# Patient Record
Sex: FEMALE | Race: WHITE | Employment: OTHER | URBAN - NONMETROPOLITAN AREA
[De-identification: names, ages, dates, MRNs, and addresses within clinical notes are randomized per-mention and may not be internally consistent; named-entity substitution may affect disease eponyms.]

---

## 2017-02-28 ENCOUNTER — PROCEDURE VISIT (OUTPATIENT)
Dept: CARDIOLOGY | Age: 72
End: 2017-02-28

## 2017-02-28 DIAGNOSIS — Z95.810 DUAL IMPLANTABLE CARDIOVERTER-DEFIBRILLATOR IN SITU: Primary | ICD-10-CM

## 2017-02-28 PROCEDURE — 93283 PRGRMG EVAL IMPLANTABLE DFB: CPT | Performed by: INTERNAL MEDICINE

## 2017-03-13 RX ORDER — METOPROLOL TARTRATE 50 MG/1
TABLET, FILM COATED ORAL
Qty: 180 TABLET | Refills: 0 | Status: SHIPPED | OUTPATIENT
Start: 2017-03-13 | End: 2017-06-11 | Stop reason: SDUPTHER

## 2017-03-13 RX ORDER — ENALAPRIL MALEATE 20 MG/1
TABLET ORAL
Qty: 180 TABLET | Refills: 0 | Status: SHIPPED | OUTPATIENT
Start: 2017-03-13 | End: 2017-06-11 | Stop reason: SDUPTHER

## 2017-03-13 RX ORDER — AMIODARONE HYDROCHLORIDE 200 MG/1
TABLET ORAL
Qty: 90 TABLET | Refills: 0 | Status: SHIPPED | OUTPATIENT
Start: 2017-03-13 | End: 2017-06-11 | Stop reason: SDUPTHER

## 2017-04-05 RX ORDER — ATORVASTATIN CALCIUM 80 MG/1
TABLET, FILM COATED ORAL
Qty: 90 TABLET | Refills: 0 | Status: SHIPPED | OUTPATIENT
Start: 2017-04-05 | End: 2017-07-06 | Stop reason: SDUPTHER

## 2017-05-10 DIAGNOSIS — C57.00 FALLOPIAN TUBE CANCER, CARCINOMA, UNSPECIFIED LATERALITY (HCC): Primary | ICD-10-CM

## 2017-05-17 ENCOUNTER — OFFICE VISIT (OUTPATIENT)
Dept: CARDIOLOGY | Age: 72
End: 2017-05-17

## 2017-05-17 VITALS
WEIGHT: 190 LBS | SYSTOLIC BLOOD PRESSURE: 148 MMHG | BODY MASS INDEX: 32.44 KG/M2 | HEIGHT: 64 IN | HEART RATE: 72 BPM | DIASTOLIC BLOOD PRESSURE: 78 MMHG

## 2017-05-17 DIAGNOSIS — Z95.810 DUAL IMPLANTABLE CARDIOVERTER-DEFIBRILLATOR IN SITU: ICD-10-CM

## 2017-05-17 DIAGNOSIS — I25.5 CARDIOMYOPATHY, ISCHEMIC: Primary | ICD-10-CM

## 2017-05-17 PROCEDURE — 3014F SCREEN MAMMO DOC REV: CPT | Performed by: INTERNAL MEDICINE

## 2017-05-17 PROCEDURE — G8598 ASA/ANTIPLAT THER USED: HCPCS | Performed by: INTERNAL MEDICINE

## 2017-05-17 PROCEDURE — 99213 OFFICE O/P EST LOW 20 MIN: CPT | Performed by: INTERNAL MEDICINE

## 2017-05-17 PROCEDURE — G8419 CALC BMI OUT NRM PARAM NOF/U: HCPCS | Performed by: INTERNAL MEDICINE

## 2017-05-17 PROCEDURE — 4040F PNEUMOC VAC/ADMIN/RCVD: CPT | Performed by: INTERNAL MEDICINE

## 2017-05-17 PROCEDURE — 3017F COLORECTAL CA SCREEN DOC REV: CPT | Performed by: INTERNAL MEDICINE

## 2017-05-17 PROCEDURE — 1090F PRES/ABSN URINE INCON ASSESS: CPT | Performed by: INTERNAL MEDICINE

## 2017-05-17 PROCEDURE — 1036F TOBACCO NON-USER: CPT | Performed by: INTERNAL MEDICINE

## 2017-05-17 PROCEDURE — 1123F ACP DISCUSS/DSCN MKR DOCD: CPT | Performed by: INTERNAL MEDICINE

## 2017-05-17 PROCEDURE — G8400 PT W/DXA NO RESULTS DOC: HCPCS | Performed by: INTERNAL MEDICINE

## 2017-05-17 PROCEDURE — G8427 DOCREV CUR MEDS BY ELIG CLIN: HCPCS | Performed by: INTERNAL MEDICINE

## 2017-06-12 RX ORDER — METOPROLOL TARTRATE 50 MG/1
TABLET, FILM COATED ORAL
Qty: 180 TABLET | Refills: 1 | Status: SHIPPED | OUTPATIENT
Start: 2017-06-12 | End: 2017-12-09 | Stop reason: SDUPTHER

## 2017-06-12 RX ORDER — AMIODARONE HYDROCHLORIDE 200 MG/1
TABLET ORAL
Qty: 90 TABLET | Refills: 1 | Status: SHIPPED | OUTPATIENT
Start: 2017-06-12 | End: 2017-12-09 | Stop reason: SDUPTHER

## 2017-06-12 RX ORDER — ENALAPRIL MALEATE 20 MG/1
TABLET ORAL
Qty: 180 TABLET | Refills: 1 | Status: SHIPPED | OUTPATIENT
Start: 2017-06-12 | End: 2017-12-09 | Stop reason: SDUPTHER

## 2017-07-06 RX ORDER — ATORVASTATIN CALCIUM 80 MG/1
TABLET, FILM COATED ORAL
Qty: 90 TABLET | Refills: 2 | Status: SHIPPED | OUTPATIENT
Start: 2017-07-06 | End: 2018-04-02 | Stop reason: SDUPTHER

## 2017-07-13 ENCOUNTER — PROCEDURE VISIT (OUTPATIENT)
Dept: CARDIOLOGY | Age: 72
End: 2017-07-13

## 2017-07-13 DIAGNOSIS — Z95.810 DUAL IMPLANTABLE CARDIOVERTER-DEFIBRILLATOR IN SITU: Primary | ICD-10-CM

## 2017-07-13 PROCEDURE — 93296 REM INTERROG EVL PM/IDS: CPT | Performed by: INTERNAL MEDICINE

## 2017-07-13 PROCEDURE — 93295 DEV INTERROG REMOTE 1/2/MLT: CPT | Performed by: INTERNAL MEDICINE

## 2017-09-13 ENCOUNTER — OFFICE VISIT (OUTPATIENT)
Dept: FAMILY MEDICINE CLINIC | Age: 72
End: 2017-09-13
Payer: MEDICARE

## 2017-09-13 VITALS
HEART RATE: 70 BPM | DIASTOLIC BLOOD PRESSURE: 74 MMHG | BODY MASS INDEX: 31.65 KG/M2 | TEMPERATURE: 98.2 F | SYSTOLIC BLOOD PRESSURE: 144 MMHG | HEIGHT: 64 IN | RESPIRATION RATE: 16 BRPM | WEIGHT: 185.4 LBS

## 2017-09-13 DIAGNOSIS — Z12.11 SPECIAL SCREENING FOR MALIGNANT NEOPLASMS, COLON: ICD-10-CM

## 2017-09-13 DIAGNOSIS — E11.9 TYPE 2 DIABETES MELLITUS WITHOUT COMPLICATION, WITHOUT LONG-TERM CURRENT USE OF INSULIN (HCC): Primary | ICD-10-CM

## 2017-09-13 DIAGNOSIS — E78.2 MIXED HYPERLIPIDEMIA: ICD-10-CM
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
## 2017-09-13 DIAGNOSIS — M25.562 ACUTE PAIN OF BOTH KNEES: ICD-10-CM

## 2017-09-13 DIAGNOSIS — Z23 NEED FOR VACCINATION: ICD-10-CM

## 2017-09-13 DIAGNOSIS — Z91.81 AT HIGH RISK FOR FALLS: ICD-10-CM

## 2017-09-13 DIAGNOSIS — M25.561 ACUTE PAIN OF BOTH KNEES: ICD-10-CM

## 2017-09-13 DIAGNOSIS — I10 ESSENTIAL HYPERTENSION: ICD-10-CM

## 2017-09-13 PROCEDURE — G8598 ASA/ANTIPLAT THER USED: HCPCS | Performed by: FAMILY MEDICINE

## 2017-09-13 PROCEDURE — 96372 THER/PROPH/DIAG INJ SC/IM: CPT | Performed by: FAMILY MEDICINE

## 2017-09-13 PROCEDURE — G8400 PT W/DXA NO RESULTS DOC: HCPCS | Performed by: FAMILY MEDICINE

## 2017-09-13 PROCEDURE — 3046F HEMOGLOBIN A1C LEVEL >9.0%: CPT | Performed by: FAMILY MEDICINE

## 2017-09-13 PROCEDURE — G8427 DOCREV CUR MEDS BY ELIG CLIN: HCPCS | Performed by: FAMILY MEDICINE

## 2017-09-13 PROCEDURE — G0009 ADMIN PNEUMOCOCCAL VACCINE: HCPCS | Performed by: FAMILY MEDICINE

## 2017-09-13 PROCEDURE — 99204 OFFICE O/P NEW MOD 45 MIN: CPT | Performed by: FAMILY MEDICINE

## 2017-09-13 PROCEDURE — 3014F SCREEN MAMMO DOC REV: CPT | Performed by: FAMILY MEDICINE

## 2017-09-13 PROCEDURE — 3017F COLORECTAL CA SCREEN DOC REV: CPT | Performed by: FAMILY MEDICINE

## 2017-09-13 PROCEDURE — 1090F PRES/ABSN URINE INCON ASSESS: CPT | Performed by: FAMILY MEDICINE

## 2017-09-13 PROCEDURE — 1123F ACP DISCUSS/DSCN MKR DOCD: CPT | Performed by: FAMILY MEDICINE

## 2017-09-13 PROCEDURE — 90732 PPSV23 VACC 2 YRS+ SUBQ/IM: CPT | Performed by: FAMILY MEDICINE

## 2017-09-13 PROCEDURE — G8417 CALC BMI ABV UP PARAM F/U: HCPCS | Performed by: FAMILY MEDICINE

## 2017-09-13 PROCEDURE — 1036F TOBACCO NON-USER: CPT | Performed by: FAMILY MEDICINE

## 2017-09-13 PROCEDURE — 4040F PNEUMOC VAC/ADMIN/RCVD: CPT | Performed by: FAMILY MEDICINE

## 2017-09-13 RX ORDER — TRIAMCINOLONE ACETONIDE 40 MG/ML
60 INJECTION, SUSPENSION INTRA-ARTICULAR; INTRAMUSCULAR ONCE
Status: DISCONTINUED | OUTPATIENT
Start: 2017-09-13 | End: 2017-09-13

## 2017-09-13 RX ORDER — KETOROLAC TROMETHAMINE 30 MG/ML
60 INJECTION, SOLUTION INTRAMUSCULAR; INTRAVENOUS ONCE
Status: COMPLETED | OUTPATIENT
Start: 2017-09-13 | End: 2017-09-13

## 2017-09-13 RX ORDER — TRAMADOL HYDROCHLORIDE 100 MG/1
100 TABLET, EXTENDED RELEASE ORAL DAILY PRN
Qty: 7 TABLET | Refills: 0 | Status: CANCELLED | OUTPATIENT
Start: 2017-09-13 | End: 2017-09-20

## 2017-09-13 RX ORDER — TRAMADOL HYDROCHLORIDE 50 MG/1
50 TABLET ORAL EVERY 8 HOURS PRN
Qty: 20 TABLET | Refills: 0 | Status: SHIPPED | OUTPATIENT
Start: 2017-09-13 | End: 2017-11-20 | Stop reason: SDUPTHER

## 2017-09-13 RX ADMIN — KETOROLAC TROMETHAMINE 60 MG: 30 INJECTION, SOLUTION INTRAMUSCULAR; INTRAVENOUS at 09:28

## 2017-09-13 ASSESSMENT — ENCOUNTER SYMPTOMS
COUGH: 0
SHORTNESS OF BREATH: 0
RHINORRHEA: 0
WHEEZING: 0
EYE DISCHARGE: 0
DIARRHEA: 0
NAUSEA: 0
CONSTIPATION: 0
SORE THROAT: 0
ABDOMINAL PAIN: 0

## 2017-09-14 ENCOUNTER — HOSPITAL ENCOUNTER (OUTPATIENT)
Dept: GENERAL RADIOLOGY | Age: 72
Discharge: HOME | End: 2017-09-14
Payer: MEDICARE

## 2017-09-14 ENCOUNTER — HOSPITAL ENCOUNTER (OUTPATIENT)
Age: 72
Discharge: HOME | End: 2017-09-14
Payer: MEDICARE
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
## 2017-09-14 DIAGNOSIS — M25.562 ACUTE PAIN OF BOTH KNEES: ICD-10-CM

## 2017-09-14 DIAGNOSIS — M25.561 ACUTE PAIN OF BOTH KNEES: ICD-10-CM

## 2017-09-14 PROCEDURE — 73565 X-RAY EXAM OF KNEES: CPT

## 2017-09-21 ENCOUNTER — PROCEDURE VISIT (OUTPATIENT)
Dept: CARDIOLOGY CLINIC | Age: 72
End: 2017-09-21
Payer: MEDICARE

## 2017-09-21 DIAGNOSIS — Z95.810 DUAL IMPLANTABLE CARDIOVERTER-DEFIBRILLATOR IN SITU: ICD-10-CM

## 2017-09-21 DIAGNOSIS — I25.5 CARDIOMYOPATHY, ISCHEMIC: Primary | ICD-10-CM

## 2017-09-21 PROCEDURE — 93297 REM INTERROG DEV EVAL ICPMS: CPT | Performed by: INTERNAL MEDICINE

## 2017-09-26 ENCOUNTER — OFFICE VISIT (OUTPATIENT)
Dept: FAMILY MEDICINE CLINIC | Age: 72
End: 2017-09-26
Payer: MEDICARE

## 2017-09-26 VITALS
SYSTOLIC BLOOD PRESSURE: 132 MMHG | DIASTOLIC BLOOD PRESSURE: 76 MMHG | HEART RATE: 72 BPM | WEIGHT: 180.8 LBS | BODY MASS INDEX: 31.25 KG/M2 | RESPIRATION RATE: 12 BRPM

## 2017-09-26 DIAGNOSIS — E11.9 TYPE 2 DIABETES MELLITUS WITHOUT COMPLICATION, WITHOUT LONG-TERM CURRENT USE OF INSULIN (HCC): Primary | ICD-10-CM

## 2017-09-26 PROCEDURE — 1090F PRES/ABSN URINE INCON ASSESS: CPT | Performed by: FAMILY MEDICINE

## 2017-09-26 PROCEDURE — 1036F TOBACCO NON-USER: CPT | Performed by: FAMILY MEDICINE

## 2017-09-26 PROCEDURE — 3014F SCREEN MAMMO DOC REV: CPT | Performed by: FAMILY MEDICINE

## 2017-09-26 PROCEDURE — 3046F HEMOGLOBIN A1C LEVEL >9.0%: CPT | Performed by: FAMILY MEDICINE

## 2017-09-26 PROCEDURE — 1123F ACP DISCUSS/DSCN MKR DOCD: CPT | Performed by: FAMILY MEDICINE

## 2017-09-26 PROCEDURE — 4040F PNEUMOC VAC/ADMIN/RCVD: CPT | Performed by: FAMILY MEDICINE

## 2017-09-26 PROCEDURE — G8417 CALC BMI ABV UP PARAM F/U: HCPCS | Performed by: FAMILY MEDICINE

## 2017-09-26 PROCEDURE — G8427 DOCREV CUR MEDS BY ELIG CLIN: HCPCS | Performed by: FAMILY MEDICINE

## 2017-09-26 PROCEDURE — 82274 ASSAY TEST FOR BLOOD FECAL: CPT | Performed by: FAMILY MEDICINE

## 2017-09-26 PROCEDURE — G8400 PT W/DXA NO RESULTS DOC: HCPCS | Performed by: FAMILY MEDICINE

## 2017-09-26 PROCEDURE — 99214 OFFICE O/P EST MOD 30 MIN: CPT | Performed by: FAMILY MEDICINE

## 2017-09-26 PROCEDURE — G8598 ASA/ANTIPLAT THER USED: HCPCS | Performed by: FAMILY MEDICINE

## 2017-09-26 PROCEDURE — 3017F COLORECTAL CA SCREEN DOC REV: CPT | Performed by: FAMILY MEDICINE

## 2017-09-26 RX ORDER — GABAPENTIN 100 MG/1
100 CAPSULE ORAL DAILY
Qty: 90 CAPSULE | Refills: 3 | Status: SHIPPED | OUTPATIENT
Start: 2017-09-26 | End: 2017-10-31

## 2017-09-26 ASSESSMENT — ENCOUNTER SYMPTOMS
RHINORRHEA: 0
EYE DISCHARGE: 0
SHORTNESS OF BREATH: 0
CONSTIPATION: 0
COUGH: 0
WHEEZING: 0
DIARRHEA: 0
SORE THROAT: 0
NAUSEA: 0
ABDOMINAL PAIN: 0

## 2017-09-27 ENCOUNTER — HOSPITAL ENCOUNTER (OUTPATIENT)
Age: 72
Discharge: HOME | End: 2017-09-27
Payer: MEDICARE

## 2017-09-27 DIAGNOSIS — E11.9 TYPE 2 DIABETES MELLITUS WITHOUT COMPLICATION, WITHOUT LONG-TERM CURRENT USE OF INSULIN (HCC): ICD-10-CM

## 2017-09-27 LAB
CREATININE, URINE: 64.8 MG/DL
MICROALBUMIN UR-MCNC: < 1.2 MG/DL
MICROALBUMIN/CREAT UR-RTO: 19 MG/G (ref 0–30)

## 2017-09-27 PROCEDURE — 83036 HEMOGLOBIN GLYCOSYLATED A1C: CPT

## 2017-09-27 PROCEDURE — 82043 UR ALBUMIN QUANTITATIVE: CPT

## 2017-09-29 LAB — MISC. #1 REFERENCE GROUP TEST: ABNORMAL

## 2017-10-02 LAB
CONTROL: NORMAL
HEMOCCULT STL QL: NORMAL

## 2017-10-03 ENCOUNTER — OFFICE VISIT (OUTPATIENT)
Dept: FAMILY MEDICINE CLINIC | Age: 72
End: 2017-10-03
Payer: MEDICARE

## 2017-10-03 VITALS
DIASTOLIC BLOOD PRESSURE: 78 MMHG | HEIGHT: 64 IN | BODY MASS INDEX: 30.32 KG/M2 | WEIGHT: 177.6 LBS | TEMPERATURE: 97.6 F | RESPIRATION RATE: 16 BRPM | HEART RATE: 70 BPM | SYSTOLIC BLOOD PRESSURE: 138 MMHG

## 2017-10-03 DIAGNOSIS — E11.42 DIABETIC POLYNEUROPATHY ASSOCIATED WITH TYPE 2 DIABETES MELLITUS (HCC): ICD-10-CM

## 2017-10-03 DIAGNOSIS — E11.65 TYPE 2 DIABETES MELLITUS WITH HYPERGLYCEMIA, WITHOUT LONG-TERM CURRENT USE OF INSULIN (HCC): Primary | ICD-10-CM

## 2017-10-03 DIAGNOSIS — I10 ESSENTIAL HYPERTENSION: ICD-10-CM

## 2017-10-03 PROBLEM — E11.40 DIABETIC NEUROPATHY (HCC): Status: ACTIVE | Noted: 2017-10-03

## 2017-10-03 PROCEDURE — G8598 ASA/ANTIPLAT THER USED: HCPCS | Performed by: FAMILY MEDICINE

## 2017-10-03 PROCEDURE — G8400 PT W/DXA NO RESULTS DOC: HCPCS | Performed by: FAMILY MEDICINE

## 2017-10-03 PROCEDURE — 4040F PNEUMOC VAC/ADMIN/RCVD: CPT | Performed by: FAMILY MEDICINE

## 2017-10-03 PROCEDURE — 1090F PRES/ABSN URINE INCON ASSESS: CPT | Performed by: FAMILY MEDICINE

## 2017-10-03 PROCEDURE — 99214 OFFICE O/P EST MOD 30 MIN: CPT | Performed by: FAMILY MEDICINE

## 2017-10-03 PROCEDURE — 3017F COLORECTAL CA SCREEN DOC REV: CPT | Performed by: FAMILY MEDICINE

## 2017-10-03 PROCEDURE — G8427 DOCREV CUR MEDS BY ELIG CLIN: HCPCS | Performed by: FAMILY MEDICINE

## 2017-10-03 PROCEDURE — 1123F ACP DISCUSS/DSCN MKR DOCD: CPT | Performed by: FAMILY MEDICINE

## 2017-10-03 PROCEDURE — G8484 FLU IMMUNIZE NO ADMIN: HCPCS | Performed by: FAMILY MEDICINE

## 2017-10-03 PROCEDURE — 1036F TOBACCO NON-USER: CPT | Performed by: FAMILY MEDICINE

## 2017-10-03 PROCEDURE — 3014F SCREEN MAMMO DOC REV: CPT | Performed by: FAMILY MEDICINE

## 2017-10-03 PROCEDURE — 3046F HEMOGLOBIN A1C LEVEL >9.0%: CPT | Performed by: FAMILY MEDICINE

## 2017-10-03 PROCEDURE — G8417 CALC BMI ABV UP PARAM F/U: HCPCS | Performed by: FAMILY MEDICINE

## 2017-10-03 ASSESSMENT — ENCOUNTER SYMPTOMS
VISUAL CHANGE: 0
SORE THROAT: 0
COUGH: 0
DIARRHEA: 0
SHORTNESS OF BREATH: 0
NAUSEA: 0
ABDOMINAL PAIN: 0
WHEEZING: 0
RHINORRHEA: 0
CONSTIPATION: 0
EYE DISCHARGE: 0

## 2017-10-03 NOTE — PATIENT INSTRUCTIONS
Learning About Diabetes Food Guidelines  Your Care Instructions  Meal planning is important to manage diabetes. It helps keep your blood sugar at a target level (which you set with your doctor). You don't have to eat special foods. You can eat what your family eats, including sweets once in a while. But you do have to pay attention to how often you eat and how much you eat of certain foods. You may want to work with a dietitian or a certified diabetes educator (CDE) to help you plan meals and snacks. A dietitian or CDE can also help you lose weight if that is one of your goals. What should you know about eating carbs? Managing the amount of carbohydrate (carbs) you eat is an important part of healthy meals when you have diabetes. Carbohydrate is found in many foods. · Learn which foods have carbs. And learn the amounts of carbs in different foods. ¨ Bread, cereal, pasta, and rice have about 15 grams of carbs in a serving. A serving is 1 slice of bread (1 ounce), ½ cup of cooked cereal, or 1/3 cup of cooked pasta or rice. ¨ Fruits have 15 grams of carbs in a serving. A serving is 1 small fresh fruit, such as an apple or orange; ½ of a banana; ½ cup of cooked or canned fruit; ½ cup of fruit juice; 1 cup of melon or raspberries; or 2 tablespoons of dried fruit. ¨ Milk and no-sugar-added yogurt have 15 grams of carbs in a serving. A serving is 1 cup of milk or 2/3 cup of no-sugar-added yogurt. ¨ Starchy vegetables have 15 grams of carbs in a serving. A serving is ½ cup of mashed potatoes or sweet potato; 1 cup winter squash; ½ of a small baked potato; ½ cup of cooked beans; or ½ cup cooked corn or green peas. · Learn how much carbs to eat each day and at each meal. A dietitian or CDE can teach you how to keep track of the amount of carbs you eat. This is called carbohydrate counting. · If you are not sure how to count carbohydrate grams, use the Plate Method to plan meals.  It is a good, quick way to make skip meals. Your blood sugar may drop too low if you skip meals and take insulin or certain medicines for diabetes. · Check with your doctor before you drink alcohol. Alcohol can cause your blood sugar to drop too low. Alcohol can also cause a bad reaction if you take certain diabetes medicines. Follow-up care is a key part of your treatment and safety. Be sure to make and go to all appointments, and call your doctor if you are having problems. It's also a good idea to know your test results and keep a list of the medicines you take. Where can you learn more? Go to https://SourceDogg.compepicewPlayDo.Meal Sharing. org and sign in to your Macrotek account. Enter M507 in the [facility] box to learn more about \"Learning About Diabetes Food Guidelines. \"     If you do not have an account, please click on the \"Sign Up Now\" link. Current as of: March 13, 2017  Content Version: 11.3  © 5384-9159 [employer]. Care instructions adapted under license by [facility] ([facility]). If you have questions about a medical condition or this instruction, always ask your healthcare professional. [place] [address] any warranty or liability for your use of this information. Diabetes and Preventing Falls: Care Instructions  Your Care Instructions  If you are an older adult who has diabetes, you may have a higher risk of falling. Complications of diabetessuch as nerve damage, foot problems, and reduced visionmay increase your risk of a fall. Some of your medicines also may add to your risk. By making your home safer, you can lower your risk of falling. Doing things to prevent diabetes complications may also help to lower your risk. You can make your home safer with a few simple measures. Follow-up care is a key part of your treatment and safety. Be sure to make and go to all appointments, and call your doctor if you are having problems.  It's also a good idea to know your test results and keep a list of the medicines you take. How can you care for yourself at home? Taking care of yourself  · Keep your blood sugar at a target level (which you set with your doctor). · Exercise regularly to improve your strength, muscle tone, and balance. Walk if you can. Swimming may be a good choice if you cannot walk easily. · Have your vision checked as often as your doctor recommends. It is usually once a year or more often if you have eye problems. · Know the side effects of the medicines you take. Ask your doctor or pharmacist whether the medicines you take can affect your balance. Sleeping pills or sedatives can affect your balance. · Limit the amount of alcohol you drink. Alcohol can impair your balance and other senses. · Have your doctor check your feet during each visit. If you have a foot problem, see your doctor. Preventing falls at home  · Remove raised doorway thresholds, throw rugs, and clutter. Repair loose carpet or raised areas in the floor. · Move furniture and electrical cords to keep them out of walking paths. · Use nonskid floor wax, and wipe up spills right away, especially on ceramic tile floors. · If you use a walker or cane, put rubber tips on it. If you use crutches, clean the bottoms of them regularly with an abrasive pad, such as steel wool. · Keep your house well lit, especially [place], and outside walkways. Use night-lights in areas such as hallways and bathrooms. Add extra light switches or use remote switches (such as switches that go on or off when you clap your hands) to make it easier to turn lights on if you have to get up during the night. · Install sturdy handrails on stairways. Put grab bars near your shower, bathtub, and toilet. · Store household items on low shelves so that you do not have to climb or reach high. Or use a reaching device that you can get at a medical supply store.  If you have to climb for something, use a step stool with handrails, or ask someone to get

## 2017-10-03 NOTE — MR AVS SNAPSHOT
[address] [place] [address] [address] Jesus Guess [phone]      You Were Seen for:         Comments    Type 2 diabetes mellitus with hyperglycemia, without long-term current use of insulin ([facility]   [[id number]]         Vital Signs     Blood Pressure Pulse Temperature Respirations Height Weight    138/78 (Site: Left Arm, Position: Sitting, Cuff Size: Medium Adult) 70 97.6 °F (36.4 °C) (Oral) 16 5' 3.78\" (1.62 m) 177 lb 9.6 oz (80.6 kg)    Body Mass Index Smoking Status                30.7 kg/m2 Former Smoker          Additional Information about your Body Mass Index (BMI)           Your BMI as listed above is considered obese (30 or more). BMI is an estimate of body fat, calculated from your height and weight. The higher your BMI, the greater your risk of heart disease, high blood pressure, type 2 diabetes, stroke, gallstones, arthritis, sleep apnea, and certain cancers. BMI is not perfect. It may overestimate body fat in athletes and people who are more muscular. Even a small weight loss (between 5 and 10 percent of your current weight) by decreasing your calorie intake and becoming more physically active will help lower your risk of developing or worsening diseases associated with obesity. Learn more at: SiteJabber.co.uk          Instructions         Learning About Diabetes Food Guidelines  Your Care Instructions  Meal planning is important to manage diabetes. It helps keep your blood sugar at a target level (which you set with your doctor). You don't have to eat special foods. You can eat what your family eats, including sweets once in a while. But you do have to pay attention to how often you eat and how much you eat of certain foods. You may want to work with a dietitian or a certified diabetes educator (CDE) to help you plan meals and snacks. A dietitian or CDE can also help you lose weight if that is one of your goals. What should you know about eating carbs? Managing the amount of carbohydrate (carbs) you eat is an important part of healthy meals when you have diabetes. Carbohydrate is found in many foods. · Learn which foods have carbs. And learn the amounts of carbs in different foods. ¨ Bread, cereal, pasta, and rice have about 15 grams of carbs in a serving. A serving is 1 slice of bread (1 ounce), ½ cup of cooked cereal, or 1/3 cup of cooked pasta or rice. ¨ Fruits have 15 grams of carbs in a serving. A serving is 1 small fresh fruit, such as an apple or orange; ½ of a banana; ½ cup of cooked or canned fruit; ½ cup of fruit juice; 1 cup of melon or raspberries; or 2 tablespoons of dried fruit. ¨ Milk and no-sugar-added yogurt have 15 grams of carbs in a serving. A serving is 1 cup of milk or 2/3 cup of no-sugar-added yogurt. ¨ Starchy vegetables have 15 grams of carbs in a serving. A serving is ½ cup of mashed potatoes or sweet potato; 1 cup winter squash; ½ of a small baked potato; ½ cup of cooked beans; or ½ cup cooked corn or green peas. · Learn how much carbs to eat each day and at each meal. A dietitian or CDE can teach you how to keep track of the amount of carbs you eat. This is called carbohydrate counting. · If you are not sure how to count carbohydrate grams, use the Plate Method to plan meals. It is a good, quick way to make sure that you have a balanced meal. It also helps you spread carbs throughout the day. ¨ Divide your plate by types of foods. Put non-starchy vegetables on half the plate, meat or other protein food on one-quarter of the plate, and a grain or starchy vegetable in the final quarter of the plate.  To this you can add a small piece of fruit and 1 cup of milk or yogurt, depending on how many carbs you are supposed to eat at a meal.  · Try to eat about the same amount of carbs at each meal. Do not \"save up\" your daily allowance of carbs to eat at one meal. · Proteins have very little or no carbs per serving. Examples of proteins are beef, chicken, turkey, fish, eggs, tofu, cheese, cottage cheese, and peanut butter. A serving size of meat is 3 ounces, which is about the size of a deck of cards. Examples of meat substitute serving sizes (equal to 1 ounce of meat) are 1/4 cup of cottage cheese, 1 egg, 1 tablespoon of peanut butter, and ½ cup of tofu. How can you eat out and still eat healthy? · Learn to estimate the serving sizes of foods that have carbohydrate. If you measure food at home, it will be easier to estimate the amount in a serving of restaurant food. · If the meal you order has too much carbohydrate (such as potatoes, corn, or baked beans), ask to have a low-carbohydrate food instead. Ask for a salad or green vegetables. · If you use insulin, check your blood sugar before and after eating out to help you plan how much to eat in the future. · If you eat more carbohydrate at a meal than you had planned, take a walk or do other exercise. This will help lower your blood sugar. What else should you know? · Limit saturated fat, such as the fat from meat and dairy products. This is a healthy choice because people who have diabetes are at higher risk of heart disease. So choose lean cuts of meat and nonfat or low-fat dairy products. Use olive or canola oil instead of butter or shortening when cooking. · Don't skip meals. Your blood sugar may drop too low if you skip meals and take insulin or certain medicines for diabetes. · Check with your doctor before you drink alcohol. Alcohol can cause your blood sugar to drop too low. Alcohol can also cause a bad reaction if you take certain diabetes medicines. Follow-up care is a key part of your treatment and safety. Be sure to make and go to all appointments, and call your doctor if you are having problems. It's also a good idea to know your test results and keep a list of the medicines you take. Where can you learn more? Go to https://chpepiceweb.Getonic. org and sign in to your Tonchidot account. Enter P602 in the [employer] box to learn more about \"Learning About Diabetes Food Guidelines. \"     If you do not have an account, please click on the \"Sign Up Now\" link. Current as of: March 13, 2017  Content Version: 11.3  © 0906-8239 [employer] ([employer]). Care instructions adapted under license by [facility] ([facility]). If you have questions about a medical condition or this instruction, always ask your healthcare professional. [place] [address] any warranty or liability for your use of this information. Diabetes and Preventing Falls: Care Instructions  Your Care Instructions  If you are an older adult who has diabetes, you may have a higher risk of falling. Complications of diabetessuch as nerve damage, foot problems, and reduced visionmay increase your risk of a fall. Some of your medicines also may add to your risk. By making your home safer, you can lower your risk of falling. Doing things to prevent diabetes complications may also help to lower your risk. You can make your home safer with a few simple measures. Follow-up care is a key part of your treatment and safety. Be sure to make and go to all appointments, and call your doctor if you are having problems. It's also a good idea to know your test results and keep a list of the medicines you take. How can you care for yourself at home? Taking care of yourself  · Keep your blood sugar at a target level (which you set with your doctor). · Exercise regularly to improve your strength, muscle tone, and balance. Walk if you can. Swimming may be a good choice if you cannot walk easily. · Have your vision checked as often as your doctor recommends. It is usually once a year or more often if you have eye problems. · Know the side effects of the medicines you take.  Ask your doctor or pharmacist whether the medicines you take can affect your balance. Sleeping pills or sedatives can affect your balance. · Limit the amount of alcohol you drink. Alcohol can impair your balance and other senses. · Have your doctor check your feet during each visit. If you have a foot problem, see your doctor. Preventing falls at home  · Remove raised doorway thresholds, throw rugs, and clutter. Repair loose carpet or raised areas in the floor. · Move furniture and electrical cords to keep them out of walking paths. · Use nonskid floor wax, and wipe up spills right away, especially on ceramic tile floors. · If you use a walker or cane, put rubber tips on it. If you use crutches, clean the bottoms of them regularly with an abrasive pad, such as steel wool. · Keep your house well lit, especially [place], and outside walkways. Use night-lights in areas such as hallways and bathrooms. Add extra light switches or use remote switches (such as switches that go on or off when you clap your hands) to make it easier to turn lights on if you have to get up during the night. · Install sturdy handrails on stairways. Put grab bars near your shower, bathtub, and toilet. · Store household items on low shelves so that you do not have to climb or reach high. Or use a reaching device that you can get at a medical supply store. If you have to climb for something, use a step stool with handrails, or ask someone to get it for you. · Keep a cordless phone and a flashlight with new batteries by your bed. If possible, put a phone in each of the main rooms of your house, or carry a cell phone in case you fall and cannot reach a phone. Or you can wear a device around your neck or wrist. You push a button that sends a signal for help. · Wear low-heeled shoes that fit well and give your feet good support. Use footwear with nonskid soles.  Check the heels and soles of your shoes for wear. Repair or replace worn heels or soles. · Do not wear socks without shoes on wood floors. · Walk on the grass when the sidewalks are slippery. If you live in an area that gets snow and ice in the winter, sprinkle salt on slippery steps and sidewalks. Where can you learn more? Go to https://FaisonsAffaire.compepiceweb.20x200. org and sign in to your Referrizer account. Enter E309 in the Puddle box to learn more about \"Diabetes and Preventing Falls: Care Instructions. \"     If you do not have an account, please click on the \"Sign Up Now\" link. Current as of: August 4, 2016  Content Version: 11.3  © 3808-5397 [employer]. Care instructions adapted under license by [facility] ([facility]). If you have questions about a medical condition or this instruction, always ask your healthcare professional. [place] [address] any warranty or liability for your use of this information. Today's Medication Changes          These changes are accurate as of: 10/3/17 10:05 AM.  If you have any questions, ask your nurse or doctor. START taking these medications           insulin glargine 100 UNIT/ML injection pen   Commonly known as:  [facility]   Instructions:  Inject 10 Units into the skin nightly   Quantity:  5 Pen   Refills:  3   Started by:  Columba Agarwal MD            Where to Get Your Medications      These medications were sent to [facility]. - P [phone] - F [phone]  [address]  [address] [address], [place] [address]     Phone:  [phone]     insulin glargine 100 UNIT/ML injection pen               Your Current Medications Are              insulin glargine (BASAGLAR KWIKPEN) 100 UNIT/ML injection pen Inject 10 Units into the skin nightly    gabapentin (NEURONTIN) 100 MG capsule Take 1 capsule by mouth daily    atorvastatin (LIPITOR) 80 MG tablet TAKE 1 TABLET DAILY metoprolol tartrate (LOPRESSOR) 50 MG tablet TAKE 1 TABLET TWICE A DAY    amiodarone (CORDARONE) 200 MG tablet TAKE 1 TABLET DAILY    enalapril (VASOTEC) 20 MG tablet TAKE 1 TABLET TWICE A DAY    glucose blood VI test strips (ASCENSIA AUTODISC VI;ONE TOUCH ULTRA TEST VI) strip Test once daily. Dispense One Touch Ultra Test Strips. Dx: Type 2 diabetes (250.00)    aspirin 325 MG tablet Take 325 mg by mouth daily. Allergies              Sulfa Antibiotics       We Ordered/Performed the following            [facility] FOOT [facility]     Scheduling Instructions:    [address]intia [address]   [address] [facility] [address], [facility]  [phone]    Comments: The patient can be scheduled with any member of the group, including the provider with the first available appointments.           Additional Information        Basic Information     Date Of Birth Sex Race Ethnicity Preferred Language Preferred Written Language    1945 Female White Non-/Non  English English      Problem List as of 10/3/2017  Date Reviewed: 5/17/2017                Diabetic neuropathy ([facility]    Type 2 diabetes mellitus with hyperglycemia, without long-term current use of insulin ([facility]    Cardiomyopathy, ischemic    Hyperlipidemia    Hypertension    Ventricular arrhythmia    [facility] Boris dual ICD      Immunizations as of 10/3/2017     Name Date    Influenza Vaccine, unspecified formulation 11/6/2015    Pneumococcal Polysaccharide (Wpoxyvnkg75) 9/13/2017      Preventive Care        Date Due    Hemoglobin A1C (Test For Long-Term Glucose Control) 1/3/1955    Eye Exam By An Eye Doctor 1/3/1955    Tetanus Combination Vaccine (1 - Tdap) 1/3/1963    Cholesterol Screening 4/25/2015    Diabetic Foot Exam 1/26/2016    Mammograms are recommended every 2 years for low/average risk patients

## 2017-10-03 NOTE — PROGRESS NOTES
change in her home blood glucose trend. An ACE inhibitor/angiotensin II receptor blocker is being taken. She does not see a podiatrist.Eye exam is not current. Past Medical History:   Diagnosis Date    CAD (coronary artery disease)     Hyperlipidemia     Hypertension     ICD (implantable cardiac defibrillator), dual, in situ     [facility]. Boris dual ICD    Shingles 12/2014    St Boris dual ICD     Type II or unspecified type diabetes mellitus without mention of complication, not stated as uncontrolled     Ventricular arrhythmia       Past Surgical History:   Procedure Laterality Date    CARDIAC PACEMAKER PLACEMENT  9-08-09    [facility]    CARDIOVASCULAR STRESS TEST  01-27-10    Excellent treadmill exercise. Improved functional capacity to functional class 1 completed 8 METS. Hemodynamic response was appropriate. No ischemic ECG changes noted.  CARDIOVASCULAR STRESS TEST  10-28-09    No evidence of stress induced ischemia. Evidence of  prior transmural MI demonstrated by transient fixed defects of inferior wall extending into lateral wall, indicative of RCA lesion. Bowel and soft tissue attenuation interfering w/ counts of lateral wall. EF 29% w/ severe septal and inferolateral hypokinesis w/ very mild lateral wall hypokinesis being noted.  CARDIOVERSION  8-29-09    CHOLECYSTECTOMY  2005    Laparoscopic    CORONARY ARTERY BYPASS GRAFT      DIAGNOSTIC CARDIAC CATH LAB PROCEDURE  8-24-09    Multivessel disease demonstrated by LAD having 70-80%  proximal lesion and napkin-ring lesion at bifurcation w/ D2. D2 appears to be medium large caliber vessel which has an ostial lesion of 70-80%. left -to-left collaterals as well as left-to-right collaterals emanating from LAD to PDA. Circumflex had anterior marginal branch and posterior marginal branch.  HERNIA REPAIR      Multiple    HYSTERECTOMY  1997    TRANSTHORACIC ECHOCARDIOGRAM  07-11-11    LV size mildly to moderately dilated.  Systolic function 01/26/2017    Flu vaccine (1) 09/01/2017    Pneumococcal low/med risk (2 of 2 - PCV13) 09/13/2018    Colon Cancer Screen FIT/FOBT  09/26/2018    Diabetic microalbuminuria test  09/27/2018    Zostavax vaccine  Addressed    DEXA (modify frequency per FRAX score)  Addressed       Subjective:      Review of Systems   Constitutional: Negative for chills, fatigue, fever and weight loss. HENT: Negative for congestion, rhinorrhea and sore throat. Eyes: Negative for discharge and visual disturbance. Respiratory: Negative for cough, shortness of breath and wheezing. Cardiovascular: Negative for chest pain and palpitations. Gastrointestinal: Negative for abdominal pain, constipation, diarrhea and nausea. Endocrine: Positive for polydipsia, polyphagia and polyuria. Genitourinary: Positive for frequency. Negative for dysuria and hematuria. Musculoskeletal: Negative for arthralgias and myalgias. Neurological: Positive for numbness. Negative for dizziness, weakness and headaches. Psychiatric/Behavioral: Negative for sleep disturbance. The patient is not nervous/anxious. Objective:     Physical Exam   Constitutional: She is oriented to person, place, and time. She appears well-developed and well-nourished. HENT:   Head: Normocephalic and atraumatic. Eyes: Conjunctivae and EOM are normal. Right eye exhibits no discharge. Left eye exhibits no discharge. No scleral icterus. Neck: Normal range of motion. Cardiovascular: Normal rate, regular rhythm and normal heart sounds. Pulmonary/Chest: Effort normal and breath sounds normal. She has no wheezes. Abdominal: Soft. Bowel sounds are normal. She exhibits no distension. There is no tenderness. Musculoskeletal: She exhibits no edema or tenderness. Lymphadenopathy:     She has no cervical adenopathy. Neurological: She is alert and oriented to person, place, and time. Coordination normal.   Skin: Skin is warm and dry.    Psychiatric: She

## 2017-10-09 ENCOUNTER — TELEPHONE (OUTPATIENT)
Dept: FAMILY MEDICINE CLINIC | Age: 72
End: 2017-10-09

## 2017-10-09 PROBLEM — E11.65 TYPE 2 DIABETES MELLITUS WITH HYPERGLYCEMIA, WITHOUT LONG-TERM CURRENT USE OF INSULIN (HCC): Status: RESOLVED | Noted: 2017-09-13 | Resolved: 2017-10-09

## 2017-10-09 PROBLEM — Z79.4 TYPE 2 DIABETES MELLITUS WITH HYPERGLYCEMIA, WITH LONG-TERM CURRENT USE OF INSULIN (HCC): Status: ACTIVE | Noted: 2017-10-09

## 2017-10-09 PROBLEM — E11.65 TYPE 2 DIABETES MELLITUS WITH HYPERGLYCEMIA, WITH LONG-TERM CURRENT USE OF INSULIN (HCC): Status: ACTIVE | Noted: 2017-10-09

## 2017-10-18 ENCOUNTER — TELEPHONE (OUTPATIENT)
Dept: FAMILY MEDICINE CLINIC | Age: 72
End: 2017-10-18

## 2017-10-24 ENCOUNTER — TELEPHONE (OUTPATIENT)
Dept: FAMILY MEDICINE CLINIC | Age: 72
End: 2017-10-24

## 2017-10-25 ENCOUNTER — PROCEDURE VISIT (OUTPATIENT)
Dept: CARDIOLOGY CLINIC | Age: 72
End: 2017-10-25

## 2017-10-25 DIAGNOSIS — I25.5 CARDIOMYOPATHY, ISCHEMIC: Primary | ICD-10-CM

## 2017-10-25 DIAGNOSIS — Z95.810 DUAL IMPLANTABLE CARDIOVERTER-DEFIBRILLATOR IN SITU: ICD-10-CM

## 2017-10-25 NOTE — PROGRESS NOTES
6.2-6.7 years on device   At imped 360   Shock 51  At threshold 1 @ 0.5  RV 1.5 @ 0.5  P waves 1 RV 12    86% atrial paced 0% RV paced

## 2017-10-31 ENCOUNTER — OFFICE VISIT (OUTPATIENT)
Dept: FAMILY MEDICINE CLINIC | Age: 72
End: 2017-10-31
Payer: MEDICARE

## 2017-10-31 VITALS
BODY MASS INDEX: 30.46 KG/M2 | WEIGHT: 178.4 LBS | DIASTOLIC BLOOD PRESSURE: 64 MMHG | TEMPERATURE: 98.2 F | SYSTOLIC BLOOD PRESSURE: 124 MMHG | RESPIRATION RATE: 12 BRPM | HEIGHT: 64 IN | HEART RATE: 70 BPM

## 2017-10-31 DIAGNOSIS — E11.65 TYPE 2 DIABETES MELLITUS WITH HYPERGLYCEMIA, WITH LONG-TERM CURRENT USE OF INSULIN (HCC): Primary | ICD-10-CM

## 2017-10-31 DIAGNOSIS — E11.42 DIABETIC POLYNEUROPATHY ASSOCIATED WITH TYPE 2 DIABETES MELLITUS (HCC): ICD-10-CM

## 2017-10-31 DIAGNOSIS — Z79.4 TYPE 2 DIABETES MELLITUS WITH HYPERGLYCEMIA, WITH LONG-TERM CURRENT USE OF INSULIN (HCC): Primary | ICD-10-CM

## 2017-10-31 DIAGNOSIS — Z23 NEED FOR VACCINATION: ICD-10-CM

## 2017-10-31 DIAGNOSIS — E11.65 TYPE 2 DIABETES MELLITUS WITH HYPERGLYCEMIA, WITHOUT LONG-TERM CURRENT USE OF INSULIN (HCC): ICD-10-CM

## 2017-10-31 LAB — HBA1C MFR BLD: 14 %

## 2017-10-31 PROCEDURE — G8427 DOCREV CUR MEDS BY ELIG CLIN: HCPCS | Performed by: FAMILY MEDICINE

## 2017-10-31 PROCEDURE — 83036 HEMOGLOBIN GLYCOSYLATED A1C: CPT | Performed by: FAMILY MEDICINE

## 2017-10-31 PROCEDURE — G8598 ASA/ANTIPLAT THER USED: HCPCS | Performed by: FAMILY MEDICINE

## 2017-10-31 PROCEDURE — 1036F TOBACCO NON-USER: CPT | Performed by: FAMILY MEDICINE

## 2017-10-31 PROCEDURE — 3017F COLORECTAL CA SCREEN DOC REV: CPT | Performed by: FAMILY MEDICINE

## 2017-10-31 PROCEDURE — 3046F HEMOGLOBIN A1C LEVEL >9.0%: CPT | Performed by: FAMILY MEDICINE

## 2017-10-31 PROCEDURE — 99214 OFFICE O/P EST MOD 30 MIN: CPT | Performed by: FAMILY MEDICINE

## 2017-10-31 PROCEDURE — 90688 IIV4 VACCINE SPLT 0.5 ML IM: CPT | Performed by: FAMILY MEDICINE

## 2017-10-31 PROCEDURE — G0008 ADMIN INFLUENZA VIRUS VAC: HCPCS | Performed by: FAMILY MEDICINE

## 2017-10-31 PROCEDURE — G8417 CALC BMI ABV UP PARAM F/U: HCPCS | Performed by: FAMILY MEDICINE

## 2017-10-31 PROCEDURE — G8484 FLU IMMUNIZE NO ADMIN: HCPCS | Performed by: FAMILY MEDICINE

## 2017-10-31 PROCEDURE — 4040F PNEUMOC VAC/ADMIN/RCVD: CPT | Performed by: FAMILY MEDICINE

## 2017-10-31 PROCEDURE — 3014F SCREEN MAMMO DOC REV: CPT | Performed by: FAMILY MEDICINE

## 2017-10-31 PROCEDURE — G8400 PT W/DXA NO RESULTS DOC: HCPCS | Performed by: FAMILY MEDICINE

## 2017-10-31 PROCEDURE — 1090F PRES/ABSN URINE INCON ASSESS: CPT | Performed by: FAMILY MEDICINE

## 2017-10-31 PROCEDURE — 1123F ACP DISCUSS/DSCN MKR DOCD: CPT | Performed by: FAMILY MEDICINE

## 2017-10-31 RX ORDER — METFORMIN HYDROCHLORIDE 750 MG/1
750 TABLET, EXTENDED RELEASE ORAL 2 TIMES DAILY WITH MEALS
Qty: 30 TABLET | Refills: 3 | Status: SHIPPED | OUTPATIENT
Start: 2017-10-31 | End: 2018-04-09 | Stop reason: SDUPTHER

## 2017-10-31 RX ORDER — GABAPENTIN 400 MG/1
400 CAPSULE ORAL 3 TIMES DAILY
Qty: 90 CAPSULE | Refills: 3 | Status: SHIPPED | OUTPATIENT
Start: 2017-10-31 | End: 2017-12-19 | Stop reason: DRUGHIGH

## 2017-10-31 ASSESSMENT — ENCOUNTER SYMPTOMS
SHORTNESS OF BREATH: 0
ABDOMINAL PAIN: 0
SORE THROAT: 0
RHINORRHEA: 0
NAUSEA: 0
CONSTIPATION: 0
DIARRHEA: 0
WHEEZING: 0
COUGH: 0
EYE DISCHARGE: 0

## 2017-10-31 NOTE — PROGRESS NOTES
Demarcus Gonzalez  [address] [place] [address]  Dept: [phone]  Dept Fax: [phone]  Loc: [phone]    Timmy Yee is a 67 y.o. female who presents today for her medical conditions/complaints as noted below. Chief Complaint   Patient presents with    1 Month Follow-Up     4 week check up DM           HPI:     Patient presents for one-month follow-up of diabetes. She states that she has not been to the diabetic clinic But she does have an appointment later this week. She states she is trying to avoid carbohydrates and sweets in her meals and is only eating 3 meals a day with out junk food snacks. She is taking her Lantus 10 units at night but states that her sugar in the morning still runs at least 250. She states that she feels well and she is nervous about bringing her sugar down to low as previously this made her feel ill. She does complain of numbness and tingling in her feet and toes. States the electric pain is improved with gabapentin, but still present. She is currently just takin gabapentin 300mg qhs. Vision is good.         Current Outpatient Prescriptions   Medication Sig Dispense Refill    gabapentin (NEURONTIN) 400 MG capsule Take 1 capsule by mouth 3 times daily 90 capsule 3    metFORMIN (GLUCOPHAGE XR) 750 MG extended release tablet Take 1 tablet by mouth 2 times daily (with meals) 30 tablet 3    insulin glargine (LANTUS SOLOSTAR) 100 UNIT/ML injection pen Inject 20 Units into the skin nightly 5 Pen 1    Insulin Pen Needle (B-D UF III MINI PEN NEEDLES) 31G X 5 MM MISC 1 each by Does not apply route daily 100 each 3    atorvastatin (LIPITOR) 80 MG tablet TAKE 1 TABLET DAILY 90 tablet 2    metoprolol tartrate (LOPRESSOR) 50 MG tablet TAKE 1 TABLET TWICE A  tablet 1    amiodarone (CORDARONE) 200 MG tablet TAKE 1 TABLET DAILY 90 tablet 1    enalapril (VASOTEC) 20 MG tablet TAKE 1 TABLET TWICE A  tablet 1    glucose blood VI test strips (ASCENSIA AUTODISC VI;ONE TOUCH ULTRA TEST VI) strip Test once daily. Dispense One Touch Ultra Test Strips. Dx: Type 2 diabetes (250.00) 100 each 5    aspirin 325 MG tablet Take 325 mg by mouth daily. No current facility-administered medications for this visit. Allergies   Allergen Reactions    Sulfa Antibiotics        Subjective:      Review of Systems   Constitutional: Negative for chills, fatigue and fever. HENT: Negative for congestion, rhinorrhea and sore throat. Eyes: Negative for discharge and visual disturbance. Respiratory: Negative for cough, shortness of breath and wheezing. Cardiovascular: Negative for chest pain and palpitations. Gastrointestinal: Negative for abdominal pain, constipation, diarrhea and nausea. Endocrine: Positive for polydipsia and polyuria. Genitourinary: Negative for dysuria and hematuria. Musculoskeletal: Negative for arthralgias and myalgias. Neurological: Positive for numbness (of feet/toes). Negative for dizziness and headaches. Psychiatric/Behavioral: Negative for sleep disturbance. The patient is not nervous/anxious. Objective:     /64 (Site: Left Arm, Position: Sitting, Cuff Size: Medium Adult)   Pulse 70   Temp 98.2 °F (36.8 °C) (Oral)   Resp 12   Ht 5' 3.78\" (1.62 m)   Wt 178 lb 6.4 oz (80.9 kg)   BMI 30.83 kg/m²     Physical Exam   Constitutional: She is oriented to person, place, and time. She appears well-developed and well-nourished. HENT:   Head: Normocephalic and atraumatic. Eyes: Conjunctivae and EOM are normal. Right eye exhibits no discharge. Left eye exhibits no discharge. No scleral icterus. Neck: Normal range of motion. Cardiovascular: Normal rate, regular rhythm and normal heart sounds. Pulmonary/Chest: Effort normal and breath sounds normal. She has no wheezes. Abdominal: Soft. Bowel sounds are normal. She exhibits no distension.  There is no tenderness. Musculoskeletal: She exhibits no edema or tenderness. Lymphadenopathy:     She has no cervical adenopathy. Neurological: She is alert and oriented to person, place, and time. Coordination normal.   Skin: Skin is warm and dry. Psychiatric: She has a normal mood and affect. Her behavior is normal. Judgment and thought content normal.   Nursing note and vitals reviewed. Assessment:      1. Type 2 diabetes mellitus with hyperglycemia, with long-term current use of insulin (HCC)  POCT glycosylated hemoglobin (Hb A1C)    Lipid Panel    Comprehensive Metabolic Panel    CBC Auto Differential    metFORMIN (GLUCOPHAGE XR) 750 MG extended release tablet   2. Diabetic polyneuropathy associated with type 2 diabetes mellitus (HCC)  gabapentin (NEURONTIN) 400 MG capsule    insulin glargine (LANTUS SOLOSTAR) 100 UNIT/ML injection pen   3. Need for vaccination  INFLUENZA, QUADV, 3 YRS AND OLDER, IM, MDV, 0.5ML (Racheal Rodríguez)   4. Type 2 diabetes mellitus with hyperglycemia, without long-term current use of insulin (HCC)  insulin glargine (LANTUS SOLOSTAR) 100 UNIT/ML injection pen       Plan:   A1C still greater than 14 today. Will add metformin and increase lantus to 20 units. Discussed likelihood that she will be on higher doses of insulin, and probably with meals as well, but will go slowly. Increase neurontin to 400mg tid. Follow up with diabetic clinic this week and here in office in 4 wks. Keep sugar log. Flu shot given. Fasting labs ordered     Junaid England received counseling on the following healthy behaviors: nutrition    Patient given educational materials on Diabetes    I have instructed Junaid England to complete a self tracking handout on Blood Sugars  and instructed them to bring it with them to her next appointment. Discussed use, benefit, and side effects of prescribed medications. Barriers to medication compliance addressed. All patient questions answered. Pt voiced understanding.

## 2017-10-31 NOTE — PROGRESS NOTES
Visit Information    Have you changed or started any medications since your last visit including any over-the-counter medicines, vitamins, or herbal medicines? no   Are you having any side effects from any of your medications? -  no  Have you stopped taking any of your medications? Is so, why? -  no    Have you seen any other physician or provider since your last visit? No  Have you had any other diagnostic tests since your last visit? No  Have you been seen in the emergency room and/or had an admission to a hospital since we last saw you? No  Have you had your routine dental cleaning in the past 6 months? no    Have you activated your LookIt account? If not, what are your barriers?  Yes     Patient Care Team:  Moo Shane MD as PCP - General (Family Medicine)  Moo Shane MD as PCP - S Attributed Provider    Medical History Review  Past Medical, Family, and Social History reviewed and does contribute to the patient presenting condition    Health Maintenance   Topic Date Due    Diabetic hemoglobin A1C test  01/03/1955    DTaP/Tdap/Td vaccine (1 - Tdap) 01/03/1963    Lipid screen  04/25/2015    Breast cancer screen  01/26/2017    Flu vaccine (1) 09/01/2017    Pneumococcal low/med risk (2 of 2 - PCV13) 09/13/2018    Colon Cancer Screen FIT/FOBT  09/26/2018    Diabetic microalbuminuria test  09/27/2018    Diabetic foot exam  10/03/2018    Diabetic retinal exam  10/10/2018    Zostavax vaccine  Addressed    DEXA (modify frequency per FRAX score)  Addressed

## 2017-11-02 ENCOUNTER — HOSPITAL ENCOUNTER (OUTPATIENT)
Dept: PHARMACY | Age: 72
Setting detail: THERAPIES SERIES
Discharge: HOME | End: 2017-11-02
Payer: MEDICARE

## 2017-11-02 VITALS
HEIGHT: 64 IN | BODY MASS INDEX: 29.84 KG/M2 | SYSTOLIC BLOOD PRESSURE: 92 MMHG | WEIGHT: 174.8 LBS | DIASTOLIC BLOOD PRESSURE: 58 MMHG

## 2017-11-02 PROCEDURE — G0108 DIAB MANAGE TRN  PER INDIV: HCPCS | Performed by: REGISTERED NURSE

## 2017-11-02 NOTE — PROGRESS NOTES
Diabetes Clinic at [address] [place], [address]  [phone] (phone)  [phone] (fax)    Patient ID: Levi Duffy [id number]  Referring Provider: Dr. Gumaro Alexander     Patient's name and  were verified. Subjective:    She presents for Her Initial diabetic visit. She has type 2 diabetes mellitus. Home regimen includes: insulin  biguanide She is noncompliant some of the time. Assessment:     Lab Results   Component Value Date    LABA1C 14.0 10/31/2017    BUN 16 10/04/2016    CREATININE 0.7 2015     Vitals:    17 1403   Weight: 174 lb 12.8 oz (79.3 kg)   Height: 5' 4\" (1.626 m)     Wt Readings from Last 3 Encounters:   17 174 lb 12.8 oz (79.3 kg)   10/31/17 178 lb 6.4 oz (80.9 kg)   10/03/17 177 lb 9.6 oz (80.6 kg)     Ht Readings from Last 3 Encounters:   17 5' 4\" (1.626 m)   10/31/17 5' 3.78\" (1.62 m)   10/03/17 5' 3.78\" (1.62 m)       Glucose at 2 hrs PPD today resulted at 186mg/dl  Current monitoring regimen: home blood tests - 1 times daily  Home blood sugar trends: FBS's 184-281  Any episodes of hypoglycemia? no  Previous visit with dietician: no  Current diet: 6-8am B cereal chex/ tea                       12-1pm Belgian [place] ([facility]) sandwich ([facility]) and banana                       5-6pm D turkey sausage/ cauliflower/ potato                                 Colored peppers                   No snacking                     Tea or water  Current exercise: limited r/t pain in Rt knee and leg  Eye exam current (within one year): yes 2017 SANG AGUILAR AM [place]  Any history of foot problems? no  Last foot exam: 17  Immunizations up to date: yes -   Taking ASA:  Yes  Appropriate for use of MyChart Glucose Grid:  No    Focus:     Initial diabetes education visit. Paul Franco reports that she is new to Diabetes, but presents with a meter that was from when she checked BS's 2 yrs ago. REcent A1C 14%. FBS's only are 180-280. She is on Lantus insulin and was started on Metformin 2 days ago; tolerating well at this time. Educated today on carbs/ exercise and SGM and goals. She is receptive to changes suggested and is asked to bring BS report ot class session in 1 week. Requesting directives per referral-sent to Dr. Trev Clemons. DSME PLAN:   Discussed general issues about diabetes pathophysiology and management. Counseling at today's visit: BG goals; exercise; SGM; mealplan-carbs. 1. 3 meals each day . Try to have 45 grams ( 30-50 grams of carbohdyrate) at each meal  2. Check your blood sugar every morning and every 3rd day-check your blood sugar at supper time  3. Ride you bike 10 minutes every day--see if you can increase to 15 or 20 minutes. 4. Be ready for a low blood sugar --glucose tablets (4) or smarties (3 rolls) or fruit juice (1/2 cup)  5. Drink plenty of water during the day. 6. Bring your blood sugar results to your class on Nov 7  Meter download, medications, PMH and nursing assessment Hector Pratt states She is willing to participate in this plan of care and verbalized understanding of all instructions provided. Teach back used to verify comprehension. Total time involved in direct patient education: 60 minutes.

## 2017-11-06 ENCOUNTER — TELEPHONE (OUTPATIENT)
Dept: FAMILY MEDICINE CLINIC | Age: 72
End: 2017-11-06

## 2017-11-06 DIAGNOSIS — M25.561 CHRONIC PAIN OF BOTH KNEES: Primary | ICD-10-CM

## 2017-11-06 DIAGNOSIS — G89.29 CHRONIC PAIN OF BOTH KNEES: Primary | ICD-10-CM

## 2017-11-06 DIAGNOSIS — M25.562 CHRONIC PAIN OF BOTH KNEES: Primary | ICD-10-CM

## 2017-11-06 NOTE — TELEPHONE ENCOUNTER
11/6/17 Pt is asking if Dr Fawad Obregon could refer her to an orthopedic doctor for the pain in her legs, she does not have a preference.  (diabetic nurse suggested this to the pt)Thanks Sam Zarco

## 2017-11-07 ENCOUNTER — HOSPITAL ENCOUNTER (OUTPATIENT)
Dept: PHARMACY | Age: 72
Setting detail: THERAPIES SERIES
Discharge: HOME | End: 2017-11-07
Payer: MEDICARE

## 2017-11-07 PROCEDURE — G0109 DIAB MANAGE TRN IND/GROUP: HCPCS | Performed by: REGISTERED NURSE

## 2017-11-13 ENCOUNTER — HOSPITAL ENCOUNTER (OUTPATIENT)
Dept: INTERVENTIONAL RADIOLOGY/VASCULAR | Age: 72
Discharge: HOME | End: 2017-11-13
Payer: MEDICARE

## 2017-11-13 DIAGNOSIS — R52 PAIN: ICD-10-CM

## 2017-11-13 DIAGNOSIS — R60.9 SWELLING: ICD-10-CM

## 2017-11-13 PROCEDURE — 93970 EXTREMITY STUDY: CPT

## 2017-11-14 ENCOUNTER — HOSPITAL ENCOUNTER (OUTPATIENT)
Dept: PHARMACY | Age: 72
Setting detail: THERAPIES SERIES
Discharge: HOME | End: 2017-11-14
Payer: MEDICARE

## 2017-11-14 PROCEDURE — 97804 MEDICAL NUTRITION GROUP: CPT | Performed by: DIETITIAN, REGISTERED

## 2017-11-20 ENCOUNTER — TELEPHONE (OUTPATIENT)
Dept: FAMILY MEDICINE CLINIC | Age: 72
End: 2017-11-20

## 2017-11-20 DIAGNOSIS — M25.561 ACUTE PAIN OF BOTH KNEES: ICD-10-CM

## 2017-11-20 DIAGNOSIS — M25.562 ACUTE PAIN OF BOTH KNEES: ICD-10-CM

## 2017-11-20 DIAGNOSIS — E11.42 DIABETIC POLYNEUROPATHY ASSOCIATED WITH TYPE 2 DIABETES MELLITUS (HCC): Primary | ICD-10-CM

## 2017-11-20 RX ORDER — TRAMADOL HYDROCHLORIDE 50 MG/1
50 TABLET ORAL EVERY 8 HOURS PRN
Qty: 30 TABLET | Refills: 1 | Status: SHIPPED | OUTPATIENT
Start: 2017-11-20 | End: 2017-11-27

## 2017-11-20 NOTE — TELEPHONE ENCOUNTER
11/20/2017 Patient requesting prescription of tramadol for her diabetic nerve pain to be called into [facility]. da

## 2017-11-28 ENCOUNTER — PROCEDURE VISIT (OUTPATIENT)
Dept: CARDIOLOGY CLINIC | Age: 72
End: 2017-11-28
Payer: MEDICARE

## 2017-11-28 ENCOUNTER — HOSPITAL ENCOUNTER (OUTPATIENT)
Dept: PHARMACY | Age: 72
Setting detail: THERAPIES SERIES
Discharge: HOME | End: 2017-11-28
Payer: MEDICARE

## 2017-11-28 VITALS — DIASTOLIC BLOOD PRESSURE: 67 MMHG | HEART RATE: 68 BPM | SYSTOLIC BLOOD PRESSURE: 118 MMHG

## 2017-11-28 DIAGNOSIS — Z95.810 DUAL IMPLANTABLE CARDIOVERTER-DEFIBRILLATOR IN SITU: Primary | ICD-10-CM

## 2017-11-28 PROCEDURE — 93295 DEV INTERROG REMOTE 1/2/MLT: CPT | Performed by: INTERNAL MEDICINE

## 2017-11-28 PROCEDURE — 93296 REM INTERROG EVL PM/IDS: CPT | Performed by: INTERNAL MEDICINE

## 2017-11-28 PROCEDURE — 99213 OFFICE O/P EST LOW 20 MIN: CPT

## 2017-11-28 NOTE — PROGRESS NOTES
resulted at 116mg/dl  See  for home blood glucose trends    Assessment:        type 2 DM under fair control as evidenced by home glucose log. Plan:  No medications changes  1. Be sure to check your blood sugar every morning and continue to write them down.   2. Bring both meters with you to your next apt.  3. Follow up with Karen Millard in 8 weeks (1/30/18 at 2pm)    Follow-up:   8 weeks with CDE    Assessment and plan review with Danielle Kelsey, AngelitoD

## 2017-12-11 ENCOUNTER — HOSPITAL ENCOUNTER (OUTPATIENT)
Age: 72
Discharge: HOME | End: 2017-12-11
Payer: MEDICARE

## 2017-12-11 DIAGNOSIS — Z79.4 TYPE 2 DIABETES MELLITUS WITH HYPERGLYCEMIA, WITH LONG-TERM CURRENT USE OF INSULIN (HCC): ICD-10-CM

## 2017-12-11 DIAGNOSIS — E11.65 TYPE 2 DIABETES MELLITUS WITH HYPERGLYCEMIA, WITH LONG-TERM CURRENT USE OF INSULIN (HCC): ICD-10-CM

## 2017-12-11 DIAGNOSIS — I25.5 CARDIOMYOPATHY, ISCHEMIC: ICD-10-CM

## 2017-12-11 LAB
ALBUMIN SERPL-MCNC: 3.8 G/DL (ref 3.5–5.1)
ALP BLD-CCNC: 71 U/L (ref 38–126)
ALT SERPL-CCNC: 13 U/L (ref 11–66)
ANION GAP SERPL CALCULATED.3IONS-SCNC: 11 MEQ/L (ref 8–16)
ANISOCYTOSIS: ABNORMAL
AST SERPL-CCNC: 12 U/L (ref 5–40)
BASOPHILS # BLD: 0.4 %
BASOPHILS ABSOLUTE: 0 THOU/MM3 (ref 0–0.1)
BILIRUB SERPL-MCNC: 0.5 MG/DL (ref 0.3–1.2)
BILIRUBIN DIRECT: < 0.2 MG/DL (ref 0–0.3)
BUN BLDV-MCNC: 11 MG/DL (ref 7–22)
CALCIUM SERPL-MCNC: 9.5 MG/DL (ref 8.5–10.5)
CHLORIDE BLD-SCNC: 103 MEQ/L (ref 98–111)
CHOLESTEROL, TOTAL: 115 MG/DL (ref 100–199)
CO2: 30 MEQ/L (ref 23–33)
CREAT SERPL-MCNC: 0.6 MG/DL (ref 0.4–1.2)
EOSINOPHIL # BLD: 1.5 %
EOSINOPHILS ABSOLUTE: 0.1 THOU/MM3 (ref 0–0.4)
GFR SERPL CREATININE-BSD FRML MDRD: > 90 ML/MIN/1.73M2
GLUCOSE BLD-MCNC: 137 MG/DL (ref 70–108)
HCT VFR BLD CALC: 43.7 % (ref 37–47)
HDLC SERPL-MCNC: 48 MG/DL
HEMOGLOBIN: 14.4 GM/DL (ref 12–16)
LDL CHOLESTEROL CALCULATED: 47 MG/DL
LYMPHOCYTES # BLD: 24.8 %
LYMPHOCYTES ABSOLUTE: 2.1 THOU/MM3 (ref 1–4.8)
MCH RBC QN AUTO: 30.3 PG (ref 27–31)
MCHC RBC AUTO-ENTMCNC: 32.9 GM/DL (ref 33–37)
MCV RBC AUTO: 92.2 FL (ref 81–99)
MONOCYTES # BLD: 5 %
MONOCYTES ABSOLUTE: 0.4 THOU/MM3 (ref 0.4–1.3)
NUCLEATED RED BLOOD CELLS: 0 /100 WBC
PDW BLD-RTO: 15 % (ref 11.5–14.5)
PLATELET # BLD: 238 THOU/MM3 (ref 130–400)
PLATELET ESTIMATE: ADEQUATE
PMV BLD AUTO: 9.9 MCM (ref 7.4–10.4)
POTASSIUM SERPL-SCNC: 4.8 MEQ/L (ref 3.5–5.2)
RBC # BLD: 4.74 MILL/MM3 (ref 4.2–5.4)
SCAN OF BLOOD SMEAR: NORMAL
SEG NEUTROPHILS: 68.3 %
SEGMENTED NEUTROPHILS ABSOLUTE COUNT: 5.8 THOU/MM3 (ref 1.8–7.7)
SODIUM BLD-SCNC: 144 MEQ/L (ref 135–145)
TOTAL PROTEIN: 6.9 G/DL (ref 6.1–8)
TRIGL SERPL-MCNC: 102 MG/DL (ref 0–199)
WBC # BLD: 8.5 THOU/MM3 (ref 4.8–10.8)

## 2017-12-11 PROCEDURE — 82248 BILIRUBIN DIRECT: CPT

## 2017-12-11 PROCEDURE — 85025 COMPLETE CBC W/AUTO DIFF WBC: CPT

## 2017-12-11 PROCEDURE — 80061 LIPID PANEL: CPT

## 2017-12-11 PROCEDURE — 36415 COLL VENOUS BLD VENIPUNCTURE: CPT

## 2017-12-11 PROCEDURE — 82542 COL CHROMOTOGRAPHY QUAL/QUAN: CPT

## 2017-12-11 PROCEDURE — 80053 COMPREHEN METABOLIC PANEL: CPT

## 2017-12-11 RX ORDER — ENALAPRIL MALEATE 20 MG/1
TABLET ORAL
Qty: 180 TABLET | Refills: 0 | Status: SHIPPED | OUTPATIENT
Start: 2017-12-11 | End: 2018-03-12 | Stop reason: SDUPTHER

## 2017-12-11 RX ORDER — AMIODARONE HYDROCHLORIDE 200 MG/1
TABLET ORAL
Qty: 90 TABLET | Refills: 0 | Status: SHIPPED | OUTPATIENT
Start: 2017-12-11 | End: 2018-03-12 | Stop reason: SDUPTHER

## 2017-12-11 RX ORDER — METOPROLOL TARTRATE 50 MG/1
TABLET, FILM COATED ORAL
Qty: 180 TABLET | Refills: 0 | Status: SHIPPED | OUTPATIENT
Start: 2017-12-11 | End: 2018-03-12 | Stop reason: SDUPTHER

## 2017-12-13 LAB — AMIODARONE LEVEL: NORMAL

## 2017-12-19 ENCOUNTER — OFFICE VISIT (OUTPATIENT)
Dept: FAMILY MEDICINE CLINIC | Age: 72
End: 2017-12-19
Payer: MEDICARE

## 2017-12-19 ENCOUNTER — TELEPHONE (OUTPATIENT)
Dept: FAMILY MEDICINE CLINIC | Age: 72
End: 2017-12-19

## 2017-12-19 VITALS
OXYGEN SATURATION: 97 % | SYSTOLIC BLOOD PRESSURE: 114 MMHG | BODY MASS INDEX: 29.59 KG/M2 | RESPIRATION RATE: 20 BRPM | HEART RATE: 70 BPM | WEIGHT: 167 LBS | HEIGHT: 63 IN | DIASTOLIC BLOOD PRESSURE: 62 MMHG

## 2017-12-19 DIAGNOSIS — E11.65 TYPE 2 DIABETES MELLITUS WITH HYPERGLYCEMIA, WITH LONG-TERM CURRENT USE OF INSULIN (HCC): ICD-10-CM

## 2017-12-19 DIAGNOSIS — M17.0 PRIMARY OSTEOARTHRITIS OF BOTH KNEES: ICD-10-CM

## 2017-12-19 DIAGNOSIS — E11.42 DIABETIC POLYNEUROPATHY ASSOCIATED WITH TYPE 2 DIABETES MELLITUS (HCC): ICD-10-CM

## 2017-12-19 DIAGNOSIS — E11.65 TYPE 2 DIABETES MELLITUS WITH HYPERGLYCEMIA, WITH LONG-TERM CURRENT USE OF INSULIN (HCC): Primary | ICD-10-CM

## 2017-12-19 DIAGNOSIS — Z79.4 TYPE 2 DIABETES MELLITUS WITH HYPERGLYCEMIA, WITH LONG-TERM CURRENT USE OF INSULIN (HCC): Primary | ICD-10-CM

## 2017-12-19 DIAGNOSIS — Z79.4 TYPE 2 DIABETES MELLITUS WITH HYPERGLYCEMIA, WITH LONG-TERM CURRENT USE OF INSULIN (HCC): ICD-10-CM

## 2017-12-19 LAB — HBA1C MFR BLD: 9.5 %

## 2017-12-19 PROCEDURE — G8484 FLU IMMUNIZE NO ADMIN: HCPCS | Performed by: FAMILY MEDICINE

## 2017-12-19 PROCEDURE — 1036F TOBACCO NON-USER: CPT | Performed by: FAMILY MEDICINE

## 2017-12-19 PROCEDURE — G8400 PT W/DXA NO RESULTS DOC: HCPCS | Performed by: FAMILY MEDICINE

## 2017-12-19 PROCEDURE — G8427 DOCREV CUR MEDS BY ELIG CLIN: HCPCS | Performed by: FAMILY MEDICINE

## 2017-12-19 PROCEDURE — 1090F PRES/ABSN URINE INCON ASSESS: CPT | Performed by: FAMILY MEDICINE

## 2017-12-19 PROCEDURE — 99214 OFFICE O/P EST MOD 30 MIN: CPT | Performed by: FAMILY MEDICINE

## 2017-12-19 PROCEDURE — G8598 ASA/ANTIPLAT THER USED: HCPCS | Performed by: FAMILY MEDICINE

## 2017-12-19 PROCEDURE — 1123F ACP DISCUSS/DSCN MKR DOCD: CPT | Performed by: FAMILY MEDICINE

## 2017-12-19 PROCEDURE — 4040F PNEUMOC VAC/ADMIN/RCVD: CPT | Performed by: FAMILY MEDICINE

## 2017-12-19 PROCEDURE — 3046F HEMOGLOBIN A1C LEVEL >9.0%: CPT | Performed by: FAMILY MEDICINE

## 2017-12-19 PROCEDURE — G8417 CALC BMI ABV UP PARAM F/U: HCPCS | Performed by: FAMILY MEDICINE

## 2017-12-19 PROCEDURE — 83036 HEMOGLOBIN GLYCOSYLATED A1C: CPT | Performed by: FAMILY MEDICINE

## 2017-12-19 PROCEDURE — 3017F COLORECTAL CA SCREEN DOC REV: CPT | Performed by: FAMILY MEDICINE

## 2017-12-19 PROCEDURE — 3014F SCREEN MAMMO DOC REV: CPT | Performed by: FAMILY MEDICINE

## 2017-12-19 RX ORDER — GABAPENTIN 400 MG/1
800 CAPSULE ORAL EVERY EVENING
Qty: 90 CAPSULE | Refills: 3 | Status: SHIPPED
Start: 2017-12-19 | End: 2018-04-09 | Stop reason: SDUPTHER

## 2017-12-19 ASSESSMENT — ENCOUNTER SYMPTOMS
DIARRHEA: 0
EYE DISCHARGE: 0
COUGH: 0
CONSTIPATION: 0
SORE THROAT: 0
WHEEZING: 0
NAUSEA: 0
SHORTNESS OF BREATH: 0
RHINORRHEA: 0
ABDOMINAL PAIN: 0

## 2017-12-19 ASSESSMENT — PATIENT HEALTH QUESTIONNAIRE - PHQ9
SUM OF ALL RESPONSES TO PHQ QUESTIONS 1-9: 0
2. FEELING DOWN, DEPRESSED OR HOPELESS: 0
SUM OF ALL RESPONSES TO PHQ9 QUESTIONS 1 & 2: 0
1. LITTLE INTEREST OR PLEASURE IN DOING THINGS: 0

## 2017-12-19 NOTE — TELEPHONE ENCOUNTER
Per the  ( on [facility]) was requesting the lantus solostar insulin pen 100 units can this go thru [facility] or not, please call and advise the pt.    ann marie-12/19/2017    sherri-01/30/2018    Pharmacy [facility] in [address] [place] [address] but they would  Like to see if they can get this thru [facility] instead they wasn't sure.

## 2017-12-19 NOTE — PROGRESS NOTES
Ariella Dixon  [address] [place] [address]  Dept: [phone]  Dept Fax: [phone]  Loc: [phone]    Joel Do is a 67 y.o. female who presents today for her medical conditions/complaints as noted below. Chief Complaint   Patient presents with   Joey Han Rides on 1-2-18 right knee scope and 2-6-18 left knee scope done at S     Diabetes     2 month check up BS have been running lower            HPI:     Patient presents for preop clearance as she is having bilateral arthroscopic knee done of her knees. She denies any chest pain or shortness of breath. States that she can vacuum a room without getting short of breath and can also walk up a flight of stairs without getting short of breath, although her knees hurt her. 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 Also here to recheck her diabetes. States that her sugars have been running between 901 40. She is taking 20 units of insulin nightly. States that she has had a few instances with sugar in the 70 and when this happens she feels a little shaky AND SWEATY. She eats something and then her symptoms resolved. She is following up with diabetic clinic. Her previous hemoglobin A1c was greater than 14 and today is 9.5. Has been 6 weeks since last checked. Patient does complain of neuropathy in her feet and legs which she states is worse at night. She states she's got shooting cramping pains in her legs and it keeps her awake at night. She takes the Neurontin which she thinks helps somewhat but does not give great relief. Otherwise patient doing well without any complaints.         Past Medical History:   Diagnosis Date    CAD (coronary artery disease)     Hyperlipidemia     Hypertension     ICD (implantable cardiac defibrillator), dual, in situ     [facility] dual ICD    Shingles 12/2014    [facility] dual ICD     Type II or unspecified type diabetes mellitus without mention of complication, not stated as uncontrolled     Ventricular arrhythmia       Past Surgical History:   Procedure Laterality Date    CARDIAC PACEMAKER PLACEMENT  9-08-09    [facility]    CARDIOVASCULAR STRESS TEST  01-27-10    Excellent treadmill exercise. Improved functional capacity to functional class 1 completed 8 METS. Hemodynamic response was appropriate. No ischemic ECG changes noted.  CARDIOVASCULAR STRESS TEST  10-28-09    No evidence of stress induced ischemia. Evidence of  prior transmural MI demonstrated by transient fixed defects of inferior wall extending into lateral wall, indicative of RCA lesion. Bowel and soft tissue attenuation interfering w/ counts of lateral wall. EF 29% w/ severe septal and inferolateral hypokinesis w/ very mild lateral wall hypokinesis being noted.  CARDIOVERSION  8-29-09    CHOLECYSTECTOMY  2005    Laparoscopic    CORONARY ARTERY BYPASS GRAFT      DIAGNOSTIC CARDIAC CATH LAB PROCEDURE  8-24-09    Multivessel disease demonstrated by LAD having 70-80%  proximal lesion and napkin-ring lesion at bifurcation w/ D2. D2 appears to be medium large caliber vessel which has an ostial lesion of 70-80%. left -to-left collaterals as well as left-to-right collaterals emanating from LAD to PDA. Circumflex had anterior marginal branch and posterior marginal branch.  HERNIA REPAIR      Multiple    HYSTERECTOMY  1997    TRANSTHORACIC ECHOCARDIOGRAM  07-11-11    LV size mildly to moderately dilated. Systolic function markedly reduced. EF 25-30%. Severe diffuse hypokinesis. Grade 1 diastolic dysfunction. LA was mildly to moderately dilated. RV mildly dilated, systolic function mildly reduced. Mild MR and TR.  TRANSTHORACIC ECHOCARDIOGRAM  8-24-09    LV demonstrates severe hypokinesis of the inferior basal as well as  basal aneurysm being noted and paradoxical septal motion. EF 45-50%.  LA is moderately dilated and AV demonstrates mild AV signed by Rivera Eckert MD on 12/19/2017 at 1:24 PM

## 2017-12-27 ENCOUNTER — OFFICE VISIT (OUTPATIENT)
Dept: CARDIOLOGY CLINIC | Age: 72
End: 2017-12-27
Payer: MEDICARE

## 2017-12-27 ENCOUNTER — HOSPITAL ENCOUNTER (OUTPATIENT)
Age: 72
End: 2017-12-27

## 2017-12-27 VITALS
HEART RATE: 64 BPM | WEIGHT: 163 LBS | BODY MASS INDEX: 30 KG/M2 | SYSTOLIC BLOOD PRESSURE: 102 MMHG | DIASTOLIC BLOOD PRESSURE: 68 MMHG | HEIGHT: 62 IN

## 2017-12-27 DIAGNOSIS — I25.5 CARDIOMYOPATHY, ISCHEMIC: ICD-10-CM

## 2017-12-27 DIAGNOSIS — Z79.4 TYPE 2 DIABETES MELLITUS WITH HYPERGLYCEMIA, WITH LONG-TERM CURRENT USE OF INSULIN (HCC): ICD-10-CM

## 2017-12-27 DIAGNOSIS — E11.65 TYPE 2 DIABETES MELLITUS WITH HYPERGLYCEMIA, WITH LONG-TERM CURRENT USE OF INSULIN (HCC): ICD-10-CM

## 2017-12-27 DIAGNOSIS — R94.31 EKG ABNORMALITIES: Primary | ICD-10-CM

## 2017-12-27 DIAGNOSIS — Z01.810 PREOP CARDIOVASCULAR EXAM: ICD-10-CM

## 2017-12-27 DIAGNOSIS — I73.9 CLAUDICATION (HCC): ICD-10-CM

## 2017-12-27 PROCEDURE — 99213 OFFICE O/P EST LOW 20 MIN: CPT | Performed by: INTERNAL MEDICINE

## 2017-12-27 PROCEDURE — 3017F COLORECTAL CA SCREEN DOC REV: CPT | Performed by: INTERNAL MEDICINE

## 2017-12-27 PROCEDURE — G8400 PT W/DXA NO RESULTS DOC: HCPCS | Performed by: INTERNAL MEDICINE

## 2017-12-27 PROCEDURE — G8484 FLU IMMUNIZE NO ADMIN: HCPCS | Performed by: INTERNAL MEDICINE

## 2017-12-27 PROCEDURE — 1123F ACP DISCUSS/DSCN MKR DOCD: CPT | Performed by: INTERNAL MEDICINE

## 2017-12-27 PROCEDURE — G8417 CALC BMI ABV UP PARAM F/U: HCPCS | Performed by: INTERNAL MEDICINE

## 2017-12-27 PROCEDURE — 3014F SCREEN MAMMO DOC REV: CPT | Performed by: INTERNAL MEDICINE

## 2017-12-27 PROCEDURE — G8598 ASA/ANTIPLAT THER USED: HCPCS | Performed by: INTERNAL MEDICINE

## 2017-12-27 PROCEDURE — 1036F TOBACCO NON-USER: CPT | Performed by: INTERNAL MEDICINE

## 2017-12-27 PROCEDURE — 1090F PRES/ABSN URINE INCON ASSESS: CPT | Performed by: INTERNAL MEDICINE

## 2017-12-27 PROCEDURE — G8427 DOCREV CUR MEDS BY ELIG CLIN: HCPCS | Performed by: INTERNAL MEDICINE

## 2017-12-27 PROCEDURE — 4040F PNEUMOC VAC/ADMIN/RCVD: CPT | Performed by: INTERNAL MEDICINE

## 2017-12-27 PROCEDURE — 3046F HEMOGLOBIN A1C LEVEL >9.0%: CPT | Performed by: INTERNAL MEDICINE

## 2017-12-27 NOTE — PROGRESS NOTES
tongue is mid line no masses presence and no peter A waves present   LYMPHATICS are without adenopathies at least on exam   CHEST  No biventricular heaves and thrills and no right ventricular heaves and examination without signs of trauma and lesions  HEART not distant and regular rate and rhythm without   gallop signs and and no murmurs and systolic crescendo  Decrescendo  normal s1 and s2 sounds and no s3 and s4 split   Point of maximum intensity of the heartbeat  is at or displaced from the fifth intercostal space  LUNGS no   presence of intercostal retractions and no  use of the accessory muscles with a diaphragmatic movement present and not present pectus and pigeon chest and without wheezes and rhonchi and non diminished in all posterior lungs  and without rales  ABDOMEN abdominal bruit not present  And  No  Presency of  morbid obesity and with no    non distended without organomegaly and no rebound tenderness and bowel sound are present  all quadrants   NEUROLOGY all the cranial nerves are intact and no motor deficits  and no sensory deficits  MUSCULAR SKELETAL without signs of trauma and kyphosis and scoliosis and normal range of motion for all extremities  INTEGUMENTARY without tenting and skin rashes indentation and  dryness  NECK without lymph adenopathy   NEUROPSYCHIATRIC has no anxiety, no mood swings and depression  VASCULAR EXAM for carotid ,radial femoral arteries are  steady  and not diminished   Extremities no evidence of peripheral edema  And pulses are  normal    Assessment and plans    1. EKG abnormalities  Stress test, lexiscan    VL NATANAEL BILATERAL LIMITED 1-2 LEVELS   2. Claudication (HCC)  VL NATANAEL BILATERAL LIMITED 1-2 LEVELS   3. Type 2 diabetes mellitus with hyperglycemia, with long-term current use of insulin (HCC)     4. Cardiomyopathy, ischemic     5. Preop cardiovascular exam       Patient was advised to return in 2 months for follow up or sooner if there is any change in care.   Nuclear stress test is recommended  to patient to exclude ischemia  For preoperative work up and has ashd and cabg many years ago over 5 years and patient  agrees with that work up and its risks and benefits and potential need for additional testing if stress test is abnormal such as cardiac catheterization. In addition patient is advised to avoid strenuous activity that not only  Includes all strenuous activities  and is not limited to sexual activities and driving heavy equipments and traveling long distances and hunting or even stressfully events  In the mean time while we a wait stress test testing. A waits stress test and the followed by risk assessment as per findings     Patient is educated about symptoms of congestive heart failure and these include weighing one self and if gains 3 pounds to take additional water pill, fluid restrict to 2 liters a day, 2 gram sodium intake and if increased thirst to seek medical attention. Patient is a advised to have their lipids and liver panel and TSH checked through their family doctors and the therapeutic values that need to be met are also presented to the patient and need to achieve them is emphasized and patient agrees and accepts.          Electronically signed by Jhonny Arreguin DO on 12/27/2017 at 3:15 PM

## 2017-12-27 NOTE — PROGRESS NOTES
Pt here for 6 mo check up     Pt due to sx  Right knee on 1/2/18, sx on left a month later with Dr. Randell Ramos at [facility]    Pt has fallen several times on knees since last visit     Pt denies peripheral edema , dizziness, chest pain, heart palpitations, sob     Pt c/o right leg pain, numbness and tingling, bilateral leg pain notices at night when resting

## 2017-12-29 ENCOUNTER — HOSPITAL ENCOUNTER (OUTPATIENT)
Dept: NON INVASIVE DIAGNOSTICS | Age: 72
Discharge: HOME | End: 2017-12-29
Payer: MEDICARE

## 2017-12-29 ENCOUNTER — HOSPITAL ENCOUNTER (OUTPATIENT)
Dept: INTERVENTIONAL RADIOLOGY/VASCULAR | Age: 72
Discharge: HOME | End: 2017-12-29
Payer: MEDICARE

## 2017-12-29 VITALS — HEIGHT: 62 IN | WEIGHT: 163 LBS | BODY MASS INDEX: 30 KG/M2

## 2017-12-29 DIAGNOSIS — R94.31 EKG ABNORMALITIES: ICD-10-CM

## 2017-12-29 DIAGNOSIS — I73.9 CLAUDICATION (HCC): ICD-10-CM

## 2017-12-29 PROCEDURE — 3430000000 HC RX DIAGNOSTIC RADIOPHARMACEUTICAL: Performed by: INTERNAL MEDICINE

## 2017-12-29 PROCEDURE — A9500 TC99M SESTAMIBI: HCPCS | Performed by: INTERNAL MEDICINE

## 2017-12-29 PROCEDURE — 78452 HT MUSCLE IMAGE SPECT MULT: CPT | Performed by: INTERNAL MEDICINE

## 2017-12-29 PROCEDURE — 93922 UPR/L XTREMITY ART 2 LEVELS: CPT

## 2017-12-29 PROCEDURE — 6360000002 HC RX W HCPCS

## 2017-12-29 PROCEDURE — 93017 CV STRESS TEST TRACING ONLY: CPT | Performed by: INTERNAL MEDICINE

## 2017-12-29 RX ADMIN — Medication 9.4 MILLICURIE: at 12:00

## 2017-12-29 RX ADMIN — Medication 32.2 MILLICURIE: at 12:55

## 2018-01-02 ENCOUNTER — TELEPHONE (OUTPATIENT)
Dept: CARDIOLOGY CLINIC | Age: 73
End: 2018-01-02

## 2018-01-02 NOTE — TELEPHONE ENCOUNTER
Called and informed Amparo Zambrano that pt is high risk for surgery and has to have the surgery done in the hospital, telephone encounter faxed to Amparo Zambrano. Left message for pt to call our office back.

## 2018-01-03 ENCOUNTER — PROCEDURE VISIT (OUTPATIENT)
Dept: CARDIOLOGY CLINIC | Age: 73
End: 2018-01-03

## 2018-01-03 DIAGNOSIS — Z95.810 DUAL IMPLANTABLE CARDIOVERTER-DEFIBRILLATOR IN SITU: Primary | ICD-10-CM

## 2018-01-03 PROCEDURE — 99999 PR OFFICE/OUTPT VISIT,PROCEDURE ONLY: CPT | Performed by: INTERNAL MEDICINE

## 2018-01-08 ENCOUNTER — TELEPHONE (OUTPATIENT)
Dept: CARDIOLOGY | Age: 73
End: 2018-01-08

## 2018-01-08 NOTE — TELEPHONE ENCOUNTER
1/8/18  Patient requesting 12/29/17 stress results.    Thanks/blm  Donv: 2/28/18 in [address] [place] [address] (pt req to stay in [address] [place] [address])

## 2018-01-08 NOTE — TELEPHONE ENCOUNTER
Patient requesting stress test (12/29/2017)  Results. Dr. Ely Mccarthy out of town. Lexiscan EKG stress test is not suggestive for ischemia.   The LVEF is calculated to be 31% with severe inferior basal hypokinesis .   There was a large infarct with no viability in the distribution of the   right coronary and left circumflex arteries in the anterior basal region.   No evidence of ischemia is noted on this study.

## 2018-01-09 ENCOUNTER — HOSPITAL ENCOUNTER (EMERGENCY)
Age: 73
Discharge: HOME | End: 2018-01-09
Attending: FAMILY MEDICINE
Payer: MEDICARE

## 2018-01-09 ENCOUNTER — OFFICE VISIT (OUTPATIENT)
Dept: FAMILY MEDICINE CLINIC | Age: 73
End: 2018-01-09
Payer: MEDICARE

## 2018-01-09 VITALS
OXYGEN SATURATION: 97 % | BODY MASS INDEX: 28.72 KG/M2 | WEIGHT: 157 LBS | SYSTOLIC BLOOD PRESSURE: 138 MMHG | HEART RATE: 70 BPM | DIASTOLIC BLOOD PRESSURE: 70 MMHG | RESPIRATION RATE: 20 BRPM

## 2018-01-09 VITALS
RESPIRATION RATE: 18 BRPM | SYSTOLIC BLOOD PRESSURE: 140 MMHG | BODY MASS INDEX: 30 KG/M2 | HEART RATE: 70 BPM | OXYGEN SATURATION: 100 % | WEIGHT: 163 LBS | DIASTOLIC BLOOD PRESSURE: 79 MMHG | HEIGHT: 62 IN | TEMPERATURE: 97.6 F

## 2018-01-09 DIAGNOSIS — M79.604 BILATERAL LEG AND FOOT PAIN: Primary | ICD-10-CM

## 2018-01-09 DIAGNOSIS — M79.671 BILATERAL LEG AND FOOT PAIN: Primary | ICD-10-CM

## 2018-01-09 DIAGNOSIS — E11.42 DIABETIC POLYNEUROPATHY ASSOCIATED WITH TYPE 2 DIABETES MELLITUS (HCC): Primary | ICD-10-CM

## 2018-01-09 DIAGNOSIS — G25.81 RESTLESS LEG SYNDROME: ICD-10-CM

## 2018-01-09 DIAGNOSIS — M79.605 BILATERAL LEG AND FOOT PAIN: Primary | ICD-10-CM

## 2018-01-09 DIAGNOSIS — I10 ESSENTIAL HYPERTENSION: ICD-10-CM

## 2018-01-09 DIAGNOSIS — M79.672 BILATERAL LEG AND FOOT PAIN: Primary | ICD-10-CM

## 2018-01-09 PROCEDURE — 99282 EMERGENCY DEPT VISIT SF MDM: CPT

## 2018-01-09 PROCEDURE — 99214 OFFICE O/P EST MOD 30 MIN: CPT | Performed by: FAMILY MEDICINE

## 2018-01-09 RX ORDER — ROPINIROLE 0.5 MG/1
0.5 TABLET, FILM COATED ORAL NIGHTLY
Qty: 30 TABLET | Refills: 1 | Status: SHIPPED | OUTPATIENT
Start: 2018-01-09 | End: 2018-03-02 | Stop reason: SDUPTHER

## 2018-01-09 RX ORDER — PREGABALIN 200 MG/1
200 CAPSULE ORAL 2 TIMES DAILY
Qty: 60 CAPSULE | Refills: 2 | Status: SHIPPED | OUTPATIENT
Start: 2018-01-09 | End: 2018-04-09 | Stop reason: SDUPTHER

## 2018-01-09 ASSESSMENT — ENCOUNTER SYMPTOMS
SHORTNESS OF BREATH: 0
VOMITING: 0
CONSTIPATION: 0
DIARRHEA: 0
ABDOMINAL PAIN: 0
WHEEZING: 0
SINUS PRESSURE: 0
SHORTNESS OF BREATH: 0
RHINORRHEA: 0
CHEST TIGHTNESS: 0
COLOR CHANGE: 0
NAUSEA: 0
SORE THROAT: 0
WHEEZING: 0
COUGH: 0
NAUSEA: 0
ABDOMINAL PAIN: 0
SORE THROAT: 0
BACK PAIN: 0
DIARRHEA: 0

## 2018-01-09 ASSESSMENT — PAIN DESCRIPTION - DESCRIPTORS: DESCRIPTORS: ACHING

## 2018-01-09 ASSESSMENT — PAIN SCALES - GENERAL: PAINLEVEL_OUTOF10: 8

## 2018-01-09 ASSESSMENT — PAIN DESCRIPTION - ORIENTATION: ORIENTATION: LEFT;RIGHT

## 2018-01-09 ASSESSMENT — PAIN DESCRIPTION - LOCATION: LOCATION: LEG

## 2018-01-09 NOTE — ED PROVIDER NOTES
defibrillator), dual, in situ; Shingles; St Boris dual ICD; Type II or unspecified type diabetes mellitus without mention of complication, not stated as uncontrolled; and Ventricular arrhythmia. SURGICAL HISTORY      has a past surgical history that includes Diagnostic Cardiac Cath Lab Procedure (8-24-09); Cardiac pacemaker placement (9-08-09); transthoracic echocardiogram (07-11-11); cardiovascular stress test (01-27-10); Hysterectomy (1997); Coronary artery bypass graft; Cholecystectomy (2005); hernia repair; cardiovascular stress test (10-28-09); transthoracic echocardiogram (8-24-09); and Cardioversion (8-29-09). CURRENT MEDICATIONS       Discharge Medication List as of 1/9/2018 12:22 PM      CONTINUE these medications which have NOT CHANGED    Details   gabapentin (NEURONTIN) 400 MG capsule Take 2 capsules by mouth every evening, Disp-90 capsule, R-3Adjust Sig      insulin glargine (LANTUS SOLOSTAR) 100 UNIT/ML injection pen Inject 20 Units into the skin nightly, Disp-18 mL, R-2Normal      enalapril (VASOTEC) 20 MG tablet TAKE 1 TABLET TWICE A DAY, Disp-180 tablet, R-0Normal      metoprolol tartrate (LOPRESSOR) 50 MG tablet TAKE 1 TABLET TWICE A DAY, Disp-180 tablet, R-0Normal      amiodarone (CORDARONE) 200 MG tablet TAKE 1 TABLET DAILY, Disp-90 tablet, R-0Normal      metFORMIN (GLUCOPHAGE XR) 750 MG extended release tablet Take 1 tablet by mouth 2 times daily (with meals), Disp-30 tablet, R-3Normal      atorvastatin (LIPITOR) 80 MG tablet TAKE 1 TABLET DAILY, Disp-90 tablet, R-2Normal      aspirin 325 MG tablet Take 325 mg by mouth daily. Insulin Pen Needle (B-D UF III MINI PEN NEEDLES) 31G X 5 MM MISC DAILY Starting Wed 10/18/2017, Disp-100 each, R-3, Normal      glucose blood VI test strips (ASCENSIA AUTODISC VI;ONE TOUCH ULTRA TEST VI) strip Disp-100 each, R-5, NormalTest once daily. Dispense One Touch Ultra Test Strips.   Dx: Type 2 diabetes (250.00)             ALLERGIES     is allergic to sulfa antibiotics. FAMILY HISTORY     indicated that the status of her father is unknown.    family history includes Diabetes in her father. SOCIAL HISTORY      reports that she quit smoking about 29 years ago. Her smoking use included Cigarettes. She has never used smokeless tobacco. She reports that she does not drink alcohol or use drugs. PHYSICAL EXAM     INITIAL VITALS:  height is 5' 2\" (1.575 m) and weight is 163 lb (73.9 kg). Her oral temperature is 97.6 °F (36.4 °C). Her blood pressure is 140/79 (abnormal) and her pulse is 70. Her respiration is 18 and oxygen saturation is 100%. Physical Exam   Constitutional: She is oriented to person, place, and time. She appears well-developed and well-nourished. No distress. HENT:   Head: Normocephalic and atraumatic. Mouth/Throat: Oropharynx is clear and moist.   Eyes: Conjunctivae and EOM are normal. Pupils are equal, round, and reactive to light. Right eye exhibits no discharge. Left eye exhibits no discharge. Neck: Normal range of motion. Neck supple. No thyromegaly present. Cardiovascular: Normal rate, regular rhythm, normal heart sounds and intact distal pulses. No murmur heard. Pulmonary/Chest: Effort normal and breath sounds normal. She has no wheezes. She exhibits no tenderness. Abdominal: Soft. Bowel sounds are normal. She exhibits no distension. There is no tenderness. There is no guarding. Musculoskeletal: Normal range of motion. She exhibits no edema or tenderness. Neurological: She is alert and oriented to person, place, and time. No cranial nerve deficit. Monofilament unavailable for sensation testing in feet. Skin: Skin is warm and dry. No rash noted. Psychiatric: She has a normal mood and affect. Her behavior is normal.   Nursing note and vitals reviewed.       DIFFERENTIAL DIAGNOSIS:   Peripheral neuropathy, diabetic neuropathy, restless leg syndrome    DIAGNOSTIC RESULTS       LABS:   Labs Reviewed - No data to display    EMERGENCY DEPARTMENT COURSE:   Vitals:    Vitals:    01/09/18 1120   BP: (!) 140/79   Pulse: 70   Resp: 18   Temp: 97.6 °F (36.4 °C)   TempSrc: Oral   SpO2: 100%   Weight: 163 lb (73.9 kg)   Height: 5' 2\" (1.575 m)     MDM  Patient seen and evaluated in the department for bilateral leg pain. Appropriate labs were ordered and reviewed. Examination revealed no bilateral lower extremity edema. Patient likely had ABIs performed by Dr. Jina Rolon. I considered discharge to be an appropriate decision based on the patient's condition. Patient was discharged home in stable condition with recommended follow up with their PCP. I informed patient that her gabapentin dosing may require adjusting, she may require an EMG study, or medication for restless leg syndrome. Continue regular Tylenol for discomfort at this time. She understood and was in agreement with the above-stated plans. CRITICAL CARE:   None    CONSULTS:  None    PROCEDURES:  None    FINAL IMPRESSION      1. Bilateral leg and foot pain    2.  Essential hypertension          DISPOSITION/PLAN   Discharge    PATIENT REFERRED TO:  Liliam Miguel MD  [address] [place]  [phone]    Schedule an appointment as soon as possible for a visit in 2 days  Bilateral leg pain      DISCHARGE MEDICATIONS:  Discharge Medication List as of 1/9/2018 12:22 PM          (Please note that portions of this note were completed with a voice recognition program.  Efforts were made to edit the dictations but occasionally words are mis-transcribed.)    MD Dave Guerrero MD  01/09/18 [id number]

## 2018-01-09 NOTE — PROGRESS NOTES
SpO2 97%   BMI 28.72 kg/m²     Assessment:      1. Diabetic polyneuropathy associated with type 2 diabetes mellitus (HCC)  pregabalin (LYRICA) 200 MG capsule   2. Restless leg syndrome  rOPINIRole (REQUIP) 0.5 MG tablet       Plan: We will change to Lyrica as gabapentin wasn't effective at 800 mg. Will add Requip nightly for restless leg. Follow-up in 6 weeks to recheck this as well as diabetes. Discussed use, benefit, and side effects of prescribed medications. Barriers to medication compliance addressed. All patient questions answered. Pt voiced understanding. No Follow-up on file. No orders of the defined types were placed in this encounter. Orders Placed This Encounter   Medications    rOPINIRole (REQUIP) 0.5 MG tablet     Sig: Take 1 tablet by mouth nightly     Dispense:  30 tablet     Refill:  1    pregabalin (LYRICA) 200 MG capsule     Sig: Take 1 capsule by mouth 2 times daily for 30 days. Dispense:  60 capsule     Refill:  2        Reccommended tobacco cessation options including pharmacologic methods, counseled great than 3 minutes during this visit:  Yes  []  No  []     Patient given educational materials - see patient instructions. Discussed use, benefit, and side effects of prescribed medications. All patient questions answered. Pt voiced understanding. Reviewed health maintenance. Instructed to continue current medications, diet and exercise. Patient agreed with treatment plan. Follow up as directed.      Electronically signed by Malachi Salvador MD on 1/9/2018 at 4:59 PM

## 2018-01-09 NOTE — ED TRIAGE NOTES
Patient presents to the ED with complaints of bilateral leg pain that has been going on for approximately one month. Patient states that the pain has gotten so bad that she can hardly stand it. Patient states that it feels like muscle pain. Awb2ytnd states that she is diabetic and the fears that this is nerve pain. Patient states that shhe has not noticed increased swelling. VS as noted.

## 2018-01-11 ENCOUNTER — TELEPHONE (OUTPATIENT)
Dept: FAMILY MEDICINE CLINIC | Age: 73
End: 2018-01-11

## 2018-01-16 ENCOUNTER — TELEPHONE (OUTPATIENT)
Dept: FAMILY MEDICINE CLINIC | Age: 73
End: 2018-01-16

## 2018-01-16 DIAGNOSIS — G62.9 NEUROPATHY: Primary | ICD-10-CM

## 2018-01-16 NOTE — TELEPHONE ENCOUNTER
1/16/18 Pt is asking for a referral to a neurologist. Pt is having difficulty walking, c/o discomfort and shakiness in her legs. Pt states it started a couple of weeks ago.  No preference, pls advise thanks/kqk

## 2018-01-22 ENCOUNTER — TELEPHONE (OUTPATIENT)
Dept: FAMILY MEDICINE CLINIC | Age: 73
End: 2018-01-22
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
## 2018-01-22 NOTE — TELEPHONE ENCOUNTER
Patient is calling still having Pain in Both Legs, numb in both legs and feet, she has fallen a few times and she wants to make sure a Neurologist is who she should be seen by.  Please advise to patient at [phone]

## 2018-01-26 ENCOUNTER — HOSPITAL ENCOUNTER (OUTPATIENT)
Age: 73
Discharge: HOME | End: 2018-01-26
Payer: MEDICARE
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
## 2018-01-26 LAB
CREAT SERPL-MCNC: 0.6 MG/DL (ref 0.4–1.2)
GFR SERPL CREATININE-BSD FRML MDRD: > 90 ML/MIN/1.73M2

## 2018-01-26 PROCEDURE — 36415 COLL VENOUS BLD VENIPUNCTURE: CPT

## 2018-01-26 PROCEDURE — 82565 ASSAY OF CREATININE: CPT

## 2018-01-29 ENCOUNTER — TELEPHONE (OUTPATIENT)
Dept: FAMILY MEDICINE CLINIC | Age: 73
End: 2018-01-29

## 2018-01-29 DIAGNOSIS — E11.42 DIABETIC POLYNEUROPATHY ASSOCIATED WITH TYPE 2 DIABETES MELLITUS (HCC): ICD-10-CM

## 2018-01-29 DIAGNOSIS — E11.65 TYPE 2 DIABETES MELLITUS WITH HYPERGLYCEMIA, WITH LONG-TERM CURRENT USE OF INSULIN (HCC): ICD-10-CM

## 2018-01-29 DIAGNOSIS — Z79.4 TYPE 2 DIABETES MELLITUS WITH HYPERGLYCEMIA, WITH LONG-TERM CURRENT USE OF INSULIN (HCC): ICD-10-CM

## 2018-01-30 ENCOUNTER — HOSPITAL ENCOUNTER (OUTPATIENT)
Dept: CT IMAGING | Age: 73
Discharge: HOME | End: 2018-01-30
Payer: MEDICARE
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
## 2018-01-30 PROCEDURE — 70470 CT HEAD/BRAIN W/O & W/DYE: CPT

## 2018-01-30 PROCEDURE — 6360000004 HC RX CONTRAST MEDICATION: Performed by: FAMILY MEDICINE

## 2018-01-30 RX ADMIN — IOPAMIDOL 100 ML: 755 INJECTION, SOLUTION INTRAVENOUS at 09:26

## 2018-02-06 ENCOUNTER — OFFICE VISIT (OUTPATIENT)
Dept: FAMILY MEDICINE CLINIC | Age: 73
End: 2018-02-06
Payer: MEDICARE

## 2018-02-06 VITALS
HEIGHT: 62 IN | SYSTOLIC BLOOD PRESSURE: 118 MMHG | WEIGHT: 169.4 LBS | BODY MASS INDEX: 31.17 KG/M2 | DIASTOLIC BLOOD PRESSURE: 70 MMHG | OXYGEN SATURATION: 97 % | TEMPERATURE: 97.3 F | RESPIRATION RATE: 16 BRPM | HEART RATE: 70 BPM
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
## 2018-02-06 DIAGNOSIS — I10 ESSENTIAL HYPERTENSION: ICD-10-CM

## 2018-02-06 DIAGNOSIS — R26.81 UNSTEADY GAIT: ICD-10-CM

## 2018-02-06 DIAGNOSIS — E11.42 DIABETIC POLYNEUROPATHY ASSOCIATED WITH TYPE 2 DIABETES MELLITUS (HCC): Primary | ICD-10-CM

## 2018-02-06 LAB — HBA1C MFR BLD: 7.3 %

## 2018-02-06 PROCEDURE — 1090F PRES/ABSN URINE INCON ASSESS: CPT | Performed by: FAMILY MEDICINE

## 2018-02-06 PROCEDURE — G8484 FLU IMMUNIZE NO ADMIN: HCPCS | Performed by: FAMILY MEDICINE

## 2018-02-06 PROCEDURE — 3014F SCREEN MAMMO DOC REV: CPT | Performed by: FAMILY MEDICINE

## 2018-02-06 PROCEDURE — G8417 CALC BMI ABV UP PARAM F/U: HCPCS | Performed by: FAMILY MEDICINE

## 2018-02-06 PROCEDURE — 3045F PR MOST RECENT HEMOGLOBIN A1C LEVEL 7.0-9.0%: CPT | Performed by: FAMILY MEDICINE

## 2018-02-06 PROCEDURE — 1123F ACP DISCUSS/DSCN MKR DOCD: CPT | Performed by: FAMILY MEDICINE

## 2018-02-06 PROCEDURE — G8427 DOCREV CUR MEDS BY ELIG CLIN: HCPCS | Performed by: FAMILY MEDICINE

## 2018-02-06 PROCEDURE — 3017F COLORECTAL CA SCREEN DOC REV: CPT | Performed by: FAMILY MEDICINE

## 2018-02-06 PROCEDURE — 1036F TOBACCO NON-USER: CPT | Performed by: FAMILY MEDICINE

## 2018-02-06 PROCEDURE — 83036 HEMOGLOBIN GLYCOSYLATED A1C: CPT | Performed by: FAMILY MEDICINE

## 2018-02-06 PROCEDURE — G8598 ASA/ANTIPLAT THER USED: HCPCS | Performed by: FAMILY MEDICINE

## 2018-02-06 PROCEDURE — 4040F PNEUMOC VAC/ADMIN/RCVD: CPT | Performed by: FAMILY MEDICINE

## 2018-02-06 PROCEDURE — 99214 OFFICE O/P EST MOD 30 MIN: CPT | Performed by: FAMILY MEDICINE

## 2018-02-06 PROCEDURE — G8400 PT W/DXA NO RESULTS DOC: HCPCS | Performed by: FAMILY MEDICINE

## 2018-02-06 ASSESSMENT — ENCOUNTER SYMPTOMS
CONSTIPATION: 0
SORE THROAT: 0
DIARRHEA: 0
COUGH: 0
SHORTNESS OF BREATH: 0
NAUSEA: 0
EYE DISCHARGE: 0
RHINORRHEA: 0
WHEEZING: 0
ABDOMINAL PAIN: 0

## 2018-02-06 NOTE — PROGRESS NOTES
[facility]  [address] [place] [address]  Dept: [phone]  Dept Fax: [phone]  Loc: [phone]    Jorge Rodriguez is a 75 y.o. female who presents today for her medical conditions/complaints as noted below. Chief Complaint   Patient presents with    1 Month Follow-Up     diabetes and leg pain and also review CT scan completed 01/30/2018           HPI:     Patient presents for one-month follow-up of diabetes, leg pain, unsteady gait and falls. Patient has been a diabetic who is uncontrolled for years. Recently started coming to our office and sugars have improved significantly. However, unfortunately patient has moderate to severe diabetic neuropathy in her bilateral feet and ankles due to the many years untreated diabetes. She is currently taking insulin and metformin and her fasting morning sugars are between 100 and 120. She denies any lows. She does state that she's frustrated with the pain in her legs and the numbness. She states that she falls often because she cannot feel her toes and trips over things. She does use a walker but is still unsteady. Her  is frustrated because she can't go out and do things that she would like to do because the neuropathy. Did have a CT scan of the head to rule out any neurologic pathology but the CT was reviewed and was within normal limits. She states that she takes the Lyrica twice daily and Neurontin nightly and she does believe that this helps somewhat with her neuropathic pain. A1c today is down to 7.3 which is greatly improved from 9.56 weeks ago.         Past Medical History:   Diagnosis Date    CAD (coronary artery disease)     Hyperlipidemia     Hypertension     ICD (implantable cardiac defibrillator), dual, in situ     [employer]. Boris dual ICD    Shingles 12/2014    St Boris dual ICD     Type II or unspecified type diabetes mellitus without mention of complication, not Family History   Problem Relation Age of Onset    Diabetes Father        Social History   Substance Use Topics    Smoking status: Former Smoker     Types: Cigarettes     Quit date: 9/20/1988    Smokeless tobacco: Never Used    Alcohol use No      Current Outpatient Prescriptions   Medication Sig Dispense Refill    insulin glargine (LANTUS SOLOSTAR) 100 UNIT/ML injection pen Inject 20 Units into the skin nightly 6 mL 0    rOPINIRole (REQUIP) 0.5 MG tablet Take 1 tablet by mouth nightly 30 tablet 1    pregabalin (LYRICA) 200 MG capsule Take 1 capsule by mouth 2 times daily for 30 days. 60 capsule 2    gabapentin (NEURONTIN) 400 MG capsule Take 2 capsules by mouth every evening 90 capsule 3    enalapril (VASOTEC) 20 MG tablet TAKE 1 TABLET TWICE A  tablet 0    metoprolol tartrate (LOPRESSOR) 50 MG tablet TAKE 1 TABLET TWICE A  tablet 0    amiodarone (CORDARONE) 200 MG tablet TAKE 1 TABLET DAILY 90 tablet 0    metFORMIN (GLUCOPHAGE XR) 750 MG extended release tablet Take 1 tablet by mouth 2 times daily (with meals) 30 tablet 3    Insulin Pen Needle (B-D UF III MINI PEN NEEDLES) 31G X 5 MM MISC 1 each by Does not apply route daily 100 each 3    atorvastatin (LIPITOR) 80 MG tablet TAKE 1 TABLET DAILY 90 tablet 2    glucose blood VI test strips (ASCENSIA AUTODISC VI;ONE TOUCH ULTRA TEST VI) strip Test once daily. Dispense One Touch Ultra Test Strips. Dx: Type 2 diabetes (250.00) 100 each 5    aspirin 325 MG tablet Take 325 mg by mouth daily. No current facility-administered medications for this visit.       Allergies   Allergen Reactions    Sulfa Antibiotics        Health Maintenance   Topic Date Due    DTaP/Tdap/Td vaccine (1 - Tdap) [birth year]03/1963    TSH testing  01/21/2016    Breast cancer screen  01/26/2017    A1C test (Diabetic or Prediabetic)  03/19/2018    Pneumococcal low/med risk (2 of 2 - PCV13) 09/13/2018    Colon Cancer Screen FIT/FOBT  09/26/2018    Diabetic microalbuminuria test  09/27/2018    Diabetic foot exam  10/03/2018    Diabetic retinal exam  10/10/2018    Lipid screen  12/11/2018    Potassium monitoring  12/11/2018    Creatinine monitoring  01/26/2019    Zostavax vaccine  Addressed    DEXA (modify frequency per FRAX score)  Addressed    Flu vaccine  Completed       Subjective:      Review of Systems   Constitutional: Negative for chills, fatigue and fever. HENT: Negative for congestion, rhinorrhea and sore throat. Eyes: Negative for discharge and visual disturbance. Respiratory: Negative for cough, shortness of breath and wheezing. Cardiovascular: Negative for chest pain and palpitations. Gastrointestinal: Negative for abdominal pain, constipation, diarrhea and nausea. Genitourinary: Negative for dysuria and hematuria. Musculoskeletal: Positive for gait problem. Negative for arthralgias and myalgias. Neurological: Positive for numbness. Negative for dizziness and headaches. Psychiatric/Behavioral: Negative for sleep disturbance. The patient is not nervous/anxious. Objective:     Physical Exam   Constitutional: She is oriented to person, place, and time. She appears well-developed and well-nourished. No distress. HENT:   Head: Normocephalic and atraumatic. Right Ear: Hearing, tympanic membrane, external ear and ear canal normal.   Left Ear: Hearing, tympanic membrane, external ear and ear canal normal.   Nose: Right sinus exhibits no maxillary sinus tenderness and no frontal sinus tenderness. Left sinus exhibits no maxillary sinus tenderness and no frontal sinus tenderness. Mouth/Throat: Oropharynx is clear and moist and mucous membranes are normal. No oropharyngeal exudate, posterior oropharyngeal edema or posterior oropharyngeal erythema. Eyes: Conjunctivae and EOM are normal. Pupils are equal, round, and reactive to light. Right eye exhibits no discharge. Left eye exhibits no discharge. No scleral icterus.    Neck: Reason:   Specialty Services Required     Requested Specialty:   Physical Therapy     Number of Visits Requested:   1    POCT glycosylated hemoglobin (Hb A1C)     No orders of the defined types were placed in this encounter. Reccommended tobacco cessation options including pharmacologic methods, counseled great than 3 minutes during this visit:  Yes  []  No  []     Patient given educational materials - see patient instructions. Discussed use, benefit, and side effects of prescribed medications. All patient questions answered. Pt voiced understanding. Reviewed health maintenance. Instructed to continue current medications, diet and exercise. Patient agreed with treatment plan. Follow up as directed.      Electronically signed by Marry Garcia MD on 2/6/2018 at 1:03 PM

## 2018-02-06 NOTE — PATIENT INSTRUCTIONS
when cooking. · Don't skip meals. Your blood sugar may drop too low if you skip meals and take insulin or certain medicines for diabetes. · Check with your doctor before you drink alcohol. Alcohol can cause your blood sugar to drop too low. Alcohol can also cause a bad reaction if you take certain diabetes medicines. Follow-up care is a key part of your treatment and safety. Be sure to make and go to all appointments, and call your doctor if you are having problems. It's also a good idea to know your test results and keep a list of the medicines you take. Where can you learn more? Go to [email or web address] and sign in to your [employer] account. Enter L306 in the Tapjoy box to learn more about \"Learning About Diabetes Food Guidelines. \"     If you do not have an account, please click on the \"Sign Up Now\" link. Current as of: March 13, 2017  Content Version: 11.5  © 6235-4140 [employer] Care instructions adapted under license by [facility] ([facility]). If you have questions about a medical condition or this instruction, always ask your healthcare professional. [place] [address] any warranty or liability for your use of this information. Patient Education        Preventing Falls: Care Instructions  Your Care Instructions    Getting around your home safely can be a challenge if you have injuries or health problems that make it easy for you to fall. Loose rugs and furniture in walkways are among the dangers for many older people who have problems walking or who have poor eyesight. People who have conditions such as arthritis, osteoporosis, or dementia also have to be careful not to fall. You can make your home safer with a few simple measures. Follow-up care is a key part of your treatment and safety. Be sure to make and go to all appointments, and call your doctor if you are having problems.  It's also a good idea to know your test results and keep a

## 2018-02-08 ENCOUNTER — INITIAL CONSULT (OUTPATIENT)
Dept: NEUROLOGY | Age: 73
End: 2018-02-08
Payer: MEDICARE

## 2018-02-08 VITALS
DIASTOLIC BLOOD PRESSURE: 83 MMHG | HEART RATE: 66 BPM | BODY MASS INDEX: 31.65 KG/M2 | WEIGHT: 172 LBS | SYSTOLIC BLOOD PRESSURE: 130 MMHG | HEIGHT: 62 IN

## 2018-02-08 DIAGNOSIS — R20.0 NUMBNESS IN BOTH LEGS: ICD-10-CM

## 2018-02-08 DIAGNOSIS — R27.8 SENSORY ATAXIA: Primary | ICD-10-CM

## 2018-02-08 PROCEDURE — 3017F COLORECTAL CA SCREEN DOC REV: CPT | Performed by: PSYCHIATRY & NEUROLOGY

## 2018-02-08 PROCEDURE — 1036F TOBACCO NON-USER: CPT | Performed by: PSYCHIATRY & NEUROLOGY

## 2018-02-08 PROCEDURE — G8598 ASA/ANTIPLAT THER USED: HCPCS | Performed by: PSYCHIATRY & NEUROLOGY

## 2018-02-08 PROCEDURE — G8484 FLU IMMUNIZE NO ADMIN: HCPCS | Performed by: PSYCHIATRY & NEUROLOGY

## 2018-02-08 PROCEDURE — 99204 OFFICE O/P NEW MOD 45 MIN: CPT | Performed by: PSYCHIATRY & NEUROLOGY

## 2018-02-08 PROCEDURE — 4040F PNEUMOC VAC/ADMIN/RCVD: CPT | Performed by: PSYCHIATRY & NEUROLOGY

## 2018-02-08 PROCEDURE — G8417 CALC BMI ABV UP PARAM F/U: HCPCS | Performed by: PSYCHIATRY & NEUROLOGY

## 2018-02-08 PROCEDURE — 1090F PRES/ABSN URINE INCON ASSESS: CPT | Performed by: PSYCHIATRY & NEUROLOGY

## 2018-02-08 PROCEDURE — 1123F ACP DISCUSS/DSCN MKR DOCD: CPT | Performed by: PSYCHIATRY & NEUROLOGY

## 2018-02-08 PROCEDURE — G8400 PT W/DXA NO RESULTS DOC: HCPCS | Performed by: PSYCHIATRY & NEUROLOGY

## 2018-02-08 PROCEDURE — G8427 DOCREV CUR MEDS BY ELIG CLIN: HCPCS | Performed by: PSYCHIATRY & NEUROLOGY

## 2018-02-08 PROCEDURE — 3014F SCREEN MAMMO DOC REV: CPT | Performed by: PSYCHIATRY & NEUROLOGY

## 2018-02-08 NOTE — LETTER
[facility]  [facility]  [address]  Dept: [phone]  Dept Fax: [phone]  Loc: Ama Ruvalcaba MD        2/8/2018      Patient:  Tiffani Padron  MRN:  [id number]  YOB: 1945  Date of Visit:  2/8/2018    Dear Dr. Len Manzo,    Thank you for referring Otto Rosado to me for consultation. Please see attached visit summary with my findings. If you have any questions, please do not hesitate to call me.       Sincerely,         Keven Booker MD

## 2018-02-08 NOTE — PROGRESS NOTES
Problems Mother        Review of Systems   Complete review of systems is negative other than what is mentioned in HPI      Vitals:    02/08/18 1511   BP: 130/83   Site: Left Arm   Position: Sitting   Pulse: 66   Weight: 172 lb (78 kg)   Height: 5' 2.01\" (1.575 m)       Physical Examination:  General appearance - alert, well appearing, and in no distress, oriented to person, place, and time and normal weight, she uses a walker. Mental status- Level of Alertness: awake  Orientation: person, place, time  Memory: normal  Fund of Knowledge: normal  Attention/Concentration: normal  Language: normal. Mood is normal.   Neck - supple, no significant adenopathy, carotids upstroke normal bilaterally,   There is no carotid bruit . No neck lymphadenopathy . No thyroid enlargement   Neurological -   Cranial [id number]:.   Cranial nerve II: Normal   Cranial nerve III: Pupils: equal, round, reactive to light  Cranial nerves III, IV, VI: Extraocular Movements: intact   Cranial nerve V: Facial sensation: intact   Cranial nerve VII:Facial strength: intact   Cranial nerve VIII: Hearing: intact   Cranial nerve IX: Palate Elevation intact bilaterally  Cranial nerve XI: Shoulder shrug intact bilaterally  Cranial nerve XII: Tongue midline   neck supple without rigidity  DTR's are decreased distal and symmetric  Babinski sign negative. Motor exam is 5/5 in the upper and lower extremitiest. Normal muscle tone . There is no muscle atrophy. Sensory is intact for light touch, cortical sensation. Coordination: finger to nose intact  Gait and station steppage gait guarded, she uses walker. Abnormal movement none, vibration absent up to above the knee bilaterally, proprioception absent. Skin - normal coloration, no rashes, no suspicious skin lesions  Superficial temporal artery pulses are normal.   There is no limitation of range of motion of the neck.   There is no resting tremor, no pin rolling, no bradykinesia, no Hypohonia, normal blink rate. Musculoskeletal: Has no hand arthritis, no limitation of ROM in any of the four extremities. There is no leg edema . The Heart was regular in rate and rhythm. No heart murmur  Chest Clear  Abdomen was soft. We reviewed the patient records from referring provider and available information in the EHR       ASSESSMENT:     1. Sensory ataxia     2. Numbness in both legs        This is a 76-year-old female who presents with symptoms of sensory ataxia probably related to poorly controlled diabetes. She is unable to ambulate without constantly holding onto the walker. She loses her balance when she closes her eyes, she does well when she is able to hold on to an object, while ambulating without having to depend on it with weight. The patient's unable to walk at night in the dark, even with the walker. She has to look down at the ground when ambulating. She has numbness in both lower extremities. The patient has a pacemaker in place, precluding MRI being performed. The patient was counseled about her condition, and the need to modify her risk factors. She has questions reflecting understanding. She would benefit from an EMG of the lower extremities, in addition to obtaining B12, folate, B6. She was advised to work on  modifying her diabetes control. She appears to be making progress with the A1c, improving from 14 to 7.3 recently. After detailed discussion with patient we agreed on the following plan. Plan    1. B12, Folate, B6.   2. EMG bilateral lower extremities. 3. Work with your family doctor closely on improving your diabetes control. 4. Physical therapy evaluation for gait safety. 5. Call with any new symptoms or concerns. 6. Follow up in 2 months.      Alexander Sanchez MD

## 2018-02-14 ENCOUNTER — HOSPITAL ENCOUNTER (OUTPATIENT)
Dept: PHYSICAL THERAPY | Age: 73
Setting detail: THERAPIES SERIES
Discharge: HOME | End: 2018-02-14
Payer: MEDICARE

## 2018-02-14 PROCEDURE — 97110 THERAPEUTIC EXERCISES: CPT

## 2018-02-14 PROCEDURE — G8979 MOBILITY GOAL STATUS: HCPCS

## 2018-02-14 PROCEDURE — G8978 MOBILITY CURRENT STATUS: HCPCS

## 2018-02-14 PROCEDURE — 97162 PT EVAL MOD COMPLEX 30 MIN: CPT

## 2018-02-14 ASSESSMENT — PAIN DESCRIPTION - LOCATION: LOCATION: TOE (COMMENT WHICH ONE);LEG

## 2018-02-14 ASSESSMENT — PAIN DESCRIPTION - PAIN TYPE: TYPE: CHRONIC PAIN

## 2018-02-14 ASSESSMENT — PAIN SCALES - GENERAL: PAINLEVEL_OUTOF10: 6

## 2018-02-14 ASSESSMENT — PAIN DESCRIPTION - ORIENTATION: ORIENTATION: RIGHT;LEFT

## 2018-02-14 NOTE — PROGRESS NOTES
No  Comments: next physician visit Dr. Filemon Aly 2/22/18, Dr. Avi So 3/13/18. difficulty with steps, walking, balance,   Other (Comment): [facility]     Subjective: patient reports she has had problems with falling X 1 year. Has started using RW X 1 month. Feels off balance. Has had feelings of numbness in toes, anterior lower leg R>L X 1 year.  Has fallen about 7X in the past year      Pain:  Patient Currently in Pain: Yes  Pain Assessment: 0-10  Pain Level: 6  Pain Type: Chronic pain  Pain Location: Toe (Comment which one), Leg  Pain Orientation: Right, Left  Pain Radiating Towards: anterior lower legs by ankles R>L     Social/Functional History:    Lives With: Spouse  Type of Home: House  Home Layout: One level  Home Access: Stairs to enter without rails (2 steps)  Home Equipment: 4 wheeled walker, Cane   ADL Assistance: Independent  Homemaking Assistance: Independent  Ambulation Assistance: Independent  Transfer Assistance: Independent    Active : No  Occupation: Retired  Leisure & Hobbies: crossword puzzles, watch TV, computer games,     Objective  Overall Orientation Status: Within Normal Limits    Follows Commands: Within Functional Limits        Vision: Within Functional Limits    Hearing: Within functional limits      Observation/Palpation  Observation: foot drop noted L      ROM RLE: SLR 70 degrees with assist due to leg weakness, dorsiflexion 5 degrees  ROM LLE: SLR 70 degrees with assist due to leg weakness, dorsiflexion 5 degrees    Strength RLE: Exception  Comment: hip flexion 3/5, abduction, 4/5, hip extension 4/5, knee extension 4+/5, knee flexion 4/5, dorsiflexion 1/5, plantarflexion 3+/5  Strength LLE: Exception  Comment: hip flexion 3/5, abduction, 4/5, hip extension 4/5, knee extension 4+/5, knee flexion 4/5, dorsiflexion 3/5, plantarflexion 3+/5    Strength LUE: WNL  Strength RUE: WNL    Special Tests: double knee to chest: no change, trunk rolls: feet a little less numb Endurance Training, Neuromuscular Re-education, Home Exercise Program, Safety Education & Training, Positioning, Equipment Evaluation, Education, & procurement, Patient/Caregiver Education & Training  Plan Comment: POC initiated    History: Personal factors or comorbidities that impact plan of care -  Moderate Complexity: 1-2 personal factors or comorbidities. See history section above for details. Examination: Body structures and functions, activity limitations, participation restrictions; using standardized tests and measures - Moderate Complexity: 3 or morebody structures and functional, activity limitations and/or participation restrictions. See restrictions and objective section above for details. Clinical Presentation: Moderate - Evolving with Changing Characteristics: decreased LE strength, history of falls, impaired balance, R foot drop    Decision Making: Moderate Complexity due to decreased LE strength, history of falls, impaired balance, R foot drop. Decision making was based on patient assessment and decision making process in determining plan of care and establishing reasonable expectations for measurable functional outcomes. Evaluation Complexity: Based on the findings of patient history, examination, clinical presentation, and decision making during this evaluation, the evaluation of Janina Rockwell  is of medium complexity.     Goals:  Patient goals : get legs better    Short term goals  Time Frame for Short term goals: 4 weeks  Short term goal 1: no reported falls in 4 weeks period for safety in home and community with using RW  Short term goal 2: increase LE strength >4/5 for balance, stair climbing, transfers  Short term goal 3: tandem stance >10 sec, SLS>5 sec for safety on wet or uneven surfaces  Short term goal 4: ambulate with RW or cart >30 min for shopping, steps reciprocally with 2 handrails for improve egress into home    Long term goals  Time Frame for Long term goals : 6

## 2018-02-15 ENCOUNTER — HOSPITAL ENCOUNTER (OUTPATIENT)
Dept: PHYSICAL THERAPY | Age: 73
Setting detail: THERAPIES SERIES
Discharge: HOME | End: 2018-02-15
Payer: MEDICARE

## 2018-02-15 PROCEDURE — 97116 GAIT TRAINING THERAPY: CPT

## 2018-02-15 PROCEDURE — 97110 THERAPEUTIC EXERCISES: CPT

## 2018-02-15 NOTE — PROGRESS NOTES
current POC, progressing as able    Goals:  Patient goals : Get legs better    Short term goals  Time Frame for Short term goals: 4 weeks  Short term goal 1: No reported falls in 4 weeks period for safety in home and community with using RW  Short term goal 2: Increase LE strength >4/5 for balance, stair climbing, transfers  Short term goal 3: Tandem stance >10 sec, SLS>5 sec for safety on wet or uneven surfaces  Short term goal 4: Ambulate with RW or cart >30 min for shopping, steps reciprocally with 2 handrails for improve egress into home    Long term goals  Time Frame for Long term goals : 6 weeks  Long term goal 1: Patient to ambulate with straight cane >30 min for shopping, steps reciprocally with 1 handrail for egress into home  Long term goal 2: SLS>8 sec bilat, tandem stance >15 sec for balance, fall prevention, walking on wet or uneven surfaces  Long term goal 3: Increase LE strength >4+/5 for balance, fall prevention, walking on wet or uneven surfaces  Long term goal 4: Independent HEP to achieve above goals       Colleen Walsh.  Miranda Gallagher, [address], 2/15/2018

## 2018-02-19 ENCOUNTER — HOSPITAL ENCOUNTER (OUTPATIENT)
Dept: PHYSICAL THERAPY | Age: 73
Setting detail: THERAPIES SERIES
Discharge: HOME | End: 2018-02-19
Payer: MEDICARE

## 2018-02-19 PROCEDURE — 97110 THERAPEUTIC EXERCISES: CPT

## 2018-02-19 NOTE — PROGRESS NOTES
occasional hand tap to //bars, Rockerboard 2 directions x10 each and balance x1 minute each way  Exercise 4: Gait training x4 lengths of gym with rollator walker working on heel strike, hip/knee flexion and keeping toes straight ahead. Ace wrap at RIght foot assisting with dorsiflexion  Exercise 5: Seated heel/toe raises x10  Exercise 8: Squats x10, Lunges x5 Bilateral; 3-way hip x10 Bilateral   Exercise 9:  straight leg raises x10 Bilateral, Bridging 10x 5 seconds         Activity Tolerance:  Activity Tolerance: Patient Tolerated treatment well  Activity Tolerance: Fatigue noted at end of session. Assessment: Body structures, Functions, Activity limitations: Decreased functional mobility , Decreased ADL status, Decreased strength, Decreased balance, Decreased endurance  Assessment: does well wtih gait and R foot wrapped into dorsiflexion. will continue this and possiblty have patient pursue AFO in near future  Prognosis: Good  REQUIRES PT FOLLOW UP: Yes    Patient Education:  Patient Education: Continue with HEP marching, walking sideways at counter. discussion on handrail at back door, shower seat and hand held shower for home with handout provided.  seated heel/toe raise, seated march/LAQ, hip abd/Ham curl with red t-band                      Plan:  Times per week: 2-3X/week  Plan weeks: 6 weeks  Specific instructions for Next Treatment: ROM, stretching, strength, core strength, balance, gait, stairs  Current Treatment Recommendations: Strengthening, ROM, Balance Training, Functional Mobility Training, Transfer Training, IADL Training, Endurance Training, Neuromuscular Re-education, Home Exercise Program, Safety Education & Training, Positioning, Equipment Evaluation, Education, & procurement, Patient/Caregiver Education & Training  Plan Comment: Continue with current POC, progressing as able    Goals:  Patient goals : Get legs better    Short term goals  Time Frame for Short term goals: 4 weeks  Short term

## 2018-02-21 ENCOUNTER — HOSPITAL ENCOUNTER (OUTPATIENT)
Dept: PHYSICAL THERAPY | Age: 73
Setting detail: THERAPIES SERIES
Discharge: HOME | End: 2018-02-21
Payer: MEDICARE

## 2018-02-21 PROCEDURE — 97110 THERAPEUTIC EXERCISES: CPT

## 2018-02-21 NOTE — PROGRESS NOTES
goals  Time Frame for Short term goals: 4 weeks  Short term goal 1: No reported falls in 4 weeks period for safety in home and community with using RW  Short term goal 2: Increase LE strength >4/5 for balance, stair climbing, transfers  Short term goal 3: Tandem stance >10 sec, SLS>5 sec for safety on wet or uneven surfaces  Short term goal 4: Ambulate with RW or cart >30 min for shopping, steps reciprocally with 2 handrails for improve egress into home    Long term goals  Time Frame for Long term goals : 6 weeks  Long term goal 1: Patient to ambulate with straight cane >30 min for shopping, steps reciprocally with 1 handrail for egress into home  Long term goal 2: SLS>8 sec bilat, tandem stance >15 sec for balance, fall prevention, walking on wet or uneven surfaces  Long term goal 3: Increase LE strength >4+/5 for balance, fall prevention, walking on wet or uneven surfaces  Long term goal 4:  Independent HEP to achieve above goals         Shari Matos, PT  [id number]

## 2018-02-22 ENCOUNTER — PROCEDURE VISIT (OUTPATIENT)
Dept: NEUROLOGY | Age: 73
End: 2018-02-22
Payer: MEDICARE

## 2018-02-22 DIAGNOSIS — G62.9 NEUROPATHY: ICD-10-CM

## 2018-02-22 DIAGNOSIS — M21.371 RIGHT FOOT DROP: ICD-10-CM

## 2018-02-22 DIAGNOSIS — M54.16 LUMBAR RADICULOPATHY: Primary | ICD-10-CM

## 2018-02-22 PROCEDURE — 95910 NRV CNDJ TEST 7-8 STUDIES: CPT | Performed by: PSYCHIATRY & NEUROLOGY

## 2018-02-22 PROCEDURE — 95886 MUSC TEST DONE W/N TEST COMP: CPT | Performed by: PSYCHIATRY & NEUROLOGY

## 2018-02-26 ENCOUNTER — HOSPITAL ENCOUNTER (OUTPATIENT)
Dept: PHYSICAL THERAPY | Age: 73
Setting detail: THERAPIES SERIES
Discharge: HOME | End: 2018-02-26
Payer: MEDICARE

## 2018-02-26 PROCEDURE — 97110 THERAPEUTIC EXERCISES: CPT

## 2018-02-26 NOTE — PROGRESS NOTES
Anjelkayla Hyatt 59  OUTPATIENT PHYSICAL THERAPY  DAILY NOTE  [facility]     Time In: 1300  Time Out: 1353  Minutes: 53  Timed Code Treatment Minutes: 48 Minutes     Date: 2018  Patient Name: Jane Gutiérrez,  Gender:  female        CSN: [id number]   : 1945  (75 y.o.)       Referring Practitioner: Rober Adame MD      Diagnosis: diabetic polyneuropathy associated wit type2 diabetes mellitus, fall, unsteady gait  Treatment Diagnosis: diabetic polyneuropathy associated with type 2 veronica etes mellitus, fall, unsteady gait   Additional Pertinent Hx: history of DM with poly neuropathy, essential HTN, history of falls, PACEMAKER, CABG                  General:  PT Visit Information  Onset Date: 18  PT Insurance Information: Medicare $3700 cap/calendar year, ionto/HP/CP not covered. [place] secondary  Total # of Visits to Date: 5  Plan of Care/Certification Expiration Date: 18  Progress Note Counter: 5/10 for PN               Subjective:  Chart Reviewed: Yes  Patient assessed for rehabilitation services?: Yes  Family / Caregiver Present: No  Comments: next physician visit Dr. Marco Whalen 18, Dr. Toya Mello 3/13/18. difficulty with steps, walking, balance,   Other (Comment): [facility]     Subjective: has a report to see Dr. Kaykay Garrido for her back 3/19/18-no back pain but radicular symptoms/weakness may be related. Had EMG a few days ago.       Pain:  Patient Currently in Pain: Denies         Objective  Follows Commands: Within Functional Limits                                                                                                                       Exercises  Exercise 1: LE strength: Hip adduction red X 10 bilat, hip abduction with red theraband x15, seated march 2# X 15 bilat,    ; Seated LAQ 2# X15, hamstring curls with red theraband x15 Bilateral  Exercise 2: Sidestepping in parallel bars: 2#  x3 laps with ace wrap assisting Right foot into dorsiflexion;     stand

## 2018-03-01 ENCOUNTER — HOSPITAL ENCOUNTER (OUTPATIENT)
Dept: PHYSICAL THERAPY | Age: 73
Setting detail: THERAPIES SERIES
Discharge: HOME | End: 2018-03-01
Payer: MEDICARE

## 2018-03-01 PROCEDURE — 97110 THERAPEUTIC EXERCISES: CPT

## 2018-03-01 NOTE — PROGRESS NOTES
4\" X 10 bilat  Exercise 5: Seated heel/toe raises x10X 2  Exercise 6: 3 way hip: 2# X 10 ea bilat    flexion and abduction: 2# X 5 X 5 sec ea bilat  Exercise 10:  seated march, LAQ: 2# X 15 ea. standham curl 2# X 15 ea         Activity Tolerance:  Activity Tolerance: Patient Tolerated treatment well  Activity Tolerance: Fatigue noted at end of session. Assessment: Body structures, Functions, Activity limitations: Decreased functional mobility , Decreased ADL status, Decreased strength, Decreased balance, Decreased endurance  Assessment: doing well with ex. continues with weakness of hamstrings and weakness of bilat dorsiflexors R worse than L  Prognosis: Good  REQUIRES PT FOLLOW UP: Yes    Patient Education:  Patient Education: Continue with HEP marching, walking sideways at counter. discussion on handrail at back door, shower seat and hand held shower for home with handout provided. seated heel/toe raise, seated march/LAQ, hip abd/Ham curl with red t-band                      Plan:  Times per week: 2-3X/week  Plan weeks: 6 weeks  Specific instructions for Next Treatment: ROM, stretching, strength, core strength, balance, gait, stairs  Current Treatment Recommendations: Strengthening, ROM, Balance Training, Functional Mobility Training, Transfer Training, IADL Training, Endurance Training, Neuromuscular Re-education, Home Exercise Program, Safety Education & Training, Positioning, Equipment Evaluation, Education, & procurement, Patient/Caregiver Education & Training  Plan Comment: Continue with current POC, progressing as able    Goals:  Patient goals : Get legs better    Short term goals  Time Frame for Short term goals: 4 weeks  Short term goal 1: No reported falls in 4 weeks period for safety in home and community with using RW  Short term goal 2:  Increase LE strength >4/5 for balance, stair climbing, transfers  Short term goal 3: Tandem stance >10 sec, SLS>5 sec for safety on wet or uneven surfaces  Short

## 2018-03-02 DIAGNOSIS — G25.81 RESTLESS LEG SYNDROME: ICD-10-CM

## 2018-03-05 ENCOUNTER — HOSPITAL ENCOUNTER (OUTPATIENT)
Dept: PHYSICAL THERAPY | Age: 73
Setting detail: THERAPIES SERIES
Discharge: HOME | End: 2018-03-05
Payer: MEDICARE

## 2018-03-05 PROCEDURE — 97110 THERAPEUTIC EXERCISES: CPT

## 2018-03-05 RX ORDER — ROPINIROLE 0.5 MG/1
0.5 TABLET, FILM COATED ORAL NIGHTLY
Qty: 30 TABLET | Refills: 11 | Status: SHIPPED | OUTPATIENT
Start: 2018-03-05 | End: 2019-03-11 | Stop reason: SDUPTHER

## 2018-03-05 NOTE — PROGRESS NOTES
directions x15 each and balance x1 minute each way  Exercise 4: step up, lat step up: 4\" X 10 bilat  Exercise 5: Seated heel/toe raises x10X 2  Exercise 6: 3 way hip: 2# X 10 ea bilat    flexion and abduction: 2# X 5 X 5 sec ea bilat  Exercise 8: Squats x10, Lunges x5 Bilateral;   Exercise 10:  seated march, LAQ: 2# X 15 ea-X 2 sets ea. standham curl X 15 ea-min assist needed bilat         Activity Tolerance:  Activity Tolerance: Patient Tolerated treatment well  Activity Tolerance: Fatigue noted at end of session. Assessment: Body structures, Functions, Activity limitations: Decreased functional mobility , Decreased ADL status, Decreased strength, Decreased balance, Decreased endurance  Assessment: doing well with ex. continues with weakness of hamstrings and weakness of bilat dorsiflexors R worse than L. wrapping  R foot into dorsiflexion helps with gait. She would benefit from AFO for R foot drop  Prognosis: Good  REQUIRES PT FOLLOW UP: Yes    Patient Education:  Patient Education: Continue with HEP marching, walking sideways at counter. discussion on handrail at back door, shower seat and hand held shower for home with handout provided.  seated heel/toe raise, seated march/LAQ, hip abd/Ham curl with red t-band                      Plan:  Times per week: 2-3X/week  Plan weeks: 6 weeks  Specific instructions for Next Treatment: ROM, stretching, strength, core strength, balance, gait, stairs  Current Treatment Recommendations: Strengthening, ROM, Balance Training, Functional Mobility Training, Transfer Training, IADL Training, Endurance Training, Neuromuscular Re-education, Home Exercise Program, Safety Education & Training, Positioning, Equipment Evaluation, Education, & procurement, Patient/Caregiver Education & Training  Plan Comment: Continue with current POC, progressing as able    Goals:  Patient goals : Get legs better    Short term goals  Time Frame for Short term goals: 4 weeks  Short term goal 1: No

## 2018-03-07 ENCOUNTER — OFFICE VISIT (OUTPATIENT)
Dept: CARDIOLOGY CLINIC | Age: 73
End: 2018-03-07
Payer: MEDICARE

## 2018-03-07 VITALS
HEART RATE: 77 BPM | DIASTOLIC BLOOD PRESSURE: 70 MMHG | BODY MASS INDEX: 31.65 KG/M2 | WEIGHT: 172 LBS | HEIGHT: 62 IN | SYSTOLIC BLOOD PRESSURE: 132 MMHG

## 2018-03-07 DIAGNOSIS — I10 ESSENTIAL HYPERTENSION: ICD-10-CM

## 2018-03-07 DIAGNOSIS — Z79.4 TYPE 2 DIABETES MELLITUS WITH HYPERGLYCEMIA, WITH LONG-TERM CURRENT USE OF INSULIN (HCC): ICD-10-CM

## 2018-03-07 DIAGNOSIS — I25.10 CORONARY ARTERY DISEASE INVOLVING NATIVE CORONARY ARTERY OF NATIVE HEART WITHOUT ANGINA PECTORIS: Primary | ICD-10-CM

## 2018-03-07 DIAGNOSIS — I49.9 VENTRICULAR ARRHYTHMIA: ICD-10-CM

## 2018-03-07 DIAGNOSIS — I73.9 CLAUDICATION (HCC): ICD-10-CM

## 2018-03-07 DIAGNOSIS — I25.5 CARDIOMYOPATHY, ISCHEMIC: ICD-10-CM

## 2018-03-07 DIAGNOSIS — R94.30 ABNORMAL CARDIAC FUNCTION TEST: ICD-10-CM

## 2018-03-07 DIAGNOSIS — Z95.1 HX OF CABG: ICD-10-CM

## 2018-03-07 DIAGNOSIS — E11.65 TYPE 2 DIABETES MELLITUS WITH HYPERGLYCEMIA, WITH LONG-TERM CURRENT USE OF INSULIN (HCC): ICD-10-CM

## 2018-03-07 DIAGNOSIS — I70.0 ATHEROSCLEROSIS OF AORTA (HCC): ICD-10-CM

## 2018-03-07 DIAGNOSIS — Z95.810 DUAL IMPLANTABLE CARDIOVERTER-DEFIBRILLATOR IN SITU: ICD-10-CM

## 2018-03-07 PROCEDURE — G8400 PT W/DXA NO RESULTS DOC: HCPCS | Performed by: INTERNAL MEDICINE

## 2018-03-07 PROCEDURE — 3014F SCREEN MAMMO DOC REV: CPT | Performed by: INTERNAL MEDICINE

## 2018-03-07 PROCEDURE — 93000 ELECTROCARDIOGRAM COMPLETE: CPT | Performed by: INTERNAL MEDICINE

## 2018-03-07 PROCEDURE — 1123F ACP DISCUSS/DSCN MKR DOCD: CPT | Performed by: INTERNAL MEDICINE

## 2018-03-07 PROCEDURE — 4040F PNEUMOC VAC/ADMIN/RCVD: CPT | Performed by: INTERNAL MEDICINE

## 2018-03-07 PROCEDURE — 3045F PR MOST RECENT HEMOGLOBIN A1C LEVEL 7.0-9.0%: CPT | Performed by: INTERNAL MEDICINE

## 2018-03-07 PROCEDURE — 1036F TOBACCO NON-USER: CPT | Performed by: INTERNAL MEDICINE

## 2018-03-07 PROCEDURE — G8484 FLU IMMUNIZE NO ADMIN: HCPCS | Performed by: INTERNAL MEDICINE

## 2018-03-07 PROCEDURE — G8417 CALC BMI ABV UP PARAM F/U: HCPCS | Performed by: INTERNAL MEDICINE

## 2018-03-07 PROCEDURE — G8598 ASA/ANTIPLAT THER USED: HCPCS | Performed by: INTERNAL MEDICINE

## 2018-03-07 PROCEDURE — 1090F PRES/ABSN URINE INCON ASSESS: CPT | Performed by: INTERNAL MEDICINE

## 2018-03-07 PROCEDURE — 99214 OFFICE O/P EST MOD 30 MIN: CPT | Performed by: INTERNAL MEDICINE

## 2018-03-07 PROCEDURE — G8428 CUR MEDS NOT DOCUMENT: HCPCS | Performed by: INTERNAL MEDICINE

## 2018-03-07 PROCEDURE — 3017F COLORECTAL CA SCREEN DOC REV: CPT | Performed by: INTERNAL MEDICINE

## 2018-03-07 NOTE — PROGRESS NOTES
runoff. Patient is educated about symptoms of congestive heart failure and these include weighing one self and if gains 3 pounds to take additional water pill, fluid restrict to 2 liters a day, 2 gram sodium intake and if increased thirst to seek medical attention. Return in about 3 months (around 6/7/2018).

## 2018-03-09 ENCOUNTER — HOSPITAL ENCOUNTER (OUTPATIENT)
Dept: PHYSICAL THERAPY | Age: 73
Setting detail: THERAPIES SERIES
Discharge: HOME | End: 2018-03-09
Payer: MEDICARE

## 2018-03-09 PROCEDURE — G8978 MOBILITY CURRENT STATUS: HCPCS

## 2018-03-09 PROCEDURE — G8979 MOBILITY GOAL STATUS: HCPCS

## 2018-03-09 PROCEDURE — 97110 THERAPEUTIC EXERCISES: CPT

## 2018-03-09 NOTE — PROGRESS NOTES
Tita Hyatt 59  OUTPATIENT PHYSICAL THERAPY  DAILY NOTE  [facility]     Time In: 1695  Time Out: 1110  Minutes: 55  Timed Code Treatment Minutes: 50 Minutes     Date: 3/9/2018  Patient Name: Kathi Fink,  Gender:  female        CSN: [id number]   : 1945  (75 y.o.)       Referring Practitioner: Liliam Miguel MD      Diagnosis: diabetic polyneuropathy associated wit type2 diabetes mellitus, fall, unsteady gait  Treatment Diagnosis: diabetic polyneuropathy associated with type 2 veronica etes mellitus, fall, unsteady gait   Additional Pertinent Hx: history of DM with poly neuropathy, essential HTN, history of falls, PACEMAKER, CABG                  General:  PT Visit Information  Onset Date: 18  PT Insurance Information: Medicare $3700 cap/calendar year, ionto/HP/CP not covered. [place] secondary  Total # of Visits to Date: 8  Plan of Care/Certification Expiration Date: 18  Progress Note Counter: 8/10 for PN               Subjective:  Chart Reviewed: Yes  Patient assessed for rehabilitation services?: Yes  Family / Caregiver Present: No  Comments: next physician visit Dr. Carlos Hameed 18, Dr. Clementina Collado 3/13/18, Dr. Cesilia Curran 3/19/18. difficulty with steps, walking, balance,   Other (Comment): [facility]     Subjective: to have CTA scan of abdominal aorta and LE's 3/17/18. no falls     Pain:  Patient Currently in Pain: Denies         Objective  Follows Commands: Within Functional Limits                                                                                                                       Exercises  Exercise 2: Sidestepping in parallel bars: yellow  x3 laps with ace wrap assisting Right foot into dorsiflexion;     stand march: 2# X1 min-work on hi knees  Exercise 6: 3 way hip: 2# X 10 ea bilat    flexion and abduction: 2# X 5 X 5 sec ea bilat  Exercise 7: core strength:-sit on dynadisc[de-identified]  seated trunk rotation-red band X 10 bilat.            shoulder flexion and wood chop 2# ball X 10 bilat. abdominal isomets 5 X 5 sec blat  Exercise 9:  straight leg raises x10 Bilateral, Bridging 10x 10seconds. ...supine hip abd/add X 10 bilat        prone ham curl X 10 (min assist for L, mod assist with strap for R)-strength <2/10 R  Exercise 10:  seated march, LAQ: 2# X 15 ea-X 2 sets ea. standham curl X 15 ea-min assist needed bilat         Activity Tolerance:  Activity Tolerance: Patient Tolerated treatment well    Assessment: Body structures, Functions, Activity limitations: Decreased functional mobility , Decreased ADL status, Decreased strength, Decreased balance, Decreased endurance  Assessment: continues with leg weakness R>L and has only minimally improved. Hamstrings are very week bilaterally. would benefit from AFO for RLE to assist with foot drop. Prognosis: Good  REQUIRES PT FOLLOW UP: Yes    Patient Education:  Patient Education: Continue with HEP marching, walking sideways at counter. discussion on handrail at back door, shower seat and hand held shower for home with handout provided. seated heel/toe raise, seated march/LAQ, hip abd/Ham curl with red t-band. bridge, SLR, supine hpi abd/add, heel slides, prone ham curl                      Plan:  Times per week: 2-3X/week  Plan weeks: 6 weeks  Specific instructions for Next Treatment: ROM, stretching, strength, core strength, balance, gait, stairs  Current Treatment Recommendations: Strengthening, ROM, Balance Training, Functional Mobility Training, Transfer Training, IADL Training, Endurance Training, Neuromuscular Re-education, Home Exercise Program, Safety Education & Training, Positioning, Equipment Evaluation, Education, & procurement, Patient/Caregiver Education & Training  Plan Comment: Will put on hold until after visit with neurologist on 3/19/18.  recommend to continue with therapy for strengthening and balance for 4 more weeks but will wait until after doctor visit    Goals:  Patient goals : Get legs

## 2018-03-09 NOTE — PROGRESS NOTES
Recommendations: Strengthening, ROM, Balance Training, Functional Mobility Training, Transfer Training, IADL Training, Endurance Training, Neuromuscular Re-education, Home Exercise Program, Safety Education & Training, Positioning, Equipment Evaluation, Education, & procurement, Patient/Caregiver Education & Training  Plan Comment: Will put on hold until after visit with neurologist on 3/19/18. recommend to continue with therapy for strengthening and balance for 4 more weeks but will wait until after doctor visit    Goals:  Patient goals : Get legs better    Short term goals  Time Frame for Short term goals: 4 weeks  Short term goal 1: No reported falls in 4 weeks period for safety in home and community with using RW: MET with no reported falls  Short term goal 2: Increase LE strength >4/5 for balance, stair climbing, transfers: PART MET with hip flexion 4-/5 bilat, abduction 4/5, knee extension L 4/5, R 4/5, knee flexion L 2+/5, R 2-/5  Short term goal 3: Tandem stance >10 sec, SLS>5 sec for safety on wet or uneven surfaces: PART MET-continue  Short term goal 4: Ambulate with RW or cart >30 min for shopping, steps reciprocally with 2 handrails for improve egress into home: PART MET with limited walking <200 ft    Long term goals  Time Frame for Long term goals : 6 weeks: NOT MET due to time frame  Long term goal 1: Patient to ambulate with straight cane >30 min for shopping, steps reciprocally with 1 handrail for egress into home  Long term goal 2: SLS>8 sec bilat, tandem stance >15 sec for balance, fall prevention, walking on wet or uneven surfaces  Long term goal 3: Increase LE strength >4+/5 for balance, fall prevention, walking on wet or uneven surfaces  Long term goal 4:  Independent HEP to achieve above goals    PT G-Codes  Functional Assessment Tool Used: patient specific functional scale  Score: walking7, steps 8, balance 5: 20/3=6  Functional Limitation: Mobility: Walking and moving around  Mobility:

## 2018-03-12 RX ORDER — ENALAPRIL MALEATE 20 MG/1
TABLET ORAL
Qty: 180 TABLET | Refills: 0 | Status: SHIPPED | OUTPATIENT
Start: 2018-03-12 | End: 2018-06-10 | Stop reason: SDUPTHER

## 2018-03-12 RX ORDER — AMIODARONE HYDROCHLORIDE 200 MG/1
TABLET ORAL
Qty: 90 TABLET | Refills: 0 | Status: SHIPPED | OUTPATIENT
Start: 2018-03-12 | End: 2018-06-10 | Stop reason: SDUPTHER

## 2018-03-12 RX ORDER — METOPROLOL TARTRATE 50 MG/1
TABLET, FILM COATED ORAL
Qty: 180 TABLET | Refills: 0 | Status: SHIPPED | OUTPATIENT
Start: 2018-03-12 | End: 2018-06-10 | Stop reason: SDUPTHER

## 2018-03-13 ENCOUNTER — OFFICE VISIT (OUTPATIENT)
Dept: FAMILY MEDICINE CLINIC | Age: 73
End: 2018-03-13
Payer: MEDICARE

## 2018-03-13 VITALS
WEIGHT: 173.2 LBS | RESPIRATION RATE: 20 BRPM | HEIGHT: 62 IN | OXYGEN SATURATION: 97 % | DIASTOLIC BLOOD PRESSURE: 64 MMHG | TEMPERATURE: 97.8 F | SYSTOLIC BLOOD PRESSURE: 124 MMHG | BODY MASS INDEX: 31.87 KG/M2 | HEART RATE: 70 BPM

## 2018-03-13 DIAGNOSIS — E11.42 DIABETIC POLYNEUROPATHY ASSOCIATED WITH TYPE 2 DIABETES MELLITUS (HCC): ICD-10-CM

## 2018-03-13 DIAGNOSIS — E11.65 TYPE 2 DIABETES MELLITUS WITH HYPERGLYCEMIA, WITH LONG-TERM CURRENT USE OF INSULIN (HCC): Primary | ICD-10-CM

## 2018-03-13 DIAGNOSIS — Z79.4 TYPE 2 DIABETES MELLITUS WITH HYPERGLYCEMIA, WITH LONG-TERM CURRENT USE OF INSULIN (HCC): Primary | ICD-10-CM

## 2018-03-13 PROCEDURE — 4040F PNEUMOC VAC/ADMIN/RCVD: CPT | Performed by: FAMILY MEDICINE

## 2018-03-13 PROCEDURE — 3014F SCREEN MAMMO DOC REV: CPT | Performed by: FAMILY MEDICINE

## 2018-03-13 PROCEDURE — G8400 PT W/DXA NO RESULTS DOC: HCPCS | Performed by: FAMILY MEDICINE

## 2018-03-13 PROCEDURE — 1090F PRES/ABSN URINE INCON ASSESS: CPT | Performed by: FAMILY MEDICINE

## 2018-03-13 PROCEDURE — G8598 ASA/ANTIPLAT THER USED: HCPCS | Performed by: FAMILY MEDICINE

## 2018-03-13 PROCEDURE — 99214 OFFICE O/P EST MOD 30 MIN: CPT | Performed by: FAMILY MEDICINE

## 2018-03-13 PROCEDURE — G8482 FLU IMMUNIZE ORDER/ADMIN: HCPCS | Performed by: FAMILY MEDICINE

## 2018-03-13 PROCEDURE — 3017F COLORECTAL CA SCREEN DOC REV: CPT | Performed by: FAMILY MEDICINE

## 2018-03-13 PROCEDURE — 1036F TOBACCO NON-USER: CPT | Performed by: FAMILY MEDICINE

## 2018-03-13 PROCEDURE — G8427 DOCREV CUR MEDS BY ELIG CLIN: HCPCS | Performed by: FAMILY MEDICINE

## 2018-03-13 PROCEDURE — G8417 CALC BMI ABV UP PARAM F/U: HCPCS | Performed by: FAMILY MEDICINE

## 2018-03-13 PROCEDURE — 3045F PR MOST RECENT HEMOGLOBIN A1C LEVEL 7.0-9.0%: CPT | Performed by: FAMILY MEDICINE

## 2018-03-13 PROCEDURE — 1123F ACP DISCUSS/DSCN MKR DOCD: CPT | Performed by: FAMILY MEDICINE

## 2018-03-13 ASSESSMENT — ENCOUNTER SYMPTOMS
ABDOMINAL PAIN: 0
COUGH: 0
RHINORRHEA: 0
CONSTIPATION: 0
SHORTNESS OF BREATH: 0
WHEEZING: 0
SORE THROAT: 0
DIARRHEA: 0
NAUSEA: 0

## 2018-03-13 NOTE — PROGRESS NOTES
STRESS TEST  10-28-09    No evidence of stress induced ischemia. Evidence of  prior transmural MI demonstrated by transient fixed defects of inferior wall extending into lateral wall, indicative of RCA lesion. Bowel and soft tissue attenuation interfering w/ counts of lateral wall. EF 29% w/ severe septal and inferolateral hypokinesis w/ very mild lateral wall hypokinesis being noted.  CARDIOVERSION  8-29-09    CHOLECYSTECTOMY  2005    Laparoscopic    CORONARY ARTERY BYPASS GRAFT      DIAGNOSTIC CARDIAC CATH LAB PROCEDURE  8-24-09    Multivessel disease demonstrated by LAD having 70-80%  proximal lesion and napkin-ring lesion at bifurcation w/ D2. D2 appears to be medium large caliber vessel which has an ostial lesion of 70-80%. left -to-left collaterals as well as left-to-right collaterals emanating from LAD to PDA. Circumflex had anterior marginal branch and posterior marginal branch.  HERNIA REPAIR      Multiple    HYSTERECTOMY  1997    TRANSTHORACIC ECHOCARDIOGRAM  07-11-11    LV size mildly to moderately dilated. Systolic function markedly reduced. EF 25-30%. Severe diffuse hypokinesis. Grade 1 diastolic dysfunction. LA was mildly to moderately dilated. RV mildly dilated, systolic function mildly reduced. Mild MR and TR.  TRANSTHORACIC ECHOCARDIOGRAM  8-24-09    LV demonstrates severe hypokinesis of the inferior basal as well as  basal aneurysm being noted and paradoxical septal motion. EF 45-50%. LA is moderately dilated and AV demonstrates mild AV sclerosis w/o AS and AI. Trace MR and TV insufficiency.         Family History   Problem Relation Age of Onset    Diabetes Father     No Known Problems Mother        Social History   Substance Use Topics    Smoking status: Former Smoker     Types: Cigarettes     Quit date: 9/20/1988    Smokeless tobacco: Never Used    Alcohol use No      Current Outpatient Prescriptions   Medication Sig Dispense Refill    enalapril (VASOTEC) 20 MG tablet TAKE 1 TABLET TWICE A  tablet 0    metoprolol tartrate (LOPRESSOR) 50 MG tablet TAKE 1 TABLET TWICE A  tablet 0    amiodarone (CORDARONE) 200 MG tablet TAKE 1 TABLET DAILY 90 tablet 0    rOPINIRole (REQUIP) 0.5 MG tablet Take 1 tablet by mouth nightly 30 tablet 11    insulin glargine (LANTUS SOLOSTAR) 100 UNIT/ML injection pen Inject 20 Units into the skin nightly 6 mL 0    pregabalin (LYRICA) 200 MG capsule Take 1 capsule by mouth 2 times daily for 30 days. 60 capsule 2    gabapentin (NEURONTIN) 400 MG capsule Take 2 capsules by mouth every evening 90 capsule 3    metFORMIN (GLUCOPHAGE XR) 750 MG extended release tablet Take 1 tablet by mouth 2 times daily (with meals) 30 tablet 3    Insulin Pen Needle (B-D UF III MINI PEN NEEDLES) 31G X 5 MM MISC 1 each by Does not apply route daily 100 each 3    atorvastatin (LIPITOR) 80 MG tablet TAKE 1 TABLET DAILY 90 tablet 2    glucose blood VI test strips (ASCENSIA AUTODISC VI;ONE TOUCH ULTRA TEST VI) strip Test once daily. Dispense One Touch Ultra Test Strips. Dx: Type 2 diabetes (250.00) 100 each 5    aspirin 325 MG tablet Take 325 mg by mouth daily. No current facility-administered medications for this visit.       Allergies   Allergen Reactions    Sulfa Antibiotics        Health Maintenance   Topic Date Due    DTaP/Tdap/Td vaccine (1 - Tdap) 01/03/1963    Shingles Vaccine (1 of 2 - 2 Dose Series) 01/03/1994    TSH testing  01/21/2016    Breast cancer screen  01/26/2017    Pneumococcal low/med risk (2 of 2 - PCV13) 09/13/2018    Colon Cancer Screen FIT/FOBT  09/26/2018    Diabetic microalbuminuria test  09/27/2018    Diabetic foot exam  10/03/2018    Diabetic retinal exam  10/10/2018    Lipid screen  12/11/2018    Potassium monitoring  12/11/2018    Creatinine monitoring  01/26/2019    A1C test (Diabetic or Prediabetic)  02/06/2019    DEXA (modify frequency per FRAX score)  Addressed    Flu vaccine  Completed       Subjective: Review of Systems   Constitutional: Negative for chills, fatigue and fever. HENT: Negative for congestion, rhinorrhea and sore throat. Eyes: Negative for visual disturbance. Respiratory: Negative for cough, shortness of breath and wheezing. Cardiovascular: Negative for chest pain and palpitations. Gastrointestinal: Negative for abdominal pain, constipation, diarrhea and nausea. Genitourinary: Negative for dysuria and hematuria. Musculoskeletal: Positive for gait problem. Negative for arthralgias and myalgias. Neurological: Positive for numbness. Negative for dizziness and headaches. Psychiatric/Behavioral: Negative for sleep disturbance. The patient is not nervous/anxious. Objective:     Physical Exam   Constitutional: She is oriented to person, place, and time. She appears well-developed and well-nourished. HENT:   Head: Normocephalic and atraumatic. Eyes: Conjunctivae and EOM are normal. Right eye exhibits no discharge. Left eye exhibits no discharge. No scleral icterus. Neck: Normal range of motion. Cardiovascular: Normal rate, regular rhythm and normal heart sounds. Pulmonary/Chest: Effort normal and breath sounds normal. She has no wheezes. Abdominal: Soft. Bowel sounds are normal. She exhibits no distension. There is no tenderness. Musculoskeletal: She exhibits no edema or tenderness. Lymphadenopathy:     She has no cervical adenopathy. Neurological: She is alert and oriented to person, place, and time. Coordination normal.   Skin: Skin is warm and dry. Psychiatric: She has a normal mood and affect. Her behavior is normal. Judgment and thought content normal.   Nursing note and vitals reviewed. /64 (Site: Left Arm, Position: Sitting, Cuff Size: Medium Adult)   Pulse 70   Temp 97.8 °F (36.6 °C) (Oral)   Resp 20   Ht 5' 2\" (1.575 m)   Wt 173 lb 3.2 oz (78.6 kg)   SpO2 97%   BMI 31.68 kg/m²     Assessment:      1.  Type 2 diabetes mellitus with hyperglycemia, with long-term current use of insulin ([facility] [address]     2. Diabetic polyneuropathy associated with type 2 diabetes mellitus ([facility] [address]         Plan:     Sugars much better. Tolerates meds, so Continue current meds for now. Recheck A1C per diabetic clinic. ABIs per cardio and follow up with neuro for neuropathy. Follow up here in 3 months or prn      Discussed use, benefit, and side effects of prescribed medications. Barriers to medication compliance addressed. All patient questions answered. Pt voiced understanding. Return in about 3 months (around 6/13/2018) for follow up, DM. No orders of the defined types were placed in this encounter. No orders of the defined types were placed in this encounter. Reccommended tobacco cessation options including pharmacologic methods, counseled great than 3 minutes during this visit:  Yes  []  No  []     Patient given educational materials - see patient instructions. Discussed use, benefit, and side effects of prescribed medications. All patient questions answered. Pt voiced understanding. Reviewed health maintenance. Instructed to continue current medications, diet and exercise. Patient agreed with treatment plan. Follow up as directed.      Electronically signed by Jo Gomez MD on 3/13/2018 at 9:53 AM

## 2018-03-14 ENCOUNTER — NURSE ONLY (OUTPATIENT)
Dept: CARDIOLOGY CLINIC | Age: 73
End: 2018-03-14
Payer: MEDICARE

## 2018-03-14 DIAGNOSIS — Z95.810 DUAL IMPLANTABLE CARDIOVERTER-DEFIBRILLATOR IN SITU: Primary | ICD-10-CM

## 2018-03-14 PROCEDURE — 93283 PRGRMG EVAL IMPLANTABLE DFB: CPT | Performed by: INTERNAL MEDICINE

## 2018-03-17 ENCOUNTER — HOSPITAL ENCOUNTER (OUTPATIENT)
Dept: CT IMAGING | Age: 73
Discharge: HOME | End: 2018-03-17
Payer: MEDICARE

## 2018-03-17 DIAGNOSIS — R94.30 ABNORMAL CARDIAC FUNCTION TEST: ICD-10-CM

## 2018-03-17 LAB — POC CREATININE WHOLE BLOOD: 0.7 MG/DL (ref 0.5–1.2)

## 2018-03-17 PROCEDURE — 6360000004 HC RX CONTRAST MEDICATION: Performed by: INTERNAL MEDICINE

## 2018-03-17 PROCEDURE — 75635 CT ANGIO ABDOMINAL ARTERIES: CPT

## 2018-03-17 PROCEDURE — 82565 ASSAY OF CREATININE: CPT

## 2018-03-17 RX ADMIN — IOPAMIDOL 100 ML: 755 INJECTION, SOLUTION INTRAVENOUS at 10:21

## 2018-03-19 ENCOUNTER — OFFICE VISIT (OUTPATIENT)
Dept: NEUROSURGERY | Age: 73
End: 2018-03-19
Payer: MEDICARE

## 2018-03-19 VITALS
DIASTOLIC BLOOD PRESSURE: 74 MMHG | HEIGHT: 62 IN | HEART RATE: 69 BPM | BODY MASS INDEX: 31.73 KG/M2 | SYSTOLIC BLOOD PRESSURE: 127 MMHG | WEIGHT: 172.4 LBS

## 2018-03-19 DIAGNOSIS — R20.2 NUMBNESS AND TINGLING OF BOTH FEET: Primary | ICD-10-CM

## 2018-03-19 DIAGNOSIS — R20.0 NUMBNESS AND TINGLING OF BOTH FEET: Primary | ICD-10-CM

## 2018-03-19 DIAGNOSIS — R29.898 WEAKNESS OF FOOT, RIGHT: ICD-10-CM

## 2018-03-19 PROCEDURE — 1123F ACP DISCUSS/DSCN MKR DOCD: CPT | Performed by: NEUROLOGICAL SURGERY

## 2018-03-19 PROCEDURE — G8427 DOCREV CUR MEDS BY ELIG CLIN: HCPCS | Performed by: NEUROLOGICAL SURGERY

## 2018-03-19 PROCEDURE — G8598 ASA/ANTIPLAT THER USED: HCPCS | Performed by: NEUROLOGICAL SURGERY

## 2018-03-19 PROCEDURE — 4040F PNEUMOC VAC/ADMIN/RCVD: CPT | Performed by: NEUROLOGICAL SURGERY

## 2018-03-19 PROCEDURE — 3017F COLORECTAL CA SCREEN DOC REV: CPT | Performed by: NEUROLOGICAL SURGERY

## 2018-03-19 PROCEDURE — 1090F PRES/ABSN URINE INCON ASSESS: CPT | Performed by: NEUROLOGICAL SURGERY

## 2018-03-19 PROCEDURE — 1036F TOBACCO NON-USER: CPT | Performed by: NEUROLOGICAL SURGERY

## 2018-03-19 PROCEDURE — G8400 PT W/DXA NO RESULTS DOC: HCPCS | Performed by: NEUROLOGICAL SURGERY

## 2018-03-19 PROCEDURE — 3014F SCREEN MAMMO DOC REV: CPT | Performed by: NEUROLOGICAL SURGERY

## 2018-03-19 PROCEDURE — G8482 FLU IMMUNIZE ORDER/ADMIN: HCPCS | Performed by: NEUROLOGICAL SURGERY

## 2018-03-19 PROCEDURE — 99204 OFFICE O/P NEW MOD 45 MIN: CPT | Performed by: NEUROLOGICAL SURGERY

## 2018-03-19 PROCEDURE — G8417 CALC BMI ABV UP PARAM F/U: HCPCS | Performed by: NEUROLOGICAL SURGERY

## 2018-03-19 ASSESSMENT — ENCOUNTER SYMPTOMS
BACK PAIN: 0
CHEST TIGHTNESS: 0
ABDOMINAL PAIN: 0
TROUBLE SWALLOWING: 0

## 2018-03-19 NOTE — LETTER
use included Cigarettes. She has never used smokeless tobacco. She reports that she does not drink alcohol or use drugs. Subjective:      Review of Systems   Constitutional: Positive for activity change. Negative for fever. HENT: Negative for trouble swallowing. Eyes: Negative for visual disturbance. Respiratory: Negative for chest tightness. Cardiovascular: Negative for chest pain. Gastrointestinal: Negative for abdominal pain. Genitourinary: Negative for difficulty urinating. Musculoskeletal: Positive for arthralgias and gait problem. Negative for back pain and neck pain. Neurological: Positive for seizures, weakness and numbness. Negative for dizziness. Psychiatric/Behavioral: Negative for agitation. The patient is not nervous/anxious. Objective:     /74 (Site: Left Arm, Position: Sitting)   Pulse 69   Ht 5' 2.01\" (1.575 m)   Wt 172 lb 6.4 oz (78.2 kg)   BMI 31.52 kg/m²         On physical exam:       Examination of carotid arteries (puls, amplitude, bruits) or Examination of peripheral vascular system  (swelling, varicosities and pulses, temperature, edema,tenderness) : WNL  Patient is A/A/Ox3  Recent and remote memory: WNL  Attention span and concentration: WNL  Language (naming objects;repeating phrases;spontaneous speech): WNL  Fund of knowledge: WNL  Cranial nerves:2-12: grossly intact   Muscle strength: foot dors-flexion about 0/5 in the right ( since about 3 weeks per patient), otherwise 5/5 through out. DTR in all 4 extremities:1+  Babinski:down response  Gait:unsteady gait, has foot drop in the right   Cerebellar function: WNL  Sensation: grossly intact. Straight leg raising test: negative. Imaging studies:    Patient came in without any spine imaging studies.     Lab Results   Component Value Date    WBC 8.5 12/11/2017    HGB 14.4 12/11/2017    HCT 43.7 12/11/2017     12/11/2017    CHOL 115 12/11/2017    TRIG 102 12/11/2017    HDL 48 12/11/2017

## 2018-03-20 ENCOUNTER — TELEPHONE (OUTPATIENT)
Dept: CARDIOLOGY CLINIC | Age: 73
End: 2018-03-20

## 2018-03-21 ENCOUNTER — HOSPITAL ENCOUNTER (OUTPATIENT)
Dept: CT IMAGING | Age: 73
Discharge: HOME | End: 2018-03-21
Payer: MEDICARE

## 2018-03-21 DIAGNOSIS — R20.0 NUMBNESS AND TINGLING OF BOTH FEET: ICD-10-CM

## 2018-03-21 DIAGNOSIS — R20.2 NUMBNESS AND TINGLING OF BOTH FEET: ICD-10-CM

## 2018-03-21 DIAGNOSIS — R29.898 WEAKNESS OF FOOT, RIGHT: ICD-10-CM

## 2018-03-21 PROCEDURE — 72133 CT LUMBAR SPINE W/O & W/DYE: CPT

## 2018-03-21 PROCEDURE — 6360000004 HC RX CONTRAST MEDICATION: Performed by: NEUROLOGICAL SURGERY

## 2018-03-21 RX ADMIN — IOPAMIDOL 100 ML: 755 INJECTION, SOLUTION INTRAVENOUS at 11:34

## 2018-03-21 NOTE — TELEPHONE ENCOUNTER
Let the PCP know the result  Please let patient know about the result. Please schedule office visit next week.

## 2018-03-27 NOTE — COMMUNICATION BODY
Assessment:     HPI     Patient presented today because of history of several fall over the course of  last years. Also, patient is started about one year ago to have gait progressive gait problem in the form of progressive numbness and tingling in both legs and feet  especially in right. Patient numbness and tingling are increased with walking. Now she only can walk for short distance without walking before she needs to sit down to relief her symptoms. Patient stated that she developed right foot drop after 3 weeks ago. There is no history of back pain or radiating pain ( she just experiences pain in her posterior aspect of her calfs sometimes). Patient denied any changes in her urination or bowel habits. Has good control in urination and bowell habits. There is no history of fever or back surgery. On aspirin. Patient on on aspirin and has a pacemaker. Medications:    Current Outpatient Prescriptions:     enalapril (VASOTEC) 20 MG tablet, TAKE 1 TABLET TWICE A DAY, Disp: 180 tablet, Rfl: 0    metoprolol tartrate (LOPRESSOR) 50 MG tablet, TAKE 1 TABLET TWICE A DAY, Disp: 180 tablet, Rfl: 0    amiodarone (CORDARONE) 200 MG tablet, TAKE 1 TABLET DAILY, Disp: 90 tablet, Rfl: 0    rOPINIRole (REQUIP) 0.5 MG tablet, Take 1 tablet by mouth nightly, Disp: 30 tablet, Rfl: 11    insulin glargine (LANTUS SOLOSTAR) 100 UNIT/ML injection pen, Inject 20 Units into the skin nightly, Disp: 6 mL, Rfl: 0    pregabalin (LYRICA) 200 MG capsule, Take 1 capsule by mouth 2 times daily for 30 days. , Disp: 60 capsule, Rfl: 2    gabapentin (NEURONTIN) 400 MG capsule, Take 2 capsules by mouth every evening, Disp: 90 capsule, Rfl: 3    metFORMIN (GLUCOPHAGE XR) 750 MG extended release tablet, Take 1 tablet by mouth 2 times daily (with meals), Disp: 30 tablet, Rfl: 3    Insulin Pen Needle (B-D UF III MINI PEN NEEDLES) 31G X 5 MM MISC, 1 each by Does not apply route daily, Disp: 100 each, Rfl: 3    atorvastatin her:  · L-spine CT with and without contrast ( as obtaining MRI is contraindication because of her pacemaker). · Obtaining  of her EMG. · Pre-cardiac cleanness for possible surgical intervention. I will see her again as soon as the above are completed.

## 2018-03-28 ENCOUNTER — OFFICE VISIT (OUTPATIENT)
Dept: NEUROSURGERY | Age: 73
End: 2018-03-28
Payer: MEDICARE

## 2018-03-28 ENCOUNTER — OFFICE VISIT (OUTPATIENT)
Dept: CARDIOLOGY CLINIC | Age: 73
End: 2018-03-28
Payer: MEDICARE

## 2018-03-28 VITALS
HEIGHT: 62 IN | BODY MASS INDEX: 32.2 KG/M2 | WEIGHT: 175 LBS | DIASTOLIC BLOOD PRESSURE: 84 MMHG | SYSTOLIC BLOOD PRESSURE: 143 MMHG | HEART RATE: 69 BPM

## 2018-03-28 VITALS
SYSTOLIC BLOOD PRESSURE: 124 MMHG | HEART RATE: 70 BPM | BODY MASS INDEX: 31.98 KG/M2 | DIASTOLIC BLOOD PRESSURE: 76 MMHG | HEIGHT: 62 IN | WEIGHT: 173.8 LBS

## 2018-03-28 DIAGNOSIS — I25.5 CARDIOMYOPATHY, ISCHEMIC: ICD-10-CM

## 2018-03-28 DIAGNOSIS — I73.9 PVD (PERIPHERAL VASCULAR DISEASE) WITH CLAUDICATION (HCC): ICD-10-CM

## 2018-03-28 DIAGNOSIS — I70.0 ATHEROSCLEROSIS OF AORTA (HCC): ICD-10-CM

## 2018-03-28 DIAGNOSIS — R20.0 NUMBNESS AND TINGLING OF BOTH LEGS: Primary | ICD-10-CM

## 2018-03-28 DIAGNOSIS — I25.10 CORONARY ARTERY DISEASE INVOLVING NATIVE CORONARY ARTERY OF NATIVE HEART WITHOUT ANGINA PECTORIS: Primary | ICD-10-CM

## 2018-03-28 DIAGNOSIS — Z95.810 DUAL IMPLANTABLE CARDIOVERTER-DEFIBRILLATOR IN SITU: ICD-10-CM

## 2018-03-28 DIAGNOSIS — I10 ESSENTIAL HYPERTENSION: ICD-10-CM

## 2018-03-28 DIAGNOSIS — E11.65 TYPE 2 DIABETES MELLITUS WITH HYPERGLYCEMIA, WITH LONG-TERM CURRENT USE OF INSULIN (HCC): ICD-10-CM

## 2018-03-28 DIAGNOSIS — R20.2 NUMBNESS AND TINGLING OF BOTH LEGS: Primary | ICD-10-CM

## 2018-03-28 DIAGNOSIS — I71.40 ABDOMINAL AORTIC ANEURYSM (AAA) WITHOUT RUPTURE: ICD-10-CM

## 2018-03-28 DIAGNOSIS — Z95.1 HX OF CABG: ICD-10-CM

## 2018-03-28 DIAGNOSIS — R29.898 WEAKNESS OF FOOT, RIGHT: ICD-10-CM

## 2018-03-28 DIAGNOSIS — I49.9 VENTRICULAR ARRHYTHMIA: ICD-10-CM

## 2018-03-28 DIAGNOSIS — I73.9 CLAUDICATION (HCC): ICD-10-CM

## 2018-03-28 DIAGNOSIS — Z79.4 TYPE 2 DIABETES MELLITUS WITH HYPERGLYCEMIA, WITH LONG-TERM CURRENT USE OF INSULIN (HCC): ICD-10-CM

## 2018-03-28 PROCEDURE — 1090F PRES/ABSN URINE INCON ASSESS: CPT | Performed by: NEUROLOGICAL SURGERY

## 2018-03-28 PROCEDURE — 1036F TOBACCO NON-USER: CPT | Performed by: INTERNAL MEDICINE

## 2018-03-28 PROCEDURE — 4040F PNEUMOC VAC/ADMIN/RCVD: CPT | Performed by: INTERNAL MEDICINE

## 2018-03-28 PROCEDURE — G8482 FLU IMMUNIZE ORDER/ADMIN: HCPCS | Performed by: NEUROLOGICAL SURGERY

## 2018-03-28 PROCEDURE — G8400 PT W/DXA NO RESULTS DOC: HCPCS | Performed by: INTERNAL MEDICINE

## 2018-03-28 PROCEDURE — 99214 OFFICE O/P EST MOD 30 MIN: CPT | Performed by: NEUROLOGICAL SURGERY

## 2018-03-28 PROCEDURE — G8427 DOCREV CUR MEDS BY ELIG CLIN: HCPCS | Performed by: NEUROLOGICAL SURGERY

## 2018-03-28 PROCEDURE — G8427 DOCREV CUR MEDS BY ELIG CLIN: HCPCS | Performed by: INTERNAL MEDICINE

## 2018-03-28 PROCEDURE — 99214 OFFICE O/P EST MOD 30 MIN: CPT | Performed by: INTERNAL MEDICINE

## 2018-03-28 PROCEDURE — 1123F ACP DISCUSS/DSCN MKR DOCD: CPT | Performed by: INTERNAL MEDICINE

## 2018-03-28 PROCEDURE — G8598 ASA/ANTIPLAT THER USED: HCPCS | Performed by: INTERNAL MEDICINE

## 2018-03-28 PROCEDURE — G8417 CALC BMI ABV UP PARAM F/U: HCPCS | Performed by: INTERNAL MEDICINE

## 2018-03-28 PROCEDURE — G8598 ASA/ANTIPLAT THER USED: HCPCS | Performed by: NEUROLOGICAL SURGERY

## 2018-03-28 PROCEDURE — 3045F PR MOST RECENT HEMOGLOBIN A1C LEVEL 7.0-9.0%: CPT | Performed by: INTERNAL MEDICINE

## 2018-03-28 PROCEDURE — 3014F SCREEN MAMMO DOC REV: CPT | Performed by: NEUROLOGICAL SURGERY

## 2018-03-28 PROCEDURE — 3014F SCREEN MAMMO DOC REV: CPT | Performed by: INTERNAL MEDICINE

## 2018-03-28 PROCEDURE — G8417 CALC BMI ABV UP PARAM F/U: HCPCS | Performed by: NEUROLOGICAL SURGERY

## 2018-03-28 PROCEDURE — 3017F COLORECTAL CA SCREEN DOC REV: CPT | Performed by: NEUROLOGICAL SURGERY

## 2018-03-28 PROCEDURE — 4040F PNEUMOC VAC/ADMIN/RCVD: CPT | Performed by: NEUROLOGICAL SURGERY

## 2018-03-28 PROCEDURE — 1090F PRES/ABSN URINE INCON ASSESS: CPT | Performed by: INTERNAL MEDICINE

## 2018-03-28 PROCEDURE — 3017F COLORECTAL CA SCREEN DOC REV: CPT | Performed by: INTERNAL MEDICINE

## 2018-03-28 PROCEDURE — G8482 FLU IMMUNIZE ORDER/ADMIN: HCPCS | Performed by: INTERNAL MEDICINE

## 2018-03-28 PROCEDURE — 1036F TOBACCO NON-USER: CPT | Performed by: NEUROLOGICAL SURGERY

## 2018-03-28 PROCEDURE — G8400 PT W/DXA NO RESULTS DOC: HCPCS | Performed by: NEUROLOGICAL SURGERY

## 2018-03-28 PROCEDURE — 1123F ACP DISCUSS/DSCN MKR DOCD: CPT | Performed by: NEUROLOGICAL SURGERY

## 2018-03-28 NOTE — PROGRESS NOTES
Patient here to discuss CTA results. Patient denies any cardiac symptoms at this time. Patient needs cardiac clearance for back surgery with Dr. Kirstin Vo, not scheduled yet. Hold ASA?

## 2018-03-28 NOTE — PROGRESS NOTES
Christiano Soares NOTE  OUTPATIENT  [facility]    Patient Name: Lars Sullivan        CSN: [id number]   YOB: 1945  Gender: female  Referring Practitioner: Andria Springer MD  Diagnosis: diabetic polyneuropathy associated wit type2 diabetes mellitus, fall, unsteady gait    Patient is discharged from Physical Therapy services at this time. See last note for details related to results of therapy and goal achievement. Reason for discharge:  patient's last visit 3/9/18. Was placed on hold until after neurologist's visit 3/19/18.  No change since progress note of 3/9/18      Maciel Gave, PT [id number]

## 2018-03-29 DIAGNOSIS — E11.65 TYPE 2 DIABETES MELLITUS WITH HYPERGLYCEMIA, WITH LONG-TERM CURRENT USE OF INSULIN (HCC): ICD-10-CM

## 2018-03-29 DIAGNOSIS — Z79.4 TYPE 2 DIABETES MELLITUS WITH HYPERGLYCEMIA, WITH LONG-TERM CURRENT USE OF INSULIN (HCC): ICD-10-CM

## 2018-03-29 DIAGNOSIS — E11.42 DIABETIC POLYNEUROPATHY ASSOCIATED WITH TYPE 2 DIABETES MELLITUS (HCC): ICD-10-CM

## 2018-03-30 NOTE — PROGRESS NOTES
There is preservation of the expected lumbar lordosis. The vertebral body heights and alignment are maintained. No compression fracture deformity or bony destructive lesion is identified. There is a subacute, nondisplaced fracture of the posterior left 11th rib. No suspicious abnormality is identified within the spinal canal. Dedicated views through the disc spaces demonstrate a disc bulge without significant spinal canal narrowing at L3-L4. Mild facet arthropathy is also present without significant neural foraminal narrowing. At L4-L5, there is a disc bulge and ligamentum flavum thickening without significant spinal canal narrowing. Degenerative facet arthropathy is present. Mild neural foraminal narrowing is suggested bilaterally. At L5-S1, there is a disc bulge and flattening of the ventral thecal sac without significant spinal canal narrowing. Facet arthropathy is present and there is suggestion of mild neural foraminal narrowing on the right and mild to moderate neural foraminal narrowing on the left. There are mild degenerative changes involving the bilateral sacroiliac joints with vacuum phenomenon in the joint and adjacent bony sclerosis. Within the visualized retroperitoneal soft tissues, atherosclerotic plaque is present within the abdominal aorta which is aneurysmal and measures up to 3.6 cm in diameter. Fatty atrophy is also noted within the posterior spinal musculature. No other suspicious abnormality is identified within the paraspinal soft tissues are     1. Mild degenerative changes are noted within the lumbar spine without significant spinal canal narrowing. Mild to moderate neural foraminal narrowing is noted on the left at L5-S1. 2. There is a subacute, nondisplaced fracture of the posterior left 11th rib. 3. There is aneurysmal dilation of the abdominal aorta which measures up to 3.6 cm in diameter. **This report has been created using voice recognition software.  It may contain minor errors which patent. There is moderate partially calcified plaque within the right common femoral artery. Multifocal areas of moderate partially calcified plaque are noted within the right SFA. There is narrowing of the right mid superficial femoral artery which represents at least 50% stenosis which is significantly flow-limiting. There are also multifocal areas of mild to moderate narrowing within the distal right SFA extending to the femoropopliteal region. There are multifocal areas of moderate flow-limiting stenosis within the above-the-knee popliteal artery as well where the luminal diameters is reduced to as much is 3 mm which is near 50%. The trifurcation appears patent. However, the anterior tibial artery on the right occludes proximally. The posterior tibial artery and the right crosses the right ankle. There is a small collateral vessel off of the peroneal artery which appears to reconstitute the dorsalis pedis artery distally. However, this is diminutive in caliber. Multifocal areas of mild to moderate stenosis within the left SFA are demonstrated. This is most pronounced near the left hiatus where there is at least 50% luminal narrowing demonstrated. There is also near complete occlusion involving a proximal segment of the left popliteal artery above-the-knee. This is demonstrated on axial image 226. However, the popliteal artery reconstitutes to a more normal caliber more distally. There is a patent trifurcation on the left. However there is chronic-appearing multifocal occlusive segments of the left posterior tibial artery. However, there is reconstitution of the left posterior tibial artery distally by small collaterals. The posterior tibial arteries seen crossing the left ankle distally. No acute osseous findings are demonstrated. Lower lumbar facet arthrosis is demonstrated. There is an age-indeterminate subacute fracture within the posterior left 11th rib.  There is a ventral abdominal hernia containing Date    LABA1C 7.3 02/06/2018       Lab Results   Component Value Date    TRIG 102 12/11/2017    HDL 48 12/11/2017    LDLCALC 47 12/11/2017       Lab Results   Component Value Date    TSH 2.890 01/21/2015           Assessment/Plan:    Coronary artery disease  CABG  Seems to be stable. Denies angina or change in breathing pattern. Continue ASA//BB/Statin. Hypertension, on medical treatment. Seems to be under good control. Patient is compliant with medical treatment. Diabetes mellitus: Followed by family physician. Patient needs very tight control to minimize cardiac risk. Dyslipidemia: On statin, followed periodically. Patient need periodic lipid and liver profile. Patient is a advised to have their lipids and liver panel checked through family doctor. The therapeutic values that need to be met are also presented to the patient and need to achieve them is emphasized and patient agrees and accepts. NYHA class II CHF. EF - 35-40%  Well compensated. No clinical evidence of fluid overload today. No recent hospitalization for CHF. P atient is educated about symptoms of congestive heart failure:\  These includes  1. Weighing one self daily and if gains 2-3 pounds in 1-2 days to take additional water pill. 2. Fluid restrict to 2 liters a day  3.  2 gram sodium intake   4. If increased thirst to seek medical attention    Will need periodic echocardiograms depending on symptoms. The patient will be optimized with medical therapy for heart failure. Continue BB/ACEi. Heart Failure Clinic will be considered. Claudication/Multiple risk factors for peripheral vascular disease. Borderline abnormal NATANAEL  Abnormal TBI  C/o toe numbness  Abnormal CTA of abd/pelvis  3.6 cm abdominal aortic aneurysm infrarenal.  Jordan significant proximal iliac artery stenosis. Jordan SFA/popliteal arteries disease. I think her symptoms of leg pain and numbness are due to PVD. I will get vascular surgical consult.   ?

## 2018-04-02 RX ORDER — ATORVASTATIN CALCIUM 80 MG/1
TABLET, FILM COATED ORAL
Qty: 90 TABLET | Refills: 0 | Status: SHIPPED | OUTPATIENT
Start: 2018-04-02 | End: 2018-04-12 | Stop reason: SDUPTHER

## 2018-04-05 ENCOUNTER — TELEPHONE (OUTPATIENT)
Dept: CARDIOLOGY CLINIC | Age: 73
End: 2018-04-05

## 2018-04-05 DIAGNOSIS — I70.213 ATHEROSCLEROSIS OF NATIVE ARTERY OF BOTH LOWER EXTREMITIES WITH INTERMITTENT CLAUDICATION (HCC): ICD-10-CM

## 2018-04-05 DIAGNOSIS — I73.9 CLAUDICATION (HCC): Primary | ICD-10-CM

## 2018-04-09 DIAGNOSIS — E11.65 TYPE 2 DIABETES MELLITUS WITH HYPERGLYCEMIA, WITH LONG-TERM CURRENT USE OF INSULIN (HCC): ICD-10-CM

## 2018-04-09 DIAGNOSIS — Z79.4 TYPE 2 DIABETES MELLITUS WITH HYPERGLYCEMIA, WITH LONG-TERM CURRENT USE OF INSULIN (HCC): ICD-10-CM

## 2018-04-09 DIAGNOSIS — E11.42 DIABETIC POLYNEUROPATHY ASSOCIATED WITH TYPE 2 DIABETES MELLITUS (HCC): ICD-10-CM

## 2018-04-09 RX ORDER — GABAPENTIN 400 MG/1
800 CAPSULE ORAL EVERY EVENING
Qty: 90 CAPSULE | Refills: 3 | Status: SHIPPED | OUTPATIENT
Start: 2018-04-09 | End: 2018-10-23 | Stop reason: SDUPTHER

## 2018-04-09 RX ORDER — METFORMIN HYDROCHLORIDE 750 MG/1
750 TABLET, EXTENDED RELEASE ORAL 2 TIMES DAILY WITH MEALS
Qty: 30 TABLET | Refills: 3 | Status: SHIPPED | OUTPATIENT
Start: 2018-04-09 | End: 2018-06-25 | Stop reason: SDUPTHER

## 2018-04-09 RX ORDER — PREGABALIN 200 MG/1
200 CAPSULE ORAL 2 TIMES DAILY
Qty: 60 CAPSULE | Refills: 2 | Status: SHIPPED | OUTPATIENT
Start: 2018-04-09 | End: 2018-07-09 | Stop reason: SDUPTHER

## 2018-04-10 ENCOUNTER — PREP FOR PROCEDURE (OUTPATIENT)
Dept: CARDIOLOGY | Age: 73
End: 2018-04-10

## 2018-04-10 RX ORDER — ASPIRIN 325 MG
325 TABLET ORAL ONCE
Status: CANCELLED | OUTPATIENT
Start: 2018-04-10 | End: 2018-04-10

## 2018-04-10 RX ORDER — SODIUM CHLORIDE 0.9 % (FLUSH) 0.9 %
10 SYRINGE (ML) INJECTION EVERY 12 HOURS SCHEDULED
Status: CANCELLED | OUTPATIENT
Start: 2018-04-10

## 2018-04-10 RX ORDER — SODIUM CHLORIDE 0.9 % (FLUSH) 0.9 %
10 SYRINGE (ML) INJECTION PRN
Status: CANCELLED | OUTPATIENT
Start: 2018-04-10

## 2018-04-10 RX ORDER — DIPHENHYDRAMINE HYDROCHLORIDE 50 MG/ML
50 INJECTION INTRAMUSCULAR; INTRAVENOUS ONCE
Status: CANCELLED | OUTPATIENT
Start: 2018-04-10 | End: 2018-04-10

## 2018-04-10 RX ORDER — SODIUM CHLORIDE 9 MG/ML
INJECTION, SOLUTION INTRAVENOUS CONTINUOUS
Status: CANCELLED | OUTPATIENT
Start: 2018-04-10

## 2018-04-10 RX ORDER — NITROGLYCERIN 0.4 MG/1
0.4 TABLET SUBLINGUAL EVERY 5 MIN PRN
Status: CANCELLED | OUTPATIENT
Start: 2018-04-10

## 2018-04-11 ENCOUNTER — HOSPITAL ENCOUNTER (OUTPATIENT)
Dept: INTERVENTIONAL RADIOLOGY/VASCULAR | Age: 73
Discharge: HOME | End: 2018-04-11
Attending: INTERNAL MEDICINE | Admitting: INTERNAL MEDICINE
Payer: MEDICARE

## 2018-04-11 VITALS
HEART RATE: 70 BPM | HEIGHT: 62 IN | SYSTOLIC BLOOD PRESSURE: 159 MMHG | OXYGEN SATURATION: 95 % | WEIGHT: 163 LBS | DIASTOLIC BLOOD PRESSURE: 77 MMHG | TEMPERATURE: 97.9 F | BODY MASS INDEX: 30 KG/M2 | RESPIRATION RATE: 16 BRPM

## 2018-04-11 DIAGNOSIS — I73.9 CLAUDICATION (HCC): ICD-10-CM

## 2018-04-11 DIAGNOSIS — I70.213 ATHEROSCLEROSIS OF NATIVE ARTERY OF BOTH LOWER EXTREMITIES WITH INTERMITTENT CLAUDICATION (HCC): ICD-10-CM

## 2018-04-11 PROBLEM — Z01.810 PREOP CARDIOVASCULAR EXAM: Status: RESOLVED | Noted: 2017-12-27 | Resolved: 2018-04-11

## 2018-04-11 LAB
ABO CHECK: NORMAL
ANION GAP SERPL CALCULATED.3IONS-SCNC: 11 MEQ/L (ref 8–16)
ANTIBODY SCREEN: NORMAL
APTT: 29 SECONDS (ref 22–38)
BUN BLDV-MCNC: 19 MG/DL (ref 7–22)
CALCIUM SERPL-MCNC: 9.1 MG/DL (ref 8.5–10.5)
CHLORIDE BLD-SCNC: 102 MEQ/L (ref 98–111)
CO2: 29 MEQ/L (ref 23–33)
CREAT SERPL-MCNC: 0.6 MG/DL (ref 0.4–1.2)
GFR SERPL CREATININE-BSD FRML MDRD: > 90 ML/MIN/1.73M2
GLUCOSE BLD-MCNC: 106 MG/DL (ref 70–108)
HCT VFR BLD CALC: 38 % (ref 37–47)
HEMOGLOBIN: 12.7 GM/DL (ref 12–16)
INR BLD: 0.96 (ref 0.85–1.13)
MCH RBC QN AUTO: 31.1 PG (ref 27–31)
MCHC RBC AUTO-ENTMCNC: 33.5 GM/DL (ref 33–37)
MCV RBC AUTO: 92.9 FL (ref 81–99)
PDW BLD-RTO: 13.9 % (ref 11.5–14.5)
PLATELET # BLD: 167 THOU/MM3 (ref 130–400)
PMV BLD AUTO: 10.8 FL (ref 7.4–10.4)
POTASSIUM REFLEX MAGNESIUM: 4.8 MEQ/L (ref 3.5–5.2)
RBC # BLD: 4.09 MILL/MM3 (ref 4.2–5.4)
RH FACTOR: NORMAL
SODIUM BLD-SCNC: 142 MEQ/L (ref 135–145)
WBC # BLD: 8.2 THOU/MM3 (ref 4.8–10.8)

## 2018-04-11 PROCEDURE — 86900 BLOOD TYPING SEROLOGIC ABO: CPT

## 2018-04-11 PROCEDURE — 36246 INS CATH ABD/L-EXT ART 2ND: CPT | Performed by: INTERNAL MEDICINE

## 2018-04-11 PROCEDURE — 75625 CONTRAST EXAM ABDOMINL AORTA: CPT | Performed by: INTERNAL MEDICINE

## 2018-04-11 PROCEDURE — 85610 PROTHROMBIN TIME: CPT

## 2018-04-11 PROCEDURE — 36415 COLL VENOUS BLD VENIPUNCTURE: CPT

## 2018-04-11 PROCEDURE — C1894 INTRO/SHEATH, NON-LASER: HCPCS

## 2018-04-11 PROCEDURE — 80048 BASIC METABOLIC PNL TOTAL CA: CPT

## 2018-04-11 PROCEDURE — 86901 BLOOD TYPING SEROLOGIC RH(D): CPT

## 2018-04-11 PROCEDURE — 2500000003 HC RX 250 WO HCPCS

## 2018-04-11 PROCEDURE — 86850 RBC ANTIBODY SCREEN: CPT

## 2018-04-11 PROCEDURE — C1769 GUIDE WIRE: HCPCS

## 2018-04-11 PROCEDURE — 2580000003 HC RX 258: Performed by: NURSE PRACTITIONER

## 2018-04-11 PROCEDURE — 2500000003 HC RX 250 WO HCPCS: Performed by: INTERNAL MEDICINE

## 2018-04-11 PROCEDURE — 6360000002 HC RX W HCPCS

## 2018-04-11 PROCEDURE — 75716 ARTERY X-RAYS ARMS/LEGS: CPT | Performed by: INTERNAL MEDICINE

## 2018-04-11 PROCEDURE — 6360000002 HC RX W HCPCS: Performed by: INTERNAL MEDICINE

## 2018-04-11 PROCEDURE — 96360 HYDRATION IV INFUSION INIT: CPT

## 2018-04-11 PROCEDURE — 36200 PLACE CATHETER IN AORTA: CPT | Performed by: INTERNAL MEDICINE

## 2018-04-11 PROCEDURE — 85027 COMPLETE CBC AUTOMATED: CPT

## 2018-04-11 PROCEDURE — 6360000004 HC RX CONTRAST MEDICATION: Performed by: INTERNAL MEDICINE

## 2018-04-11 PROCEDURE — 85730 THROMBOPLASTIN TIME PARTIAL: CPT

## 2018-04-11 RX ORDER — FENTANYL CITRATE 50 UG/ML
25 INJECTION, SOLUTION INTRAMUSCULAR; INTRAVENOUS ONCE
Status: COMPLETED | OUTPATIENT
Start: 2018-04-11 | End: 2018-04-11

## 2018-04-11 RX ORDER — VERAPAMIL HYDROCHLORIDE 2.5 MG/ML
2.5 INJECTION, SOLUTION INTRAVENOUS ONCE
Status: DISCONTINUED | OUTPATIENT
Start: 2018-04-11 | End: 2018-04-11 | Stop reason: HOSPADM

## 2018-04-11 RX ORDER — SODIUM CHLORIDE 9 MG/ML
INJECTION, SOLUTION INTRAVENOUS CONTINUOUS
Status: DISCONTINUED | OUTPATIENT
Start: 2018-04-11 | End: 2018-04-11 | Stop reason: HOSPADM

## 2018-04-11 RX ORDER — MIDAZOLAM HYDROCHLORIDE 1 MG/ML
1 INJECTION INTRAMUSCULAR; INTRAVENOUS ONCE
Status: COMPLETED | OUTPATIENT
Start: 2018-04-11 | End: 2018-04-11

## 2018-04-11 RX ORDER — DIPHENHYDRAMINE HYDROCHLORIDE 50 MG/ML
50 INJECTION INTRAMUSCULAR; INTRAVENOUS ONCE
Status: DISCONTINUED | OUTPATIENT
Start: 2018-04-11 | End: 2018-04-11

## 2018-04-11 RX ORDER — HEPARIN SODIUM 1000 [USP'U]/ML
40 INJECTION, SOLUTION INTRAVENOUS; SUBCUTANEOUS ONCE
Status: COMPLETED | OUTPATIENT
Start: 2018-04-11 | End: 2018-04-11

## 2018-04-11 RX ORDER — SODIUM CHLORIDE 0.9 % (FLUSH) 0.9 %
10 SYRINGE (ML) INJECTION PRN
Status: DISCONTINUED | OUTPATIENT
Start: 2018-04-11 | End: 2018-04-11

## 2018-04-11 RX ORDER — SODIUM CHLORIDE 0.9 % (FLUSH) 0.9 %
10 SYRINGE (ML) INJECTION EVERY 12 HOURS SCHEDULED
Status: DISCONTINUED | OUTPATIENT
Start: 2018-04-11 | End: 2018-04-11 | Stop reason: HOSPADM

## 2018-04-11 RX ORDER — SODIUM CHLORIDE 0.9 % (FLUSH) 0.9 %
10 SYRINGE (ML) INJECTION EVERY 12 HOURS SCHEDULED
Status: DISCONTINUED | OUTPATIENT
Start: 2018-04-11 | End: 2018-04-11

## 2018-04-11 RX ORDER — NITROGLYCERIN 0.4 MG/1
0.4 TABLET SUBLINGUAL EVERY 5 MIN PRN
Status: DISCONTINUED | OUTPATIENT
Start: 2018-04-11 | End: 2018-04-11

## 2018-04-11 RX ORDER — SODIUM CHLORIDE 9 MG/ML
INJECTION, SOLUTION INTRAVENOUS CONTINUOUS
Status: DISCONTINUED | OUTPATIENT
Start: 2018-04-11 | End: 2018-04-11

## 2018-04-11 RX ORDER — CLOPIDOGREL BISULFATE 75 MG/1
75 TABLET ORAL DAILY
Qty: 30 TABLET | Refills: 3 | Status: SHIPPED | OUTPATIENT
Start: 2018-04-11 | End: 2018-09-14 | Stop reason: SDUPTHER

## 2018-04-11 RX ORDER — ONDANSETRON 2 MG/ML
4 INJECTION INTRAMUSCULAR; INTRAVENOUS EVERY 6 HOURS PRN
Status: DISCONTINUED | OUTPATIENT
Start: 2018-04-11 | End: 2018-04-11 | Stop reason: HOSPADM

## 2018-04-11 RX ORDER — ASPIRIN 81 MG/1
81 TABLET ORAL DAILY
Qty: 30 TABLET | Refills: 3 | Status: SHIPPED | OUTPATIENT
Start: 2018-04-11

## 2018-04-11 RX ORDER — ASPIRIN 325 MG
325 TABLET ORAL ONCE
Status: DISCONTINUED | OUTPATIENT
Start: 2018-04-11 | End: 2018-04-11

## 2018-04-11 RX ORDER — SODIUM CHLORIDE 0.9 % (FLUSH) 0.9 %
10 SYRINGE (ML) INJECTION PRN
Status: DISCONTINUED | OUTPATIENT
Start: 2018-04-11 | End: 2018-04-11 | Stop reason: HOSPADM

## 2018-04-11 RX ORDER — ACETAMINOPHEN 325 MG/1
650 TABLET ORAL EVERY 4 HOURS PRN
Status: DISCONTINUED | OUTPATIENT
Start: 2018-04-11 | End: 2018-04-11 | Stop reason: HOSPADM

## 2018-04-11 RX ADMIN — Medication 200 MCG: at 12:28

## 2018-04-11 RX ADMIN — IOVERSOL 105 ML: 678 INJECTION INTRA-ARTERIAL; INTRAVENOUS at 13:02

## 2018-04-11 RX ADMIN — SODIUM CHLORIDE: 9 INJECTION, SOLUTION INTRAVENOUS at 10:31

## 2018-04-11 RX ADMIN — HEPARIN SODIUM 3000 UNITS: 1000 INJECTION, SOLUTION INTRAVENOUS; SUBCUTANEOUS at 12:28

## 2018-04-11 RX ADMIN — FENTANYL CITRATE 25 MCG: 50 INJECTION, SOLUTION INTRAMUSCULAR; INTRAVENOUS at 12:25

## 2018-04-11 RX ADMIN — MIDAZOLAM HYDROCHLORIDE 1 MG: 1 INJECTION INTRAMUSCULAR; INTRAVENOUS at 12:24

## 2018-04-11 ASSESSMENT — PAIN SCALES - GENERAL: PAINLEVEL_OUTOF10: 0

## 2018-04-12 ENCOUNTER — TELEPHONE (OUTPATIENT)
Dept: CARDIOLOGY CLINIC | Age: 73
End: 2018-04-12

## 2018-04-12 DIAGNOSIS — I71.40 ABDOMINAL AORTIC ANEURYSM (AAA) WITHOUT RUPTURE: Primary | ICD-10-CM

## 2018-04-12 RX ORDER — ATORVASTATIN CALCIUM 80 MG/1
TABLET, FILM COATED ORAL
Qty: 90 TABLET | Refills: 0 | Status: SHIPPED | OUTPATIENT
Start: 2018-04-12 | End: 2018-09-06 | Stop reason: SDUPTHER

## 2018-04-13 ENCOUNTER — HOSPITAL ENCOUNTER (OUTPATIENT)
Age: 73
Discharge: HOME | End: 2018-04-13
Payer: MEDICARE

## 2018-04-13 DIAGNOSIS — R27.8 SENSORY ATAXIA: ICD-10-CM

## 2018-04-13 DIAGNOSIS — R20.0 NUMBNESS IN BOTH LEGS: ICD-10-CM

## 2018-04-13 LAB
FOLATE: 8.7 NG/ML (ref 4.8–24.2)
VITAMIN B-12: 245 PG/ML (ref 211–911)

## 2018-04-13 PROCEDURE — 84207 ASSAY OF VITAMIN B-6: CPT

## 2018-04-13 PROCEDURE — 82607 VITAMIN B-12: CPT

## 2018-04-13 PROCEDURE — 82746 ASSAY OF FOLIC ACID SERUM: CPT

## 2018-04-13 PROCEDURE — 36415 COLL VENOUS BLD VENIPUNCTURE: CPT

## 2018-04-16 ENCOUNTER — TELEPHONE (OUTPATIENT)
Dept: NEUROLOGY | Age: 73
End: 2018-04-16

## 2018-04-16 ENCOUNTER — TELEPHONE (OUTPATIENT)
Dept: FAMILY MEDICINE CLINIC | Age: 73
End: 2018-04-16

## 2018-04-17 LAB — VITAMIN B6: 9.8 NMOL/L (ref 20–125)

## 2018-04-18 ENCOUNTER — OFFICE VISIT (OUTPATIENT)
Dept: NEUROLOGY | Age: 73
End: 2018-04-18
Payer: MEDICARE

## 2018-04-18 VITALS
DIASTOLIC BLOOD PRESSURE: 72 MMHG | WEIGHT: 167.2 LBS | HEIGHT: 62 IN | HEART RATE: 74 BPM | SYSTOLIC BLOOD PRESSURE: 132 MMHG | BODY MASS INDEX: 30.77 KG/M2

## 2018-04-18 DIAGNOSIS — M54.16 LUMBAR RADICULOPATHY: Primary | ICD-10-CM

## 2018-04-18 DIAGNOSIS — R20.0 NUMBNESS IN BOTH LEGS: ICD-10-CM

## 2018-04-18 DIAGNOSIS — M21.371 RIGHT FOOT DROP: ICD-10-CM

## 2018-04-18 DIAGNOSIS — R27.8 SENSORY ATAXIA: ICD-10-CM

## 2018-04-18 PROCEDURE — 99213 OFFICE O/P EST LOW 20 MIN: CPT | Performed by: PSYCHIATRY & NEUROLOGY

## 2018-04-18 PROCEDURE — G8400 PT W/DXA NO RESULTS DOC: HCPCS | Performed by: PSYCHIATRY & NEUROLOGY

## 2018-04-18 PROCEDURE — G8598 ASA/ANTIPLAT THER USED: HCPCS | Performed by: PSYCHIATRY & NEUROLOGY

## 2018-04-18 PROCEDURE — 4040F PNEUMOC VAC/ADMIN/RCVD: CPT | Performed by: PSYCHIATRY & NEUROLOGY

## 2018-04-18 PROCEDURE — 1036F TOBACCO NON-USER: CPT | Performed by: PSYCHIATRY & NEUROLOGY

## 2018-04-18 PROCEDURE — 3017F COLORECTAL CA SCREEN DOC REV: CPT | Performed by: PSYCHIATRY & NEUROLOGY

## 2018-04-18 PROCEDURE — G8427 DOCREV CUR MEDS BY ELIG CLIN: HCPCS | Performed by: PSYCHIATRY & NEUROLOGY

## 2018-04-18 PROCEDURE — 1123F ACP DISCUSS/DSCN MKR DOCD: CPT | Performed by: PSYCHIATRY & NEUROLOGY

## 2018-04-18 PROCEDURE — 1090F PRES/ABSN URINE INCON ASSESS: CPT | Performed by: PSYCHIATRY & NEUROLOGY

## 2018-04-18 PROCEDURE — G8417 CALC BMI ABV UP PARAM F/U: HCPCS | Performed by: PSYCHIATRY & NEUROLOGY

## 2018-04-23 ENCOUNTER — OFFICE VISIT (OUTPATIENT)
Dept: CARDIOTHORACIC SURGERY | Age: 73
End: 2018-04-23
Payer: MEDICARE

## 2018-04-23 VITALS
DIASTOLIC BLOOD PRESSURE: 70 MMHG | SYSTOLIC BLOOD PRESSURE: 121 MMHG | HEART RATE: 69 BPM | BODY MASS INDEX: 33.82 KG/M2 | WEIGHT: 183.8 LBS | HEIGHT: 62 IN

## 2018-04-23 DIAGNOSIS — I71.40 ABDOMINAL AORTIC ANEURYSM (AAA) 3.0 CM TO 5.0 CM IN DIAMETER IN FEMALE: Primary | ICD-10-CM

## 2018-04-23 DIAGNOSIS — J43.1 PANLOBULAR EMPHYSEMA (HCC): ICD-10-CM

## 2018-04-23 DIAGNOSIS — I73.9 PVD (PERIPHERAL VASCULAR DISEASE) WITH CLAUDICATION (HCC): ICD-10-CM

## 2018-04-23 DIAGNOSIS — E11.65 TYPE 2 DIABETES MELLITUS WITH HYPERGLYCEMIA, WITH LONG-TERM CURRENT USE OF INSULIN (HCC): ICD-10-CM

## 2018-04-23 DIAGNOSIS — Z79.4 TYPE 2 DIABETES MELLITUS WITH HYPERGLYCEMIA, WITH LONG-TERM CURRENT USE OF INSULIN (HCC): ICD-10-CM

## 2018-04-23 PROCEDURE — 2022F DILAT RTA XM EVC RTNOPTHY: CPT | Performed by: THORACIC SURGERY (CARDIOTHORACIC VASCULAR SURGERY)

## 2018-04-23 PROCEDURE — 3045F PR MOST RECENT HEMOGLOBIN A1C LEVEL 7.0-9.0%: CPT | Performed by: THORACIC SURGERY (CARDIOTHORACIC VASCULAR SURGERY)

## 2018-04-23 PROCEDURE — G8417 CALC BMI ABV UP PARAM F/U: HCPCS | Performed by: THORACIC SURGERY (CARDIOTHORACIC VASCULAR SURGERY)

## 2018-04-23 PROCEDURE — G8400 PT W/DXA NO RESULTS DOC: HCPCS | Performed by: THORACIC SURGERY (CARDIOTHORACIC VASCULAR SURGERY)

## 2018-04-23 PROCEDURE — 3023F SPIROM DOC REV: CPT | Performed by: THORACIC SURGERY (CARDIOTHORACIC VASCULAR SURGERY)

## 2018-04-23 PROCEDURE — 99204 OFFICE O/P NEW MOD 45 MIN: CPT | Performed by: THORACIC SURGERY (CARDIOTHORACIC VASCULAR SURGERY)

## 2018-04-23 PROCEDURE — 1123F ACP DISCUSS/DSCN MKR DOCD: CPT | Performed by: THORACIC SURGERY (CARDIOTHORACIC VASCULAR SURGERY)

## 2018-04-23 PROCEDURE — 1090F PRES/ABSN URINE INCON ASSESS: CPT | Performed by: THORACIC SURGERY (CARDIOTHORACIC VASCULAR SURGERY)

## 2018-04-23 PROCEDURE — 3017F COLORECTAL CA SCREEN DOC REV: CPT | Performed by: THORACIC SURGERY (CARDIOTHORACIC VASCULAR SURGERY)

## 2018-04-23 PROCEDURE — G8427 DOCREV CUR MEDS BY ELIG CLIN: HCPCS | Performed by: THORACIC SURGERY (CARDIOTHORACIC VASCULAR SURGERY)

## 2018-04-23 PROCEDURE — 1036F TOBACCO NON-USER: CPT | Performed by: THORACIC SURGERY (CARDIOTHORACIC VASCULAR SURGERY)

## 2018-04-23 PROCEDURE — G8598 ASA/ANTIPLAT THER USED: HCPCS | Performed by: THORACIC SURGERY (CARDIOTHORACIC VASCULAR SURGERY)

## 2018-04-23 PROCEDURE — G8926 SPIRO NO PERF OR DOC: HCPCS | Performed by: THORACIC SURGERY (CARDIOTHORACIC VASCULAR SURGERY)

## 2018-04-23 PROCEDURE — 4040F PNEUMOC VAC/ADMIN/RCVD: CPT | Performed by: THORACIC SURGERY (CARDIOTHORACIC VASCULAR SURGERY)

## 2018-04-23 ASSESSMENT — ENCOUNTER SYMPTOMS
GASTROINTESTINAL NEGATIVE: 1
ALLERGIC/IMMUNOLOGIC NEGATIVE: 1
RESPIRATORY NEGATIVE: 1
EYES NEGATIVE: 1

## 2018-05-16 ENCOUNTER — TELEPHONE (OUTPATIENT)
Dept: CARDIOLOGY CLINIC | Age: 73
End: 2018-05-16

## 2018-06-06 ENCOUNTER — OFFICE VISIT (OUTPATIENT)
Dept: CARDIOLOGY CLINIC | Age: 73
End: 2018-06-06
Payer: MEDICARE

## 2018-06-06 VITALS
DIASTOLIC BLOOD PRESSURE: 64 MMHG | HEART RATE: 60 BPM | BODY MASS INDEX: 34.23 KG/M2 | SYSTOLIC BLOOD PRESSURE: 134 MMHG | HEIGHT: 62 IN | WEIGHT: 186 LBS

## 2018-06-06 DIAGNOSIS — I71.40 ABDOMINAL AORTIC ANEURYSM (AAA) WITHOUT RUPTURE: Primary | ICD-10-CM

## 2018-06-06 DIAGNOSIS — I25.5 CARDIOMYOPATHY, ISCHEMIC: ICD-10-CM

## 2018-06-06 DIAGNOSIS — Z95.1 HX OF CABG: ICD-10-CM

## 2018-06-06 DIAGNOSIS — Z79.4 TYPE 2 DIABETES MELLITUS WITH HYPERGLYCEMIA, WITH LONG-TERM CURRENT USE OF INSULIN (HCC): ICD-10-CM

## 2018-06-06 DIAGNOSIS — Z95.810 DUAL IMPLANTABLE CARDIOVERTER-DEFIBRILLATOR IN SITU: ICD-10-CM

## 2018-06-06 DIAGNOSIS — I73.9 PAD (PERIPHERAL ARTERY DISEASE) (HCC): ICD-10-CM

## 2018-06-06 DIAGNOSIS — I10 ESSENTIAL HYPERTENSION: ICD-10-CM

## 2018-06-06 DIAGNOSIS — E11.65 TYPE 2 DIABETES MELLITUS WITH HYPERGLYCEMIA, WITH LONG-TERM CURRENT USE OF INSULIN (HCC): ICD-10-CM

## 2018-06-06 PROCEDURE — G8598 ASA/ANTIPLAT THER USED: HCPCS | Performed by: PHYSICIAN ASSISTANT

## 2018-06-06 PROCEDURE — G8417 CALC BMI ABV UP PARAM F/U: HCPCS | Performed by: PHYSICIAN ASSISTANT

## 2018-06-06 PROCEDURE — 2022F DILAT RTA XM EVC RTNOPTHY: CPT | Performed by: PHYSICIAN ASSISTANT

## 2018-06-06 PROCEDURE — G8427 DOCREV CUR MEDS BY ELIG CLIN: HCPCS | Performed by: PHYSICIAN ASSISTANT

## 2018-06-06 PROCEDURE — 4040F PNEUMOC VAC/ADMIN/RCVD: CPT | Performed by: PHYSICIAN ASSISTANT

## 2018-06-06 PROCEDURE — 99213 OFFICE O/P EST LOW 20 MIN: CPT | Performed by: PHYSICIAN ASSISTANT

## 2018-06-06 PROCEDURE — 1090F PRES/ABSN URINE INCON ASSESS: CPT | Performed by: PHYSICIAN ASSISTANT

## 2018-06-06 PROCEDURE — 1123F ACP DISCUSS/DSCN MKR DOCD: CPT | Performed by: PHYSICIAN ASSISTANT

## 2018-06-06 PROCEDURE — 3017F COLORECTAL CA SCREEN DOC REV: CPT | Performed by: PHYSICIAN ASSISTANT

## 2018-06-06 PROCEDURE — G8400 PT W/DXA NO RESULTS DOC: HCPCS | Performed by: PHYSICIAN ASSISTANT

## 2018-06-06 PROCEDURE — 3045F PR MOST RECENT HEMOGLOBIN A1C LEVEL 7.0-9.0%: CPT | Performed by: PHYSICIAN ASSISTANT

## 2018-06-06 PROCEDURE — 1036F TOBACCO NON-USER: CPT | Performed by: PHYSICIAN ASSISTANT

## 2018-06-06 RX ORDER — HYDROCHLOROTHIAZIDE 25 MG/1
12.5 TABLET ORAL DAILY
Qty: 30 TABLET | Refills: 3 | Status: SHIPPED | OUTPATIENT
Start: 2018-06-06 | End: 2018-06-06 | Stop reason: CLARIF

## 2018-06-06 RX ORDER — HYDROCHLOROTHIAZIDE 25 MG/1
12.5 TABLET ORAL DAILY
Qty: 30 TABLET | Refills: 3 | Status: SHIPPED | OUTPATIENT
Start: 2018-06-06 | End: 2019-01-11 | Stop reason: SDUPTHER

## 2018-06-11 RX ORDER — METOPROLOL TARTRATE 50 MG/1
TABLET, FILM COATED ORAL
Qty: 180 TABLET | Refills: 0 | Status: SHIPPED | OUTPATIENT
Start: 2018-06-11 | End: 2018-09-08 | Stop reason: SDUPTHER

## 2018-06-11 RX ORDER — AMIODARONE HYDROCHLORIDE 200 MG/1
TABLET ORAL
Qty: 90 TABLET | Refills: 0 | Status: SHIPPED | OUTPATIENT
Start: 2018-06-11 | End: 2018-09-08 | Stop reason: SDUPTHER

## 2018-06-11 RX ORDER — ENALAPRIL MALEATE 20 MG/1
TABLET ORAL
Qty: 180 TABLET | Refills: 0 | Status: SHIPPED | OUTPATIENT
Start: 2018-06-11 | End: 2018-09-08 | Stop reason: SDUPTHER

## 2018-06-12 ENCOUNTER — OFFICE VISIT (OUTPATIENT)
Dept: FAMILY MEDICINE CLINIC | Age: 73
End: 2018-06-12
Payer: MEDICARE

## 2018-06-12 VITALS
OXYGEN SATURATION: 98 % | RESPIRATION RATE: 20 BRPM | BODY MASS INDEX: 33.31 KG/M2 | SYSTOLIC BLOOD PRESSURE: 120 MMHG | HEART RATE: 72 BPM | DIASTOLIC BLOOD PRESSURE: 68 MMHG | HEIGHT: 62 IN | WEIGHT: 181 LBS

## 2018-06-12 DIAGNOSIS — E11.65 TYPE 2 DIABETES MELLITUS WITH HYPERGLYCEMIA, WITH LONG-TERM CURRENT USE OF INSULIN (HCC): Primary | ICD-10-CM

## 2018-06-12 DIAGNOSIS — R29.898 MUSCULAR DECONDITIONING: ICD-10-CM

## 2018-06-12 DIAGNOSIS — R26.81 UNSTEADY GAIT: ICD-10-CM

## 2018-06-12 DIAGNOSIS — Z79.4 TYPE 2 DIABETES MELLITUS WITH HYPERGLYCEMIA, WITH LONG-TERM CURRENT USE OF INSULIN (HCC): Primary | ICD-10-CM

## 2018-06-12 LAB — HBA1C MFR BLD: 6.2 %

## 2018-06-12 PROCEDURE — 99214 OFFICE O/P EST MOD 30 MIN: CPT | Performed by: FAMILY MEDICINE

## 2018-06-12 PROCEDURE — 83036 HEMOGLOBIN GLYCOSYLATED A1C: CPT | Performed by: FAMILY MEDICINE

## 2018-06-12 PROCEDURE — 1123F ACP DISCUSS/DSCN MKR DOCD: CPT | Performed by: FAMILY MEDICINE

## 2018-06-12 PROCEDURE — G8598 ASA/ANTIPLAT THER USED: HCPCS | Performed by: FAMILY MEDICINE

## 2018-06-12 PROCEDURE — 3017F COLORECTAL CA SCREEN DOC REV: CPT | Performed by: FAMILY MEDICINE

## 2018-06-12 PROCEDURE — G8427 DOCREV CUR MEDS BY ELIG CLIN: HCPCS | Performed by: FAMILY MEDICINE

## 2018-06-12 PROCEDURE — G8417 CALC BMI ABV UP PARAM F/U: HCPCS | Performed by: FAMILY MEDICINE

## 2018-06-12 PROCEDURE — 1090F PRES/ABSN URINE INCON ASSESS: CPT | Performed by: FAMILY MEDICINE

## 2018-06-12 PROCEDURE — 2022F DILAT RTA XM EVC RTNOPTHY: CPT | Performed by: FAMILY MEDICINE

## 2018-06-12 PROCEDURE — 3044F HG A1C LEVEL LT 7.0%: CPT | Performed by: FAMILY MEDICINE

## 2018-06-12 PROCEDURE — G8400 PT W/DXA NO RESULTS DOC: HCPCS | Performed by: FAMILY MEDICINE

## 2018-06-12 PROCEDURE — 4040F PNEUMOC VAC/ADMIN/RCVD: CPT | Performed by: FAMILY MEDICINE

## 2018-06-12 PROCEDURE — 1036F TOBACCO NON-USER: CPT | Performed by: FAMILY MEDICINE

## 2018-06-12 ASSESSMENT — ENCOUNTER SYMPTOMS
WHEEZING: 0
CONSTIPATION: 0
SHORTNESS OF BREATH: 0
ABDOMINAL PAIN: 0
DIARRHEA: 0
RHINORRHEA: 0
NAUSEA: 0
EYE DISCHARGE: 0
SORE THROAT: 0
COUGH: 0

## 2018-06-25 ENCOUNTER — TELEPHONE (OUTPATIENT)
Dept: FAMILY MEDICINE CLINIC | Age: 73
End: 2018-06-25

## 2018-06-25 DIAGNOSIS — Z79.4 TYPE 2 DIABETES MELLITUS WITH HYPERGLYCEMIA, WITH LONG-TERM CURRENT USE OF INSULIN (HCC): Primary | ICD-10-CM

## 2018-06-25 DIAGNOSIS — E11.65 TYPE 2 DIABETES MELLITUS WITH HYPERGLYCEMIA, WITH LONG-TERM CURRENT USE OF INSULIN (HCC): Primary | ICD-10-CM

## 2018-06-25 RX ORDER — METFORMIN HYDROCHLORIDE 750 MG/1
750 TABLET, EXTENDED RELEASE ORAL 2 TIMES DAILY WITH MEALS
Qty: 30 TABLET | Refills: 11 | Status: SHIPPED | OUTPATIENT
Start: 2018-06-25 | End: 2019-07-08 | Stop reason: SDUPTHER

## 2018-07-09 DIAGNOSIS — E11.42 DIABETIC POLYNEUROPATHY ASSOCIATED WITH TYPE 2 DIABETES MELLITUS (HCC): ICD-10-CM

## 2018-07-09 NOTE — TELEPHONE ENCOUNTER
Irvin Blanc called requesting a refill on the following medications:  Requested Prescriptions     Pending Prescriptions Disp Refills    pregabalin (LYRICA) 200 MG capsule 60 capsule 2     Sig: Take 1 capsule by mouth 2 times daily for 30 days. .     Pharm[facility]: [facility]      Date of last visit:  6/12/2018  Date of next visit (if applicable): 82/23/2475

## 2018-07-10 RX ORDER — PREGABALIN 200 MG/1
200 CAPSULE ORAL 2 TIMES DAILY
Qty: 60 CAPSULE | Refills: 0 | Status: SHIPPED | OUTPATIENT
Start: 2018-07-10 | End: 2018-08-07 | Stop reason: SDUPTHER

## 2018-07-25 ENCOUNTER — TELEPHONE (OUTPATIENT)
Dept: CARDIOLOGY CLINIC | Age: 73
End: 2018-07-25

## 2018-07-25 NOTE — TELEPHONE ENCOUNTER
States she cannot get monitor to work and \"can't get thru\" to the company. Scheduled for in office appt.

## 2018-08-07 DIAGNOSIS — E11.42 DIABETIC POLYNEUROPATHY ASSOCIATED WITH TYPE 2 DIABETES MELLITUS (HCC): ICD-10-CM

## 2018-08-07 RX ORDER — PREGABALIN 200 MG/1
200 CAPSULE ORAL 2 TIMES DAILY
Qty: 60 CAPSULE | Refills: 0 | Status: SHIPPED | OUTPATIENT
Start: 2018-08-07 | End: 2018-09-10 | Stop reason: SDUPTHER

## 2018-08-08 ENCOUNTER — NURSE ONLY (OUTPATIENT)
Dept: CARDIOLOGY CLINIC | Age: 73
End: 2018-08-08
Payer: MEDICARE

## 2018-08-08 DIAGNOSIS — Z95.810 DUAL IMPLANTABLE CARDIOVERTER-DEFIBRILLATOR IN SITU: Primary | ICD-10-CM

## 2018-08-08 PROCEDURE — 93283 PRGRMG EVAL IMPLANTABLE DFB: CPT | Performed by: INTERNAL MEDICINE

## 2018-08-08 NOTE — PROGRESS NOTES
Carolina [employer] ICD  Dr Mark Reid 4yrs  Atrial paced 97%  Ventricle paced 87%  p sensing 0.6mV  r wave >12  Atrial threshold 1.1V @ 0.5ms  RV threshold 1.2V @ 0.5ms  Atrial impedence 330ohms  rv impedence 400 ohms  Shock 43ohms  No events

## 2018-08-09 RX ORDER — ONDANSETRON 2 MG/ML
INJECTION INTRAMUSCULAR; INTRAVENOUS
Status: DISPENSED
Start: 2018-08-09 | End: 2018-08-09

## 2018-08-16 ENCOUNTER — HOSPITAL ENCOUNTER (OUTPATIENT)
Age: 73
Discharge: HOME | End: 2018-08-16
Payer: MEDICARE

## 2018-08-16 LAB
ALBUMIN SERPL-MCNC: 4.2 G/DL (ref 3.5–5.1)
ALP BLD-CCNC: 89 U/L (ref 38–126)
ALT SERPL-CCNC: 15 U/L (ref 11–66)
ANION GAP SERPL CALCULATED.3IONS-SCNC: 12 MEQ/L (ref 8–16)
AST SERPL-CCNC: 14 U/L (ref 5–40)
AVERAGE GLUCOSE: 126 MG/DL (ref 70–126)
BASOPHILS # BLD: 0.6 %
BASOPHILS ABSOLUTE: 0 THOU/MM3 (ref 0–0.1)
BILIRUB SERPL-MCNC: 0.4 MG/DL (ref 0.3–1.2)
BUN BLDV-MCNC: 21 MG/DL (ref 7–22)
CALCIUM SERPL-MCNC: 9.4 MG/DL (ref 8.5–10.5)
CHLORIDE BLD-SCNC: 101 MEQ/L (ref 98–111)
CHOLESTEROL, TOTAL: 142 MG/DL (ref 100–199)
CO2: 30 MEQ/L (ref 23–33)
CREAT SERPL-MCNC: 0.6 MG/DL (ref 0.4–1.2)
CREATININE, URINE: 44.3 MG/DL
EOSINOPHIL # BLD: 2.8 %
EOSINOPHILS ABSOLUTE: 0.2 THOU/MM3 (ref 0–0.4)
ERYTHROCYTE [DISTWIDTH] IN BLOOD BY AUTOMATED COUNT: 14.6 % (ref 11.5–14.5)
ERYTHROCYTE [DISTWIDTH] IN BLOOD BY AUTOMATED COUNT: 49.5 FL (ref 35–45)
GFR SERPL CREATININE-BSD FRML MDRD: > 90 ML/MIN/1.73M2
GLUCOSE BLD-MCNC: 64 MG/DL (ref 70–108)
HBA1C MFR BLD: 6.2 % (ref 4.4–6.4)
HCT VFR BLD CALC: 38.5 % (ref 37–47)
HDLC SERPL-MCNC: 54 MG/DL
HEMOGLOBIN: 12.4 GM/DL (ref 12–16)
IMMATURE GRANS (ABS): 0.03 THOU/MM3 (ref 0–0.07)
IMMATURE GRANULOCYTES: 0.4 %
LDL CHOLESTEROL CALCULATED: 76 MG/DL
LYMPHOCYTES # BLD: 23.7 %
LYMPHOCYTES ABSOLUTE: 1.8 THOU/MM3 (ref 1–4.8)
MCH RBC QN AUTO: 29.7 PG (ref 26–33)
MCHC RBC AUTO-ENTMCNC: 32.2 GM/DL (ref 32.2–35.5)
MCV RBC AUTO: 92.3 FL (ref 81–99)
MICROALBUMIN UR-MCNC: < 1.2 MG/DL
MICROALBUMIN/CREAT UR-RTO: 27 MG/G (ref 0–30)
MONOCYTES # BLD: 6.8 %
MONOCYTES ABSOLUTE: 0.5 THOU/MM3 (ref 0.4–1.3)
NUCLEATED RED BLOOD CELLS: 0 /100 WBC
PLATELET # BLD: 179 THOU/MM3 (ref 130–400)
PMV BLD AUTO: 11.9 FL (ref 9.4–12.4)
POTASSIUM SERPL-SCNC: 4.4 MEQ/L (ref 3.5–5.2)
RBC # BLD: 4.17 MILL/MM3 (ref 4.2–5.4)
SEG NEUTROPHILS: 65.7 %
SEGMENTED NEUTROPHILS ABSOLUTE COUNT: 5.1 THOU/MM3 (ref 1.8–7.7)
SODIUM BLD-SCNC: 143 MEQ/L (ref 135–145)
TOTAL PROTEIN: 7.3 G/DL (ref 6.1–8)
TRIGL SERPL-MCNC: 61 MG/DL (ref 0–199)
TSH SERPL DL<=0.05 MIU/L-ACNC: 3.62 UIU/ML (ref 0.4–4.2)
WBC # BLD: 7.8 THOU/MM3 (ref 4.8–10.8)

## 2018-08-16 PROCEDURE — 82043 UR ALBUMIN QUANTITATIVE: CPT

## 2018-08-16 PROCEDURE — 84443 ASSAY THYROID STIM HORMONE: CPT

## 2018-08-16 PROCEDURE — 36415 COLL VENOUS BLD VENIPUNCTURE: CPT

## 2018-08-16 PROCEDURE — 80053 COMPREHEN METABOLIC PANEL: CPT

## 2018-08-16 PROCEDURE — 80061 LIPID PANEL: CPT

## 2018-08-16 PROCEDURE — 83036 HEMOGLOBIN GLYCOSYLATED A1C: CPT

## 2018-08-16 PROCEDURE — 85025 COMPLETE CBC W/AUTO DIFF WBC: CPT

## 2018-09-06 RX ORDER — ATORVASTATIN CALCIUM 80 MG/1
TABLET, FILM COATED ORAL
Qty: 90 TABLET | Refills: 0 | Status: SHIPPED | OUTPATIENT
Start: 2018-09-06 | End: 2018-12-05 | Stop reason: SDUPTHER

## 2018-09-10 DIAGNOSIS — E11.42 DIABETIC POLYNEUROPATHY ASSOCIATED WITH TYPE 2 DIABETES MELLITUS (HCC): ICD-10-CM

## 2018-09-10 RX ORDER — AMIODARONE HYDROCHLORIDE 200 MG/1
TABLET ORAL
Qty: 90 TABLET | Refills: 3 | Status: SHIPPED | OUTPATIENT
Start: 2018-09-10 | End: 2019-09-08 | Stop reason: SDUPTHER

## 2018-09-10 RX ORDER — ENALAPRIL MALEATE 20 MG/1
TABLET ORAL
Qty: 180 TABLET | Refills: 3 | Status: ON HOLD | OUTPATIENT
Start: 2018-09-10 | End: 2019-08-25 | Stop reason: HOSPADM

## 2018-09-10 RX ORDER — METOPROLOL TARTRATE 50 MG/1
TABLET, FILM COATED ORAL
Qty: 180 TABLET | Refills: 3 | Status: ON HOLD | OUTPATIENT
Start: 2018-09-10 | End: 2019-06-05 | Stop reason: HOSPADM

## 2018-09-10 RX ORDER — PREGABALIN 200 MG/1
200 CAPSULE ORAL 2 TIMES DAILY
Qty: 60 CAPSULE | Refills: 0 | Status: SHIPPED | OUTPATIENT
Start: 2018-09-10 | End: 2018-10-09 | Stop reason: SDUPTHER

## 2018-09-14 ENCOUNTER — OFFICE VISIT (OUTPATIENT)
Dept: CARDIOLOGY CLINIC | Age: 73
End: 2018-09-14
Payer: MEDICARE

## 2018-09-14 VITALS
HEART RATE: 72 BPM | WEIGHT: 197.8 LBS | SYSTOLIC BLOOD PRESSURE: 122 MMHG | HEIGHT: 62 IN | DIASTOLIC BLOOD PRESSURE: 62 MMHG | BODY MASS INDEX: 36.4 KG/M2

## 2018-09-14 DIAGNOSIS — I25.810 CORONARY ARTERY DISEASE INVOLVING CORONARY BYPASS GRAFT OF NATIVE HEART WITHOUT ANGINA PECTORIS: ICD-10-CM

## 2018-09-14 DIAGNOSIS — I25.5 ISCHEMIC CARDIOMYOPATHY: ICD-10-CM

## 2018-09-14 DIAGNOSIS — I10 ESSENTIAL HYPERTENSION: ICD-10-CM

## 2018-09-14 DIAGNOSIS — I73.9 PVD (PERIPHERAL VASCULAR DISEASE) (HCC): ICD-10-CM

## 2018-09-14 DIAGNOSIS — I71.40 ABDOMINAL AORTIC ANEURYSM (AAA) WITHOUT RUPTURE: Primary | ICD-10-CM

## 2018-09-14 PROCEDURE — G8400 PT W/DXA NO RESULTS DOC: HCPCS | Performed by: NUCLEAR MEDICINE

## 2018-09-14 PROCEDURE — G8598 ASA/ANTIPLAT THER USED: HCPCS | Performed by: NUCLEAR MEDICINE

## 2018-09-14 PROCEDURE — 1123F ACP DISCUSS/DSCN MKR DOCD: CPT | Performed by: NUCLEAR MEDICINE

## 2018-09-14 PROCEDURE — G8427 DOCREV CUR MEDS BY ELIG CLIN: HCPCS | Performed by: NUCLEAR MEDICINE

## 2018-09-14 PROCEDURE — 99214 OFFICE O/P EST MOD 30 MIN: CPT | Performed by: NUCLEAR MEDICINE

## 2018-09-14 PROCEDURE — 1036F TOBACCO NON-USER: CPT | Performed by: NUCLEAR MEDICINE

## 2018-09-14 PROCEDURE — 1090F PRES/ABSN URINE INCON ASSESS: CPT | Performed by: NUCLEAR MEDICINE

## 2018-09-14 PROCEDURE — 4040F PNEUMOC VAC/ADMIN/RCVD: CPT | Performed by: NUCLEAR MEDICINE

## 2018-09-14 PROCEDURE — G8417 CALC BMI ABV UP PARAM F/U: HCPCS | Performed by: NUCLEAR MEDICINE

## 2018-09-14 PROCEDURE — 3017F COLORECTAL CA SCREEN DOC REV: CPT | Performed by: NUCLEAR MEDICINE

## 2018-09-14 PROCEDURE — 1101F PT FALLS ASSESS-DOCD LE1/YR: CPT | Performed by: NUCLEAR MEDICINE

## 2018-09-14 RX ORDER — CLOPIDOGREL BISULFATE 75 MG/1
75 TABLET ORAL DAILY
Qty: 90 TABLET | Refills: 3 | Status: SHIPPED | OUTPATIENT
Start: 2018-09-14 | End: 2019-01-01 | Stop reason: SDUPTHER

## 2018-09-14 NOTE — PROGRESS NOTES
times daily (with meals) 30 tablet 11    hydrochlorothiazide (HYDRODIURIL) 25 MG tablet Take 0.5 tablets by mouth daily 30 tablet 3    aspirin EC 81 MG EC tablet Take 1 tablet by mouth daily 30 tablet 3    clopidogrel (PLAVIX) 75 MG tablet Take 1 tablet by mouth daily 30 tablet 3    rOPINIRole (REQUIP) 0.5 MG tablet Take 1 tablet by mouth nightly 30 tablet 11    Insulin Pen Needle (B-D UF III MINI PEN NEEDLES) 31G X 5 MM MISC 1 each by Does not apply route daily 100 each 3    glucose blood VI test strips (ASCENSIA AUTODISC VI;ONE TOUCH ULTRA TEST VI) strip Test once daily. Dispense One Touch Ultra Test Strips. Dx: Type 2 diabetes (250.00) 100 each 5    gabapentin (NEURONTIN) 400 MG capsule Take 2 capsules by mouth every evening for 30 days. 90 capsule 3    insulin glargine (LANTUS SOLOSTAR) 100 UNIT/ML injection pen Inject 20 Units into the skin nightly 6 mL 0     No current facility-administered medications for this visit.       Allergies   Allergen Reactions    Sulfa Antibiotics      Health Maintenance   Topic Date Due    DTaP/Tdap/Td vaccine (1 - Tdap) 01/03/1963    Shingles Vaccine (1 of 2 - 2 Dose Series) 01/03/1994    Breast cancer screen  01/26/2017    Flu vaccine (1) 09/01/2018    Pneumococcal low/med risk (2 of 2 - PCV13) 09/13/2018    Colon Cancer Screen FIT/FOBT  09/26/2018    Diabetic foot exam  10/03/2018    Diabetic retinal exam  10/10/2018    A1C test (Diabetic or Prediabetic)  08/16/2019    Diabetic microalbuminuria test  08/16/2019    Lipid screen  08/16/2019    TSH testing  08/16/2019    Potassium monitoring  08/16/2019    Creatinine monitoring  08/16/2019    DEXA (modify frequency per FRAX score)  Addressed       Subjective:  Review of Systems  General:   No fever, no chills, some fatigue or weight loss  Pulmonary:    some chronic dyspnea, no wheezing  Cardiac:    Denies recent chest pain,   GI:     No nausea or vomiting, no abdominal pain  Neuro:     No dizziness or light

## 2018-09-21 DIAGNOSIS — Z79.4 TYPE 2 DIABETES MELLITUS WITH HYPERGLYCEMIA, WITH LONG-TERM CURRENT USE OF INSULIN (HCC): ICD-10-CM

## 2018-09-21 DIAGNOSIS — E11.65 TYPE 2 DIABETES MELLITUS WITH HYPERGLYCEMIA, WITH LONG-TERM CURRENT USE OF INSULIN (HCC): ICD-10-CM

## 2018-09-21 DIAGNOSIS — E11.42 DIABETIC POLYNEUROPATHY ASSOCIATED WITH TYPE 2 DIABETES MELLITUS (HCC): ICD-10-CM

## 2018-09-24 RX ORDER — INSULIN GLARGINE 100 [IU]/ML
INJECTION, SOLUTION SUBCUTANEOUS
Qty: 15 ML | Refills: 2 | Status: SHIPPED | OUTPATIENT
Start: 2018-09-24 | End: 2019-05-06 | Stop reason: SDUPTHER

## 2018-09-24 NOTE — TELEPHONE ENCOUNTER
[facility] [place]  [facility]  Advanced Heart Failure Consult    [facility]      Heart Failure Clinic in [place]    Date of visit: 9/24/2018  Patient Name: Ese Booth  Medical Record Number: [id number]  YOB: 1930   Primary Physician: Derick Cardenas DO  Cardiologist: Dr Velarde    Referral: Dr. Ambrocio Velarde M.D.    CC:    Chief Complaint   Patient presents with   • Follow-up   • CHF       SUBJECTIVE     HISTORY OF PRESENT ILLNESS:Ese Booth is a 88 year old female who presents to the clinic with the following CV atherosclerotic heart disease, generalized cardiomyopathy, history of recent stroke, history of recent volvulus, status post colon resection, Back pain      2/8/2012 to  2/9/12 Sciatica of left side  2/15/2012 to 02/17/2012   Back pain          8/23/2014 to 8/26/2014 Ureteral stone, acute UTI   5/13/2016 to 5/16/2016 unsteadiness on feet, uncontrolled hypertension   10/20/2017 to 10/25/2017 CVA   10/25/2017 to 11/9/2017 CVA inpatient rehab  11/15/2017 to 11/21/2017 cecal volvulus, s/p right hemicolectomy  3/29/18 ED for UTI and hypertension  7/25/18 ED for UTI    At first visit:      Ese Booth states she is walking over 200 feet without shortness of breath or LARSEN. She is walking this  6 times a day been going to and from the lunchroom. Upon returning from the lunchroom she does need to rest at 175 feet due to weakness not LARSEN or shortness of breath. She is unsteady when moving from sitting to standing, I did witness that in the clinic, she denies lightheadedness or dizziness. Ese Booth is weighing herself daily however, both her and her  have difficulty reading the scale. Ese Booth is receiving home PT. Her daughter started June on glycerena protein shakes once per day. Ese Booth denies diarrhea, hypoglycemic episodes, edema, weight gain or weight loss, fatigue, dysphagia, cough,  wheeze, chest pain or  Referral placed. Thanks. tightness, syncope, presyncope, lightheadedness, dizziness,  SOB (shortness of breath), dyspnea on exertion, orthopnea, bendopnea, PND (paroxysmal nocturnal dyspnea), abdominal pain, abdominal fullness or early satiety. Ese Booth sleeps flat in bed on one pillow.       Since last visit: increased losartan then switched to spironolactone 12.5 mg daily with candesartan per recommendation of PCP  Ese Booth is accompanied by  Daughter Kira Booth is walking with a walker at this time she is able to walk over 200 feet without LARSEN or SOB. Ese Booth states she has been feeling good. Ese Booth's daughter Kira believes that Tessas blood pressure is elevated due to concern for her daughter.  Ese Booth denies headache, edema, weight gain or weight loss, fatigue, dysphagia, cough,  wheeze, chest pain or tightness, syncope, presyncope, lightheadedness, dizziness,  SOB (shortness of breath), dyspnea on exertion, orthopnea, PND (paroxysmal nocturnal dyspnea), abdominal pain, abdominal fullness or early satiety. Ese Booth sleeps flat in bed on one pillow.        HEART FAILURE MEDICATIONS   [] ACEi [x] ARB [x] BB (beta blocker) [] CCB (calcium channel blocker)   [] Corlanor   [] DIG (digoxin) [x] Diuretic  [] Entresto [] Hydralazine [x] MRA [] Nitrate       DEVICES has documented refusal of ICD  [ ] ICD [ ] BIV - ICD [ ] PPM [ ] Life Vest [ ] CardioMEMS    Frequency / Description   []  YES    [x]  NO Angina:   []  YES    [x]  NO Claudication:   []  YES    [x]  NO Syncope:   []  YES    [x]  NO Orthopnea:   []  YES    [x]  NO PND:   []  YES    [x]  NO Pedal edema:   []  YES    [x]  NO Abdominal Swelling:   []  YES    [x]  NO Early Satiety:   []  YES    [x]  NO Hospitalization:   []  YES    [x]  NO ER Visit:   [x]  YES    []  NO Weight gain:up one lb since the last visit  []  YES    [x]  NO Other:    COMPLIANCE   Description   [x]  YES    []  NO Med:   [x]  YES    []  NO Diet:    REVIEW OF SYSTEMS:   6 point review of systems was completed and is negative except as stated above.    MEDICATIONS  Current Outpatient Medications   Medication Sig Dispense Refill   • spironolactone (ALDACTONE) 25 MG tablet Take 0.5 tablets by mouth daily. 15 tablet 11   • candesartan (ATACAND) 32 MG tablet Take 1 tablet by mouth daily. 30 tablet 11   • docusate sodium-sennosides (SENEXON-S) 50-8.6 MG per tablet Take 1 tablet by mouth daily. 60 tablet 0   • metformin (GLUCOPHAGE-XR) 500 MG 24 hr tablet Take 1 tablet by mouth 2 times daily. 60 tablet 3   • saline nasal (AYR,NASOGEL) Gel Spray in each nostril 3 times daily.  0   • carbamide peroxide (DEBROX) 6.5 % otic solution Place 5 drops into both ears 2 times daily. 15 mL 0   • carvedilol (COREG) 12.5 MG tablet Take 1 tablet by mouth 2 times daily (with meals). 60 tablet 3   • DULoxetine (CYMBALTA) 20 MG capsule Take 2 capsules by mouth daily. 60 capsule 5   • furosemide (LASIX) 20 MG tablet Take 1 tablet by mouth daily. 28 tablet 5   • LANTUS SOLOSTAR 100 UNIT/ML pen-injector INJECT 12 UNITS SUBQ EVERY MORNING FOR DIABETES MELLITUS **DATE OPENED: ___/___/___**EXPIRES 28 DAYS ONCE OPENED* 3 mL 10   • ferrous sulfate 324 (65 Fe) MG EC tablet Take 1 tablet by mouth three times a week. Mon/wed/fri 12 tablet 5   • carBAMazepine (TEGRETOL XR) 200 MG 12 hr tablet Take 1 tablet by mouth every 12 hours. 60 tablet 4   • pantoprazole (PROTONIX) 40 MG tablet Take 1 tablet by mouth nightly. 30 tablet 4   • acetaminophen (TYLENOL) 325 MG tablet Take 2 tablets by mouth 3 times daily. 120 tablet 2   • senna (SENOKOT) 8.6 MG tablet Take 2 tablets by mouth 2 times daily as needed for Constipation.     • polyethylene glycol (MIRALAX) powder Take 17 g by mouth twice a week.      • atorvastatin (LIPITOR) 40 MG tablet Take 1 tablet by mouth daily. 28 tablet 11   • Magnesium Hydroxide (MILK OF MAGNESIA PO)      • blood glucose (ACCU-CHEK JULIET PLUS) test strip One strip to test blood sugar twice daily.  100 strip 2   • Insulin Pen Needle (PEN NEEDLES) 31G X 6 MM Misc 1 unit daily 60 each 11   • calcium carbonate-vitamin D (CALTRATE+D) 600-400 MG-UNIT per tablet Take 1 tablet by mouth daily.     • ASPIRIN 81 MG OR TABS 1 TABLET BY MOUTH DAILY 30 11     No current facility-administered medications for this visit.        OBJECTIVE  PAST HISTORY:  I have reviewed the past medical history, family history, social history, medications and allergies listed in the medical record as obtained by my nursing staff and support staff and agree with their documentation.    PHYSICAL EXAMINATION:  Vitals:    Visit Vitals  /60   Pulse 58   Resp 18   Ht 5' 7\" (1.702 m)   Wt 72.4 kg   BMI 25.00 kg/m²      BMI: Estimated body mass index is 25 kg/m² as calculated from the following:    Height as of this encounter: 5' 7\" (1.702 m).    Weight as of this encounter: 72.4 kg.   Constitutional: well nourished 88 year old female in no acute distress.  Skin: Warm, dry, intact, no lesions.  HEENT: Normocephalic, atraumatic. Oral mucous membranes moist, EOMs(extraocular movements) intact.  Neck: Supple, trachea midline, RAP= 5 cm, positive HJR (hepatojgular reflux), negative carotid bruits bilateral.  No thyromegaly.  Cardiovascular: Normal S1, S2. regular rhythm. Murmur: Soft systolic murmur. negative S3 and S4.  Respiratory: Anterior/posterior lung sounds : fine rales in bases to auscultation bilaterally.   Abdomen: Soft, nontender, negative hepatomegaly and normal bowel sounds.  Musculoskeletal/Extremities: CRT (capillary refill time) < 3 seconds, no clubbing, no cyanosis, no edema.  Neurological: No focal neurological deficits and speech normal. Sensation grossly intact.  Psychiatric: Alert and oriented to person, place date except for month    LABORATORY:  Lab Results   Component Value Date    POTASSIUM 5.1 09/24/2018    SODIUM 137 09/24/2018    CO2 32 09/24/2018    CHLORIDE 98 09/24/2018    BUN 30 (H) 09/24/2018    CREATININE 1.03 (H)  09/24/2018    GLUCOSE 135 (H) 09/24/2018    CALCIUM 9.1 09/24/2018    WBC 10.9 08/17/2018    RBC 3.96 (L) 08/17/2018    HCT 35.3 (L) 08/17/2018    HGB 11.3 (L) 08/17/2018     08/17/2018    TSH 2.879 10/23/2017    CHOLESTEROL 176 10/21/2017    HDL 79 10/21/2017    CHOHDL 2.2 10/21/2017    TRIGLYCERIDE 77 10/21/2017    CALCLDL 82 10/21/2017    INR 1.0 11/14/2017       Sodium (mmol/L)   Date Value   09/24/2018 137     Potassium (mmol/L)   Date Value   09/24/2018 5.1     Chloride (mmol/L)   Date Value   09/24/2018 98     Glucose (mg/dL)   Date Value   09/24/2018 135 (H)     CALCIUM (mg/dL)   Date Value   09/24/2018 9.1   07/18/2012 9.7     CO2 (mmol/L)   Date Value   08/24/2012 31     Carbon Dioxide (mmol/L)   Date Value   09/24/2018 32     BUN (mg/dL)   Date Value   09/24/2018 30 (H)     Creatinine (mg/dL)   Date Value   09/24/2018 1.03 (H)     B-TYPE NATRIURETIC PEPTIDE (pg/mL)   Date Value   05/31/2018 167 (H)     Albumin (g/dL)   Date Value   09/07/2018 3.6     Lab Results   Component Value Date    NTPROB 1,826 (H) 09/24/2018         LIPIDS:  CHOLESTEROL (mg/dL)   Date Value   10/21/2017 176     HDL (mg/dL)   Date Value   10/21/2017 79     CHOL/HDL (no units)   Date Value   10/21/2017 2.2     TRIGLYCERIDE (mg/dL)   Date Value   10/21/2017 77     CALCULATED LDL (mg/dL)   Date Value   10/21/2017 82             DIAGNOSTICS:  The following diagnostics were reviewed    Cardiac   11/30/2017 ECHO  Increased left ventricular cavity size with severely reduced left ventricular function - left ventricular ejection fraction, 18 % is  globally reduced.  Moderately decreased right ventricular systolic function.  Increased left atrial size.  No significant change since the prior study from 10/22/2017.    10/22/2017 ECHO  Left ventricular enlargeLment with severe global LV systolic dysfunction. EF 15%. No LV apical thrombus.  Moderately decreased right ventricular systolic function. Normal right ventricular  size.  Unremarkable valvular structures.  Incomplete TR Doppler envelope precludes accurate assessment of PASP.  LVID= 5.1 cm    8/8/2007 ECHO      11/14/2017 EKG  Normal sinus rhythm   Left axis deviation   Left bundle branch block   Abnormal ECG   When compared with ECG of   25-OCT-2017 21:43,   fusion complexes   are no longer   present   Confirmed by JOSE SMILEY MD ([id number]) on 11/14/2017 11:54:47 PM    10/24/2017 Cardiac Cath  1) SEVERE TANDEM AND CALCIFIED  STENOSIS AT MID LAD.  2) MILD LV DYSFUNCTION.     3) SUCCESSFUL PTCA OF MID LAD.  4) UN-SUCCESSFUL  STENT PLACEMENT OF STENT DUE TO     TORTUITY AND CALCIFICATION OF MID-LAD,        Non-Cardiac       ASSESSMENT/PLAN:  Heart Failure most likely Nonischemic possible \"stress induced from CVA\" cardiomyopathy HFrEF (ejection fraction): ACC/ AHA Stage C, New York Heart Association Classification:   NYHA Class III: Significant HF symptoms/activity intolerance (Only able to do light housework, can walk slowly on level ground)  Volume status is normal .   Perfusion status is normal.   Labs Reviewed.    Based on objective data and clinical data will augment medical therapy as follows:   OMT (optimal medical therapy):    ICD:  None refuses defibrillator   Teaching  1) Record weights on a daily basis.    2) Call me with a weight gain greater than 3 pounds in one day or 5 pounds in one week.  Also call for weight loss of greater than 5 lbs in one week.  3) Call the clinic if you have dizziness, lightheadedness, or pass out.  4) Follow a low salt diet keep to less than 2 gram sodium (2000mg) per day  and limit fluid intake to 8494-6690 ml or 8-10 cups fluid restricted diet.  5) Avoid the use of NSAID including but not limited to Ibuprofen, Advil and Aleve.  May take Tylenol.   6) Ese C [place] please check with community pharmacist with all over the counter  medications to ensure product does not contain NSAIDs.   7) Following these instructions and taking your  medication as prescribed as well as exercising, WILL IMPROVE YOUR QUALITY OF LIFE and INCREASE your life span. It will also prevent progression of heart failure symptoms and decrease hospitalizations.    Debilitation and a low serum albumin: she has increased her ambulation and is \"more steady\", Pal BOWERS from palliative care will be seeing patient today.    Albumin (g/dL)   Date Value   09/07/2018 3.6     High serum potassium: stable, needs spirolactone for blood pressure control, can not increase spirolactonedue to high potassium level, potassium level still within normal range     Hypertension: blood pressure remains on the high side of normal, recheck of blood pressure was 158/60, will allow PCP to adjust medications.     Follow-up:     Clinic: 1 month PCP, Yue NP in 2 months   Test:  [x] BMP [x] pro-BNP [] CBC [] CMP [] Echo  [] CPX    Visit coordinated by:   Donna Booth  understands she can return sooner if any issues should arise.       Yue Centeno, ROBINSON NP APNP    Thank you Dr. Velarde for this kind referral to our heart failure clinic we will work closely with you to give Ese Booth the best care.

## 2018-10-09 DIAGNOSIS — E11.42 DIABETIC POLYNEUROPATHY ASSOCIATED WITH TYPE 2 DIABETES MELLITUS (HCC): ICD-10-CM

## 2018-10-09 RX ORDER — PREGABALIN 200 MG/1
200 CAPSULE ORAL 2 TIMES DAILY
Qty: 60 CAPSULE | Refills: 3 | Status: SHIPPED | OUTPATIENT
Start: 2018-10-09 | End: 2019-02-11 | Stop reason: SDUPTHER

## 2018-10-09 NOTE — TELEPHONE ENCOUNTER
[address] [place] [address] called requesting a refill on the following medications:  Requested Prescriptions     Pending Prescriptions Disp Refills    pregabalin (LYRICA) 200 MG capsule 60 capsule 0     Sig: Take 1 capsule by mouth 2 times daily for 30 days. .     Pharmacy verified:  [facility]       Date of last visit: 06-12-18   Date of next visit (if applicable): 72/28/1780

## 2018-10-15 ENCOUNTER — HOSPITAL ENCOUNTER (OUTPATIENT)
Dept: CT IMAGING | Age: 73
Discharge: HOME | End: 2018-10-15
Payer: MEDICARE

## 2018-10-15 ENCOUNTER — HOSPITAL ENCOUNTER (OUTPATIENT)
Dept: INTERVENTIONAL RADIOLOGY/VASCULAR | Age: 73
Discharge: HOME | End: 2018-10-15
Payer: MEDICARE

## 2018-10-15 DIAGNOSIS — E11.65 TYPE 2 DIABETES MELLITUS WITH HYPERGLYCEMIA, WITH LONG-TERM CURRENT USE OF INSULIN (HCC): ICD-10-CM

## 2018-10-15 DIAGNOSIS — Z79.4 TYPE 2 DIABETES MELLITUS WITH HYPERGLYCEMIA, WITH LONG-TERM CURRENT USE OF INSULIN (HCC): ICD-10-CM

## 2018-10-15 DIAGNOSIS — I73.9 PVD (PERIPHERAL VASCULAR DISEASE) WITH CLAUDICATION (HCC): ICD-10-CM

## 2018-10-15 DIAGNOSIS — I71.40 ABDOMINAL AORTIC ANEURYSM (AAA) 3.0 CM TO 5.0 CM IN DIAMETER IN FEMALE: ICD-10-CM

## 2018-10-15 LAB — POC CREATININE WHOLE BLOOD: 0.8 MG/DL (ref 0.5–1.2)

## 2018-10-15 PROCEDURE — 93922 UPR/L XTREMITY ART 2 LEVELS: CPT

## 2018-10-15 PROCEDURE — 82565 ASSAY OF CREATININE: CPT

## 2018-10-15 PROCEDURE — 6360000004 HC RX CONTRAST MEDICATION: Performed by: THORACIC SURGERY (CARDIOTHORACIC VASCULAR SURGERY)

## 2018-10-15 PROCEDURE — 75635 CT ANGIO ABDOMINAL ARTERIES: CPT

## 2018-10-15 RX ADMIN — IOPAMIDOL 90 ML: 755 INJECTION, SOLUTION INTRAVENOUS at 08:28

## 2018-10-22 ENCOUNTER — OFFICE VISIT (OUTPATIENT)
Dept: CARDIOTHORACIC SURGERY | Age: 73
End: 2018-10-22
Payer: MEDICARE

## 2018-10-22 VITALS
WEIGHT: 206.8 LBS | BODY MASS INDEX: 38.05 KG/M2 | HEIGHT: 62 IN | DIASTOLIC BLOOD PRESSURE: 77 MMHG | HEART RATE: 71 BPM | SYSTOLIC BLOOD PRESSURE: 135 MMHG

## 2018-10-22 DIAGNOSIS — I73.9 ASYMPTOMATIC PVD (PERIPHERAL VASCULAR DISEASE) (HCC): Primary | ICD-10-CM

## 2018-10-22 DIAGNOSIS — I71.40 ABDOMINAL AORTIC ANEURYSM (AAA) 3.0 CM TO 5.0 CM IN DIAMETER IN FEMALE: ICD-10-CM

## 2018-10-22 PROCEDURE — 99213 OFFICE O/P EST LOW 20 MIN: CPT | Performed by: THORACIC SURGERY (CARDIOTHORACIC VASCULAR SURGERY)

## 2018-10-22 PROCEDURE — G8417 CALC BMI ABV UP PARAM F/U: HCPCS | Performed by: THORACIC SURGERY (CARDIOTHORACIC VASCULAR SURGERY)

## 2018-10-22 PROCEDURE — 1090F PRES/ABSN URINE INCON ASSESS: CPT | Performed by: THORACIC SURGERY (CARDIOTHORACIC VASCULAR SURGERY)

## 2018-10-22 PROCEDURE — 1036F TOBACCO NON-USER: CPT | Performed by: THORACIC SURGERY (CARDIOTHORACIC VASCULAR SURGERY)

## 2018-10-22 PROCEDURE — 1101F PT FALLS ASSESS-DOCD LE1/YR: CPT | Performed by: THORACIC SURGERY (CARDIOTHORACIC VASCULAR SURGERY)

## 2018-10-22 PROCEDURE — 4040F PNEUMOC VAC/ADMIN/RCVD: CPT | Performed by: THORACIC SURGERY (CARDIOTHORACIC VASCULAR SURGERY)

## 2018-10-22 PROCEDURE — G8484 FLU IMMUNIZE NO ADMIN: HCPCS | Performed by: THORACIC SURGERY (CARDIOTHORACIC VASCULAR SURGERY)

## 2018-10-22 PROCEDURE — G8400 PT W/DXA NO RESULTS DOC: HCPCS | Performed by: THORACIC SURGERY (CARDIOTHORACIC VASCULAR SURGERY)

## 2018-10-22 PROCEDURE — G8598 ASA/ANTIPLAT THER USED: HCPCS | Performed by: THORACIC SURGERY (CARDIOTHORACIC VASCULAR SURGERY)

## 2018-10-22 PROCEDURE — G8427 DOCREV CUR MEDS BY ELIG CLIN: HCPCS | Performed by: THORACIC SURGERY (CARDIOTHORACIC VASCULAR SURGERY)

## 2018-10-22 PROCEDURE — 1123F ACP DISCUSS/DSCN MKR DOCD: CPT | Performed by: THORACIC SURGERY (CARDIOTHORACIC VASCULAR SURGERY)

## 2018-10-22 PROCEDURE — 3017F COLORECTAL CA SCREEN DOC REV: CPT | Performed by: THORACIC SURGERY (CARDIOTHORACIC VASCULAR SURGERY)

## 2018-10-23 DIAGNOSIS — E11.42 DIABETIC POLYNEUROPATHY ASSOCIATED WITH TYPE 2 DIABETES MELLITUS (HCC): ICD-10-CM

## 2018-10-23 DIAGNOSIS — Z79.4 TYPE 2 DIABETES MELLITUS WITH HYPERGLYCEMIA, WITH LONG-TERM CURRENT USE OF INSULIN (HCC): ICD-10-CM

## 2018-10-23 DIAGNOSIS — E11.65 TYPE 2 DIABETES MELLITUS WITH HYPERGLYCEMIA, WITH LONG-TERM CURRENT USE OF INSULIN (HCC): ICD-10-CM

## 2018-10-23 RX ORDER — GABAPENTIN 400 MG/1
800 CAPSULE ORAL EVERY EVENING
Qty: 90 CAPSULE | Refills: 3 | Status: SHIPPED | OUTPATIENT
Start: 2018-10-23 | End: 2019-04-29 | Stop reason: SDUPTHER

## 2018-10-30 DIAGNOSIS — Z51.11 ENCOUNTER FOR ANTINEOPLASTIC CHEMOTHERAPY: ICD-10-CM

## 2018-11-16 ENCOUNTER — HOSPITAL ENCOUNTER (OUTPATIENT)
Age: 73
Discharge: HOME | End: 2018-11-16
Payer: MEDICARE

## 2018-11-16 LAB
ALBUMIN SERPL-MCNC: 4.1 G/DL (ref 3.5–5.1)
ALP BLD-CCNC: 94 U/L (ref 38–126)
ALT SERPL-CCNC: 16 U/L (ref 11–66)
ANION GAP SERPL CALCULATED.3IONS-SCNC: 9 MEQ/L (ref 8–16)
AST SERPL-CCNC: 16 U/L (ref 5–40)
AVERAGE GLUCOSE: 126 MG/DL (ref 70–126)
BASOPHILS # BLD: 0.7 %
BASOPHILS ABSOLUTE: 0.1 THOU/MM3 (ref 0–0.1)
BILIRUB SERPL-MCNC: 0.3 MG/DL (ref 0.3–1.2)
BUN BLDV-MCNC: 18 MG/DL (ref 7–22)
CALCIUM SERPL-MCNC: 8.9 MG/DL (ref 8.5–10.5)
CHLORIDE BLD-SCNC: 101 MEQ/L (ref 98–111)
CHOLESTEROL, TOTAL: 115 MG/DL (ref 100–199)
CO2: 29 MEQ/L (ref 23–33)
CREAT SERPL-MCNC: 0.6 MG/DL (ref 0.4–1.2)
CREATININE, URINE: 24.7 MG/DL
EOSINOPHIL # BLD: 2 %
EOSINOPHILS ABSOLUTE: 0.2 THOU/MM3 (ref 0–0.4)
ERYTHROCYTE [DISTWIDTH] IN BLOOD BY AUTOMATED COUNT: 15.7 % (ref 11.5–14.5)
ERYTHROCYTE [DISTWIDTH] IN BLOOD BY AUTOMATED COUNT: 52.7 FL (ref 35–45)
GFR SERPL CREATININE-BSD FRML MDRD: > 90 ML/MIN/1.73M2
GLUCOSE BLD-MCNC: 85 MG/DL (ref 70–108)
HBA1C MFR BLD: 6.2 % (ref 4.4–6.4)
HCT VFR BLD CALC: 37.3 % (ref 37–47)
HDLC SERPL-MCNC: 56 MG/DL
HEMOGLOBIN: 11.6 GM/DL (ref 12–16)
IMMATURE GRANS (ABS): 0.04 THOU/MM3 (ref 0–0.07)
IMMATURE GRANULOCYTES: 0.5 %
LDL CHOLESTEROL CALCULATED: 50 MG/DL
LYMPHOCYTES # BLD: 25.6 %
LYMPHOCYTES ABSOLUTE: 2.1 THOU/MM3 (ref 1–4.8)
MCH RBC QN AUTO: 28.6 PG (ref 26–33)
MCHC RBC AUTO-ENTMCNC: 31.1 GM/DL (ref 32.2–35.5)
MCV RBC AUTO: 91.9 FL (ref 81–99)
MICROALBUMIN UR-MCNC: < 1.2 MG/DL
MICROALBUMIN/CREAT UR-RTO: 49 MG/G (ref 0–30)
MONOCYTES # BLD: 7.2 %
MONOCYTES ABSOLUTE: 0.6 THOU/MM3 (ref 0.4–1.3)
NUCLEATED RED BLOOD CELLS: 0 /100 WBC
PLATELET # BLD: 181 THOU/MM3 (ref 130–400)
PMV BLD AUTO: 12.4 FL (ref 9.4–12.4)
POTASSIUM SERPL-SCNC: 4.6 MEQ/L (ref 3.5–5.2)
RBC # BLD: 4.06 MILL/MM3 (ref 4.2–5.4)
SEG NEUTROPHILS: 64 %
SEGMENTED NEUTROPHILS ABSOLUTE COUNT: 5.2 THOU/MM3 (ref 1.8–7.7)
SODIUM BLD-SCNC: 139 MEQ/L (ref 135–145)
TOTAL PROTEIN: 6.7 G/DL (ref 6.1–8)
TRIGL SERPL-MCNC: 46 MG/DL (ref 0–199)
TSH SERPL DL<=0.05 MIU/L-ACNC: 2.56 UIU/ML (ref 0.4–4.2)
WBC # BLD: 8.2 THOU/MM3 (ref 4.8–10.8)

## 2018-11-16 PROCEDURE — 36415 COLL VENOUS BLD VENIPUNCTURE: CPT

## 2018-11-16 PROCEDURE — 84443 ASSAY THYROID STIM HORMONE: CPT

## 2018-11-16 PROCEDURE — 83036 HEMOGLOBIN GLYCOSYLATED A1C: CPT

## 2018-11-16 PROCEDURE — 80053 COMPREHEN METABOLIC PANEL: CPT

## 2018-11-16 PROCEDURE — 85025 COMPLETE CBC W/AUTO DIFF WBC: CPT

## 2018-11-16 PROCEDURE — 82043 UR ALBUMIN QUANTITATIVE: CPT

## 2018-11-16 PROCEDURE — 80061 LIPID PANEL: CPT

## 2018-11-29 PROBLEM — Z51.11 ENCOUNTER FOR ANTINEOPLASTIC CHEMOTHERAPY: Status: RESOLVED | Noted: 2018-10-30 | Resolved: 2018-11-29

## 2018-12-03 ENCOUNTER — OFFICE VISIT (OUTPATIENT)
Dept: FAMILY MEDICINE CLINIC | Age: 73
End: 2018-12-03
Payer: MEDICARE

## 2018-12-03 VITALS
HEART RATE: 74 BPM | BODY MASS INDEX: 37.54 KG/M2 | OXYGEN SATURATION: 96 % | DIASTOLIC BLOOD PRESSURE: 68 MMHG | WEIGHT: 204 LBS | SYSTOLIC BLOOD PRESSURE: 132 MMHG | HEIGHT: 62 IN | RESPIRATION RATE: 20 BRPM

## 2018-12-03 DIAGNOSIS — J30.2 SEASONAL ALLERGIC RHINITIS, UNSPECIFIED TRIGGER: ICD-10-CM

## 2018-12-03 DIAGNOSIS — Z12.11 SPECIAL SCREENING FOR MALIGNANT NEOPLASMS, COLON: ICD-10-CM

## 2018-12-03 DIAGNOSIS — Z23 NEED FOR VACCINATION: ICD-10-CM

## 2018-12-03 DIAGNOSIS — E11.42 DIABETIC POLYNEUROPATHY ASSOCIATED WITH TYPE 2 DIABETES MELLITUS (HCC): Primary | ICD-10-CM

## 2018-12-03 PROCEDURE — G8400 PT W/DXA NO RESULTS DOC: HCPCS | Performed by: FAMILY MEDICINE

## 2018-12-03 PROCEDURE — G8417 CALC BMI ABV UP PARAM F/U: HCPCS | Performed by: FAMILY MEDICINE

## 2018-12-03 PROCEDURE — G8427 DOCREV CUR MEDS BY ELIG CLIN: HCPCS | Performed by: FAMILY MEDICINE

## 2018-12-03 PROCEDURE — 3044F HG A1C LEVEL LT 7.0%: CPT | Performed by: FAMILY MEDICINE

## 2018-12-03 PROCEDURE — G8484 FLU IMMUNIZE NO ADMIN: HCPCS | Performed by: FAMILY MEDICINE

## 2018-12-03 PROCEDURE — 1090F PRES/ABSN URINE INCON ASSESS: CPT | Performed by: FAMILY MEDICINE

## 2018-12-03 PROCEDURE — 1036F TOBACCO NON-USER: CPT | Performed by: FAMILY MEDICINE

## 2018-12-03 PROCEDURE — 3017F COLORECTAL CA SCREEN DOC REV: CPT | Performed by: FAMILY MEDICINE

## 2018-12-03 PROCEDURE — 99214 OFFICE O/P EST MOD 30 MIN: CPT | Performed by: FAMILY MEDICINE

## 2018-12-03 PROCEDURE — G8598 ASA/ANTIPLAT THER USED: HCPCS | Performed by: FAMILY MEDICINE

## 2018-12-03 PROCEDURE — 4040F PNEUMOC VAC/ADMIN/RCVD: CPT | Performed by: FAMILY MEDICINE

## 2018-12-03 PROCEDURE — 1101F PT FALLS ASSESS-DOCD LE1/YR: CPT | Performed by: FAMILY MEDICINE

## 2018-12-03 PROCEDURE — G0009 ADMIN PNEUMOCOCCAL VACCINE: HCPCS | Performed by: FAMILY MEDICINE

## 2018-12-03 PROCEDURE — 1123F ACP DISCUSS/DSCN MKR DOCD: CPT | Performed by: FAMILY MEDICINE

## 2018-12-03 PROCEDURE — 90670 PCV13 VACCINE IM: CPT | Performed by: FAMILY MEDICINE

## 2018-12-03 PROCEDURE — 2022F DILAT RTA XM EVC RTNOPTHY: CPT | Performed by: FAMILY MEDICINE

## 2018-12-03 RX ORDER — FLUTICASONE PROPIONATE 50 MCG
2 SPRAY, SUSPENSION (ML) NASAL DAILY
Qty: 1 BOTTLE | Refills: 1 | Status: ON HOLD | OUTPATIENT
Start: 2018-12-03 | End: 2020-01-01

## 2018-12-03 RX ORDER — LANOLIN ALCOHOL/MO/W.PET/CERES
3000 CREAM (GRAM) TOPICAL DAILY
Status: ON HOLD | COMMUNITY
End: 2019-01-01 | Stop reason: HOSPADM

## 2018-12-03 ASSESSMENT — ENCOUNTER SYMPTOMS
WHEEZING: 0
EYE DISCHARGE: 0
RESPIRATORY NEGATIVE: 1
CONSTIPATION: 0
TROUBLE SWALLOWING: 0
NAUSEA: 0
ABDOMINAL PAIN: 0
COUGH: 0
DIARRHEA: 0
SINUS PAIN: 1
SORE THROAT: 0
EYES NEGATIVE: 1
SHORTNESS OF BREATH: 0
GASTROINTESTINAL NEGATIVE: 1
RHINORRHEA: 0
SINUS PRESSURE: 1

## 2018-12-03 ASSESSMENT — PATIENT HEALTH QUESTIONNAIRE - PHQ9
1. LITTLE INTEREST OR PLEASURE IN DOING THINGS: 0
2. FEELING DOWN, DEPRESSED OR HOPELESS: 0
SUM OF ALL RESPONSES TO PHQ QUESTIONS 1-9: 0
SUM OF ALL RESPONSES TO PHQ QUESTIONS 1-9: 0
SUM OF ALL RESPONSES TO PHQ9 QUESTIONS 1 & 2: 0

## 2018-12-03 NOTE — PROGRESS NOTES
[facility]  [address] [place] [address]  Dept: [phone]  Dept Fax: [phone]  Loc: [phone]    Richie Oswald is a 75 y.o. female who presents today for her medical conditions/complaints as noted below. Chief Complaint   Patient presents with    Diabetes     6 month check up A1C on 11-16-18 was 6.2 results are in Epic. This am was 143 but has been ranging 70 to 140s    Hypertension     6 month check up            HPI:     BUNNY Dupree is a 75 y.o. Female here for 6 - mo follow-up on T2DM management. Patient is doing well today and states she has no problems with her medication regimen or adverse side effects. Ms. Annabelle Nicohlas does complain of some maxillary sinus pressure with associated inner ear soreness/pressure. Patient denies purulent sputum production, fevers/chills, cough, nausea, vomiting or sore throat. The sinus pressure has been present for the past 3 days. She has tried Aleve for her symptoms but has had little relief. Ms. Annabelle Nicholas takes her blood glucose periodically and in the past few weeks her low was 79 while the high was in the 160s. She denies any episodes of hypoglycemia with tremors, sweating or falls. Past Medical History:   Diagnosis Date    CAD (coronary artery disease)     Hyperlipidemia     Hypertension     ICD (implantable cardiac defibrillator), dual, in situ     [facility] dual ICD    Shingles 12/2014    [facility] dual ICD     Type II or unspecified type diabetes mellitus without mention of complication, not stated as uncontrolled     Ventricular arrhythmia       Past Surgical History:   Procedure Laterality Date    CARDIAC PACEMAKER PLACEMENT  9-08-09    [facility]    CARDIOVASCULAR STRESS TEST  01-27-10    Excellent treadmill exercise. Improved functional capacity to functional class 1 completed 8 METS. Hemodynamic response was appropriate.  No ischemic ECG changes

## 2018-12-05 RX ORDER — ATORVASTATIN CALCIUM 80 MG/1
TABLET, FILM COATED ORAL
Qty: 90 TABLET | Refills: 1 | Status: ON HOLD | OUTPATIENT
Start: 2018-12-05 | End: 2019-06-03 | Stop reason: SDUPTHER

## 2018-12-06 DIAGNOSIS — Z12.11 SPECIAL SCREENING FOR MALIGNANT NEOPLASMS, COLON: ICD-10-CM

## 2018-12-06 LAB
CONTROL: NORMAL
HEMOCCULT STL QL: POSITIVE

## 2018-12-06 PROCEDURE — 82274 ASSAY TEST FOR BLOOD FECAL: CPT | Performed by: FAMILY MEDICINE

## 2018-12-07 DIAGNOSIS — R19.5 POSITIVE FIT (FECAL IMMUNOCHEMICAL TEST): Primary | ICD-10-CM

## 2018-12-11 ENCOUNTER — TELEPHONE (OUTPATIENT)
Dept: FAMILY MEDICINE CLINIC | Age: 73
End: 2018-12-11

## 2018-12-11 ENCOUNTER — PROCEDURE VISIT (OUTPATIENT)
Dept: NEUROLOGY | Age: 73
End: 2018-12-11
Payer: MEDICARE

## 2018-12-11 DIAGNOSIS — G62.9 NEUROPATHY: ICD-10-CM

## 2018-12-11 DIAGNOSIS — M54.16 LUMBAR RADICULOPATHY: Primary | ICD-10-CM

## 2018-12-11 DIAGNOSIS — M21.371 RIGHT FOOT DROP: ICD-10-CM

## 2018-12-11 PROCEDURE — 95886 MUSC TEST DONE W/N TEST COMP: CPT | Performed by: PSYCHIATRY & NEUROLOGY

## 2018-12-11 PROCEDURE — 95908 NRV CNDJ TST 3-4 STUDIES: CPT | Performed by: PSYCHIATRY & NEUROLOGY

## 2018-12-19 ENCOUNTER — TELEPHONE (OUTPATIENT)
Dept: FAMILY MEDICINE CLINIC | Age: 73
End: 2018-12-19

## 2018-12-19 DIAGNOSIS — E66.9 DIABETES MELLITUS TYPE 2 IN OBESE (HCC): ICD-10-CM

## 2018-12-19 DIAGNOSIS — E11.69 DIABETES MELLITUS TYPE 2 IN OBESE (HCC): ICD-10-CM

## 2019-01-01 ENCOUNTER — OFFICE VISIT (OUTPATIENT)
Dept: SURGERY | Age: 74
End: 2019-01-01
Payer: MEDICARE

## 2019-01-01 ENCOUNTER — HOSPITAL ENCOUNTER (OUTPATIENT)
Dept: NON INVASIVE DIAGNOSTICS | Age: 74
Discharge: HOME | End: 2019-11-20
Payer: MEDICARE

## 2019-01-01 ENCOUNTER — ANESTHESIA (OUTPATIENT)
Dept: OPERATING ROOM | Age: 74
End: 2019-01-01
Payer: MEDICARE

## 2019-01-01 ENCOUNTER — TELEPHONE (OUTPATIENT)
Dept: CARDIOLOGY CLINIC | Age: 74
End: 2019-01-01

## 2019-01-01 ENCOUNTER — OFFICE VISIT (OUTPATIENT)
Dept: FAMILY MEDICINE CLINIC | Age: 74
End: 2019-01-01
Payer: MEDICARE

## 2019-01-01 ENCOUNTER — HOSPITAL ENCOUNTER (OUTPATIENT)
Dept: WOMENS IMAGING | Age: 74
Discharge: HOME | End: 2019-12-03
Payer: MEDICARE

## 2019-01-01 ENCOUNTER — HOSPITAL ENCOUNTER (OUTPATIENT)
Dept: INFUSION THERAPY | Age: 74
Discharge: HOME | End: 2019-10-29
Payer: MEDICARE

## 2019-01-01 ENCOUNTER — CARE COORDINATION (OUTPATIENT)
Dept: CARE COORDINATION | Age: 74
End: 2019-01-01

## 2019-01-01 ENCOUNTER — HOSPITAL ENCOUNTER (OUTPATIENT)
Dept: PHYSICAL THERAPY | Age: 74
Setting detail: THERAPIES SERIES
Discharge: HOME | End: 2019-12-17
Payer: MEDICARE

## 2019-01-01 ENCOUNTER — HOSPITAL ENCOUNTER (OUTPATIENT)
Dept: INFUSION THERAPY | Age: 74
Discharge: HOME | End: 2019-12-18
Payer: MEDICARE

## 2019-01-01 ENCOUNTER — HOSPITAL ENCOUNTER (OUTPATIENT)
Dept: PHYSICAL THERAPY | Age: 74
Setting detail: THERAPIES SERIES
Discharge: HOME | End: 2019-12-30
Payer: MEDICARE

## 2019-01-01 ENCOUNTER — HOSPITAL ENCOUNTER (OUTPATIENT)
Dept: PHYSICAL THERAPY | Age: 74
Setting detail: THERAPIES SERIES
Discharge: HOME | End: 2019-11-25
Payer: MEDICARE

## 2019-01-01 ENCOUNTER — HOSPITAL ENCOUNTER (OUTPATIENT)
Dept: PHYSICAL THERAPY | Age: 74
Setting detail: THERAPIES SERIES
Discharge: HOME | End: 2019-12-23
Payer: MEDICARE

## 2019-01-01 ENCOUNTER — OFFICE VISIT (OUTPATIENT)
Dept: ONCOLOGY | Age: 74
End: 2019-01-01
Payer: MEDICARE

## 2019-01-01 ENCOUNTER — OFFICE VISIT (OUTPATIENT)
Dept: CARDIOTHORACIC SURGERY | Age: 74
End: 2019-01-01
Payer: MEDICARE

## 2019-01-01 ENCOUNTER — HOSPITAL ENCOUNTER (OUTPATIENT)
Age: 74
Setting detail: OUTPATIENT SURGERY
Discharge: HOME | End: 2019-12-03
Attending: SURGERY | Admitting: SURGERY
Payer: MEDICARE

## 2019-01-01 ENCOUNTER — ANESTHESIA EVENT (OUTPATIENT)
Dept: OPERATING ROOM | Age: 74
End: 2019-01-01
Payer: MEDICARE

## 2019-01-01 ENCOUNTER — TELEPHONE (OUTPATIENT)
Dept: ONCOLOGY | Age: 74
End: 2019-01-01

## 2019-01-01 ENCOUNTER — OFFICE VISIT (OUTPATIENT)
Dept: SURGERY | Age: 74
End: 2019-01-01

## 2019-01-01 ENCOUNTER — TELEPHONE (OUTPATIENT)
Dept: SURGERY | Age: 74
End: 2019-01-01

## 2019-01-01 ENCOUNTER — HOSPITAL ENCOUNTER (OUTPATIENT)
Dept: NUCLEAR MEDICINE | Age: 74
Discharge: HOME | End: 2019-12-03
Payer: MEDICARE

## 2019-01-01 ENCOUNTER — HOSPITAL ENCOUNTER (OUTPATIENT)
Dept: PET IMAGING | Age: 74
Discharge: HOME | End: 2019-11-11
Payer: MEDICARE

## 2019-01-01 ENCOUNTER — APPOINTMENT (OUTPATIENT)
Dept: WOMENS IMAGING | Age: 74
End: 2019-01-01
Attending: SURGERY
Payer: MEDICARE

## 2019-01-01 ENCOUNTER — HOSPITAL ENCOUNTER (OUTPATIENT)
Dept: INFUSION THERAPY | Age: 74
Discharge: HOME | End: 2019-11-12
Payer: MEDICARE

## 2019-01-01 ENCOUNTER — HOSPITAL ENCOUNTER (OUTPATIENT)
Dept: CT IMAGING | Age: 74
Discharge: HOME | End: 2019-10-09
Payer: MEDICARE

## 2019-01-01 ENCOUNTER — HOSPITAL ENCOUNTER (OUTPATIENT)
Dept: INTERVENTIONAL RADIOLOGY/VASCULAR | Age: 74
Discharge: HOME | End: 2019-10-09
Payer: MEDICARE

## 2019-01-01 ENCOUNTER — NURSE ONLY (OUTPATIENT)
Dept: CARDIOLOGY CLINIC | Age: 74
End: 2019-01-01
Payer: MEDICARE

## 2019-01-01 VITALS
WEIGHT: 204.81 LBS | OXYGEN SATURATION: 90 % | HEIGHT: 62 IN | RESPIRATION RATE: 18 BRPM | HEART RATE: 80 BPM | SYSTOLIC BLOOD PRESSURE: 122 MMHG | BODY MASS INDEX: 37.69 KG/M2 | DIASTOLIC BLOOD PRESSURE: 62 MMHG | TEMPERATURE: 97.9 F

## 2019-01-01 VITALS
BODY MASS INDEX: 35.99 KG/M2 | TEMPERATURE: 97.8 F | SYSTOLIC BLOOD PRESSURE: 106 MMHG | OXYGEN SATURATION: 96 % | HEIGHT: 62 IN | HEART RATE: 79 BPM | RESPIRATION RATE: 20 BRPM | DIASTOLIC BLOOD PRESSURE: 65 MMHG | WEIGHT: 195.6 LBS

## 2019-01-01 VITALS
SYSTOLIC BLOOD PRESSURE: 110 MMHG | OXYGEN SATURATION: 97 % | DIASTOLIC BLOOD PRESSURE: 59 MMHG | RESPIRATION RATE: 14 BRPM

## 2019-01-01 VITALS — BODY MASS INDEX: 36.25 KG/M2 | HEIGHT: 62 IN | WEIGHT: 197 LBS

## 2019-01-01 VITALS
OXYGEN SATURATION: 93 % | TEMPERATURE: 97.1 F | BODY MASS INDEX: 36.36 KG/M2 | WEIGHT: 197.6 LBS | RESPIRATION RATE: 18 BRPM | HEART RATE: 80 BPM | DIASTOLIC BLOOD PRESSURE: 74 MMHG | SYSTOLIC BLOOD PRESSURE: 118 MMHG | HEIGHT: 62 IN

## 2019-01-01 VITALS
BODY MASS INDEX: 35.99 KG/M2 | HEIGHT: 62 IN | TEMPERATURE: 97.8 F | HEART RATE: 79 BPM | WEIGHT: 195.6 LBS | RESPIRATION RATE: 18 BRPM | SYSTOLIC BLOOD PRESSURE: 106 MMHG | DIASTOLIC BLOOD PRESSURE: 65 MMHG | OXYGEN SATURATION: 96 %

## 2019-01-01 VITALS
DIASTOLIC BLOOD PRESSURE: 60 MMHG | RESPIRATION RATE: 18 BRPM | WEIGHT: 207.8 LBS | HEART RATE: 79 BPM | OXYGEN SATURATION: 98 % | BODY MASS INDEX: 38.24 KG/M2 | SYSTOLIC BLOOD PRESSURE: 122 MMHG | HEIGHT: 62 IN | TEMPERATURE: 98.2 F

## 2019-01-01 VITALS
HEIGHT: 62 IN | DIASTOLIC BLOOD PRESSURE: 68 MMHG | WEIGHT: 198.6 LBS | RESPIRATION RATE: 20 BRPM | TEMPERATURE: 97.5 F | SYSTOLIC BLOOD PRESSURE: 120 MMHG | BODY MASS INDEX: 36.55 KG/M2 | HEART RATE: 74 BPM | OXYGEN SATURATION: 90 %

## 2019-01-01 VITALS
OXYGEN SATURATION: 98 % | SYSTOLIC BLOOD PRESSURE: 120 MMHG | BODY MASS INDEX: 37.69 KG/M2 | HEIGHT: 62 IN | HEART RATE: 87 BPM | TEMPERATURE: 97.7 F | DIASTOLIC BLOOD PRESSURE: 80 MMHG | WEIGHT: 204.8 LBS | RESPIRATION RATE: 18 BRPM

## 2019-01-01 VITALS
HEIGHT: 62 IN | BODY MASS INDEX: 35.63 KG/M2 | HEART RATE: 81 BPM | WEIGHT: 193.6 LBS | DIASTOLIC BLOOD PRESSURE: 68 MMHG | SYSTOLIC BLOOD PRESSURE: 107 MMHG

## 2019-01-01 VITALS
BODY MASS INDEX: 36.63 KG/M2 | DIASTOLIC BLOOD PRESSURE: 66 MMHG | SYSTOLIC BLOOD PRESSURE: 114 MMHG | WEIGHT: 199.08 LBS | RESPIRATION RATE: 18 BRPM | OXYGEN SATURATION: 92 % | HEART RATE: 70 BPM | HEIGHT: 62 IN | TEMPERATURE: 96.5 F

## 2019-01-01 VITALS
SYSTOLIC BLOOD PRESSURE: 112 MMHG | DIASTOLIC BLOOD PRESSURE: 58 MMHG | HEART RATE: 77 BPM | WEIGHT: 200.3 LBS | RESPIRATION RATE: 16 BRPM | BODY MASS INDEX: 36.64 KG/M2 | OXYGEN SATURATION: 97 %

## 2019-01-01 DIAGNOSIS — E11.42 DIABETIC POLYNEUROPATHY ASSOCIATED WITH TYPE 2 DIABETES MELLITUS (HCC): ICD-10-CM

## 2019-01-01 DIAGNOSIS — Z01.818 PRE-OP TESTING: ICD-10-CM

## 2019-01-01 DIAGNOSIS — E86.1 HYPOTENSION DUE TO HYPOVOLEMIA: ICD-10-CM

## 2019-01-01 DIAGNOSIS — C50.911 BREAST CANCER METASTASIZED TO AXILLARY LYMPH NODE, RIGHT (HCC): Primary | ICD-10-CM

## 2019-01-01 DIAGNOSIS — Z92.21 STATUS POST CHEMOTHERAPY, TIME SINCE GREATER THAN 12 WEEKS: ICD-10-CM

## 2019-01-01 DIAGNOSIS — E11.65 TYPE 2 DIABETES MELLITUS WITH HYPERGLYCEMIA, WITH LONG-TERM CURRENT USE OF INSULIN (HCC): ICD-10-CM

## 2019-01-01 DIAGNOSIS — T45.1X5A NEUROPATHY DUE TO CHEMOTHERAPEUTIC DRUG (HCC): ICD-10-CM

## 2019-01-01 DIAGNOSIS — C50.211 MALIGNANT NEOPLASM OF UPPER-INNER QUADRANT OF RIGHT FEMALE BREAST, UNSPECIFIED ESTROGEN RECEPTOR STATUS (HCC): ICD-10-CM

## 2019-01-01 DIAGNOSIS — I10 ESSENTIAL HYPERTENSION: ICD-10-CM

## 2019-01-01 DIAGNOSIS — C77.3 BREAST CANCER METASTASIZED TO AXILLARY LYMPH NODE, RIGHT (HCC): ICD-10-CM

## 2019-01-01 DIAGNOSIS — C77.3 BREAST CANCER METASTASIZED TO AXILLARY LYMPH NODE, RIGHT (HCC): Primary | ICD-10-CM

## 2019-01-01 DIAGNOSIS — Z95.810 DUAL IMPLANTABLE CARDIOVERTER-DEFIBRILLATOR IN SITU: ICD-10-CM

## 2019-01-01 DIAGNOSIS — I25.810 CORONARY ARTERY DISEASE INVOLVING CORONARY BYPASS GRAFT OF NATIVE HEART WITHOUT ANGINA PECTORIS: ICD-10-CM

## 2019-01-01 DIAGNOSIS — Z79.4 TYPE 2 DIABETES MELLITUS WITH HYPERGLYCEMIA, WITH LONG-TERM CURRENT USE OF INSULIN (HCC): ICD-10-CM

## 2019-01-01 DIAGNOSIS — R54 FRAIL ELDERLY: ICD-10-CM

## 2019-01-01 DIAGNOSIS — I25.810 CORONARY ARTERY DISEASE INVOLVING CORONARY BYPASS GRAFT OF NATIVE HEART WITHOUT ANGINA PECTORIS: Primary | ICD-10-CM

## 2019-01-01 DIAGNOSIS — C50.912 MALIGNANT NEOPLASM OF LEFT FEMALE BREAST, UNSPECIFIED ESTROGEN RECEPTOR STATUS, UNSPECIFIED SITE OF BREAST (HCC): Primary | ICD-10-CM

## 2019-01-01 DIAGNOSIS — I25.5 ISCHEMIC CARDIOMYOPATHY: ICD-10-CM

## 2019-01-01 DIAGNOSIS — R91.1 PULMONARY NODULE: ICD-10-CM

## 2019-01-01 DIAGNOSIS — I73.9 ASYMPTOMATIC PVD (PERIPHERAL VASCULAR DISEASE) (HCC): ICD-10-CM

## 2019-01-01 DIAGNOSIS — Z98.890 STATUS POST RIGHT BREAST LUMPECTOMY: ICD-10-CM

## 2019-01-01 DIAGNOSIS — C50.911 BREAST CANCER METASTASIZED TO AXILLARY LYMPH NODE, RIGHT (HCC): ICD-10-CM

## 2019-01-01 DIAGNOSIS — Z95.810 DUAL IMPLANTABLE CARDIOVERTER-DEFIBRILLATOR IN SITU: Primary | ICD-10-CM

## 2019-01-01 DIAGNOSIS — I71.40 ABDOMINAL AORTIC ANEURYSM (AAA) WITHOUT RUPTURE: Primary | ICD-10-CM

## 2019-01-01 DIAGNOSIS — C50.911 INVASIVE DUCTAL CARCINOMA OF RIGHT BREAST (HCC): ICD-10-CM

## 2019-01-01 DIAGNOSIS — I95.89 HYPOTENSION DUE TO HYPOVOLEMIA: ICD-10-CM

## 2019-01-01 DIAGNOSIS — N63.0 BREAST MASS: ICD-10-CM

## 2019-01-01 DIAGNOSIS — Z09 POSTOP CHECK: ICD-10-CM

## 2019-01-01 DIAGNOSIS — J90 PLEURAL EFFUSION, BILATERAL: ICD-10-CM

## 2019-01-01 DIAGNOSIS — C50.411 MALIGNANT NEOPLASM OF UPPER-OUTER QUADRANT OF RIGHT FEMALE BREAST, UNSPECIFIED ESTROGEN RECEPTOR STATUS (HCC): Primary | ICD-10-CM

## 2019-01-01 DIAGNOSIS — G62.0 NEUROPATHY DUE TO CHEMOTHERAPEUTIC DRUG (HCC): ICD-10-CM

## 2019-01-01 DIAGNOSIS — R06.02 SHORTNESS OF BREATH: ICD-10-CM

## 2019-01-01 DIAGNOSIS — Z87.891 FORMER SMOKER: ICD-10-CM

## 2019-01-01 DIAGNOSIS — I71.40 ABDOMINAL AORTIC ANEURYSM (AAA) 3.0 CM TO 5.0 CM IN DIAMETER IN FEMALE: ICD-10-CM

## 2019-01-01 DIAGNOSIS — R53.81 PHYSICAL DECONDITIONING: ICD-10-CM

## 2019-01-01 DIAGNOSIS — E11.42 DIABETIC POLYNEUROPATHY ASSOCIATED WITH TYPE 2 DIABETES MELLITUS (HCC): Primary | ICD-10-CM

## 2019-01-01 LAB
ALBUMIN SERPL-MCNC: 3.7 G/DL (ref 3.5–5.1)
ALP BLD-CCNC: 71 U/L (ref 38–126)
ALT SERPL-CCNC: 11 U/L (ref 11–66)
AST SERPL-CCNC: 14 U/L (ref 5–40)
BILIRUB SERPL-MCNC: 0.3 MG/DL (ref 0.3–1.2)
BILIRUBIN DIRECT: < 0.2 MG/DL (ref 0–0.3)
ERYTHROCYTE [DISTWIDTH] IN BLOOD BY AUTOMATED COUNT: 25.5 % (ref 11.5–14.5)
ERYTHROCYTE [DISTWIDTH] IN BLOOD BY AUTOMATED COUNT: 81 FL (ref 35–45)
GLUCOSE BLD-MCNC: 102 MG/DL (ref 70–108)
GLUCOSE BLD-MCNC: 118 MG/DL (ref 70–108)
HCT VFR BLD CALC: 34.1 % (ref 37–47)
HEMOGLOBIN: 10.6 GM/DL (ref 12–16)
LV EF: 43 %
LVEF MODALITY: NORMAL
MCH RBC QN AUTO: 28.3 PG (ref 26–33)
MCHC RBC AUTO-ENTMCNC: 31.1 GM/DL (ref 32.2–35.5)
MCV RBC AUTO: 91.2 FL (ref 81–99)
PLATELET # BLD: 159 THOU/MM3 (ref 130–400)
POTASSIUM REFLEX MAGNESIUM: 3.9 MEQ/L (ref 3.5–5.2)
RBC # BLD: 3.74 MILL/MM3 (ref 4.2–5.4)
SCAN OF BLOOD SMEAR: NORMAL
SEGMENTED NEUTROPHILS ABSOLUTE COUNT: 5.2 THOU/MM3 (ref 1.8–7.7)
TOTAL PROTEIN: 6.6 G/DL (ref 6.1–8)
WBC # BLD: 7.6 THOU/MM3 (ref 4.8–10.8)

## 2019-01-01 PROCEDURE — 3044F HG A1C LEVEL LT 7.0%: CPT | Performed by: INTERNAL MEDICINE

## 2019-01-01 PROCEDURE — 3017F COLORECTAL CA SCREEN DOC REV: CPT | Performed by: INTERNAL MEDICINE

## 2019-01-01 PROCEDURE — 2580000003 HC RX 258: Performed by: SURGERY

## 2019-01-01 PROCEDURE — 97140 MANUAL THERAPY 1/> REGIONS: CPT

## 2019-01-01 PROCEDURE — G8427 DOCREV CUR MEDS BY ELIG CLIN: HCPCS | Performed by: FAMILY MEDICINE

## 2019-01-01 PROCEDURE — 97535 SELF CARE MNGMENT TRAINING: CPT

## 2019-01-01 PROCEDURE — 1090F PRES/ABSN URINE INCON ASSESS: CPT | Performed by: PHYSICIAN ASSISTANT

## 2019-01-01 PROCEDURE — A9500 TC99M SESTAMIBI: HCPCS | Performed by: NUCLEAR MEDICINE

## 2019-01-01 PROCEDURE — 7100000011 HC PHASE II RECOVERY - ADDTL 15 MIN: Performed by: SURGERY

## 2019-01-01 PROCEDURE — 6360000002 HC RX W HCPCS

## 2019-01-01 PROCEDURE — G8598 ASA/ANTIPLAT THER USED: HCPCS | Performed by: INTERNAL MEDICINE

## 2019-01-01 PROCEDURE — 1036F TOBACCO NON-USER: CPT | Performed by: INTERNAL MEDICINE

## 2019-01-01 PROCEDURE — 19301 PARTIAL MASTECTOMY: CPT | Performed by: SURGERY

## 2019-01-01 PROCEDURE — 4040F PNEUMOC VAC/ADMIN/RCVD: CPT | Performed by: INTERNAL MEDICINE

## 2019-01-01 PROCEDURE — G8484 FLU IMMUNIZE NO ADMIN: HCPCS | Performed by: PHYSICIAN ASSISTANT

## 2019-01-01 PROCEDURE — 77065 DX MAMMO INCL CAD UNI: CPT

## 2019-01-01 PROCEDURE — G8598 ASA/ANTIPLAT THER USED: HCPCS | Performed by: FAMILY MEDICINE

## 2019-01-01 PROCEDURE — A9552 F18 FDG: HCPCS | Performed by: INTERNAL MEDICINE

## 2019-01-01 PROCEDURE — G8400 PT W/DXA NO RESULTS DOC: HCPCS | Performed by: PHYSICIAN ASSISTANT

## 2019-01-01 PROCEDURE — G8417 CALC BMI ABV UP PARAM F/U: HCPCS | Performed by: INTERNAL MEDICINE

## 2019-01-01 PROCEDURE — 2580000003 HC RX 258: Performed by: ANESTHESIOLOGY

## 2019-01-01 PROCEDURE — 99211 OFF/OP EST MAY X REQ PHY/QHP: CPT

## 2019-01-01 PROCEDURE — G8400 PT W/DXA NO RESULTS DOC: HCPCS | Performed by: INTERNAL MEDICINE

## 2019-01-01 PROCEDURE — 88305 TISSUE EXAM BY PATHOLOGIST: CPT

## 2019-01-01 PROCEDURE — 6360000002 HC RX W HCPCS: Performed by: ANESTHESIOLOGY

## 2019-01-01 PROCEDURE — G8400 PT W/DXA NO RESULTS DOC: HCPCS | Performed by: FAMILY MEDICINE

## 2019-01-01 PROCEDURE — C1769 GUIDE WIRE: HCPCS

## 2019-01-01 PROCEDURE — G8427 DOCREV CUR MEDS BY ELIG CLIN: HCPCS | Performed by: PHYSICIAN ASSISTANT

## 2019-01-01 PROCEDURE — 3017F COLORECTAL CA SCREEN DOC REV: CPT | Performed by: SURGERY

## 2019-01-01 PROCEDURE — A9541 TC99M SULFUR COLLOID: HCPCS | Performed by: SURGERY

## 2019-01-01 PROCEDURE — 1123F ACP DISCUSS/DSCN MKR DOCD: CPT | Performed by: INTERNAL MEDICINE

## 2019-01-01 PROCEDURE — 1123F ACP DISCUSS/DSCN MKR DOCD: CPT | Performed by: PHYSICIAN ASSISTANT

## 2019-01-01 PROCEDURE — 84132 ASSAY OF SERUM POTASSIUM: CPT

## 2019-01-01 PROCEDURE — G8428 CUR MEDS NOT DOCUMENT: HCPCS | Performed by: INTERNAL MEDICINE

## 2019-01-01 PROCEDURE — 97161 PT EVAL LOW COMPLEX 20 MIN: CPT

## 2019-01-01 PROCEDURE — 38792 RA TRACER ID OF SENTINL NODE: CPT

## 2019-01-01 PROCEDURE — 4040F PNEUMOC VAC/ADMIN/RCVD: CPT | Performed by: SURGERY

## 2019-01-01 PROCEDURE — 2709999900 HC NON-CHARGEABLE SUPPLY

## 2019-01-01 PROCEDURE — 99214 OFFICE O/P EST MOD 30 MIN: CPT | Performed by: PHYSICIAN ASSISTANT

## 2019-01-01 PROCEDURE — 99214 OFFICE O/P EST MOD 30 MIN: CPT | Performed by: INTERNAL MEDICINE

## 2019-01-01 PROCEDURE — G8482 FLU IMMUNIZE ORDER/ADMIN: HCPCS | Performed by: INTERNAL MEDICINE

## 2019-01-01 PROCEDURE — G8417 CALC BMI ABV UP PARAM F/U: HCPCS | Performed by: SURGERY

## 2019-01-01 PROCEDURE — 3430000000 HC RX DIAGNOSTIC RADIOPHARMACEUTICAL: Performed by: SURGERY

## 2019-01-01 PROCEDURE — G8598 ASA/ANTIPLAT THER USED: HCPCS | Performed by: SURGERY

## 2019-01-01 PROCEDURE — 97110 THERAPEUTIC EXERCISES: CPT

## 2019-01-01 PROCEDURE — 4040F PNEUMOC VAC/ADMIN/RCVD: CPT | Performed by: FAMILY MEDICINE

## 2019-01-01 PROCEDURE — 6360000002 HC RX W HCPCS: Performed by: NURSE ANESTHETIST, CERTIFIED REGISTERED

## 2019-01-01 PROCEDURE — 2580000003 HC RX 258: Performed by: INTERNAL MEDICINE

## 2019-01-01 PROCEDURE — 3700000001 HC ADD 15 MINUTES (ANESTHESIA): Performed by: SURGERY

## 2019-01-01 PROCEDURE — G8598 ASA/ANTIPLAT THER USED: HCPCS | Performed by: PHYSICIAN ASSISTANT

## 2019-01-01 PROCEDURE — 7100000000 HC PACU RECOVERY - FIRST 15 MIN: Performed by: SURGERY

## 2019-01-01 PROCEDURE — 7100000001 HC PACU RECOVERY - ADDTL 15 MIN: Performed by: SURGERY

## 2019-01-01 PROCEDURE — 2022F DILAT RTA XM EVC RTNOPTHY: CPT | Performed by: FAMILY MEDICINE

## 2019-01-01 PROCEDURE — 19285 PERQ DEV BREAST 1ST US IMAG: CPT

## 2019-01-01 PROCEDURE — 1090F PRES/ABSN URINE INCON ASSESS: CPT | Performed by: SURGERY

## 2019-01-01 PROCEDURE — 93306 TTE W/DOPPLER COMPLETE: CPT

## 2019-01-01 PROCEDURE — 6360000002 HC RX W HCPCS: Performed by: SURGERY

## 2019-01-01 PROCEDURE — 93016 CV STRESS TEST SUPVJ ONLY: CPT | Performed by: NUCLEAR MEDICINE

## 2019-01-01 PROCEDURE — 76098 X-RAY EXAM SURGICAL SPECIMEN: CPT

## 2019-01-01 PROCEDURE — 99024 POSTOP FOLLOW-UP VISIT: CPT | Performed by: SURGERY

## 2019-01-01 PROCEDURE — 99214 OFFICE O/P EST MOD 30 MIN: CPT | Performed by: FAMILY MEDICINE

## 2019-01-01 PROCEDURE — 1036F TOBACCO NON-USER: CPT | Performed by: PHYSICIAN ASSISTANT

## 2019-01-01 PROCEDURE — 93283 PRGRMG EVAL IMPLANTABLE DFB: CPT | Performed by: INTERNAL MEDICINE

## 2019-01-01 PROCEDURE — 2022F DILAT RTA XM EVC RTNOPTHY: CPT | Performed by: SURGERY

## 2019-01-01 PROCEDURE — 1090F PRES/ABSN URINE INCON ASSESS: CPT | Performed by: INTERNAL MEDICINE

## 2019-01-01 PROCEDURE — 4040F PNEUMOC VAC/ADMIN/RCVD: CPT | Performed by: PHYSICIAN ASSISTANT

## 2019-01-01 PROCEDURE — 74176 CT ABD & PELVIS W/O CONTRAST: CPT

## 2019-01-01 PROCEDURE — 38900 IO MAP OF SENT LYMPH NODE: CPT | Performed by: SURGERY

## 2019-01-01 PROCEDURE — G8482 FLU IMMUNIZE ORDER/ADMIN: HCPCS | Performed by: FAMILY MEDICINE

## 2019-01-01 PROCEDURE — 3430000000 HC RX DIAGNOSTIC RADIOPHARMACEUTICAL: Performed by: INTERNAL MEDICINE

## 2019-01-01 PROCEDURE — 3044F HG A1C LEVEL LT 7.0%: CPT | Performed by: SURGERY

## 2019-01-01 PROCEDURE — 1123F ACP DISCUSS/DSCN MKR DOCD: CPT | Performed by: SURGERY

## 2019-01-01 PROCEDURE — 3600000012 HC SURGERY LEVEL 2 ADDTL 15MIN: Performed by: SURGERY

## 2019-01-01 PROCEDURE — 3044F HG A1C LEVEL LT 7.0%: CPT | Performed by: FAMILY MEDICINE

## 2019-01-01 PROCEDURE — 2500000003 HC RX 250 WO HCPCS: Performed by: SURGERY

## 2019-01-01 PROCEDURE — 99214 OFFICE O/P EST MOD 30 MIN: CPT | Performed by: SURGERY

## 2019-01-01 PROCEDURE — G8417 CALC BMI ABV UP PARAM F/U: HCPCS | Performed by: PHYSICIAN ASSISTANT

## 2019-01-01 PROCEDURE — G8417 CALC BMI ABV UP PARAM F/U: HCPCS | Performed by: FAMILY MEDICINE

## 2019-01-01 PROCEDURE — 36591 DRAW BLOOD OFF VENOUS DEVICE: CPT

## 2019-01-01 PROCEDURE — 1036F TOBACCO NON-USER: CPT | Performed by: SURGERY

## 2019-01-01 PROCEDURE — G8427 DOCREV CUR MEDS BY ELIG CLIN: HCPCS | Performed by: SURGERY

## 2019-01-01 PROCEDURE — G8400 PT W/DXA NO RESULTS DOC: HCPCS | Performed by: SURGERY

## 2019-01-01 PROCEDURE — APPSS30 APP SPLIT SHARED TIME 16-30 MINUTES: Performed by: PHYSICIAN ASSISTANT

## 2019-01-01 PROCEDURE — 93922 UPR/L XTREMITY ART 2 LEVELS: CPT

## 2019-01-01 PROCEDURE — 78452 HT MUSCLE IMAGE SPECT MULT: CPT

## 2019-01-01 PROCEDURE — G8427 DOCREV CUR MEDS BY ELIG CLIN: HCPCS | Performed by: INTERNAL MEDICINE

## 2019-01-01 PROCEDURE — 78815 PET IMAGE W/CT SKULL-THIGH: CPT

## 2019-01-01 PROCEDURE — 78452 HT MUSCLE IMAGE SPECT MULT: CPT | Performed by: NUCLEAR MEDICINE

## 2019-01-01 PROCEDURE — 1090F PRES/ABSN URINE INCON ASSESS: CPT | Performed by: FAMILY MEDICINE

## 2019-01-01 PROCEDURE — 7100000010 HC PHASE II RECOVERY - FIRST 15 MIN: Performed by: SURGERY

## 2019-01-01 PROCEDURE — 36590 REMOVAL TUNNELED CV CATH: CPT | Performed by: SURGERY

## 2019-01-01 PROCEDURE — 1036F TOBACCO NON-USER: CPT | Performed by: FAMILY MEDICINE

## 2019-01-01 PROCEDURE — 3700000000 HC ANESTHESIA ATTENDED CARE: Performed by: SURGERY

## 2019-01-01 PROCEDURE — 82948 REAGENT STRIP/BLOOD GLUCOSE: CPT

## 2019-01-01 PROCEDURE — 3430000000 HC RX DIAGNOSTIC RADIOPHARMACEUTICAL: Performed by: NUCLEAR MEDICINE

## 2019-01-01 PROCEDURE — 2709999900 HC NON-CHARGEABLE SUPPLY: Performed by: SURGERY

## 2019-01-01 PROCEDURE — 19286 PERQ DEV BREAST ADD US IMAG: CPT

## 2019-01-01 PROCEDURE — 3017F COLORECTAL CA SCREEN DOC REV: CPT | Performed by: PHYSICIAN ASSISTANT

## 2019-01-01 PROCEDURE — 6360000002 HC RX W HCPCS: Performed by: INTERNAL MEDICINE

## 2019-01-01 PROCEDURE — 93017 CV STRESS TEST TRACING ONLY: CPT

## 2019-01-01 PROCEDURE — 38740 REMOVE ARMPIT LYMPH NODES: CPT | Performed by: SURGERY

## 2019-01-01 PROCEDURE — 3017F COLORECTAL CA SCREEN DOC REV: CPT | Performed by: FAMILY MEDICINE

## 2019-01-01 PROCEDURE — 88307 TISSUE EXAM BY PATHOLOGIST: CPT

## 2019-01-01 PROCEDURE — 2022F DILAT RTA XM EVC RTNOPTHY: CPT | Performed by: INTERNAL MEDICINE

## 2019-01-01 PROCEDURE — 3600000002 HC SURGERY LEVEL 2 BASE: Performed by: SURGERY

## 2019-01-01 PROCEDURE — 1123F ACP DISCUSS/DSCN MKR DOCD: CPT | Performed by: FAMILY MEDICINE

## 2019-01-01 PROCEDURE — 85027 COMPLETE CBC AUTOMATED: CPT

## 2019-01-01 PROCEDURE — G8482 FLU IMMUNIZE ORDER/ADMIN: HCPCS | Performed by: SURGERY

## 2019-01-01 PROCEDURE — 36415 COLL VENOUS BLD VENIPUNCTURE: CPT

## 2019-01-01 PROCEDURE — 93018 CV STRESS TEST I&R ONLY: CPT | Performed by: NUCLEAR MEDICINE

## 2019-01-01 PROCEDURE — 80076 HEPATIC FUNCTION PANEL: CPT

## 2019-01-01 RX ORDER — SODIUM CHLORIDE 0.9 % (FLUSH) 0.9 %
20 SYRINGE (ML) INJECTION PRN
Status: CANCELLED | OUTPATIENT
Start: 2019-01-01

## 2019-01-01 RX ORDER — PHENYLEPHRINE HYDROCHLORIDE 10 MG/ML
INJECTION INTRAVENOUS PRN
Status: DISCONTINUED | OUTPATIENT
Start: 2019-01-01 | End: 2019-01-01 | Stop reason: SDUPTHER

## 2019-01-01 RX ORDER — FENTANYL CITRATE 50 UG/ML
25 INJECTION, SOLUTION INTRAMUSCULAR; INTRAVENOUS EVERY 5 MIN PRN
Status: DISCONTINUED | OUTPATIENT
Start: 2019-01-01 | End: 2019-01-01 | Stop reason: HOSPADM

## 2019-01-01 RX ORDER — INSULIN GLARGINE 100 [IU]/ML
INJECTION, SOLUTION SUBCUTANEOUS
Qty: 15 ML | Refills: 4 | Status: ON HOLD | OUTPATIENT
Start: 2019-01-01 | End: 2020-01-01

## 2019-01-01 RX ORDER — FENTANYL CITRATE 50 UG/ML
INJECTION, SOLUTION INTRAMUSCULAR; INTRAVENOUS PRN
Status: DISCONTINUED | OUTPATIENT
Start: 2019-01-01 | End: 2019-01-01 | Stop reason: SDUPTHER

## 2019-01-01 RX ORDER — MORPHINE SULFATE 2 MG/ML
2 INJECTION, SOLUTION INTRAMUSCULAR; INTRAVENOUS
Status: CANCELLED | OUTPATIENT
Start: 2019-01-01

## 2019-01-01 RX ORDER — TRAMADOL HYDROCHLORIDE 50 MG/1
100 TABLET ORAL EVERY 6 HOURS PRN
Status: CANCELLED | OUTPATIENT
Start: 2019-01-01

## 2019-01-01 RX ORDER — GABAPENTIN 400 MG/1
400 CAPSULE ORAL 3 TIMES DAILY
Qty: 90 CAPSULE | Refills: 0 | Status: SHIPPED | OUTPATIENT
Start: 2019-01-01 | End: 2020-01-01 | Stop reason: ALTCHOICE

## 2019-01-01 RX ORDER — BUPIVACAINE HYDROCHLORIDE AND EPINEPHRINE 5; 5 MG/ML; UG/ML
INJECTION, SOLUTION EPIDURAL; INTRACAUDAL; PERINEURAL PRN
Status: DISCONTINUED | OUTPATIENT
Start: 2019-01-01 | End: 2019-01-01 | Stop reason: ALTCHOICE

## 2019-01-01 RX ORDER — SODIUM CHLORIDE 0.9 % (FLUSH) 0.9 %
10 SYRINGE (ML) INJECTION PRN
Status: DISCONTINUED | OUTPATIENT
Start: 2019-01-01 | End: 2019-01-01 | Stop reason: HOSPADM

## 2019-01-01 RX ORDER — ACETAMINOPHEN 325 MG/1
650 TABLET ORAL EVERY 4 HOURS PRN
Status: CANCELLED | OUTPATIENT
Start: 2019-01-01

## 2019-01-01 RX ORDER — ONDANSETRON 2 MG/ML
4 INJECTION INTRAMUSCULAR; INTRAVENOUS
Status: DISCONTINUED | OUTPATIENT
Start: 2019-01-01 | End: 2019-01-01 | Stop reason: HOSPADM

## 2019-01-01 RX ORDER — SODIUM CHLORIDE 0.9 % (FLUSH) 0.9 %
10 SYRINGE (ML) INJECTION EVERY 12 HOURS SCHEDULED
Status: CANCELLED | OUTPATIENT
Start: 2019-01-01

## 2019-01-01 RX ORDER — PREGABALIN 200 MG/1
200 CAPSULE ORAL 2 TIMES DAILY
Qty: 60 CAPSULE | Refills: 2 | Status: SHIPPED | OUTPATIENT
Start: 2019-01-01 | End: 2020-01-01 | Stop reason: SDUPTHER

## 2019-01-01 RX ORDER — PROPOFOL 10 MG/ML
INJECTION, EMULSION INTRAVENOUS PRN
Status: DISCONTINUED | OUTPATIENT
Start: 2019-01-01 | End: 2019-01-01 | Stop reason: SDUPTHER

## 2019-01-01 RX ORDER — MORPHINE SULFATE 2 MG/ML
4 INJECTION, SOLUTION INTRAMUSCULAR; INTRAVENOUS
Status: CANCELLED | OUTPATIENT
Start: 2019-01-01

## 2019-01-01 RX ORDER — FENTANYL CITRATE 50 UG/ML
50 INJECTION, SOLUTION INTRAMUSCULAR; INTRAVENOUS EVERY 5 MIN PRN
Status: DISCONTINUED | OUTPATIENT
Start: 2019-01-01 | End: 2019-01-01 | Stop reason: HOSPADM

## 2019-01-01 RX ORDER — LABETALOL 20 MG/4 ML (5 MG/ML) INTRAVENOUS SYRINGE
5 EVERY 10 MIN PRN
Status: DISCONTINUED | OUTPATIENT
Start: 2019-01-01 | End: 2019-01-01 | Stop reason: HOSPADM

## 2019-01-01 RX ORDER — FLUDEOXYGLUCOSE F 18 200 MCI/ML
14.7 INJECTION, SOLUTION INTRAVENOUS
Status: COMPLETED | OUTPATIENT
Start: 2019-01-01 | End: 2019-01-01

## 2019-01-01 RX ORDER — SODIUM CHLORIDE 9 MG/ML
INJECTION, SOLUTION INTRAVENOUS CONTINUOUS PRN
Status: DISCONTINUED | OUTPATIENT
Start: 2019-01-01 | End: 2019-01-01 | Stop reason: SDUPTHER

## 2019-01-01 RX ORDER — SODIUM CHLORIDE 0.9 % (FLUSH) 0.9 %
10 SYRINGE (ML) INJECTION EVERY 12 HOURS SCHEDULED
Status: DISCONTINUED | OUTPATIENT
Start: 2019-01-01 | End: 2019-01-01 | Stop reason: HOSPADM

## 2019-01-01 RX ORDER — HEPARIN SODIUM (PORCINE) LOCK FLUSH IV SOLN 100 UNIT/ML 100 UNIT/ML
500 SOLUTION INTRAVENOUS PRN
Status: CANCELLED | OUTPATIENT
Start: 2019-01-01

## 2019-01-01 RX ORDER — SODIUM CHLORIDE 9 MG/ML
INJECTION, SOLUTION INTRAVENOUS CONTINUOUS
Status: CANCELLED | OUTPATIENT
Start: 2019-01-01

## 2019-01-01 RX ORDER — SODIUM CHLORIDE 9 MG/ML
INJECTION, SOLUTION INTRAVENOUS CONTINUOUS
Status: DISCONTINUED | OUTPATIENT
Start: 2019-01-01 | End: 2019-01-01 | Stop reason: HOSPADM

## 2019-01-01 RX ORDER — CLOPIDOGREL BISULFATE 75 MG/1
75 TABLET ORAL DAILY
Qty: 90 TABLET | Refills: 3 | Status: SHIPPED | OUTPATIENT
Start: 2019-01-01

## 2019-01-01 RX ORDER — PREGABALIN 200 MG/1
200 CAPSULE ORAL 2 TIMES DAILY
COMMUNITY
Start: 2019-01-01 | End: 2019-01-01 | Stop reason: SDUPTHER

## 2019-01-01 RX ORDER — SODIUM CHLORIDE 0.9 % (FLUSH) 0.9 %
10 SYRINGE (ML) INJECTION PRN
Status: CANCELLED | OUTPATIENT
Start: 2019-01-01

## 2019-01-01 RX ORDER — ISOSULFAN BLUE 50 MG/5ML
INJECTION, SOLUTION SUBCUTANEOUS PRN
Status: DISCONTINUED | OUTPATIENT
Start: 2019-01-01 | End: 2019-01-01 | Stop reason: ALTCHOICE

## 2019-01-01 RX ORDER — SODIUM CHLORIDE 0.9 % (FLUSH) 0.9 %
20 SYRINGE (ML) INJECTION PRN
Status: DISCONTINUED | OUTPATIENT
Start: 2019-01-01 | End: 2019-01-01 | Stop reason: HOSPADM

## 2019-01-01 RX ORDER — TRAMADOL HYDROCHLORIDE 50 MG/1
50 TABLET ORAL EVERY 6 HOURS PRN
Status: CANCELLED | OUTPATIENT
Start: 2019-01-01

## 2019-01-01 RX ORDER — HEPARIN SODIUM (PORCINE) LOCK FLUSH IV SOLN 100 UNIT/ML 100 UNIT/ML
500 SOLUTION INTRAVENOUS PRN
Status: DISCONTINUED | OUTPATIENT
Start: 2019-01-01 | End: 2019-01-01 | Stop reason: HOSPADM

## 2019-01-01 RX ORDER — TRAMADOL HYDROCHLORIDE 50 MG/1
50 TABLET ORAL EVERY 6 HOURS PRN
Qty: 28 TABLET | Refills: 0 | Status: SHIPPED | OUTPATIENT
Start: 2019-01-01 | End: 2019-01-01 | Stop reason: ALTCHOICE

## 2019-01-01 RX ORDER — ONDANSETRON 2 MG/ML
4 INJECTION INTRAMUSCULAR; INTRAVENOUS EVERY 6 HOURS PRN
Status: CANCELLED | OUTPATIENT
Start: 2019-01-01

## 2019-01-01 RX ADMIN — Medication 0.99 MILLICURIE: at 11:06

## 2019-01-01 RX ADMIN — Medication 9.6 MILLICURIE: at 09:45

## 2019-01-01 RX ADMIN — PROPOFOL 100 MG: 10 INJECTION, EMULSION INTRAVENOUS at 13:57

## 2019-01-01 RX ADMIN — PHENYLEPHRINE HYDROCHLORIDE 100 MCG: 10 INJECTION INTRAVENOUS at 14:35

## 2019-01-01 RX ADMIN — FENTANYL CITRATE 50 MCG: 50 INJECTION INTRAMUSCULAR; INTRAVENOUS at 14:13

## 2019-01-01 RX ADMIN — Medication 10 ML: at 15:10

## 2019-01-01 RX ADMIN — Medication 31.9 MILLICURIE: at 10:46

## 2019-01-01 RX ADMIN — SODIUM CHLORIDE: 9 INJECTION, SOLUTION INTRAVENOUS at 13:54

## 2019-01-01 RX ADMIN — HEPARIN SODIUM (PORCINE) LOCK FLUSH IV SOLN 100 UNIT/ML 500 UNITS: 100 SOLUTION at 15:11

## 2019-01-01 RX ADMIN — FENTANYL CITRATE 50 MCG: 50 INJECTION INTRAMUSCULAR; INTRAVENOUS at 13:57

## 2019-01-01 RX ADMIN — SODIUM CHLORIDE: 9 INJECTION, SOLUTION INTRAVENOUS at 13:12

## 2019-01-01 RX ADMIN — Medication 2 G: at 14:05

## 2019-01-01 RX ADMIN — Medication 20 ML: at 15:11

## 2019-01-01 RX ADMIN — FLUDEOXYGLUCOSE F 18 14.7 MILLICURIE: 200 INJECTION, SOLUTION INTRAVENOUS at 10:08

## 2019-01-01 SDOH — SOCIAL STABILITY: SOCIAL NETWORK
IN A TYPICAL WEEK, HOW MANY TIMES DO YOU TALK ON THE PHONE WITH FAMILY, FRIENDS, OR NEIGHBORS?: MORE THAN THREE TIMES A WEEK

## 2019-01-01 SDOH — HEALTH STABILITY: MENTAL HEALTH: HOW OFTEN DO YOU HAVE A DRINK CONTAINING ALCOHOL?: NEVER

## 2019-01-01 SDOH — SOCIAL STABILITY: SOCIAL NETWORK: ARE YOU MARRIED, WIDOWED, DIVORCED, SEPARATED, NEVER MARRIED, OR LIVING WITH A PARTNER?: MARRIED

## 2019-01-01 SDOH — HEALTH STABILITY: PHYSICAL HEALTH: ON AVERAGE, HOW MANY MINUTES DO YOU ENGAGE IN EXERCISE AT THIS LEVEL?: 10 MIN

## 2019-01-01 SDOH — SOCIAL STABILITY: SOCIAL NETWORK: HOW OFTEN DO YOU GET TOGETHER WITH FRIENDS OR RELATIVES?: NEVER

## 2019-01-01 SDOH — SOCIAL STABILITY: SOCIAL NETWORK
DO YOU BELONG TO ANY CLUBS OR ORGANIZATIONS SUCH AS CHURCH GROUPS UNIONS, FRATERNAL OR ATHLETIC GROUPS, OR SCHOOL GROUPS?: NO

## 2019-01-01 SDOH — ECONOMIC STABILITY: INCOME INSECURITY: HOW HARD IS IT FOR YOU TO PAY FOR THE VERY BASICS LIKE FOOD, HOUSING, MEDICAL CARE, AND HEATING?: NOT HARD AT ALL

## 2019-01-01 SDOH — HEALTH STABILITY: MENTAL HEALTH
STRESS IS WHEN SOMEONE FEELS TENSE, NERVOUS, ANXIOUS, OR CAN'T SLEEP AT NIGHT BECAUSE THEIR MIND IS TROUBLED. HOW STRESSED ARE YOU?: NOT AT ALL

## 2019-01-01 SDOH — HEALTH STABILITY: PHYSICAL HEALTH: ON AVERAGE, HOW MANY DAYS PER WEEK DO YOU ENGAGE IN MODERATE TO STRENUOUS EXERCISE (LIKE A BRISK WALK)?: 5 DAYS

## 2019-01-01 SDOH — SOCIAL STABILITY: SOCIAL NETWORK: HOW OFTEN DO YOU ATTENT MEETINGS OF THE CLUB OR ORGANIZATION YOU BELONG TO?: NEVER

## 2019-01-01 SDOH — SOCIAL STABILITY: SOCIAL NETWORK: HOW OFTEN DO YOU ATTEND CHURCH OR RELIGIOUS SERVICES?: NEVER

## 2019-01-01 SDOH — ECONOMIC STABILITY: TRANSPORTATION INSECURITY
IN THE PAST 12 MONTHS, HAS THE LACK OF TRANSPORTATION KEPT YOU FROM MEDICAL APPOINTMENTS OR FROM GETTING MEDICATIONS?: NO

## 2019-01-01 SDOH — ECONOMIC STABILITY: FOOD INSECURITY: WITHIN THE PAST 12 MONTHS, YOU WORRIED THAT YOUR FOOD WOULD RUN OUT BEFORE YOU GOT MONEY TO BUY MORE.: NEVER TRUE

## 2019-01-01 SDOH — ECONOMIC STABILITY: FOOD INSECURITY: WITHIN THE PAST 12 MONTHS, THE FOOD YOU BOUGHT JUST DIDN'T LAST AND YOU DIDN'T HAVE MONEY TO GET MORE.: NEVER TRUE

## 2019-01-01 SDOH — ECONOMIC STABILITY: TRANSPORTATION INSECURITY
IN THE PAST 12 MONTHS, HAS LACK OF TRANSPORTATION KEPT YOU FROM MEETINGS, WORK, OR FROM GETTING THINGS NEEDED FOR DAILY LIVING?: NO

## 2019-01-01 ASSESSMENT — ENCOUNTER SYMPTOMS
SORE THROAT: 0
BLOOD IN STOOL: 0
SORE THROAT: 0
RECTAL PAIN: 0
WHEEZING: 0
CHEST TIGHTNESS: 0
BACK PAIN: 1
TROUBLE SWALLOWING: 0
SORE THROAT: 0
RECTAL PAIN: 0
BLOOD IN STOOL: 0
SHORTNESS OF BREATH: 1
CHEST TIGHTNESS: 0
DIARRHEA: 0
CONSTIPATION: 0
NAUSEA: 0
RHINORRHEA: 0
VOICE CHANGE: 0
COUGH: 0
TROUBLE SWALLOWING: 0
COUGH: 0
ABDOMINAL DISTENTION: 0
COLOR CHANGE: 0
VOMITING: 0
SHORTNESS OF BREATH: 1
ABDOMINAL PAIN: 0
DIARRHEA: 0
BLOOD IN STOOL: 0
COLOR CHANGE: 0
FACIAL SWELLING: 0
BACK PAIN: 1
SHORTNESS OF BREATH: 1
ABDOMINAL PAIN: 0
TROUBLE SWALLOWING: 0
DIARRHEA: 0
ABDOMINAL PAIN: 0
WHEEZING: 0
BACK PAIN: 1
ABDOMINAL DISTENTION: 0
BLOOD IN STOOL: 0
COLOR CHANGE: 0
ABDOMINAL PAIN: 0
ABDOMINAL PAIN: 0
FACIAL SWELLING: 0
WHEEZING: 0
COLOR CHANGE: 0
CONSTIPATION: 0
EYE DISCHARGE: 0
VOMITING: 0
SHORTNESS OF BREATH: 0
NAUSEA: 0
VOMITING: 0
SORE THROAT: 0
VOMITING: 0
SORE THROAT: 0
CONSTIPATION: 0
SHORTNESS OF BREATH: 1
EYE DISCHARGE: 0
EYE DISCHARGE: 0
CHEST TIGHTNESS: 0
COUGH: 0
TROUBLE SWALLOWING: 0
DIARRHEA: 0
COUGH: 0
WHEEZING: 0
NAUSEA: 0
NAUSEA: 0
RECTAL PAIN: 0
ABDOMINAL DISTENTION: 0
FACIAL SWELLING: 0
EYE DISCHARGE: 0
COUGH: 0
NAUSEA: 0
CONSTIPATION: 0
SHORTNESS OF BREATH: 0
WHEEZING: 0

## 2019-01-01 ASSESSMENT — PULMONARY FUNCTION TESTS
PIF_VALUE: 3
PIF_VALUE: 4
PIF_VALUE: 3
PIF_VALUE: 2
PIF_VALUE: 3
PIF_VALUE: 3
PIF_VALUE: 4
PIF_VALUE: 3
PIF_VALUE: 4
PIF_VALUE: 2
PIF_VALUE: 2
PIF_VALUE: 3
PIF_VALUE: 2
PIF_VALUE: 3
PIF_VALUE: 7
PIF_VALUE: 2
PIF_VALUE: 2
PIF_VALUE: 4
PIF_VALUE: 4
PIF_VALUE: 3
PIF_VALUE: 4
PIF_VALUE: 3
PIF_VALUE: 8
PIF_VALUE: 3
PIF_VALUE: 3
PIF_VALUE: 28
PIF_VALUE: 4
PIF_VALUE: 15
PIF_VALUE: 3
PIF_VALUE: 3
PIF_VALUE: 4
PIF_VALUE: 3
PIF_VALUE: 0
PIF_VALUE: 1
PIF_VALUE: 3
PIF_VALUE: 2
PIF_VALUE: 3
PIF_VALUE: 4
PIF_VALUE: 3
PIF_VALUE: 4
PIF_VALUE: 2
PIF_VALUE: 3
PIF_VALUE: 3
PIF_VALUE: 2
PIF_VALUE: 3
PIF_VALUE: 7
PIF_VALUE: 4
PIF_VALUE: 2
PIF_VALUE: 3
PIF_VALUE: 2
PIF_VALUE: 3
PIF_VALUE: 2
PIF_VALUE: 3
PIF_VALUE: 2
PIF_VALUE: 3
PIF_VALUE: 3

## 2019-01-01 ASSESSMENT — PAIN - FUNCTIONAL ASSESSMENT: PAIN_FUNCTIONAL_ASSESSMENT: 0-10

## 2019-01-01 ASSESSMENT — PAIN SCALES - GENERAL: PAINLEVEL_OUTOF10: 2

## 2019-01-02 ENCOUNTER — HOSPITAL ENCOUNTER (OUTPATIENT)
Age: 74
Discharge: HOME | End: 2019-01-02
Payer: MEDICARE

## 2019-01-02 LAB
ANION GAP SERPL CALCULATED.3IONS-SCNC: 11 MEQ/L (ref 8–16)
BASOPHILS # BLD: 0.9 %
BASOPHILS ABSOLUTE: 0.1 THOU/MM3 (ref 0–0.1)
BUN BLDV-MCNC: 14 MG/DL (ref 7–22)
CALCIUM SERPL-MCNC: 9.1 MG/DL (ref 8.5–10.5)
CHLORIDE BLD-SCNC: 103 MEQ/L (ref 98–111)
CO2: 29 MEQ/L (ref 23–33)
CREAT SERPL-MCNC: 0.7 MG/DL (ref 0.4–1.2)
EOSINOPHIL # BLD: 2.9 %
EOSINOPHILS ABSOLUTE: 0.2 THOU/MM3 (ref 0–0.4)
ERYTHROCYTE [DISTWIDTH] IN BLOOD BY AUTOMATED COUNT: 15.6 % (ref 11.5–14.5)
ERYTHROCYTE [DISTWIDTH] IN BLOOD BY AUTOMATED COUNT: 51.8 FL (ref 35–45)
GFR SERPL CREATININE-BSD FRML MDRD: 82 ML/MIN/1.73M2
GLUCOSE BLD-MCNC: 90 MG/DL (ref 70–108)
HCT VFR BLD CALC: 35.7 % (ref 37–47)
HEMOGLOBIN: 11.1 GM/DL (ref 12–16)
IMMATURE GRANS (ABS): 0.03 THOU/MM3 (ref 0–0.07)
IMMATURE GRANULOCYTES: 0.4 %
LYMPHOCYTES # BLD: 20 %
LYMPHOCYTES ABSOLUTE: 1.4 THOU/MM3 (ref 1–4.8)
MCH RBC QN AUTO: 28.3 PG (ref 26–33)
MCHC RBC AUTO-ENTMCNC: 31.1 GM/DL (ref 32.2–35.5)
MCV RBC AUTO: 91.1 FL (ref 81–99)
MONOCYTES # BLD: 6.7 %
MONOCYTES ABSOLUTE: 0.5 THOU/MM3 (ref 0.4–1.3)
NUCLEATED RED BLOOD CELLS: 0 /100 WBC
PLATELET # BLD: 176 THOU/MM3 (ref 130–400)
PMV BLD AUTO: 12 FL (ref 9.4–12.4)
POTASSIUM SERPL-SCNC: 4.8 MEQ/L (ref 3.5–5.2)
RBC # BLD: 3.92 MILL/MM3 (ref 4.2–5.4)
SEG NEUTROPHILS: 69.1 %
SEGMENTED NEUTROPHILS ABSOLUTE COUNT: 4.8 THOU/MM3 (ref 1.8–7.7)
SODIUM BLD-SCNC: 143 MEQ/L (ref 135–145)
WBC # BLD: 6.9 THOU/MM3 (ref 4.8–10.8)

## 2019-01-02 PROCEDURE — 80048 BASIC METABOLIC PNL TOTAL CA: CPT

## 2019-01-02 PROCEDURE — 36415 COLL VENOUS BLD VENIPUNCTURE: CPT

## 2019-01-02 PROCEDURE — 85025 COMPLETE CBC W/AUTO DIFF WBC: CPT

## 2019-01-09 ENCOUNTER — ANESTHESIA EVENT (OUTPATIENT)
Dept: ENDOSCOPY | Age: 74
End: 2019-01-09
Payer: MEDICARE

## 2019-01-09 ENCOUNTER — ANESTHESIA (OUTPATIENT)
Dept: ENDOSCOPY | Age: 74
End: 2019-01-09
Payer: MEDICARE

## 2019-01-09 ENCOUNTER — HOSPITAL ENCOUNTER (OUTPATIENT)
Age: 74
Setting detail: OUTPATIENT SURGERY
Discharge: HOME | End: 2019-01-09
Attending: INTERNAL MEDICINE | Admitting: INTERNAL MEDICINE
Payer: MEDICARE

## 2019-01-09 VITALS
OXYGEN SATURATION: 96 % | HEART RATE: 70 BPM | BODY MASS INDEX: 37.73 KG/M2 | DIASTOLIC BLOOD PRESSURE: 60 MMHG | SYSTOLIC BLOOD PRESSURE: 116 MMHG | RESPIRATION RATE: 16 BRPM | TEMPERATURE: 97.7 F | WEIGHT: 205 LBS | HEIGHT: 62 IN

## 2019-01-09 VITALS
DIASTOLIC BLOOD PRESSURE: 67 MMHG | OXYGEN SATURATION: 100 % | SYSTOLIC BLOOD PRESSURE: 119 MMHG | RESPIRATION RATE: 18 BRPM

## 2019-01-09 LAB
GLUCOSE BLD-MCNC: 100 MG/DL (ref 70–108)
GLUCOSE BLD-MCNC: 55 MG/DL (ref 70–108)

## 2019-01-09 PROCEDURE — 2580000003 HC RX 258: Performed by: ANESTHESIOLOGY

## 2019-01-09 PROCEDURE — 82948 REAGENT STRIP/BLOOD GLUCOSE: CPT

## 2019-01-09 PROCEDURE — 7100000000 HC PACU RECOVERY - FIRST 15 MIN: Performed by: INTERNAL MEDICINE

## 2019-01-09 PROCEDURE — 2580000003 HC RX 258: Performed by: INTERNAL MEDICINE

## 2019-01-09 PROCEDURE — 6360000002 HC RX W HCPCS: Performed by: NURSE ANESTHETIST, CERTIFIED REGISTERED

## 2019-01-09 PROCEDURE — 3609027000 HC COLONOSCOPY: Performed by: INTERNAL MEDICINE

## 2019-01-09 PROCEDURE — 2709999900 HC NON-CHARGEABLE SUPPLY

## 2019-01-09 PROCEDURE — 7100000001 HC PACU RECOVERY - ADDTL 15 MIN: Performed by: INTERNAL MEDICINE

## 2019-01-09 PROCEDURE — 3700000000 HC ANESTHESIA ATTENDED CARE: Performed by: INTERNAL MEDICINE

## 2019-01-09 PROCEDURE — 3700000001 HC ADD 15 MINUTES (ANESTHESIA): Performed by: INTERNAL MEDICINE

## 2019-01-09 PROCEDURE — 2709999900 HC NON-CHARGEABLE SUPPLY: Performed by: INTERNAL MEDICINE

## 2019-01-09 RX ORDER — PROPOFOL 10 MG/ML
INJECTION, EMULSION INTRAVENOUS PRN
Status: DISCONTINUED | OUTPATIENT
Start: 2019-01-09 | End: 2019-01-09 | Stop reason: SDUPTHER

## 2019-01-09 RX ORDER — SODIUM CHLORIDE 450 MG/100ML
INJECTION, SOLUTION INTRAVENOUS CONTINUOUS
Status: DISCONTINUED | OUTPATIENT
Start: 2019-01-09 | End: 2019-01-09 | Stop reason: HOSPADM

## 2019-01-09 RX ORDER — DEXTROSE AND SODIUM CHLORIDE 5; .9 G/100ML; G/100ML
INJECTION, SOLUTION INTRAVENOUS CONTINUOUS
Status: DISCONTINUED | OUTPATIENT
Start: 2019-01-09 | End: 2019-01-09 | Stop reason: HOSPADM

## 2019-01-09 RX ADMIN — SODIUM CHLORIDE: 4.5 INJECTION, SOLUTION INTRAVENOUS at 12:19

## 2019-01-09 RX ADMIN — PROPOFOL 100 MG: 10 INJECTION, EMULSION INTRAVENOUS at 13:35

## 2019-01-09 RX ADMIN — DEXTROSE AND SODIUM CHLORIDE: 5; 900 INJECTION, SOLUTION INTRAVENOUS at 12:34

## 2019-01-09 RX ADMIN — PROPOFOL 50 MG: 10 INJECTION, EMULSION INTRAVENOUS at 13:47

## 2019-01-09 RX ADMIN — DEXTROSE AND SODIUM CHLORIDE 300 ML: 5; 900 INJECTION, SOLUTION INTRAVENOUS at 13:58

## 2019-01-09 RX ADMIN — PROPOFOL 50 MG: 10 INJECTION, EMULSION INTRAVENOUS at 13:40

## 2019-01-09 ASSESSMENT — PAIN SCALES - GENERAL
PAINLEVEL_OUTOF10: 0
PAINLEVEL_OUTOF10: 0

## 2019-01-09 ASSESSMENT — PAIN - FUNCTIONAL ASSESSMENT: PAIN_FUNCTIONAL_ASSESSMENT: 0-10

## 2019-01-10 RX ORDER — HYDROCHLOROTHIAZIDE 25 MG/1
12.5 TABLET ORAL DAILY
Qty: 30 TABLET | Refills: 3 | Status: CANCELLED | OUTPATIENT
Start: 2019-01-10

## 2019-01-11 RX ORDER — HYDROCHLOROTHIAZIDE 25 MG/1
12.5 TABLET ORAL DAILY
Qty: 30 TABLET | Refills: 4 | Status: ON HOLD | OUTPATIENT
Start: 2019-01-11 | End: 2019-06-05 | Stop reason: HOSPADM

## 2019-02-11 DIAGNOSIS — E11.42 DIABETIC POLYNEUROPATHY ASSOCIATED WITH TYPE 2 DIABETES MELLITUS (HCC): ICD-10-CM

## 2019-02-11 RX ORDER — PREGABALIN 200 MG/1
200 CAPSULE ORAL 2 TIMES DAILY
Qty: 60 CAPSULE | Refills: 3 | Status: SHIPPED | OUTPATIENT
Start: 2019-02-11 | End: 2019-06-19 | Stop reason: SDUPTHER

## 2019-03-11 DIAGNOSIS — G25.81 RESTLESS LEG SYNDROME: ICD-10-CM

## 2019-03-11 RX ORDER — ROPINIROLE 0.5 MG/1
0.5 TABLET, FILM COATED ORAL NIGHTLY
Qty: 30 TABLET | Refills: 11 | Status: ON HOLD | OUTPATIENT
Start: 2019-03-11 | End: 2019-01-01 | Stop reason: HOSPADM

## 2019-03-13 ENCOUNTER — OFFICE VISIT (OUTPATIENT)
Dept: NEUROSURGERY | Age: 74
End: 2019-03-13
Payer: MEDICARE

## 2019-03-13 VITALS
HEIGHT: 62 IN | DIASTOLIC BLOOD PRESSURE: 78 MMHG | SYSTOLIC BLOOD PRESSURE: 132 MMHG | HEART RATE: 71 BPM | WEIGHT: 218 LBS | BODY MASS INDEX: 40.12 KG/M2

## 2019-03-13 DIAGNOSIS — R20.2 NUMBNESS AND TINGLING OF BOTH FEET: Primary | ICD-10-CM

## 2019-03-13 DIAGNOSIS — R29.898 WEAKNESS OF FOOT, RIGHT: ICD-10-CM

## 2019-03-13 DIAGNOSIS — R20.0 NUMBNESS AND TINGLING OF BOTH FEET: Primary | ICD-10-CM

## 2019-03-13 PROCEDURE — G8400 PT W/DXA NO RESULTS DOC: HCPCS | Performed by: NEUROLOGICAL SURGERY

## 2019-03-13 PROCEDURE — 99214 OFFICE O/P EST MOD 30 MIN: CPT | Performed by: NEUROLOGICAL SURGERY

## 2019-03-13 PROCEDURE — G8484 FLU IMMUNIZE NO ADMIN: HCPCS | Performed by: NEUROLOGICAL SURGERY

## 2019-03-13 PROCEDURE — 1101F PT FALLS ASSESS-DOCD LE1/YR: CPT | Performed by: NEUROLOGICAL SURGERY

## 2019-03-13 PROCEDURE — 1090F PRES/ABSN URINE INCON ASSESS: CPT | Performed by: NEUROLOGICAL SURGERY

## 2019-03-13 PROCEDURE — G8427 DOCREV CUR MEDS BY ELIG CLIN: HCPCS | Performed by: NEUROLOGICAL SURGERY

## 2019-03-13 PROCEDURE — G8417 CALC BMI ABV UP PARAM F/U: HCPCS | Performed by: NEUROLOGICAL SURGERY

## 2019-03-13 PROCEDURE — G8598 ASA/ANTIPLAT THER USED: HCPCS | Performed by: NEUROLOGICAL SURGERY

## 2019-03-13 PROCEDURE — 1123F ACP DISCUSS/DSCN MKR DOCD: CPT | Performed by: NEUROLOGICAL SURGERY

## 2019-03-13 PROCEDURE — 1036F TOBACCO NON-USER: CPT | Performed by: NEUROLOGICAL SURGERY

## 2019-03-13 PROCEDURE — 4040F PNEUMOC VAC/ADMIN/RCVD: CPT | Performed by: NEUROLOGICAL SURGERY

## 2019-03-13 PROCEDURE — 3017F COLORECTAL CA SCREEN DOC REV: CPT | Performed by: NEUROLOGICAL SURGERY

## 2019-03-14 ENCOUNTER — TELEPHONE (OUTPATIENT)
Dept: NEUROSURGERY | Age: 74
End: 2019-03-14

## 2019-03-19 ENCOUNTER — TELEPHONE (OUTPATIENT)
Dept: NEUROSURGERY | Age: 74
End: 2019-03-19

## 2019-03-20 ENCOUNTER — NURSE ONLY (OUTPATIENT)
Dept: CARDIOLOGY CLINIC | Age: 74
End: 2019-03-20
Payer: MEDICARE

## 2019-03-20 DIAGNOSIS — Z95.810 DUAL IMPLANTABLE CARDIOVERTER-DEFIBRILLATOR IN SITU: Primary | ICD-10-CM

## 2019-03-20 PROCEDURE — 93283 PRGRMG EVAL IMPLANTABLE DFB: CPT | Performed by: INTERNAL MEDICINE

## 2019-04-09 ENCOUNTER — OFFICE VISIT (OUTPATIENT)
Dept: PHYSICAL MEDICINE AND REHAB | Age: 74
End: 2019-04-09
Payer: MEDICARE

## 2019-04-09 VITALS
HEART RATE: 70 BPM | DIASTOLIC BLOOD PRESSURE: 65 MMHG | HEIGHT: 62 IN | BODY MASS INDEX: 40.12 KG/M2 | SYSTOLIC BLOOD PRESSURE: 111 MMHG | WEIGHT: 218.03 LBS

## 2019-04-09 DIAGNOSIS — M21.371 RIGHT FOOT DROP: Primary | ICD-10-CM

## 2019-04-09 PROCEDURE — 1036F TOBACCO NON-USER: CPT | Performed by: PHYSICAL MEDICINE & REHABILITATION

## 2019-04-09 PROCEDURE — 4040F PNEUMOC VAC/ADMIN/RCVD: CPT | Performed by: PHYSICAL MEDICINE & REHABILITATION

## 2019-04-09 PROCEDURE — 99204 OFFICE O/P NEW MOD 45 MIN: CPT | Performed by: PHYSICAL MEDICINE & REHABILITATION

## 2019-04-09 PROCEDURE — 1090F PRES/ABSN URINE INCON ASSESS: CPT | Performed by: PHYSICAL MEDICINE & REHABILITATION

## 2019-04-09 PROCEDURE — G8400 PT W/DXA NO RESULTS DOC: HCPCS | Performed by: PHYSICAL MEDICINE & REHABILITATION

## 2019-04-09 PROCEDURE — G8598 ASA/ANTIPLAT THER USED: HCPCS | Performed by: PHYSICAL MEDICINE & REHABILITATION

## 2019-04-09 PROCEDURE — G8417 CALC BMI ABV UP PARAM F/U: HCPCS | Performed by: PHYSICAL MEDICINE & REHABILITATION

## 2019-04-09 PROCEDURE — G8427 DOCREV CUR MEDS BY ELIG CLIN: HCPCS | Performed by: PHYSICAL MEDICINE & REHABILITATION

## 2019-04-09 PROCEDURE — 1123F ACP DISCUSS/DSCN MKR DOCD: CPT | Performed by: PHYSICAL MEDICINE & REHABILITATION

## 2019-04-09 PROCEDURE — 3017F COLORECTAL CA SCREEN DOC REV: CPT | Performed by: PHYSICAL MEDICINE & REHABILITATION

## 2019-04-09 ASSESSMENT — ENCOUNTER SYMPTOMS
PHOTOPHOBIA: 0
RECTAL PAIN: 0
SORE THROAT: 0
TROUBLE SWALLOWING: 0
BACK PAIN: 1
SHORTNESS OF BREATH: 0
DIARRHEA: 0
WHEEZING: 0

## 2019-04-09 NOTE — PROGRESS NOTES
Spine Medicine Consultation    [address] [facility] [address]  [place]  Dept: [phone]  Loc: [phone]      Chief Complaint(s): Numbness and drop foot      Arelis Yo is a 75 y.o. female, who came in due to numbness in  feet    Onset: 1years    Cause of the symptom(s): N/A    Quality of Symptoms: numbness and tingling    Aggravating factors: N/A    Relieving factors: rest    Red Flags: N/A    Treatment done: physical therapy      Current pain level: 0 on the scale of 0-10 ( 10 being worst)     Allergies   Allergen Reactions    Sulfa Antibiotics        Social History     Socioeconomic History    Marital status:      Spouse name: Not on file    Number of children: Not on file    Years of education: Not on file    Highest education level: Not on file   Occupational History    Not on file   Social Needs    Financial resource strain: Not on file    Food insecurity:     Worry: Not on file     Inability: Not on file    Transportation needs:     Medical: Not on file     Non-medical: Not on file   Tobacco Use    Smoking status: Former Smoker     Packs/day: 1.50     Years: 25.00     Pack years: 37.50     Types: Cigarettes     Last attempt to quit: 1988     Years since quittin.5    Smokeless tobacco: Never Used   Substance and Sexual Activity    Alcohol use: No    Drug use: No    Sexual activity: Not on file   Lifestyle    Physical activity:     Days per week: Not on file     Minutes per session: Not on file    Stress: Not on file   Relationships    Social connections:     Talks on phone: Not on file     Gets together: Not on file     Attends Congregation service: Not on file     Active member of club or organization: Not on file     Attends meetings of clubs or organizations: Not on file     Relationship status: Not on file    Intimate partner violence:     Fear of current or ex partner: Not on file     Emotionally abused: Not on file     Physically abused: Not on file     Forced sexual activity: Not on file   Other Topics Concern    Not on file   Social History Narrative    Not on file       Past Medical History:   Diagnosis Date    CAD (coronary artery disease)     Hyperlipidemia     Hypertension     ICD (implantable cardiac defibrillator), dual, in situ     [facility] dual ICD    Shingles 12/2014    [facility] dual ICD     Type II or unspecified type diabetes mellitus without mention of complication, not stated as uncontrolled     Ventricular arrhythmia        Past Surgical History:   Procedure Laterality Date    CARDIAC PACEMAKER PLACEMENT  9-08-09    [facility]    CARDIOVASCULAR STRESS TEST  01-27-10    Excellent treadmill exercise. Improved functional capacity to functional class 1 completed 8 METS. Hemodynamic response was appropriate. No ischemic ECG changes noted.  CARDIOVASCULAR STRESS TEST  10-28-09    No evidence of stress induced ischemia. Evidence of  prior transmural MI demonstrated by transient fixed defects of inferior wall extending into lateral wall, indicative of RCA lesion. Bowel and soft tissue attenuation interfering w/ counts of lateral wall. EF 29% w/ severe septal and inferolateral hypokinesis w/ very mild lateral wall hypokinesis being noted.  CARDIOVERSION  8-29-09    CHOLECYSTECTOMY  2005    Laparoscopic    COLONOSCOPY Left 1/9/2019    COLONOSCOPY performed by Helena Gonzalez MD at [address] [place] [address] CATH LAB PROCEDURE  8-24-09    Multivessel disease demonstrated by LAD having 70-80%  proximal lesion and napkin-ring lesion at bifurcation w/ D2. D2 appears to be medium large caliber vessel which has an ostial lesion of 70-80%. left -to-left collaterals as well as left-to-right collaterals emanating from LAD to PDA.  Circumflex had anterior marginal branch and posterior marginal normal.       Neurologic Exam     Mental Status   Oriented to person, place, and time. Ortho Exam    Bilateral leg swelling 2+   2/5 right dorsiflexor    Assessment:     Right Foot Drop   Diabetes     Plan:     Agree with CT scan of the LS spine. With unilateral weakness, spinal pathology, most likely right L4, might be pathological.     She also has bilateral foot numbness due to diabetes. Since this is a painless foot drop, and no pain, PT should be the most reasonable approach. Further assessment and followup in  4 weeks for re-evaluation of new treatment(s) and change therapy. Time spent with patient was 45 minutes. More than 50% was spent counseling/coordinating the patient's care.      Jesse Lerner MD   Spine Medicine/PM&R

## 2019-04-19 RX ORDER — SODIUM CHLORIDE 450 MG/100ML
INJECTION, SOLUTION INTRAVENOUS CONTINUOUS
Status: CANCELLED | OUTPATIENT
Start: 2019-04-19

## 2019-04-22 ENCOUNTER — HOSPITAL ENCOUNTER (OUTPATIENT)
Dept: CT IMAGING | Age: 74
Discharge: HOME | End: 2019-04-22
Payer: MEDICARE

## 2019-04-22 ENCOUNTER — HOSPITAL ENCOUNTER (OUTPATIENT)
Dept: GENERAL RADIOLOGY | Age: 74
Discharge: HOME | End: 2019-04-22
Payer: MEDICARE

## 2019-04-22 VITALS
OXYGEN SATURATION: 100 % | HEART RATE: 82 BPM | WEIGHT: 200 LBS | RESPIRATION RATE: 16 BRPM | SYSTOLIC BLOOD PRESSURE: 141 MMHG | DIASTOLIC BLOOD PRESSURE: 66 MMHG | TEMPERATURE: 96.8 F | BODY MASS INDEX: 36.57 KG/M2

## 2019-04-22 DIAGNOSIS — R29.898 WEAKNESS OF FOOT, RIGHT: ICD-10-CM

## 2019-04-22 DIAGNOSIS — M54.5 LOW BACK PAIN, UNSPECIFIED BACK PAIN LATERALITY, UNSPECIFIED CHRONICITY, WITH SCIATICA PRESENCE UNSPECIFIED: ICD-10-CM

## 2019-04-22 DIAGNOSIS — R20.2 NUMBNESS AND TINGLING OF BOTH FEET: ICD-10-CM

## 2019-04-22 DIAGNOSIS — R20.0 NUMBNESS AND TINGLING OF BOTH FEET: ICD-10-CM

## 2019-04-22 PROCEDURE — 72132 CT LUMBAR SPINE W/DYE: CPT

## 2019-04-22 PROCEDURE — 62304 MYELOGRAPHY LUMBAR INJECTION: CPT

## 2019-04-22 PROCEDURE — 2580000003 HC RX 258: Performed by: RADIOLOGY

## 2019-04-22 PROCEDURE — 2709999900 HC NON-CHARGEABLE SUPPLY

## 2019-04-22 PROCEDURE — 6360000004 HC RX CONTRAST MEDICATION: Performed by: RADIOLOGY

## 2019-04-22 RX ORDER — GABAPENTIN 400 MG/1
400 CAPSULE ORAL 2 TIMES DAILY
Status: ON HOLD | COMMUNITY
End: 2019-06-05 | Stop reason: HOSPADM

## 2019-04-22 RX ORDER — PREGABALIN 100 MG/1
200 CAPSULE ORAL 2 TIMES DAILY
Status: ON HOLD | COMMUNITY
End: 2019-06-05 | Stop reason: HOSPADM

## 2019-04-22 RX ORDER — SODIUM CHLORIDE 450 MG/100ML
INJECTION, SOLUTION INTRAVENOUS CONTINUOUS
Status: DISCONTINUED | OUTPATIENT
Start: 2019-04-22 | End: 2019-04-23 | Stop reason: HOSPADM

## 2019-04-22 RX ADMIN — IOHEXOL 20 ML: 180 INJECTION INTRAVENOUS at 10:17

## 2019-04-22 RX ADMIN — SODIUM CHLORIDE: 4.5 INJECTION, SOLUTION INTRAVENOUS at 09:11

## 2019-04-22 ASSESSMENT — PAIN - FUNCTIONAL ASSESSMENT: PAIN_FUNCTIONAL_ASSESSMENT: 0-10

## 2019-04-22 NOTE — PROGRESS NOTES
0845 pt arrives to [facility] for CT Myelogram. All questions and concerns addressed. Blood thinners held x 5 days. Family at bedside  0847 PATIENT RIGHTS AND RESPONSIBILITIES SHEET OFFERED TO PT TO READ.  0920 fluro notified of pt availability  0940 to Fluro via cart  1045 pt returns to [facility] post procedure. Mid lower back bandaid clean and dry. Hand grasp, pedal push and pull equal and strong. Denies pain. Refreshments given to pt, lunch offered to spouse. Side rail up x1, call light in reach.  Advised of post procedure orders  1115 denies needs  1145 resting, call light in reach  1230 resting, denies needs  1240 __m__ Safety:       (Environmental)  Altamese Bussing to environment   Ensure ID band is correct and in place/ allergy band as needed   Assess for fall risk   Initiate fall precautions as applicable (fall band, side rails, etc.)   Call light within reach   Bed in low position/ wheels locked    _m___ Pain:        Assess pain level and characteristics   Administer analgesics as ordered   Assess effectiveness of pain management and report to MD as needed    __m__ Knowledge Deficit:   Assess baseline knowledge   Provide teaching at level of understanding   Provide teaching via preferred learning method   Evaluate teaching effectiveness    __m_ Hemodynamic/Respiratory Status:       (Pre and Post Procedure Monitoring)   Assess/Monitor vital signs and LOC   Assess Baseline SpO2 prior to any sedation   Obtain weight/height   Assess vital signs/ LOC until patient meets discharge criteria   Monitor procedure site and notify MD of any issues         1245 WRITTEN DISCHARGE INSTRUCTIONS GIVEN TO PT-VERBALIZES UNDERSTANDING  1300   PT DISCHARGED PER WHEEL CHAIR IN SATISFACTORY CONDITION

## 2019-04-29 DIAGNOSIS — Z79.4 TYPE 2 DIABETES MELLITUS WITH HYPERGLYCEMIA, WITH LONG-TERM CURRENT USE OF INSULIN (HCC): ICD-10-CM

## 2019-04-29 DIAGNOSIS — E11.42 DIABETIC POLYNEUROPATHY ASSOCIATED WITH TYPE 2 DIABETES MELLITUS (HCC): ICD-10-CM

## 2019-04-29 DIAGNOSIS — E11.65 TYPE 2 DIABETES MELLITUS WITH HYPERGLYCEMIA, WITH LONG-TERM CURRENT USE OF INSULIN (HCC): ICD-10-CM

## 2019-04-29 RX ORDER — GABAPENTIN 400 MG/1
800 CAPSULE ORAL EVERY EVENING
Qty: 90 CAPSULE | Refills: 3 | Status: ON HOLD | OUTPATIENT
Start: 2019-04-29 | End: 2019-09-01 | Stop reason: HOSPADM

## 2019-04-29 NOTE — TELEPHONE ENCOUNTER
Astrid Villar called requesting a refill on the following medications:  Requested Prescriptions     Pending Prescriptions Disp Refills    gabapentin (NEURONTIN) 400 MG capsule 90 capsule 3     Sig: Take 2 capsules by mouth every evening for 30 days. Pharmacy verified:[facility]  . pv      Date of last visit: 12-18-19  Date of next visit (if applicable): 0/6/7371

## 2019-05-02 ENCOUNTER — HOSPITAL ENCOUNTER (OUTPATIENT)
Age: 74
Discharge: HOME | End: 2019-05-02
Payer: MEDICARE

## 2019-05-02 LAB
AVERAGE GLUCOSE: 114 MG/DL (ref 70–126)
HBA1C MFR BLD: 5.8 % (ref 4.4–6.4)

## 2019-05-02 PROCEDURE — 83036 HEMOGLOBIN GLYCOSYLATED A1C: CPT

## 2019-05-02 PROCEDURE — 36415 COLL VENOUS BLD VENIPUNCTURE: CPT

## 2019-05-06 ENCOUNTER — OFFICE VISIT (OUTPATIENT)
Dept: FAMILY MEDICINE CLINIC | Age: 74
End: 2019-05-06
Payer: MEDICARE

## 2019-05-06 VITALS
BODY MASS INDEX: 40.05 KG/M2 | OXYGEN SATURATION: 98 % | HEART RATE: 72 BPM | WEIGHT: 219 LBS | RESPIRATION RATE: 16 BRPM | DIASTOLIC BLOOD PRESSURE: 68 MMHG | SYSTOLIC BLOOD PRESSURE: 124 MMHG

## 2019-05-06 DIAGNOSIS — H61.21 IMPACTED CERUMEN OF RIGHT EAR: ICD-10-CM

## 2019-05-06 DIAGNOSIS — I71.40 ABDOMINAL AORTIC ANEURYSM (AAA) WITHOUT RUPTURE: ICD-10-CM

## 2019-05-06 DIAGNOSIS — I73.9 PVD (PERIPHERAL VASCULAR DISEASE) (HCC): ICD-10-CM

## 2019-05-06 DIAGNOSIS — E11.65 TYPE 2 DIABETES MELLITUS WITH HYPERGLYCEMIA, WITH LONG-TERM CURRENT USE OF INSULIN (HCC): ICD-10-CM

## 2019-05-06 DIAGNOSIS — E11.42 DIABETIC POLYNEUROPATHY ASSOCIATED WITH TYPE 2 DIABETES MELLITUS (HCC): ICD-10-CM

## 2019-05-06 DIAGNOSIS — Z79.4 TYPE 2 DIABETES MELLITUS WITH HYPERGLYCEMIA, WITH LONG-TERM CURRENT USE OF INSULIN (HCC): ICD-10-CM

## 2019-05-06 DIAGNOSIS — E11.42 DIABETIC POLYNEUROPATHY ASSOCIATED WITH TYPE 2 DIABETES MELLITUS (HCC): Primary | ICD-10-CM

## 2019-05-06 PROCEDURE — 3044F HG A1C LEVEL LT 7.0%: CPT | Performed by: FAMILY MEDICINE

## 2019-05-06 PROCEDURE — G8427 DOCREV CUR MEDS BY ELIG CLIN: HCPCS | Performed by: FAMILY MEDICINE

## 2019-05-06 PROCEDURE — G8598 ASA/ANTIPLAT THER USED: HCPCS | Performed by: FAMILY MEDICINE

## 2019-05-06 PROCEDURE — 99214 OFFICE O/P EST MOD 30 MIN: CPT | Performed by: FAMILY MEDICINE

## 2019-05-06 PROCEDURE — 4040F PNEUMOC VAC/ADMIN/RCVD: CPT | Performed by: FAMILY MEDICINE

## 2019-05-06 PROCEDURE — 3017F COLORECTAL CA SCREEN DOC REV: CPT | Performed by: FAMILY MEDICINE

## 2019-05-06 PROCEDURE — G8400 PT W/DXA NO RESULTS DOC: HCPCS | Performed by: FAMILY MEDICINE

## 2019-05-06 PROCEDURE — 2022F DILAT RTA XM EVC RTNOPTHY: CPT | Performed by: FAMILY MEDICINE

## 2019-05-06 PROCEDURE — G8417 CALC BMI ABV UP PARAM F/U: HCPCS | Performed by: FAMILY MEDICINE

## 2019-05-06 PROCEDURE — 1036F TOBACCO NON-USER: CPT | Performed by: FAMILY MEDICINE

## 2019-05-06 PROCEDURE — 69210 REMOVE IMPACTED EAR WAX UNI: CPT | Performed by: FAMILY MEDICINE

## 2019-05-06 PROCEDURE — 1123F ACP DISCUSS/DSCN MKR DOCD: CPT | Performed by: FAMILY MEDICINE

## 2019-05-06 PROCEDURE — 1090F PRES/ABSN URINE INCON ASSESS: CPT | Performed by: FAMILY MEDICINE

## 2019-05-06 RX ORDER — INSULIN GLARGINE 100 [IU]/ML
INJECTION, SOLUTION SUBCUTANEOUS
Qty: 15 ML | Refills: 2 | Status: SHIPPED | OUTPATIENT
Start: 2019-05-06 | End: 2019-05-06

## 2019-05-06 ASSESSMENT — ENCOUNTER SYMPTOMS
SORE THROAT: 0
CONSTIPATION: 0
NAUSEA: 0
SHORTNESS OF BREATH: 0
EYE DISCHARGE: 0
DIARRHEA: 0
ABDOMINAL PAIN: 0
COUGH: 0
RHINORRHEA: 0
WHEEZING: 0

## 2019-05-06 ASSESSMENT — PATIENT HEALTH QUESTIONNAIRE - PHQ9
2. FEELING DOWN, DEPRESSED OR HOPELESS: 0
SUM OF ALL RESPONSES TO PHQ9 QUESTIONS 1 & 2: 0
1. LITTLE INTEREST OR PLEASURE IN DOING THINGS: 0
SUM OF ALL RESPONSES TO PHQ QUESTIONS 1-9: 0
SUM OF ALL RESPONSES TO PHQ QUESTIONS 1-9: 0

## 2019-05-06 NOTE — PROGRESS NOTES
[facility]  [address] [place] [address]  Dept: [phone]  Dept Fax: [phone]  Loc: [phone]    Radha Short is a 75 y.o. female who presents today for her medical conditions/complaints as noted below. Chief Complaint   Patient presents with    6 Month Follow-Up     A1C 5/2/19    Diabetes    Nasal Congestion    Other     patient states she hears a thumping noise when she lays down unless she is sleeping in her lounge chair. HPI:     Patient presents for six-month recheck of chronic medical conditions including diabetes mellitus type 2 with neuropathy, peripheral vascular disease, AAA, essential hypertension. Her diabetes has been very well controlled with her current regimen and her latest hemoglobin A1c was 5.8. She denies any signs or symptoms of hypoglycemia and tolerates medications well. Also has peripheral vascular disease with numbness and tingling of her feet associated with poor blood flow as well as diabetes. She states that she still has some numbness but the neuropathic pain is controlled with Lyrica and Neurontin. She does follow up with cardiology. Also has history of a AAA that this is being monitored and not yet large enough for surgical intervention. Next, patient with hypertension but this is been well-controlled on her current regimen. Denies any headache, visual changes, chest pain. Does states she's had some congestion and fullness in her head over the past several days. States she almost feels dizzy if she changes positions too quickly. She states that she hears a thumping sound in her right ear feels full of pressure.       Past Medical History:   Diagnosis Date    CAD (coronary artery disease)     Hyperlipidemia     Hypertension     ICD (implantable cardiac defibrillator), dual, in situ     [facility] [facility] dual ICD    Shingles 12/2014    [facility] dual ICD     Type II or unspecified type diabetes mellitus without mention of complication, not stated as uncontrolled     Ventricular arrhythmia       Past Surgical History:   Procedure Laterality Date    CARDIAC PACEMAKER PLACEMENT  9-08-09    [facility]    CARDIOVASCULAR STRESS TEST  01-27-10    Excellent treadmill exercise. Improved functional capacity to functional class 1 completed 8 METS. Hemodynamic response was appropriate. No ischemic ECG changes noted.  CARDIOVASCULAR STRESS TEST  10-28-09    No evidence of stress induced ischemia. Evidence of  prior transmural MI demonstrated by transient fixed defects of inferior wall extending into lateral wall, indicative of RCA lesion. Bowel and soft tissue attenuation interfering w/ counts of lateral wall. EF 29% w/ severe septal and inferolateral hypokinesis w/ very mild lateral wall hypokinesis being noted.  CARDIOVERSION  8-29-09    CHOLECYSTECTOMY  2005    Laparoscopic    COLONOSCOPY Left 1/9/2019    COLONOSCOPY performed by Shilpi Spangler MD at [address] [place] [address] CATH LAB PROCEDURE  8-24-09    Multivessel disease demonstrated by LAD having 70-80%  proximal lesion and napkin-ring lesion at bifurcation w/ D2. D2 appears to be medium large caliber vessel which has an ostial lesion of 70-80%. left -to-left collaterals as well as left-to-right collaterals emanating from LAD to PDA. Circumflex had anterior marginal branch and posterior marginal branch.  HERNIA REPAIR      Multiple    HYSTERECTOMY  1997    PACEMAKER PLACEMENT      TRANSTHORACIC ECHOCARDIOGRAM  07-11-11    LV size mildly to moderately dilated. Systolic function markedly reduced. EF 25-30%. Severe diffuse hypokinesis. Grade 1 diastolic dysfunction. LA was mildly to moderately dilated. RV mildly dilated, systolic function mildly reduced. Mild MR and TR.      TRANSTHORACIC ECHOCARDIOGRAM  8-24-09    LV demonstrates severe hypokinesis of the inferior basal as well as  basal aneurysm being noted and paradoxical septal motion. EF 45-50%. LA is moderately dilated and AV demonstrates mild AV sclerosis w/o AS and AI. Trace MR and TV insufficiency. Family History   Problem Relation Age of Onset    Diabetes Father     No Known Problems Mother        Social History     Tobacco Use    Smoking status: Former Smoker     Packs/day: 1.50     Years: 25.00     Pack years: 37.50     Types: Cigarettes     Last attempt to quit: 1988     Years since quittin.6    Smokeless tobacco: Never Used   Substance Use Topics    Alcohol use: No      Current Outpatient Medications   Medication Sig Dispense Refill    gabapentin (NEURONTIN) 400 MG capsule Take 2 capsules by mouth every evening for 30 days. 90 capsule 3    gabapentin (NEURONTIN) 400 MG capsule Take 400 mg by mouth 2 times daily.  pregabalin (LYRICA) 100 MG capsule Take 200 mg by mouth 2 times daily.  rOPINIRole (REQUIP) 0.5 MG tablet Take 1 tablet by mouth nightly 30 tablet 11    pregabalin (LYRICA) 200 MG capsule Take 1 capsule by mouth 2 times daily for 30 days. . 60 capsule 3    hydrochlorothiazide (HYDRODIURIL) 25 MG tablet Take 0.5 tablets by mouth daily 30 tablet 4    blood glucose test strips (ASCENSIA AUTODISC VI;ONE TOUCH ULTRA TEST VI) strip Test once daily. Dispense One Touch Ultra Test Strips.   Dx: Type 2 diabetes (250.00) 100 each 5    atorvastatin (LIPITOR) 80 MG tablet TAKE 1 TABLET DAILY 90 tablet 1    vitamin B-12 (CYANOCOBALAMIN) 1000 MCG tablet Take 3,000 mcg by mouth daily      fluticasone (FLONASE) 50 MCG/ACT nasal spray 2 sprays by Nasal route daily 1 Bottle 1    Insulin Pen Needle (B-D UF III MINI PEN NEEDLES) 31G X 5 MM MISC 1 each by Does not apply route daily 100 each 3    clopidogrel (PLAVIX) 75 MG tablet Take 1 tablet by mouth daily 90 tablet 3    amiodarone (CORDARONE) 200 MG tablet TAKE 1 TABLET DAILY 90 tablet 3    metoprolol tartrate (LOPRESSOR) 50 MG tablet TAKE 1 TABLET TWICE A  tablet 3    enalapril (VASOTEC) 20 MG tablet TAKE 1 TABLET TWICE A  tablet 3    metFORMIN (GLUCOPHAGE XR) 750 MG extended release tablet Take 1 tablet by mouth 2 times daily (with meals) 30 tablet 11    aspirin EC 81 MG EC tablet Take 1 tablet by mouth daily 30 tablet 3    insulin glargine (LANTUS SOLOSTAR) 100 UNIT/ML injection pen Inject 20 Units into the skin nightly 6 mL 0     No current facility-administered medications for this visit. Allergies   Allergen Reactions    Sulfa Antibiotics        Health Maintenance   Topic Date Due    DTaP/Tdap/Td vaccine (1 - Tdap) 01/03/1963    Shingles Vaccine (1 of 2) 01/03/1994    Diabetic foot exam  10/03/2018    Diabetic retinal exam  10/10/2018    Lipid screen  11/16/2019    TSH testing  11/16/2019    Potassium monitoring  01/02/2020    Creatinine monitoring  01/02/2020    A1C test (Diabetic or Prediabetic)  05/02/2020    Colon cancer screen colonoscopy  01/09/2029    Flu vaccine  Completed    Pneumococcal 65+ years Vaccine  Completed    DEXA (modify frequency per FRAX score)  Addressed       Subjective:      Review of Systems   Constitutional: Negative for chills, fatigue and fever. HENT: Positive for congestion, hearing loss (right side) and postnasal drip. Negative for rhinorrhea and sore throat. Eyes: Negative for discharge and visual disturbance. Respiratory: Negative for cough, shortness of breath and wheezing. Cardiovascular: Negative for chest pain and palpitations. Gastrointestinal: Negative for abdominal pain, constipation, diarrhea and nausea. Genitourinary: Negative for dysuria and hematuria. Musculoskeletal: Negative for arthralgias and myalgias. Neurological: Positive for numbness (feet) and headaches. Negative for dizziness. Psychiatric/Behavioral: Negative for sleep disturbance. The patient is not nervous/anxious.         Objective:     Physical Exam   Constitutional: She is oriented to person, place, and time. She appears well-developed and well-nourished. No distress. HENT:   Head: Normocephalic and atraumatic. Right Ear: Hearing, tympanic membrane, external ear and ear canal normal.   Left Ear: Hearing, tympanic membrane, external ear and ear canal normal.   Nose: Right sinus exhibits no maxillary sinus tenderness and no frontal sinus tenderness. Left sinus exhibits no maxillary sinus tenderness and no frontal sinus tenderness. Mouth/Throat: Oropharynx is clear and moist and mucous membranes are normal. No oropharyngeal exudate, posterior oropharyngeal edema or posterior oropharyngeal erythema. Cerumen impaction, right   Eyes: Pupils are equal, round, and reactive to light. Conjunctivae and EOM are normal. Right eye exhibits no discharge. Left eye exhibits no discharge. No scleral icterus. Neck: Normal range of motion. Neck supple. Cardiovascular: Normal rate, regular rhythm, normal heart sounds and intact distal pulses. Pulmonary/Chest: Effort normal and breath sounds normal. No respiratory distress. She has no wheezes. Abdominal: Soft. Bowel sounds are normal. She exhibits no distension. There is no tenderness. Musculoskeletal: Normal range of motion. She exhibits no edema or tenderness. Lymphadenopathy:     She has no cervical adenopathy. Neurological: She is alert and oriented to person, place, and time. She exhibits normal muscle tone. Coordination normal.   Skin: Skin is warm and dry. No rash noted. Psychiatric: She has a normal mood and affect. Her behavior is normal. Judgment and thought content normal.   Nursing note and vitals reviewed.   Ear Cerumen Removal Procedure Note    Indication: ear cerumen impaction    Procedure: After placing the patient's head in the appropriate position, the patient's right ear canal was irrigated with the appropriate solution and curetted until the majority of the cerumen was flushed out of the canal.  At this point, the procedure was complete. The patient tolerated the procedure well. Complications: None    Visual inspection:  Deformity/amputation: absent  Skin lesions/pre-ulcerative calluses: absent  Edema: right- negative, left- negative    Sensory exam:  Monofilament sensation: abnormal  (minimum of 5 random plantar locations tested, avoiding callused areas - > 1 area with absence of sensation is + for neuropathy)    Plus at least one of the following:  Pulses: normal,   Pinprick: Intact  Proprioception: Intact  Vibration (128 Hz): N/A  Thickened, yellow toenails of all 10 digits    /68 (Site: Left Upper Arm, Position: Sitting, Cuff Size: Medium Adult)   Pulse 72   Resp 16   Wt 219 lb (99.3 kg)   SpO2 98%   BMI 40.05 kg/m²     Assessment:       Diagnosis Orders   1. Diabetic polyneuropathy associated with type 2 diabetes mellitus (HCC)   DIABETES FOOT EXAM     DIABETES EYE EXAM   2. Abdominal aortic aneurysm (AAA) without rupture ([facility] [address]     3. PVD (peripheral vascular disease) ([facility] [address]     4. Type 2 diabetes mellitus with hyperglycemia, with long-term current use of insulin ([facility])     5. Impacted cerumen of right ear  10941 - [place] REMOVE IMPACTED EAR WAX       Plan:     DM- controlled, lyrica/neurontin for neuropathy. Sugars controlled with current meds  AAA- follows up with CT surg  PVD- chronic, med management. Follows  With cardio/ ct surg  Right ear flushed. Discussed use, benefit, and side effects of prescribed medications. Barriers tomedication compliance addressed. All patient questions answered. Pt voiced understanding. No follow-ups on file. Orders Placed This Encounter   Procedures     DIABETES FOOT EXAM     DIABETES EYE EXAM     Standing Status:   Future     Standing Expiration Date:   11/6/2020    00639 - [place] REMOVE IMPACTED EAR WAX     No orders of the defined types were placed in this encounter.        Reccommended tobacco cessation options including pharmacologicmethods, counseled great than 3 minutes during this visit:  Yes  []  No  []     Patient given educational materials - see patient instructions. Discussed use, benefit, and sideeffects of prescribed medications. All patient questions answered. Pt voiced understanding. Reviewed health maintenance. Instructed to continue current medications, diet and exercise. Patient agreed with treatment plan. Follow up as directed.      Electronically signed by Ariel Lynn MD on 5/6/2019 at 3:40 PM

## 2019-05-08 ENCOUNTER — OFFICE VISIT (OUTPATIENT)
Dept: NEUROSURGERY | Age: 74
End: 2019-05-08
Payer: MEDICARE

## 2019-05-08 VITALS
HEIGHT: 62 IN | SYSTOLIC BLOOD PRESSURE: 133 MMHG | BODY MASS INDEX: 40.52 KG/M2 | WEIGHT: 220.2 LBS | HEART RATE: 76 BPM | DIASTOLIC BLOOD PRESSURE: 76 MMHG

## 2019-05-08 DIAGNOSIS — M21.371 RIGHT FOOT DROP: ICD-10-CM

## 2019-05-08 DIAGNOSIS — R29.898 WEAKNESS OF FOOT, RIGHT: ICD-10-CM

## 2019-05-08 DIAGNOSIS — R20.2 NUMBNESS AND TINGLING OF BOTH FEET: Primary | ICD-10-CM

## 2019-05-08 DIAGNOSIS — R20.0 NUMBNESS AND TINGLING OF BOTH FEET: Primary | ICD-10-CM

## 2019-05-08 PROCEDURE — G8427 DOCREV CUR MEDS BY ELIG CLIN: HCPCS | Performed by: NEUROLOGICAL SURGERY

## 2019-05-08 PROCEDURE — 1036F TOBACCO NON-USER: CPT | Performed by: NEUROLOGICAL SURGERY

## 2019-05-08 PROCEDURE — 4040F PNEUMOC VAC/ADMIN/RCVD: CPT | Performed by: NEUROLOGICAL SURGERY

## 2019-05-08 PROCEDURE — 3017F COLORECTAL CA SCREEN DOC REV: CPT | Performed by: NEUROLOGICAL SURGERY

## 2019-05-08 PROCEDURE — G8400 PT W/DXA NO RESULTS DOC: HCPCS | Performed by: NEUROLOGICAL SURGERY

## 2019-05-08 PROCEDURE — G8598 ASA/ANTIPLAT THER USED: HCPCS | Performed by: NEUROLOGICAL SURGERY

## 2019-05-08 PROCEDURE — G8417 CALC BMI ABV UP PARAM F/U: HCPCS | Performed by: NEUROLOGICAL SURGERY

## 2019-05-08 PROCEDURE — 1123F ACP DISCUSS/DSCN MKR DOCD: CPT | Performed by: NEUROLOGICAL SURGERY

## 2019-05-08 PROCEDURE — 1090F PRES/ABSN URINE INCON ASSESS: CPT | Performed by: NEUROLOGICAL SURGERY

## 2019-05-08 PROCEDURE — 99214 OFFICE O/P EST MOD 30 MIN: CPT | Performed by: NEUROLOGICAL SURGERY

## 2019-05-14 ENCOUNTER — OFFICE VISIT (OUTPATIENT)
Dept: PHYSICAL MEDICINE AND REHAB | Age: 74
End: 2019-05-14
Payer: MEDICARE

## 2019-05-14 VITALS
WEIGHT: 220.24 LBS | DIASTOLIC BLOOD PRESSURE: 77 MMHG | BODY MASS INDEX: 40.53 KG/M2 | SYSTOLIC BLOOD PRESSURE: 126 MMHG | HEART RATE: 69 BPM | HEIGHT: 62 IN

## 2019-05-14 DIAGNOSIS — M21.371 RIGHT FOOT DROP: Primary | ICD-10-CM

## 2019-05-14 DIAGNOSIS — M47.816 LUMBAR SPONDYLOSIS: ICD-10-CM

## 2019-05-14 PROCEDURE — G8400 PT W/DXA NO RESULTS DOC: HCPCS | Performed by: PHYSICAL MEDICINE & REHABILITATION

## 2019-05-14 PROCEDURE — G8427 DOCREV CUR MEDS BY ELIG CLIN: HCPCS | Performed by: PHYSICAL MEDICINE & REHABILITATION

## 2019-05-14 PROCEDURE — 99214 OFFICE O/P EST MOD 30 MIN: CPT | Performed by: PHYSICAL MEDICINE & REHABILITATION

## 2019-05-14 PROCEDURE — 4040F PNEUMOC VAC/ADMIN/RCVD: CPT | Performed by: PHYSICAL MEDICINE & REHABILITATION

## 2019-05-14 PROCEDURE — 3017F COLORECTAL CA SCREEN DOC REV: CPT | Performed by: PHYSICAL MEDICINE & REHABILITATION

## 2019-05-14 PROCEDURE — G8598 ASA/ANTIPLAT THER USED: HCPCS | Performed by: PHYSICAL MEDICINE & REHABILITATION

## 2019-05-14 PROCEDURE — G8417 CALC BMI ABV UP PARAM F/U: HCPCS | Performed by: PHYSICAL MEDICINE & REHABILITATION

## 2019-05-14 PROCEDURE — 1123F ACP DISCUSS/DSCN MKR DOCD: CPT | Performed by: PHYSICAL MEDICINE & REHABILITATION

## 2019-05-14 PROCEDURE — 1090F PRES/ABSN URINE INCON ASSESS: CPT | Performed by: PHYSICAL MEDICINE & REHABILITATION

## 2019-05-14 PROCEDURE — 1036F TOBACCO NON-USER: CPT | Performed by: PHYSICAL MEDICINE & REHABILITATION

## 2019-05-14 NOTE — PROGRESS NOTES
Emotionally abused: Not on file     Physically abused: Not on file     Forced sexual activity: Not on file   Other Topics Concern    Not on file   Social History Narrative    Not on file       Past Medical History:   Diagnosis Date    CAD (coronary artery disease)     Hyperlipidemia     Hypertension     ICD (implantable cardiac defibrillator), dual, in situ     [facility] dual ICD    Shingles 12/2014    [facility] dual ICD     Type II or unspecified type diabetes mellitus without mention of complication, not stated as uncontrolled     Ventricular arrhythmia        Past Surgical History:   Procedure Laterality Date    CARDIAC PACEMAKER PLACEMENT  9-08-09    [facility]    CARDIOVASCULAR STRESS TEST  01-27-10    Excellent treadmill exercise. Improved functional capacity to functional class 1 completed 8 METS. Hemodynamic response was appropriate. No ischemic ECG changes noted.  CARDIOVASCULAR STRESS TEST  10-28-09    No evidence of stress induced ischemia. Evidence of  prior transmural MI demonstrated by transient fixed defects of inferior wall extending into lateral wall, indicative of RCA lesion. Bowel and soft tissue attenuation interfering w/ counts of lateral wall. EF 29% w/ severe septal and inferolateral hypokinesis w/ very mild lateral wall hypokinesis being noted.  CARDIOVERSION  8-29-09    CHOLECYSTECTOMY  2005    Laparoscopic    COLONOSCOPY Left 1/9/2019    COLONOSCOPY performed by Denisha Caal MD at [address] [place] [address] CATH LAB PROCEDURE  8-24-09    Multivessel disease demonstrated by LAD having 70-80%  proximal lesion and napkin-ring lesion at bifurcation w/ D2. D2 appears to be medium large caliber vessel which has an ostial lesion of 70-80%. left -to-left collaterals as well as left-to-right collaterals emanating from LAD to PDA. Circumflex had anterior marginal branch and posterior marginal branch.      HERNIA REPAIR      Multiple

## 2019-05-20 ENCOUNTER — OFFICE VISIT (OUTPATIENT)
Dept: CARDIOLOGY CLINIC | Age: 74
End: 2019-05-20
Payer: MEDICARE

## 2019-05-20 VITALS
SYSTOLIC BLOOD PRESSURE: 112 MMHG | HEIGHT: 62 IN | WEIGHT: 224 LBS | BODY MASS INDEX: 41.22 KG/M2 | DIASTOLIC BLOOD PRESSURE: 60 MMHG | HEART RATE: 77 BPM

## 2019-05-20 DIAGNOSIS — I25.5 ISCHEMIC CARDIOMYOPATHY: ICD-10-CM

## 2019-05-20 DIAGNOSIS — I71.40 ABDOMINAL AORTIC ANEURYSM (AAA) WITHOUT RUPTURE: ICD-10-CM

## 2019-05-20 DIAGNOSIS — I25.810 CORONARY ARTERY DISEASE INVOLVING CORONARY BYPASS GRAFT OF NATIVE HEART WITHOUT ANGINA PECTORIS: ICD-10-CM

## 2019-05-20 DIAGNOSIS — Z95.810 DUAL IMPLANTABLE CARDIOVERTER-DEFIBRILLATOR IN SITU: Primary | ICD-10-CM

## 2019-05-20 PROCEDURE — 93000 ELECTROCARDIOGRAM COMPLETE: CPT | Performed by: NUCLEAR MEDICINE

## 2019-05-20 PROCEDURE — 1123F ACP DISCUSS/DSCN MKR DOCD: CPT | Performed by: NUCLEAR MEDICINE

## 2019-05-20 PROCEDURE — 3017F COLORECTAL CA SCREEN DOC REV: CPT | Performed by: NUCLEAR MEDICINE

## 2019-05-20 PROCEDURE — G8598 ASA/ANTIPLAT THER USED: HCPCS | Performed by: NUCLEAR MEDICINE

## 2019-05-20 PROCEDURE — 1090F PRES/ABSN URINE INCON ASSESS: CPT | Performed by: NUCLEAR MEDICINE

## 2019-05-20 PROCEDURE — 4040F PNEUMOC VAC/ADMIN/RCVD: CPT | Performed by: NUCLEAR MEDICINE

## 2019-05-20 PROCEDURE — G8417 CALC BMI ABV UP PARAM F/U: HCPCS | Performed by: NUCLEAR MEDICINE

## 2019-05-20 PROCEDURE — 99214 OFFICE O/P EST MOD 30 MIN: CPT | Performed by: NUCLEAR MEDICINE

## 2019-05-20 PROCEDURE — 1036F TOBACCO NON-USER: CPT | Performed by: NUCLEAR MEDICINE

## 2019-05-20 PROCEDURE — G8400 PT W/DXA NO RESULTS DOC: HCPCS | Performed by: NUCLEAR MEDICINE

## 2019-05-20 PROCEDURE — G8427 DOCREV CUR MEDS BY ELIG CLIN: HCPCS | Performed by: NUCLEAR MEDICINE

## 2019-05-20 NOTE — PROGRESS NOTES
Endoscopy    CORONARY ARTERY BYPASS GRAFT      DIAGNOSTIC CARDIAC CATH LAB PROCEDURE  09    Multivessel disease demonstrated by LAD having 70-80%  proximal lesion and napkin-ring lesion at bifurcation w/ D2. D2 appears to be medium large caliber vessel which has an ostial lesion of 70-80%. left -to-left collaterals as well as left-to-right collaterals emanating from LAD to PDA. Circumflex had anterior marginal branch and posterior marginal branch.  HERNIA REPAIR      Multiple    HYSTERECTOMY      PACEMAKER PLACEMENT      TRANSTHORACIC ECHOCARDIOGRAM  11    LV size mildly to moderately dilated. Systolic function markedly reduced. EF 25-30%. Severe diffuse hypokinesis. Grade 1 diastolic dysfunction. LA was mildly to moderately dilated. RV mildly dilated, systolic function mildly reduced. Mild MR and TR.  TRANSTHORACIC ECHOCARDIOGRAM  09    LV demonstrates severe hypokinesis of the inferior basal as well as  basal aneurysm being noted and paradoxical septal motion. EF 45-50%. LA is moderately dilated and AV demonstrates mild AV sclerosis w/o AS and AI. Trace MR and TV insufficiency. Family History   Problem Relation Age of Onset    Diabetes Father     No Known Problems Mother      Social History     Tobacco Use    Smoking status: Former Smoker     Packs/day: 1.50     Years: 25.00     Pack years: 37.50     Types: Cigarettes     Last attempt to quit: 1988     Years since quittin.6    Smokeless tobacco: Never Used   Substance Use Topics    Alcohol use: No      Current Outpatient Medications   Medication Sig Dispense Refill    gabapentin (NEURONTIN) 400 MG capsule Take 2 capsules by mouth every evening for 30 days. 90 capsule 3    gabapentin (NEURONTIN) 400 MG capsule Take 400 mg by mouth 2 times daily.  pregabalin (LYRICA) 100 MG capsule Take 200 mg by mouth 2 times daily.       rOPINIRole (REQUIP) 0.5 MG tablet Take 1 tablet by mouth nightly 30 tablet 11    hydrochlorothiazide (HYDRODIURIL) 25 MG tablet Take 0.5 tablets by mouth daily 30 tablet 4    blood glucose test strips (ASCENSIA AUTODISC VI;ONE TOUCH ULTRA TEST VI) strip Test once daily. Dispense One Touch Ultra Test Strips. Dx: Type 2 diabetes (250.00) 100 each 5    atorvastatin (LIPITOR) 80 MG tablet TAKE 1 TABLET DAILY 90 tablet 1    vitamin B-12 (CYANOCOBALAMIN) 1000 MCG tablet Take 3,000 mcg by mouth daily      fluticasone (FLONASE) 50 MCG/ACT nasal spray 2 sprays by Nasal route daily 1 Bottle 1    Insulin Pen Needle (B-D UF III MINI PEN NEEDLES) 31G X 5 MM MISC 1 each by Does not apply route daily 100 each 3    clopidogrel (PLAVIX) 75 MG tablet Take 1 tablet by mouth daily 90 tablet 3    amiodarone (CORDARONE) 200 MG tablet TAKE 1 TABLET DAILY 90 tablet 3    metoprolol tartrate (LOPRESSOR) 50 MG tablet TAKE 1 TABLET TWICE A  tablet 3    enalapril (VASOTEC) 20 MG tablet TAKE 1 TABLET TWICE A  tablet 3    metFORMIN (GLUCOPHAGE XR) 750 MG extended release tablet Take 1 tablet by mouth 2 times daily (with meals) 30 tablet 11    aspirin EC 81 MG EC tablet Take 1 tablet by mouth daily 30 tablet 3    pregabalin (LYRICA) 200 MG capsule Take 1 capsule by mouth 2 times daily for 30 days. . 60 capsule 3    insulin glargine (LANTUS SOLOSTAR) 100 UNIT/ML injection pen Inject 20 Units into the skin nightly 6 mL 0     No current facility-administered medications for this visit.       Allergies   Allergen Reactions    Sulfa Antibiotics      Health Maintenance   Topic Date Due    DTaP/Tdap/Td vaccine (1 - Tdap) 01/03/1963    Shingles Vaccine (1 of 2) 01/03/1994    Diabetic retinal exam  10/10/2018    Lipid screen  11/16/2019    TSH testing  11/16/2019    Potassium monitoring  01/02/2020    Creatinine monitoring  01/02/2020    A1C test (Diabetic or Prediabetic)  05/02/2020    Diabetic foot exam  05/06/2020    Colon cancer screen colonoscopy  01/09/2029    Flu vaccine  Completed    medications. All patient questions answered. Pt voicedunderstanding. Instructed to continue current medications, diet and exercise. Continue risk factor modification and medical management. Patient agreed with treatment plan. Follow up as directed.     Electronically signedby Elmer Nava MD on 5/20/2019 at 11:25 AM

## 2019-05-30 ENCOUNTER — HOSPITAL ENCOUNTER (OUTPATIENT)
Age: 74
Discharge: HOME | DRG: 291 | End: 2019-05-30
Attending: HOSPITALIST | Admitting: HOSPITALIST
Payer: MEDICARE

## 2019-05-30 ENCOUNTER — OFFICE VISIT (OUTPATIENT)
Dept: FAMILY MEDICINE CLINIC | Age: 74
End: 2019-05-30
Payer: MEDICARE

## 2019-05-30 ENCOUNTER — HOSPITAL ENCOUNTER (INPATIENT)
Age: 74
LOS: 6 days | Discharge: HOME HEALTH | DRG: 291 | End: 2019-06-05
Attending: HOSPITALIST | Admitting: INTERNAL MEDICINE
Payer: MEDICARE

## 2019-05-30 ENCOUNTER — HOSPITAL ENCOUNTER (OUTPATIENT)
Dept: GENERAL RADIOLOGY | Age: 74
Discharge: HOME | DRG: 291 | End: 2019-05-30
Payer: MEDICARE

## 2019-05-30 VITALS
OXYGEN SATURATION: 85 % | BODY MASS INDEX: 41.59 KG/M2 | HEART RATE: 76 BPM | RESPIRATION RATE: 24 BRPM | HEIGHT: 62 IN | SYSTOLIC BLOOD PRESSURE: 116 MMHG | DIASTOLIC BLOOD PRESSURE: 72 MMHG | WEIGHT: 226 LBS

## 2019-05-30 DIAGNOSIS — J90 PLEURAL EFFUSION, BILATERAL: ICD-10-CM

## 2019-05-30 DIAGNOSIS — Z79.4 TYPE 2 DIABETES MELLITUS WITH HYPERGLYCEMIA, WITH LONG-TERM CURRENT USE OF INSULIN (HCC): ICD-10-CM

## 2019-05-30 DIAGNOSIS — R06.02 SOB (SHORTNESS OF BREATH): ICD-10-CM

## 2019-05-30 DIAGNOSIS — Z87.891 FORMER SMOKER: ICD-10-CM

## 2019-05-30 DIAGNOSIS — J18.9 PNEUMONIA DUE TO INFECTIOUS ORGANISM, UNSPECIFIED LATERALITY, UNSPECIFIED PART OF LUNG: Primary | ICD-10-CM

## 2019-05-30 DIAGNOSIS — E11.65 TYPE 2 DIABETES MELLITUS WITH HYPERGLYCEMIA, WITH LONG-TERM CURRENT USE OF INSULIN (HCC): ICD-10-CM

## 2019-05-30 DIAGNOSIS — E11.42 DIABETIC POLYNEUROPATHY ASSOCIATED WITH TYPE 2 DIABETES MELLITUS (HCC): ICD-10-CM

## 2019-05-30 DIAGNOSIS — G47.30 SLEEP APNEA, UNSPECIFIED TYPE: Primary | ICD-10-CM

## 2019-05-30 DIAGNOSIS — J96.21 ACUTE AND CHRONIC RESPIRATORY FAILURE WITH HYPOXIA (HCC): ICD-10-CM

## 2019-05-30 DIAGNOSIS — N63.10 MASS OF RIGHT BREAST: ICD-10-CM

## 2019-05-30 DIAGNOSIS — J96.01 ACUTE HYPOXEMIC RESPIRATORY FAILURE (HCC): ICD-10-CM

## 2019-05-30 DIAGNOSIS — R06.02 SHORTNESS OF BREATH: ICD-10-CM

## 2019-05-30 LAB
ALBUMIN SERPL-MCNC: 3.8 G/DL (ref 3.5–5.1)
ALP BLD-CCNC: 101 U/L (ref 38–126)
ALT SERPL-CCNC: 34 U/L (ref 11–66)
ANION GAP SERPL CALCULATED.3IONS-SCNC: 17 MEQ/L (ref 8–16)
AST SERPL-CCNC: 25 U/L (ref 5–40)
AVERAGE GLUCOSE: 120 MG/DL (ref 70–126)
BASOPHILS # BLD: 0.7 %
BASOPHILS ABSOLUTE: 0.1 THOU/MM3 (ref 0–0.1)
BILIRUB SERPL-MCNC: 0.5 MG/DL (ref 0.3–1.2)
BUN BLDV-MCNC: 17 MG/DL (ref 7–22)
CALCIUM SERPL-MCNC: 9 MG/DL (ref 8.5–10.5)
CHLORIDE BLD-SCNC: 98 MEQ/L (ref 98–111)
CO2: 24 MEQ/L (ref 23–33)
CREAT SERPL-MCNC: 0.9 MG/DL (ref 0.4–1.2)
EKG ATRIAL RATE: 75 BPM
EKG P-R INTERVAL: 216 MS
EKG Q-T INTERVAL: 508 MS
EKG QRS DURATION: 182 MS
EKG QTC CALCULATION (BAZETT): 548 MS
EKG R AXIS: -78 DEGREES
EKG T AXIS: 106 DEGREES
EKG VENTRICULAR RATE: 70 BPM
EOSINOPHIL # BLD: 0.8 %
EOSINOPHILS ABSOLUTE: 0.1 THOU/MM3 (ref 0–0.4)
ERYTHROCYTE [DISTWIDTH] IN BLOOD BY AUTOMATED COUNT: 17.7 % (ref 11.5–14.5)
ERYTHROCYTE [DISTWIDTH] IN BLOOD BY AUTOMATED COUNT: 51.6 FL (ref 35–45)
FOLATE: 10.2 NG/ML (ref 4.8–24.2)
GFR SERPL CREATININE-BSD FRML MDRD: 61 ML/MIN/1.73M2
GLUCOSE BLD-MCNC: 95 MG/DL (ref 70–108)
HBA1C MFR BLD: 6 % (ref 4.4–6.4)
HCT VFR BLD CALC: 31.4 % (ref 37–47)
HEMOGLOBIN: 9.3 GM/DL (ref 12–16)
IMMATURE GRANS (ABS): 0.04 THOU/MM3 (ref 0–0.07)
IMMATURE GRANULOCYTES: 0.4 %
LYMPHOCYTES # BLD: 12 %
LYMPHOCYTES ABSOLUTE: 1.2 THOU/MM3 (ref 1–4.8)
MCH RBC QN AUTO: 24.3 PG (ref 26–33)
MCHC RBC AUTO-ENTMCNC: 29.6 GM/DL (ref 32.2–35.5)
MCV RBC AUTO: 82.2 FL (ref 81–99)
MONOCYTES # BLD: 7.2 %
MONOCYTES ABSOLUTE: 0.7 THOU/MM3 (ref 0.4–1.3)
NUCLEATED RED BLOOD CELLS: 0 /100 WBC
PLATELET # BLD: 231 THOU/MM3 (ref 130–400)
PMV BLD AUTO: 12.6 FL (ref 9.4–12.4)
POTASSIUM SERPL-SCNC: 4.3 MEQ/L (ref 3.5–5.2)
RBC # BLD: 3.82 MILL/MM3 (ref 4.2–5.4)
SEG NEUTROPHILS: 78.9 %
SEGMENTED NEUTROPHILS ABSOLUTE COUNT: 7.7 THOU/MM3 (ref 1.8–7.7)
SODIUM BLD-SCNC: 139 MEQ/L (ref 135–145)
TOTAL PROTEIN: 6.4 G/DL (ref 6.1–8)
TSH SERPL DL<=0.05 MIU/L-ACNC: 2.74 UIU/ML (ref 0.4–4.2)
VITAMIN B-12: > 2000 PG/ML (ref 211–911)
WBC # BLD: 9.7 THOU/MM3 (ref 4.8–10.8)

## 2019-05-30 PROCEDURE — 82746 ASSAY OF FOLIC ACID SERUM: CPT

## 2019-05-30 PROCEDURE — G8427 DOCREV CUR MEDS BY ELIG CLIN: HCPCS | Performed by: NURSE PRACTITIONER

## 2019-05-30 PROCEDURE — 87040 BLOOD CULTURE FOR BACTERIA: CPT

## 2019-05-30 PROCEDURE — 93005 ELECTROCARDIOGRAM TRACING: CPT | Performed by: NURSE PRACTITIONER

## 2019-05-30 PROCEDURE — 80053 COMPREHEN METABOLIC PANEL: CPT

## 2019-05-30 PROCEDURE — 1123F ACP DISCUSS/DSCN MKR DOCD: CPT | Performed by: NURSE PRACTITIONER

## 2019-05-30 PROCEDURE — 99214 OFFICE O/P EST MOD 30 MIN: CPT | Performed by: NURSE PRACTITIONER

## 2019-05-30 PROCEDURE — 85025 COMPLETE CBC W/AUTO DIFF WBC: CPT

## 2019-05-30 PROCEDURE — 4040F PNEUMOC VAC/ADMIN/RCVD: CPT | Performed by: NURSE PRACTITIONER

## 2019-05-30 PROCEDURE — 1200000003 HC TELEMETRY R&B

## 2019-05-30 PROCEDURE — 99222 1ST HOSP IP/OBS MODERATE 55: CPT | Performed by: INTERNAL MEDICINE

## 2019-05-30 PROCEDURE — 96372 THER/PROPH/DIAG INJ SC/IM: CPT | Performed by: NURSE PRACTITIONER

## 2019-05-30 PROCEDURE — 1090F PRES/ABSN URINE INCON ASSESS: CPT | Performed by: NURSE PRACTITIONER

## 2019-05-30 PROCEDURE — 71046 X-RAY EXAM CHEST 2 VIEWS: CPT

## 2019-05-30 PROCEDURE — G8417 CALC BMI ABV UP PARAM F/U: HCPCS | Performed by: NURSE PRACTITIONER

## 2019-05-30 PROCEDURE — 82607 VITAMIN B-12: CPT

## 2019-05-30 PROCEDURE — 36415 COLL VENOUS BLD VENIPUNCTURE: CPT

## 2019-05-30 PROCEDURE — 1036F TOBACCO NON-USER: CPT | Performed by: NURSE PRACTITIONER

## 2019-05-30 PROCEDURE — 2709999900 HC NON-CHARGEABLE SUPPLY

## 2019-05-30 PROCEDURE — 84443 ASSAY THYROID STIM HORMONE: CPT

## 2019-05-30 PROCEDURE — 3017F COLORECTAL CA SCREEN DOC REV: CPT | Performed by: NURSE PRACTITIONER

## 2019-05-30 PROCEDURE — G8400 PT W/DXA NO RESULTS DOC: HCPCS | Performed by: NURSE PRACTITIONER

## 2019-05-30 PROCEDURE — G8598 ASA/ANTIPLAT THER USED: HCPCS | Performed by: NURSE PRACTITIONER

## 2019-05-30 PROCEDURE — 94640 AIRWAY INHALATION TREATMENT: CPT

## 2019-05-30 PROCEDURE — 6370000000 HC RX 637 (ALT 250 FOR IP): Performed by: HOSPITALIST

## 2019-05-30 PROCEDURE — 83036 HEMOGLOBIN GLYCOSYLATED A1C: CPT

## 2019-05-30 PROCEDURE — 1200000000 HC SEMI PRIVATE

## 2019-05-30 RX ORDER — SODIUM CHLORIDE 0.9 % (FLUSH) 0.9 %
10 SYRINGE (ML) INJECTION PRN
Status: DISCONTINUED | OUTPATIENT
Start: 2019-05-30 | End: 2019-06-05 | Stop reason: HOSPADM

## 2019-05-30 RX ORDER — DOXYCYCLINE HYCLATE 100 MG
100 TABLET ORAL 2 TIMES DAILY
Qty: 20 TABLET | Refills: 0 | Status: ON HOLD | OUTPATIENT
Start: 2019-05-30 | End: 2019-06-05 | Stop reason: HOSPADM

## 2019-05-30 RX ORDER — ATORVASTATIN CALCIUM 80 MG/1
80 TABLET, FILM COATED ORAL DAILY
Status: DISCONTINUED | OUTPATIENT
Start: 2019-05-31 | End: 2019-06-05 | Stop reason: HOSPADM

## 2019-05-30 RX ORDER — METOPROLOL TARTRATE 50 MG/1
50 TABLET, FILM COATED ORAL 2 TIMES DAILY
Status: DISCONTINUED | OUTPATIENT
Start: 2019-05-30 | End: 2019-06-03

## 2019-05-30 RX ORDER — METFORMIN HYDROCHLORIDE 750 MG/1
750 TABLET, EXTENDED RELEASE ORAL 2 TIMES DAILY WITH MEALS
Status: DISCONTINUED | OUTPATIENT
Start: 2019-05-30 | End: 2019-05-30

## 2019-05-30 RX ORDER — CEFTRIAXONE 1 G/1
1 INJECTION, POWDER, FOR SOLUTION INTRAMUSCULAR; INTRAVENOUS ONCE
Status: COMPLETED | OUTPATIENT
Start: 2019-05-30 | End: 2019-05-30

## 2019-05-30 RX ORDER — PREGABALIN 50 MG/1
200 CAPSULE ORAL 2 TIMES DAILY
Status: DISCONTINUED | OUTPATIENT
Start: 2019-05-30 | End: 2019-06-05 | Stop reason: HOSPADM

## 2019-05-30 RX ORDER — PREGABALIN 50 MG/1
200 CAPSULE ORAL 2 TIMES DAILY
Status: DISCONTINUED | OUTPATIENT
Start: 2019-05-30 | End: 2019-05-30 | Stop reason: SDUPTHER

## 2019-05-30 RX ORDER — HYDROCHLOROTHIAZIDE 25 MG/1
12.5 TABLET ORAL DAILY
Status: DISCONTINUED | OUTPATIENT
Start: 2019-05-31 | End: 2019-06-02

## 2019-05-30 RX ORDER — METHYLPREDNISOLONE SODIUM SUCCINATE 40 MG/ML
40 INJECTION, POWDER, LYOPHILIZED, FOR SOLUTION INTRAMUSCULAR; INTRAVENOUS DAILY
Status: DISCONTINUED | OUTPATIENT
Start: 2019-05-30 | End: 2019-05-31

## 2019-05-30 RX ORDER — DEXTROSE MONOHYDRATE 25 G/50ML
12.5 INJECTION, SOLUTION INTRAVENOUS PRN
Status: DISCONTINUED | OUTPATIENT
Start: 2019-05-30 | End: 2019-06-05 | Stop reason: HOSPADM

## 2019-05-30 RX ORDER — ACETAMINOPHEN 325 MG/1
650 TABLET ORAL EVERY 4 HOURS PRN
Status: DISCONTINUED | OUTPATIENT
Start: 2019-05-30 | End: 2019-06-05 | Stop reason: HOSPADM

## 2019-05-30 RX ORDER — GUAIFENESIN 600 MG/1
600 TABLET, EXTENDED RELEASE ORAL 2 TIMES DAILY
Status: DISCONTINUED | OUTPATIENT
Start: 2019-05-30 | End: 2019-06-05 | Stop reason: HOSPADM

## 2019-05-30 RX ORDER — GABAPENTIN 400 MG/1
400 CAPSULE ORAL 2 TIMES DAILY
Status: DISCONTINUED | OUTPATIENT
Start: 2019-05-30 | End: 2019-05-31 | Stop reason: DRUGHIGH

## 2019-05-30 RX ORDER — AMIODARONE HYDROCHLORIDE 200 MG/1
200 TABLET ORAL DAILY
Status: DISCONTINUED | OUTPATIENT
Start: 2019-05-31 | End: 2019-06-05 | Stop reason: HOSPADM

## 2019-05-30 RX ORDER — CLOPIDOGREL BISULFATE 75 MG/1
75 TABLET ORAL DAILY
Status: DISCONTINUED | OUTPATIENT
Start: 2019-05-31 | End: 2019-06-05 | Stop reason: HOSPADM

## 2019-05-30 RX ORDER — INSULIN GLARGINE 100 [IU]/ML
19 INJECTION, SOLUTION SUBCUTANEOUS NIGHTLY
Status: DISCONTINUED | OUTPATIENT
Start: 2019-05-30 | End: 2019-06-01

## 2019-05-30 RX ORDER — ENALAPRIL MALEATE 10 MG/1
20 TABLET ORAL 2 TIMES DAILY
Status: DISCONTINUED | OUTPATIENT
Start: 2019-05-30 | End: 2019-06-03

## 2019-05-30 RX ORDER — PREDNISONE 20 MG/1
20 TABLET ORAL DAILY
Qty: 5 TABLET | Refills: 0 | Status: ON HOLD | OUTPATIENT
Start: 2019-05-30 | End: 2019-06-05 | Stop reason: HOSPADM

## 2019-05-30 RX ORDER — DEXTROSE MONOHYDRATE 50 MG/ML
100 INJECTION, SOLUTION INTRAVENOUS PRN
Status: DISCONTINUED | OUTPATIENT
Start: 2019-05-30 | End: 2019-06-05 | Stop reason: HOSPADM

## 2019-05-30 RX ORDER — ROPINIROLE 0.5 MG/1
0.5 TABLET, FILM COATED ORAL NIGHTLY
Status: DISCONTINUED | OUTPATIENT
Start: 2019-05-30 | End: 2019-06-05 | Stop reason: HOSPADM

## 2019-05-30 RX ORDER — SODIUM CHLORIDE 0.9 % (FLUSH) 0.9 %
10 SYRINGE (ML) INJECTION EVERY 12 HOURS SCHEDULED
Status: DISCONTINUED | OUTPATIENT
Start: 2019-05-30 | End: 2019-06-05 | Stop reason: HOSPADM

## 2019-05-30 RX ORDER — FAMOTIDINE 20 MG/1
20 TABLET, FILM COATED ORAL 2 TIMES DAILY
Status: DISCONTINUED | OUTPATIENT
Start: 2019-05-30 | End: 2019-06-05 | Stop reason: HOSPADM

## 2019-05-30 RX ORDER — ONDANSETRON 2 MG/ML
4 INJECTION INTRAMUSCULAR; INTRAVENOUS EVERY 6 HOURS PRN
Status: DISCONTINUED | OUTPATIENT
Start: 2019-05-30 | End: 2019-06-05 | Stop reason: HOSPADM

## 2019-05-30 RX ORDER — GABAPENTIN 400 MG/1
800 CAPSULE ORAL EVERY EVENING
Status: DISCONTINUED | OUTPATIENT
Start: 2019-05-30 | End: 2019-06-05 | Stop reason: HOSPADM

## 2019-05-30 RX ORDER — SODIUM CHLORIDE 9 MG/ML
INJECTION, SOLUTION INTRAVENOUS CONTINUOUS
Status: DISCONTINUED | OUTPATIENT
Start: 2019-05-30 | End: 2019-05-31

## 2019-05-30 RX ORDER — IPRATROPIUM BROMIDE AND ALBUTEROL SULFATE 2.5; .5 MG/3ML; MG/3ML
1 SOLUTION RESPIRATORY (INHALATION)
Status: DISCONTINUED | OUTPATIENT
Start: 2019-05-30 | End: 2019-06-04

## 2019-05-30 RX ORDER — LANOLIN ALCOHOL/MO/W.PET/CERES
3000 CREAM (GRAM) TOPICAL DAILY
Status: DISCONTINUED | OUTPATIENT
Start: 2019-05-30 | End: 2019-05-31 | Stop reason: SINTOL

## 2019-05-30 RX ORDER — FLUTICASONE PROPIONATE 50 MCG
2 SPRAY, SUSPENSION (ML) NASAL DAILY
Status: DISCONTINUED | OUTPATIENT
Start: 2019-05-31 | End: 2019-06-05 | Stop reason: HOSPADM

## 2019-05-30 RX ORDER — ALBUTEROL SULFATE 2.5 MG/3ML
2.5 SOLUTION RESPIRATORY (INHALATION)
Status: DISCONTINUED | OUTPATIENT
Start: 2019-05-30 | End: 2019-06-05 | Stop reason: HOSPADM

## 2019-05-30 RX ORDER — ASPIRIN 81 MG/1
81 TABLET ORAL DAILY
Status: DISCONTINUED | OUTPATIENT
Start: 2019-05-31 | End: 2019-06-05 | Stop reason: HOSPADM

## 2019-05-30 RX ORDER — ONDANSETRON 4 MG/1
4 TABLET, ORALLY DISINTEGRATING ORAL EVERY 8 HOURS PRN
Qty: 20 TABLET | Refills: 0 | Status: SHIPPED | OUTPATIENT
Start: 2019-05-30 | End: 2019-07-30

## 2019-05-30 RX ORDER — NICOTINE POLACRILEX 4 MG
15 LOZENGE BUCCAL PRN
Status: DISCONTINUED | OUTPATIENT
Start: 2019-05-30 | End: 2019-06-05 | Stop reason: HOSPADM

## 2019-05-30 RX ADMIN — IPRATROPIUM BROMIDE AND ALBUTEROL SULFATE 1 AMPULE: .5; 3 SOLUTION RESPIRATORY (INHALATION) at 21:57

## 2019-05-30 RX ADMIN — CEFTRIAXONE 1 G: 1 INJECTION, POWDER, FOR SOLUTION INTRAMUSCULAR; INTRAVENOUS at 12:25

## 2019-05-30 ASSESSMENT — ENCOUNTER SYMPTOMS
DIARRHEA: 0
SORE THROAT: 0
VOMITING: 1
COUGH: 1
COLOR CHANGE: 0
SHORTNESS OF BREATH: 1
WHEEZING: 1
SINUS PRESSURE: 0
NAUSEA: 1
ABDOMINAL PAIN: 0
ABDOMINAL DISTENTION: 0

## 2019-05-30 ASSESSMENT — PAIN SCALES - GENERAL
PAINLEVEL_OUTOF10: 0
PAINLEVEL_OUTOF10: 0

## 2019-05-30 NOTE — PROGRESS NOTES
After obtaining consent, and per orders of Sima Peñaloza CNP, injection of Rocephin 1 gram IM given in Right upper quad. gluteus by Guerda Sierra. Patient instructed to remain in clinic for 20 minutes afterwards, and to report any adverse reaction to me immediately. Upon checking the pt she reported no adverse reaction and felt fine. Pt left on her own.          Administrations This Visit     cefTRIAXone (ROCEPHIN) injection 1 g     Admin Date  05/30/2019  12:25 Action  Given Dose  1 g Route  Intramuscular Site  Dorsogluteal Right Administered By  Guerda Sierra CMA ([facility])    Ordering Provider:  ROMAIN Caban CNP    NDC:  7350-7623-83    Lot#:  [id number]    :  NITZA Becerra    Patient Supplied?:  No    Comments:  right gluteal

## 2019-05-30 NOTE — PROGRESS NOTES
Concha Messina  [address] [place] [address]  Dept: [phone]  Dept Fax: [phone]  Loc: [phone]    Teddy Felty is a 75 y.o. female who presents today for her medical conditions/complaints as noted below. Chief Complaint   Patient presents with    Shortness of Breath     has noticed she has trouble catching her breath at times and gets SOB at times when she is up moving around denies being dizzy or lt headed            HPI:     This is a 58-year-old female presents today for shortness of breath. This has been going on for approximately the last week or so. It has been getting slightly worse over the course of the week. She is coughing up phlegm. Denies any fevers. She has been nauseated and does vomit up some phlegm at times. She states she does have some swelling in bilateral legs which is normal for her she states. She has not been taken anything over-the-counter nor has she been exposed any recent illnesses. Today her pulse ox in the office was 85% percent and her normal is 98%. Does have heart history including AICD. HPI    Current Outpatient Medications   Medication Sig Dispense Refill    ondansetron (ZOFRAN ODT) 4 MG disintegrating tablet Take 1 tablet by mouth every 8 hours as needed for Nausea or Vomiting 20 tablet 0    doxycycline hyclate (VIBRA-TABS) 100 MG tablet Take 1 tablet by mouth 2 times daily for 10 days 20 tablet 0    predniSONE (DELTASONE) 20 MG tablet Take 1 tablet by mouth daily for 5 days 5 tablet 0    gabapentin (NEURONTIN) 400 MG capsule Take 400 mg by mouth 2 times daily.  rOPINIRole (REQUIP) 0.5 MG tablet Take 1 tablet by mouth nightly 30 tablet 11    pregabalin (LYRICA) 200 MG capsule Take 1 capsule by mouth 2 times daily for 30 days. . 60 capsule 3    hydrochlorothiazide (HYDRODIURIL) 25 MG tablet Take 0.5 tablets by mouth daily 30 tablet 4    blood glucose test strips (ASCENSIA AUTODISC VI;ONE TOUCH ULTRA TEST VI) strip Test once daily. Dispense One Touch Ultra Test Strips. Dx: Type 2 diabetes (250.00) 100 each 5    atorvastatin (LIPITOR) 80 MG tablet TAKE 1 TABLET DAILY 90 tablet 1    vitamin B-12 (CYANOCOBALAMIN) 1000 MCG tablet Take 3,000 mcg by mouth daily      fluticasone (FLONASE) 50 MCG/ACT nasal spray 2 sprays by Nasal route daily 1 Bottle 1    Insulin Pen Needle (B-D UF III MINI PEN NEEDLES) 31G X 5 MM MISC 1 each by Does not apply route daily 100 each 3    clopidogrel (PLAVIX) 75 MG tablet Take 1 tablet by mouth daily 90 tablet 3    amiodarone (CORDARONE) 200 MG tablet TAKE 1 TABLET DAILY 90 tablet 3    metoprolol tartrate (LOPRESSOR) 50 MG tablet TAKE 1 TABLET TWICE A  tablet 3    enalapril (VASOTEC) 20 MG tablet TAKE 1 TABLET TWICE A  tablet 3    metFORMIN (GLUCOPHAGE XR) 750 MG extended release tablet Take 1 tablet by mouth 2 times daily (with meals) 30 tablet 11    aspirin EC 81 MG EC tablet Take 1 tablet by mouth daily 30 tablet 3    insulin glargine (LANTUS SOLOSTAR) 100 UNIT/ML injection pen Inject 20 Units into the skin nightly (Patient taking differently: Inject 19 Units into the skin nightly ) 6 mL 0    gabapentin (NEURONTIN) 400 MG capsule Take 2 capsules by mouth every evening for 30 days. 90 capsule 3    pregabalin (LYRICA) 100 MG capsule Take 200 mg by mouth 2 times daily. No current facility-administered medications for this visit. Allergies   Allergen Reactions    Sulfa Antibiotics        Subjective:      Review of Systems   Constitutional: Positive for chills and fatigue. Negative for activity change, appetite change and fever. HENT: Negative for congestion, ear pain, postnasal drip, sinus pressure and sore throat. Respiratory: Positive for cough, shortness of breath and wheezing. Cardiovascular: Negative for chest pain. Gastrointestinal: Positive for nausea and vomiting.  Negative for abdominal distention, abdominal pain and diarrhea. Genitourinary: Negative for difficulty urinating. Skin: Negative for color change and rash. Neurological: Negative for dizziness, light-headedness and headaches. Objective:     /72   Pulse 76   Resp 24   Ht 5' 2\" (1.575 m)   Wt 226 lb (102.5 kg)   SpO2 (!) 85%   BMI 41.34 kg/m²     Physical Exam   Constitutional: She is oriented to person, place, and time. She appears well-developed and well-nourished. HENT:   Head: Normocephalic. Neck: Normal range of motion. Neck supple. Cardiovascular: Normal rate and regular rhythm. Pulmonary/Chest: Effort normal. Tachypnea noted. No respiratory distress. She has decreased breath sounds. She has no wheezes. She has rhonchi. She has no rales. Abdominal: Soft. There is no tenderness. Musculoskeletal: Normal range of motion. Neurological: She is alert and oriented to person, place, and time. Skin: Skin is warm. No rash noted. Assessment:       Diagnosis Orders   1. Pneumonia due to infectious organism, unspecified laterality, unspecified part of lung     2. SOB (shortness of breath)  CBC Auto Differential    XR CHEST STANDARD (2 VW)    Comprehensive Metabolic Panel    EKG 12 Lead       Plan:     Rocephin 1 gram IM given  Doxy script given  Prednisone as prescribed    ADDEND:  Chest x-ray showed pneumonia  Spoke with hospitalist  Admit to [facility] for pneumonia and hypoxia  Spoke with Dr. Eric Hickey to 5K 26A        Discussed use, benefit, and side effects of prescribed medications. Barriers to medication compliance addressed. All patient questions answered. Pt voiced understanding. No follow-ups on file.   Orders Placed This Encounter   Procedures    XR CHEST STANDARD (2 VW)     Standing Status:   Future     Number of Occurrences:   1     Standing Expiration Date:   5/29/2020     Order Specific Question:   Reason for exam:     Answer:   SOB    CBC Auto Differential Standing Status:   Future     Number of Occurrences:   1     Standing Expiration Date:   5/29/2020    Comprehensive Metabolic Panel     Standing Status:   Future     Number of Occurrences:   1     Standing Expiration Date:   5/29/2020    EKG 12 Lead     Standing Status:   Future     Standing Expiration Date:   5/29/2020     Order Specific Question:   Reason for Exam?     Answer:   Shortness of Breath     Orders Placed This Encounter   Medications    ondansetron (ZOFRAN ODT) 4 MG disintegrating tablet     Sig: Take 1 tablet by mouth every 8 hours as needed for Nausea or Vomiting     Dispense:  20 tablet     Refill:  0    doxycycline hyclate (VIBRA-TABS) 100 MG tablet     Sig: Take 1 tablet by mouth 2 times daily for 10 days     Dispense:  20 tablet     Refill:  0    predniSONE (DELTASONE) 20 MG tablet     Sig: Take 1 tablet by mouth daily for 5 days     Dispense:  5 tablet     Refill:  0    cefTRIAXone (ROCEPHIN) injection 1 g           Electronically signed by ROMAIN Skinner CNP on 5/30/2019 at 3:39 PM

## 2019-05-31 ENCOUNTER — APPOINTMENT (OUTPATIENT)
Dept: CT IMAGING | Age: 74
DRG: 291 | End: 2019-05-31
Attending: HOSPITALIST
Payer: MEDICARE

## 2019-05-31 PROBLEM — R05.9 COUGH: Status: ACTIVE | Noted: 2019-05-31

## 2019-05-31 PROBLEM — J96.01 ACUTE HYPOXEMIC RESPIRATORY FAILURE (HCC): Status: ACTIVE | Noted: 2019-05-31

## 2019-05-31 PROBLEM — R60.0 LOWER EXTREMITY EDEMA: Status: ACTIVE | Noted: 2019-05-31

## 2019-05-31 LAB
ALBUMIN SERPL-MCNC: 3.7 G/DL (ref 3.5–5.1)
ALP BLD-CCNC: 112 U/L (ref 38–126)
ALT SERPL-CCNC: 41 U/L (ref 11–66)
ANION GAP SERPL CALCULATED.3IONS-SCNC: 14 MEQ/L (ref 8–16)
AST SERPL-CCNC: 36 U/L (ref 5–40)
BASOPHILS # BLD: 0.4 %
BASOPHILS ABSOLUTE: 0 THOU/MM3 (ref 0–0.1)
BILIRUB SERPL-MCNC: 0.4 MG/DL (ref 0.3–1.2)
BUN BLDV-MCNC: 18 MG/DL (ref 7–22)
CALCIUM SERPL-MCNC: 8.8 MG/DL (ref 8.5–10.5)
CHLORIDE BLD-SCNC: 98 MEQ/L (ref 98–111)
CHOLESTEROL, TOTAL: 94 MG/DL (ref 100–199)
CO2: 24 MEQ/L (ref 23–33)
CREAT SERPL-MCNC: 0.9 MG/DL (ref 0.4–1.2)
EOSINOPHIL # BLD: 0 %
EOSINOPHILS ABSOLUTE: 0 THOU/MM3 (ref 0–0.4)
ERYTHROCYTE [DISTWIDTH] IN BLOOD BY AUTOMATED COUNT: 17.5 % (ref 11.5–14.5)
ERYTHROCYTE [DISTWIDTH] IN BLOOD BY AUTOMATED COUNT: 51.1 FL (ref 35–45)
GFR SERPL CREATININE-BSD FRML MDRD: 61 ML/MIN/1.73M2
GLUCOSE BLD-MCNC: 176 MG/DL (ref 70–108)
GLUCOSE BLD-MCNC: 219 MG/DL (ref 70–108)
GLUCOSE BLD-MCNC: 325 MG/DL (ref 70–108)
GLUCOSE BLD-MCNC: 460 MG/DL (ref 70–108)
HCT VFR BLD CALC: 30.3 % (ref 37–47)
HDLC SERPL-MCNC: 44 MG/DL
HEMOGLOBIN: 9 GM/DL (ref 12–16)
IMMATURE GRANS (ABS): 0.06 THOU/MM3 (ref 0–0.07)
IMMATURE GRANULOCYTES: 0.7 %
INR BLD: 1.11 (ref 0.85–1.13)
LACTIC ACID: 1.1 MMOL/L (ref 0.5–2.2)
LD: 218 U/L (ref 100–190)
LDL CHOLESTEROL CALCULATED: 37 MG/DL
LV EF: 40 %
LVEF MODALITY: NORMAL
LYMPHOCYTES # BLD: 3.5 %
LYMPHOCYTES ABSOLUTE: 0.3 THOU/MM3 (ref 1–4.8)
MCH RBC QN AUTO: 23.9 PG (ref 26–33)
MCHC RBC AUTO-ENTMCNC: 29.7 GM/DL (ref 32.2–35.5)
MCV RBC AUTO: 80.6 FL (ref 81–99)
MONOCYTES # BLD: 1.1 %
MONOCYTES ABSOLUTE: 0.1 THOU/MM3 (ref 0.4–1.3)
NUCLEATED RED BLOOD CELLS: 0 /100 WBC
PLATELET # BLD: 184 THOU/MM3 (ref 130–400)
PMV BLD AUTO: 11.6 FL (ref 9.4–12.4)
POTASSIUM REFLEX MAGNESIUM: 4.2 MEQ/L (ref 3.5–5.2)
PRO-BNP: 4667 PG/ML (ref 0–1800)
PROCALCITONIN: 0.03 NG/ML (ref 0.01–0.09)
RBC # BLD: 3.76 MILL/MM3 (ref 4.2–5.4)
SEG NEUTROPHILS: 94.3 %
SEGMENTED NEUTROPHILS ABSOLUTE COUNT: 8.6 THOU/MM3 (ref 1.8–7.7)
SODIUM BLD-SCNC: 136 MEQ/L (ref 135–145)
TOTAL PROTEIN: 6.5 G/DL (ref 6.1–8)
TOTAL PROTEIN: 6.7 G/DL (ref 6.1–8)
TRIGL SERPL-MCNC: 67 MG/DL (ref 0–199)
WBC # BLD: 9.1 THOU/MM3 (ref 4.8–10.8)

## 2019-05-31 PROCEDURE — 83615 LACTATE (LD) (LDH) ENZYME: CPT

## 2019-05-31 PROCEDURE — 2709999900 HC NON-CHARGEABLE SUPPLY

## 2019-05-31 PROCEDURE — 85610 PROTHROMBIN TIME: CPT

## 2019-05-31 PROCEDURE — 84145 PROCALCITONIN (PCT): CPT

## 2019-05-31 PROCEDURE — 82948 REAGENT STRIP/BLOOD GLUCOSE: CPT

## 2019-05-31 PROCEDURE — 6370000000 HC RX 637 (ALT 250 FOR IP): Performed by: HOSPITALIST

## 2019-05-31 PROCEDURE — 2700000000 HC OXYGEN THERAPY PER DAY

## 2019-05-31 PROCEDURE — 85025 COMPLETE CBC W/AUTO DIFF WBC: CPT

## 2019-05-31 PROCEDURE — 80061 LIPID PANEL: CPT

## 2019-05-31 PROCEDURE — 2580000003 HC RX 258: Performed by: HOSPITALIST

## 2019-05-31 PROCEDURE — 6360000002 HC RX W HCPCS: Performed by: HOSPITALIST

## 2019-05-31 PROCEDURE — 97165 OT EVAL LOW COMPLEX 30 MIN: CPT

## 2019-05-31 PROCEDURE — 6360000004 HC RX CONTRAST MEDICATION: Performed by: INTERNAL MEDICINE

## 2019-05-31 PROCEDURE — 99232 SBSQ HOSP IP/OBS MODERATE 35: CPT | Performed by: INTERNAL MEDICINE

## 2019-05-31 PROCEDURE — 80053 COMPREHEN METABOLIC PANEL: CPT

## 2019-05-31 PROCEDURE — 87070 CULTURE OTHR SPECIMN AEROBIC: CPT

## 2019-05-31 PROCEDURE — 87075 CULTR BACTERIA EXCEPT BLOOD: CPT

## 2019-05-31 PROCEDURE — 83880 ASSAY OF NATRIURETIC PEPTIDE: CPT

## 2019-05-31 PROCEDURE — 1200000000 HC SEMI PRIVATE

## 2019-05-31 PROCEDURE — 94640 AIRWAY INHALATION TREATMENT: CPT

## 2019-05-31 PROCEDURE — 99223 1ST HOSP IP/OBS HIGH 75: CPT | Performed by: INTERNAL MEDICINE

## 2019-05-31 PROCEDURE — 84155 ASSAY OF PROTEIN SERUM: CPT

## 2019-05-31 PROCEDURE — 93306 TTE W/DOPPLER COMPLETE: CPT

## 2019-05-31 PROCEDURE — 97535 SELF CARE MNGMENT TRAINING: CPT

## 2019-05-31 PROCEDURE — 71275 CT ANGIOGRAPHY CHEST: CPT

## 2019-05-31 PROCEDURE — 36415 COLL VENOUS BLD VENIPUNCTURE: CPT

## 2019-05-31 PROCEDURE — 94761 N-INVAS EAR/PLS OXIMETRY MLT: CPT

## 2019-05-31 PROCEDURE — 87205 SMEAR GRAM STAIN: CPT

## 2019-05-31 PROCEDURE — 83605 ASSAY OF LACTIC ACID: CPT

## 2019-05-31 RX ADMIN — FLUTICASONE PROPIONATE 2 SPRAY: 50 SPRAY, METERED NASAL at 11:09

## 2019-05-31 RX ADMIN — GUAIFENESIN 600 MG: 600 TABLET, EXTENDED RELEASE ORAL at 08:39

## 2019-05-31 RX ADMIN — HYDROCHLOROTHIAZIDE 12.5 MG: 25 TABLET ORAL at 08:39

## 2019-05-31 RX ADMIN — INSULIN GLARGINE 19 UNITS: 100 INJECTION, SOLUTION SUBCUTANEOUS at 00:42

## 2019-05-31 RX ADMIN — INSULIN GLARGINE 19 UNITS: 100 INJECTION, SOLUTION SUBCUTANEOUS at 20:28

## 2019-05-31 RX ADMIN — AZITHROMYCIN MONOHYDRATE 500 MG: 500 INJECTION, POWDER, LYOPHILIZED, FOR SOLUTION INTRAVENOUS at 01:15

## 2019-05-31 RX ADMIN — GABAPENTIN 800 MG: 400 CAPSULE ORAL at 17:53

## 2019-05-31 RX ADMIN — METOPROLOL TARTRATE 50 MG: 50 TABLET, FILM COATED ORAL at 08:39

## 2019-05-31 RX ADMIN — PREGABALIN 200 MG: 50 CAPSULE ORAL at 19:36

## 2019-05-31 RX ADMIN — ENALAPRIL MALEATE 20 MG: 10 TABLET ORAL at 08:39

## 2019-05-31 RX ADMIN — SODIUM CHLORIDE: 9 INJECTION, SOLUTION INTRAVENOUS at 00:25

## 2019-05-31 RX ADMIN — ENALAPRIL MALEATE 20 MG: 10 TABLET ORAL at 19:36

## 2019-05-31 RX ADMIN — PREGABALIN 200 MG: 50 CAPSULE ORAL at 08:38

## 2019-05-31 RX ADMIN — CEFTRIAXONE SODIUM 1 G: 1 INJECTION, POWDER, FOR SOLUTION INTRAMUSCULAR; INTRAVENOUS at 21:35

## 2019-05-31 RX ADMIN — Medication 10 ML: at 19:37

## 2019-05-31 RX ADMIN — Medication 10 ML: at 00:25

## 2019-05-31 RX ADMIN — IPRATROPIUM BROMIDE AND ALBUTEROL SULFATE 1 AMPULE: .5; 3 SOLUTION RESPIRATORY (INHALATION) at 08:56

## 2019-05-31 RX ADMIN — METOPROLOL TARTRATE 50 MG: 50 TABLET, FILM COATED ORAL at 19:36

## 2019-05-31 RX ADMIN — ENOXAPARIN SODIUM 40 MG: 40 INJECTION SUBCUTANEOUS at 00:39

## 2019-05-31 RX ADMIN — ASPIRIN 81 MG: 81 TABLET, COATED ORAL at 08:38

## 2019-05-31 RX ADMIN — IPRATROPIUM BROMIDE AND ALBUTEROL SULFATE 1 AMPULE: .5; 3 SOLUTION RESPIRATORY (INHALATION) at 16:20

## 2019-05-31 RX ADMIN — IPRATROPIUM BROMIDE AND ALBUTEROL SULFATE 1 AMPULE: .5; 3 SOLUTION RESPIRATORY (INHALATION) at 21:19

## 2019-05-31 RX ADMIN — FAMOTIDINE 20 MG: 20 TABLET ORAL at 08:39

## 2019-05-31 RX ADMIN — Medication 10 ML: at 08:41

## 2019-05-31 RX ADMIN — CLOPIDOGREL BISULFATE 75 MG: 75 TABLET ORAL at 08:39

## 2019-05-31 RX ADMIN — FAMOTIDINE 20 MG: 20 TABLET ORAL at 19:36

## 2019-05-31 RX ADMIN — ATORVASTATIN CALCIUM 80 MG: 80 TABLET, FILM COATED ORAL at 08:39

## 2019-05-31 RX ADMIN — ENALAPRIL MALEATE 20 MG: 10 TABLET ORAL at 00:39

## 2019-05-31 RX ADMIN — IPRATROPIUM BROMIDE AND ALBUTEROL SULFATE 1 AMPULE: .5; 3 SOLUTION RESPIRATORY (INHALATION) at 12:47

## 2019-05-31 RX ADMIN — METHYLPREDNISOLONE SODIUM SUCCINATE 40 MG: 40 INJECTION, POWDER, FOR SOLUTION INTRAMUSCULAR; INTRAVENOUS at 08:39

## 2019-05-31 RX ADMIN — CEFTRIAXONE SODIUM 1 G: 1 INJECTION, POWDER, FOR SOLUTION INTRAMUSCULAR; INTRAVENOUS at 00:30

## 2019-05-31 RX ADMIN — GUAIFENESIN 600 MG: 600 TABLET, EXTENDED RELEASE ORAL at 00:39

## 2019-05-31 RX ADMIN — ROPINIROLE HYDROCHLORIDE 0.5 MG: 0.5 TABLET, FILM COATED ORAL at 01:10

## 2019-05-31 RX ADMIN — FAMOTIDINE 20 MG: 20 TABLET ORAL at 00:39

## 2019-05-31 RX ADMIN — METOPROLOL TARTRATE 50 MG: 50 TABLET, FILM COATED ORAL at 00:39

## 2019-05-31 RX ADMIN — AZITHROMYCIN MONOHYDRATE 500 MG: 500 INJECTION, POWDER, LYOPHILIZED, FOR SOLUTION INTRAVENOUS at 22:12

## 2019-05-31 RX ADMIN — PREGABALIN 200 MG: 50 CAPSULE ORAL at 01:15

## 2019-05-31 RX ADMIN — IPRATROPIUM BROMIDE AND ALBUTEROL SULFATE 1 AMPULE: .5; 3 SOLUTION RESPIRATORY (INHALATION) at 03:24

## 2019-05-31 RX ADMIN — AMIODARONE HYDROCHLORIDE 200 MG: 200 TABLET ORAL at 08:39

## 2019-05-31 RX ADMIN — GUAIFENESIN 600 MG: 600 TABLET, EXTENDED RELEASE ORAL at 19:36

## 2019-05-31 RX ADMIN — METHYLPREDNISOLONE SODIUM SUCCINATE 40 MG: 40 INJECTION, POWDER, FOR SOLUTION INTRAMUSCULAR; INTRAVENOUS at 00:39

## 2019-05-31 RX ADMIN — ROPINIROLE HYDROCHLORIDE 0.5 MG: 0.5 TABLET, FILM COATED ORAL at 19:36

## 2019-05-31 RX ADMIN — IOPAMIDOL 80 ML: 755 INJECTION, SOLUTION INTRAVENOUS at 13:42

## 2019-05-31 ASSESSMENT — PAIN SCALES - GENERAL
PAINLEVEL_OUTOF10: 0

## 2019-05-31 NOTE — PLAN OF CARE
[facility]  PHYSICAL THERAPY MISSED TREATMENT NOTE  ACUTE CARE    Date: 2019  Patient Name: Kateryna Arredondo        MRN: [id number]   : 1945  (75 y.o.)  Gender: female   Referring Practitioner: Maribel Machuca DO  Diagnosis: pneumonia          REASON FOR MISSED TREATMENT:  Missed Treat. First attempt this am pt with OT. Will check back as able.

## 2019-05-31 NOTE — H&P
05/30/19  1320 05/31/19  0559    136   K 4.3 4.2   CL 98 98   CO2 24 24   BUN 17 18   CREATININE 0.9 0.9   CALCIUM 9.0 8.8     Recent Labs     05/30/19  1320 05/31/19  0559   AST 25 36   ALT 34 41   BILITOT 0.5 0.4   ALKPHOS 101 112     No results for input(s): INR in the last 72 hours. No results for input(s): [place] in the last 72 hours. Urinalysis:    No results found for: Vanessa Im, BACTERIA, RBCUA, BLOODU, [address]    Radiology:   I have reviewed the imaging studies with the following interpretation:  CT CHEST W CONTRAST    (Results Pending)         Diet:  DIET CARB CONTROL;    DVT prophylaxis: [x] Lovenox                                 [] SCDs                                 [] SQ Heparin                                 [] Encourage ambulation           [] Already on Anticoagulation    Code Status: Full Code      PT/OT Eval Status: no    Disposition:    [x] Home       [] TCU       [] Rehab       [] Psych       [] SNF       [] [place]       [] Other-       Assessment and Plan:    Principal Problem:    Acute hypoxemic respiratory failure ([address]  Active Problems:    Diabetic neuropathy ([facility]    Type 2 diabetes mellitus with hyperglycemia, with long-term current use of insulin (HCC)    PVD (peripheral vascular disease) ([facility]    Coronary artery disease involving coronary bypass graft of native heart without angina pectoris    Abdominal aortic aneurysm (AAA) without rupture ([address]    Pneumonia    Lower extremity edema    Cough  Resolved Problems:    * No resolved hospital problems. *        · Admit for hypoxia in outpatient office - was 85% on RA  · O2 as needed  · CT chest  · PCT  · Labs in the AM  · Echo as patient has LE edema  · Continue with antibiotics (Rocephin and azithro) for now pending the above testing  · Continue with home medications      Thank you Tomi Armendariz MD for the opportunity to be involved in this patient's care.     Electronically signed by Bren Luther DO on 5/31/2019 at 7:27 AM

## 2019-05-31 NOTE — CONSULTS
in the past. She also smoked tobacco for 25 to 30years with 1PPD . She quit smoking several years back. She also exposed to second hand tobacco smoke. She is having cough: Yes  Duration of cough: for 2 weeks   Her cough is associated with sputum production: Yes   The sputum color: yellow  Hemoptysis:No  Diurnal variation: None. Relieving factors: No  Aggravating factors: No    She gives a history of fever: No  Chills & rigors:No  Associated night sweats: No.  Recent travel history:No.    Rrecent sick contacts: No.      She admits to orthopnea and paroxysmal nocturnal dyspnea. Prior to current current hospitalization:  She is not using any oxygen supplementation at rest, exercise or during sleep/at night time. She was never diagnosed with pulmonary diseases I.e bronchial asthma, COPD, pulmonary embolism,pulmonary hypertension or pleural effusion/s in the past.  She was never diagnosed with pulmonary tuberculosis in the past. She denies any recent exposure to any patients with tuberculosis. She denies any recent travel to endemic places of tuberculosis. PMHx   Past Medical History      Diagnosis Date    CAD (coronary artery disease)     Hyperlipidemia     Hypertension     ICD (implantable cardiac defibrillator), dual, in situ     [facility] [employer] dual ICD    Shingles 12/2014    [facility] [place] dual ICD     Type II or unspecified type diabetes mellitus without mention of complication, not stated as uncontrolled     Ventricular arrhythmia       Past Surgical History        Procedure Laterality Date    CARDIAC PACEMAKER PLACEMENT  9-08-09    [facility]    CARDIOVASCULAR STRESS TEST  01-27-10    Excellent treadmill exercise. Improved functional capacity to functional class 1 completed 8 METS. Hemodynamic response was appropriate. No ischemic ECG changes noted.  CARDIOVASCULAR STRESS TEST  10-28-09    No evidence of stress induced ischemia.  Evidence of  prior transmural MI demonstrated by transient fixed Blood cultures- no growth X 2 sets. Sputum cultures- no growth. Urine for legionella and streptococcal antigens were pending. EKG     Echocardiogram   Echocardiogram done on 5/31/19: Official report pending. Radiology    CXR  May 30, 2019   PROCEDURE: XR CHEST (2 VW)   1. Mild cardiomegaly. Permanent dual-chamber pacemaker. Metallic sternotomy sutures. 2. Small bilateral pleural effusions. Minimal retrocardiac atelectasis/pneumonia. Moderate right middle lobe atelectasis/pneumonia. Questionable minimal infiltrate anterior segment right upper lobe. CT Scans  (See actual reports for details)      Assessment   -Acute hypoxic respiratory failure due to ? decompensated CHF Vs right side pleural effusion with atelectasis Vs right side Pneumonia due to community acquired pneumonia ( CAP) Vs Atypical pneumonia  -Right side Pneumonia due to community acquired pneumonia ( CAP) Vs Atypical pneumonia.  -Chronic hx of tobacco smoking- ? COPD. -CAD S/p CABG in the past.  -Hypertension. -S/p [facility] dual ICD. -Type II or unspecified type diabetes mellitus without mention of complication, not stated as uncontrolled      Plan   -Follow pending cultures.  -Will send serum BNP now. -Send and follow Sputum cultures and gram stain.  -Follow Urine for legionella and streptococcal antigens.  -Continue current antibiotics. -Duonebs 3ml via nebs Q4h while awake  -Accuchecks monitoring ACHS with regular insulin coverage with Low dose regimen.  -Acapella Q4h as tolerated. -Will schedule patient for full pulmonary function tests before clinic visit as out patient.  -Titrate Oxygen to keep Spo2 >90%. -Schedule patient for ultrasound guided thoracentesis on Right side of chest with interventional radiology service.  -Send pleural fluid to routine analysis including PH, total protein, LDH,Glucose, cell count, Cytology with cell block, Gram stain and cultures.  Please send simultaneous serum total protein and LDH for

## 2019-05-31 NOTE — PLAN OF CARE
Problem: RESPIRATORY  Goal: Lung sounds clear or within normal limits for patient  5/31/2019 8565 by Niharika Neumann RCP  Outcome: Ongoing  Note:   Duoneb to improve breath sounds.

## 2019-05-31 NOTE — CARE COORDINATION
5/31/19, 1:30 PM      Ele Pratt       Admitted from: Direct admit from her PCP's office. 5/30/2019/ 1215 [facility] day: 1   Location: [address] [place] [address]- Reason for admit: Pneumonia [J18.9] Status: Inpt. Admit order signed?: yes  PMH:  has a past medical history of CAD (coronary artery disease), Hyperlipidemia, Hypertension, ICD (implantable cardiac defibrillator), dual, in situ, Shingles, [facility] dual ICD, Type II or unspecified type diabetes mellitus without mention of complication, not stated as uncontrolled, and Ventricular arrhythmia. Procedure: N/A  Pertinent abnormal Imaging:Chest x-ray. Medications:  Scheduled Meds:   amiodarone  200 mg Oral Daily    aspirin EC  81 mg Oral Daily    atorvastatin  80 mg Oral Daily    clopidogrel  75 mg Oral Daily    enalapril  20 mg Oral BID    fluticasone  2 spray Nasal Daily    gabapentin  800 mg Oral QPM    hydrochlorothiazide  12.5 mg Oral Daily    insulin glargine  19 Units Subcutaneous Nightly    metoprolol tartrate  50 mg Oral BID    pregabalin  200 mg Oral BID    rOPINIRole  0.5 mg Oral Nightly    sodium chloride flush  10 mL Intravenous 2 times per day    enoxaparin  40 mg Subcutaneous Q24H    famotidine  20 mg Oral BID    ipratropium-albuterol  1 ampule Inhalation Q4H WA    azithromycin  500 mg Intravenous Q24H    And    cefTRIAXone (ROCEPHIN) IV  1 g Intravenous Q24H    guaiFENesin  600 mg Oral BID     Continuous Infusions:   dextrose        Pertinent Info/Orders/Treatment Plan:  Pulmonology consult, IV Zithromax, IV Rocephin, Duoneb nebulizer, DM management, Lovenox, prn Proventil, BMP, CBC, and Magnesium in the a.m., oxygen, SCD's, PT/OT, up with assistance. Diet: DIET CARB CONTROL;   Smoking status:  reports that she quit smoking about 30 years ago. Her smoking use included cigarettes. She has a 37.50 pack-year smoking history.  She has never used smokeless tobacco.   PCP: Josue Rehman MD  Readmission: No  Readmission Risk Score: 13%    Discharge Planning  Current Residence:  Private Residence  Living Arrangements:  Family Members, Children, Spouse/Significant Other   Support Systems:  Spouse/Significant Other, Children, Family Members  Current Services PTA:     Potential Assistance Needed:  N/A  Potential Assistance Purchasing Medications:  No  Does patient want to participate in local refill/ meds to beds program?  No  Type of Home Care Services:  None  Patient expects to be discharged to:  Home  Expected Discharge date:  05/30/19  Follow Up Appointment: Best Day/ Time: (S) (any day but Friday, prefers mornings. )    Discharge Plan: Rachel Cuellar is from home with her . She is in agreement for [place] at discharge. SS has arranged.       Evaluation: yes

## 2019-05-31 NOTE — PROGRESS NOTES
goals: by discharge  Short term goal 1: Pt will complete BADL routine with S and incorporating EC strategies prn wtih no vc for technique to improve ease with shower routine  Short term goal 2: Pt will complete dynamic stnading task wtih S for > 3 minutes with 2 hand release and no LOB for ease with sink side grooming  Short term goal 3: Pt will complete functional mobility HH distances with S and LRAD and no vc for safety or breathing technique to improve ease with IADLs  Long term goals  Time Frame for Long term goals : No LTG established d/t short ELOS    Following session, patient left in safe position with all fall risk precautions in place.

## 2019-05-31 NOTE — PLAN OF CARE
Problem: Falls - Risk of:  Goal: Will remain free from falls  Description  Will remain free from falls  Outcome: Ongoing  Note:   PT remains free from falls this shift. Bed in lowest position with brakes on. Bed exit alarm activated. Pathway clear. Possessions within reach. 2/4 side rails raised for safety. Call light within reach. Pt rounded on hourly. Problem: Risk for Impaired Skin Integrity  Goal: Tissue integrity - skin and mucous membranes  Description  Structural intactness and normal physiological function of skin and  mucous membranes. Outcome: Ongoing  Note:   No decreased skin integrity to report. Problem: Discharge Planning:  Goal: Discharged to appropriate level of care  Description  Discharged to appropriate level of care  Outcome: Ongoing  Note:   Pt is from home. Pt plans to return home with family upon discharge. Problem: Pain:  Goal: Pain level will decrease  Description  Pain level will decrease  Outcome: Ongoing  Note:   PT's pain controlled this shift. Pt has denied pain this shift. Non-pharmacological interventions include rest and reposition. Pt tolerates this plan of care for pain. Problem: RESPIRATORY  Goal: Lung sounds clear or within normal limits for patient  5/31/2019 0830 by Carrie Bates RN  Outcome: Ongoing  Note:   PT's lung sounds are diminished. Pt has been short of breath. Pt has had 3L of O2 added tonight for decreased O2 saturation. Care plan reviewed with patient. Patient verbalize understanding of the plan of care and contribute to goal setting.

## 2019-05-31 NOTE — PLAN OF CARE
Problem: DISCHARGE BARRIERS  Goal: Patient's continuum of care needs are met  Outcome: Ongoing  Note:   Home as PTA-new referral to [facility]. See SW note for 5/31/19.

## 2019-06-01 ENCOUNTER — APPOINTMENT (OUTPATIENT)
Dept: GENERAL RADIOLOGY | Age: 74
DRG: 291 | End: 2019-06-01
Attending: HOSPITALIST
Payer: MEDICARE

## 2019-06-01 ENCOUNTER — APPOINTMENT (OUTPATIENT)
Dept: ULTRASOUND IMAGING | Age: 74
DRG: 291 | End: 2019-06-01
Attending: HOSPITALIST
Payer: MEDICARE

## 2019-06-01 LAB
ANION GAP SERPL CALCULATED.3IONS-SCNC: 9 MEQ/L (ref 8–16)
BASOPHILS # BLD: 0.3 %
BASOPHILS ABSOLUTE: 0 THOU/MM3 (ref 0–0.1)
BUN BLDV-MCNC: 20 MG/DL (ref 7–22)
CALCIUM SERPL-MCNC: 9 MG/DL (ref 8.5–10.5)
CHLORIDE BLD-SCNC: 101 MEQ/L (ref 98–111)
CO2: 28 MEQ/L (ref 23–33)
CREAT SERPL-MCNC: 0.8 MG/DL (ref 0.4–1.2)
EOSINOPHIL # BLD: 0.3 %
EOSINOPHILS ABSOLUTE: 0 THOU/MM3 (ref 0–0.4)
ERYTHROCYTE [DISTWIDTH] IN BLOOD BY AUTOMATED COUNT: 17.5 % (ref 11.5–14.5)
ERYTHROCYTE [DISTWIDTH] IN BLOOD BY AUTOMATED COUNT: 51 FL (ref 35–45)
GFR SERPL CREATININE-BSD FRML MDRD: 70 ML/MIN/1.73M2
GLUCOSE BLD-MCNC: 183 MG/DL (ref 70–108)
GLUCOSE BLD-MCNC: 190 MG/DL (ref 70–108)
GLUCOSE BLD-MCNC: 204 MG/DL (ref 70–108)
GLUCOSE BLD-MCNC: 239 MG/DL (ref 70–108)
GLUCOSE BLD-MCNC: 252 MG/DL (ref 70–108)
GLUCOSE, FLUID: 214 MG/DL
HCT VFR BLD CALC: 27.8 % (ref 37–47)
HEMOGLOBIN: 8.5 GM/DL (ref 12–16)
IMMATURE GRANS (ABS): 0.04 THOU/MM3 (ref 0–0.07)
IMMATURE GRANULOCYTES: 0.3 %
LD, FLUID: 65 U/L
LYMPHOCYTES # BLD: 14.6 %
LYMPHOCYTES ABSOLUTE: 1.7 THOU/MM3 (ref 1–4.8)
MAGNESIUM: 2 MG/DL (ref 1.6–2.4)
MCH RBC QN AUTO: 24.5 PG (ref 26–33)
MCHC RBC AUTO-ENTMCNC: 30.6 GM/DL (ref 32.2–35.5)
MCV RBC AUTO: 80.1 FL (ref 81–99)
MONOCYTES # BLD: 9.7 %
MONOCYTES ABSOLUTE: 1.1 THOU/MM3 (ref 0.4–1.3)
NUCLEATED RED BLOOD CELLS: 0 /100 WBC
PH FLUID: 7.48
PLATELET # BLD: 189 THOU/MM3 (ref 130–400)
PMV BLD AUTO: 11.8 FL (ref 9.4–12.4)
POTASSIUM SERPL-SCNC: 4.5 MEQ/L (ref 3.5–5.2)
PROTEIN FLUID: 1.5 GM/DL
RBC # BLD: 3.47 MILL/MM3 (ref 4.2–5.4)
SEG NEUTROPHILS: 74.8 %
SEGMENTED NEUTROPHILS ABSOLUTE COUNT: 8.6 THOU/MM3 (ref 1.8–7.7)
SODIUM BLD-SCNC: 138 MEQ/L (ref 135–145)
WBC # BLD: 11.5 THOU/MM3 (ref 4.8–10.8)

## 2019-06-01 PROCEDURE — 6360000002 HC RX W HCPCS: Performed by: HOSPITALIST

## 2019-06-01 PROCEDURE — 71045 X-RAY EXAM CHEST 1 VIEW: CPT

## 2019-06-01 PROCEDURE — 99232 SBSQ HOSP IP/OBS MODERATE 35: CPT | Performed by: HOSPITALIST

## 2019-06-01 PROCEDURE — 36415 COLL VENOUS BLD VENIPUNCTURE: CPT

## 2019-06-01 PROCEDURE — 89050 BODY FLUID CELL COUNT: CPT

## 2019-06-01 PROCEDURE — 82948 REAGENT STRIP/BLOOD GLUCOSE: CPT

## 2019-06-01 PROCEDURE — 0W993ZZ DRAINAGE OF RIGHT PLEURAL CAVITY, PERCUTANEOUS APPROACH: ICD-10-PCS | Performed by: INTERNAL MEDICINE

## 2019-06-01 PROCEDURE — 88112 CYTOPATH CELL ENHANCE TECH: CPT

## 2019-06-01 PROCEDURE — 6370000000 HC RX 637 (ALT 250 FOR IP): Performed by: HOSPITALIST

## 2019-06-01 PROCEDURE — 88305 TISSUE EXAM BY PATHOLOGIST: CPT

## 2019-06-01 PROCEDURE — 80048 BASIC METABOLIC PNL TOTAL CA: CPT

## 2019-06-01 PROCEDURE — 94761 N-INVAS EAR/PLS OXIMETRY MLT: CPT

## 2019-06-01 PROCEDURE — 2580000003 HC RX 258: Performed by: HOSPITALIST

## 2019-06-01 PROCEDURE — 94640 AIRWAY INHALATION TREATMENT: CPT

## 2019-06-01 PROCEDURE — 2709999900 HC NON-CHARGEABLE SUPPLY

## 2019-06-01 PROCEDURE — 93010 ELECTROCARDIOGRAM REPORT: CPT | Performed by: INTERNAL MEDICINE

## 2019-06-01 PROCEDURE — 83615 LACTATE (LD) (LDH) ENZYME: CPT

## 2019-06-01 PROCEDURE — 83986 ASSAY PH BODY FLUID NOS: CPT

## 2019-06-01 PROCEDURE — 82945 GLUCOSE OTHER FLUID: CPT

## 2019-06-01 PROCEDURE — 99233 SBSQ HOSP IP/OBS HIGH 50: CPT | Performed by: INTERNAL MEDICINE

## 2019-06-01 PROCEDURE — 1200000000 HC SEMI PRIVATE

## 2019-06-01 PROCEDURE — 32555 ASPIRATE PLEURA W/ IMAGING: CPT

## 2019-06-01 PROCEDURE — 85025 COMPLETE CBC W/AUTO DIFF WBC: CPT

## 2019-06-01 PROCEDURE — 83735 ASSAY OF MAGNESIUM: CPT

## 2019-06-01 PROCEDURE — 84157 ASSAY OF PROTEIN OTHER: CPT

## 2019-06-01 RX ORDER — INSULIN GLARGINE 100 [IU]/ML
25 INJECTION, SOLUTION SUBCUTANEOUS NIGHTLY
Status: DISCONTINUED | OUTPATIENT
Start: 2019-06-01 | End: 2019-06-04

## 2019-06-01 RX ADMIN — CEFTRIAXONE SODIUM 1 G: 1 INJECTION, POWDER, FOR SOLUTION INTRAMUSCULAR; INTRAVENOUS at 21:46

## 2019-06-01 RX ADMIN — FAMOTIDINE 20 MG: 20 TABLET ORAL at 10:08

## 2019-06-01 RX ADMIN — IPRATROPIUM BROMIDE AND ALBUTEROL SULFATE 1 AMPULE: .5; 3 SOLUTION RESPIRATORY (INHALATION) at 07:53

## 2019-06-01 RX ADMIN — FLUTICASONE PROPIONATE 2 SPRAY: 50 SPRAY, METERED NASAL at 10:09

## 2019-06-01 RX ADMIN — METOPROLOL TARTRATE 50 MG: 50 TABLET, FILM COATED ORAL at 19:51

## 2019-06-01 RX ADMIN — ENALAPRIL MALEATE 20 MG: 10 TABLET ORAL at 19:51

## 2019-06-01 RX ADMIN — ENOXAPARIN SODIUM 40 MG: 40 INJECTION SUBCUTANEOUS at 21:49

## 2019-06-01 RX ADMIN — IPRATROPIUM BROMIDE AND ALBUTEROL SULFATE 1 AMPULE: .5; 3 SOLUTION RESPIRATORY (INHALATION) at 20:48

## 2019-06-01 RX ADMIN — Medication 10 ML: at 10:10

## 2019-06-01 RX ADMIN — INSULIN LISPRO 5 UNITS: 100 INJECTION, SOLUTION INTRAVENOUS; SUBCUTANEOUS at 14:40

## 2019-06-01 RX ADMIN — Medication 10 ML: at 19:53

## 2019-06-01 RX ADMIN — PREGABALIN 200 MG: 50 CAPSULE ORAL at 19:51

## 2019-06-01 RX ADMIN — AZITHROMYCIN MONOHYDRATE 500 MG: 500 INJECTION, POWDER, LYOPHILIZED, FOR SOLUTION INTRAVENOUS at 23:09

## 2019-06-01 RX ADMIN — GABAPENTIN 800 MG: 400 CAPSULE ORAL at 18:03

## 2019-06-01 RX ADMIN — GUAIFENESIN 600 MG: 600 TABLET, EXTENDED RELEASE ORAL at 10:09

## 2019-06-01 RX ADMIN — IPRATROPIUM BROMIDE AND ALBUTEROL SULFATE 1 AMPULE: .5; 3 SOLUTION RESPIRATORY (INHALATION) at 16:10

## 2019-06-01 RX ADMIN — PREGABALIN 200 MG: 50 CAPSULE ORAL at 10:08

## 2019-06-01 RX ADMIN — ROPINIROLE HYDROCHLORIDE 0.5 MG: 0.5 TABLET, FILM COATED ORAL at 19:51

## 2019-06-01 RX ADMIN — INSULIN GLARGINE 25 UNITS: 100 INJECTION, SOLUTION SUBCUTANEOUS at 21:49

## 2019-06-01 RX ADMIN — ATORVASTATIN CALCIUM 80 MG: 80 TABLET, FILM COATED ORAL at 10:09

## 2019-06-01 RX ADMIN — INSULIN LISPRO 5 UNITS: 100 INJECTION, SOLUTION INTRAVENOUS; SUBCUTANEOUS at 18:02

## 2019-06-01 RX ADMIN — FAMOTIDINE 20 MG: 20 TABLET ORAL at 19:51

## 2019-06-01 RX ADMIN — GUAIFENESIN 600 MG: 600 TABLET, EXTENDED RELEASE ORAL at 19:51

## 2019-06-01 RX ADMIN — IPRATROPIUM BROMIDE AND ALBUTEROL SULFATE 1 AMPULE: .5; 3 SOLUTION RESPIRATORY (INHALATION) at 12:33

## 2019-06-01 ASSESSMENT — PAIN SCALES - GENERAL: PAINLEVEL_OUTOF10: 0

## 2019-06-01 NOTE — PROGRESS NOTES
Updated Dr. Nazario Denny regarding patient BP this morning. Per Dr. Nazario Denny, hold Amiodarone, Enalapril, Hydrochlorothiazide, Metoprolol. Per Dr. Nazario Denny, hold is SBP < 110.

## 2019-06-01 NOTE — PROGRESS NOTES
[facility]    Patient - Ross Maharaj   MRN -  [id number]   [facility]   - 1945      Date of Admission -  2019  6:05 PM  Date of evaluation -  2019  Room - [address], [place] Primary Care Physician - Filomena Minor MD     Problem List      Active Hospital Problems    Diagnosis Date Noted    Lower extremity edema [R60.0] 2019    Cough [R05] 2019    Acute hypoxemic respiratory failure ([facility] [J96.01] 2019    Pleural effusion, bilateral [J90]     Pneumonia [J18.9] 2019    Abdominal aortic aneurysm (AAA) without rupture ([facility] [address] [I71.4] 2018    Coronary artery disease involving coronary bypass graft of native heart without angina pectoris [I25.810] 2018    PVD (peripheral vascular disease) ([facility] [address] [I73.9] 2018    Type 2 diabetes mellitus with hyperglycemia, with long-term current use of insulin ([facility] [address] [E11.65, Z79.4] 10/09/2017    Diabetic neuropathy ([facility] [address] [E11.40] 10/03/2017     Reason for Consult    For management of hypoxia and dyspnea. HPI   History Obtained From: Patient and electronic medical record. Ross Maharaj is a 75 y.o. female  was initially admitted under hospitalist service. Pulmonary medicine was consulted for further management of hypoxia/Hypoxic respiratory failure. She is currently on oxygen via nasal cannula. The patient is a 75 y.o. female who presented with worsening of shortness of breath for the last 2 weeks. Her current illness started as upper respiratory tract infection with cough, cold and sputum  production which is initially white in color and later changed to greenish yellow. She also noticed worsening of shortness of breath with decline in functional status. Due to worsening of her above symptoms she was admitted to [facility] for further evaluation.     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 Oral Daily    enalapril  20 mg Oral BID    fluticasone  2 spray Nasal Daily    gabapentin  800 mg Oral QPM    hydrochlorothiazide  12.5 mg Oral Daily    metoprolol tartrate  50 mg Oral BID    pregabalin  200 mg Oral BID    rOPINIRole  0.5 mg Oral Nightly    sodium chloride flush  10 mL Intravenous 2 times per day    enoxaparin  40 mg Subcutaneous Q24H    famotidine  20 mg Oral BID    ipratropium-albuterol  1 ampule Inhalation Q4H WA    azithromycin  500 mg Intravenous Q24H    And    cefTRIAXone (ROCEPHIN) IV  1 g Intravenous Q24H    guaiFENesin  600 mg Oral BID     glucose, dextrose, glucagon (rDNA), dextrose, sodium chloride flush, magnesium hydroxide, ondansetron, acetaminophen, albuterol  IV Drips/Infusions   dextrose       Home Medications  Medications Prior to Admission: ondansetron (ZOFRAN ODT) 4 MG disintegrating tablet, Take 1 tablet by mouth every 8 hours as needed for Nausea or Vomiting  doxycycline hyclate (VIBRA-TABS) 100 MG tablet, Take 1 tablet by mouth 2 times daily for 10 days  predniSONE (DELTASONE) 20 MG tablet, Take 1 tablet by mouth daily for 5 days  gabapentin (NEURONTIN) 400 MG capsule, Take 2 capsules by mouth every evening for 30 days. gabapentin (NEURONTIN) 400 MG capsule, Take 400 mg by mouth 2 times daily. pregabalin (LYRICA) 100 MG capsule, Take 200 mg by mouth 2 times daily. rOPINIRole (REQUIP) 0.5 MG tablet, Take 1 tablet by mouth nightly  pregabalin (LYRICA) 200 MG capsule, Take 1 capsule by mouth 2 times daily for 30 days. .  hydrochlorothiazide (HYDRODIURIL) 25 MG tablet, Take 0.5 tablets by mouth daily  blood glucose test strips (ASCENSIA AUTODISC VI;ONE TOUCH ULTRA TEST VI) strip, Test once daily. Dispense One Touch Ultra Test Strips.   Dx: Type 2 diabetes (250.00)  atorvastatin (LIPITOR) 80 MG tablet, TAKE 1 TABLET DAILY  vitamin B-12 (CYANOCOBALAMIN) 1000 MCG tablet, Take 3,000 mcg by mouth daily  fluticasone (FLONASE) 50 MCG/ACT nasal spray, 2 sprays by Nasal route daily  Insulin Pen Needle (B-D UF III MINI PEN NEEDLES) 31G X 5 MM MISC, 1 each by Does not apply route daily  clopidogrel (PLAVIX) 75 MG tablet, Take 1 tablet by mouth daily  amiodarone (CORDARONE) 200 MG tablet, TAKE 1 TABLET DAILY  metoprolol tartrate (LOPRESSOR) 50 MG tablet, TAKE 1 TABLET TWICE A DAY  enalapril (VASOTEC) 20 MG tablet, TAKE 1 TABLET TWICE A DAY  metFORMIN (GLUCOPHAGE XR) 750 MG extended release tablet, Take 1 tablet by mouth 2 times daily (with meals)  aspirin EC 81 MG EC tablet, Take 1 tablet by mouth daily  insulin glargine (LANTUS SOLOSTAR) 100 UNIT/ML injection pen, Inject 20 Units into the skin nightly (Patient taking differently: Inject 19 Units into the skin nightly )  Diet    DIET CARB CONTROL;   Allergies    Sulfa antibiotics  Family History          Problem Relation Age of Onset    Diabetes Father     No Known Problems Mother      Sleep History    Never diagnosed with sleep apnea in the past    Social History     Social History     Socioeconomic History    Marital status:      Spouse name: Not on file    Number of children: Not on file    Years of education: Not on file    Highest education level: Not on file   Occupational History    Not on file   Social Needs    Financial resource strain: Not on file    Food insecurity:     Worry: Not on file     Inability: Not on file    Transportation needs:     Medical: Not on file     Non-medical: Not on file   Tobacco Use    Smoking status: Former Smoker     Packs/day: 1.50     Years: 25.00     Pack years: 37.50     Types: Cigarettes     Last attempt to quit: 1988     Years since quittin.7    Smokeless tobacco: Never Used   Substance and Sexual Activity    Alcohol use: No    Drug use: No    Sexual activity: Not on file   Lifestyle    Physical activity:     Days per week: Not on file     Minutes per session: Not on file    Stress: Not on file   Relationships    Social connections:     Talks on phone: Not on Echocardiogram   5/31/2019   Transthoracic Echocardiography Report (TTE)    Conclusions      Summary   Ejection fraction is visually estimated at 40%. There was moderate global hypokinesis of the left ventricle. Hypokinetic motion of the basal inferior wall noted in the left ventricle. Left Ventricular size is Mildly increased . Moderately dilated left atrium. The aortic valve leaflets were not well visualized. Aortic valve appears tricuspid. Aortic valve leaflets are somewhat thickened. Aortic valve leaflets are Mildly calcified. Signature      ----------------------------------------------------------------   Electronically signed by Adrian Mendez MD (Interpreting   physician) on 05/31/2019 at 05:33 PM   ----------------------------------------------------------------     Radiology    CXR  May 30, 2019   PROCEDURE: XR CHEST (2 VW)   1. Mild cardiomegaly. Permanent dual-chamber pacemaker. Metallic sternotomy sutures. 2. Small bilateral pleural effusions. Minimal retrocardiac atelectasis/pneumonia. Moderate right middle lobe atelectasis/pneumonia. Questionable minimal infiltrate anterior segment right upper lobe. CT Scans  (See actual reports for details)      Assessment   -Acute hypoxic respiratory failure due to decompensated CHF Vs right side pleural effusion with atelectasis Vs right side Pneumonia due to community acquired pneumonia ( CAP) Vs Atypical pneumonia  -Right side Pneumonia due to community acquired pneumonia ( CAP) Vs Atypical pneumonia.  -Chronic hx of tobacco smoking- ? COPD. -CAD S/p CABG in the past.  -Hypertension. -S/p [place] Boris dual ICD. -Type II or unspecified type diabetes mellitus without mention of complication, not stated as uncontrolled      Plan   -Send and follow Sputum cultures and gram stain.  -Follow Urine for legionella and streptococcal antigens.  -Continue current antibiotics.   -Duonebs 3ml via nebs Q4h while awake  -Accuchecks monitoring

## 2019-06-01 NOTE — PROGRESS NOTES
Intravenous Q24H    guaiFENesin  600 mg Oral BID     PRN Meds: glucose, dextrose, glucagon (rDNA), dextrose, sodium chloride flush, magnesium hydroxide, ondansetron, acetaminophen, albuterol      Intake/Output Summary (Last 24 hours) at 6/1/2019 1808  Last data filed at 6/1/2019 1331  Gross per 24 hour   Intake 747.92 ml   Output 1200 ml   Net -452.08 ml       Diet:  DIET CARB CONTROL; Exam:  BP (!) 99/49   Pulse 87   Temp 97.6 °F (36.4 °C) (Axillary)   Resp 18   Ht 5' 2\" (1.575 m)   Wt 228 lb (103.4 kg)   SpO2 97%   BMI 41.70 kg/m²     General appearance: No apparent distress, appears stated age and cooperative. HEENT: Pupils equal, round, and reactive to light. Conjunctivae/corneas clear. Neck: Supple, with full range of motion. No jugular venous distention. Trachea midline. Respiratory:  Normal respiratory effort. Clear to auscultation, bilaterally without Rales/Wheezes/Rhonchi. Cardiovascular: Regular rate and rhythm with normal S1/S2 without murmurs, rubs or gallops. Abdomen: Soft, non-tender, non-distended with normal bowel sounds. Musculoskeletal: passive and active ROM x 4 extremities. Skin: Skin color, texture, turgor normal.  No rashes or lesions. Neurologic:  Neurovascularly intact without any focal sensory/motor deficits.  Cranial nerves: II-XII intact, grossly non-focal.  Psychiatric: Alert and oriented, thought content appropriate, normal insight  Capillary Refill: Brisk,< 3 seconds   Peripheral Pulses: +2 palpable, equal bilaterally       Labs:   Recent Labs     05/30/19  1320 05/31/19  0559 06/01/19  0634   WBC 9.7 9.1 11.5*   HGB 9.3* 9.0* 8.5*   HCT 31.4* 30.3* 27.8*    184 189     Recent Labs     05/30/19  1320 05/31/19  0559 06/01/19  0634    136 138   K 4.3 4.2 4.5   CL 98 98 101   CO2 24 24 28   BUN 17 18 20   CREATININE 0.9 0.9 0.8   CALCIUM 9.0 8.8 9.0     Recent Labs     05/30/19  1320 05/31/19  0559   AST 25 36   ALT 34 41   BILITOT 0.5 0.4   ALKPHOS 101 112

## 2019-06-02 LAB
BODY FLUID RBC: < 2000 /CUMM
CHARACTER, BODY FLUID: ABNORMAL
COLOR: YELLOW
GLUCOSE BLD-MCNC: 114 MG/DL (ref 70–108)
GLUCOSE BLD-MCNC: 158 MG/DL (ref 70–108)
GLUCOSE BLD-MCNC: 78 MG/DL (ref 70–108)
GLUCOSE BLD-MCNC: 96 MG/DL (ref 70–108)
INTERPRETATION: NORMAL
MESOTHELIAL CELLS BODY FLUID: ABNORMAL
MONONUCLEAR CELLS BODY FLUID: 93.7 %
PATHOLOGIST REVIEW: ABNORMAL
POLYMORPHONUCLEAR CELLS BODY FLUID: 6.3 %
SPECIMEN: ABNORMAL
TOTAL NUCLEATED CELLS BODY FLUID: 1387 /CUMM (ref 0–500)
TOTAL VOLUME RECEIVED BODY FLUID: 83 ML

## 2019-06-02 PROCEDURE — 2700000000 HC OXYGEN THERAPY PER DAY

## 2019-06-02 PROCEDURE — 82948 REAGENT STRIP/BLOOD GLUCOSE: CPT

## 2019-06-02 PROCEDURE — 6360000002 HC RX W HCPCS: Performed by: HOSPITALIST

## 2019-06-02 PROCEDURE — 87899 AGENT NOS ASSAY W/OPTIC: CPT

## 2019-06-02 PROCEDURE — 1200000000 HC SEMI PRIVATE

## 2019-06-02 PROCEDURE — 99233 SBSQ HOSP IP/OBS HIGH 50: CPT | Performed by: INTERNAL MEDICINE

## 2019-06-02 PROCEDURE — 2709999900 HC NON-CHARGEABLE SUPPLY

## 2019-06-02 PROCEDURE — 6370000000 HC RX 637 (ALT 250 FOR IP): Performed by: HOSPITALIST

## 2019-06-02 PROCEDURE — 94640 AIRWAY INHALATION TREATMENT: CPT

## 2019-06-02 PROCEDURE — 94761 N-INVAS EAR/PLS OXIMETRY MLT: CPT

## 2019-06-02 PROCEDURE — 2580000003 HC RX 258: Performed by: HOSPITALIST

## 2019-06-02 PROCEDURE — 87449 NOS EACH ORGANISM AG IA: CPT

## 2019-06-02 PROCEDURE — 99232 SBSQ HOSP IP/OBS MODERATE 35: CPT | Performed by: HOSPITALIST

## 2019-06-02 RX ORDER — FUROSEMIDE 10 MG/ML
20 INJECTION INTRAMUSCULAR; INTRAVENOUS DAILY
Status: DISCONTINUED | OUTPATIENT
Start: 2019-06-02 | End: 2019-06-04

## 2019-06-02 RX ADMIN — IPRATROPIUM BROMIDE AND ALBUTEROL SULFATE 1 AMPULE: .5; 3 SOLUTION RESPIRATORY (INHALATION) at 11:20

## 2019-06-02 RX ADMIN — ATORVASTATIN CALCIUM 80 MG: 80 TABLET, FILM COATED ORAL at 09:38

## 2019-06-02 RX ADMIN — PREGABALIN 200 MG: 50 CAPSULE ORAL at 09:47

## 2019-06-02 RX ADMIN — Medication 10 ML: at 09:45

## 2019-06-02 RX ADMIN — IPRATROPIUM BROMIDE AND ALBUTEROL SULFATE 1 AMPULE: .5; 3 SOLUTION RESPIRATORY (INHALATION) at 20:49

## 2019-06-02 RX ADMIN — ENALAPRIL MALEATE 20 MG: 10 TABLET ORAL at 09:37

## 2019-06-02 RX ADMIN — CEFTRIAXONE SODIUM 1 G: 1 INJECTION, POWDER, FOR SOLUTION INTRAMUSCULAR; INTRAVENOUS at 21:42

## 2019-06-02 RX ADMIN — ENALAPRIL MALEATE 20 MG: 10 TABLET ORAL at 19:37

## 2019-06-02 RX ADMIN — GUAIFENESIN 600 MG: 600 TABLET, EXTENDED RELEASE ORAL at 19:37

## 2019-06-02 RX ADMIN — GUAIFENESIN 600 MG: 600 TABLET, EXTENDED RELEASE ORAL at 09:37

## 2019-06-02 RX ADMIN — CLOPIDOGREL BISULFATE 75 MG: 75 TABLET ORAL at 09:44

## 2019-06-02 RX ADMIN — FAMOTIDINE 20 MG: 20 TABLET ORAL at 09:44

## 2019-06-02 RX ADMIN — FAMOTIDINE 20 MG: 20 TABLET ORAL at 19:37

## 2019-06-02 RX ADMIN — INSULIN GLARGINE 25 UNITS: 100 INJECTION, SOLUTION SUBCUTANEOUS at 21:45

## 2019-06-02 RX ADMIN — METOPROLOL TARTRATE 50 MG: 50 TABLET, FILM COATED ORAL at 19:37

## 2019-06-02 RX ADMIN — ENOXAPARIN SODIUM 40 MG: 40 INJECTION SUBCUTANEOUS at 23:25

## 2019-06-02 RX ADMIN — Medication 10 ML: at 19:38

## 2019-06-02 RX ADMIN — AMIODARONE HYDROCHLORIDE 200 MG: 200 TABLET ORAL at 09:38

## 2019-06-02 RX ADMIN — IPRATROPIUM BROMIDE AND ALBUTEROL SULFATE 1 AMPULE: .5; 3 SOLUTION RESPIRATORY (INHALATION) at 15:15

## 2019-06-02 RX ADMIN — ASPIRIN 81 MG: 81 TABLET, COATED ORAL at 09:44

## 2019-06-02 RX ADMIN — PREGABALIN 200 MG: 50 CAPSULE ORAL at 19:37

## 2019-06-02 RX ADMIN — IPRATROPIUM BROMIDE AND ALBUTEROL SULFATE 1 AMPULE: .5; 3 SOLUTION RESPIRATORY (INHALATION) at 08:15

## 2019-06-02 RX ADMIN — AZITHROMYCIN MONOHYDRATE 500 MG: 500 INJECTION, POWDER, LYOPHILIZED, FOR SOLUTION INTRAVENOUS at 22:27

## 2019-06-02 RX ADMIN — FLUTICASONE PROPIONATE 2 SPRAY: 50 SPRAY, METERED NASAL at 09:37

## 2019-06-02 RX ADMIN — FUROSEMIDE 20 MG: 10 INJECTION, SOLUTION INTRAMUSCULAR; INTRAVENOUS at 19:38

## 2019-06-02 RX ADMIN — ROPINIROLE HYDROCHLORIDE 0.5 MG: 0.5 TABLET, FILM COATED ORAL at 19:37

## 2019-06-02 RX ADMIN — METOPROLOL TARTRATE 50 MG: 50 TABLET, FILM COATED ORAL at 09:37

## 2019-06-02 RX ADMIN — GABAPENTIN 800 MG: 400 CAPSULE ORAL at 16:42

## 2019-06-02 RX ADMIN — HYDROCHLOROTHIAZIDE 12.5 MG: 25 TABLET ORAL at 09:39

## 2019-06-02 ASSESSMENT — PAIN SCALES - GENERAL
PAINLEVEL_OUTOF10: 0
PAINLEVEL_OUTOF10: 0

## 2019-06-02 ASSESSMENT — PAIN DESCRIPTION - PAIN TYPE: TYPE: ACUTE PAIN

## 2019-06-02 NOTE — PROGRESS NOTES
[facility]    Patient - Nilda Miranda   MRN -  [id number]   Acct # - [de-identified]   - 1945      Date of Admission -  2019  6:05 PM  Date of evaluation -  2019  Room - [address] MD Primary Care Physician - Shasta Fritz MD     Problem List      Active Hospital Problems    Diagnosis Date Noted    Acute and chronic respiratory failure with hypoxia ([address] [J96.21]     Breast mass [N63.0]     Lower extremity edema [R60.0] 2019    Cough [R05] 2019    Acute hypoxemic respiratory failure ([address] [J96.01] 2019    Pleural effusion, bilateral [J90]     Pneumonia [J18.9] 2019    Abdominal aortic aneurysm (AAA) without rupture ([address] [I71.4] 2018    Coronary artery disease involving coronary bypass graft of native heart without angina pectoris [I25.810] 2018    PVD (peripheral vascular disease) ([address] [I73.9] 2018    Type 2 diabetes mellitus with hyperglycemia, with long-term current use of insulin ([address] [E11.65, Z79.4] 10/09/2017    Diabetic neuropathy ([address] [E11.40] 10/03/2017     Reason for Consult    For management of dyspnea and pleural effusion. HPI   History Obtained From: Patient and electronic medical record. Nilda Miranda is a 75 y.o. female  was initially admitted under hospitalist service. Pulmonary medicine was consulted for further management of hypoxia/Hypoxic respiratory failure. She is currently on oxygen via nasal cannula. The patient is a 75 y.o. female who presented with worsening of shortness of breath for the last 2 weeks. Her current illness started as upper respiratory tract infection with cough, cold and sputum  production which is initially white in color and later changed to greenish yellow. She also noticed worsening of shortness of breath with decline in functional status.  Due to worsening of her above symptoms she was admitted to (CYANOCOBALAMIN) 1000 MCG tablet, Take 3,000 mcg by mouth daily  fluticasone (FLONASE) 50 MCG/ACT nasal spray, 2 sprays by Nasal route daily  Insulin Pen Needle (B-D UF III MINI PEN NEEDLES) 31G X 5 MM MISC, 1 each by Does not apply route daily  clopidogrel (PLAVIX) 75 MG tablet, Take 1 tablet by mouth daily  amiodarone (CORDARONE) 200 MG tablet, TAKE 1 TABLET DAILY  metoprolol tartrate (LOPRESSOR) 50 MG tablet, TAKE 1 TABLET TWICE A DAY  enalapril (VASOTEC) 20 MG tablet, TAKE 1 TABLET TWICE A DAY  metFORMIN (GLUCOPHAGE XR) 750 MG extended release tablet, Take 1 tablet by mouth 2 times daily (with meals)  aspirin EC 81 MG EC tablet, Take 1 tablet by mouth daily  insulin glargine (LANTUS SOLOSTAR) 100 UNIT/ML injection pen, Inject 20 Units into the skin nightly (Patient taking differently: Inject 19 Units into the skin nightly )  Diet    DIET CARB CONTROL;   Allergies    Sulfa antibiotics  Family History          Problem Relation Age of Onset    Diabetes Father     No Known Problems Mother      Sleep History    Never diagnosed with sleep apnea in the past    Social History     Social History     Socioeconomic History    Marital status:      Spouse name: Not on file    Number of children: Not on file    Years of education: Not on file    Highest education level: Not on file   Occupational History    Not on file   Social Needs    Financial resource strain: Not on file    Food insecurity:     Worry: Not on file     Inability: Not on file    Transportation needs:     Medical: Not on file     Non-medical: Not on file   Tobacco Use    Smoking status: Former Smoker     Packs/day: 1.50     Years: 25.00     Pack years: 37.50     Types: Cigarettes     Last attempt to quit: 1988     Years since quittin.7    Smokeless tobacco: Never Used   Substance and Sexual Activity    Alcohol use: No    Drug use: No    Sexual activity: Not on file   Lifestyle    Physical activity:     Days per week: Not on distension. No tenderness. Musculoskeletal: Normal range of motion. Extremities: Patient exhibits 1+ bilateral leg edema and no tenderness. Lymphadenopathy:  No cervical adenopathy. Neurological: Patient is alert and oriented to person, place, and time. Skin: Skin is warm and dry. Psychiatric: Patient  has a normal mood and affect. Labs  - Old records and notes have been reviewed in [facility]   ABG  No results found for: PH, PO2, PCO2, HCO3, O2SAT  No results found for: IFIO2, MODE, SETTIDVOL, SETPEEP  CBC  Recent Labs     05/31/19  0559 06/01/19  0634   WBC 9.1 11.5*   RBC 3.76* 3.47*   HGB 9.0* 8.5*   HCT 30.3* 27.8*   MCV 80.6* 80.1*   MCH 23.9* 24.5*   MCHC 29.7* 30.6*    189   MPV 11.6 11.8      BMP  Recent Labs     05/31/19  0559 06/01/19  0634    138   K 4.2 4.5   CL 98 101   CO2 24 28   BUN 18 20   CREATININE 0.9 0.8   GLUCOSE 219* 252*   MG  --  2.0   CALCIUM 8.8 9.0     LFT  Recent Labs     05/31/19  0559   AST 36   ALT 41   BILITOT 0.4   ALKPHOS 112     TROP  No results found for: TROPONINT  BNP  No results for input(s): BNP in the last 72 hours. Lactic Acid  Recent Labs     05/31/19  0559   LACTA 1.1     INR  Recent Labs     05/31/19  1423   INR 1.11     PTT  No results for input(s): APTT in the last 72 hours. Glucose  Recent Labs     06/01/19  1934 06/02/19  0848 06/02/19  1241   POCGLU 190* 78 114*     UA   Recent Labs     06/01/19  1330   COLORU YELLOW   . Thoracentesis fluid analysis results. Performed on: 6/1/19  Side: right side of chest .  By IR: Yes  Amount of pleural fluid drained: 0.6L    Lab Results   Component Value Date    PHFL 7.48 06/01/2019    COLORU YELLOW 06/01/2019    LDFL 65 06/01/2019    PROTEINFL 1.5 06/01/2019    TRIG 67 05/31/2019     Lab Results   Component Value Date    PROT 6.7 05/31/2019     CYTOLOGY: Pending    Serum LDH: 218  LDH ratio i.e Pleural fluid LDH/ Serum LDH: 0.298  Total protein ratio i.e Pleural fluid Total protein/ Serum Total protein: 0.22    Pleural fluid cultures were negative so far. Gram stain of pleural fluid is negative for any organisms. PFTs   No old studies in Epic. Sleep studies   No old studies in Epic. Cultures    Blood cultures- no growth X 2 sets. Sputum cultures- no growth. Urine for legionella and streptococcal antigens were pending. EKG     Echocardiogram   5/31/2019   Transthoracic Echocardiography Report (TTE)    Conclusions      Summary   Ejection fraction is visually estimated at 40%. There was moderate global hypokinesis of the left ventricle. Hypokinetic motion of the basal inferior wall noted in the left ventricle. Left Ventricular size is Mildly increased . Moderately dilated left atrium. The aortic valve leaflets were not well visualized. Aortic valve appears tricuspid. Aortic valve leaflets are somewhat thickened. Aortic valve leaflets are Mildly calcified. Signature      ----------------------------------------------------------------   Electronically signed by Venessa Baires MD (Interpreting   physician) on 05/31/2019 at 05:33 PM   ----------------------------------------------------------------     Radiology    CXR  Jun 1, 2019   PROCEDURE: XR CHEST PA EXPIRATION 1 VW   Status post right-sided thoracentesis. No pneumothorax. Mild residual right basilar atelectasis. Small left-sided pleural effusion         May 30, 2019   PROCEDURE: XR CHEST (2 VW)   1. Mild cardiomegaly. Permanent dual-chamber pacemaker. Metallic sternotomy sutures. 2. Small bilateral pleural effusions. Minimal retrocardiac atelectasis/pneumonia. Moderate right middle lobe atelectasis/pneumonia. Questionable minimal infiltrate anterior segment right upper lobe.          CT Scans  (See actual reports for details)      Assessment   -Acute hypoxic respiratory failure due to decompensated CHF Vs right side pleural effusion with atelectasis- improving.  -Right side Pneumonia due to community acquired

## 2019-06-02 NOTE — PROGRESS NOTES
Nightly    sodium chloride flush  10 mL Intravenous 2 times per day    enoxaparin  40 mg Subcutaneous Q24H    famotidine  20 mg Oral BID    ipratropium-albuterol  1 ampule Inhalation Q4H WA    azithromycin  500 mg Intravenous Q24H    And    cefTRIAXone (ROCEPHIN) IV  1 g Intravenous Q24H    guaiFENesin  600 mg Oral BID     PRN Meds: glucose, dextrose, glucagon (rDNA), dextrose, sodium chloride flush, magnesium hydroxide, ondansetron, acetaminophen, albuterol      Intake/Output Summary (Last 24 hours) at 6/2/2019 1844  Last data filed at 6/2/2019 1505  Gross per 24 hour   Intake 797.11 ml   Output 1000 ml   Net -202.89 ml       Diet:  DIET CARB CONTROL; Exam:  BP (!) 103/53   Pulse 73   Temp 97.9 °F (36.6 °C) (Oral)   Resp 16   Ht 5' 2\" (1.575 m)   Wt 228 lb (103.4 kg)   SpO2 95%   BMI 41.70 kg/m²     General appearance: No apparent distress, appears stated age and cooperative. HEENT: Pupils equal, round, and reactive to light. Conjunctivae/corneas clear. Neck: Supple, with full range of motion. No jugular venous distention. Trachea midline. Respiratory:  Normal respiratory effort. Clear to auscultation, bilaterally without Rales/Wheezes/Rhonchi. Cardiovascular: Regular rate and rhythm with normal S1/S2 without murmurs, rubs or gallops. Abdomen: Soft, non-tender, non-distended with normal bowel sounds. Musculoskeletal: passive and active ROM x 4 extremities. Skin: Skin color, texture, turgor normal.  No rashes or lesions. Neurologic:  Neurovascularly intact without any focal sensory/motor deficits.  Cranial nerves: II-XII intact, grossly non-focal.  Psychiatric: Alert and oriented, thought content appropriate, normal insight  Capillary Refill: Brisk,< 3 seconds   Peripheral Pulses: +2 palpable, equal bilaterally       Labs:   Recent Labs     05/31/19 0559 06/01/19  0634   WBC 9.1 11.5*   HGB 9.0* 8.5*   HCT 30.3* 27.8*    189     Recent Labs     05/31/19  0559 06/01/19  0634

## 2019-06-02 NOTE — CONSULTS
MR and TV insufficiency. Diet    DIET CARB CONTROL; Allergies   Sulfa antibiotics  Social History     Social History     Socioeconomic History    Marital status:      Spouse name: Not on file    Number of children: Not on file    Years of education: Not on file    Highest education level: Not on file   Occupational History    Not on file   Social Needs    Financial resource strain: Not on file    Food insecurity:     Worry: Not on file     Inability: Not on file    Transportation needs:     Medical: Not on file     Non-medical: Not on file   Tobacco Use    Smoking status: Former Smoker     Packs/day: 1.50     Years: 25.00     Pack years: 37.50     Types: Cigarettes     Last attempt to quit: 1988     Years since quittin.7    Smokeless tobacco: Never Used   Substance and Sexual Activity    Alcohol use: No    Drug use: No    Sexual activity: Not on file   Lifestyle    Physical activity:     Days per week: Not on file     Minutes per session: Not on file    Stress: Not on file   Relationships    Social connections:     Talks on phone: Not on file     Gets together: Not on file     Attends [employer]orationist service: Not on file     Active member of club or organization: Not on file     Attends meetings of clubs or organizations: Not on file     Relationship status: Not on file    Intimate partner violence:     Fear of current or ex partner: Not on file     Emotionally abused: Not on file     Physically abused: Not on file     Forced sexual activity: Not on file   Other Topics Concern    Not on file   Social History Narrative    Not on file     Family History          Problem Relation Age of Onset    Diabetes Father     No Known Problems Mother      ROS     Review of Systems   Constitutional: Positive for activity change and fatigue. Negative for appetite change and fever.    HENT: Negative for congestion, dental problem, facial swelling, hearing loss, mouth sores, nosebleeds, sore 10/17/2016 TECHNIQUE: PA and lateral views of the chest were obtained. 1. Mild cardiomegaly. Permanent dual-chamber pacemaker. Metallic sternotomy sutures. 2. Small bilateral pleural effusions. Minimal retrocardiac atelectasis/pneumonia. Moderate right middle lobe atelectasis/pneumonia. Questionable minimal infiltrate anterior segment right upper lobe. **This report has been created using voice recognition software. It may contain minor errors which are inherent in voice recognition technology. ** Final report electronically signed by Dr. Luis Regan on 5/30/2019 1:43 PM    Cta Chest W Wo Contrast    Result Date: 5/31/2019  PROCEDURE: CTA CHEST W WO CONTRAST CLINICAL INFORMATION: DYSPNEA, ON EXERTION, Evaluate for PE. Naoma Broccoli COMPARISON: No prior study. TECHNIQUE: 1.5 mm axial images were obtained through the chest after the administration of IV contrast.  A non-contrast localizer was obtained. 3D reconstructions were performed on the scanner to include sagittal and bilateral oblique images through the  chest. 80 mL Isovue-370 were administered intravenously. All CT scans at this facility use dose modulation, iterative reconstruction, and/or weight-based dosing when appropriate to reduce radiation dose to as low as reasonably achievable. FINDINGS: Thyroid gland is unremarkable. The thoracic aorta is normal caliber with atherosclerotic changes. Pulmonary arterial vessels are adequately opacified. No acute pulmonary arterial embolism. Bilateral pleural effusions right greater than left with adjacent consolidation atelectasis versus pneumonia. Correlation with symptoms is advised. Postsurgical changes with median sternotomy wires. Asymmetric breast tissue in the right upper lateral breast. Correlation with mammogram is advised. Few right axillary lymph nodes largest is 1.3 cm. Cardiomegaly. No acute findings in the upper abdomen. Degenerative changes thoracic spine. 1. No acute pulmonary embolism.  2. Bilateral

## 2019-06-03 ENCOUNTER — APPOINTMENT (OUTPATIENT)
Dept: WOMENS IMAGING | Age: 74
DRG: 291 | End: 2019-06-03
Attending: HOSPITALIST
Payer: MEDICARE

## 2019-06-03 PROBLEM — I50.21 SYSTOLIC CHF, ACUTE (HCC): Status: ACTIVE | Noted: 2019-06-03

## 2019-06-03 LAB
CREAT SERPL-MCNC: 0.8 MG/DL (ref 0.4–1.2)
GFR SERPL CREATININE-BSD FRML MDRD: 70 ML/MIN/1.73M2
GLUCOSE BLD-MCNC: 115 MG/DL (ref 70–108)
GLUCOSE BLD-MCNC: 121 MG/DL (ref 70–108)
GLUCOSE BLD-MCNC: 169 MG/DL (ref 70–108)
GLUCOSE BLD-MCNC: 169 MG/DL (ref 70–108)
GLUCOSE BLD-MCNC: 31 MG/DL (ref 70–108)

## 2019-06-03 PROCEDURE — 82565 ASSAY OF CREATININE: CPT

## 2019-06-03 PROCEDURE — 6360000002 HC RX W HCPCS: Performed by: HOSPITALIST

## 2019-06-03 PROCEDURE — 6370000000 HC RX 637 (ALT 250 FOR IP): Performed by: INTERNAL MEDICINE

## 2019-06-03 PROCEDURE — 97161 PT EVAL LOW COMPLEX 20 MIN: CPT

## 2019-06-03 PROCEDURE — 36415 COLL VENOUS BLD VENIPUNCTURE: CPT

## 2019-06-03 PROCEDURE — 2709999900 HC NON-CHARGEABLE SUPPLY

## 2019-06-03 PROCEDURE — 1200000000 HC SEMI PRIVATE

## 2019-06-03 PROCEDURE — 6370000000 HC RX 637 (ALT 250 FOR IP): Performed by: HOSPITALIST

## 2019-06-03 PROCEDURE — 2700000000 HC OXYGEN THERAPY PER DAY

## 2019-06-03 PROCEDURE — 82948 REAGENT STRIP/BLOOD GLUCOSE: CPT

## 2019-06-03 PROCEDURE — APPSS30 APP SPLIT SHARED TIME 16-30 MINUTES: Performed by: NURSE PRACTITIONER

## 2019-06-03 PROCEDURE — 97116 GAIT TRAINING THERAPY: CPT

## 2019-06-03 PROCEDURE — 94640 AIRWAY INHALATION TREATMENT: CPT

## 2019-06-03 PROCEDURE — 99233 SBSQ HOSP IP/OBS HIGH 50: CPT | Performed by: INTERNAL MEDICINE

## 2019-06-03 PROCEDURE — 76642 ULTRASOUND BREAST LIMITED: CPT

## 2019-06-03 PROCEDURE — 99232 SBSQ HOSP IP/OBS MODERATE 35: CPT | Performed by: INTERNAL MEDICINE

## 2019-06-03 PROCEDURE — 77066 DX MAMMO INCL CAD BI: CPT

## 2019-06-03 PROCEDURE — 94760 N-INVAS EAR/PLS OXIMETRY 1: CPT

## 2019-06-03 PROCEDURE — 2580000003 HC RX 258: Performed by: HOSPITALIST

## 2019-06-03 RX ORDER — METFORMIN HYDROCHLORIDE 750 MG/1
750 TABLET, EXTENDED RELEASE ORAL 2 TIMES DAILY WITH MEALS
Status: DISCONTINUED | OUTPATIENT
Start: 2019-06-03 | End: 2019-06-05 | Stop reason: HOSPADM

## 2019-06-03 RX ORDER — ENALAPRIL MALEATE 10 MG/1
10 TABLET ORAL 2 TIMES DAILY
Status: DISCONTINUED | OUTPATIENT
Start: 2019-06-03 | End: 2019-06-04

## 2019-06-03 RX ORDER — METOPROLOL SUCCINATE 50 MG/1
50 TABLET, EXTENDED RELEASE ORAL 2 TIMES DAILY
Status: DISCONTINUED | OUTPATIENT
Start: 2019-06-03 | End: 2019-06-05 | Stop reason: HOSPADM

## 2019-06-03 RX ORDER — ATORVASTATIN CALCIUM 80 MG/1
TABLET, FILM COATED ORAL
Qty: 90 TABLET | Refills: 3 | Status: SHIPPED | OUTPATIENT
Start: 2019-06-03 | End: 2019-06-05 | Stop reason: HOSPADM

## 2019-06-03 RX ADMIN — METOPROLOL SUCCINATE 50 MG: 50 TABLET, EXTENDED RELEASE ORAL at 20:19

## 2019-06-03 RX ADMIN — FAMOTIDINE 20 MG: 20 TABLET ORAL at 10:03

## 2019-06-03 RX ADMIN — ENOXAPARIN SODIUM 40 MG: 40 INJECTION SUBCUTANEOUS at 21:14

## 2019-06-03 RX ADMIN — ENALAPRIL MALEATE 10 MG: 10 TABLET ORAL at 19:36

## 2019-06-03 RX ADMIN — METOPROLOL TARTRATE 50 MG: 50 TABLET, FILM COATED ORAL at 10:06

## 2019-06-03 RX ADMIN — Medication 10 ML: at 20:19

## 2019-06-03 RX ADMIN — ASPIRIN 81 MG: 81 TABLET, COATED ORAL at 10:03

## 2019-06-03 RX ADMIN — INSULIN GLARGINE 25 UNITS: 100 INJECTION, SOLUTION SUBCUTANEOUS at 20:19

## 2019-06-03 RX ADMIN — ACETAMINOPHEN 650 MG: 325 TABLET ORAL at 08:52

## 2019-06-03 RX ADMIN — ENALAPRIL MALEATE 20 MG: 10 TABLET ORAL at 10:06

## 2019-06-03 RX ADMIN — GABAPENTIN 800 MG: 400 CAPSULE ORAL at 18:06

## 2019-06-03 RX ADMIN — PREGABALIN 200 MG: 50 CAPSULE ORAL at 19:36

## 2019-06-03 RX ADMIN — PREGABALIN 200 MG: 50 CAPSULE ORAL at 10:03

## 2019-06-03 RX ADMIN — IPRATROPIUM BROMIDE AND ALBUTEROL SULFATE 1 AMPULE: .5; 3 SOLUTION RESPIRATORY (INHALATION) at 09:32

## 2019-06-03 RX ADMIN — FLUTICASONE PROPIONATE 2 SPRAY: 50 SPRAY, METERED NASAL at 10:07

## 2019-06-03 RX ADMIN — IPRATROPIUM BROMIDE AND ALBUTEROL SULFATE 1 AMPULE: .5; 3 SOLUTION RESPIRATORY (INHALATION) at 17:37

## 2019-06-03 RX ADMIN — FUROSEMIDE 20 MG: 10 INJECTION, SOLUTION INTRAMUSCULAR; INTRAVENOUS at 10:04

## 2019-06-03 RX ADMIN — ROPINIROLE HYDROCHLORIDE 0.5 MG: 0.5 TABLET, FILM COATED ORAL at 19:36

## 2019-06-03 RX ADMIN — IPRATROPIUM BROMIDE AND ALBUTEROL SULFATE 1 AMPULE: .5; 3 SOLUTION RESPIRATORY (INHALATION) at 22:53

## 2019-06-03 RX ADMIN — GUAIFENESIN 600 MG: 600 TABLET, EXTENDED RELEASE ORAL at 19:36

## 2019-06-03 RX ADMIN — FAMOTIDINE 20 MG: 20 TABLET ORAL at 19:36

## 2019-06-03 RX ADMIN — INSULIN LISPRO 1 UNITS: 100 INJECTION, SOLUTION INTRAVENOUS; SUBCUTANEOUS at 13:08

## 2019-06-03 RX ADMIN — Medication 10 ML: at 19:36

## 2019-06-03 RX ADMIN — GUAIFENESIN 600 MG: 600 TABLET, EXTENDED RELEASE ORAL at 10:06

## 2019-06-03 RX ADMIN — Medication 10 ML: at 10:08

## 2019-06-03 RX ADMIN — AMIODARONE HYDROCHLORIDE 200 MG: 200 TABLET ORAL at 10:06

## 2019-06-03 RX ADMIN — IPRATROPIUM BROMIDE AND ALBUTEROL SULFATE 1 AMPULE: .5; 3 SOLUTION RESPIRATORY (INHALATION) at 13:23

## 2019-06-03 RX ADMIN — METFORMIN HYDROCHLORIDE 750 MG: 750 TABLET, EXTENDED RELEASE ORAL at 18:06

## 2019-06-03 RX ADMIN — CLOPIDOGREL BISULFATE 75 MG: 75 TABLET ORAL at 10:03

## 2019-06-03 RX ADMIN — ATORVASTATIN CALCIUM 80 MG: 80 TABLET, FILM COATED ORAL at 10:07

## 2019-06-03 ASSESSMENT — PAIN SCALES - GENERAL
PAINLEVEL_OUTOF10: 6
PAINLEVEL_OUTOF10: 6

## 2019-06-03 ASSESSMENT — PAIN DESCRIPTION - LOCATION: LOCATION: HEAD

## 2019-06-03 ASSESSMENT — PAIN DESCRIPTION - PAIN TYPE: TYPE: ACUTE PAIN

## 2019-06-03 NOTE — CARE COORDINATION
6/3/19  2:09 PM    Mammogram today. Pt with O2 remaining. Unsuccessful weaning earlier today. Cultures pending. Continue with Duoneb and incentive spirometry. [facility].

## 2019-06-03 NOTE — FLOWSHEET NOTE
[facility] THERAPY MISSED TREATMENT NOTE  [facility] 5K  [id number]      Date: 6/3/2019  Patient Name: Ata Rodrigues        CSN: [id number]   : 1945  (75 y.o.)  Gender: female   Referring Practitioner: Jordon Munoz DO  Diagnosis: pneumonia          REASON FOR MISSED TREATMENT:  Patient unable to participate. Patient on first attempt was working with Respiratory Therapy and was very soon going to a procedure.

## 2019-06-03 NOTE — PROGRESS NOTES
results for input(s): AST, ALT, ALB, BILITOT, ALKPHOS, LIPASE in the last 72 hours. Invalid input(s): AMYLASE  TROP  No results found for: TROPONINT  BNP  No results for input(s): BNP in the last 72 hours. Lactic Acid  No results for input(s): LACTA in the last 72 hours. INR  Recent Labs     05/31/19  1423   INR 1.11     PTT  No results for input(s): APTT in the last 72 hours. Glucose  Recent Labs     06/02/19  1955 06/03/19  0814 06/03/19  1140   POCGLU 158* 121* 169*     UA   Recent Labs     06/01/19  1330   COLORU YELLOW   . Thoracentesis fluid analysis results. Performed on: 6/1/19  Side: right side of chest .  By IR: Yes  Amount of pleural fluid drained: 0.6L    Lab Results   Component Value Date    PHFL 7.48 06/01/2019    COLORU YELLOW 06/01/2019    LDFL 65 06/01/2019    PROTEINFL 1.5 06/01/2019    TRIG 67 05/31/2019     Lab Results   Component Value Date    PROT 6.7 05/31/2019     CYTOLOGY: Pending    Serum LDH: 218  LDH ratio i.e Pleural fluid LDH/ Serum LDH: 0.298  Total protein ratio i.e Pleural fluid Total protein/ Serum Total protein: 0.22    Pleural fluid cultures were negative so far. Gram stain of pleural fluid is negative for any organisms. PFTs   No old studies in Epic. Sleep studies   No old studies in Epic. Sleep questions completed 6/3/19. Cultures    Blood cultures- no growth X 2 sets. Sputum cultures- no growth. Urine for legionella and streptococcal antigens were pending. EKG     Echocardiogram   5/31/2019   Transthoracic Echocardiography Report (TTE)    Conclusions      Summary   Ejection fraction is visually estimated at 40%. There was moderate global hypokinesis of the left ventricle. Hypokinetic motion of the basal inferior wall noted in the left ventricle. Left Ventricular size is Mildly increased . Moderately dilated left atrium. The aortic valve leaflets were not well visualized. Aortic valve appears tricuspid.    Aortic valve leaflets are somewhat addressed. Electronically signed by   ROMAIN Mccain CNP on 6/3/2019 at 11:56 AM     Addendum by Dr. Amina Staples MD:  I have seen and examined the patient independently. Face to face evaluation and examination was performed. The above evaluation and note has been reviewed. Labs and radiographs were reviewed. I Have discussed with Mr. Gayla Gomez CNP about this patient in detail. The above assessment and plan has been reviewed. Please see my modifications mentioned below. My modifications:  Feels better. No chest pain. Continue Diuresis. Sleeping habits:  Time to go to bed: 11:00            PM  Time to wake up: 7:00        AM    Sleep History:  Pt with history of:  Morning headache:No   Dryness of mouth in the morning:Yes  Hx of snoring:No  Witnessed apneas:No  Excessive day time sleepiness:Yes. See below for [place] score  Hypnogogic Hallucinations:NO  Hypnopompic Hallucinations:NO  Symptoms suggestive of Restless leg syndrome:NO  History of Seizures:NO  Sleep Walking:NO  Sleep Talking:NO  Sleep paralysis: NO  Cataplexy: NO      [place] Sleepiness Score:   Sitting and readin  Watching TV:3  Sitting inactive in a public place:1  Being a passenger in a motor vehicle for an hour or more:0  Lying down in the afternoon:3  Sitting and talking to someone:0  Sitting quietly after lunch (no alcohol):3  Stopped for a few minutes in traffic while drivin  Total AKLRV:36    Mallampati airway Class:IV  Neck Circumference:15.5 Inches    Plan:  -Will schedule Jimbo Pratt for nocturnal polysomnogram (Sleep study) with baseline protocol at [facility] sleep lab after discharge from the current hospitalization. Patient to follow with Ms. Paradise José Miguel Prince CNP/ my ( Dr. Lucila Jaimes sleep clinic at [address], [address] for Pulmonary disease in 1 week after the sleep study to go over the study reports and further management options.  -Patient educated to not to drive any motor vehicles or

## 2019-06-03 NOTE — PROGRESS NOTES
8-29-09    CHOLECYSTECTOMY  2005    Laparoscopic    COLONOSCOPY Left 1/9/2019    COLONOSCOPY performed by Cheryl Gomez MD at [address] [facility] CATH LAB PROCEDURE  8-24-09    Multivessel disease demonstrated by LAD having 70-80%  proximal lesion and napkin-ring lesion at bifurcation w/ D2. D2 appears to be medium large caliber vessel which has an ostial lesion of 70-80%. left -to-left collaterals as well as left-to-right collaterals emanating from LAD to PDA. Circumflex had anterior marginal branch and posterior marginal branch.  HERNIA REPAIR      Multiple    HYSTERECTOMY  1997    PACEMAKER PLACEMENT      TRANSTHORACIC ECHOCARDIOGRAM  07-11-11    LV size mildly to moderately dilated. Systolic function markedly reduced. EF 25-30%. Severe diffuse hypokinesis. Grade 1 diastolic dysfunction. LA was mildly to moderately dilated. RV mildly dilated, systolic function mildly reduced. Mild MR and TR.  TRANSTHORACIC ECHOCARDIOGRAM  8-24-09    LV demonstrates severe hypokinesis of the inferior basal as well as  basal aneurysm being noted and paradoxical septal motion. EF 45-50%. LA is moderately dilated and AV demonstrates mild AV sclerosis w/o AS and AI. Trace MR and TV insufficiency. Restrictions/Precautions:  Fall Risk     Other position/activity restrictions: R foot drop has AFO at home, O2       Subjective:  Chart Reviewed: Yes  Patient assessed for rehabilitation services?: Yes  Family / Caregiver Present: No  Subjective: RN approved PT evaluation. Pt in supine and was agreeable to PT interventions. General:  Overall Orientation Status: Within Functional Limits  Follows Commands: Within Functional Limits    Vision: Within Functional Limits    Hearing: Within functional limits         Pain:  Denies.           Social/Functional History:    Lives With: Spouse  Type of Home: House  Home Layout: One level  Home Access: Stairs to enter with rails  Entrance Stairs - Number of Steps: 1 ELIANE  Home Equipment: Rolling walker     Bathroom Shower/Tub: Tub/Shower unit  Bathroom Toilet: Handicap height  Bathroom Equipment: Grab bars in shower, Shower chair       ADL Assistance: [address]: Independent  Ambulation Assistance: (with 1SC)  Transfer Assistance: Independent          Additional Comments: spouse home to assist.       Objective:       RLE AROM: WFL         LLE AROM : WFL      Strength RLE: WFL  Comment: hip flexion: 3+/5, dorsiflexion: 3/5    Strength LLE: WFL  Comment: dorsiflexion: 3+/5      Sensation  Overall Sensation Status: Impaired(pt reports N/T of bilat LE from feet to mid calf)      Balance  Sitting - Static: Good  Sitting - Dynamic: Good  Standing - Static: Fair, +  Standing - Dynamic: Fair    Supine to Sit: Stand by assistance(HOB flat, use of bed rail)  Scooting: Supervision    Transfers  Sit to Stand: Stand by assistance(good hand placement without cueing)  Stand to sit: Supervision(cueing for hand placement, reaching back for armed chair, and proper mgmt of 2WW)       Ambulation 1  Surface: level tile  Device: Rolling Walker  Assistance: Contact guard assistance  Quality of Gait: right foot drop (pt has AFO, not present), decreased bilat heel strike, mild guarded gait, downward gaze  Distance: 80ft  Comments: cueing provided for improved heel strike, postural awareness, upward gaze       Stairs  # Steps : 2  Stairs Height: 6\"  Rails: Right ascending  Device: Hand Held Assist  Assistance: Minimal assistance(HHA)  Comment: cueing provided for safe sequencing secondary to increased right LE weakness - up with good/down with bad LE - pt reports that she has completed stair negotiation opposite of the recommmend way and requested to complete with this sequencing - safe stair negotiation with HHA and right or right rail       Exercises:  Comments: none       Activity Tolerance:  Activity Tolerance: Patient Tolerated

## 2019-06-04 ENCOUNTER — APPOINTMENT (OUTPATIENT)
Dept: GENERAL RADIOLOGY | Age: 74
DRG: 291 | End: 2019-06-04
Attending: HOSPITALIST
Payer: MEDICARE

## 2019-06-04 PROBLEM — E16.2 HYPOGLYCEMIA: Status: ACTIVE | Noted: 2019-06-04

## 2019-06-04 LAB
ANION GAP SERPL CALCULATED.3IONS-SCNC: 12 MEQ/L (ref 8–16)
BUN BLDV-MCNC: 21 MG/DL (ref 7–22)
CALCIUM SERPL-MCNC: 9.2 MG/DL (ref 8.5–10.5)
CHLORIDE BLD-SCNC: 103 MEQ/L (ref 98–111)
CO2: 27 MEQ/L (ref 23–33)
CREAT SERPL-MCNC: 0.6 MG/DL (ref 0.4–1.2)
GFR SERPL CREATININE-BSD FRML MDRD: > 90 ML/MIN/1.73M2
GLUCOSE BLD-MCNC: 102 MG/DL (ref 70–108)
GLUCOSE BLD-MCNC: 181 MG/DL (ref 70–108)
GLUCOSE BLD-MCNC: 206 MG/DL (ref 70–108)
GLUCOSE BLD-MCNC: 46 MG/DL (ref 70–108)
GLUCOSE BLD-MCNC: 58 MG/DL (ref 70–108)
GLUCOSE BLD-MCNC: 63 MG/DL (ref 70–108)
LD: 273 U/L (ref 100–190)
LEGIONELLA URINARY AG: NEGATIVE
POTASSIUM SERPL-SCNC: 4.1 MEQ/L (ref 3.5–5.2)
SODIUM BLD-SCNC: 142 MEQ/L (ref 135–145)
STREP PNEUMO AG, UR: NEGATIVE
TOTAL PROTEIN: 6.5 G/DL (ref 6.1–8)

## 2019-06-04 PROCEDURE — 94640 AIRWAY INHALATION TREATMENT: CPT

## 2019-06-04 PROCEDURE — 6360000002 HC RX W HCPCS: Performed by: HOSPITALIST

## 2019-06-04 PROCEDURE — 2700000000 HC OXYGEN THERAPY PER DAY

## 2019-06-04 PROCEDURE — 94760 N-INVAS EAR/PLS OXIMETRY 1: CPT

## 2019-06-04 PROCEDURE — 87205 SMEAR GRAM STAIN: CPT

## 2019-06-04 PROCEDURE — 84157 ASSAY OF PROTEIN OTHER: CPT

## 2019-06-04 PROCEDURE — 36415 COLL VENOUS BLD VENIPUNCTURE: CPT

## 2019-06-04 PROCEDURE — 6370000000 HC RX 637 (ALT 250 FOR IP): Performed by: HOSPITALIST

## 2019-06-04 PROCEDURE — 97110 THERAPEUTIC EXERCISES: CPT

## 2019-06-04 PROCEDURE — 87070 CULTURE OTHR SPECIMN AEROBIC: CPT

## 2019-06-04 PROCEDURE — 82945 GLUCOSE OTHER FLUID: CPT

## 2019-06-04 PROCEDURE — APPSS30 APP SPLIT SHARED TIME 16-30 MINUTES: Performed by: NURSE PRACTITIONER

## 2019-06-04 PROCEDURE — 82948 REAGENT STRIP/BLOOD GLUCOSE: CPT

## 2019-06-04 PROCEDURE — 6370000000 HC RX 637 (ALT 250 FOR IP): Performed by: INTERNAL MEDICINE

## 2019-06-04 PROCEDURE — 80048 BASIC METABOLIC PNL TOTAL CA: CPT

## 2019-06-04 PROCEDURE — 2580000003 HC RX 258: Performed by: HOSPITALIST

## 2019-06-04 PROCEDURE — 1200000000 HC SEMI PRIVATE

## 2019-06-04 PROCEDURE — 87102 FUNGUS ISOLATION CULTURE: CPT

## 2019-06-04 PROCEDURE — 87075 CULTR BACTERIA EXCEPT BLOOD: CPT

## 2019-06-04 PROCEDURE — 99233 SBSQ HOSP IP/OBS HIGH 50: CPT | Performed by: INTERNAL MEDICINE

## 2019-06-04 PROCEDURE — 71046 X-RAY EXAM CHEST 2 VIEWS: CPT

## 2019-06-04 PROCEDURE — 83986 ASSAY PH BODY FLUID NOS: CPT

## 2019-06-04 PROCEDURE — 97116 GAIT TRAINING THERAPY: CPT

## 2019-06-04 PROCEDURE — 84155 ASSAY OF PROTEIN SERUM: CPT

## 2019-06-04 PROCEDURE — 89050 BODY FLUID CELL COUNT: CPT

## 2019-06-04 PROCEDURE — 99232 SBSQ HOSP IP/OBS MODERATE 35: CPT | Performed by: INTERNAL MEDICINE

## 2019-06-04 PROCEDURE — 83615 LACTATE (LD) (LDH) ENZYME: CPT

## 2019-06-04 RX ORDER — IPRATROPIUM BROMIDE AND ALBUTEROL SULFATE 2.5; .5 MG/3ML; MG/3ML
3 SOLUTION RESPIRATORY (INHALATION) EVERY 4 HOURS PRN
Qty: 360 ML | Refills: 5 | Status: SHIPPED | OUTPATIENT
Start: 2019-06-04

## 2019-06-04 RX ORDER — INSULIN GLARGINE 100 [IU]/ML
18 INJECTION, SOLUTION SUBCUTANEOUS NIGHTLY
Status: DISCONTINUED | OUTPATIENT
Start: 2019-06-04 | End: 2019-06-05 | Stop reason: HOSPADM

## 2019-06-04 RX ORDER — BUMETANIDE 1 MG/1
0.5 TABLET ORAL 2 TIMES DAILY
Status: DISCONTINUED | OUTPATIENT
Start: 2019-06-04 | End: 2019-06-05 | Stop reason: HOSPADM

## 2019-06-04 RX ORDER — ENALAPRIL MALEATE 5 MG/1
5 TABLET ORAL 2 TIMES DAILY
Status: DISCONTINUED | OUTPATIENT
Start: 2019-06-04 | End: 2019-06-05 | Stop reason: HOSPADM

## 2019-06-04 RX ORDER — IPRATROPIUM BROMIDE AND ALBUTEROL SULFATE 2.5; .5 MG/3ML; MG/3ML
1 SOLUTION RESPIRATORY (INHALATION) EVERY 4 HOURS PRN
Status: DISCONTINUED | OUTPATIENT
Start: 2019-06-04 | End: 2019-06-05 | Stop reason: HOSPADM

## 2019-06-04 RX ADMIN — ENALAPRIL MALEATE 5 MG: 10 TABLET ORAL at 19:57

## 2019-06-04 RX ADMIN — ROPINIROLE HYDROCHLORIDE 0.5 MG: 0.5 TABLET, FILM COATED ORAL at 19:57

## 2019-06-04 RX ADMIN — IPRATROPIUM BROMIDE AND ALBUTEROL SULFATE 1 AMPULE: .5; 3 SOLUTION RESPIRATORY (INHALATION) at 08:53

## 2019-06-04 RX ADMIN — INSULIN LISPRO 2 UNITS: 100 INJECTION, SOLUTION INTRAVENOUS; SUBCUTANEOUS at 12:38

## 2019-06-04 RX ADMIN — BUMETANIDE 0.5 MG: 1 TABLET ORAL at 19:56

## 2019-06-04 RX ADMIN — ATORVASTATIN CALCIUM 80 MG: 80 TABLET, FILM COATED ORAL at 10:08

## 2019-06-04 RX ADMIN — PREGABALIN 200 MG: 50 CAPSULE ORAL at 10:09

## 2019-06-04 RX ADMIN — Medication 10 ML: at 19:57

## 2019-06-04 RX ADMIN — FLUTICASONE PROPIONATE 2 SPRAY: 50 SPRAY, METERED NASAL at 10:10

## 2019-06-04 RX ADMIN — FAMOTIDINE 20 MG: 20 TABLET ORAL at 19:58

## 2019-06-04 RX ADMIN — METFORMIN HYDROCHLORIDE 750 MG: 750 TABLET, EXTENDED RELEASE ORAL at 18:16

## 2019-06-04 RX ADMIN — ASPIRIN 81 MG: 81 TABLET, COATED ORAL at 10:08

## 2019-06-04 RX ADMIN — AMIODARONE HYDROCHLORIDE 200 MG: 200 TABLET ORAL at 12:42

## 2019-06-04 RX ADMIN — FAMOTIDINE 20 MG: 20 TABLET ORAL at 10:09

## 2019-06-04 RX ADMIN — FUROSEMIDE 20 MG: 10 INJECTION, SOLUTION INTRAMUSCULAR; INTRAVENOUS at 10:09

## 2019-06-04 RX ADMIN — ENOXAPARIN SODIUM 40 MG: 40 INJECTION SUBCUTANEOUS at 19:58

## 2019-06-04 RX ADMIN — INSULIN GLARGINE 18 UNITS: 100 INJECTION, SOLUTION SUBCUTANEOUS at 20:35

## 2019-06-04 RX ADMIN — METFORMIN HYDROCHLORIDE 750 MG: 750 TABLET, EXTENDED RELEASE ORAL at 10:08

## 2019-06-04 RX ADMIN — GABAPENTIN 800 MG: 400 CAPSULE ORAL at 18:16

## 2019-06-04 RX ADMIN — GUAIFENESIN 600 MG: 600 TABLET, EXTENDED RELEASE ORAL at 10:08

## 2019-06-04 RX ADMIN — Medication 10 ML: at 10:02

## 2019-06-04 RX ADMIN — GUAIFENESIN 600 MG: 600 TABLET, EXTENDED RELEASE ORAL at 19:57

## 2019-06-04 RX ADMIN — PREGABALIN 200 MG: 50 CAPSULE ORAL at 19:57

## 2019-06-04 RX ADMIN — CLOPIDOGREL BISULFATE 75 MG: 75 TABLET ORAL at 10:09

## 2019-06-04 ASSESSMENT — PAIN SCALES - GENERAL: PAINLEVEL_OUTOF10: 0

## 2019-06-04 NOTE — PROGRESS NOTES
A home oxygen evaluation has been completed. [x]Patient is an inpatient. It is expected that the patient will be discharged within the next 48 hours. Qualified provider to write order for home prescription if patient qualifies. Social service/care managers will arrange for home oxygen. If patient is active, arrange for Home Medical supplier to assess for Oxygen Conserving Device per pulse oximetry. []Patient is an outpatient. Results will be faxed to the ordering provider. Qualified provider to write order for home prescription if patient qualifies and arranges for home oxygen. Patient was placed on room air for 50 minutes. SpO2 was 96 % on room air at rest. Patients SpO2 was not below 89% and did not qualified for home oxygen. Oxygen was not applied  to maintain a SpO2 between 90-92% while at rest. Actual SpO2 was 96 %. Patient can ambulate for exercise flow rate. Patients was ambulated, SpO2 was 93% on ra to maintain SpO2 between 90-92% while exercising.

## 2019-06-04 NOTE — PROGRESS NOTES
Physical Therapy   [address] MED 5K - 8M-26/725-S    Time In: 2831  Time Out: 1110  Timed Code Treatment Minutes: 25 Minutes  Minutes: 25          Date: 2019  Patient Name: Jazmín Staton,  Gender:  female        MRN: [id number]  : 1945  (75 y.o.)     Referring Practitioner: JERED Machuca DO  Diagnosis: pneumonia   Additional Pertinent Hx: Per EMR: \"This is a 65-year-old female presents today for shortness of breath. This has been going on for approximately the last week or so. It has been getting slightly worse over the course of the week. She is coughing up phlegm. Denies any fevers. She has been nauseated and does vomit up some phlegm at times. She states she does have some swelling in bilateral legs which is normal for her she states. \"     Past Medical History:   Diagnosis Date    CAD (coronary artery disease)     Hyperlipidemia     Hypertension     ICD (implantable cardiac defibrillator), dual, in situ     [employer] dual ICD    Shingles 2014    [employer] dual ICD     Type II or unspecified type diabetes mellitus without mention of complication, not stated as uncontrolled     Ventricular arrhythmia      Past Surgical History:   Procedure Laterality Date    CARDIAC PACEMAKER PLACEMENT  09    [address] [facility]    CARDIOVASCULAR STRESS TEST  01-27-10    Excellent treadmill exercise. Improved functional capacity to functional class 1 completed 8 METS. Hemodynamic response was appropriate. No ischemic ECG changes noted.  CARDIOVASCULAR STRESS TEST  10-28-09    No evidence of stress induced ischemia. Evidence of  prior transmural MI demonstrated by transient fixed defects of inferior wall extending into lateral wall, indicative of RCA lesion. Bowel and soft tissue attenuation interfering w/ counts of lateral wall. EF 29% w/ severe septal and inferolateral hypokinesis w/ very mild lateral wall hypokinesis being noted.     

## 2019-06-04 NOTE — CARE COORDINATION
[address] Transitions Interview     2019    Patient: Zachary Pratt Patient : 1945   MRN: [id number]  Reason for Admission:   RARS: Readmission Risk Score: 13       Reviewed nH Predict Tool with patient and SW/CM. nH Predict Tool uploaded in the media tab. Recommendation is for home with home health and discharge plan is home with spouse and home health      Readmission Risk  Patient Active Problem List   Diagnosis    [facility] [employer] dual ICD    Hyperlipidemia    Hypertension    Ventricular arrhythmia    Ischemic cardiomyopathy    Diabetic neuropathy ([address]    Type 2 diabetes mellitus with hyperglycemia, with long-term current use of insulin ([address]    PVD (peripheral vascular disease) [facility]    Coronary artery disease involving coronary bypass graft of native heart without angina pectoris    Abdominal aortic aneurysm (AAA) without rupture (HCC)    Pneumonia    Lower extremity edema    Cough    Acute hypoxemic respiratory failure (HCC)    Pleural effusion, bilateral    Acute and chronic respiratory failure with hypoxia (HCC)    Breast mass    Systolic CHF, acute ([facility]    Sleep apnea       Inpatient Assessment  Care Transitions Summary    Care Transitions Inpatient Review  Medication Review  Are you able to afford your medications?:  Yes  How often do you have difficulty taking your medications?:  I always take them as prescribed. Housing Review  Who do you live with?:  Partner/Spouse/SO  Are you an active caregiver in your home?:  No  Social Support  Do you have a ?:  No  Do you have a [facility]?:  Yes  [facility] Name[facility]  [facility]  [address]  Patient DME:  Dariana Burns chair, Other  Other Patient DME:  [facility]  Functional Review  Ability to seek help/take action for Emergent/Urgent situations i.e. fire, crime, inclement weather or health crisis. :  Independent  Ability handle personal hygiene needs (bathing/dressing/grooming):

## 2019-06-04 NOTE — PROGRESS NOTES
recognition technology. ** Final report electronically signed by Dr. Jess Aschoff on 5/30/2019 1:43 PM    Cta Chest W Wo Contrast    Result Date: 5/31/2019  PROCEDURE: CTA CHEST W WO CONTRAST CLINICAL INFORMATION: DYSPNEA, ON EXERTION, Evaluate for PE. Ximenaa Ely COMPARISON: No prior study. TECHNIQUE: 1.5 mm axial images were obtained through the chest after the administration of IV contrast.  A non-contrast localizer was obtained. 3D reconstructions were performed on the scanner to include sagittal and bilateral oblique images through the  chest. 80 mL Isovue-370 were administered intravenously. All CT scans at this facility use dose modulation, iterative reconstruction, and/or weight-based dosing when appropriate to reduce radiation dose to as low as reasonably achievable. FINDINGS: Thyroid gland is unremarkable. The thoracic aorta is normal caliber with atherosclerotic changes. Pulmonary arterial vessels are adequately opacified. No acute pulmonary arterial embolism. Bilateral pleural effusions right greater than left with adjacent consolidation atelectasis versus pneumonia. Correlation with symptoms is advised. Postsurgical changes with median sternotomy wires. Asymmetric breast tissue in the right upper lateral breast. Correlation with mammogram is advised. Few right axillary lymph nodes largest is 1.3 cm. Cardiomegaly. No acute findings in the upper abdomen. Degenerative changes thoracic spine. 1. No acute pulmonary embolism. 2. Bilateral pleural effusions right greater than left with adjacent consolidation, atelectasis or infiltrate. Right pleural effusion may be loculated. Correlation and follow-up advised to document resolution. 3. Asymmetric breast tissue in the right upper lateral breast. This is incompletely evaluated on this exam. Correlation with mammogram is advised. Few right axillary lymph nodes largest is 1.3 cm. **This report has been created using voice recognition software.  It may contain minor

## 2019-06-04 NOTE — PROGRESS NOTES
[facility]    Patient - Rios Cancino   MRN -  [id number]   [facility]   - 1945      Date of Admission -  2019  6:05 PM  Date of evaluation -  2019  Room - [address] [place] [address] Day - 5  Consulting - Lo Rosa MD Primary Care Physician - Jory Sy MD     Problem List      Active Hospital Problems    Diagnosis Date Noted    Hypoglycemia [E16.2]     Systolic CHF, acute ([address] [I50.21] 2019    Sleep apnea [G47.30]     Acute and chronic respiratory failure with hypoxia ([address] [J96.21]     Breast mass [N63.0]     Lower extremity edema [R60.0] 2019    Cough [R05] 2019    Acute hypoxemic respiratory failure (HCC) [J96.01] 2019    Pleural effusion, bilateral [J90]     Abdominal aortic aneurysm (AAA) without rupture ([address] [I71.4] 2018    Coronary artery disease involving coronary bypass graft of native heart without angina pectoris [I25.810] 2018    PVD (peripheral vascular disease) ([address] [I73.9] 2018    Type 2 diabetes mellitus with hyperglycemia, with long-term current use of insulin ([address] [E11.65, Z79.4] 10/09/2017    Diabetic neuropathy ([address] [E11.40] 10/03/2017     Reason for Consult    For management of dyspnea and pleural effusion  HPI   History Obtained From: Patient and electronic medical record. Rios Cancino is a 75 y.o. female  was initially admitted under hospitalist service. Pulmonary medicine was consulted for further management of hypoxia/Hypoxic respiratory failure. She is currently on oxygen via nasal cannula. The patient is a 75 y.o. female who presented with worsening of shortness of breath for the last 2 weeks. Her current illness started as upper respiratory tract infection with cough, cold and sputum  production which is initially white in color and later changed to greenish yellow.  She also noticed worsening of shortness of breath with decline in

## 2019-06-04 NOTE — CARE COORDINATION
6/4/19, 11:48 AM    DISCHARGE BARRIERS    Call to [facility] ([facility])-advised her that order has been placed for home health and that patient is a possible discharge for tomorrow.

## 2019-06-05 ENCOUNTER — APPOINTMENT (OUTPATIENT)
Dept: GENERAL RADIOLOGY | Age: 74
DRG: 291 | End: 2019-06-05
Attending: HOSPITALIST
Payer: MEDICARE

## 2019-06-05 ENCOUNTER — APPOINTMENT (OUTPATIENT)
Dept: ULTRASOUND IMAGING | Age: 74
DRG: 291 | End: 2019-06-05
Attending: HOSPITALIST
Payer: MEDICARE

## 2019-06-05 VITALS
OXYGEN SATURATION: 91 % | BODY MASS INDEX: 39.97 KG/M2 | TEMPERATURE: 97.9 F | DIASTOLIC BLOOD PRESSURE: 61 MMHG | HEIGHT: 62 IN | HEART RATE: 79 BPM | SYSTOLIC BLOOD PRESSURE: 133 MMHG | WEIGHT: 217.2 LBS | RESPIRATION RATE: 18 BRPM

## 2019-06-05 LAB
ANION GAP SERPL CALCULATED.3IONS-SCNC: 8 MEQ/L (ref 8–16)
BLOOD CULTURE, ROUTINE: NORMAL
BLOOD CULTURE, ROUTINE: NORMAL
BODY FLUID RBC: < 2000 /CUMM
BUN BLDV-MCNC: 21 MG/DL (ref 7–22)
CALCIUM SERPL-MCNC: 8.5 MG/DL (ref 8.5–10.5)
CHARACTER, BODY FLUID: ABNORMAL
CHLORIDE BLD-SCNC: 104 MEQ/L (ref 98–111)
CO2: 28 MEQ/L (ref 23–33)
COLOR: ABNORMAL
CREAT SERPL-MCNC: 0.6 MG/DL (ref 0.4–1.2)
GFR SERPL CREATININE-BSD FRML MDRD: > 90 ML/MIN/1.73M2
GLUCOSE BLD-MCNC: 112 MG/DL (ref 70–108)
GLUCOSE BLD-MCNC: 116 MG/DL (ref 70–108)
GLUCOSE BLD-MCNC: 83 MG/DL (ref 70–108)
GLUCOSE BLD-MCNC: 92 MG/DL (ref 70–108)
GLUCOSE, FLUID: 102 MG/DL
LD, FLUID: 271 U/L
MESOTHELIAL CELLS BODY FLUID: ABNORMAL
MONONUCLEAR CELLS BODY FLUID: 93.8 %
PATHOLOGIST REVIEW: ABNORMAL
PH FLUID: 7.62
POLYMORPHONUCLEAR CELLS BODY FLUID: 6.2 %
POTASSIUM SERPL-SCNC: 4.4 MEQ/L (ref 3.5–5.2)
PRO-BNP: 1528 PG/ML (ref 0–1800)
PROTEIN FLUID: 2.5 GM/DL
SODIUM BLD-SCNC: 140 MEQ/L (ref 135–145)
SPECIMEN: ABNORMAL
TOTAL NUCLEATED CELLS BODY FLUID: 1886 /CUMM (ref 0–500)
TOTAL VOLUME RECEIVED BODY FLUID: 83 ML

## 2019-06-05 PROCEDURE — 6370000000 HC RX 637 (ALT 250 FOR IP): Performed by: HOSPITALIST

## 2019-06-05 PROCEDURE — 71045 X-RAY EXAM CHEST 1 VIEW: CPT

## 2019-06-05 PROCEDURE — 99239 HOSP IP/OBS DSCHRG MGMT >30: CPT | Performed by: INTERNAL MEDICINE

## 2019-06-05 PROCEDURE — 88305 TISSUE EXAM BY PATHOLOGIST: CPT

## 2019-06-05 PROCEDURE — 88112 CYTOPATH CELL ENHANCE TECH: CPT

## 2019-06-05 PROCEDURE — 6370000000 HC RX 637 (ALT 250 FOR IP): Performed by: INTERNAL MEDICINE

## 2019-06-05 PROCEDURE — 2580000003 HC RX 258: Performed by: HOSPITALIST

## 2019-06-05 PROCEDURE — 32555 ASPIRATE PLEURA W/ IMAGING: CPT

## 2019-06-05 PROCEDURE — 99232 SBSQ HOSP IP/OBS MODERATE 35: CPT | Performed by: INTERNAL MEDICINE

## 2019-06-05 PROCEDURE — 99999 PR OFFICE/OUTPT VISIT,PROCEDURE ONLY: CPT | Performed by: INTERNAL MEDICINE

## 2019-06-05 PROCEDURE — 83880 ASSAY OF NATRIURETIC PEPTIDE: CPT

## 2019-06-05 PROCEDURE — 80048 BASIC METABOLIC PNL TOTAL CA: CPT

## 2019-06-05 PROCEDURE — APPSS30 APP SPLIT SHARED TIME 16-30 MINUTES: Performed by: NURSE PRACTITIONER

## 2019-06-05 PROCEDURE — 36415 COLL VENOUS BLD VENIPUNCTURE: CPT

## 2019-06-05 PROCEDURE — 82948 REAGENT STRIP/BLOOD GLUCOSE: CPT

## 2019-06-05 RX ORDER — METOPROLOL SUCCINATE 50 MG/1
50 TABLET, EXTENDED RELEASE ORAL DAILY
Qty: 30 TABLET | Refills: 3 | Status: ON HOLD | OUTPATIENT
Start: 2019-06-05 | End: 2019-08-25 | Stop reason: HOSPADM

## 2019-06-05 RX ORDER — ATORVASTATIN CALCIUM 80 MG/1
80 TABLET, FILM COATED ORAL DAILY
Qty: 90 TABLET | Refills: 1 | Status: SHIPPED | OUTPATIENT
Start: 2019-06-05 | End: 2020-01-01

## 2019-06-05 RX ORDER — BUMETANIDE 0.5 MG/1
0.5 TABLET ORAL 2 TIMES DAILY
Qty: 30 TABLET | Refills: 3 | Status: SHIPPED | OUTPATIENT
Start: 2019-06-05 | End: 2019-06-24 | Stop reason: ALTCHOICE

## 2019-06-05 RX ADMIN — ATORVASTATIN CALCIUM 80 MG: 80 TABLET, FILM COATED ORAL at 08:19

## 2019-06-05 RX ADMIN — AMIODARONE HYDROCHLORIDE 200 MG: 200 TABLET ORAL at 08:19

## 2019-06-05 RX ADMIN — PREGABALIN 200 MG: 50 CAPSULE ORAL at 08:24

## 2019-06-05 RX ADMIN — METOPROLOL SUCCINATE 50 MG: 50 TABLET, EXTENDED RELEASE ORAL at 08:19

## 2019-06-05 RX ADMIN — FLUTICASONE PROPIONATE 2 SPRAY: 50 SPRAY, METERED NASAL at 08:19

## 2019-06-05 RX ADMIN — GUAIFENESIN 600 MG: 600 TABLET, EXTENDED RELEASE ORAL at 08:19

## 2019-06-05 RX ADMIN — FAMOTIDINE 20 MG: 20 TABLET ORAL at 08:19

## 2019-06-05 RX ADMIN — METFORMIN HYDROCHLORIDE 750 MG: 750 TABLET, EXTENDED RELEASE ORAL at 08:19

## 2019-06-05 RX ADMIN — BUMETANIDE 0.5 MG: 1 TABLET ORAL at 08:21

## 2019-06-05 RX ADMIN — ENALAPRIL MALEATE 5 MG: 10 TABLET ORAL at 08:19

## 2019-06-05 RX ADMIN — Medication 10 ML: at 08:20

## 2019-06-05 NOTE — PROGRESS NOTES
[facility]    Patient - Abebe Platt   MRN -  [id number]   [facility] # - [de-identified]   - 1945      Date of Admission -  2019  6:05 PM  Date of evaluation -  2019  Room - [address] [place] [address] Marcellus RIVERA MD Primary Care Physician - Cain Garcia MD     Problem List      Active Hospital Problems    Diagnosis Date Noted    Hypoglycemia [E16.2]     Systolic CHF, acute ([address] [I50.21] 2019    Sleep apnea [G47.30]     Acute and chronic respiratory failure with hypoxia ([address] [J96.21]     Breast mass [N63.0]     Lower extremity edema [R60.0] 2019    Cough [R05] 2019    Acute hypoxemic respiratory failure (HCC) [J96.01] 2019    Pleural effusion, bilateral [J90]     Abdominal aortic aneurysm (AAA) without rupture ([address] [I71.4] 2018    Coronary artery disease involving coronary bypass graft of native heart without angina pectoris [I25.810] 2018    PVD (peripheral vascular disease) ([address] [I73.9] 2018    Type 2 diabetes mellitus with hyperglycemia, with long-term current use of insulin ([address] [E11.65, Z79.4] 10/09/2017    Diabetic neuropathy ([address] [E11.40] 10/03/2017     Reason for Consult    For management of dyspnea and pleural effusion  HPI   History Obtained From: Patient and electronic medical record. Abebe Platt is a 75 y.o. female  was initially admitted under hospitalist service. Pulmonary medicine was consulted for further management of hypoxia/Hypoxic respiratory failure. She is currently on oxygen via nasal cannula. The patient is a 75 y.o. female who presented with worsening of shortness of breath for the last 2 weeks. Her current illness started as upper respiratory tract infection with cough, cold and sputum  production which is initially white in color and later changed to greenish yellow.  She also noticed worsening of shortness of breath with decline in functional status. Due to worsening of her above symptoms she was admitted to [facility] for further evaluation. 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 She also smoked tobacco for 25 to 30years with 1PPD . She quit smoking several years back. She also exposed to second hand tobacco smoke. She is having cough: Yes  Duration of cough: for 2 weeks   Her cough is associated with sputum production: Yes   The sputum color: yellow  Hemoptysis:No  Diurnal variation: None. Relieving factors: No  Aggravating factors: No    She gives a history of fever: No  Chills & rigors:No  Associated night sweats: No.  Recent travel history:No.    Rrecent sick contacts: No.      She admits to orthopnea and paroxysmal nocturnal dyspnea. Prior to current current hospitalization:  She is not using any oxygen supplementation at rest, exercise or during sleep/at night time. She was never diagnosed with pulmonary diseases I.e bronchial asthma, COPD, pulmonary embolism,pulmonary hypertension or pleural effusion/s in the past.  She was never diagnosed with pulmonary tuberculosis in the past. She denies any recent exposure to any patients with tuberculosis. She denies any recent travel to endemic places of tuberculosis. Sleeping habits:  Time to go to bed: 10:30            PM  Time to wake up: 9:00        AM    Sleep History:  Pt with history of:  Morning headache:No   Dryness of mouth in the morning:Yes  Hx of snoring:Yes  Witnessed apneas:No  Excessive day time sleepiness:Yes.  See below for [place] score  Hypnogogic Hallucinations:NO  Hypnopompic Hallucinations:NO  Symptoms suggestive of Restless leg syndrome:NO  History of Seizures:NO  Sleep Walking:NO  Sleep Talking:NO  Sleep paralysis: NO  Cataplexy: NO      [place] Sleepiness Score:   Sitting and reading:3  Watching TV:3  Sitting inactive in a public place:0  Being a passenger in a motor vehicle for an hour or more:3  Lying down in the afternoon:3  Sitting and talking to someone:0  Sitting quietly after lunch (no alcohol): 2  Stopped for a few minutes in traffic while drivin  Total YTEEI:42      She is currently working as a: retired. She denies any difficulty in sleeping at new places. Do you drink coffee: No. 0 cup/s per day. Do you drink caffeinated beverages i.e sodas: No.  0 can/s per day. Do you drink tea: Decaffeinated Tea cup/s/glasse/s per day. Do you drink alcoholic beverages:  No. 0 drink/s per day. History of recreational drug use: No.     Mallampati airway Class:III  Neck Circumference:16.0 Inches    Patient considerations: Walker    Subjective/Events Past 24 hours/ROS   -Right side thoracentesis today removed 200 mL  -Seen post thora, denies SOB  -Denies CP  -Rest of the body systems were reviewed. PMHx   Past Medical History      Diagnosis Date    CAD (coronary artery disease)     Hyperlipidemia     Hypertension     ICD (implantable cardiac defibrillator), dual, in situ     [facility] dual ICD    Shingles 2014    [facility] dual ICD     Type II or unspecified type diabetes mellitus without mention of complication, not stated as uncontrolled     Ventricular arrhythmia       Past Surgical History        Procedure Laterality Date    CARDIAC PACEMAKER PLACEMENT  09    [facility]    CARDIOVASCULAR STRESS TEST  01-27-10    Excellent treadmill exercise. Improved functional capacity to functional class 1 completed 8 METS. Hemodynamic response was appropriate. No ischemic ECG changes noted.  CARDIOVASCULAR STRESS TEST  10-28-09    No evidence of stress induced ischemia. Evidence of  prior transmural MI demonstrated by transient fixed defects of inferior wall extending into lateral wall, indicative of RCA lesion. Bowel and soft tissue attenuation interfering w/ counts of lateral wall. EF 29% w/ severe septal and inferolateral hypokinesis w/ very mild lateral wall hypokinesis being noted.      CARDIOVERSION  8-29-09    CHOLECYSTECTOMY  2005    Laparoscopic    COLONOSCOPY Left 1/9/2019    COLONOSCOPY performed by Alfonso Becker MD at [address] [facility] CATH LAB PROCEDURE  8-24-09    Multivessel disease demonstrated by LAD having 70-80%  proximal lesion and napkin-ring lesion at bifurcation w/ D2. D2 appears to be medium large caliber vessel which has an ostial lesion of 70-80%. left -to-left collaterals as well as left-to-right collaterals emanating from LAD to PDA. Circumflex had anterior marginal branch and posterior marginal branch.  HERNIA REPAIR      Multiple    HYSTERECTOMY  1997    PACEMAKER PLACEMENT      TRANSTHORACIC ECHOCARDIOGRAM  07-11-11    LV size mildly to moderately dilated. Systolic function markedly reduced. EF 25-30%. Severe diffuse hypokinesis. Grade 1 diastolic dysfunction. LA was mildly to moderately dilated. RV mildly dilated, systolic function mildly reduced. Mild MR and TR.  TRANSTHORACIC ECHOCARDIOGRAM  8-24-09    LV demonstrates severe hypokinesis of the inferior basal as well as  basal aneurysm being noted and paradoxical septal motion. EF 45-50%. LA is moderately dilated and AV demonstrates mild AV sclerosis w/o AS and AI. Trace MR and TV insufficiency.       Meds    Current Medications    insulin glargine  18 Units Subcutaneous Nightly    bumetanide  0.5 mg Oral BID    enalapril  5 mg Oral BID    metoprolol succinate  50 mg Oral BID    metFORMIN  750 mg Oral BID WC    insulin lispro  0-6 Units Subcutaneous TID WC    insulin lispro  0-3 Units Subcutaneous Nightly    amiodarone  200 mg Oral Daily    aspirin EC  81 mg Oral Daily    atorvastatin  80 mg Oral Daily    clopidogrel  75 mg Oral Daily    fluticasone  2 spray Nasal Daily    gabapentin  800 mg Oral QPM    pregabalin  200 mg Oral BID    rOPINIRole  0.5 mg Oral Nightly    sodium chloride flush  10 mL Intravenous 2 times per day    below.     My modifications: On room air. Feels better. Leg edema improved. Chest Xray:  Jun 5, 2019   PROCEDURE: XR CHEST 1 VIEW     1. The patient is status post ultrasound-guided right thoracentesis with complete aspiration of the right pleural effusion. There is no pneumothorax. 2. There is pulmonary vascular congestion with patchy right lower lobe infiltrate. There is moderate elevation of the left hemidiaphragm. Follow up as above.     Newton Daly MD 6/5/2019 7:01 PM

## 2019-06-05 NOTE — BRIEF OP NOTE
Post Procedure Progress Note      6/5/2019  Pre-Procedure Diagnosis: Right pleural effusion  Post-Procedure Diagnosis: Same  Physician: Denise Valdes MD  Anesthesia: 2% lidocaine local  Procedure Performed: Ultrasound guided right thoracentesis  Specimen Removed: 0.2 liters cloudy yellow pleural fluid  Disposition of Specimen: 83 ml specimen sent to the lab  Estimated Blood Loss: None  Complications: None

## 2019-06-05 NOTE — FLOWSHEET NOTE
06/05/19 1227   Encounter Summary   Services provided to: Patient and family together   Referral/Consult From: Daniel   Continue Visiting Yes  (6/5/19)   Complexity of Encounter Low   Length of Encounter 15 minutes   Spiritual/Jewish   Type Spiritual support   Assessment Calm; Approachable   Intervention Nurtured hope   Outcome Comfort   S- During my contact with the 75 yr old patient and family, I wanted to assess the spiritual and          emotional needs. O- The patient was in the chair and their family was also supportively present. The pt didnt          share any major concerns at the time. The pt does have support through their family. A- I offered emotional support as well as words of encouragement to all that was           present. The pt said they are trying to remain positive. P-  Continued support would be helpful in order to meet the future Spiritual needs of the         patient.

## 2019-06-05 NOTE — PLAN OF CARE
Problem: Falls - Risk of:  Goal: Will remain free from falls  Description  Will remain free from falls  Outcome: Ongoing     Problem: Falls - Risk of:  Goal: Absence of physical injury  Description  Absence of physical injury  Outcome: Ongoing     Problem: Risk for Impaired Skin Integrity  Goal: Tissue integrity - skin and mucous membranes  Description  Structural intactness and normal physiological function of skin and  mucous membranes. Outcome: Ongoing     Problem: RESPIRATORY  Goal: Lung sounds clear or within normal limits for patient  6/4/2019 0953 by Rios Galeas CANDELARIA  Outcome: Ongoing     Problem: Discharge Planning:  Goal: Discharged to appropriate level of care  Description  Discharged to appropriate level of care  Outcome: Ongoing     Problem: Pain:  Goal: Pain level will decrease  Description  Pain level will decrease  Outcome: Ongoing     Problem: Pain:  Goal: Control of acute pain  Description  Control of acute pain  Outcome: Ongoing     Problem: Pain:  Goal: Control of chronic pain  Description  Control of chronic pain  Outcome: Ongoing     Problem: DISCHARGE BARRIERS  Goal: Patient's continuum of care needs are met  Outcome: Ongoing   Care plan reviewed with patient and family. Patient and family verbalize understanding of the plan of care and contribute to goal setting. Dr Hailey Fragoso is going to have her have another thorocentesis tomorrow so that she will be better when she is discharged and possibly not have to come back as soon.

## 2019-06-06 ENCOUNTER — CARE COORDINATION (OUTPATIENT)
Dept: CASE MANAGEMENT | Age: 74
End: 2019-06-06

## 2019-06-06 ENCOUNTER — TELEPHONE (OUTPATIENT)
Dept: FAMILY MEDICINE CLINIC | Age: 74
End: 2019-06-06

## 2019-06-06 LAB
ANAEROBIC CULTURE: NORMAL
BODY FLUID CULTURE, STERILE: NORMAL
GRAM STAIN RESULT: NORMAL

## 2019-06-06 NOTE — CARE COORDINATION
[facility] regarding nebulizer order. They have order and will be delivering    [address] regarding discharge yesterday. BPCI letter placed in mail and sent to patient.

## 2019-06-06 NOTE — TELEPHONE ENCOUNTER
[facility] Transitions Initial Follow Up Call    Outreach made within 2 business days of discharge: Yes    Patient: Spencer Macdonald Patient : 1945   MRN: [id number]  Reason for Admission: There are no discharge diagnoses documented for the most recent discharge. Discharge Date: 19       Spoke with: Patient    Discharge department/facility: [facility]    TCM Interactive Patient Contact:  Was patient able to fill all prescriptions: Yes  Was patient instructed to bring all medications to the follow-up visit: Yes  Is patient taking all medications as directed in the discharge summary?  Yes  Does patient understand their discharge instructions: Yes  Does patient have questions or concerns that need addressed prior to 7-14 day follow up office visit: no    Scheduled appointment with PCP within 7-14 days    Follow Up  Future Appointments   Date Time Provider Candy Freire   2019  9:30 AM Ruchi Mcnamara PA-C [facility] Heart [facility] [place]   2019 12:00 PM SCHEDULE, FERMIN PACER NURSE SRPX PCR PUT [facility] [place]   2019 11:40 AM Jose Alberto Gaxiola MD SRPX Pain [facility] [place]   10/9/2019  9:00 AM STR VASCULAR LAB ROOM 2 STRZ VAS LAB STR Radiolog   10/9/2019 10:00 AM STR CT IMAGING RM1  OP [facility] STRZ OUT EXP STR Radiolog   10/28/2019  9:45 AM Alfa Kamara MD SRPX CT/CV [facility] [place]   2019  9:30 AM Jeremy Pabon MD [facility] Med CG [facility] [place]   2020 11:15 AM Michel Martinez MD [address] [place] [address] ([address] [place] [address])

## 2019-06-06 NOTE — CARE COORDINATION
[facility] Transitions Initial Follow Up Call    Call within 2 business days of discharge: Yes    Patient: Radha Short Patient : 1945   MRN: [id number]  Reason for Admission: pneumonia  Discharge Date: 19 RARS: Readmission Risk Score: 12      Last Discharge [facility]       Complaint Diagnosis Description Type Department Provider    19  Sleep apnea, unspecified type . .. Admission (Discharged) Holly 96, DO           Spoke with: Earma Phalen Selriokadi  Patient stated she is doing great. She sleep all night and did not wake up which normally she does. Home health nurse is in home right now. Nurse will review medications with patient. Patient refused to review medications. Reviewed appts. Patient does drive and also  can drive. Denies SOB, cough, fever and chills. Denies concerns or issues at this time.     Facility: [facility]    Non-face-to-face services provided:  Obtained and reviewed discharge summary and/or continuity of care documents    Care Transitions 24 Hour Call    Do you have any ongoing symptoms?:  No  Do you have a copy of your discharge instructions?:  Yes  Do you have all of your prescriptions and are they filled?:  Yes  Have you been contacted by a [place]?:  No  Have you scheduled your follow up appointment?:  Yes  How are you going to get to your appointment?:  Car - drive self  Were you discharged with any Home Care or Post Acute Services:  No  Patient DME:  Tara Ken chair, Other  Other Patient DME:  Piyush Jordanu you have support at home?:  Partner/Spouse/SO  Do you feel like you have everything you need to keep you well at home?:  Yes  Are you an active caregiver in your home?:  No  Care Transitions Interventions  No Identified Needs         Follow Up  Future Appointments   Date Time Provider Candy Freire   2019  9:30 AM Roger Go PA-C [facility]   2019 12:00 PM SCHEDULE, FERMIN NAQVI NURSE SRPX PCR PUT [facility]   8/13/2019 11:40 AM Tiburcio Elizalde MD SRPX Pain [facility] [place]   10/9/2019  9:00 AM STR VASCULAR LAB ROOM 2 STRZ VAS LAB STR Radiolog   10/9/2019 10:00 AM STR CT IMAGING RM1  OP EXPRESS STRZ OUT EXP STR Radiolog   10/28/2019  9:45 AM Waylon Akbar MD SRPX CT/CV [facility] [place]   11/5/2019  9:30 AM Shahriar Hernadez MD [facility] - [facility]   1/20/2020 11:15 AM Mima Diaz MD SRPX Heart [facility] - Nehemiah Rivers RN

## 2019-06-10 LAB
ANAEROBIC CULTURE: NORMAL
BODY FLUID CULTURE, STERILE: NORMAL
GRAM STAIN RESULT: NORMAL

## 2019-06-11 ENCOUNTER — HOSPITAL ENCOUNTER (OUTPATIENT)
Dept: WOMENS IMAGING | Age: 74
Discharge: HOME | End: 2019-06-11
Payer: MEDICARE

## 2019-06-11 DIAGNOSIS — N63.10 BREAST MASS, RIGHT: ICD-10-CM

## 2019-06-11 DIAGNOSIS — R59.9 ENLARGED LYMPH NODE: ICD-10-CM

## 2019-06-11 PROCEDURE — 19083 BX BREAST 1ST LESION US IMAG: CPT

## 2019-06-11 PROCEDURE — 88377 M/PHMTRC ALYS ISHQUANT/SEMIQ: CPT

## 2019-06-11 PROCEDURE — C1894 INTRO/SHEATH, NON-LASER: HCPCS

## 2019-06-11 PROCEDURE — 88341 IMHCHEM/IMCYTCHM EA ADD ANTB: CPT

## 2019-06-11 PROCEDURE — 88342 IMHCHEM/IMCYTCHM 1ST ANTB: CPT

## 2019-06-11 PROCEDURE — 2709999900 HC NON-CHARGEABLE SUPPLY

## 2019-06-11 PROCEDURE — 88360 TUMOR IMMUNOHISTOCHEM/MANUAL: CPT

## 2019-06-11 PROCEDURE — A4648 IMPLANTABLE TISSUE MARKER: HCPCS

## 2019-06-11 PROCEDURE — 88305 TISSUE EXAM BY PATHOLOGIST: CPT

## 2019-06-11 PROCEDURE — 77065 DX MAMMO INCL CAD UNI: CPT

## 2019-06-11 PROCEDURE — 76942 ECHO GUIDE FOR BIOPSY: CPT

## 2019-06-11 NOTE — PROGRESS NOTES
Breast Biopsy Flowsheet/Post-Operative Care    Date of Procedure: 6/11/2019  Physician: Dr. Horton Medico  Technologist: Joon Yang RT(R)(M)    Biopsy:ultrasound guided breast biopsy  Lesion type: Non-palpable  Breast: right    Clock face position: Site #1: Upper inner aspect middle depth     Site #2: Upper outer aspect posterior depth - lymph node         Primary Method of Detection: CT Scan chest      Microcalcification's: no   Distribution: N/A    Asymmetry: asymmetric    Biopsy Method:   Sertera:    Site # 1    Gauge: 14    # of Passes: 4     Clip: Barbell    Sertera:    Site # 2    Gauge: 14    # of Passes: 3     Clip:  Tribell        Pre-Op Assessment: (BI-RADS)   5.  Highly Suggestive of Malignancy    Patient Tolerated Procedure: good  Complications: nne  Comments: none    Post Operative Care  Steri strips: Yes  Dressing: Gauze, Tape   Ice Applied to Site:  Yes  Evidence of Bleeding:  No    Pain Verbalized: No      Written Discharge Instructions: Yes  Condition at Discharge: good  Time of Discharge: Eva    Electronically signed by Nakita Corrales RN on 6/11/2019 at 9:09 AM

## 2019-06-11 NOTE — PROGRESS NOTES
Formulation and discussion of sedation / procedure plans, risks, benefits, side effects and alternatives with patient and/or responsible adult completed.     Electronically signed by Uriel Hanley MD on 6/11/2019 at 8:22 AM

## 2019-06-11 NOTE — PROGRESS NOTES
[facility] [address]  Pre-Biopsy Assessment      Patient Education    Written information about procedure Yes  right   Procedural steps explained Yes Ultrasound Biopsy   Post-op potential: bruising, hematoma, pain Yes    Self-care: activity, care of dressing Yes    Patient verbalized understanding Yes    Consent signed and witnessed Yes      Hormone Therapy Status: none    Recent Medication: Other, Plavix and ASA Last Dose: 6/10/19                                     Hormone Replacement Therapy: no    Previous Breast Biopsy: no    Previous Diagnosis Cancer: no    Hysterectomy:yes - enlarged uterus, abnormal bleeding    Emotional Status: Nervous    Language or Physical Barriers: none    Comments: none      Electronically signed by Alvarado Cárdenas RN on 6/11/2019 at 7:58 AM

## 2019-06-12 ENCOUNTER — CLINICAL DOCUMENTATION (OUTPATIENT)
Dept: WOMENS IMAGING | Age: 74
End: 2019-06-12

## 2019-06-13 ASSESSMENT — ENCOUNTER SYMPTOMS
VOMITING: 0
SORE THROAT: 0
ABDOMINAL DISTENTION: 0
EYE DISCHARGE: 0
DIARRHEA: 0
WHEEZING: 0
SHORTNESS OF BREATH: 1
COUGH: 1
TROUBLE SWALLOWING: 0
BACK PAIN: 1
CHEST TIGHTNESS: 0
FACIAL SWELLING: 0
BLOOD IN STOOL: 0
ABDOMINAL PAIN: 0
RECTAL PAIN: 0
NAUSEA: 0
COLOR CHANGE: 0
CONSTIPATION: 0

## 2019-06-14 ENCOUNTER — CLINICAL DOCUMENTATION (OUTPATIENT)
Dept: CASE MANAGEMENT | Age: 74
End: 2019-06-14

## 2019-06-14 ENCOUNTER — CLINICAL DOCUMENTATION (OUTPATIENT)
Dept: PHYSICAL THERAPY | Age: 74
End: 2019-06-14

## 2019-06-14 ENCOUNTER — TELEPHONE (OUTPATIENT)
Dept: SURGERY | Age: 74
End: 2019-06-14

## 2019-06-14 DIAGNOSIS — C77.3 BREAST CANCER METASTASIZED TO AXILLARY LYMPH NODE, RIGHT (HCC): ICD-10-CM

## 2019-06-14 DIAGNOSIS — C50.911 INVASIVE DUCTAL CARCINOMA OF RIGHT BREAST (HCC): Primary | ICD-10-CM

## 2019-06-14 DIAGNOSIS — C50.911 BREAST CANCER METASTASIZED TO AXILLARY LYMPH NODE, RIGHT (HCC): ICD-10-CM

## 2019-06-14 NOTE — PROGRESS NOTES
[facility]  OUTPATIENT REHABILITATION  PHYSICAL THERAPY  INTEGRATED BREAST CANCER CLINIC SCREEN      Date: 2019  Patient Name: Arabella Villeda        CSN: [id number]   : 1945  (75 y.o.)  Gender: female     Location Right   Type IDC   Hormone Type ER-  TN-   HER2-ajit  Non-Amplified       Strength/ROM Right Left   Shoulder Flexion PROM 165 168   Shoulder Abduction PROM 158 172   Shoulder Strength 4+ 4+   Elbow Strength 4+ 4+   Hand Dominance R        Location Right (cm) Left (cm)   Met Heads 19.6 19.2   Ulnar Styloid Process 17.5 18   10 cm distal to Lat. Epicondyle 25.5 25.2   Antecubital Fossa 28 30   10 cm proximal to Lat.  Epicondyle 37.5 36.5   Axilla 39 38.2   Total 167.1 167.1     [facility] [place] [address]

## 2019-06-14 NOTE — PROGRESS NOTES
Name: Radha Short  : 1945  MRN: [id number]    Oncology Navigation Follow-Up Note    Contact Type: Integrated Breast [facility]    Subjective: Arrived with spouse, Natacha Roach and daughter, Lindsay Anne. Objective: Met with Breast Team to discuss treatment recommendations. Assistance Needed: Denies needs at this time. Expressed that she would like Dr. Mary Chavarria. Receptive to Advanced Care Planning / Palliative Care:  Yes, Clinical stage IIIB but workup pending    Referrals: Dr. Mary Chavarria: 2019 at 8:30 am, surgeon out town next week. 1st available but needs PET. Niya,  to call patient with test appt. once precert approval. Dr. Henry Murray: referral placed. Kale to call patient with appt date and time. Education: Breast clinic recommendations and sequence of next steps. Notes: Teaching completed and verbalizes understanding. Arm measurements completed by Emerson Samuels, PT assistant. Not a candidate for genetic referral. Encouraged her to call with questions as needed. Will follow through continuum of care.     Electronically signed by Bud Wilson RN on 2019 at 6:35 AM

## 2019-06-18 ENCOUNTER — TELEPHONE (OUTPATIENT)
Dept: SPIRITUAL SERVICES | Facility: CLINIC | Age: 74
End: 2019-06-18

## 2019-06-19 DIAGNOSIS — E11.42 DIABETIC POLYNEUROPATHY ASSOCIATED WITH TYPE 2 DIABETES MELLITUS (HCC): ICD-10-CM

## 2019-06-19 RX ORDER — PREGABALIN 200 MG/1
200 CAPSULE ORAL 2 TIMES DAILY
Qty: 60 CAPSULE | Refills: 3 | Status: ON HOLD | OUTPATIENT
Start: 2019-06-19 | End: 2019-09-01 | Stop reason: HOSPADM

## 2019-06-19 NOTE — TELEPHONE ENCOUNTER
Santos Acevedo called requesting a refill on the following medications:  Requested Prescriptions     Pending Prescriptions Disp Refills    pregabalin (LYRICA) 200 MG capsule 60 capsule 3     Sig: Take 1 capsule by mouth 2 times daily for 30 days.      Pharmacy verified:  [facility]      Date of last visit: 11/5/19  Date of next visit (if applicable): 40/8/4005

## 2019-06-20 ENCOUNTER — HOSPITAL ENCOUNTER (OUTPATIENT)
Dept: PET IMAGING | Age: 74
Discharge: HOME | End: 2019-06-20
Payer: MEDICARE

## 2019-06-20 DIAGNOSIS — C77.3 BREAST CANCER METASTASIZED TO AXILLARY LYMPH NODE, RIGHT (HCC): ICD-10-CM

## 2019-06-20 DIAGNOSIS — C50.911 BREAST CANCER METASTASIZED TO AXILLARY LYMPH NODE, RIGHT (HCC): ICD-10-CM

## 2019-06-20 DIAGNOSIS — C50.911 INVASIVE DUCTAL CARCINOMA OF RIGHT BREAST (HCC): ICD-10-CM

## 2019-06-20 PROCEDURE — 78815 PET IMAGE W/CT SKULL-THIGH: CPT

## 2019-06-20 PROCEDURE — A9552 F18 FDG: HCPCS | Performed by: SURGERY

## 2019-06-20 PROCEDURE — 3430000000 HC RX DIAGNOSTIC RADIOPHARMACEUTICAL: Performed by: SURGERY

## 2019-06-20 RX ORDER — FLUDEOXYGLUCOSE F 18 200 MCI/ML
11.2 INJECTION, SOLUTION INTRAVENOUS
Status: COMPLETED | OUTPATIENT
Start: 2019-06-20 | End: 2019-06-20

## 2019-06-20 RX ADMIN — FLUDEOXYGLUCOSE F 18 11.2 MILLICURIE: 200 INJECTION, SOLUTION INTRAVENOUS at 07:33

## 2019-06-21 ENCOUNTER — CARE COORDINATION (OUTPATIENT)
Dept: CASE MANAGEMENT | Age: 74
End: 2019-06-21

## 2019-06-21 NOTE — CARE COORDINATION
[facility] Transitions Follow Up Call    2019    Patient: Kourtney Pratt  Patient : 1945   MRN: [id number]  Reason for Admission: pneumonia  Discharge Date: 19 RARS: Readmission Risk Score: 12         Spoke with: Kourtney Pratt  Patient stated she is doing wonderful. She has healed well from the pneumonia. Denies SOB, chest pressure, cough or fever. Denies concerns or issues. Care Transitions Subsequent and Final Call    Subsequent and Final Calls  Do you have any ongoing symptoms?:  No  Have your medications changed?:  No  Do you have any questions related to your medications?:  No  Do you currently have any active services?:  No  Do you have any needs or concerns that I can assist you with?:  No  Identified Barriers:  None  Care Transitions Interventions  No Identified Needs  Other Interventions:             Follow Up  Future Appointments   Date Time Provider Candy Freire   2019 10:15 AM ROMAIN Becker - CNP SRPX CHF [facility] [place]   2019 12:45 PM Molly Ortega MD Adv Surg [facility]   2019  9:30 AM Rosie Thompson PA-C SRP[facility] Heart [facility] [place]   2019 12:00 PM SCHEDULE, FERMIN PACER NURSE SRPX PCR PUT [facility] [place]   2019  8:00 PM SCHEDULE, STR SLEEP TECH 04 STRZ SLEEP None   2019 11:40 AM Baldo Jacobson MD SRPX Pain [facility]   2019  8:00 AM Christoph Lucio MD Pulm Med [facility] [place]   10/9/2019  9:00 AM STR VASCULAR LAB ROOM 2 STRZ VAS LAB STR Radiolog   10/9/2019 10:00 AM STR CT IMAGING RM1  OP EXPRESS STRZ OUT EXP STR Radiolog   10/28/2019  9:45 AM Randal Avelar MD SRPX CT/CV [facility] [place]   2019  9:30 AM Becca Layton MD Fam Med CG [facility] [place]   2020 11:15 AM Mar Yo MD SRPX Heart [facility] Regina Wong RN

## 2019-06-23 NOTE — PROGRESS NOTES
Heart Failure Clinic       Visit Date: 6/24/2019  Cardiologist:  Dr. Priscella Ahumada  Primary Care Physician: Dr. Catrachita Toure MD    Dhaval Kern is a 75 y.o. female who presents today for:  Chief Complaint   Patient presents with    Congestive Heart Failure     new       HPI:   Dhaval Kern is a 75 y.o. female who presents to the office for a new patient visit in the heart failure clinic. 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 SOB presenting symptom. BNP [id number]. CXR shashank pleural effusions. Thoracentesis done on R.  EF 40%  Diuresed approx 13#. Discharged on Bumex 05. BID    Concerns today: SOB \"fine\", gone since discharge. Swelling in LE continues, ongoing for over month, does not wear hose. Activity: Can complete ADLs, can walk 20-30 min w/out rest, no stairs at home. Diet: Watches salt - has for years. No processed foods. Fluid?high, Drinks a lot of tea.      Patient has:  Chest Pain: No  SOB: Improved  Difficulty sleeping: AMBER:  appt in August  Orthopnea/PND: No  Edema: Yes  Fatigue: Improved  Abdominal bloating: No  Cough: No  Appetite: Good  Any extra diuretic use: No  Weight gain: No  Home weight: Yes  Home blood pressure: Yes    Past Medical History:   Diagnosis Date    [facility]    right invasive ductal carcinoma    CAD (coronary artery disease)     sees Dr Samir Hagan CHF (congestive [facility] failure) ([facility]     Hyperlipidemia     Hypertension     ICD (implantable cardiac defibrillator), dual, in situ     [facility] dual ICD    Numbness and tingling     both feet    Pneumonia     Shingles 12/2014    Sleep apnea     [facility] dual ICD     Type II or unspecified type diabetes mellitus without mention of complication, not stated as uncontrolled     Ventricular arrhythmia      Past Surgical History:   Procedure Laterality Date    BREAST [facility]OPSY [facility] 06/11/2019    [facility]-    CARDIAC PACEMAKER PLACEMENT  9-08-09    [facility]    CARDIOVASCULAR STRESS TEST  01-27-10    Excellent treadmill exercise. Improved functional capacity to functional class 1 completed 8 METS. Hemodynamic response was appropriate. No ischemic ECG changes noted.  CARDIOVASCULAR STRESS TEST  10-28-09    No evidence of stress induced ischemia. Evidence of  prior transmural MI demonstrated by transient fixed defects of inferior wall extending into lateral wall, indicative of RCA lesion. Bowel and soft tissue attenuation interfering w/ counts of lateral wall. EF 29% w/ severe septal and inferolateral hypokinesis w/ very mild lateral wall hypokinesis being noted.  CARDIOVERSION  8-29-09    CHOLECYSTECTOMY  2005    Laparoscopic    COLONOSCOPY Left 1/9/2019    COLONOSCOPY performed by Gifty De La Garza MD at [address] [place] [address] CATH LAB PROCEDURE  8-24-09    Multivessel disease demonstrated by LAD having 70-80%  proximal lesion and napkin-ring lesion at bifurcation w/ D2. D2 appears to be medium large caliber vessel which has an ostial lesion of 70-80%. left -to-left collaterals as well as left-to-right collaterals emanating from LAD to PDA. Circumflex had anterior marginal branch and posterior marginal branch.  HERNIA REPAIR      Multiple    HYSTERECTOMY  1997    PACEMAKER PLACEMENT      TRANSTHORACIC ECHOCARDIOGRAM  07-11-11    LV size mildly to moderately dilated. Systolic function markedly reduced. EF 25-30%. Severe diffuse hypokinesis. Grade 1 diastolic dysfunction. LA was mildly to moderately dilated. RV mildly dilated, systolic function mildly reduced. Mild MR and TR.      TRANSTHORACIC ECHOCARDIOGRAM  8-24-09    LV demonstrates severe hypokinesis of the inferior basal as well as  basal aneurysm being noted and paradoxical septal motion. EF 45-50%. LA is moderately dilated and AV demonstrates mild AV sclerosis w/o AS and AI. Trace MR and TV insufficiency. Family History   Problem Relation Age of Onset    Diabetes Father     Cancer Father         bone    Hypertension Mother     Heart Disease Mother     Breast Cancer Neg Hx     Colon Cancer Neg Hx      Social History     Tobacco Use    Smoking status: Former Smoker     Packs/day: 1.50     Years: 25.00     Pack years: 37.50     Types: Cigarettes     Last attempt to quit: 1988     Years since quittin.7    Smokeless tobacco: Never Used   Substance Use Topics    Alcohol use: No     Current Outpatient Medications   Medication Sig Dispense Refill    bumetanide (BUMEX) 0.5 MG tablet Take 1 tablet by mouth 2 times daily 180 tablet 3    spironolactone (ALDACTONE) 25 MG tablet Take 1 tablet by mouth daily 90 tablet 3    pregabalin (LYRICA) 200 MG capsule Take 1 capsule by mouth 2 times daily for 30 days. 60 capsule 3    insulin glargine (LANTUS SOLOSTAR) 100 UNIT/ML injection pen Inject 18 Units into the skin nightly 5.4 mL 0    atorvastatin (LIPITOR) 80 MG tablet Take 1 tablet by mouth daily 90 tablet 1    metoprolol succinate (TOPROL XL) 50 MG extended release tablet Take 1 tablet by mouth daily 30 tablet 3    ipratropium-albuterol (DUONEB) 0.5-2.5 (3) MG/3ML SOLN nebulizer solution Inhale 3 mLs into the lungs every 4 hours as needed for Shortness of Breath or Other (wheezing) 360 mL 5    ondansetron (ZOFRAN ODT) 4 MG disintegrating tablet Take 1 tablet by mouth every 8 hours as needed for Nausea or Vomiting 20 tablet 0    rOPINIRole (REQUIP) 0.5 MG tablet Take 1 tablet by mouth nightly 30 tablet 11    blood glucose test strips (ASCENSIA AUTODISC VI;ONE TOUCH ULTRA TEST VI) strip Test once daily. Dispense One Touch Ultra Test Strips.   Dx: Type 2 diabetes (250.00) 100 each 5    vitamin B-12 (CYANOCOBALAMIN) 1000 MCG tablet Take 3,000 mcg by mouth daily      fluticasone (FLONASE) 50 MCG/ACT nasal spray 2 sprays by Nasal route daily 1 Bottle 1    Insulin Pen Needle (B-D UF III MINI PEN NEEDLES) 31G X 5 MM MISC 1 each by Does not apply route daily 100 each 3    clopidogrel (PLAVIX) 75 MG tablet Take 1 tablet by mouth daily 90 tablet 3    amiodarone (CORDARONE) 200 MG tablet TAKE 1 TABLET DAILY 90 tablet 3    enalapril (VASOTEC) 20 MG tablet TAKE 1 TABLET TWICE A  tablet 3    metFORMIN (GLUCOPHAGE XR) 750 MG extended release tablet Take 1 tablet by mouth 2 times daily (with meals) 30 tablet 11    aspirin EC 81 MG EC tablet Take 1 tablet by mouth daily 30 tablet 3    gabapentin (NEURONTIN) 400 MG capsule Take 2 capsules by mouth every evening for 30 days. 90 capsule 3     No current facility-administered medications for this visit. Allergies   Allergen Reactions    Sulfa Antibiotics Swelling       SUBJECTIVE:   Review of Systems   Constitutional: Negative for activity change, appetite change and fatigue. Respiratory: Positive for shortness of breath (much better). Negative for cough. Cardiovascular: Positive for leg swelling (ongoing). Negative for chest pain and palpitations. Gastrointestinal: Negative for abdominal distention. Neurological: Negative for weakness, light-headedness and headaches. Hematological: Negative for adenopathy. Psychiatric/Behavioral: Negative for sleep disturbance. OBJECTIVE:   Today's Vitals:  /72   Pulse 78   Ht 5' 2\" (1.575 m)   Wt 216 lb (98 kg)   SpO2 92%   BMI 39.51 kg/m²     Physical Exam   Constitutional: She is oriented to person, place, and time. She appears well-developed and well-nourished. No distress. HENT:   Head: Normocephalic and atraumatic. Eyes: Conjunctivae are normal.   Neck: Normal range of motion. Neck supple. No JVD   Cardiovascular: Normal rate, regular rhythm and normal heart sounds. No murmur heard.   Pulmonary/Chest: Effort normal. No respiratory distress. She has no wheezes. She has rales (few right base). Abdominal: Soft. Bowel sounds are normal. She exhibits no distension. There is no tenderness. Musculoskeletal: Normal range of motion. She exhibits edema (2+ pitting BLE). Neurological: She is alert and oriented to person, place, and time. Coordination normal.   Skin: Skin is warm and dry. She is not diaphoretic. Psychiatric: She has a normal mood and affect. Her behavior is normal.   Vitals reviewed. Wt Readings from Last 3 Encounters:   06/24/19 217 lb 3.2 oz (98.5 kg)   06/24/19 216 lb (98 kg)   06/04/19 217 lb 3.2 oz (98.5 kg)     BP Readings from Last 3 Encounters:   06/24/19 138/68   06/24/19 114/72   06/05/19 133/61     Pulse Readings from Last 3 Encounters:   06/24/19 77   06/24/19 78   06/05/19 79     Body mass index is 39.51 kg/m².     ECHO:   Summary   Ejection fraction is visually estimated at 40%.   There was moderate global hypokinesis of the left ventricle.   Hypokinetic motion of the basal inferior wall noted in the left ventricle.   Left Ventricular size is Mildly increased .   Moderately dilated left atrium.   The aortic valve leaflets were not well visualized.   Aortic valve appears tricuspid.   Aortic valve leaflets are somewhat thickened.   Aortic valve leaflets are Mildly calcified.      Signature      ----------------------------------------------------------------   Electronically signed by Francine Dhillon MD (Interpreting   physician) on 05/31/2019 at 05:33 PM   ----------------------------------------------------------------  2016 - EF 35-40%  2014 - EF 40%      STRESS (2017)   Summary   Lexiscan EKG stress test is not suggestive for ischemia.   The LVEF is calculated to be 31% with severe inferior basal hypokinesis .   There was a large infarct with no viability in the distribution of the   right coronary and left circumflex arteries in the anterior basal region.   No evidence of ischemia is noted on this study.      Signatures      ----------------------------------------------------------------   Electronically signed by Tiffanie Jones DO (Interpreting   Cardiologist) on 12/29/2017 at 17:26    Results reviewed:  BNP: No results found for: BNP  CBC:   Lab Results   Component Value Date    WBC 11.5 06/01/2019    RBC 3.47 06/01/2019    HGB 8.5 06/01/2019    HCT 27.8 06/01/2019     06/01/2019     CMP:    Lab Results   Component Value Date     06/05/2019    K 4.4 06/05/2019    K 4.2 05/31/2019     06/05/2019    CO2 28 06/05/2019    BUN 21 06/05/2019    CREATININE 0.6 06/05/2019    LABGLOM >90 06/05/2019    GLUCOSE 92 06/05/2019    GLUCOSE 130 03/23/2012    CALCIUM 8.5 06/05/2019     Hepatic Function Panel:    Lab Results   Component Value Date    ALKPHOS 112 05/31/2019    ALT 41 05/31/2019    AST 36 05/31/2019    PROT 6.5 06/04/2019    BILITOT 0.4 05/31/2019    BILIDIR <0.2 12/11/2017    LABALBU 3.7 05/31/2019    LABALBU 4.5 03/23/2012     Magnesium:    Lab Results   Component Value Date    MG 2.0 06/01/2019     PT/INR:    Lab Results   Component Value Date    INR 1.11 05/31/2019     Lipids:    Lab Results   Component Value Date    TRIG 67 05/31/2019    HDL 44 05/31/2019    LDLCALC 37 05/31/2019       ASSESSMENT AND PLAN:   The patient's condition/symptoms are stable. Diagnosis Orders   1. CHF (congestive heart failure), NYHA class II, chronic, systolic (HCC)  Basic Metabolic Panel       Continue:  GDMT:   ACE/ARB/ARNI - Enalapril 20 BID - prefer changing to [facility], checked coverage, cost was little high - per pt does not want at this time. BB - Toprol 50/day   Diuretic - Bumex 0.5 BID   Hydralazine/Isos. - None   Aldosterone- None - ADD Aldactone 25/day  Continue Current medications:    Symptoms much improved - legs still swollen. Compression stocking daily! Labs stable. - ADD aldactone. BMP in 3 weeks. Getting workup for Breast CA - sees surgeon today.    F/U  Emilio Mota next week  F/U

## 2019-06-24 ENCOUNTER — OFFICE VISIT (OUTPATIENT)
Dept: SURGERY | Age: 74
End: 2019-06-24
Payer: MEDICARE

## 2019-06-24 ENCOUNTER — OFFICE VISIT (OUTPATIENT)
Dept: CARDIOLOGY CLINIC | Age: 74
End: 2019-06-24
Payer: MEDICARE

## 2019-06-24 VITALS
WEIGHT: 217.2 LBS | RESPIRATION RATE: 20 BRPM | SYSTOLIC BLOOD PRESSURE: 138 MMHG | TEMPERATURE: 98 F | HEART RATE: 77 BPM | HEIGHT: 62 IN | OXYGEN SATURATION: 96 % | BODY MASS INDEX: 39.97 KG/M2 | DIASTOLIC BLOOD PRESSURE: 68 MMHG

## 2019-06-24 VITALS
SYSTOLIC BLOOD PRESSURE: 114 MMHG | HEART RATE: 78 BPM | BODY MASS INDEX: 39.75 KG/M2 | WEIGHT: 216 LBS | OXYGEN SATURATION: 92 % | HEIGHT: 62 IN | DIASTOLIC BLOOD PRESSURE: 72 MMHG

## 2019-06-24 DIAGNOSIS — Z95.810 DUAL IMPLANTABLE CARDIOVERTER-DEFIBRILLATOR IN SITU: ICD-10-CM

## 2019-06-24 DIAGNOSIS — Z79.4 TYPE 2 DIABETES MELLITUS WITH HYPERGLYCEMIA, WITH LONG-TERM CURRENT USE OF INSULIN (HCC): ICD-10-CM

## 2019-06-24 DIAGNOSIS — E11.65 TYPE 2 DIABETES MELLITUS WITH HYPERGLYCEMIA, WITH LONG-TERM CURRENT USE OF INSULIN (HCC): ICD-10-CM

## 2019-06-24 DIAGNOSIS — I50.21 SYSTOLIC CHF, ACUTE (HCC): ICD-10-CM

## 2019-06-24 DIAGNOSIS — I10 ESSENTIAL HYPERTENSION: ICD-10-CM

## 2019-06-24 DIAGNOSIS — R93.2 ABNORMAL PET SCAN OF LIVER: Primary | ICD-10-CM

## 2019-06-24 DIAGNOSIS — I25.5 ISCHEMIC CARDIOMYOPATHY: ICD-10-CM

## 2019-06-24 DIAGNOSIS — C50.911 BREAST CANCER METASTASIZED TO AXILLARY LYMPH NODE, RIGHT (HCC): ICD-10-CM

## 2019-06-24 DIAGNOSIS — J90 PLEURAL EFFUSION, BILATERAL: ICD-10-CM

## 2019-06-24 DIAGNOSIS — C50.911 INVASIVE DUCTAL CARCINOMA OF RIGHT BREAST (HCC): ICD-10-CM

## 2019-06-24 DIAGNOSIS — C77.3 BREAST CANCER METASTASIZED TO AXILLARY LYMPH NODE, RIGHT (HCC): ICD-10-CM

## 2019-06-24 DIAGNOSIS — I50.22 CHF (CONGESTIVE HEART FAILURE), NYHA CLASS II, CHRONIC, SYSTOLIC (HCC): Primary | ICD-10-CM

## 2019-06-24 PROCEDURE — 2022F DILAT RTA XM EVC RTNOPTHY: CPT | Performed by: SURGERY

## 2019-06-24 PROCEDURE — 99214 OFFICE O/P EST MOD 30 MIN: CPT | Performed by: NURSE PRACTITIONER

## 2019-06-24 PROCEDURE — G8417 CALC BMI ABV UP PARAM F/U: HCPCS | Performed by: NURSE PRACTITIONER

## 2019-06-24 PROCEDURE — 3044F HG A1C LEVEL LT 7.0%: CPT | Performed by: SURGERY

## 2019-06-24 PROCEDURE — 3017F COLORECTAL CA SCREEN DOC REV: CPT | Performed by: NURSE PRACTITIONER

## 2019-06-24 PROCEDURE — 1036F TOBACCO NON-USER: CPT | Performed by: NURSE PRACTITIONER

## 2019-06-24 PROCEDURE — G8400 PT W/DXA NO RESULTS DOC: HCPCS | Performed by: NURSE PRACTITIONER

## 2019-06-24 PROCEDURE — 1090F PRES/ABSN URINE INCON ASSESS: CPT | Performed by: NURSE PRACTITIONER

## 2019-06-24 PROCEDURE — 4040F PNEUMOC VAC/ADMIN/RCVD: CPT | Performed by: NURSE PRACTITIONER

## 2019-06-24 PROCEDURE — 4040F PNEUMOC VAC/ADMIN/RCVD: CPT | Performed by: SURGERY

## 2019-06-24 PROCEDURE — 1036F TOBACCO NON-USER: CPT | Performed by: SURGERY

## 2019-06-24 PROCEDURE — G8417 CALC BMI ABV UP PARAM F/U: HCPCS | Performed by: SURGERY

## 2019-06-24 PROCEDURE — 3017F COLORECTAL CA SCREEN DOC REV: CPT | Performed by: SURGERY

## 2019-06-24 PROCEDURE — 1111F DSCHRG MED/CURRENT MED MERGE: CPT | Performed by: NURSE PRACTITIONER

## 2019-06-24 PROCEDURE — G8427 DOCREV CUR MEDS BY ELIG CLIN: HCPCS | Performed by: NURSE PRACTITIONER

## 2019-06-24 PROCEDURE — G8427 DOCREV CUR MEDS BY ELIG CLIN: HCPCS | Performed by: SURGERY

## 2019-06-24 PROCEDURE — G8598 ASA/ANTIPLAT THER USED: HCPCS | Performed by: SURGERY

## 2019-06-24 PROCEDURE — 99204 OFFICE O/P NEW MOD 45 MIN: CPT | Performed by: SURGERY

## 2019-06-24 PROCEDURE — G8400 PT W/DXA NO RESULTS DOC: HCPCS | Performed by: SURGERY

## 2019-06-24 PROCEDURE — G8598 ASA/ANTIPLAT THER USED: HCPCS | Performed by: NURSE PRACTITIONER

## 2019-06-24 PROCEDURE — 1123F ACP DISCUSS/DSCN MKR DOCD: CPT | Performed by: NURSE PRACTITIONER

## 2019-06-24 PROCEDURE — 1111F DSCHRG MED/CURRENT MED MERGE: CPT | Performed by: SURGERY

## 2019-06-24 PROCEDURE — 1123F ACP DISCUSS/DSCN MKR DOCD: CPT | Performed by: SURGERY

## 2019-06-24 PROCEDURE — 1090F PRES/ABSN URINE INCON ASSESS: CPT | Performed by: SURGERY

## 2019-06-24 RX ORDER — BUMETANIDE 0.5 MG/1
0.5 TABLET ORAL 2 TIMES DAILY
Qty: 180 TABLET | Refills: 3 | Status: ON HOLD | OUTPATIENT
Start: 2019-06-24 | End: 2019-08-25 | Stop reason: HOSPADM

## 2019-06-24 RX ORDER — SPIRONOLACTONE 25 MG/1
25 TABLET ORAL DAILY
Qty: 90 TABLET | Refills: 3 | Status: ON HOLD | OUTPATIENT
Start: 2019-06-24 | End: 2019-08-25 | Stop reason: HOSPADM

## 2019-06-24 ASSESSMENT — ENCOUNTER SYMPTOMS
SHORTNESS OF BREATH: 1
ABDOMINAL DISTENTION: 0
COUGH: 0

## 2019-06-24 NOTE — PATIENT INSTRUCTIONS
You may receive a survey regarding the care you received during your visit. Your input is valuable to us. We encourage you to complete and return your survey. We hope you will choose us in the future for your healthcare needs.     Continue:  · Continue current medications  · Daily weights and record  · Fluid restriction of 2 Liters per day  · Limit sodium in diet to around 1329-5740 mg/day  · Monitor BP  · Activity as tolerated     Call the Heart Failure Clinic for any of the following symptoms: [phone]   Weight gain of 2-3 pounds in 1 day or 5 pounds in 1 week   Increased shortness of breath   Shortness of breath while laying down   Cough   Chest pain   Swelling in feet, ankles or legs   Tenderness or bloating in the abdomen   Fatigue    Decreased appetite or nausea    Confusion

## 2019-06-24 NOTE — PROGRESS NOTES
Leeann Teresa MD   General Surgery  New Patient Evaluation in Office  Pt Name: Teddy Felty  Date of Birth 1945   Today's Date: 6/24/2019  Medical Record Number: [id number]  Referring Provider: Dr. Marlyne Phoenix  Primary Care Provider: Cherise Mchugh MD  Chief Complaint:  Chief Complaint   Patient presents with    Surgical Consult     new patient--ref by [facility] for right breast invasive ductal carcinoma-PET scan 6/20       ASSESSMENT      1. Abnormal PET scan of liver    2. Invasive ductal carcinoma of right breast ([address]    3. Breast cancer metastasized to axillary lymph node, right ([address]    4. Pleural effusion, bilateral    5. Type 2 diabetes mellitus with hyperglycemia, with long-term current use of insulin (HCC)    6. Ischemic cardiomyopathy    7. Essential hypertension    8. St Boris dual ICD    9. Systolic CHF, acute ([address]       Cancer- clinical stage IIIb possibly stage IV  PLANS      1. Lengthy discussion with the patient and her family regarding treatment of triple negative breast cancer. Treatment may be limited by patient's medical comorbidities as well as potential of stage IV disease on PET/CT. 2.  PET/CT reviewed with patient and family. Suspicious nodule right chest.  Pleural effusion previously aspirated and cytology negative for malignancy. Abnormal area of liver adjacent to liver needs further evaluated. 3.  Ultrasound of liver and CT scan of the liver with contrast ordered. Patient is not a candidate for MRI with her ICD. 4.  Medical oncology consultation with Dr. Sonia Ocampo in her office after additional imaging studies. 5.  If patient undergoes surgical treatment. She would be a candidate for a right partial mastectomy and removal of lymph nodes under MAC anesthesia for local control. She would need cardiac risk assessment preoperatively. She is aware she would be higher risk for perioperative cardiac complications.       Bong Sparks is a 75y.o. year old female who is presenting TABLET DAILY 90 tablet 3    enalapril (VASOTEC) 20 MG tablet TAKE 1 TABLET TWICE A  tablet 3    metFORMIN (GLUCOPHAGE XR) 750 MG extended release tablet Take 1 tablet by mouth 2 times daily (with meals) 30 tablet 11    aspirin EC 81 MG EC tablet Take 1 tablet by mouth daily 30 tablet 3    gabapentin (NEURONTIN) 400 MG capsule Take 2 capsules by mouth every evening for 30 days. 90 capsule 3     No current facility-administered medications for this visit.       Allergies     Allergies   Allergen Reactions    Sulfa Antibiotics Swelling       Family History  Family History   Problem Relation Age of Onset    Diabetes Father     Cancer Father         bone    Hypertension Mother     Heart Disease Mother     Breast Cancer Neg Hx     Colon Cancer Neg Hx        SocialHistory  Social History     Socioeconomic History    Marital status:      Spouse name: Not on file    Number of children: 2    Years of education: Not on file    Highest education level: Not on file   Occupational History    Occupation: retired   Social Needs    Financial resource strain: Not on file    Food insecurity:     Worry: Not on file     Inability: Not on file   Hangtime needs:     Medical: Not on file     Non-medical: Not on file   Tobacco Use    Smoking status: Former Smoker     Packs/day: 1.50     Years: 25.00     Pack years: 37.50     Types: Cigarettes     Last attempt to quit: 1988     Years since quittin.7    Smokeless tobacco: Never Used   Substance and Sexual Activity    Alcohol use: No    Drug use: No    Sexual activity: Not on file   Lifestyle    Physical activity:     Days per week: Not on file     Minutes per session: Not on file    Stress: Not on file   Relationships    Social connections:     Talks on phone: Not on file     Gets together: Not on file     Attends Spiritism service: Not on file     Active member of club or organization: Not on file     Attends meetings of clubs or organizations: Not on file     Relationship status: Not on file    Intimate partner violence:     Fear of current or ex partner: Not on file     Emotionally abused: Not on file     Physically abused: Not on file     Forced sexual activity: Not on file   Other Topics Concern    Not on file   Social History Narrative    Not on file           Review of Systems  Review of Systems   Constitutional: Negative for chills, fatigue, fever and unexpected weight change. HENT: Negative for sore throat, trouble swallowing and voice change. Eyes: Negative for visual disturbance. Respiratory: Negative for cough, shortness of breath and wheezing. Cardiovascular: Positive for leg swelling. Negative for chest pain and palpitations. Gastrointestinal: Negative for abdominal pain, blood in stool, nausea and vomiting. Endocrine: Negative for cold intolerance, heat intolerance and polydipsia. Genitourinary: Negative for dysuria, flank pain and hematuria. Musculoskeletal: Negative for gait problem, joint swelling and myalgias. Skin: Negative for color change and rash. Allergic/Immunologic: Negative for immunocompromised state. Neurological: Positive for numbness. Negative for dizziness, tremors, seizures and speech difficulty. Complains of numbness in her legs and feet   Hematological: Positive for adenopathy. Bruises/bleeds easily. Psychiatric/Behavioral: Negative for behavioral problems, confusion and suicidal ideas. The patient is not nervous/anxious. OBJECTIVE     /68 (Site: Right Upper Arm, Position: Sitting, Cuff Size: Medium Adult)   Pulse 77   Temp 98 °F (36.7 °C) (Tympanic)   Resp 20   Ht 5' 2\" (1.575 m)   Wt 217 lb 3.2 oz (98.5 kg)   SpO2 96%   BMI 39.73 kg/m²       Physical Exam   Constitutional: She is oriented to person, place, and time. She appears well-developed and well-nourished. No distress. HENT:   Head: Normocephalic and atraumatic.    Mouth/Throat: Oropharynx is clear and moist.   Eyes: Pupils are equal, round, and reactive to light. EOM are normal. No scleral icterus. Neck: Neck supple. No JVD present. No tracheal deviation present. Cardiovascular: Normal rate and normal heart sounds. No murmur heard. ICD left upper chest wall   Pulmonary/Chest: Effort normal and breath sounds normal. No respiratory distress. She has no wheezes. She has no rales. She exhibits no tenderness. Right breast exhibits mass. Right breast exhibits no inverted nipple, no nipple discharge, no skin change and no tenderness. Left breast exhibits no inverted nipple, no mass, no nipple discharge, no skin change and no tenderness. Abdominal: Soft. She exhibits no distension and no mass. There is no tenderness. A hernia is present. Hernia confirmed positive in the ventral area. Musculoskeletal: She exhibits edema. She exhibits no deformity. Lymphadenopathy:     She has no cervical adenopathy. Right cervical: No superficial cervical, no deep cervical and no posterior cervical adenopathy present. Left cervical: No superficial cervical, no deep cervical and no posterior cervical adenopathy present. She has axillary adenopathy. Right axillary: Lateral adenopathy present. No pectoral adenopathy present. Left axillary: No pectoral and no lateral adenopathy present. Neurological: She is alert and oriented to person, place, and time. No cranial nerve deficit. Skin: Skin is warm and dry. No rash noted. No erythema. No pallor. Psychiatric: She has a normal mood and affect.  Her behavior is normal. Thought content normal.       Lab Results   Component Value Date    WBC 11.5 (H) 06/01/2019    HGB 8.5 (L) 06/01/2019    HCT 27.8 (L) 06/01/2019     06/01/2019    CHOL 94 (L) 05/31/2019    TRIG 67 05/31/2019    HDL 44 05/31/2019    ALT 41 05/31/2019    AST 36 05/31/2019     06/05/2019    K 4.4 06/05/2019     06/05/2019    CREATININE 0.6 06/05/2019 BUN 21 2019    CO2 28 2019    TSH 2.740 2019    INR 1.11 2019    LABA1C 6.0 2019    LABMICR < 1.20 2018       Performed by: [facility]     Tomlily Carnes              [id number]  Assoc.                                              Page 1 of 1  [address]  [address], [place] [address]                                                      PROC: 2019  [facility]                                    RECV: 2019  [address]                                    RPTD: 2019  [address], [place] [address]                      MRN:  [id number]     LOC: [facility]                      ACCT: [de-identified]  SEX: F                      : 1945  AGE: 74 Y                         PATHOLOGY REPORT                      ATTN: GIO COTTO                      REQ: Rj Newsome      Copies To:   BRONWYN MINAYA; Fern Ceja; ISABELLA POLK; CHERELLE WILL      Clinical Information: RIGHT BREAST MASS AND ENLARGED LYMPH NODES      ADDENDUM REPORT 19:    FINAL DIAGNOSIS:  A. Right breast, UIQ, core needle biopsy with barbell clip placement:   Invasive ductal carcinoma, [place] grade 3.    B. \"Lymph node\", right, UOQ, core needle biopsy with tribell clip  placement:   Invasive ductal carcinoma.     BREAST BIOMARKERS*  19  Estrogen Receptor: (Clone SP1), [employer]      ( X ) Negative (<1% of cells staining)    Progesterone Receptor: (Clone 1E2), [employer]      ( X ) Negative (<1% of cells staining)    Ki-67 (clone 30-9)      Percentage of positive nuclei:  25%              Favorable <10%              Borderline 10-20%              Unfavorable >20%                  ASCO/ CAP   Inform HER2 Dual ROCHELLE       HER2 dual ROCHELLE    [employer]       Group #  ( X  Group 5:         HER2/CEP17 Ratio <2.0 and <4.0 HER2   )                    signals/cell                        HER2 ROCHELLE NEGATIVE                          Number of observers:  1                        Number of invasive tumor cells counted:  20                        Using dual probe assay:                         Average number of HER2 signals per cell:   2                         Average number of CEP17 signals per cell:  2                         HER2/CEP17 ratio:  1.0      External Controls:  ( X )  Adequate    (  ) Inadequate  Internal Controls:  ( X )  Adequate     (  ) Inadequate  Standard Assay Conditions:  ( X )  Met   (  ) Not Met (Cold ischemic  time>1 hr., and/or fixation time<6 or>72 hrs. for hormone receptors). Staining Method Used:   Formalin fixation   Antigen retrieval type:  Cell Conditioning 1, mild andrés   Time in antigen retrieval:  30 minutes   Detection system type:   DAB Ultraview kit      Specimen:  A) CORE NEEDLE BREAST BIOPSY, RIGHT, MASS, UIQ, BARBELL CLIP  B) CORE NEEDLE BREAST BIOPSY, RIGHT, LYMPH NODE, UOQ, TRIBELL CLIP      Gross Examination:  A - The container is labeled [employer], right breast mass,  upper inner quadrant, barbell clip.  Received in formalin are multiple  whitish cores of tissue, which are submitted in their entirety in a  total of two cassettes.  The longest core measures 1.7 x 0.2 x 0.2 cm. A total of four cores are present.  2 cassettes. Fixative:  10% neutral buffered formalin  Tissue removal time:  08:37  Tissue fixation time:  08:38  Total fixation time:  11 hours 22 minutes    B - The container is labeled [employer], right breast lymph  node, upper outer quadrant, tribell clip.  Received in formalin are  multiple tan and yellow cores of tissue, which are submitted in their  entirety in one cassette.  The longest core measures 1.5 x 0.2 x 0.2  cm.  The specimens are submitted in their entirety in one cassette. [id number]    Fixative:  10% neutral buffered formalin  Tissue removal time:  08:55  Tissue fixation time:  08:56  Total fixation time:  11 hours 4 minutes    Microscopic Examination:  A.  Sections show large nests and cords of pleomorphic epithelioid  cells with eosinophilic to clear cytoplasm.  Nuclei are enlarged with  prominent nucleoli.  Mitotic activity is brisk.  In areas there is a  vaguely spindled stroma.  To assess whether this represents  desmoplasia/scar versus metaplastic carcinoma, immunohistochemical  stains are performed for p63 and CK5/6, with appropriate controls. Both stains are negative, excluding the possibility of metaplastic  carcinoma. Procedure: Needle biopsy  Specimen laterality: Right  Tumor site: Upper inner quadrant  Histologic type: Invasive carcinoma of type (invasive ductal carcinoma,  not otherwise specified)  Histologic grade ([place] histologic score)  Glandular/tubular differentiation: Score 3  Nuclear pleomorphism: Score 3  Mitotic rate: Score 3  Overall grade: Grade 3 (score 9)  Ductal carcinoma in situ: Not identified  Lymphovascular invasion: Not identified  Microcalcifications: Present in invasive carcinoma    B.  Sections show sheets of histiocytoid to spindled cells with admixed  large nests of epithelioid cells, consistent with invasive ductal  carcinoma. Dalton Fret is a minute focus of lymphoid tissue, however there  is no definitive evidence of a lymph node capsule.  To further  characterize the sheets of histiocytoid cells, immunohistochemical  stains are performed for pancytokeratin, CK 5/6, and p63, with  appropriate controls.  All stains are negative, excluding the  possibility that the histiocytoid cells represent a component of  metastatic carcinoma.  Overall, the findings may represent a completely  replaced lymph node by metastatic carcinoma.  Clinical correlation is  recommended. *This test was developed and its performance characteristics determined  by [facility] has not been cleared or  approved by the U.S. Food and Drug Administration.  Pursuant to the  requirements of CLIA, this laboratory has established right breast upper inner aspect middle depth.  This marker clip    placement is at biopsy site.     Additionally, there is a marker clip in the appropriate position    in the right breast upper outer aspect posterior depth.  This    marker clip placement is at biopsy site.     No other significant masses or calcifications are seen in the    breast.                 Kuldip Shaw M.D., rd/michelle:6/11/2019 17:33:14     copy to: [facility], [place], [place]:    [phone], fax: [phone]   copy to: Carmita Xiao MD, [address] [facility] [address]    [place], [place]: [phone], fax: [phone]       Imaging Technologist: Ej FAIRBANKS)(LUPE), [address]                       PROCEDURE: PET CT SKULL BASE TO MID THIGH       CLINICAL INFORMATION: Invasive ductal carcinoma of right breast ([address] Breast cancer metastasized to axillary lymph node, right ([address] Breast cancer metastasized to axillary lymph node, right ([address] .       COMPARISON: No prior study.       TECHNIQUE: Radiopharmaceutical: 11.2 mCi F-18 FDG   PET/CT imaging was performed from skull base mid thigh levels using routine PET acquisition. Injection site: Left antecubital space. Injection time: 7:33 AM   Blood glucose: 91 mg/dL   Image acquisition 8:33 AM       FINDINGS: Neck:   Bilateral neck muscular activity without CT correlate. Muscles of the muscular activity is seen in the pectoral muscles bilateral rotator cuff muscles and rhomboid muscles of the upper back. These demonstrate a specific CT correlate compatible with physiologic muscle activity.       CHEST:   Lobulated right pleural effusion fluid ascending into the major fissure. Only a small amount of fluid at the right base. Malignant effusion is not excludable. There is no mediastinal or hilar FDG avid lymphadenopathy. There is a pretracheal node present is not enlarged, short axis measurement of 5 mm SUV max 1.4. Image 88. A right axillary lymph node is present, with a surgical clip associated with it. SUV max 2.6 image 91. There is a right upper lobe noncalcified nodule, measuring 9 mm SUV max 2.4, image 91. No other FDG avid lung parenchymal lesions are identified. Heart size is enlarged. AICD over the left chest. Prominent inferior vena cava measuring 3.9 cm.       Abdomen pelvis   Increased FDG metabolism in the posterior right lobe of the liver segment seven. SUV max 2.96 image 135. It is difficult to separate this out for possible small amount of subdiaphragmatic fluid but this does appear to be within the liver parenchyma. Physiologic activity Liver spleen and genitourinary collecting system and gastrointestinal tract. There is marked constipation. Ventral abdominal hernia measuring 5.5 cm containing nonobstructive small bowel loops. An infrarenal abdominal aortic aneurysm measuring up to 3.2 cm. No retroperitoneal, mesenteric, pelvic or inguinal lymphadenopathy is identified. Muscular activity is seen in both femurs.           Impression   1. Hypermetabolic lesion right breast compatible with known primary neoplasm. 2. Single FDG avid right axillary lymph node previously sampled. Consistent with metastatic disease. 3. Single right upper lobe noncalcified nodule with FDG metabolism most concerning for metastatic disease. 4. Activity along the posterior right lobe of the liver contiguous with the hemidiaphragm and difficult to separate out from possible subdiaphragmatic fluid however this is believed to be within the Liver segment seven most concerning for hepatic    metastatic disease. 5. possible malignant effusion. 6. Cardiomegaly. 3.2 cm infrarenal abdominal aortic aneurysm.               **This report has been created using voice recognition software.  It may contain minor errors which are inherent in voice recognition technology. **       Final report electronically signed by Dr. Dae Glass

## 2019-06-25 ASSESSMENT — ENCOUNTER SYMPTOMS
VOMITING: 0
BLOOD IN STOOL: 0
COLOR CHANGE: 0
TROUBLE SWALLOWING: 0
SORE THROAT: 0
SHORTNESS OF BREATH: 0
ABDOMINAL PAIN: 0
VOICE CHANGE: 0
WHEEZING: 0
COUGH: 0
NAUSEA: 0

## 2019-06-28 ENCOUNTER — HOSPITAL ENCOUNTER (OUTPATIENT)
Dept: ULTRASOUND IMAGING | Age: 74
Discharge: HOME | End: 2019-06-28
Payer: MEDICARE

## 2019-06-28 ENCOUNTER — HOSPITAL ENCOUNTER (OUTPATIENT)
Dept: CT IMAGING | Age: 74
Discharge: HOME | End: 2019-06-28
Payer: MEDICARE

## 2019-06-28 DIAGNOSIS — C50.911 INVASIVE DUCTAL CARCINOMA OF RIGHT BREAST (HCC): ICD-10-CM

## 2019-06-28 DIAGNOSIS — R93.2 ABNORMAL PET SCAN OF LIVER: ICD-10-CM

## 2019-06-28 PROCEDURE — 6360000004 HC RX CONTRAST MEDICATION: Performed by: SURGERY

## 2019-06-28 PROCEDURE — 76705 ECHO EXAM OF ABDOMEN: CPT

## 2019-06-28 PROCEDURE — 74178 CT ABD&PLV WO CNTR FLWD CNTR: CPT

## 2019-06-28 RX ADMIN — IOHEXOL 50 ML: 240 INJECTION, SOLUTION INTRATHECAL; INTRAVASCULAR; INTRAVENOUS; ORAL at 11:15

## 2019-06-28 RX ADMIN — IOPAMIDOL 100 ML: 755 INJECTION, SOLUTION INTRAVENOUS at 11:15

## 2019-06-30 PROBLEM — R05.9 COUGH: Status: RESOLVED | Noted: 2019-05-31 | Resolved: 2019-06-30

## 2019-07-01 ENCOUNTER — OFFICE VISIT (OUTPATIENT)
Dept: CARDIOLOGY CLINIC | Age: 74
End: 2019-07-01
Payer: MEDICARE

## 2019-07-01 VITALS
SYSTOLIC BLOOD PRESSURE: 140 MMHG | WEIGHT: 214 LBS | HEART RATE: 60 BPM | BODY MASS INDEX: 39.38 KG/M2 | DIASTOLIC BLOOD PRESSURE: 80 MMHG | HEIGHT: 62 IN

## 2019-07-01 DIAGNOSIS — Z95.810 DUAL IMPLANTABLE CARDIOVERTER-DEFIBRILLATOR IN SITU: ICD-10-CM

## 2019-07-01 DIAGNOSIS — I71.40 ABDOMINAL AORTIC ANEURYSM (AAA) WITHOUT RUPTURE: ICD-10-CM

## 2019-07-01 DIAGNOSIS — I25.810 CORONARY ARTERY DISEASE INVOLVING CORONARY BYPASS GRAFT OF NATIVE HEART WITHOUT ANGINA PECTORIS: Primary | ICD-10-CM

## 2019-07-01 PROCEDURE — G8400 PT W/DXA NO RESULTS DOC: HCPCS | Performed by: PHYSICIAN ASSISTANT

## 2019-07-01 PROCEDURE — 99213 OFFICE O/P EST LOW 20 MIN: CPT | Performed by: PHYSICIAN ASSISTANT

## 2019-07-01 PROCEDURE — 3017F COLORECTAL CA SCREEN DOC REV: CPT | Performed by: PHYSICIAN ASSISTANT

## 2019-07-01 PROCEDURE — G8598 ASA/ANTIPLAT THER USED: HCPCS | Performed by: PHYSICIAN ASSISTANT

## 2019-07-01 PROCEDURE — G8427 DOCREV CUR MEDS BY ELIG CLIN: HCPCS | Performed by: PHYSICIAN ASSISTANT

## 2019-07-01 PROCEDURE — 1090F PRES/ABSN URINE INCON ASSESS: CPT | Performed by: PHYSICIAN ASSISTANT

## 2019-07-01 PROCEDURE — 1111F DSCHRG MED/CURRENT MED MERGE: CPT | Performed by: PHYSICIAN ASSISTANT

## 2019-07-01 PROCEDURE — 1036F TOBACCO NON-USER: CPT | Performed by: PHYSICIAN ASSISTANT

## 2019-07-01 PROCEDURE — 1123F ACP DISCUSS/DSCN MKR DOCD: CPT | Performed by: PHYSICIAN ASSISTANT

## 2019-07-01 PROCEDURE — G8417 CALC BMI ABV UP PARAM F/U: HCPCS | Performed by: PHYSICIAN ASSISTANT

## 2019-07-01 PROCEDURE — 4040F PNEUMOC VAC/ADMIN/RCVD: CPT | Performed by: PHYSICIAN ASSISTANT

## 2019-07-01 NOTE — PROGRESS NOTES
Pt here for fu [facility] plueral effusion     Pt denies sob, chest pain, dizziness, heart palpitations    Pt continues with swelling in bilateral feet , no change ,wears compresion  hose                 [facility]  [facility]   [address] [facility]   [address] [address]   Dept: [phone]   Dept Fax: [phone]   Loc: [phone]      Chief Complaint   Patient presents with    Follow-Up from Hospital     AAA without rupture    Coronary Artery Disease     Pt presents for f/u after recent hospitalization for Systolic CHF. Recenlty was found to have breast Ca. States SOB is improved. Still with LE edema. Seeing Dr Yokasta Macario for the CA.     Cardiologist:  Dr. Velez Her:   No fever, no chills, No fatigue or weight loss  Pulmonary:   Intermittent SOB  Cardiac:    Denies recent chest pain   GI:     No nausea or vomiting, no abdominal pain  Neuro:    No dizziness or light headedness  Musculoskeletal:  No recent active issues  Extremities:   + edema, good peripheral pulses      Past Medical History:   Diagnosis Date    Breast cancer ([facility] 06/2019    right invasive ductal carcinoma    CAD (coronary artery disease)     sees Dr Aditi Mina    [facility]F (congestive heart failure) ([facility]     Hyperlipidemia     Hypertension     ICD (implantable cardiac defibrillator), dual, in situ     St. Boris dual ICD    Numbness and tingling     both feet    Pneumonia     Shingles 12/2014    Sleep apnea     St Boris dual ICD     Type II or unspecified type diabetes mellitus without mention of complication, not stated as uncontrolled     Ventricular arrhythmia        Allergies   Allergen Reactions    Sulfa Antibiotics Swelling       Current Outpatient Medications   Medication Sig Dispense Refill    bumetanide (BUMEX) 0.5 MG tablet Take 1 tablet by mouth 2 times daily 180 tablet 3    spironolactone (ALDACTONE) 25 MG tablet Take 1 tablet by mouth daily 90 tablet 3    pregabalin (LYRICA) 200 MG capsule Take 1 capsule by mouth 2 times daily for 30 days. 60 capsule 3    insulin glargine (LANTUS SOLOSTAR) 100 UNIT/ML injection pen Inject 18 Units into the skin nightly 5.4 mL 0    atorvastatin (LIPITOR) 80 MG tablet Take 1 tablet by mouth daily 90 tablet 1    metoprolol succinate (TOPROL XL) 50 MG extended release tablet Take 1 tablet by mouth daily 30 tablet 3    ipratropium-albuterol (DUONEB) 0.5-2.5 (3) MG/3ML SOLN nebulizer solution Inhale 3 mLs into the lungs every 4 hours as needed for Shortness of Breath or Other (wheezing) 360 mL 5    ondansetron (ZOFRAN ODT) 4 MG disintegrating tablet Take 1 tablet by mouth every 8 hours as needed for Nausea or Vomiting 20 tablet 0    gabapentin (NEURONTIN) 400 MG capsule Take 2 capsules by mouth every evening for 30 days. 90 capsule 3    rOPINIRole (REQUIP) 0.5 MG tablet Take 1 tablet by mouth nightly 30 tablet 11    blood glucose test strips (ASCENSIA AUTODISC VI;ONE TOUCH ULTRA TEST VI) strip Test once daily. Dispense One Touch Ultra Test Strips. Dx: Type 2 diabetes (250.00) 100 each 5    vitamin B-12 (CYANOCOBALAMIN) 1000 MCG tablet Take 3,000 mcg by mouth daily      fluticasone (FLONASE) 50 MCG/ACT nasal spray 2 sprays by Nasal route daily 1 Bottle 1    Insulin Pen Needle (B-D UF III MINI PEN NEEDLES) 31G X 5 MM MISC 1 each by Does not apply route daily 100 each 3    clopidogrel (PLAVIX) 75 MG tablet Take 1 tablet by mouth daily 90 tablet 3    amiodarone (CORDARONE) 200 MG tablet TAKE 1 TABLET DAILY 90 tablet 3    enalapril (VASOTEC) 20 MG tablet TAKE 1 TABLET TWICE A  tablet 3    aspirin EC 81 MG EC tablet Take 1 tablet by mouth daily 30 tablet 3    metFORMIN (GLUCOPHAGE XR) 750 MG extended release tablet Take 1 tablet by mouth 2 times daily (with meals) 30 tablet 11     No current facility-administered medications for this visit.         Social History     Socioeconomic History    Marital status:      Spouse name: None    No obvious focal deficits  Musculoskeletal:  No obvious deformities       Diagnosis Orders   1. Coronary artery disease involving coronary bypass graft of native heart without angina pectoris     2. Abdominal aortic aneurysm (AAA) without rupture (HCC)     3. St Boris dual ICD         Continue Dr Oli Hylton current treatment plan    Continue same current medications and with constant vigilance to changes in symptoms and also any potential side effects. Return for care if become worse or seek medical attention immediately. The patient is educated on symptoms of heart disease that include chest pain and passing out, dizziness, etc and to report them if there is any change or go to emergency room.        Follow up with Dr Ray Gallagher as scheduled or sooner if needed  (Please note that portions of this note were completed with a voice recognition program.  Efforts were made to edit the dictation but occasionally words are mis-transcribed.)      Gary Bates PA-C

## 2019-07-03 ENCOUNTER — OFFICE VISIT (OUTPATIENT)
Dept: ONCOLOGY | Age: 74
End: 2019-07-03
Payer: MEDICARE

## 2019-07-03 ENCOUNTER — HOSPITAL ENCOUNTER (OUTPATIENT)
Dept: INFUSION THERAPY | Age: 74
Discharge: HOME | End: 2019-07-03
Payer: MEDICARE

## 2019-07-03 VITALS
SYSTOLIC BLOOD PRESSURE: 120 MMHG | WEIGHT: 218.4 LBS | TEMPERATURE: 97.6 F | HEART RATE: 75 BPM | RESPIRATION RATE: 18 BRPM | HEIGHT: 62 IN | OXYGEN SATURATION: 97 % | BODY MASS INDEX: 40.19 KG/M2 | DIASTOLIC BLOOD PRESSURE: 57 MMHG

## 2019-07-03 DIAGNOSIS — J90 PLEURAL EFFUSION, BILATERAL: ICD-10-CM

## 2019-07-03 DIAGNOSIS — C77.3 BREAST CANCER METASTASIZED TO AXILLARY LYMPH NODE, RIGHT (HCC): Primary | ICD-10-CM

## 2019-07-03 DIAGNOSIS — Z79.4 TYPE 2 DIABETES MELLITUS WITH HYPERGLYCEMIA, WITH LONG-TERM CURRENT USE OF INSULIN (HCC): ICD-10-CM

## 2019-07-03 DIAGNOSIS — C50.911 BREAST CANCER METASTASIZED TO AXILLARY LYMPH NODE, RIGHT (HCC): Primary | ICD-10-CM

## 2019-07-03 DIAGNOSIS — E11.65 TYPE 2 DIABETES MELLITUS WITH HYPERGLYCEMIA, WITH LONG-TERM CURRENT USE OF INSULIN (HCC): ICD-10-CM

## 2019-07-03 DIAGNOSIS — J96.21 ACUTE AND CHRONIC RESPIRATORY FAILURE WITH HYPOXIA (HCC): ICD-10-CM

## 2019-07-03 PROCEDURE — G8400 PT W/DXA NO RESULTS DOC: HCPCS | Performed by: INTERNAL MEDICINE

## 2019-07-03 PROCEDURE — 1111F DSCHRG MED/CURRENT MED MERGE: CPT | Performed by: INTERNAL MEDICINE

## 2019-07-03 PROCEDURE — G8427 DOCREV CUR MEDS BY ELIG CLIN: HCPCS | Performed by: INTERNAL MEDICINE

## 2019-07-03 PROCEDURE — 3017F COLORECTAL CA SCREEN DOC REV: CPT | Performed by: INTERNAL MEDICINE

## 2019-07-03 PROCEDURE — 1090F PRES/ABSN URINE INCON ASSESS: CPT | Performed by: INTERNAL MEDICINE

## 2019-07-03 PROCEDURE — 99214 OFFICE O/P EST MOD 30 MIN: CPT | Performed by: INTERNAL MEDICINE

## 2019-07-03 PROCEDURE — 1036F TOBACCO NON-USER: CPT | Performed by: INTERNAL MEDICINE

## 2019-07-03 PROCEDURE — 99211 OFF/OP EST MAY X REQ PHY/QHP: CPT

## 2019-07-03 PROCEDURE — 1123F ACP DISCUSS/DSCN MKR DOCD: CPT | Performed by: INTERNAL MEDICINE

## 2019-07-03 PROCEDURE — 3044F HG A1C LEVEL LT 7.0%: CPT | Performed by: INTERNAL MEDICINE

## 2019-07-03 PROCEDURE — 2022F DILAT RTA XM EVC RTNOPTHY: CPT | Performed by: INTERNAL MEDICINE

## 2019-07-03 PROCEDURE — G8598 ASA/ANTIPLAT THER USED: HCPCS | Performed by: INTERNAL MEDICINE

## 2019-07-03 PROCEDURE — 4040F PNEUMOC VAC/ADMIN/RCVD: CPT | Performed by: INTERNAL MEDICINE

## 2019-07-03 PROCEDURE — G8417 CALC BMI ABV UP PARAM F/U: HCPCS | Performed by: INTERNAL MEDICINE

## 2019-07-03 NOTE — PATIENT INSTRUCTIONS
1.  CT of the chest beginning of the next week.   Dr. Zachary Wheatley will call the patient with the report and discuss further management

## 2019-07-08 ENCOUNTER — HOSPITAL ENCOUNTER (OUTPATIENT)
Age: 74
Discharge: HOME | End: 2019-07-08
Payer: MEDICARE

## 2019-07-08 ENCOUNTER — HOSPITAL ENCOUNTER (OUTPATIENT)
Dept: CT IMAGING | Age: 74
Discharge: HOME | End: 2019-07-08
Payer: MEDICARE

## 2019-07-08 DIAGNOSIS — C77.3 BREAST CANCER METASTASIZED TO AXILLARY LYMPH NODE, RIGHT (HCC): ICD-10-CM

## 2019-07-08 DIAGNOSIS — I50.22 CHF (CONGESTIVE HEART FAILURE), NYHA CLASS II, CHRONIC, SYSTOLIC (HCC): ICD-10-CM

## 2019-07-08 DIAGNOSIS — Z79.4 TYPE 2 DIABETES MELLITUS WITH HYPERGLYCEMIA, WITH LONG-TERM CURRENT USE OF INSULIN (HCC): ICD-10-CM

## 2019-07-08 DIAGNOSIS — E11.65 TYPE 2 DIABETES MELLITUS WITH HYPERGLYCEMIA, WITH LONG-TERM CURRENT USE OF INSULIN (HCC): ICD-10-CM

## 2019-07-08 DIAGNOSIS — C50.911 BREAST CANCER METASTASIZED TO AXILLARY LYMPH NODE, RIGHT (HCC): ICD-10-CM

## 2019-07-08 LAB
ANION GAP SERPL CALCULATED.3IONS-SCNC: 10 MEQ/L (ref 8–16)
BUN BLDV-MCNC: 20 MG/DL (ref 7–22)
CALCIUM SERPL-MCNC: 9.5 MG/DL (ref 8.5–10.5)
CHLORIDE BLD-SCNC: 103 MEQ/L (ref 98–111)
CO2: 29 MEQ/L (ref 23–33)
CREATININE, WHOLE BLOOD: 0.9 MG/DL (ref 0.5–1.2)
ESTIMATED GFR, PCACC: 65 ML/MIN/1.73M2
FUNGUS IDENTIFIED: NORMAL
FUNGUS SMEAR: NORMAL
GLUCOSE BLD-MCNC: 103 MG/DL (ref 70–108)
POTASSIUM SERPL-SCNC: 5.2 MEQ/L (ref 3.5–5.2)
SODIUM BLD-SCNC: 142 MEQ/L (ref 135–145)

## 2019-07-08 PROCEDURE — 6360000004 HC RX CONTRAST MEDICATION: Performed by: INTERNAL MEDICINE

## 2019-07-08 PROCEDURE — 36415 COLL VENOUS BLD VENIPUNCTURE: CPT

## 2019-07-08 PROCEDURE — 82565 ASSAY OF CREATININE: CPT

## 2019-07-08 PROCEDURE — 82310 ASSAY OF CALCIUM: CPT

## 2019-07-08 PROCEDURE — 80051 ELECTROLYTE PANEL: CPT

## 2019-07-08 PROCEDURE — 84520 ASSAY OF UREA NITROGEN: CPT

## 2019-07-08 PROCEDURE — 71260 CT THORAX DX C+: CPT

## 2019-07-08 PROCEDURE — 82947 ASSAY GLUCOSE BLOOD QUANT: CPT

## 2019-07-08 RX ORDER — METFORMIN HYDROCHLORIDE 750 MG/1
750 TABLET, EXTENDED RELEASE ORAL 2 TIMES DAILY WITH MEALS
Qty: 30 TABLET | Refills: 11 | Status: SHIPPED | OUTPATIENT
Start: 2019-07-08 | End: 2020-01-01

## 2019-07-08 RX ADMIN — IOPAMIDOL 75 ML: 755 INJECTION, SOLUTION INTRAVENOUS at 12:47

## 2019-07-09 ENCOUNTER — TELEPHONE (OUTPATIENT)
Dept: CARDIOLOGY CLINIC | Age: 74
End: 2019-07-09

## 2019-07-09 DIAGNOSIS — I50.22 CHF (CONGESTIVE HEART FAILURE), NYHA CLASS II, CHRONIC, SYSTOLIC (HCC): Primary | ICD-10-CM

## 2019-07-09 NOTE — TELEPHONE ENCOUNTER
----- Message from ROMAIN Ascencio - CNP sent at 7/9/2019  9:36 AM EDT -----  Labs stable, K+ 5.2 after starting Aldactone - avoid high potassium foods. Recheck BMP in 4 weeks.

## 2019-07-11 ENCOUNTER — CARE COORDINATION (OUTPATIENT)
Dept: CASE MANAGEMENT | Age: 74
End: 2019-07-11

## 2019-07-17 ENCOUNTER — NURSE ONLY (OUTPATIENT)
Dept: CARDIOLOGY CLINIC | Age: 74
End: 2019-07-17
Payer: MEDICARE

## 2019-07-17 DIAGNOSIS — Z95.810 DUAL IMPLANTABLE CARDIOVERTER-DEFIBRILLATOR IN SITU: Primary | ICD-10-CM

## 2019-07-17 PROCEDURE — 93283 PRGRMG EVAL IMPLANTABLE DFB: CPT | Performed by: INTERNAL MEDICINE

## 2019-07-17 NOTE — PROGRESS NOTES
4.6 years on device   At imped 310  shock 40  P waves 0.4 RV 12  At threshold 1.625 @ 0.5  Rv threshold 1.25 @ 0.5  75% atrial paced 91% Rvp    2 mode switches, these are new, longest last just over 2 minutes   Declines use of merlin

## 2019-07-22 ENCOUNTER — TELEPHONE (OUTPATIENT)
Dept: ONCOLOGY | Age: 74
End: 2019-07-22

## 2019-07-22 ASSESSMENT — ENCOUNTER SYMPTOMS
DIARRHEA: 0
FACIAL SWELLING: 0
ABDOMINAL PAIN: 0
COUGH: 1
ABDOMINAL DISTENTION: 0
VOMITING: 0
CONSTIPATION: 0
BACK PAIN: 1
RECTAL PAIN: 0
COLOR CHANGE: 0
NAUSEA: 0
CHEST TIGHTNESS: 0
WHEEZING: 0
TROUBLE SWALLOWING: 0
SHORTNESS OF BREATH: 1
EYE DISCHARGE: 0
SORE THROAT: 0
BLOOD IN STOOL: 0

## 2019-07-24 NOTE — TELEPHONE ENCOUNTER
Asked Katerina Weinberg LPN to remind Dr. Mando King of telephone encounter when meeting with Dr. Mando King at the end of the day.

## 2019-07-25 ENCOUNTER — HOSPITAL ENCOUNTER (OUTPATIENT)
Dept: INFUSION THERAPY | Age: 74
Discharge: HOME | End: 2019-07-25
Payer: MEDICARE

## 2019-07-25 ENCOUNTER — OFFICE VISIT (OUTPATIENT)
Dept: ONCOLOGY | Age: 74
End: 2019-07-25
Payer: MEDICARE

## 2019-07-25 VITALS
SYSTOLIC BLOOD PRESSURE: 104 MMHG | TEMPERATURE: 97.9 F | BODY MASS INDEX: 39.9 KG/M2 | HEART RATE: 70 BPM | WEIGHT: 216.8 LBS | HEIGHT: 62 IN | RESPIRATION RATE: 18 BRPM | OXYGEN SATURATION: 92 % | DIASTOLIC BLOOD PRESSURE: 63 MMHG

## 2019-07-25 DIAGNOSIS — C50.411 MALIGNANT NEOPLASM OF UPPER-OUTER QUADRANT OF RIGHT BREAST IN FEMALE, ESTROGEN RECEPTOR NEGATIVE (HCC): ICD-10-CM

## 2019-07-25 DIAGNOSIS — Z17.1 MALIGNANT NEOPLASM OF UPPER-OUTER QUADRANT OF RIGHT BREAST IN FEMALE, ESTROGEN RECEPTOR NEGATIVE (HCC): ICD-10-CM

## 2019-07-25 PROBLEM — C50.419 MALIGNANT NEOPLASM OF UPPER OUTER QUADRANT OF BREAST IN FEMALE, ESTROGEN RECEPTOR NEGATIVE (HCC): Status: ACTIVE | Noted: 2019-07-25

## 2019-07-25 PROCEDURE — 99211 OFF/OP EST MAY X REQ PHY/QHP: CPT

## 2019-07-25 PROCEDURE — G8427 DOCREV CUR MEDS BY ELIG CLIN: HCPCS | Performed by: INTERNAL MEDICINE

## 2019-07-25 PROCEDURE — G8598 ASA/ANTIPLAT THER USED: HCPCS | Performed by: INTERNAL MEDICINE

## 2019-07-25 PROCEDURE — G8400 PT W/DXA NO RESULTS DOC: HCPCS | Performed by: INTERNAL MEDICINE

## 2019-07-25 PROCEDURE — 1090F PRES/ABSN URINE INCON ASSESS: CPT | Performed by: INTERNAL MEDICINE

## 2019-07-25 PROCEDURE — 4040F PNEUMOC VAC/ADMIN/RCVD: CPT | Performed by: INTERNAL MEDICINE

## 2019-07-25 PROCEDURE — 99214 OFFICE O/P EST MOD 30 MIN: CPT | Performed by: INTERNAL MEDICINE

## 2019-07-25 PROCEDURE — 1123F ACP DISCUSS/DSCN MKR DOCD: CPT | Performed by: INTERNAL MEDICINE

## 2019-07-25 PROCEDURE — 3017F COLORECTAL CA SCREEN DOC REV: CPT | Performed by: INTERNAL MEDICINE

## 2019-07-25 PROCEDURE — 1036F TOBACCO NON-USER: CPT | Performed by: INTERNAL MEDICINE

## 2019-07-25 PROCEDURE — G8417 CALC BMI ABV UP PARAM F/U: HCPCS | Performed by: INTERNAL MEDICINE

## 2019-07-25 RX ORDER — SODIUM CHLORIDE 9 MG/ML
20 INJECTION, SOLUTION INTRAVENOUS ONCE
Status: CANCELLED | OUTPATIENT
Start: 2019-08-01

## 2019-07-25 RX ORDER — HEPARIN SODIUM (PORCINE) LOCK FLUSH IV SOLN 100 UNIT/ML 100 UNIT/ML
500 SOLUTION INTRAVENOUS PRN
Status: CANCELLED | OUTPATIENT
Start: 2019-08-01

## 2019-07-25 RX ORDER — SODIUM CHLORIDE 0.9 % (FLUSH) 0.9 %
5 SYRINGE (ML) INJECTION PRN
Status: CANCELLED | OUTPATIENT
Start: 2019-08-01

## 2019-07-25 RX ORDER — MEPERIDINE HYDROCHLORIDE 50 MG/ML
12.5 INJECTION INTRAMUSCULAR; INTRAVENOUS; SUBCUTANEOUS ONCE
Status: CANCELLED | OUTPATIENT
Start: 2019-08-01

## 2019-07-25 RX ORDER — METHYLPREDNISOLONE SODIUM SUCCINATE 125 MG/2ML
125 INJECTION, POWDER, LYOPHILIZED, FOR SOLUTION INTRAMUSCULAR; INTRAVENOUS ONCE
Status: CANCELLED | OUTPATIENT
Start: 2019-08-01

## 2019-07-25 RX ORDER — SODIUM CHLORIDE 9 MG/ML
INJECTION, SOLUTION INTRAVENOUS CONTINUOUS
Status: CANCELLED | OUTPATIENT
Start: 2019-08-01

## 2019-07-25 RX ORDER — DIPHENHYDRAMINE HYDROCHLORIDE 50 MG/ML
50 INJECTION INTRAMUSCULAR; INTRAVENOUS ONCE
Status: CANCELLED | OUTPATIENT
Start: 2019-08-01

## 2019-07-25 RX ORDER — ONDANSETRON 2 MG/ML
8 INJECTION INTRAMUSCULAR; INTRAVENOUS ONCE
Status: CANCELLED | OUTPATIENT
Start: 2019-08-01

## 2019-07-25 RX ORDER — SODIUM CHLORIDE 0.9 % (FLUSH) 0.9 %
10 SYRINGE (ML) INJECTION PRN
Status: CANCELLED | OUTPATIENT
Start: 2019-08-01

## 2019-07-25 NOTE — PATIENT INSTRUCTIONS
rifapentine, telithromycin;  · antifungal medication--itraconazole, ketoconazole, posaconazole, voriconazole;  · heart medication--nicardipine, quinidine;  · hepatitis C medications--boceprevir, telaprevir;  · HIV/AIDS medication--atazanavir, delavirdine, efavirenz, fosamprenavir, indinavir, nelfinavir, nevirapine, ritonavir, saquinavir; or  · seizure medication--carbamazepine, fosphenytoin, oxcarbazepine, phenobarbital, phenytoin, primidone. This list is not complete and many other drugs can interact with paclitaxel. This includes prescription and over-the-counter medicines, vitamins, and herbal products. Give a list of all your medicines to any healthcare provider who treats you. Where can I get more information? Your doctor or pharmacist can provide more information about paclitaxel. Remember, keep this and all other medicines out of the reach of children, never share your medicines with others, and use this medication only for the indication prescribed. Every effort has been made to ensure that the information provided by [address] is accurate, up-to-date, and complete, but no guarantee is made to that effect. Drug information contained herein may be time sensitive. [facility] information has been compiled for use by healthcare practitioners and consumers in the [place] and therefore [employer] does not warrant that uses outside of the [place] are appropriate, unless specifically indicated otherwise. [facility] drug information does not endorse drugs, diagnose patients or recommend therapy. [facility] drug information is an informational resource designed to assist licensed healthcare practitioners in caring for their patients and/or to serve consumers viewing this service as a supplement to, and not a substitute for, the expertise, skill, knowledge and judgment of healthcare practitioners.  The absence of a warning for a given drug or drug combination in no way should be construed to

## 2019-07-26 ENCOUNTER — TELEPHONE (OUTPATIENT)
Dept: SURGERY | Age: 74
End: 2019-07-26

## 2019-07-26 RX ORDER — HEPARIN SODIUM (PORCINE) LOCK FLUSH IV SOLN 100 UNIT/ML 100 UNIT/ML
500 SOLUTION INTRAVENOUS PRN
Status: CANCELLED | OUTPATIENT
Start: 2019-07-26

## 2019-07-26 RX ORDER — SODIUM CHLORIDE 0.9 % (FLUSH) 0.9 %
20 SYRINGE (ML) INJECTION PRN
Status: CANCELLED | OUTPATIENT
Start: 2019-07-26

## 2019-07-26 RX ORDER — SODIUM CHLORIDE 0.9 % (FLUSH) 0.9 %
10 SYRINGE (ML) INJECTION PRN
Status: CANCELLED | OUTPATIENT
Start: 2019-07-26

## 2019-07-30 ENCOUNTER — HOSPITAL ENCOUNTER (OUTPATIENT)
Dept: INFUSION THERAPY | Age: 74
Discharge: HOME | End: 2019-07-30
Payer: MEDICARE

## 2019-07-30 VITALS
HEART RATE: 70 BPM | SYSTOLIC BLOOD PRESSURE: 120 MMHG | OXYGEN SATURATION: 98 % | TEMPERATURE: 98 F | DIASTOLIC BLOOD PRESSURE: 61 MMHG | RESPIRATION RATE: 18 BRPM

## 2019-07-30 DIAGNOSIS — C50.411 MALIGNANT NEOPLASM OF UPPER-OUTER QUADRANT OF RIGHT BREAST IN FEMALE, ESTROGEN RECEPTOR NEGATIVE (HCC): ICD-10-CM

## 2019-07-30 DIAGNOSIS — Z51.11 ENCOUNTER FOR CHEMOTHERAPY MANAGEMENT: Primary | ICD-10-CM

## 2019-07-30 DIAGNOSIS — Z17.1 MALIGNANT NEOPLASM OF UPPER-OUTER QUADRANT OF RIGHT BREAST IN FEMALE, ESTROGEN RECEPTOR NEGATIVE (HCC): ICD-10-CM

## 2019-07-30 PROCEDURE — 99212 OFFICE O/P EST SF 10 MIN: CPT

## 2019-07-30 RX ORDER — ONDANSETRON 4 MG/1
4 TABLET, FILM COATED ORAL EVERY 8 HOURS PRN
Qty: 90 TABLET | Refills: 3 | Status: ON HOLD | OUTPATIENT
Start: 2019-07-30 | End: 2019-01-01 | Stop reason: HOSPADM

## 2019-07-30 RX ORDER — ONDANSETRON 4 MG/1
4 TABLET, FILM COATED ORAL EVERY 8 HOURS PRN
COMMUNITY
End: 2019-07-30 | Stop reason: SDUPTHER

## 2019-07-30 RX ORDER — LIDOCAINE AND PRILOCAINE 25; 25 MG/G; MG/G
CREAM TOPICAL
Qty: 30 G | Refills: 3 | Status: ON HOLD | OUTPATIENT
Start: 2019-07-30 | End: 2019-01-01 | Stop reason: HOSPADM

## 2019-07-30 NOTE — PLAN OF CARE
Problem: Musculor/Skeletal Functional Status  Goal: Absence of falls  Outcome: Met This Shift  Note:   Patient free of falls this visit. Intervention: Fall precautions  Note:   Fall risks assessed and precautions discussed. Assistance provided with activity during visit. Problem: Intellectual/Education/Knowledge Deficit  Goal: Teaching initiated upon admission  Outcome: Met This Shift  Note:   Patient verbalizes understanding to verbal information given on taxol, including action and possible side effects. Aware to call MD if develop complications. Intervention: Verbal/written education provided  Note:   Chemotherapy Teaching     What is Chemotherapy   Drug action ? Method of Administration ? Handouts given ? Side Effects  Nausea/vomiting ? Diarrhea ? Fatigue ? Signs / Symptoms of infection ? Neutropenia ? Thrombocytopenia ? Alopecia ? neuropathy ? [place] diet &  the importance of fluids ? Micellaneous  Importance of nutrition ? Importance of oral hygiene ? When to call the MD ?   Monitoring labs ? Use of supportive services ? Explanation of Drug Regimen / Frequency  Taxol      Comments  Verbalized understanding to drug,action,side effects and when to call MD         Problem: Discharge Planning  Goal: Knowledge of discharge instructions  Description  Knowledge of discharge instructions     Outcome: Met This Shift  Note:   Patient verbalizes understanding of discharge instructions, follow up appointment, and when to call physician if needed   Intervention: Interaction with patient/family and care team  Note:   Discharge instructions given to pt and reviewed. Follow up appointments discussed. Care plan reviewed with patient and family. Patient and family verbalize understanding of the plan of care and contribute to goal setting.

## 2019-07-30 NOTE — PROGRESS NOTES
Patient and family instructed on taxol and Pre-medications: benadryl, zofran, decadron and pepcid. A folder containing the following handouts given to patient and reviewed: chemotherapy drug information sheets, side effects handouts, Understanding your blood counts, bland diet, importance of hydration, when to call physician sheet, reduce risk infection sheet, bleeding precaution and neutropenia precaution. All questions answered satisfactorily and support given. Patient given a tour of facility. Approximately 60 minutes spent with patient and family.

## 2019-07-31 ENCOUNTER — ANESTHESIA EVENT (OUTPATIENT)
Dept: OPERATING ROOM | Age: 74
End: 2019-07-31
Payer: MEDICARE

## 2019-07-31 ENCOUNTER — APPOINTMENT (OUTPATIENT)
Dept: GENERAL RADIOLOGY | Age: 74
End: 2019-07-31
Attending: SURGERY
Payer: MEDICARE

## 2019-07-31 ENCOUNTER — HOSPITAL ENCOUNTER (OUTPATIENT)
Age: 74
Setting detail: OUTPATIENT SURGERY
Discharge: HOME | End: 2019-07-31
Attending: SURGERY | Admitting: SURGERY
Payer: MEDICARE

## 2019-07-31 ENCOUNTER — ANESTHESIA (OUTPATIENT)
Dept: OPERATING ROOM | Age: 74
End: 2019-07-31
Payer: MEDICARE

## 2019-07-31 VITALS — DIASTOLIC BLOOD PRESSURE: 52 MMHG | OXYGEN SATURATION: 97 % | SYSTOLIC BLOOD PRESSURE: 84 MMHG

## 2019-07-31 VITALS
OXYGEN SATURATION: 93 % | RESPIRATION RATE: 18 BRPM | HEART RATE: 69 BPM | BODY MASS INDEX: 39.35 KG/M2 | SYSTOLIC BLOOD PRESSURE: 99 MMHG | HEIGHT: 62 IN | TEMPERATURE: 97.5 F | WEIGHT: 213.85 LBS | DIASTOLIC BLOOD PRESSURE: 53 MMHG

## 2019-07-31 DIAGNOSIS — C50.411 MALIGNANT NEOPLASM OF UPPER-OUTER QUADRANT OF RIGHT BREAST IN FEMALE, ESTROGEN RECEPTOR NEGATIVE (HCC): Primary | ICD-10-CM

## 2019-07-31 DIAGNOSIS — Z17.1 MALIGNANT NEOPLASM OF UPPER-OUTER QUADRANT OF RIGHT BREAST IN FEMALE, ESTROGEN RECEPTOR NEGATIVE (HCC): Primary | ICD-10-CM

## 2019-07-31 LAB — GLUCOSE BLD-MCNC: 113 MG/DL (ref 70–108)

## 2019-07-31 PROCEDURE — 7100000011 HC PHASE II RECOVERY - ADDTL 15 MIN: Performed by: SURGERY

## 2019-07-31 PROCEDURE — 3700000000 HC ANESTHESIA ATTENDED CARE: Performed by: SURGERY

## 2019-07-31 PROCEDURE — 6360000002 HC RX W HCPCS: Performed by: SURGERY

## 2019-07-31 PROCEDURE — 3700000001 HC ADD 15 MINUTES (ANESTHESIA): Performed by: SURGERY

## 2019-07-31 PROCEDURE — 3600000012 HC SURGERY LEVEL 2 ADDTL 15MIN: Performed by: SURGERY

## 2019-07-31 PROCEDURE — 82948 REAGENT STRIP/BLOOD GLUCOSE: CPT

## 2019-07-31 PROCEDURE — 3600000002 HC SURGERY LEVEL 2 BASE: Performed by: SURGERY

## 2019-07-31 PROCEDURE — 2500000003 HC RX 250 WO HCPCS: Performed by: SURGERY

## 2019-07-31 PROCEDURE — 77001 FLUOROGUIDE FOR VEIN DEVICE: CPT

## 2019-07-31 PROCEDURE — C1788 PORT, INDWELLING, IMP: HCPCS | Performed by: SURGERY

## 2019-07-31 PROCEDURE — 7100000010 HC PHASE II RECOVERY - FIRST 15 MIN: Performed by: SURGERY

## 2019-07-31 PROCEDURE — 71045 X-RAY EXAM CHEST 1 VIEW: CPT

## 2019-07-31 PROCEDURE — 36561 INSERT TUNNELED CV CATH: CPT | Performed by: SURGERY

## 2019-07-31 PROCEDURE — 2580000003 HC RX 258: Performed by: SURGERY

## 2019-07-31 PROCEDURE — 6360000002 HC RX W HCPCS: Performed by: NURSE ANESTHETIST, CERTIFIED REGISTERED

## 2019-07-31 PROCEDURE — 2500000003 HC RX 250 WO HCPCS: Performed by: NURSE ANESTHETIST, CERTIFIED REGISTERED

## 2019-07-31 PROCEDURE — 77001 FLUOROGUIDE FOR VEIN DEVICE: CPT | Performed by: SURGERY

## 2019-07-31 PROCEDURE — 2709999900 HC NON-CHARGEABLE SUPPLY: Performed by: SURGERY

## 2019-07-31 DEVICE — PORT INFUS OD2.7MM ID1.5MM INTRO 8FR TI POLYUR CATH DETACH CT80STPD] ANGIODYNAMICS INC]: Type: IMPLANTABLE DEVICE | Site: CHEST | Status: FUNCTIONAL

## 2019-07-31 RX ORDER — SODIUM CHLORIDE 0.9 % (FLUSH) 0.9 %
10 SYRINGE (ML) INJECTION PRN
Status: DISCONTINUED | OUTPATIENT
Start: 2019-07-31 | End: 2019-07-31 | Stop reason: SDUPTHER

## 2019-07-31 RX ORDER — SODIUM CHLORIDE 0.9 % (FLUSH) 0.9 %
10 SYRINGE (ML) INJECTION EVERY 12 HOURS SCHEDULED
Status: DISCONTINUED | OUTPATIENT
Start: 2019-07-31 | End: 2019-07-31 | Stop reason: SDUPTHER

## 2019-07-31 RX ORDER — SODIUM CHLORIDE 0.9 % (FLUSH) 0.9 %
10 SYRINGE (ML) INJECTION EVERY 12 HOURS SCHEDULED
Status: DISCONTINUED | OUTPATIENT
Start: 2019-07-31 | End: 2019-07-31 | Stop reason: HOSPADM

## 2019-07-31 RX ORDER — HYDROCODONE BITARTRATE AND ACETAMINOPHEN 5; 325 MG/1; MG/1
1 TABLET ORAL EVERY 4 HOURS PRN
Qty: 30 TABLET | Refills: 0 | Status: SHIPPED | OUTPATIENT
Start: 2019-07-31 | End: 2019-08-05

## 2019-07-31 RX ORDER — SODIUM CHLORIDE 9 MG/ML
INJECTION, SOLUTION INTRAVENOUS CONTINUOUS
Status: DISCONTINUED | OUTPATIENT
Start: 2019-07-31 | End: 2019-07-31 | Stop reason: HOSPADM

## 2019-07-31 RX ORDER — SODIUM CHLORIDE 0.9 % (FLUSH) 0.9 %
10 SYRINGE (ML) INJECTION PRN
Status: DISCONTINUED | OUTPATIENT
Start: 2019-07-31 | End: 2019-07-31 | Stop reason: HOSPADM

## 2019-07-31 RX ORDER — ONDANSETRON 2 MG/ML
4 INJECTION INTRAMUSCULAR; INTRAVENOUS EVERY 6 HOURS PRN
Status: DISCONTINUED | OUTPATIENT
Start: 2019-07-31 | End: 2019-07-31 | Stop reason: HOSPADM

## 2019-07-31 RX ORDER — LIDOCAINE HYDROCHLORIDE 20 MG/ML
INJECTION, SOLUTION INFILTRATION; PERINEURAL PRN
Status: DISCONTINUED | OUTPATIENT
Start: 2019-07-31 | End: 2019-07-31 | Stop reason: SDUPTHER

## 2019-07-31 RX ORDER — LIDOCAINE HYDROCHLORIDE 10 MG/ML
INJECTION, SOLUTION EPIDURAL; INFILTRATION; INTRACAUDAL; PERINEURAL PRN
Status: DISCONTINUED | OUTPATIENT
Start: 2019-07-31 | End: 2019-07-31 | Stop reason: ALTCHOICE

## 2019-07-31 RX ORDER — PROPOFOL 10 MG/ML
INJECTION, EMULSION INTRAVENOUS PRN
Status: DISCONTINUED | OUTPATIENT
Start: 2019-07-31 | End: 2019-07-31 | Stop reason: SDUPTHER

## 2019-07-31 RX ORDER — ACETAMINOPHEN 325 MG/1
650 TABLET ORAL EVERY 4 HOURS PRN
Status: DISCONTINUED | OUTPATIENT
Start: 2019-07-31 | End: 2019-07-31 | Stop reason: HOSPADM

## 2019-07-31 RX ORDER — MORPHINE SULFATE 2 MG/ML
2 INJECTION, SOLUTION INTRAMUSCULAR; INTRAVENOUS
Status: DISCONTINUED | OUTPATIENT
Start: 2019-07-31 | End: 2019-07-31 | Stop reason: HOSPADM

## 2019-07-31 RX ORDER — HEPARIN SODIUM (PORCINE) LOCK FLUSH IV SOLN 100 UNIT/ML 100 UNIT/ML
SOLUTION INTRAVENOUS PRN
Status: DISCONTINUED | OUTPATIENT
Start: 2019-07-31 | End: 2019-07-31 | Stop reason: ALTCHOICE

## 2019-07-31 RX ORDER — MIDAZOLAM HYDROCHLORIDE 1 MG/ML
INJECTION INTRAMUSCULAR; INTRAVENOUS PRN
Status: DISCONTINUED | OUTPATIENT
Start: 2019-07-31 | End: 2019-07-31 | Stop reason: SDUPTHER

## 2019-07-31 RX ORDER — MORPHINE SULFATE 2 MG/ML
4 INJECTION, SOLUTION INTRAMUSCULAR; INTRAVENOUS
Status: DISCONTINUED | OUTPATIENT
Start: 2019-07-31 | End: 2019-07-31 | Stop reason: HOSPADM

## 2019-07-31 RX ORDER — TRAMADOL HYDROCHLORIDE 50 MG/1
100 TABLET ORAL EVERY 6 HOURS PRN
Status: DISCONTINUED | OUTPATIENT
Start: 2019-07-31 | End: 2019-07-31 | Stop reason: HOSPADM

## 2019-07-31 RX ORDER — TRAMADOL HYDROCHLORIDE 50 MG/1
50 TABLET ORAL EVERY 6 HOURS PRN
Status: DISCONTINUED | OUTPATIENT
Start: 2019-07-31 | End: 2019-07-31 | Stop reason: HOSPADM

## 2019-07-31 RX ORDER — FENTANYL CITRATE 50 UG/ML
INJECTION, SOLUTION INTRAMUSCULAR; INTRAVENOUS PRN
Status: DISCONTINUED | OUTPATIENT
Start: 2019-07-31 | End: 2019-07-31 | Stop reason: SDUPTHER

## 2019-07-31 RX ADMIN — SODIUM CHLORIDE: 9 INJECTION, SOLUTION INTRAVENOUS at 07:47

## 2019-07-31 RX ADMIN — FENTANYL CITRATE 50 MCG: 50 INJECTION INTRAMUSCULAR; INTRAVENOUS at 07:58

## 2019-07-31 RX ADMIN — MIDAZOLAM HYDROCHLORIDE 2 MG: 1 INJECTION, SOLUTION INTRAMUSCULAR; INTRAVENOUS at 07:55

## 2019-07-31 RX ADMIN — FENTANYL CITRATE 50 MCG: 50 INJECTION INTRAMUSCULAR; INTRAVENOUS at 08:16

## 2019-07-31 RX ADMIN — SODIUM CHLORIDE: 9 INJECTION, SOLUTION INTRAVENOUS at 07:53

## 2019-07-31 RX ADMIN — PROPOFOL 50 MG: 10 INJECTION, EMULSION INTRAVENOUS at 08:00

## 2019-07-31 RX ADMIN — PHENYLEPHRINE HYDROCHLORIDE 100 MCG: 10 INJECTION INTRAVENOUS at 08:04

## 2019-07-31 RX ADMIN — LIDOCAINE HYDROCHLORIDE 100 MG: 20 INJECTION, SOLUTION INFILTRATION; PERINEURAL at 07:57

## 2019-07-31 RX ADMIN — PHENYLEPHRINE HYDROCHLORIDE 100 MCG: 10 INJECTION INTRAVENOUS at 08:27

## 2019-07-31 ASSESSMENT — PULMONARY FUNCTION TESTS
PIF_VALUE: 0
PIF_VALUE: 1
PIF_VALUE: 0
PIF_VALUE: 1
PIF_VALUE: 0
PIF_VALUE: 1
PIF_VALUE: 0

## 2019-07-31 ASSESSMENT — PAIN SCALES - GENERAL
PAINLEVEL_OUTOF10: 0
PAINLEVEL_OUTOF10: 0

## 2019-07-31 ASSESSMENT — PAIN - FUNCTIONAL ASSESSMENT: PAIN_FUNCTIONAL_ASSESSMENT: 0-10

## 2019-07-31 NOTE — H&P
probe assay:  Average number of HER2 signals per cell: 2  Average number of CEP17 signals per cell: 2  HER2/CEP17 ratio: 1.0      External Controls: ( X ) Adequate ( ) Inadequate  Internal Controls: ( X ) Adequate ( ) Inadequate  Standard Assay Conditions: ( X ) Met ( ) Not Met (Cold ischemic  time>1 hr., and/or fixation time<6 or>72 hrs. for hormone receptors). Staining Method Used:  Formalin fixation  Antigen retrieval type: Cell Conditioning 1, mild andrés  Time in antigen retrieval: 30 minutes  Detection system type: DAB Ultraview kit      Specimen:  A) CORE NEEDLE BREAST BIOPSY, RIGHT, MASS, UIQ, BARBELL CLIP  B) CORE NEEDLE BREAST BIOPSY, RIGHT, LYMPH NODE, UOQ, TRIBELL CLIP      Gross Examination:  A - The container is labeled Cindy Gastelum, right breast mass,  upper inner quadrant, barbell clip. Received in formalin are multiple  whitish cores of tissue, which are submitted in their entirety in a  total of two cassettes. The longest core measures 1.7 x 0.2 x 0.2 cm. A total of four cores are present. 2 cassettes. Fixative: 10% neutral buffered formalin  Tissue removal time: 08:37  Tissue fixation time: 08:38  Total fixation time: 11 hours 22 minutes    B - The container is labeled Cindy Gastelum, right breast lymph  node, upper outer quadrant, tribell clip. Received in formalin are  multiple tan and yellow cores of tissue, which are submitted in their  entirety in one cassette. The longest core measures 1.5 x 0.2 x 0.2  cm. The specimens are submitted in their entirety in one cassette. [id number]    Fixative: 10% neutral buffered formalin  Tissue removal time: 08:55  Tissue fixation time: 08:56  Total fixation time: 11 hours 4 minutes    Microscopic Examination:  A. Sections show large nests and cords of pleomorphic epithelioid  cells with eosinophilic to clear cytoplasm. Nuclei are enlarged with  prominent nucleoli. Mitotic activity is brisk. In areas there is a  vaguely spindled stroma.  To

## 2019-07-31 NOTE — ANESTHESIA PRE PROCEDURE
Department of Anesthesiology  Preprocedure Note       Name:  Tyrone Bailey   Age:  75 y.o.  :  1945                                          MRN:  [id number]         Date:  2019      Surgeon: Andres Gagnon):  Patrick Gomez MD    Procedure: SINGLE LUMEN SMART PORT INSERTION (Right Chest)    Medications prior to admission:   Prior to Admission medications    Medication Sig Start Date End Date Taking? Authorizing Provider   HYDROcodone-acetaminophen (NORCO) 5-325 MG per tablet Take 1 tablet by mouth every 4 hours as needed for Pain for up to 5 days. Intended supply: 5 days. Take lowest dose possible to manage pain 19  Patrick Gomez MD   lidocaine-prilocaine (EMLA) 2.5-2.5 % cream Apply topically prior to accessing port. 19   Nasrin Miller MD   ondansetron (ZOFRAN) 4 MG tablet Take 1 tablet by mouth every 8 hours as needed for Nausea or Vomiting Indications: Nausea and Vomiting caused by Cancer Chemotherapy 19   Nasrin Miller MD   metFORMIN (GLUCOPHAGE XR) 750 MG extended release tablet Take 1 tablet by mouth 2 times daily (with meals) 19   ROMAIN Waddell CNP   bumetanide (BUMEX) 0.5 MG tablet Take 1 tablet by mouth 2 times daily 19   ROMAIN Aceves CNP   spironolactone (ALDACTONE) 25 MG tablet Take 1 tablet by mouth daily 19   ROMAIN Aceves CNP   pregabalin (LYRICA) 200 MG capsule Take 1 capsule by mouth 2 times daily for 30 days.  19  ROMAIN Waddell CNP   insulin glargine (LANTUS SOLOSTAR) 100 UNIT/ML injection pen Inject 18 Units into the skin nightly 19  Javon Canela MD   atorvastatin (LIPITOR) 80 MG tablet Take 1 tablet by mouth daily 19   Javon Canela MD   metoprolol succinate (TOPROL XL) 50 MG extended release tablet Take 1 tablet by mouth daily 19   Javon Canela MD   ipratropium-albuterol (DUONEB) 0.5-2.5 (3) MG/3ML SOLN nebulizer solution Inhale 3 mLs into the lungs every 4 hours as needed for at bifurcation w/ D2. D2 appears to be medium large caliber vessel which has an ostial lesion of 70-80%. left -to-left collaterals as well as left-to-right collaterals emanating from LAD to PDA. Circumflex had anterior marginal branch and posterior marginal branch.  HERNIA REPAIR      Multiple    HYSTERECTOMY  1997    PACEMAKER PLACEMENT      TRANSTHORACIC ECHOCARDIOGRAM  11    LV size mildly to moderately dilated. Systolic function markedly reduced. EF 25-30%. Severe diffuse hypokinesis. Grade 1 diastolic dysfunction. LA was mildly to moderately dilated. RV mildly dilated, systolic function mildly reduced. Mild MR and TR.  TRANSTHORACIC ECHOCARDIOGRAM  09    LV demonstrates severe hypokinesis of the inferior basal as well as  basal aneurysm being noted and paradoxical septal motion. EF 45-50%. LA is moderately dilated and AV demonstrates mild AV sclerosis w/o AS and AI. Trace MR and TV insufficiency. Social History:    Social History     Tobacco Use    Smoking status: Former Smoker     Packs/day: 1.50     Years: 25.00     Pack years: 37.50     Types: Cigarettes     Last attempt to quit: 1988     Years since quittin.8    Smokeless tobacco: Never Used   Substance Use Topics    Alcohol use: No                                Counseling given: Not Answered      Vital Signs (Current): There were no vitals filed for this visit.                                            BP Readings from Last 3 Encounters:   19 (!) 101/54   19 (!) 84/52   19 120/61       NPO Status:                                                                                 BMI:   Wt Readings from Last 3 Encounters:   19 213 lb 13.5 oz (97 kg)   19 216 lb 12.8 oz (98.3 kg)   19 218 lb 6.4 oz (99.1 kg)     There is no height or weight on file to calculate BMI.    CBC:   Lab Results   Component Value Date    WBC 11.5 2019    RBC 3.47 2019    HGB 8.5 2019 HCT 27.8 06/01/2019    MCV 80.1 06/01/2019    RDW 13.9 04/11/2018     06/01/2019       CMP:   Lab Results   Component Value Date     07/08/2019    K 5.2 07/08/2019    K 4.2 05/31/2019     07/08/2019    CO2 29 07/08/2019    BUN 20 07/08/2019    CREATININE 0.9 07/08/2019    CREATININE 0.6 06/05/2019    LABGLOM >90 06/05/2019    GLUCOSE 103 07/08/2019    GLUCOSE 130 03/23/2012    PROT 6.5 06/04/2019    CALCIUM 9.5 07/08/2019    BILITOT 0.4 05/31/2019    ALKPHOS 112 05/31/2019    AST 36 05/31/2019    ALT 41 05/31/2019       POC Tests:   Recent Labs     07/31/19  0745   POCGLU 113*       Coags:   Lab Results   Component Value Date    INR 1.11 05/31/2019    APTT 29.0 04/11/2018       HCG (If Applicable): No results found for: PREGTESTUR, PREGSERUM, HCG, HCGQUANT     ABGs: No results found for: PHART, PO2ART, CIY3HYO, JDY8VJP, BEART, K6MHJDRL     Type & Screen (If Applicable):  Lab Results   Component Value Date    [facility] 04/11/2018       Anesthesia Evaluation  Patient summary reviewed and Nursing notes reviewed no history of anesthetic complications:   Airway: Mallampati: II  TM distance: >3 FB   Neck ROM: full  Mouth opening: > = 3 FB Dental:    (+) upper dentures and lower dentures      Pulmonary:Negative Pulmonary ROS and normal exam                               Cardiovascular:    (+) hypertension: no interval change, pacemaker: AICD and no interval change, CAD: obstructive, CABG/stent: no interval change,          Beta Blocker:  Dose within 24 Hrs         Neuro/Psych:   Negative Neuro/Psych ROS              GI/Hepatic/Renal: Neg GI/Hepatic/Renal ROS            Endo/Other:    (+) DiabetesType II DM, well controlled, , .                 Abdominal:           Vascular: negative vascular ROS. Anesthesia Plan      MAC     ASA 3             Anesthetic plan and risks discussed with patient. Plan discussed with surgical team and CRNA.                   CHRISTY Romaine Wu MD   7/31/2019

## 2019-07-31 NOTE — OP NOTE
introducer  sheath placed over-the-top of the guidewire, again in the superior vena cava  via fluoroscopic guidance. The guidewire and dilator were then removed. Flushed catheter was placed down through the center of the introducer again in the  superior vena cava via fluoroscopic guidance. The sheath was then removed. After infiltrating with local anesthesia a pocket was fashioned on the right anterior chest wall after skin incision was made with #15 scalpel blade. The catheter was tunneled into the pocket, cut to appropriate length and attached to the port with theThe port aspirated, flushed appropriately. The skin incisions were then closed using 4-0 Vicryl suture. The wound was then cleansed, sterile dressings were applied. The patient was brought out of sedation. The patient tolerated the procedure well without any immediate  complications evident. All sponge, instrument and needle counts were correct  at the end of the procedure. Postprocedure chest x-ray  was obtained and catheter was in good position. Anuradha Hagan Postop findings were discussed with the patient's family. she was given  discharge instructions. She already had prescriptions for analgesics.    Nubia Acosta MD  Electronically signed 7/31/2019 at 8:24 AM

## 2019-08-01 ENCOUNTER — OFFICE VISIT (OUTPATIENT)
Dept: ONCOLOGY | Age: 74
End: 2019-08-01
Payer: MEDICARE

## 2019-08-01 ENCOUNTER — HOSPITAL ENCOUNTER (OUTPATIENT)
Dept: INFUSION THERAPY | Age: 74
Discharge: HOME | End: 2019-08-01
Payer: MEDICARE

## 2019-08-01 VITALS
BODY MASS INDEX: 39.11 KG/M2 | RESPIRATION RATE: 18 BRPM | TEMPERATURE: 98 F | HEART RATE: 72 BPM | DIASTOLIC BLOOD PRESSURE: 56 MMHG | SYSTOLIC BLOOD PRESSURE: 99 MMHG | OXYGEN SATURATION: 96 % | HEIGHT: 62 IN

## 2019-08-01 VITALS
RESPIRATION RATE: 18 BRPM | HEART RATE: 68 BPM | BODY MASS INDEX: 39.45 KG/M2 | TEMPERATURE: 97.8 F | WEIGHT: 214.4 LBS | SYSTOLIC BLOOD PRESSURE: 91 MMHG | OXYGEN SATURATION: 93 % | HEIGHT: 62 IN | DIASTOLIC BLOOD PRESSURE: 55 MMHG

## 2019-08-01 DIAGNOSIS — C50.411 MALIGNANT NEOPLASM OF UPPER-OUTER QUADRANT OF RIGHT BREAST IN FEMALE, ESTROGEN RECEPTOR NEGATIVE (HCC): ICD-10-CM

## 2019-08-01 DIAGNOSIS — Z51.11 ENCOUNTER FOR CHEMOTHERAPY MANAGEMENT: ICD-10-CM

## 2019-08-01 DIAGNOSIS — Z17.1 MALIGNANT NEOPLASM OF UPPER-OUTER QUADRANT OF RIGHT BREAST IN FEMALE, ESTROGEN RECEPTOR NEGATIVE (HCC): ICD-10-CM

## 2019-08-01 DIAGNOSIS — C50.411 MALIGNANT NEOPLASM OF UPPER-OUTER QUADRANT OF RIGHT BREAST IN FEMALE, ESTROGEN RECEPTOR NEGATIVE (HCC): Primary | ICD-10-CM

## 2019-08-01 DIAGNOSIS — D50.8 OTHER IRON DEFICIENCY ANEMIA: Primary | ICD-10-CM

## 2019-08-01 DIAGNOSIS — D50.8 OTHER IRON DEFICIENCY ANEMIA: ICD-10-CM

## 2019-08-01 DIAGNOSIS — J90 PLEURAL EFFUSION, BILATERAL: ICD-10-CM

## 2019-08-01 DIAGNOSIS — D50.0 IRON DEFICIENCY ANEMIA DUE TO CHRONIC BLOOD LOSS: ICD-10-CM

## 2019-08-01 DIAGNOSIS — C77.3 BREAST CANCER METASTASIZED TO AXILLARY LYMPH NODE, RIGHT (HCC): ICD-10-CM

## 2019-08-01 DIAGNOSIS — C50.911 BREAST CANCER METASTASIZED TO AXILLARY LYMPH NODE, RIGHT (HCC): ICD-10-CM

## 2019-08-01 DIAGNOSIS — Z17.1 MALIGNANT NEOPLASM OF UPPER-OUTER QUADRANT OF RIGHT BREAST IN FEMALE, ESTROGEN RECEPTOR NEGATIVE (HCC): Primary | ICD-10-CM

## 2019-08-01 LAB
ALBUMIN SERPL-MCNC: 3.8 G/DL (ref 3.5–5.1)
ALP BLD-CCNC: 101 U/L (ref 38–126)
ALT SERPL-CCNC: 15 U/L (ref 11–66)
AST SERPL-CCNC: 15 U/L (ref 5–40)
BILIRUB SERPL-MCNC: 0.4 MG/DL (ref 0.3–1.2)
BILIRUBIN DIRECT: < 0.2 MG/DL (ref 0–0.3)
BUN, WHOLE BLOOD: 25 MG/DL (ref 8–26)
CHLORIDE, WHOLE BLOOD: 100 MEQ/L (ref 98–109)
CREATININE, WHOLE BLOOD: 1 MG/DL (ref 0.5–1.2)
ERYTHROCYTE [DISTWIDTH] IN BLOOD BY AUTOMATED COUNT: 20.8 % (ref 11.5–14.5)
ERYTHROCYTE [DISTWIDTH] IN BLOOD BY AUTOMATED COUNT: 55.6 FL (ref 35–45)
FERRITIN: 14 NG/ML (ref 10–291)
FOLATE: 9.2 NG/ML (ref 4.8–24.2)
GFR, ESTIMATED: 57 ML/MIN/1.73M2
GLUCOSE, WHOLE BLOOD: 142 MG/DL (ref 70–108)
HCT VFR BLD CALC: 26.1 % (ref 37–47)
HEMOGLOBIN: 7.7 GM/DL (ref 12–16)
IONIZED CALCIUM, WHOLE BLOOD: 1.23 MMOL/L (ref 1.12–1.32)
IRON SATURATION: 6 % (ref 20–50)
IRON: 22 UG/DL (ref 50–170)
MCH RBC QN AUTO: 22.3 PG (ref 26–33)
MCHC RBC AUTO-ENTMCNC: 29.5 GM/DL (ref 32.2–35.5)
MCV RBC AUTO: 75.4 FL (ref 81–99)
PLATELET # BLD: 169 THOU/MM3 (ref 130–400)
PMV BLD AUTO: ABNORMAL FL (ref 9.4–12.4)
POTASSIUM, WHOLE BLOOD: 4.7 MEQ/L (ref 3.5–4.9)
RBC # BLD: 3.46 MILL/MM3 (ref 4.2–5.4)
SEGMENTED NEUTROPHILS ABSOLUTE COUNT: 5.3 THOU/MM3 (ref 1.8–7.7)
SODIUM, WHOLE BLOOD: 138 MEQ/L (ref 138–146)
TOTAL CO2, WHOLE BLOOD: 26 MEQ/L (ref 23–33)
TOTAL IRON BINDING CAPACITY: 386 UG/DL (ref 171–450)
TOTAL PROTEIN: 6.5 G/DL (ref 6.1–8)
VITAMIN B-12: > 2000 PG/ML (ref 211–911)
WBC # BLD: 7.5 THOU/MM3 (ref 4.8–10.8)

## 2019-08-01 PROCEDURE — 6360000002 HC RX W HCPCS: Performed by: INTERNAL MEDICINE

## 2019-08-01 PROCEDURE — 1123F ACP DISCUSS/DSCN MKR DOCD: CPT | Performed by: INTERNAL MEDICINE

## 2019-08-01 PROCEDURE — 96367 TX/PROPH/DG ADDL SEQ IV INF: CPT

## 2019-08-01 PROCEDURE — 96375 TX/PRO/DX INJ NEW DRUG ADDON: CPT

## 2019-08-01 PROCEDURE — 3017F COLORECTAL CA SCREEN DOC REV: CPT | Performed by: INTERNAL MEDICINE

## 2019-08-01 PROCEDURE — G8427 DOCREV CUR MEDS BY ELIG CLIN: HCPCS | Performed by: INTERNAL MEDICINE

## 2019-08-01 PROCEDURE — 80076 HEPATIC FUNCTION PANEL: CPT

## 2019-08-01 PROCEDURE — 2580000003 HC RX 258: Performed by: INTERNAL MEDICINE

## 2019-08-01 PROCEDURE — 2709999900 HC NON-CHARGEABLE SUPPLY

## 2019-08-01 PROCEDURE — 1090F PRES/ABSN URINE INCON ASSESS: CPT | Performed by: INTERNAL MEDICINE

## 2019-08-01 PROCEDURE — 2500000003 HC RX 250 WO HCPCS: Performed by: INTERNAL MEDICINE

## 2019-08-01 PROCEDURE — 85027 COMPLETE CBC AUTOMATED: CPT

## 2019-08-01 PROCEDURE — 36591 DRAW BLOOD OFF VENOUS DEVICE: CPT

## 2019-08-01 PROCEDURE — 82728 ASSAY OF FERRITIN: CPT

## 2019-08-01 PROCEDURE — 96377 APPLICATON ON-BODY INJECTOR: CPT

## 2019-08-01 PROCEDURE — 82746 ASSAY OF FOLIC ACID SERUM: CPT

## 2019-08-01 PROCEDURE — 99211 OFF/OP EST MAY X REQ PHY/QHP: CPT

## 2019-08-01 PROCEDURE — 83550 IRON BINDING TEST: CPT

## 2019-08-01 PROCEDURE — 96415 CHEMO IV INFUSION ADDL HR: CPT

## 2019-08-01 PROCEDURE — 80047 BASIC METABLC PNL IONIZED CA: CPT

## 2019-08-01 PROCEDURE — 96413 CHEMO IV INFUSION 1 HR: CPT

## 2019-08-01 PROCEDURE — 1036F TOBACCO NON-USER: CPT | Performed by: INTERNAL MEDICINE

## 2019-08-01 PROCEDURE — 99215 OFFICE O/P EST HI 40 MIN: CPT | Performed by: INTERNAL MEDICINE

## 2019-08-01 PROCEDURE — 83540 ASSAY OF IRON: CPT

## 2019-08-01 PROCEDURE — 82607 VITAMIN B-12: CPT

## 2019-08-01 PROCEDURE — G8400 PT W/DXA NO RESULTS DOC: HCPCS | Performed by: INTERNAL MEDICINE

## 2019-08-01 PROCEDURE — 4040F PNEUMOC VAC/ADMIN/RCVD: CPT | Performed by: INTERNAL MEDICINE

## 2019-08-01 PROCEDURE — G8598 ASA/ANTIPLAT THER USED: HCPCS | Performed by: INTERNAL MEDICINE

## 2019-08-01 PROCEDURE — G8417 CALC BMI ABV UP PARAM F/U: HCPCS | Performed by: INTERNAL MEDICINE

## 2019-08-01 RX ORDER — HEPARIN SODIUM (PORCINE) LOCK FLUSH IV SOLN 100 UNIT/ML 100 UNIT/ML
500 SOLUTION INTRAVENOUS PRN
Status: CANCELLED | OUTPATIENT
Start: 2019-08-06

## 2019-08-01 RX ORDER — SODIUM CHLORIDE 9 MG/ML
INJECTION, SOLUTION INTRAVENOUS CONTINUOUS
Status: CANCELLED | OUTPATIENT
Start: 2019-08-06

## 2019-08-01 RX ORDER — SODIUM CHLORIDE 9 MG/ML
20 INJECTION, SOLUTION INTRAVENOUS ONCE
Status: COMPLETED | OUTPATIENT
Start: 2019-08-01 | End: 2019-08-01

## 2019-08-01 RX ORDER — PROCHLORPERAZINE MALEATE 5 MG/1
5 TABLET ORAL EVERY 6 HOURS PRN
Qty: 30 TABLET | Refills: 2 | Status: SHIPPED | OUTPATIENT
Start: 2019-08-01 | End: 2019-01-01 | Stop reason: ALTCHOICE

## 2019-08-01 RX ORDER — SODIUM CHLORIDE 0.9 % (FLUSH) 0.9 %
5 SYRINGE (ML) INJECTION PRN
Status: CANCELLED | OUTPATIENT
Start: 2019-08-06

## 2019-08-01 RX ORDER — DIPHENHYDRAMINE HYDROCHLORIDE 50 MG/ML
50 INJECTION INTRAMUSCULAR; INTRAVENOUS ONCE
Status: CANCELLED | OUTPATIENT
Start: 2019-08-06

## 2019-08-01 RX ORDER — DEXAMETHASONE 4 MG/1
8 TABLET ORAL
Qty: 6 TABLET | Refills: 0 | Status: SHIPPED | OUTPATIENT
Start: 2019-08-02 | End: 2019-08-05

## 2019-08-01 RX ORDER — SODIUM CHLORIDE 0.9 % (FLUSH) 0.9 %
10 SYRINGE (ML) INJECTION PRN
Status: DISCONTINUED | OUTPATIENT
Start: 2019-08-01 | End: 2019-08-02 | Stop reason: HOSPADM

## 2019-08-01 RX ORDER — METHYLPREDNISOLONE SODIUM SUCCINATE 125 MG/2ML
125 INJECTION, POWDER, LYOPHILIZED, FOR SOLUTION INTRAMUSCULAR; INTRAVENOUS ONCE
Status: CANCELLED | OUTPATIENT
Start: 2019-08-06

## 2019-08-01 RX ORDER — HEPARIN SODIUM (PORCINE) LOCK FLUSH IV SOLN 100 UNIT/ML 100 UNIT/ML
500 SOLUTION INTRAVENOUS PRN
Status: DISCONTINUED | OUTPATIENT
Start: 2019-08-01 | End: 2019-08-02 | Stop reason: HOSPADM

## 2019-08-01 RX ORDER — DIPHENHYDRAMINE HYDROCHLORIDE 50 MG/ML
50 INJECTION INTRAMUSCULAR; INTRAVENOUS ONCE
Status: COMPLETED | OUTPATIENT
Start: 2019-08-01 | End: 2019-08-01

## 2019-08-01 RX ORDER — SODIUM CHLORIDE 0.9 % (FLUSH) 0.9 %
10 SYRINGE (ML) INJECTION PRN
Status: CANCELLED | OUTPATIENT
Start: 2019-08-06

## 2019-08-01 RX ADMIN — DIPHENHYDRAMINE HYDROCHLORIDE 50 MG: 50 INJECTION, SOLUTION INTRAMUSCULAR; INTRAVENOUS at 09:41

## 2019-08-01 RX ADMIN — Medication 10 ML: at 09:37

## 2019-08-01 RX ADMIN — PEGFILGRASTIM 6 MG: KIT SUBCUTANEOUS at 13:45

## 2019-08-01 RX ADMIN — Medication 10 ML: at 08:21

## 2019-08-01 RX ADMIN — PACLITAXEL 360 MG: 6 INJECTION, SOLUTION INTRAVENOUS at 10:11

## 2019-08-01 RX ADMIN — Medication 10 ML: at 08:20

## 2019-08-01 RX ADMIN — Medication 10 ML: at 09:39

## 2019-08-01 RX ADMIN — Medication 10 ML: at 13:51

## 2019-08-01 RX ADMIN — SODIUM CHLORIDE 20 ML/HR: 9 INJECTION, SOLUTION INTRAVENOUS at 09:39

## 2019-08-01 RX ADMIN — Medication 500 UNITS: at 13:51

## 2019-08-01 RX ADMIN — DEXAMETHASONE SODIUM PHOSPHATE: 4 INJECTION, SOLUTION INTRAMUSCULAR; INTRAVENOUS at 09:51

## 2019-08-01 RX ADMIN — FAMOTIDINE 20 MG: 10 INJECTION, SOLUTION INTRAVENOUS at 09:39

## 2019-08-01 NOTE — PATIENT INSTRUCTIONS
1. Iron, ferritin, Iron saturaion today  1. Proceed with cycle #1 of dose dense Taxol with Neulasta support  3.  RTC to see me in 2 weeks for labs: CBC, BMP, LFTs in the next cycle of dose dense Taxol  4. feraheme infusion next to Tuesday and Friday

## 2019-08-01 NOTE — PROGRESS NOTES
Chemotherapy Administration    Pre-assessment Data: Antineoplastic Agents  Other:   See toxicity flow sheet for assessment [x]     Physician Notification of Concerns Related to Chemotherapy Administration:   Physician Notified [facility] / Time of Notification Saw dr Kaitlyn Macario,      Interventions:   Lab work assessed  [x]   Height / Weight verified for dose [x]   Current MAR reviewed [x]   Emergency drugs available as appropriate [x]   Anaphylaxis assessment completed [x]   Pre-medications administered as ordered [x]   Blood return noted upon initiation of chemotherapy [x]   Blood return noted each 1-2ml of a vesicant medication if given IV push []   Blood return noted each 2-3ml of a non-vesicant medication if given IV push []   Monitor for signs / symptoms of hypersensitivity reaction [x]   Chemotherapy orders (drug/dose/rate) verified by 2 Chemo certified RNs [x]   Monitor IV site and blood return throughout the infusion of the medication [x]   Document IV site checks on the IV assessment form [x]   Document chemotherapy teaching on the Patient Education tab [x]   Document patient verbalizes understanding of medications being administered [x]   If IV infiltration, see ONS Guidelines []   Other:      []         Taxol Administration:  Taxol is filtered, use specific tubing for administration. If hypersensitivity reaction occurs, STOP TAXOL, maintain plain IV and notify physician for additional orders. Taxol is considered an irritant in low doses. High dose Taxol is considered a vesicant. Check with physician if infusion should be through a central line.    Neurological assessment completed pre administration [x]   Pre-medications administered as ordered [x]   Document baseline vital signs before administration [x]   For 3 hour Taxol: Document blood pressure, pulse, respiratory rate every 15 min times 1 hour after the start of Taxol [x]   For 1 hour Taxol: Document blood pressure, pulse, respiratory rate 15 min after the start of Taxol []   Document blood pressure, pulse, respiratory rate at the completion of Taxol [x]   Other:     []

## 2019-08-01 NOTE — PROGRESS NOTES
Patient assessed for the following post chemotherapy:    Dizziness   No  Lightheadedness  No      Acute nausea/vomiting No  Headache   No  Chest pain/pressure  No  Rash/itching   No  Shortness of breath  No    Patient kept for 20 minutes observation post infusion chemotherapy. Patient tolerated chemotherapy treatment taxol without any complications. Last vital signs:   BP (!) 99/56   Pulse 72   Temp 98 °F (36.7 °C) (Oral)   Resp 18   Ht 5' 2\" (1.575 m)   SpO2 96%   BMI 39.11 kg/m²       Patient instructed if experience any of the above symptoms following today's infusion,he/she is to notify MD immediately or go to the emergency department. Discharge instructions given to patient. Verbalizes understanding. Ambulated off unit per self with belongings.

## 2019-08-01 NOTE — PLAN OF CARE
Care plan reviewed with patient . Patient verbalized understanding of the plan of care and contribute to goal setting. Problem: Intellectual/Education/Knowledge Deficit  Goal: Teaching initiated upon admission  Outcome: Met This Shift  Note:   Patient verbalizes understanding to verbal information given on taxol,action and possible side effects. Aware to call MD if develop complications. Intervention: Verbal/written education provided  Note:   Chemotherapy Teaching     What is Chemotherapy   Drug action ? Method of Administration ? Handouts given ? Side Effects  Nausea/vomiting ? Diarrhea ? Fatigue ? Signs / Symptoms of infection ? Neutropenia ? Thrombocytopenia ? Alopecia ? neuropathy ? [place] diet &  the importance of fluids ? Micellaneous  Importance of nutrition ? Importance of oral hygiene ? When to call the MD ?   Monitoring labs ? Use of supportive services ? Explanation of Drug Regimen / Frequency  taxol     Comments  Verbalized understanding to drug,action,side effects and when to call MD         Problem: Discharge Planning  Goal: Knowledge of discharge instructions  Description  Knowledge of discharge instructions     Outcome: Met This Shift  Note:   Verbalized understanding of discharge instructions, follow ups and when to call doctor   Intervention: Discharge to appropriate level of care  Note:   Discuss discharge instructions, follow ups and when to call doctor. Problem: Falls - Risk of:  Goal: Will remain free from falls  Description  Will remain free from falls  Outcome: Met This Shift  Note:   Free from falls while in infusion center.  Verbalized understanding of fall prevention and to ask for assistance with ambulation   Intervention: Assess risk factors for falls  Description  Assess risk factors for falls  Note:   Assess for fall risk, instruct to ask for assistance with ambulation      Problem: Infection - Central Venous Catheter-Associated

## 2019-08-06 ENCOUNTER — HOSPITAL ENCOUNTER (INPATIENT)
Age: 74
LOS: 4 days | Discharge: HOME HEALTH | DRG: 640 | End: 2019-08-10
Attending: EMERGENCY MEDICINE | Admitting: INTERNAL MEDICINE
Payer: MEDICARE

## 2019-08-06 ENCOUNTER — HOSPITAL ENCOUNTER (OUTPATIENT)
Dept: INFUSION THERAPY | Age: 74
Discharge: HOME | DRG: 640 | End: 2019-08-06
Payer: MEDICARE

## 2019-08-06 VITALS
OXYGEN SATURATION: 94 % | RESPIRATION RATE: 18 BRPM | TEMPERATURE: 97.9 F | SYSTOLIC BLOOD PRESSURE: 65 MMHG | HEART RATE: 70 BPM | DIASTOLIC BLOOD PRESSURE: 40 MMHG

## 2019-08-06 DIAGNOSIS — Z17.1 MALIGNANT NEOPLASM OF UPPER-OUTER QUADRANT OF RIGHT BREAST IN FEMALE, ESTROGEN RECEPTOR NEGATIVE (HCC): Primary | ICD-10-CM

## 2019-08-06 DIAGNOSIS — C50.411 MALIGNANT NEOPLASM OF UPPER-OUTER QUADRANT OF RIGHT BREAST IN FEMALE, ESTROGEN RECEPTOR NEGATIVE (HCC): Primary | ICD-10-CM

## 2019-08-06 DIAGNOSIS — E86.1 HYPOTENSION DUE TO HYPOVOLEMIA: Primary | ICD-10-CM

## 2019-08-06 DIAGNOSIS — I95.89 HYPOTENSION DUE TO HYPOVOLEMIA: Primary | ICD-10-CM

## 2019-08-06 DIAGNOSIS — N63.0 BREAST MASS: ICD-10-CM

## 2019-08-06 PROBLEM — I95.9 HYPOTENSION: Status: ACTIVE | Noted: 2019-08-06

## 2019-08-06 LAB
ADENOVIRUS F 40 41 PCR: NOT DETECTED
ANION GAP SERPL CALCULATED.3IONS-SCNC: 9 MEQ/L (ref 8–16)
ASTROVIRUS PCR: NOT DETECTED
BASOPHILS # BLD: 0.5 %
BASOPHILS ABSOLUTE: 0 THOU/MM3 (ref 0–0.1)
BUN BLDV-MCNC: 39 MG/DL (ref 7–22)
CALCIUM SERPL-MCNC: 8.3 MG/DL (ref 8.5–10.5)
CAMPYLOBACTER PCR: NOT DETECTED
CHLORIDE BLD-SCNC: 99 MEQ/L (ref 98–111)
CLOSTRIDIUM DIFFICILE, PCR: NOT DETECTED
CO2: 27 MEQ/L (ref 23–33)
CREAT SERPL-MCNC: 1 MG/DL (ref 0.4–1.2)
CRYPTOSPORIDIUM PCR: NOT DETECTED
CYCLOSPORA CAYETANENSIS PCR: NOT DETECTED
E COLI 0157 PCR: NORMAL
E COLI ENTEROAGGREGATIVE PCR: NOT DETECTED
E COLI ENTEROPATHOGENIC PCR: NOT DETECTED
E COLI ENTEROTOXIGENIC PCR: NOT DETECTED
E COLI SHIGA LIKE TOXIN PCR: NOT DETECTED
E COLI SHIGELLA/ENTEROINVASIVE PCR: NOT DETECTED
E HISTOLYTICA GI FILM ARRAY: NOT DETECTED
EKG ATRIAL RATE: 70 BPM
EKG P AXIS: 69 DEGREES
EKG P-R INTERVAL: 206 MS
EKG Q-T INTERVAL: 508 MS
EKG QRS DURATION: 182 MS
EKG QTC CALCULATION (BAZETT): 548 MS
EKG R AXIS: -77 DEGREES
EKG T AXIS: 85 DEGREES
EKG VENTRICULAR RATE: 70 BPM
ELLIPTOCYTES: ABNORMAL
EOSINOPHIL # BLD: 3.6 %
EOSINOPHILS ABSOLUTE: 0.2 THOU/MM3 (ref 0–0.4)
ERYTHROCYTE [DISTWIDTH] IN BLOOD BY AUTOMATED COUNT: 20.4 % (ref 11.5–14.5)
ERYTHROCYTE [DISTWIDTH] IN BLOOD BY AUTOMATED COUNT: 53.5 FL (ref 35–45)
GFR SERPL CREATININE-BSD FRML MDRD: 54 ML/MIN/1.73M2
GIARDIA LAMBLIA PCR: NOT DETECTED
GLUCOSE BLD-MCNC: 101 MG/DL (ref 70–108)
GLUCOSE BLD-MCNC: 87 MG/DL (ref 70–108)
HCT VFR BLD CALC: 27.9 % (ref 37–47)
HEMOGLOBIN: 8.4 GM/DL (ref 12–16)
IMMATURE GRANS (ABS): 0.05 THOU/MM3 (ref 0–0.07)
IMMATURE GRANULOCYTES: 1 %
LYMPHOCYTES # BLD: 12.3 %
LYMPHOCYTES ABSOLUTE: 0.5 THOU/MM3 (ref 1–4.8)
MCH RBC QN AUTO: 22.3 PG (ref 26–33)
MCHC RBC AUTO-ENTMCNC: 30.1 GM/DL (ref 32.2–35.5)
MCV RBC AUTO: 74 FL (ref 81–99)
MONOCYTES # BLD: 2.3 %
MONOCYTES ABSOLUTE: 0.1 THOU/MM3 (ref 0.4–1.3)
NOROVIRUS GI GII PCR: NOT DETECTED
NUCLEATED RED BLOOD CELLS: 0 /100 WBC
OSMOLALITY CALCULATION: 278.9 MOSMOL/KG (ref 275–300)
PLATELET # BLD: 110 THOU/MM3 (ref 130–400)
PLATELET ESTIMATE: ABNORMAL
PLESIOMONAS SHIGELLOIDES PCR: NOT DETECTED
POIKILOCYTES: ABNORMAL
POTASSIUM REFLEX MAGNESIUM: 5 MEQ/L (ref 3.5–5.2)
RBC # BLD: 3.77 MILL/MM3 (ref 4.2–5.4)
ROTAVIRUS A PCR: NOT DETECTED
SALMONELLA PCR: NOT DETECTED
SAPOVIRUS PCR: NOT DETECTED
SCAN OF BLOOD SMEAR: NORMAL
SEG NEUTROPHILS: 80.2 %
SEGMENTED NEUTROPHILS ABSOLUTE COUNT: 3.5 THOU/MM3 (ref 1.8–7.7)
SODIUM BLD-SCNC: 135 MEQ/L (ref 135–145)
VIBRIO CHOLERAE PCR: NOT DETECTED
VIBRIO PCR: NOT DETECTED
WBC # BLD: 4.4 THOU/MM3 (ref 4.8–10.8)
YERSINIA ENTEROCOLITICA PCR: NOT DETECTED

## 2019-08-06 PROCEDURE — 6360000002 HC RX W HCPCS: Performed by: NURSE PRACTITIONER

## 2019-08-06 PROCEDURE — 6370000000 HC RX 637 (ALT 250 FOR IP): Performed by: NURSE PRACTITIONER

## 2019-08-06 PROCEDURE — 36415 COLL VENOUS BLD VENIPUNCTURE: CPT

## 2019-08-06 PROCEDURE — 93005 ELECTROCARDIOGRAM TRACING: CPT | Performed by: NURSE PRACTITIONER

## 2019-08-06 PROCEDURE — 80048 BASIC METABOLIC PNL TOTAL CA: CPT

## 2019-08-06 PROCEDURE — 82948 REAGENT STRIP/BLOOD GLUCOSE: CPT

## 2019-08-06 PROCEDURE — 2709999900 HC NON-CHARGEABLE SUPPLY

## 2019-08-06 PROCEDURE — 99285 EMERGENCY DEPT VISIT HI MDM: CPT

## 2019-08-06 PROCEDURE — 2580000003 HC RX 258: Performed by: NURSE PRACTITIONER

## 2019-08-06 PROCEDURE — 0097U HC GI PTHGN MULT REV TRANS & AMP PRB TECH 22 TRGT: CPT

## 2019-08-06 PROCEDURE — 1200000003 HC TELEMETRY R&B

## 2019-08-06 PROCEDURE — 99211 OFF/OP EST MAY X REQ PHY/QHP: CPT

## 2019-08-06 PROCEDURE — 6370000000 HC RX 637 (ALT 250 FOR IP): Performed by: PHYSICIAN ASSISTANT

## 2019-08-06 PROCEDURE — 2580000003 HC RX 258: Performed by: INTERNAL MEDICINE

## 2019-08-06 PROCEDURE — 93010 ELECTROCARDIOGRAM REPORT: CPT | Performed by: INTERNAL MEDICINE

## 2019-08-06 PROCEDURE — 85025 COMPLETE CBC W/AUTO DIFF WBC: CPT

## 2019-08-06 PROCEDURE — 99223 1ST HOSP IP/OBS HIGH 75: CPT | Performed by: NURSE PRACTITIONER

## 2019-08-06 RX ORDER — PREGABALIN 50 MG/1
200 CAPSULE ORAL 2 TIMES DAILY
Status: DISCONTINUED | OUTPATIENT
Start: 2019-08-06 | End: 2019-08-10 | Stop reason: HOSPADM

## 2019-08-06 RX ORDER — AMIODARONE HYDROCHLORIDE 200 MG/1
200 TABLET ORAL DAILY
Status: DISCONTINUED | OUTPATIENT
Start: 2019-08-07 | End: 2019-08-10 | Stop reason: HOSPADM

## 2019-08-06 RX ORDER — INSULIN GLARGINE 100 [IU]/ML
18 INJECTION, SOLUTION SUBCUTANEOUS NIGHTLY
Status: DISCONTINUED | OUTPATIENT
Start: 2019-08-06 | End: 2019-08-10 | Stop reason: HOSPADM

## 2019-08-06 RX ORDER — SODIUM CHLORIDE 0.9 % (FLUSH) 0.9 %
10 SYRINGE (ML) INJECTION EVERY 12 HOURS SCHEDULED
Status: DISCONTINUED | OUTPATIENT
Start: 2019-08-06 | End: 2019-08-10 | Stop reason: HOSPADM

## 2019-08-06 RX ORDER — IPRATROPIUM BROMIDE AND ALBUTEROL SULFATE 2.5; .5 MG/3ML; MG/3ML
3 SOLUTION RESPIRATORY (INHALATION) EVERY 4 HOURS PRN
Status: DISCONTINUED | OUTPATIENT
Start: 2019-08-06 | End: 2019-08-10 | Stop reason: HOSPADM

## 2019-08-06 RX ORDER — GABAPENTIN 400 MG/1
800 CAPSULE ORAL EVERY EVENING
Status: DISCONTINUED | OUTPATIENT
Start: 2019-08-06 | End: 2019-08-10 | Stop reason: HOSPADM

## 2019-08-06 RX ORDER — SODIUM CHLORIDE 9 MG/ML
INJECTION, SOLUTION INTRAVENOUS CONTINUOUS
Status: DISCONTINUED | OUTPATIENT
Start: 2019-08-06 | End: 2019-08-08

## 2019-08-06 RX ORDER — ROPINIROLE 0.5 MG/1
0.5 TABLET, FILM COATED ORAL NIGHTLY
Status: DISCONTINUED | OUTPATIENT
Start: 2019-08-06 | End: 2019-08-10 | Stop reason: HOSPADM

## 2019-08-06 RX ORDER — CLOPIDOGREL BISULFATE 75 MG/1
75 TABLET ORAL DAILY
Status: DISCONTINUED | OUTPATIENT
Start: 2019-08-06 | End: 2019-08-10 | Stop reason: HOSPADM

## 2019-08-06 RX ORDER — ONDANSETRON 2 MG/ML
4 INJECTION INTRAMUSCULAR; INTRAVENOUS EVERY 6 HOURS PRN
Status: DISCONTINUED | OUTPATIENT
Start: 2019-08-06 | End: 2019-08-10 | Stop reason: HOSPADM

## 2019-08-06 RX ORDER — HEPARIN SODIUM (PORCINE) LOCK FLUSH IV SOLN 100 UNIT/ML 100 UNIT/ML
500 SOLUTION INTRAVENOUS PRN
Status: CANCELLED | OUTPATIENT
Start: 2019-08-06

## 2019-08-06 RX ORDER — ATORVASTATIN CALCIUM 80 MG/1
80 TABLET, FILM COATED ORAL DAILY
Status: DISCONTINUED | OUTPATIENT
Start: 2019-08-06 | End: 2019-08-10 | Stop reason: HOSPADM

## 2019-08-06 RX ORDER — SODIUM CHLORIDE 0.9 % (FLUSH) 0.9 %
20 SYRINGE (ML) INJECTION PRN
Status: DISCONTINUED | OUTPATIENT
Start: 2019-08-06 | End: 2019-08-07 | Stop reason: HOSPADM

## 2019-08-06 RX ORDER — ACETAMINOPHEN 325 MG/1
650 TABLET ORAL EVERY 4 HOURS PRN
Status: DISCONTINUED | OUTPATIENT
Start: 2019-08-06 | End: 2019-08-10 | Stop reason: HOSPADM

## 2019-08-06 RX ORDER — ASPIRIN 81 MG/1
81 TABLET ORAL DAILY
Status: DISCONTINUED | OUTPATIENT
Start: 2019-08-06 | End: 2019-08-10 | Stop reason: HOSPADM

## 2019-08-06 RX ORDER — FLUTICASONE PROPIONATE 50 MCG
2 SPRAY, SUSPENSION (ML) NASAL DAILY
Status: DISCONTINUED | OUTPATIENT
Start: 2019-08-06 | End: 2019-08-10 | Stop reason: HOSPADM

## 2019-08-06 RX ORDER — PROCHLORPERAZINE MALEATE 10 MG
5 TABLET ORAL EVERY 6 HOURS PRN
Status: DISCONTINUED | OUTPATIENT
Start: 2019-08-06 | End: 2019-08-10 | Stop reason: HOSPADM

## 2019-08-06 RX ORDER — SODIUM CHLORIDE 0.9 % (FLUSH) 0.9 %
10 SYRINGE (ML) INJECTION PRN
Status: DISCONTINUED | OUTPATIENT
Start: 2019-08-06 | End: 2019-08-10 | Stop reason: HOSPADM

## 2019-08-06 RX ORDER — SODIUM CHLORIDE 0.9 % (FLUSH) 0.9 %
20 SYRINGE (ML) INJECTION PRN
Status: CANCELLED | OUTPATIENT
Start: 2019-08-06

## 2019-08-06 RX ORDER — SODIUM CHLORIDE 0.9 % (FLUSH) 0.9 %
10 SYRINGE (ML) INJECTION PRN
Status: CANCELLED | OUTPATIENT
Start: 2019-08-06

## 2019-08-06 RX ADMIN — SODIUM CHLORIDE, PRESERVATIVE FREE 20 ML: 5 INJECTION INTRAVENOUS at 11:25

## 2019-08-06 RX ADMIN — ASPIRIN 81 MG: 81 TABLET ORAL at 17:42

## 2019-08-06 RX ADMIN — ROPINIROLE HYDROCHLORIDE 0.5 MG: 0.5 TABLET, FILM COATED ORAL at 21:42

## 2019-08-06 RX ADMIN — SODIUM CHLORIDE: 9 INJECTION, SOLUTION INTRAVENOUS at 17:42

## 2019-08-06 RX ADMIN — ENOXAPARIN SODIUM 40 MG: 40 INJECTION SUBCUTANEOUS at 17:42

## 2019-08-06 RX ADMIN — GABAPENTIN 800 MG: 400 CAPSULE ORAL at 17:42

## 2019-08-06 RX ADMIN — PREGABALIN 200 MG: 50 CAPSULE ORAL at 21:42

## 2019-08-06 RX ADMIN — ATORVASTATIN CALCIUM 80 MG: 80 TABLET, FILM COATED ORAL at 17:42

## 2019-08-06 RX ADMIN — ACETAMINOPHEN 650 MG: 325 TABLET ORAL at 21:42

## 2019-08-06 RX ADMIN — CLOPIDOGREL BISULFATE 75 MG: 75 TABLET ORAL at 17:42

## 2019-08-06 ASSESSMENT — PAIN SCALES - GENERAL
PAINLEVEL_OUTOF10: 4
PAINLEVEL_OUTOF10: 0
PAINLEVEL_OUTOF10: 5

## 2019-08-06 ASSESSMENT — ENCOUNTER SYMPTOMS
CHEST TIGHTNESS: 0
CONSTIPATION: 0
BLOOD IN STOOL: 0
VOMITING: 0
EYE DISCHARGE: 0
BLOOD IN STOOL: 0
SHORTNESS OF BREATH: 1
BACK PAIN: 1
WHEEZING: 0
CONSTIPATION: 0
COLOR CHANGE: 0
WHEEZING: 0
COUGH: 1
DIARRHEA: 0
COUGH: 0
FACIAL SWELLING: 0
ABDOMINAL PAIN: 0
RHINORRHEA: 0
ABDOMINAL DISTENTION: 0
ABDOMINAL PAIN: 0
RECTAL PAIN: 0
BACK PAIN: 0
SORE THROAT: 0
SHORTNESS OF BREATH: 0
SORE THROAT: 0
NAUSEA: 0
DIARRHEA: 0
VOMITING: 0
NAUSEA: 0
TROUBLE SWALLOWING: 0

## 2019-08-06 NOTE — PLAN OF CARE
Problem: Falls - Risk of:  Goal: Will remain free from falls  Description  Will remain free from falls  Outcome: Met This Shift  Note:   Patient free of falls this visit. Intervention: Assess risk factors for falls  Note:   Fall risks assessed. Precautions discussed. Call light within reach. Patient ambulated with 1 person assist to and from wheelchair during visit. Problem: Discharge Planning  Goal: Knowledge of discharge instructions  Description  Knowledge of discharge instructions     Outcome: Met This Shift  Note:   Patient verbalizes understanding of need to go immediately to ED. Intervention: Discharge to appropriate level of care  Note:   Discharge plan reviewed with patient. Patient verbalizes understanding for reason to go to ED immediately. Problem: Infection - Central Venous Catheter-Associated Bloodstream Infection:  Goal: Will show no infection signs and symptoms  Description  Will show no infection signs and symptoms  Outcome: Met This Shift  Note:   Mediport site with no redness or warmth. Skin over port site intact with no signs of breakdown noted. Patient verbalizes signs/symptoms of port infection and when to notify the physician. Intervention: Infection risk assessment  Note:   Discuss port maintenance, infection prevention, signs of infection, and when to call the doctor. Care plan reviewed with patient. Patient verbalizes understanding of the plan of care and contributes to goal setting.

## 2019-08-06 NOTE — ED PROVIDER NOTES
[facility]  eMERGENCY dEPARTMENT eNCOUnter          [address] [facility]       Chief Complaint   Patient presents with    Hypotension       Nurses Notes reviewed and I agree except as noted in the HPI. HISTORY OF PRESENT ILLNESS    Migue Martines is a 75 y.o. female with a past medical history of breast cancer, CAD, CHF, HTN, HLD, and DM. The patient presents to the ED for evaluation of hypotension. The patient was seen in office by Dr. Servando Jones (Oncology) this morning and was noted to be hypotensive with a blood pressure in the 88W systolic. Patient was sent here due to being hypotensive. The patient denies any complaints of pain, shortness of breath, dizziness, or lightheadedness. No nausea, vomiting, or diarrhea. No additional complaints or concerns at the time of initial evaluation. REVIEW OFSYSTEMS     Review of Systems   Constitutional: Negative for chills, diaphoresis and fever. HENT: Negative for congestion, rhinorrhea and sore throat. Respiratory: Negative for cough, shortness of breath and wheezing. Cardiovascular: Negative for chest pain, palpitations and leg swelling. Gastrointestinal: Negative for abdominal pain, blood in stool, constipation, diarrhea, nausea and vomiting. Genitourinary: Negative for decreased urine volume, difficulty urinating, dysuria, frequency, hematuria and urgency. Musculoskeletal: Negative for arthralgias, back pain, joint swelling, myalgias and neck pain. Skin: Negative for rash. Neurological: Negative for dizziness, weakness, light-headedness, numbness and headaches.        PAST MEDICAL HISTORY    has a past medical history of Breast cancer ([address] CAD (coronary artery disease), CHF (congestive heart failure) ([address] Hyperlipidemia, Hypertension, ICD (implantable cardiac defibrillator), dual, in situ, Numbness and tingling, Pneumonia, Shingles, Sleep apnea, [place] dual ICD, Type II or unspecified type diabetes mellitus without CONSULTS:  hospitalist    PROCEDURES:  None      FINAL IMPRESSION      1. Hypotension due to hypovolemia          DISPOSITION/PLAN   Admission     PATIENT REFERRED TO:  Jenna Calixto MD  [address] [place] [address]  [facility]  [phone]          DISCHARGE MEDICATIONS:  Current Discharge Medication List          (Please note that portionsof this note were completed with a voice recognition program.  Efforts were made to edit the dictations but occasionally words are mis-transcribed.)    Scribe: Paxton Luis 8/6/19 12:49 PM Scribing for and in the presence of Meredith Ferrara DO. Signed by: Bjorn Koch,08/08/19 12:25 PM    Provider:  I personally performed the services described in the documentation,reviewed and edited the documentation which wasdictated to the scribe in my presence, and it accurately records my words and actions.     Meredith Ferrara DO. 8/6/19 12:25 PM            Meredith Ferrara DO  08/08/19 [address] [facility],   08/10/19 [id number]

## 2019-08-06 NOTE — PROGRESS NOTES
Patient presented to unit with BP 65/40. Patient reports moderate numbness and tingling to hands and feet, generalized muscle/body aches, blurred vision and generalized weakness. Patient noted to have nearly fallen during ambulation on unit and assisted to wheelchair per nursing staff. Patient states symptoms started Sunday. Patient denies fevers at home. NILDA Mercedes notified of patient vitals and assessment. Verbal order to discharge patient directly to ED. [id number] [address] for  to transport patient. Patient instructed to go directly to ED for further evaluation. Patient verbalizes understanding. Patient assisted to car via wheelchair per Mary Orozco RN with belongings. Clovis Peña in ED notified patient to be arriving at ED shortly.

## 2019-08-06 NOTE — H&P
Admission:      Prior to Admission medications    Medication Sig Start Date End Date Taking? Authorizing Provider   metoprolol succinate (TOPROL XL) 50 MG extended release tablet Take 1 tablet by mouth daily 6/5/19  Yes Nitin Goodwin MD   amiodarone (CORDARONE) 200 MG tablet TAKE 1 TABLET DAILY 9/10/18  Yes Curry Clinton MD   enalapril (VASOTEC) 20 MG tablet TAKE 1 TABLET TWICE A DAY 9/10/18  Yes Curry Clinton MD   prochlorperazine (COMPAZINE) 5 MG tablet Take 1 tablet by mouth every 6 hours as needed for Nausea 8/1/19   Jane Johnson MD   lidocaine-prilocaine (EMLA) 2.5-2.5 % cream Apply topically prior to accessing port. 7/30/19   Jane Johnson MD   ondansetron (ZOFRAN) 4 MG tablet Take 1 tablet by mouth every 8 hours as needed for Nausea or Vomiting Indications: Nausea and Vomiting caused by Cancer Chemotherapy 7/30/19   Jane Johnson MD   metFORMIN (GLUCOPHAGE XR) 750 MG extended release tablet Take 1 tablet by mouth 2 times daily (with meals) 7/8/19   ROMAIN Nassar CNP   bumetanide (BUMEX) 0.5 MG tablet Take 1 tablet by mouth 2 times daily 6/24/19   ROMAIN Stanley CNP   spironolactone (ALDACTONE) 25 MG tablet Take 1 tablet by mouth daily 6/24/19   ROMAIN Stalney CNP   pregabalin (LYRICA) 200 MG capsule Take 1 capsule by mouth 2 times daily for 30 days. 6/19/19 8/6/19  ROMAIN Nassar CNP   insulin glargine (LANTUS SOLOSTAR) 100 UNIT/ML injection pen Inject 18 Units into the skin nightly 6/5/19 8/6/19  Nitin Goodwin MD   atorvastatin (LIPITOR) 80 MG tablet Take 1 tablet by mouth daily 6/5/19   Nitin Goodwin MD   ipratropium-albuterol (DUONEB) 0.5-2.5 (3) MG/3ML SOLN nebulizer solution Inhale 3 mLs into the lungs every 4 hours as needed for Shortness of Breath or Other (wheezing) 6/4/19   Jose Maidens, APRN - CNP   gabapentin (NEURONTIN) 400 MG capsule Take 2 capsules by mouth every evening for 30 days.  4/29/19 8/6/19  Kamryn Blackwood MD   rOPINIRole (REQUIP) 0.5 MG status closely with IV hydration. 9. S/P ICD  10. Essential hypertension, holding home BP medications. 11. Hyperlipidemia, statin. 12. Obesity. BMI 38.3. Thank you Robert Mar MD for the opportunity to be involved in this patient's care.     Electronically signed by ROMAIN Dang CNP on 8/6/2019 at 2:29 PM

## 2019-08-07 ENCOUNTER — APPOINTMENT (OUTPATIENT)
Dept: GENERAL RADIOLOGY | Age: 74
DRG: 640 | End: 2019-08-07
Payer: MEDICARE

## 2019-08-07 LAB
ANION GAP SERPL CALCULATED.3IONS-SCNC: 8 MEQ/L (ref 8–16)
BASOPHILS # BLD: 1 %
BASOPHILS ABSOLUTE: 0 THOU/MM3 (ref 0–0.1)
BUN BLDV-MCNC: 27 MG/DL (ref 7–22)
CALCIUM SERPL-MCNC: 8 MG/DL (ref 8.5–10.5)
CHLORIDE BLD-SCNC: 106 MEQ/L (ref 98–111)
CO2: 25 MEQ/L (ref 23–33)
CREAT SERPL-MCNC: 0.8 MG/DL (ref 0.4–1.2)
ELLIPTOCYTES: ABNORMAL
EOSINOPHIL # BLD: 8 %
EOSINOPHILS ABSOLUTE: 0.1 THOU/MM3 (ref 0–0.4)
ERYTHROCYTE [DISTWIDTH] IN BLOOD BY AUTOMATED COUNT: 20.6 % (ref 11.5–14.5)
ERYTHROCYTE [DISTWIDTH] IN BLOOD BY AUTOMATED COUNT: 54.8 FL (ref 35–45)
FILM ARRAY ADENOVIRUS: NOT DETECTED
FILM ARRAY BORDETELLA PERTUSSIS: NOT DETECTED
FILM ARRAY CHLAMYDOPHILIA PNEUMONIAE: NOT DETECTED
FILM ARRAY CORONAVIRUS 229E: NOT DETECTED
FILM ARRAY CORONAVIRUS HKU1: NOT DETECTED
FILM ARRAY CORONAVIRUS NL63: NOT DETECTED
FILM ARRAY CORONAVIRUS OC43: NOT DETECTED
FILM ARRAY INFLUENZA A VIRUS 09H1: NORMAL
FILM ARRAY INFLUENZA A VIRUS H1: NORMAL
FILM ARRAY INFLUENZA A VIRUS H3: NORMAL
FILM ARRAY INFLUENZA A VIRUS: NOT DETECTED
FILM ARRAY INFLUENZA B: NOT DETECTED
FILM ARRAY METAPNEUMOVIRUS: NOT DETECTED
FILM ARRAY MYCOPLASMA PNEUMONIAE: NOT DETECTED
FILM ARRAY PARAINFLUENZA VIRUS 1: NOT DETECTED
FILM ARRAY PARAINFLUENZA VIRUS 2: NOT DETECTED
FILM ARRAY PARAINFLUENZA VIRUS 3: NOT DETECTED
FILM ARRAY PARAINFLUENZA VIRUS 4: NOT DETECTED
FILM ARRAY RESPIRATORY SYNCITIAL VIRUS: NOT DETECTED
FILM ARRAY RHINOVIRUS/ENTEROVIRUS: NOT DETECTED
GFR SERPL CREATININE-BSD FRML MDRD: 70 ML/MIN/1.73M2
GLUCOSE BLD-MCNC: 130 MG/DL (ref 70–108)
GLUCOSE BLD-MCNC: 144 MG/DL (ref 70–108)
GLUCOSE BLD-MCNC: 160 MG/DL (ref 70–108)
GLUCOSE BLD-MCNC: 90 MG/DL (ref 70–108)
GLUCOSE BLD-MCNC: 90 MG/DL (ref 70–108)
HCT VFR BLD CALC: 24.9 % (ref 37–47)
HCT VFR BLD CALC: 26.9 % (ref 37–47)
HEMOGLOBIN: 7.4 GM/DL (ref 12–16)
HEMOGLOBIN: 7.9 GM/DL (ref 12–16)
IMMATURE GRANS (ABS): 0 THOU/MM3 (ref 0–0.07)
IMMATURE GRANULOCYTES: 0 %
LACTIC ACID: 0.9 MMOL/L (ref 0.5–2.2)
LYMPHOCYTES # BLD: 45 %
LYMPHOCYTES ABSOLUTE: 0.8 THOU/MM3 (ref 1–4.8)
MCH RBC QN AUTO: 22.2 PG (ref 26–33)
MCHC RBC AUTO-ENTMCNC: 29.7 GM/DL (ref 32.2–35.5)
MCV RBC AUTO: 74.6 FL (ref 81–99)
METAMYELOCYTES: 1 %
MONOCYTES # BLD: 4 %
MONOCYTES ABSOLUTE: 0.1 THOU/MM3 (ref 0.4–1.3)
NUCLEATED RED BLOOD CELLS: 0 /100 WBC
PLATELET # BLD: 96 THOU/MM3 (ref 130–400)
PMV BLD AUTO: ABNORMAL FL (ref 9.4–12.4)
POTASSIUM REFLEX MAGNESIUM: 4.5 MEQ/L (ref 3.5–5.2)
PRO-BNP: 794.8 PG/ML (ref 0–1800)
PROCALCITONIN: 0.08 NG/ML (ref 0.01–0.09)
RBC # BLD: 3.34 MILL/MM3 (ref 4.2–5.4)
SCAN OF BLOOD SMEAR: NORMAL
SEG NEUTROPHILS: 41 %
SEGMENTED NEUTROPHILS ABSOLUTE COUNT: 0.7 THOU/MM3 (ref 1.8–7.7)
SODIUM BLD-SCNC: 139 MEQ/L (ref 135–145)
TOXIC GRANULATION: PRESENT
WBC # BLD: 1.7 THOU/MM3 (ref 4.8–10.8)

## 2019-08-07 PROCEDURE — 6370000000 HC RX 637 (ALT 250 FOR IP): Performed by: NURSE PRACTITIONER

## 2019-08-07 PROCEDURE — 6370000000 HC RX 637 (ALT 250 FOR IP): Performed by: PHYSICIAN ASSISTANT

## 2019-08-07 PROCEDURE — 87899 AGENT NOS ASSAY W/OPTIC: CPT

## 2019-08-07 PROCEDURE — 85025 COMPLETE CBC W/AUTO DIFF WBC: CPT

## 2019-08-07 PROCEDURE — 85014 HEMATOCRIT: CPT

## 2019-08-07 PROCEDURE — 82948 REAGENT STRIP/BLOOD GLUCOSE: CPT

## 2019-08-07 PROCEDURE — 0100U HC RESPIRPTHGN MULT REV TRANS & AMP PRB TECH 21 TRGT: CPT

## 2019-08-07 PROCEDURE — 1200000003 HC TELEMETRY R&B

## 2019-08-07 PROCEDURE — 6360000002 HC RX W HCPCS: Performed by: NURSE PRACTITIONER

## 2019-08-07 PROCEDURE — 2580000003 HC RX 258: Performed by: NURSE PRACTITIONER

## 2019-08-07 PROCEDURE — 85018 HEMOGLOBIN: CPT

## 2019-08-07 PROCEDURE — 2709999900 HC NON-CHARGEABLE SUPPLY

## 2019-08-07 PROCEDURE — 99222 1ST HOSP IP/OBS MODERATE 55: CPT | Performed by: NURSE PRACTITIONER

## 2019-08-07 PROCEDURE — 83880 ASSAY OF NATRIURETIC PEPTIDE: CPT

## 2019-08-07 PROCEDURE — 71045 X-RAY EXAM CHEST 1 VIEW: CPT

## 2019-08-07 PROCEDURE — 87040 BLOOD CULTURE FOR BACTERIA: CPT

## 2019-08-07 PROCEDURE — 36415 COLL VENOUS BLD VENIPUNCTURE: CPT

## 2019-08-07 PROCEDURE — 80048 BASIC METABOLIC PNL TOTAL CA: CPT

## 2019-08-07 PROCEDURE — 36592 COLLECT BLOOD FROM PICC: CPT

## 2019-08-07 PROCEDURE — 83605 ASSAY OF LACTIC ACID: CPT

## 2019-08-07 PROCEDURE — 99232 SBSQ HOSP IP/OBS MODERATE 35: CPT | Performed by: FAMILY MEDICINE

## 2019-08-07 PROCEDURE — 87449 NOS EACH ORGANISM AG IA: CPT

## 2019-08-07 PROCEDURE — 84145 PROCALCITONIN (PCT): CPT

## 2019-08-07 PROCEDURE — 87086 URINE CULTURE/COLONY COUNT: CPT

## 2019-08-07 PROCEDURE — 36591 DRAW BLOOD OFF VENOUS DEVICE: CPT

## 2019-08-07 RX ORDER — DEXTROSE MONOHYDRATE 50 MG/ML
100 INJECTION, SOLUTION INTRAVENOUS PRN
Status: DISCONTINUED | OUTPATIENT
Start: 2019-08-07 | End: 2019-08-10 | Stop reason: HOSPADM

## 2019-08-07 RX ORDER — DEXTROSE MONOHYDRATE 25 G/50ML
12.5 INJECTION, SOLUTION INTRAVENOUS PRN
Status: DISCONTINUED | OUTPATIENT
Start: 2019-08-07 | End: 2019-08-10 | Stop reason: HOSPADM

## 2019-08-07 RX ORDER — NICOTINE POLACRILEX 4 MG
15 LOZENGE BUCCAL PRN
Status: DISCONTINUED | OUTPATIENT
Start: 2019-08-07 | End: 2019-08-10 | Stop reason: HOSPADM

## 2019-08-07 RX ADMIN — CEFEPIME HYDROCHLORIDE 2 G: 2 INJECTION, POWDER, FOR SOLUTION INTRAVENOUS at 22:42

## 2019-08-07 RX ADMIN — INSULIN LISPRO 1 UNITS: 100 INJECTION, SOLUTION INTRAVENOUS; SUBCUTANEOUS at 13:12

## 2019-08-07 RX ADMIN — CEFEPIME HYDROCHLORIDE 2 G: 2 INJECTION, POWDER, FOR SOLUTION INTRAVENOUS at 11:00

## 2019-08-07 RX ADMIN — ASPIRIN 81 MG: 81 TABLET ORAL at 08:39

## 2019-08-07 RX ADMIN — PREGABALIN 200 MG: 50 CAPSULE ORAL at 22:10

## 2019-08-07 RX ADMIN — SODIUM CHLORIDE: 9 INJECTION, SOLUTION INTRAVENOUS at 15:03

## 2019-08-07 RX ADMIN — ATORVASTATIN CALCIUM 80 MG: 80 TABLET, FILM COATED ORAL at 08:39

## 2019-08-07 RX ADMIN — ENOXAPARIN SODIUM 40 MG: 40 INJECTION SUBCUTANEOUS at 17:53

## 2019-08-07 RX ADMIN — AMIODARONE HYDROCHLORIDE 200 MG: 200 TABLET ORAL at 08:39

## 2019-08-07 RX ADMIN — GABAPENTIN 800 MG: 400 CAPSULE ORAL at 17:54

## 2019-08-07 RX ADMIN — NYSTATIN 500000 UNITS: 100000 SUSPENSION ORAL at 22:12

## 2019-08-07 RX ADMIN — PREGABALIN 200 MG: 50 CAPSULE ORAL at 08:39

## 2019-08-07 RX ADMIN — ROPINIROLE HYDROCHLORIDE 0.5 MG: 0.5 TABLET, FILM COATED ORAL at 22:10

## 2019-08-07 RX ADMIN — CLOPIDOGREL BISULFATE 75 MG: 75 TABLET ORAL at 08:39

## 2019-08-07 RX ADMIN — NYSTATIN 500000 UNITS: 100000 SUSPENSION ORAL at 13:56

## 2019-08-07 RX ADMIN — FLUTICASONE PROPIONATE 2 SPRAY: 50 SPRAY, METERED NASAL at 08:41

## 2019-08-07 RX ADMIN — NYSTATIN 500000 UNITS: 100000 SUSPENSION ORAL at 17:55

## 2019-08-07 RX ADMIN — ACETAMINOPHEN 650 MG: 325 TABLET ORAL at 10:17

## 2019-08-07 ASSESSMENT — PAIN DESCRIPTION - PAIN TYPE
TYPE: NEUROPATHIC PAIN
TYPE: NEUROPATHIC PAIN

## 2019-08-07 ASSESSMENT — ENCOUNTER SYMPTOMS
SORE THROAT: 0
SHORTNESS OF BREATH: 1
FACIAL SWELLING: 0
BLOOD IN STOOL: 0
TROUBLE SWALLOWING: 0
COLOR CHANGE: 0
NAUSEA: 0
CHEST TIGHTNESS: 0
RECTAL PAIN: 0
ABDOMINAL DISTENTION: 0
ABDOMINAL PAIN: 0
WHEEZING: 0
DIARRHEA: 0
CONSTIPATION: 0
BACK PAIN: 1
VOMITING: 0
EYE DISCHARGE: 0
COUGH: 1

## 2019-08-07 ASSESSMENT — PAIN DESCRIPTION - PROGRESSION
CLINICAL_PROGRESSION: NOT CHANGED
CLINICAL_PROGRESSION: NOT CHANGED

## 2019-08-07 ASSESSMENT — PAIN SCALES - GENERAL
PAINLEVEL_OUTOF10: 8
PAINLEVEL_OUTOF10: 8
PAINLEVEL_OUTOF10: 0
PAINLEVEL_OUTOF10: 0
PAINLEVEL_OUTOF10: 4
PAINLEVEL_OUTOF10: 0

## 2019-08-07 ASSESSMENT — PAIN DESCRIPTION - DESCRIPTORS
DESCRIPTORS: NUMBNESS;ACHING;TINGLING
DESCRIPTORS: TINGLING;NUMBNESS

## 2019-08-07 ASSESSMENT — PAIN DESCRIPTION - ORIENTATION
ORIENTATION: RIGHT;LEFT
ORIENTATION: RIGHT;LEFT

## 2019-08-07 ASSESSMENT — PAIN DESCRIPTION - LOCATION
LOCATION: FOOT
LOCATION: LEG;FOOT

## 2019-08-07 ASSESSMENT — PAIN DESCRIPTION - FREQUENCY
FREQUENCY: CONTINUOUS
FREQUENCY: CONTINUOUS

## 2019-08-07 ASSESSMENT — PAIN DESCRIPTION - ONSET
ONSET: ON-GOING
ONSET: ON-GOING

## 2019-08-07 ASSESSMENT — PAIN - FUNCTIONAL ASSESSMENT
PAIN_FUNCTIONAL_ASSESSMENT: PREVENTS OR INTERFERES SOME ACTIVE ACTIVITIES AND ADLS
PAIN_FUNCTIONAL_ASSESSMENT: PREVENTS OR INTERFERES SOME ACTIVE ACTIVITIES AND ADLS

## 2019-08-07 NOTE — PROGRESS NOTES
[facility]        Reason for Clinic visit; Newly diagnosed triple negative right breast cancer    HPI   Carole Pelayo is a 75 y.o. female who during recent hospitalization to [facility] shortness of breath was found to have right breast mass and right axillary lymphadenopathy  She was also found to have decompensated CHF. Her Echo on May 31, 2016 showed ejection fraction of 40%. CTA on May 31, 2019 showed asymmetric breast tissue in the right upper lateral breast. And few enlarged  right axillary lymph nodes. She did not have a mammogram at least 10 years. She underwent biopsy of the right breast mass and lymph node on the June 11, 2019. Pathology report showed:  A. Right breast, UIQ, core needle biopsy with barbell clip placement:   Invasive ductal carcinoma, Ducor grade 3.  B. \"Lymph node\", right, UOQ, core needle biopsy with tribell clip  placement:   Invasive ductal carcinoma. Estrogen Receptor: Negative (<1% of cells staining)  Progesterone Receptor: Negative (<1% of cells staining)  Ki-67 (clone 30-9)  Percentage of positive nuclei:  25%  HER2 dual ROCHELLE  HER2 ROCHELLE NEGATIVE    Interim history on August 1, 2019:  The patient presents to the medical oncology clinic to start systemic chemotherapy with dose dense Taxol. In preparation for chemotherapy she underwent successful and uncomplicated Mediport placement. Today she reports that her shortness of breath and fatigue is improved. She denies having any cough no fevers.       Past Medical History         Diagnosis Date    Breast cancer ([facility] 06/2019    right invasive ductal carcinoma    CAD (coronary artery disease)     sees Dr Sheeba Abdul CHF (congestive heart failure) ([facility]     Hyperlipidemia     Hypertension     ICD (implantable cardiac defibrillator), dual, in situ     [facility] dual ICD    Numbness and tingling     both feet    Pneumonia     Shingles 12/2014    Sleep apnea     [facility] dual ICD dysfunction. LA was mildly to moderately dilated. RV mildly dilated, systolic function mildly reduced. Mild MR and TR.  TRANSTHORACIC ECHOCARDIOGRAM  09    LV demonstrates severe hypokinesis of the inferior basal as well as  basal aneurysm being noted and paradoxical septal motion. EF 45-50%. LA is moderately dilated and AV demonstrates mild AV sclerosis w/o AS and AI. Trace MR and TV insufficiency.       Allergies   Sulfa antibiotics  Social History     Social History     Socioeconomic History    Marital status:      Spouse name: Not on file    Number of children: 2    Years of education: Not on file    Highest education level: Not on file   Occupational History    Occupation: retired   Social Needs    Financial resource strain: Not on file    Food insecurity:     Worry: Not on file     Inability: Not on file   Mobbles needs:     Medical: Not on file     Non-medical: Not on file   Tobacco Use    Smoking status: Former Smoker     Packs/day: 1.50     Years: 25.00     Pack years: 37.50     Types: Cigarettes     Last attempt to quit: 1988     Years since quittin.8    Smokeless tobacco: Never Used   Substance and Sexual Activity    Alcohol use: No    Drug use: No    Sexual activity: Not on file   Lifestyle    Physical activity:     Days per week: Not on file     Minutes per session: Not on file    Stress: Not on file   Relationships    Social connections:     Talks on phone: Not on file     Gets together: Not on file     Attends [employer] service: Not on file     Active member of club or organization: Not on file     Attends meetings of clubs or organizations: Not on file     Relationship status: Not on file    Intimate partner violence:     Fear of current or ex partner: Not on file     Emotionally abused: Not on file     Physically abused: Not on file     Forced sexual activity: Not on file   Other Topics Concern    Not on file   Social History Narrative    Not on Constitutional: She is oriented to person, place, and time. She appears well-developed and well-nourished. No distress. HENT:   Head: Normocephalic. Mouth/Throat: Oropharynx is clear and moist. No oropharyngeal exudate. Eyes: Pupils are equal, round, and reactive to light. EOM are normal. Right eye exhibits no discharge. Left eye exhibits no discharge. No scleral icterus. Neck: Normal range of motion. Neck supple. No JVD present. No tracheal deviation present. No thyromegaly present. Cardiovascular: Normal rate and normal heart sounds. Exam reveals no gallop and no friction rub. No murmur heard. Pulmonary/Chest: Effort normal. No stridor. No respiratory distress. She has decreased breath sounds. She has no wheezes. She has no rales. She exhibits no tenderness. Right breast exhibits mass (3 cm mass palpable in the outer lower quadrant). Abdominal: Soft. Bowel sounds are normal. She exhibits no distension and no mass. There is no tenderness. There is no rebound. Musculoskeletal: Normal range of motion. She exhibits no edema. Good range of motion in all four extremities. Lymphadenopathy:     She has no cervical adenopathy. She has axillary adenopathy. Right axillary: Lateral adenopathy present. Neurological: She is alert and oriented to person, place, and time. She has normal reflexes. No cranial nerve deficit. She exhibits normal muscle tone. Skin: Skin is warm. No rash noted. No erythema. Psychiatric: She has a normal mood and affect. Her behavior is normal. Judgment and thought content normal.   Vitals reviewed. Radiology        Cta Chest W Wo Contrast  Result Date: 5/31/2019  1. No acute pulmonary embolism. 2. Bilateral pleural effusions right greater than left with adjacent consolidation, atelectasis or infiltrate. Right pleural effusion may be loculated. Correlation and follow-up advised to document resolution.  3. Asymmetric breast tissue in the right upper is to proceed with standard chemotherapy. The patient is older than 78years of age and she has history of CHF, so we will start treatment with dose dense Taxol. Side effects of Taxol were discussed with the patient. Taxol side effects occurring in greater than 30% of patients taking Taxol include:  Low blood counts: increased risk for infection, anemia and/or bleeding. Hair loss  Pain in the joints and muscles. Numbness and tingling of the hands and feet   Nausea, vomiting, diarrhea  Mouth sores   Hypersensitivity reaction: fever, facial flushing, chills, shortness of breath, or hives after Taxol is given. The majority of these reactions occur within the first 10 minutes of an infusion. The following are less common side effects:  Swelling of the feet or ankles (edema). Increases in blood tests measuring liver function. Nail changes (discoloration of nail beds - rare) (see skin reactions). In preparation for for chemotherapy the patient will have Mediport placement  The patient is placed on dexamethasone 8 mg daily for 3 days starting tomorrow for chemotherapy in reduce acute nausea. For chronic chemotherapy-induced nausea she received prescription for Compazine. Instructed to call us with uncontrolled nausea vomiting, Fever above 100.3.  2.  Iron deficiency anemia. The patient will receive Feraheme infusion NEXT WEEK. 3. Pneumonia with pleural effusion. Improving with antibiotic treatment. The patient had thoracentesis on June 1, 2019 cytology was negative for malignant cells     I spent 40 minutes face-to-face with the patient and her family. Greater than 50% of time was spent on counseling and coordinating her care. Face to face counseling included prognosis, risks, benefits of treatment, compliance and risk reduction.      Electronically signed by     Darwin Corley MD on 8/7/2019 at 8:50 AM

## 2019-08-07 NOTE — PROGRESS NOTES
minor errors which are inherent in voice recognition technology. **      Final report electronically signed by Dr. Brad Ruvalcaba on 8/7/2019 10:43 AM            Assessment/Plan: This is a 62-year-old female recently diagnosed with metastatic right-sided breast cancer to the lung, admitted for hypotension    1. Acute hypotension, multifactorial secondary to low oral intake on top of diarrhea compounded with antihypertensive medications, improved    -Hypotension responded with IV fluids started in the ER  -Reduce rate of IV fluids from 100 cc/h to 40 cc/h given history of congestive heart failure to prevent fluid overload  -Continue to hold-antihypertensive medications  -Vital signs per protocol    2. Pancytopenia due to chemotherapy, worsening    -Leukopenia worsening, WBC dropped to 1.7 from 4.4 yesterday  -Hemoglobin dropped to 7.4 from 8.4 yesterday. Repeat H&H now. Given history of CAD, transfuse PRBC if hemoglobin is less than 8. Pt has hx of MICHELLE, last iron panel was 8/1/19  -Thrombocytopenia worsening, platelets dropped to 96 from 110 on arrival  -Patient is afebrile. Infectious work-up which include chest x-ray, GI panel for diarrhea reviewed. Of note, pt was just treated w pna 1 month ago, her CXR report today is expected that there's still residual infiltrates. She's already on empiric abx Cefepime that started by onco. Cont empiric abx as ordered. Urine Legionella area and Streptococcus pneumoniae antigen in process. Check procalcitonin   -Urine culture in process  -CBC in a.m.    3.  Diarrhea, likely related to chemotherapy    -GI panel unremarkable  -Last bowel movement was last night, will consider Imodium if diarrhea is worsening, monitor for now    4. Azotemia, likely due to dehydration, improving    -Reduce rate of IV fluids as stated above to prevent fluid overload  -BMP in a.m.    5. Iron deficiency anemia    -on iron infusion     6.  Right invasive ductal carcinoma    -Follows with Dr. Claudette Matson,

## 2019-08-07 NOTE — CONSULTS
carcinoma. Estrogen Receptor: Negative (<1% of cells staining)  Progesterone Receptor: Negative (<1% of cells staining)  Ki-67 (clone 30-9)  Percentage of positive nuclei:  25%  HER2 dual ROCHELLE  HER2 ROCHELLE NEGATIVE  Meds    Current Medications    cefepime  2 g Intravenous Q12H    nystatin  5 mL Oral 4x Daily    amiodarone  200 mg Oral Daily    aspirin EC  81 mg Oral Daily    atorvastatin  80 mg Oral Daily    clopidogrel  75 mg Oral Daily    fluticasone  2 spray Nasal Daily    gabapentin  800 mg Oral QPM    insulin glargine  18 Units Subcutaneous Nightly    pregabalin  200 mg Oral BID    rOPINIRole  0.5 mg Oral Nightly    sodium chloride flush  10 mL Intravenous 2 times per day    enoxaparin  40 mg Subcutaneous Q24H    insulin lispro  0-6 Units Subcutaneous TID WC    insulin lispro  0-3 Units Subcutaneous Nightly     ipratropium-albuterol, prochlorperazine, sodium chloride flush, magnesium hydroxide, ondansetron, acetaminophen  IV Drips/Infusions   sodium chloride 100 mL/hr at 08/06/19 1742     Past Medical History         Diagnosis Date    [facility]2019    right invasive ductal carcinoma    CAD (coronary artery disease)     sees Dr Carlo Irwin    [facility]F [facility]congestive heart failure) ([facility] [address]     Hyperlipidemia     Hypertension     ICD (implantable cardiac defibrillator), dual, in situ     [facility] dual ICD    Numbness and tingling     both feet    Pneumonia     Shingles 12/2014    Sleep apnea     [facility] dual ICD     Type II or unspecified type diabetes mellitus without mention of complication, not stated as uncontrolled     Ventricular arrhythmia       Past Surgical History           Procedure Laterality Date    BRE[facility]SY [facility]/11/2019    [facility]    CARDIAC PACEMAKER PLACEMENT  9-08-09    [facility]    CARDIOVASCULAR STRESS TEST  01-27-10    Excellent treadmill exercise. Improved functional capacity to functional class 1 completed 8 METS. Hemodynamic response was appropriate.  No ischemic ECG changes noted.  CARDIOVASCULAR STRESS TEST  10-28-09    No evidence of stress induced ischemia. Evidence of  prior transmural MI demonstrated by transient fixed defects of inferior wall extending into lateral wall, indicative of RCA lesion. Bowel and soft tissue attenuation interfering w/ counts of lateral wall. EF 29% w/ severe septal and inferolateral hypokinesis w/ very mild lateral wall hypokinesis being noted.  CARDIOVERSION  8-29-09    CHOLECYSTECTOMY  2005    Laparoscopic    COLONOSCOPY Left 1/9/2019    COLONOSCOPY performed by Sukhdev Mazariegos MD at [facility] CATH LAB PROCEDURE  8-24-09    Multivessel disease demonstrated by LAD having 70-80%  proximal lesion and napkin-ring lesion at bifurcation w/ D2. D2 appears to be medium large caliber vessel which has an ostial lesion of 70-80%. left -to-left collaterals as well as left-to-right collaterals emanating from LAD to PDA. Circumflex had anterior marginal branch and posterior marginal branch.  HERNIA REPAIR      Multiple    HYSTERECTOMY  1997    INSERTION / REMOVAL / REPLACEMENT VENOUS ACCESS CATHETER Right 7/31/2019    SINGLE LUMEN SMART PORT INSERTION performed by Mercy Goodson MD at [id number] [facility] ECHOCARDIOGRAM  07-11-11    LV size mildly to moderately dilated. Systolic function markedly reduced. EF 25-30%. Severe diffuse hypokinesis. Grade 1 diastolic dysfunction. LA was mildly to moderately dilated. RV mildly dilated, systolic function mildly reduced. Mild MR and TR.  TRANSTHORACIC ECHOCARDIOGRAM  8-24-09    LV demonstrates severe hypokinesis of the inferior basal as well as  basal aneurysm being noted and paradoxical septal motion. EF 45-50%. LA is moderately dilated and AV demonstrates mild AV sclerosis w/o AS and AI. Trace MR and TV insufficiency.       Diet    DIET FULL LIQUID; Carb Control: 4 carb choices (60 other ROS negative. Vitals     height is 5' 2\" (1.575 m) and weight is 209 lb (94.8 kg). Her temperature is 97.9 °F (36.6 °C). Her blood pressure is 106/51 (abnormal) and her pulse is 72. Her respiration is 18 and oxygen saturation is 91%. Exam   Physical Exam   General appearance: No apparent distress, alert and cooperative. HEENT: Pupils equal, round, and reactive to light. Conjunctivae/corneas clear. Oral mucosa moist.  Hoarse, orpharyngeal erythema. White lesions on buccal mucosa  Neck: Supple, with full range of motion. Trachea midline. Respiratory:  Normal respiratory effort. Clear to auscultation, bilaterally without Rales/Wheezes/Rhonchi. Cardiovascular: Regular rate and rhythm with normal S1/S2 without murmurs, rubs or gallops. Abdomen: Soft, non-tender, non-distended with active bowel sounds. Musculoskeletal: No clubbing, cyanosis or edema bilaterally. Full range of motion without deformity. Skin: Skin color, texture, turgor normal.  No rashes or lesions. Neurologic:  Neurovascularly intact without any focal sensory/motor deficits. Cranial nerves: II-XII intact, grossly non-focal.  Psychiatric: Alert and oriented, thought content appropriate, normal insight  Capillary Refill: Brisk,< 3 seconds   Peripheral Pulses: +2 palpable, equal bilaterally        Labs   CBC  Recent Labs     08/06/19  1233 08/07/19  0529   WBC 4.4* 1.7*   RBC 3.77* 3.34*   HGB 8.4* 7.4*   HCT 27.9* 24.9*   MCV 74.0* 74.6*   MCH 22.3* 22.2*   MCHC 30.1* 29.7*   * 96*   MPV  --  ----      BMP  Recent Labs     08/06/19  1233 08/07/19  0529    139   K 5.0 4.5   CL 99 106   CO2 27 25   BUN 39* 27*   CREATININE 1.0 0.8   GLUCOSE 87 90   CALCIUM 8.3* 8.0*     LFT  No results for input(s): AST, ALT, ALB, BILITOT, ALKPHOS, LIPASE in the last 72 hours. Invalid input(s): AMYLASE  INR  No results for input(s): INR, PROTIME in the last 72 hours. PTT  No results for input(s):  APTT in the last 72 hours. Radiology        Ct Chest W Contrast    Result Date: 7/8/2019  PROCEDURE: CT CHEST W CONTRAST CLINICAL INFORMATION: Breast cancer metastasized to axillary lymph node, right ([address] Breast cancer metastasized to axillary lymph node, right ([address] Recently diagnosed breast cancer. Evaluate for metastases. COMPARISON: PET/CT dated 6/20/2019 and CTA chest dated 5/31/2019. TECHNIQUE: 5 mm axial images were obtained through the chest after the administration of 75 mL Isovue-370 injected in the right AC. Sagittal and coronal reconstructions were created. All CT scans at this facility use dose modulation, iterative reconstruction, and/or weight-based dosing when appropriate to reduce radiation dose to as low as reasonably achievable. FINDINGS: There is a 10 x 7 mm nodule in the right upper lobe corresponding to hypermetabolic nodule seen on previous PET/CT. There is a 1.4 x 1.1 cm nodule in the left lower lobe near the aorta, stable compared to prior CT. There is a small right pleural effusion  and trace left pleural effusion, decreased in size compared to prior CTA chest. There is mild adjacent atelectasis. Lungs otherwise appear clear. No mediastinal or hilar lymphadenopathy is identified. Redemonstration of surgical changes from prior bypass and stable left cardiac pacer/defibrillator generator with stable leads. The heart is enlarged, similar to prior exam. Visualized upper abdominal solid organs are grossly unremarkable. There are stable degenerative changes of the thoracic spine. 1. Redemonstration of right upper lobe and left lower lobe nodules, concerning for metastatic disease. 2. Small right and trace left pleural effusions, decreased compared to prior CTA chest. 3. Cardiomegaly. **This report has been created using voice recognition software. It may contain minor errors which are inherent in voice recognition technology. ** Final report electronically signed by Dr. Ludwig Duvall MD on 7/8/2019

## 2019-08-08 ENCOUNTER — APPOINTMENT (OUTPATIENT)
Dept: GENERAL RADIOLOGY | Age: 74
DRG: 640 | End: 2019-08-08
Payer: MEDICARE

## 2019-08-08 LAB
ABO: NORMAL
ANION GAP SERPL CALCULATED.3IONS-SCNC: 9 MEQ/L (ref 8–16)
ANTIBODY SCREEN: NORMAL
BASOPHILS # BLD: 0.7 %
BASOPHILS ABSOLUTE: 0 THOU/MM3 (ref 0–0.1)
BUN BLDV-MCNC: 13 MG/DL (ref 7–22)
CALCIUM IONIZED: 1.22 MMOL/L (ref 1.12–1.32)
CALCIUM SERPL-MCNC: 8 MG/DL (ref 8.5–10.5)
CHLORIDE BLD-SCNC: 109 MEQ/L (ref 98–111)
CO2: 24 MEQ/L (ref 23–33)
CREAT SERPL-MCNC: 0.6 MG/DL (ref 0.4–1.2)
EOSINOPHIL # BLD: 5.9 %
EOSINOPHILS ABSOLUTE: 0.1 THOU/MM3 (ref 0–0.4)
ERYTHROCYTE [DISTWIDTH] IN BLOOD BY AUTOMATED COUNT: 20.8 % (ref 11.5–14.5)
ERYTHROCYTE [DISTWIDTH] IN BLOOD BY AUTOMATED COUNT: 55 FL (ref 35–45)
GFR SERPL CREATININE-BSD FRML MDRD: > 90 ML/MIN/1.73M2
GLUCOSE BLD-MCNC: 145 MG/DL (ref 70–108)
GLUCOSE BLD-MCNC: 152 MG/DL (ref 70–108)
GLUCOSE BLD-MCNC: 169 MG/DL (ref 70–108)
GLUCOSE BLD-MCNC: 187 MG/DL (ref 70–108)
GLUCOSE BLD-MCNC: 210 MG/DL (ref 70–108)
HCT VFR BLD CALC: 25 % (ref 37–47)
HCT VFR BLD CALC: 28.6 % (ref 37–47)
HEMOGLOBIN: 7.4 GM/DL (ref 12–16)
HEMOGLOBIN: 8.4 GM/DL (ref 12–16)
IMMATURE GRANS (ABS): 0 THOU/MM3 (ref 0–0.07)
IMMATURE GRANULOCYTES: 0 %
LYMPHOCYTES # BLD: 46.3 %
LYMPHOCYTES ABSOLUTE: 0.6 THOU/MM3 (ref 1–4.8)
MCH RBC QN AUTO: 22 PG (ref 26–33)
MCHC RBC AUTO-ENTMCNC: 29.6 GM/DL (ref 32.2–35.5)
MCV RBC AUTO: 74.2 FL (ref 81–99)
MONOCYTES # BLD: 5.1 %
MONOCYTES ABSOLUTE: 0.1 THOU/MM3 (ref 0.4–1.3)
NUCLEATED RED BLOOD CELLS: 0 /100 WBC
ORGANISM: ABNORMAL
PLATELET # BLD: 99 THOU/MM3 (ref 130–400)
POTASSIUM SERPL-SCNC: 4.7 MEQ/L (ref 3.5–5.2)
PTH INTACT: 100.9 PG/ML (ref 15–65)
RBC # BLD: 3.37 MILL/MM3 (ref 4.2–5.4)
RH FACTOR: NORMAL
SEG NEUTROPHILS: 42 %
SEGMENTED NEUTROPHILS ABSOLUTE COUNT: 0.6 THOU/MM3 (ref 1.8–7.7)
SODIUM BLD-SCNC: 142 MEQ/L (ref 135–145)
URINE CULTURE, ROUTINE: ABNORMAL
VITAMIN D 25-HYDROXY: 9 NG/ML (ref 30–100)
WBC # BLD: 1.4 THOU/MM3 (ref 4.8–10.8)

## 2019-08-08 PROCEDURE — 6360000002 HC RX W HCPCS: Performed by: NURSE PRACTITIONER

## 2019-08-08 PROCEDURE — 85025 COMPLETE CBC W/AUTO DIFF WBC: CPT

## 2019-08-08 PROCEDURE — 71045 X-RAY EXAM CHEST 1 VIEW: CPT

## 2019-08-08 PROCEDURE — 82948 REAGENT STRIP/BLOOD GLUCOSE: CPT

## 2019-08-08 PROCEDURE — P9016 RBC LEUKOCYTES REDUCED: HCPCS

## 2019-08-08 PROCEDURE — 97110 THERAPEUTIC EXERCISES: CPT

## 2019-08-08 PROCEDURE — 97166 OT EVAL MOD COMPLEX 45 MIN: CPT

## 2019-08-08 PROCEDURE — 2580000003 HC RX 258: Performed by: NURSE PRACTITIONER

## 2019-08-08 PROCEDURE — 36591 DRAW BLOOD OFF VENOUS DEVICE: CPT

## 2019-08-08 PROCEDURE — 86923 COMPATIBILITY TEST ELECTRIC: CPT

## 2019-08-08 PROCEDURE — 2580000003 HC RX 258: Performed by: STUDENT IN AN ORGANIZED HEALTH CARE EDUCATION/TRAINING PROGRAM

## 2019-08-08 PROCEDURE — 82330 ASSAY OF CALCIUM: CPT

## 2019-08-08 PROCEDURE — 36430 TRANSFUSION BLD/BLD COMPNT: CPT

## 2019-08-08 PROCEDURE — 86850 RBC ANTIBODY SCREEN: CPT

## 2019-08-08 PROCEDURE — 2709999900 HC NON-CHARGEABLE SUPPLY

## 2019-08-08 PROCEDURE — 6370000000 HC RX 637 (ALT 250 FOR IP): Performed by: STUDENT IN AN ORGANIZED HEALTH CARE EDUCATION/TRAINING PROGRAM

## 2019-08-08 PROCEDURE — 86900 BLOOD TYPING SEROLOGIC ABO: CPT

## 2019-08-08 PROCEDURE — 99232 SBSQ HOSP IP/OBS MODERATE 35: CPT | Performed by: NURSE PRACTITIONER

## 2019-08-08 PROCEDURE — 85018 HEMOGLOBIN: CPT

## 2019-08-08 PROCEDURE — 1200000003 HC TELEMETRY R&B

## 2019-08-08 PROCEDURE — 97162 PT EVAL MOD COMPLEX 30 MIN: CPT

## 2019-08-08 PROCEDURE — 6370000000 HC RX 637 (ALT 250 FOR IP): Performed by: NURSE PRACTITIONER

## 2019-08-08 PROCEDURE — 99232 SBSQ HOSP IP/OBS MODERATE 35: CPT | Performed by: FAMILY MEDICINE

## 2019-08-08 PROCEDURE — 85014 HEMATOCRIT: CPT

## 2019-08-08 PROCEDURE — 6370000000 HC RX 637 (ALT 250 FOR IP): Performed by: PHYSICIAN ASSISTANT

## 2019-08-08 PROCEDURE — 86901 BLOOD TYPING SEROLOGIC RH(D): CPT

## 2019-08-08 PROCEDURE — 80048 BASIC METABOLIC PNL TOTAL CA: CPT

## 2019-08-08 PROCEDURE — 82306 VITAMIN D 25 HYDROXY: CPT

## 2019-08-08 PROCEDURE — 83970 ASSAY OF PARATHORMONE: CPT

## 2019-08-08 RX ORDER — 0.9 % SODIUM CHLORIDE 0.9 %
250 INTRAVENOUS SOLUTION INTRAVENOUS ONCE
Status: COMPLETED | OUTPATIENT
Start: 2019-08-08 | End: 2019-08-09

## 2019-08-08 RX ORDER — BUMETANIDE 0.25 MG/ML
0.5 INJECTION, SOLUTION INTRAMUSCULAR; INTRAVENOUS ONCE
Status: DISCONTINUED | OUTPATIENT
Start: 2019-08-08 | End: 2019-08-10 | Stop reason: HOSPADM

## 2019-08-08 RX ORDER — ERGOCALCIFEROL 1.25 MG/1
50000 CAPSULE ORAL WEEKLY
Status: DISCONTINUED | OUTPATIENT
Start: 2019-08-08 | End: 2019-08-10 | Stop reason: HOSPADM

## 2019-08-08 RX ADMIN — NYSTATIN 500000 UNITS: 100000 SUSPENSION ORAL at 20:45

## 2019-08-08 RX ADMIN — PREGABALIN 200 MG: 50 CAPSULE ORAL at 08:15

## 2019-08-08 RX ADMIN — ROPINIROLE HYDROCHLORIDE 0.5 MG: 0.5 TABLET, FILM COATED ORAL at 20:44

## 2019-08-08 RX ADMIN — AMIODARONE HYDROCHLORIDE 200 MG: 200 TABLET ORAL at 08:15

## 2019-08-08 RX ADMIN — CEFEPIME HYDROCHLORIDE 2 G: 2 INJECTION, POWDER, FOR SOLUTION INTRAVENOUS at 12:08

## 2019-08-08 RX ADMIN — NYSTATIN 500000 UNITS: 100000 SUSPENSION ORAL at 16:18

## 2019-08-08 RX ADMIN — CLOPIDOGREL BISULFATE 75 MG: 75 TABLET ORAL at 08:15

## 2019-08-08 RX ADMIN — INSULIN GLARGINE 18 UNITS: 100 INJECTION, SOLUTION SUBCUTANEOUS at 21:58

## 2019-08-08 RX ADMIN — ACETAMINOPHEN 650 MG: 325 TABLET ORAL at 20:45

## 2019-08-08 RX ADMIN — GABAPENTIN 800 MG: 400 CAPSULE ORAL at 16:18

## 2019-08-08 RX ADMIN — ERGOCALCIFEROL 50000 UNITS: 1.25 CAPSULE ORAL at 21:58

## 2019-08-08 RX ADMIN — ENOXAPARIN SODIUM 40 MG: 40 INJECTION SUBCUTANEOUS at 16:18

## 2019-08-08 RX ADMIN — INSULIN LISPRO 2 UNITS: 100 INJECTION, SOLUTION INTRAVENOUS; SUBCUTANEOUS at 17:12

## 2019-08-08 RX ADMIN — NYSTATIN 500000 UNITS: 100000 SUSPENSION ORAL at 12:08

## 2019-08-08 RX ADMIN — PREGABALIN 200 MG: 50 CAPSULE ORAL at 20:45

## 2019-08-08 RX ADMIN — ACETAMINOPHEN 650 MG: 325 TABLET ORAL at 16:18

## 2019-08-08 RX ADMIN — NYSTATIN 500000 UNITS: 100000 SUSPENSION ORAL at 08:16

## 2019-08-08 RX ADMIN — SODIUM CHLORIDE 250 ML: 9 INJECTION, SOLUTION INTRAVENOUS at 12:09

## 2019-08-08 RX ADMIN — ATORVASTATIN CALCIUM 80 MG: 80 TABLET, FILM COATED ORAL at 08:15

## 2019-08-08 RX ADMIN — ASPIRIN 81 MG: 81 TABLET ORAL at 08:15

## 2019-08-08 ASSESSMENT — PAIN DESCRIPTION - LOCATION: LOCATION: FOOT;LEG

## 2019-08-08 ASSESSMENT — PAIN SCALES - GENERAL
PAINLEVEL_OUTOF10: 8
PAINLEVEL_OUTOF10: 6
PAINLEVEL_OUTOF10: 6
PAINLEVEL_OUTOF10: 8

## 2019-08-08 ASSESSMENT — PAIN DESCRIPTION - ORIENTATION: ORIENTATION: RIGHT;LEFT

## 2019-08-08 ASSESSMENT — PAIN DESCRIPTION - PROGRESSION: CLINICAL_PROGRESSION: NOT CHANGED

## 2019-08-08 ASSESSMENT — PAIN DESCRIPTION - FREQUENCY: FREQUENCY: CONTINUOUS

## 2019-08-08 ASSESSMENT — PAIN - FUNCTIONAL ASSESSMENT: PAIN_FUNCTIONAL_ASSESSMENT: PREVENTS OR INTERFERES SOME ACTIVE ACTIVITIES AND ADLS

## 2019-08-08 ASSESSMENT — PAIN DESCRIPTION - DESCRIPTORS: DESCRIPTORS: NUMBNESS;ACHING;TINGLING

## 2019-08-08 ASSESSMENT — PAIN DESCRIPTION - ONSET: ONSET: ON-GOING

## 2019-08-08 ASSESSMENT — PAIN DESCRIPTION - PAIN TYPE: TYPE: NEUROPATHIC PAIN

## 2019-08-08 NOTE — PROGRESS NOTES
[address] [address]  INPATIENT PHYSICAL THERAPY  EVALUATION  [facility] ONC MED 5K - [id number]-H    Time In: 0176  Time Out: 7835  Timed Code Treatment Minutes: 13 Minutes  Minutes: 30          Date: 2019  Patient Name: Nicholas Huddleston,  Gender:  female        MRN: [id number]  : 1945  (75 y.o.)  Referral Date : 19   Referring Practitioner: Terri Olmstead MD  Diagnosis: Hypontension  Additional Pertinent Hx: 75 y.o. female who presented to [address] [place] [address] with hypotension. Patient recently diagnosed 2019 with right sided metastatic breast cancer to the lung. Follows with Dr. Jessica Shrestha. Last chemotherapy was Thursday. Presented to the infusion center today for IV Iron. BP was 68/40 and she was sent to the ED for evaluation. She reports not feeling well in he last few days. Has been weak, dizzy and had blurred vision. Decreased oral intake. Started with loose stool around the same time. But now has uncontrolled diarrhea that started in the ED. BP much improved following NS bolus in ED. She reports her blurred vision has resolved and dizziness is better. Restrictions/Precautions:  Restrictions/Precautions: General Precautions  Position Activity Restriction  Other position/activity restrictions: port right side chest, defibrillator    Subjective:  Patient assessed for rehabilitation services?: Yes  Family / Caregiver Present: Yes(spouse arrived middle of session)  Subjective: Pt's nurse okay with PT session. Patient agreable to physical therapy. Pt reports needing to use restroom often and may need to go during session. General:  Overall Orientation Status: Within Normal Limits  Follows Commands: Within Functional Limits    Vision: Impaired  Vision Exceptions: Wears glasses at all times    Hearing: Within functional limits         Pain:  Denies.           Social/Functional History:    Lives With: Spouse  Type of Home: House  Home Layout: One level  Home Access: Stairs to enter with without Stair Climbing T-Scale Score : 45.54    ASSESSMENT:  Activity Tolerance:  Patient tolerance of  treatment: fair. Patient with lower extremity unsteadiness with gait and requiring seated rest breaks due to fatigue. Pt also limited by loose stools at this time, likely affecting her energy levels. Treatment Initiated: Treatment and education initiated within context of evaluation. Evaluation time included review of current medical information, gathering information related to past medical, social and functional history, completion of standardized testing, formal and informal observation of tasks, assessment of data and development of plan of care and goals. Treatment time included skilled education and facilitation of tasks to increase safety and independence with functional mobility for improved independence and quality of life. Assessment: Body structures, Functions, Activity limitations: Decreased functional mobility , Decreased balance, Decreased endurance, Decreased strength  Assessment: The evaluation and examination of Mrs. Pratt indicates a decline in functional mobility compared to baseline. Patient able to ambulate short distances within room, however requires seated rest break due to weakness. Patient demonstrates mild unsteadiness with gait, however has history of right drop foot which does affect her gait pattern. Patient would benefit from skilled PT services to improve patient's gait, balance, strength and transfers to facilitate safe return to home.   Prognosis: Good   Other co-morbidities includes diabetes, drop foot, HTN, CHF  Status is evolving: loose stools, increased weakness    REQUIRES PT FOLLOW UP: Yes    Discharge Recommendations:  Discharge Recommendations: Continue to assess pending progress, Home with Home health PT    Patient Education:  PT Education: PT Role, Home Exercise Program, Transfer Training, General Safety, Gait Training, Plan of Care    Equipment Recommendations:  Equipment Needed: No    Plan:  Times per week: 5x/wk GM  Current Treatment Recommendations: Strengthening, Balance Training, Transfer Training, Endurance Training, Gait Training, Neuromuscular Re-education, Home Exercise Program, Patient/Caregiver Education & Training, Functional Mobility Training, Stair training, Safety Education & Training    Goals:  Patient goals : Go home  Short term goals  Time Frame for Short term goals: At discharge  Short term goal 1: Patient will ambulate 80' with 4WW and SBA to allow patient to navigate home environment with decreased difficulty. Short term goal 2: Patient will complete sit < > stand transfers with modified independence to allow patient to stand to ambulate safely. Short term goal 3: Patient will ascend/descend 2 steps with 1 HR and CGA to allow patient to acccess/leave home safely. Short term goal 4: Patient will be independent with seated exercises to increase/maintain strength and mobility. Long term goals  Time Frame for Long term goals : not applicable due to ELOS    Following session, patient left in safe position with all fall risk precautions in place. Pt in chair at end of session, call light in reach, needs met and spouse present.

## 2019-08-08 NOTE — PROGRESS NOTES
consulted. CXR on 8/7/2019 showed:     Interval improved however mild residual infiltrates within the left perihilar region and right lung base. There is stable blunting of the right lateral costophrenic angle suggesting a small amount of pleural fluid. Follow-up chest radiographs    recommended to confirm complete resolution      Of note, pt states that she was treated with pneumonia a month ago. Subjective: Patient seen and examined. Patient states that she feels better today, as compared to yesterday and on admission. She states her generalized weakness is improving and she feels a difference from admission level of weakness. She reports she is able to get out of bed to use the bathroom. She denies dizziness, blurry vision, abdominal pain, nausea, vomiting, urinary tract infection symptoms, cough, shortness of breath, fever and chills. She states her last bowel movement was last night, was loose, but did not have any blood. She also reports no changes in her leg swelling from yesterday.       Medications:  Reviewed    Infusion Medications    dextrose       Scheduled Medications    bumetanide  0.5 mg Intravenous Once    sodium chloride  250 mL Intravenous Once    cefepime  2 g Intravenous Q12H    nystatin  5 mL Oral 4x Daily    amiodarone  200 mg Oral Daily    aspirin EC  81 mg Oral Daily    atorvastatin  80 mg Oral Daily    clopidogrel  75 mg Oral Daily    fluticasone  2 spray Nasal Daily    gabapentin  800 mg Oral QPM    insulin glargine  18 Units Subcutaneous Nightly    pregabalin  200 mg Oral BID    rOPINIRole  0.5 mg Oral Nightly    sodium chloride flush  10 mL Intravenous 2 times per day    enoxaparin  40 mg Subcutaneous Q24H    insulin lispro  0-6 Units Subcutaneous TID WC    insulin lispro  0-3 Units Subcutaneous Nightly     PRN Meds: glucose, dextrose, glucagon (rDNA), dextrose, ipratropium-albuterol, prochlorperazine, sodium chloride flush, magnesium hydroxide, ondansetron, 5/30/19  -Continue Lantus and low dose sliding scale insulin  -Continue Accu-Cheks  -Carb controlled diet     10. Essential hypertension     -Patient is on Bumex 0.5 mg p.o. twice daily, Aldactone 25 mg 1 tablet p.o. daily, Toprol-XL 50 mg p.o. daily, and enalapril 20 mg p.o. twice daily  -cont to hold home BP medications due to hypotension, will re-assess tomorrow, might need to reduce BP meds dose depending on BP tomorrow     11. HLD     -cont statin     12. Obesity     -Body mass index is 38.23 kg/m². -lifestyle modification       13. Generalized weakness/physical deconditioning     -PT/OT    14. Hypocalcemia    -Calcium level 8.0 stable from yesterday trending downwards from admission  -Order Vitamin D, PTH, and ionized calcium levels          Electronically signed by Rad Cannon MD on 8/8/2019 at 1:35 PM          Addendum:    Attending Supervising Physicians GEOFF Andersen 106      I have seen and examined the patient with [facility] resident Dr. Margaret Lane on 8/8/2019. I have reviewed hospital course, labs, imaging. I agree with the work-up, evaluation, management and diagnoses. Care plan has been discussed. My assessment/changes/addendum reveals: On physical exam,    Ext: (+) grade 1 pitting edema on both legs, from knees to distal legs. I agree with resident's assessment and plan with the following addendum/changes:       Pancytopenia due to chemotherapy, worsening    -agree w 1 unit PRBC transfusion, given hx of CAD, Hgb goal is >/=8    DM type 2, controlled    -POCT glucose fluctuates, mostly controlled.   -agree w continuing lantus and SSI for now, re-assess tomorrow, adjust insulin regimen prn     Chronic B/L LE edema, stable    -Per pt this has been stable  -agree w 1 dose of Bumex 0.5 mg IV and discontinuing IVF given crackles on chest auscultation and pulmonary venous congestion on cxr  -low salt diet.  Elevate legs when resting      Pulmonary venous congestion without evidence of pulmonary

## 2019-08-09 ENCOUNTER — HOSPITAL ENCOUNTER (OUTPATIENT)
Dept: INFUSION THERAPY | Age: 74
Discharge: HOME | End: 2019-08-09

## 2019-08-09 ENCOUNTER — APPOINTMENT (OUTPATIENT)
Dept: GENERAL RADIOLOGY | Age: 74
DRG: 640 | End: 2019-08-09
Payer: MEDICARE

## 2019-08-09 LAB
BASOPHILS # BLD: 0.6 %
BASOPHILS ABSOLUTE: 0 THOU/MM3 (ref 0–0.1)
EOSINOPHIL # BLD: 3.2 %
EOSINOPHILS ABSOLUTE: 0.1 THOU/MM3 (ref 0–0.4)
ERYTHROCYTE [DISTWIDTH] IN BLOOD BY AUTOMATED COUNT: 21.1 % (ref 11.5–14.5)
ERYTHROCYTE [DISTWIDTH] IN BLOOD BY AUTOMATED COUNT: 57.5 FL (ref 35–45)
GLUCOSE BLD-MCNC: 145 MG/DL (ref 70–108)
GLUCOSE BLD-MCNC: 147 MG/DL (ref 70–108)
GLUCOSE BLD-MCNC: 209 MG/DL (ref 70–108)
GLUCOSE BLD-MCNC: 232 MG/DL (ref 70–108)
HCT VFR BLD CALC: 26.3 % (ref 37–47)
HEMOGLOBIN: 7.8 GM/DL (ref 12–16)
IMMATURE GRANS (ABS): 0.03 THOU/MM3 (ref 0–0.07)
IMMATURE GRANULOCYTES: 2 %
LEGIONELLA URINARY AG: NEGATIVE
LYMPHOCYTES # BLD: 68.6 %
LYMPHOCYTES ABSOLUTE: 1.1 THOU/MM3 (ref 1–4.8)
MCH RBC QN AUTO: 22.5 PG (ref 26–33)
MCHC RBC AUTO-ENTMCNC: 29.7 GM/DL (ref 32.2–35.5)
MCV RBC AUTO: 76 FL (ref 81–99)
MONOCYTES # BLD: 9 %
MONOCYTES ABSOLUTE: 0.1 THOU/MM3 (ref 0.4–1.3)
NUCLEATED RED BLOOD CELLS: 0 /100 WBC
PLATELET # BLD: 102 THOU/MM3 (ref 130–400)
RBC # BLD: 3.46 MILL/MM3 (ref 4.2–5.4)
SEG NEUTROPHILS: 16.7 %
SEGMENTED NEUTROPHILS ABSOLUTE COUNT: 0.3 THOU/MM3 (ref 1.8–7.7)
STREP PNEUMO AG, UR: NEGATIVE
WBC # BLD: 1.6 THOU/MM3 (ref 4.8–10.8)

## 2019-08-09 PROCEDURE — 97530 THERAPEUTIC ACTIVITIES: CPT

## 2019-08-09 PROCEDURE — 6360000002 HC RX W HCPCS: Performed by: NURSE PRACTITIONER

## 2019-08-09 PROCEDURE — 2580000003 HC RX 258: Performed by: NURSE PRACTITIONER

## 2019-08-09 PROCEDURE — 6370000000 HC RX 637 (ALT 250 FOR IP): Performed by: NURSE PRACTITIONER

## 2019-08-09 PROCEDURE — 85025 COMPLETE CBC W/AUTO DIFF WBC: CPT

## 2019-08-09 PROCEDURE — 71045 X-RAY EXAM CHEST 1 VIEW: CPT

## 2019-08-09 PROCEDURE — 82948 REAGENT STRIP/BLOOD GLUCOSE: CPT

## 2019-08-09 PROCEDURE — 99232 SBSQ HOSP IP/OBS MODERATE 35: CPT | Performed by: NURSE PRACTITIONER

## 2019-08-09 PROCEDURE — 6370000000 HC RX 637 (ALT 250 FOR IP): Performed by: PHYSICIAN ASSISTANT

## 2019-08-09 PROCEDURE — 1200000003 HC TELEMETRY R&B

## 2019-08-09 PROCEDURE — 99232 SBSQ HOSP IP/OBS MODERATE 35: CPT | Performed by: FAMILY MEDICINE

## 2019-08-09 RX ORDER — AMOXICILLIN AND CLAVULANATE POTASSIUM 500; 125 MG/1; MG/1
1 TABLET, FILM COATED ORAL EVERY 8 HOURS SCHEDULED
Status: DISCONTINUED | OUTPATIENT
Start: 2019-08-09 | End: 2019-08-10 | Stop reason: HOSPADM

## 2019-08-09 RX ADMIN — ASPIRIN 81 MG: 81 TABLET ORAL at 09:53

## 2019-08-09 RX ADMIN — GABAPENTIN 800 MG: 400 CAPSULE ORAL at 17:58

## 2019-08-09 RX ADMIN — PREGABALIN 200 MG: 50 CAPSULE ORAL at 09:53

## 2019-08-09 RX ADMIN — INSULIN LISPRO 1 UNITS: 100 INJECTION, SOLUTION INTRAVENOUS; SUBCUTANEOUS at 21:14

## 2019-08-09 RX ADMIN — INSULIN LISPRO 2 UNITS: 100 INJECTION, SOLUTION INTRAVENOUS; SUBCUTANEOUS at 17:58

## 2019-08-09 RX ADMIN — PREGABALIN 200 MG: 50 CAPSULE ORAL at 21:19

## 2019-08-09 RX ADMIN — NYSTATIN 500000 UNITS: 100000 SUSPENSION ORAL at 09:53

## 2019-08-09 RX ADMIN — NYSTATIN 500000 UNITS: 100000 SUSPENSION ORAL at 13:33

## 2019-08-09 RX ADMIN — AMOXICILLIN AND CLAVULANATE POTASSIUM 1 TABLET: 500; 125 TABLET, FILM COATED ORAL at 13:33

## 2019-08-09 RX ADMIN — NYSTATIN 500000 UNITS: 100000 SUSPENSION ORAL at 21:14

## 2019-08-09 RX ADMIN — INSULIN GLARGINE 18 UNITS: 100 INJECTION, SOLUTION SUBCUTANEOUS at 21:16

## 2019-08-09 RX ADMIN — FLUTICASONE PROPIONATE 2 SPRAY: 50 SPRAY, METERED NASAL at 09:53

## 2019-08-09 RX ADMIN — ATORVASTATIN CALCIUM 80 MG: 80 TABLET, FILM COATED ORAL at 09:53

## 2019-08-09 RX ADMIN — AMIODARONE HYDROCHLORIDE 200 MG: 200 TABLET ORAL at 09:53

## 2019-08-09 RX ADMIN — NYSTATIN 500000 UNITS: 100000 SUSPENSION ORAL at 17:58

## 2019-08-09 RX ADMIN — ACETAMINOPHEN 650 MG: 325 TABLET ORAL at 03:58

## 2019-08-09 RX ADMIN — CEFEPIME HYDROCHLORIDE 2 G: 2 INJECTION, POWDER, FOR SOLUTION INTRAVENOUS at 00:33

## 2019-08-09 RX ADMIN — AMOXICILLIN AND CLAVULANATE POTASSIUM 1 TABLET: 500; 125 TABLET, FILM COATED ORAL at 21:13

## 2019-08-09 RX ADMIN — ENOXAPARIN SODIUM 40 MG: 40 INJECTION SUBCUTANEOUS at 17:58

## 2019-08-09 RX ADMIN — ACETAMINOPHEN 650 MG: 325 TABLET ORAL at 21:12

## 2019-08-09 RX ADMIN — INSULIN LISPRO 1 UNITS: 100 INJECTION, SOLUTION INTRAVENOUS; SUBCUTANEOUS at 09:59

## 2019-08-09 RX ADMIN — INSULIN LISPRO 2 UNITS: 100 INJECTION, SOLUTION INTRAVENOUS; SUBCUTANEOUS at 13:34

## 2019-08-09 RX ADMIN — CLOPIDOGREL BISULFATE 75 MG: 75 TABLET ORAL at 09:53

## 2019-08-09 RX ADMIN — SODIUM CHLORIDE, PRESERVATIVE FREE 10 ML: 5 INJECTION INTRAVENOUS at 00:34

## 2019-08-09 RX ADMIN — ROPINIROLE HYDROCHLORIDE 0.5 MG: 0.5 TABLET, FILM COATED ORAL at 21:14

## 2019-08-09 ASSESSMENT — PAIN DESCRIPTION - DESCRIPTORS
DESCRIPTORS: ACHING
DESCRIPTORS: TINGLING;NUMBNESS

## 2019-08-09 ASSESSMENT — PAIN SCALES - GENERAL
PAINLEVEL_OUTOF10: 5
PAINLEVEL_OUTOF10: 4
PAINLEVEL_OUTOF10: 3
PAINLEVEL_OUTOF10: 5
PAINLEVEL_OUTOF10: 6
PAINLEVEL_OUTOF10: 6

## 2019-08-09 ASSESSMENT — PAIN DESCRIPTION - PAIN TYPE
TYPE: NEUROPATHIC PAIN
TYPE: CHRONIC PAIN

## 2019-08-09 ASSESSMENT — PAIN DESCRIPTION - ONSET
ONSET: ON-GOING
ONSET: PROGRESSIVE

## 2019-08-09 ASSESSMENT — PAIN DESCRIPTION - FREQUENCY
FREQUENCY: CONTINUOUS
FREQUENCY: CONTINUOUS

## 2019-08-09 ASSESSMENT — PAIN DESCRIPTION - PROGRESSION: CLINICAL_PROGRESSION: NOT CHANGED

## 2019-08-09 ASSESSMENT — PAIN DESCRIPTION - LOCATION
LOCATION: FOOT;LEG
LOCATION: LEG

## 2019-08-09 ASSESSMENT — PAIN DESCRIPTION - ORIENTATION
ORIENTATION: RIGHT;LEFT
ORIENTATION: RIGHT;LEFT

## 2019-08-09 ASSESSMENT — PAIN - FUNCTIONAL ASSESSMENT: PAIN_FUNCTIONAL_ASSESSMENT: PREVENTS OR INTERFERES SOME ACTIVE ACTIVITIES AND ADLS

## 2019-08-09 NOTE — PROGRESS NOTES
EF 25-30%. Severe diffuse hypokinesis. Grade 1 diastolic dysfunction. LA was mildly to moderately dilated. RV mildly dilated, systolic function mildly reduced. Mild MR and TR.  TRANSTHORACIC ECHOCARDIOGRAM  09    LV demonstrates severe hypokinesis of the inferior basal as well as  basal aneurysm being noted and paradoxical septal motion. EF 45-50%. LA is moderately dilated and AV demonstrates mild AV sclerosis w/o AS and AI. Trace MR and TV insufficiency.       Diet    DIET GENERAL; Low Sodium (2 GM)  Allergies    Sulfa antibiotics  Social History     Social History     Socioeconomic History    Marital status:      Spouse name: Not on file    Number of children: 2    Years of education: Not on file    Highest education level: Not on file   Occupational History    Occupation: retired   Social Needs    Financial resource strain: Not on file    Food insecurity:     Worry: Not on file     Inability: Not on file   Imagiin. needs:     Medical: Not on file     Non-medical: Not on file   Tobacco Use    Smoking status: Former Smoker     Packs/day: 1.50     Years: 25.00     Pack years: 37.50     Types: Cigarettes     Last attempt to quit: 1988     Years since quittin.9    Smokeless tobacco: Never Used   Substance and Sexual Activity    Alcohol use: No    Drug use: No    Sexual activity: Not on file   Lifestyle    Physical activity:     Days per week: Not on file     Minutes per session: Not on file    Stress: Not on file   Relationships    Social connections:     Talks on phone: Not on file     Gets together: Not on file     Attends [employer] service: Not on file     Active member of club or organization: Not on file     Attends meetings of clubs or organizations: Not on file     Relationship status: Not on file    Intimate partner violence:     Fear of current or ex partner: Not on file     Emotionally abused: Not on file     Physically abused: Not on file     Forced sexual activity: Not on file   Other Topics Concern    Not on file   Social History Narrative    Not on file     Family History          Problem Relation Age of Onset    Diabetes Father     Cancer Father 80        Bone    Hypertension Mother     Heart Disease Mother     No Known Problems Sister     No Known Problems Brother     No Known Problems Brother     Breast Cancer Neg Hx     Colon Cancer Neg Hx      ROS     Review of Systems   Pertinent review of systems noted in HPI, all other ROS negative. Vitals     height is 5' 2\" (1.575 m) and weight is 209 lb (94.8 kg). Her oral temperature is 97.7 °F (36.5 °C). Her blood pressure is 120/60 and her pulse is 85. Her respiration is 18 and oxygen saturation is 93%. Exam   Physical Exam   General appearance: No apparent distress, alert and cooperative. HEENT: Pupils equal, round, and reactive to light. Conjunctivae/corneas clear. Oral mucosa moist.  Hoarse, no orpharyngeal erythema. White lesions on buccal mucosa  Neck: Supple, with full range of motion. Trachea midline. Respiratory:  Normal respiratory effort. Clear to auscultation, bilaterally without Rales/Wheezes/Rhonchi. Cardiovascular: Regular rate and rhythm with normal S1/S2 without murmurs, rubs or gallops. Abdomen: Soft, non-tender, non-distended with active bowel sounds. Musculoskeletal: No clubbing, cyanosis or edema bilaterally. Full range of motion without deformity. Skin: Skin color, texture, turgor normal.  No rashes or lesions. Neurologic:  Neurovascularly intact without any focal sensory/motor deficits.  Cranial nerves: II-XII intact, grossly non-focal.  Psychiatric: Alert and oriented, thought content appropriate, normal insight  Capillary Refill: Brisk,< 3 seconds   Peripheral Pulses: +2 palpable, equal bilaterally        Labs   CBC  Recent Labs     08/07/19  0529  08/08/19  0540 08/08/19  1715 08/09/19  0552   WBC 1.7*  --  1.4*  --  1.6*   RBC 3.34*  --  3.37*  --  3.46*   HGB fields. There is blunting of the right lateral costophrenic angle which may represent a small amount of pleural fluid. Follow-up chest radiographs are recommended to confirm complete resolution. **This report has been created using voice recognition software. It may contain minor errors which are inherent in voice recognition technology. ** Final report electronically signed by Dr. Jacinto Haider on 7/31/2019 9:05 AM    Fl Vascular Access Guidance    Result Date: 7/31/2019  Radiology exam is complete. No Radiologist dictation. Please follow up with ordering provider. Assessment/Recommendations        Hypotension (resolved)- secondary to infection vs dehydration vs CHF   - Improved with fluids  - Infectious workup    Neutropenia- improving remains afebrile  - WBC 1.6  - Repeat CXR shows improvement  - Bld cultures- NGTD  - legionella/pneumo urinary antigen-neg  - lactate 0.9  - RVP antigen panel- neg  - started on cefepime- will transition to augmentin      Pancytopenia (improving)- secondary to chemotherapy  - WBC  1.6, Hgb 7.8, plts 102  - Transfuse to keep Hgb > 8 (CAD), plts >10,000  - transfused with  1 unit prbc 8/8  - Will not transfuse today eventhough Hgb < 8   Will reevaluate with AM labs    Triple negative breast cancer  -  Last chemo with Taxol  8/1            Case discussed with nurse and patient/family. Questions and concerns addressed.   Plan made in collaboration with Dr. Arriaga Client    Electronically signed by   ROMAIN De La Rosa NP on 8/9/2019 at 10:35 AM

## 2019-08-09 NOTE — PROGRESS NOTES
1015: Pt resting in bed and states she is \"feeling better\" today. Pt denies questions at this time of information given yesterday. Will follow supportively. Staff to call if needs arise.

## 2019-08-09 NOTE — PROGRESS NOTES
consulted. CXR on 8/7/2019 showed:     Interval improved however mild residual infiltrates within the left perihilar region and right lung base. There is stable blunting of the right lateral costophrenic angle suggesting a small amount of pleural fluid. Follow-up chest radiographs    recommended to confirm complete resolution      Of note, pt states that she was treated with pneumonia a month ago. Subjective: Patient seen and examined. Patient states that she feels great today, much more energetic as compared to yesterday. She reports she is able to get out of bed to use the bathroom. She denies dizziness, blurry vision, abdominal pain, nausea, vomiting, urinary tract infection symptoms, cough, shortness of breath, fever and chills. She states she is no longer having diarrhea.        Medications:  Reviewed    Infusion Medications    dextrose       Scheduled Medications    bumetanide  0.5 mg Intravenous Once    vitamin D  50,000 Units Oral Weekly    cefepime  2 g Intravenous Q12H    nystatin  5 mL Oral 4x Daily    amiodarone  200 mg Oral Daily    aspirin EC  81 mg Oral Daily    atorvastatin  80 mg Oral Daily    clopidogrel  75 mg Oral Daily    fluticasone  2 spray Nasal Daily    gabapentin  800 mg Oral QPM    insulin glargine  18 Units Subcutaneous Nightly    pregabalin  200 mg Oral BID    rOPINIRole  0.5 mg Oral Nightly    sodium chloride flush  10 mL Intravenous 2 times per day    enoxaparin  40 mg Subcutaneous Q24H    insulin lispro  0-6 Units Subcutaneous TID WC    insulin lispro  0-3 Units Subcutaneous Nightly     PRN Meds: glucose, dextrose, glucagon (rDNA), dextrose, ipratropium-albuterol, prochlorperazine, sodium chloride flush, magnesium hydroxide, ondansetron, acetaminophen      Intake/Output Summary (Last 24 hours) at 8/9/2019 0736  Last data filed at 8/9/2019 0620  Gross per 24 hour   Intake 1140.7 ml   Output 1350 ml   Net -209.3 ml       Diet:  DIET GENERAL; Low Sodium (2 GM)    Exam:  BP (!) 118/58   Pulse 80   Temp 98.5 °F (36.9 °C) (Oral)   Resp 16   Ht 5' 2\" (1.575 m)   Wt 209 lb (94.8 kg)   SpO2 94%   BMI 38.23 kg/m²     General appearance: No apparent distress, appears stated age and cooperative. HEENT: Pupils equal, round, and reactive to light. Conjunctivae/corneas clear. Neck: Supple, with full range of motion. No jugular venous distention. Trachea midline. Respiratory:  Normal respiratory effort. No rhonchi, rales, or wheezing. Cardiovascular: Regular rate and rhythm with normal S1/S2 without murmurs, rubs or gallops. Abdomen: Soft, non-tender, non-distended with normal bowel sounds. Musculoskeletal: passive and active ROM x 4 extremities. Skin: Skin color, texture, turgor normal.  No rashes or lesions. Neurologic:  Neurovascularly intact without any focal sensory/motor deficits. Cranial nerves: II-XII intact, grossly non-focal.  Psychiatric: Alert and oriented, thought content appropriate, normal insight  Capillary Refill: Brisk,< 3 seconds   Peripheral Pulses: +2 palpable, equal bilaterally       Labs:   Recent Labs     08/07/19  0529  08/08/19  0540 08/08/19  1715 08/09/19  0552   WBC 1.7*  --  1.4*  --  1.6*   HGB 7.4*   < > 7.4* 8.4* 7.8*   HCT 24.9*   < > 25.0* 28.6* 26.3*   PLT 96*  --  99*  --  102*    < > = values in this interval not displayed. Recent Labs     08/06/19  1233 08/07/19  0529 08/08/19  0540    139 142   K 5.0 4.5 4.7   CL 99 106 109   CO2 27 25 24   BUN 39* 27* 13   CREATININE 1.0 0.8 0.6   CALCIUM 8.3* 8.0* 8.0*     No results for input(s): AST, ALT, BILIDIR, BILITOT, ALKPHOS in the last 72 hours. No results for input(s): INR in the last 72 hours. No results for input(s): Thao Cam in the last 72 hours.     Urinalysis:    No results found for: Maegan Cote, BACTERIA, RBCUA, BLOODU, [address]    Radiology:  XR CHEST PORTABLE   Final Result      Improved pulmonary venous congestion however there is new

## 2019-08-09 NOTE — PLAN OF CARE
Problem: Falls - Risk of:  Goal: Will remain free from falls  Description  Will remain free from falls  Outcome: Ongoing  Note:   No falls this shift. Fall assessment completed. Pt using bed/chair alarms appropriately. Problem: Falls - Risk of:  Goal: Absence of physical injury  Description  Absence of physical injury  Outcome: Ongoing  Note:   See above. Problem: Bowel/Gastric:  Goal: Control of bowel function will improve  Description  Control of bowel function will improve  Outcome: Ongoing  Note:   Pt has had no loose stools this shift. Problem: Bowel/Gastric:  Goal: Ability to achieve a regular elimination pattern will improve  Description  Ability to achieve a regular elimination pattern will improve  Outcome: Ongoing  Note:   Toileting offered during hourly rounding. Problem: Nutritional:  Goal: Ability to follow a diet with enough fiber (20 to 30 grams) for normal bowel function will improve  Description  Ability to follow a diet with enough fiber (20 to 30 grams) for normal bowel function will improve  Outcome: Ongoing     Problem: Skin Integrity:  Goal: Risk for impaired skin integrity will decrease  Description  Risk for impaired skin integrity will decrease  Outcome: Ongoing  Note:   No skin breakdown this shift. Pt turns self while in bed. Will continue to monitor. Problem: Pain:  Goal: Pain level will decrease  Description  Pain level will decrease  Outcome: Ongoing  Note:   Pt complaints of no pain other than her chronic neuropathy pain. Plan of care reviewed with pt. Pt active with plan of care.
contribute to goal setting.

## 2019-08-10 ENCOUNTER — APPOINTMENT (OUTPATIENT)
Dept: GENERAL RADIOLOGY | Age: 74
DRG: 640 | End: 2019-08-10
Payer: MEDICARE

## 2019-08-10 VITALS
TEMPERATURE: 97.8 F | RESPIRATION RATE: 16 BRPM | WEIGHT: 209 LBS | DIASTOLIC BLOOD PRESSURE: 56 MMHG | BODY MASS INDEX: 38.46 KG/M2 | OXYGEN SATURATION: 92 % | SYSTOLIC BLOOD PRESSURE: 117 MMHG | HEIGHT: 62 IN | HEART RATE: 84 BPM

## 2019-08-10 PROBLEM — E55.9 VITAMIN D DEFICIENCY: Status: ACTIVE | Noted: 2019-08-10

## 2019-08-10 PROBLEM — D64.9 ANEMIA: Status: ACTIVE | Noted: 2019-08-10

## 2019-08-10 LAB
ACANTHOCYTES: ABNORMAL
ANION GAP SERPL CALCULATED.3IONS-SCNC: 8 MEQ/L (ref 8–16)
BASOPHILS # BLD: 1 %
BASOPHILS ABSOLUTE: 0 THOU/MM3 (ref 0–0.1)
BUN BLDV-MCNC: 12 MG/DL (ref 7–22)
CALCIUM SERPL-MCNC: 8.2 MG/DL (ref 8.5–10.5)
CHLORIDE BLD-SCNC: 109 MEQ/L (ref 98–111)
CO2: 24 MEQ/L (ref 23–33)
CREAT SERPL-MCNC: 0.5 MG/DL (ref 0.4–1.2)
ELLIPTOCYTES: ABNORMAL
EOSINOPHIL # BLD: 2.4 %
EOSINOPHILS ABSOLUTE: 0.1 THOU/MM3 (ref 0–0.4)
ERYTHROCYTE [DISTWIDTH] IN BLOOD BY AUTOMATED COUNT: 21.4 % (ref 11.5–14.5)
ERYTHROCYTE [DISTWIDTH] IN BLOOD BY AUTOMATED COUNT: 58.5 FL (ref 35–45)
GFR SERPL CREATININE-BSD FRML MDRD: > 90 ML/MIN/1.73M2
GLUCOSE BLD-MCNC: 118 MG/DL (ref 70–108)
GLUCOSE BLD-MCNC: 133 MG/DL (ref 70–108)
HCT VFR BLD CALC: 26.8 % (ref 37–47)
HEMOGLOBIN: 8 GM/DL (ref 12–16)
IMMATURE GRANS (ABS): 0.14 THOU/MM3 (ref 0–0.07)
IMMATURE GRANULOCYTES: 7 %
LYMPHOCYTES # BLD: 66 %
LYMPHOCYTES ABSOLUTE: 1.4 THOU/MM3 (ref 1–4.8)
MCH RBC QN AUTO: 22.6 PG (ref 26–33)
MCHC RBC AUTO-ENTMCNC: 29.9 GM/DL (ref 32.2–35.5)
MCV RBC AUTO: 75.7 FL (ref 81–99)
MONOCYTES # BLD: 16 %
MONOCYTES ABSOLUTE: 0.3 THOU/MM3 (ref 0.4–1.3)
NUCLEATED RED BLOOD CELLS: 2 /100 WBC
PATHOLOGIST REVIEW: ABNORMAL
PLATELET # BLD: 136 THOU/MM3 (ref 130–400)
POIKILOCYTES: ABNORMAL
POTASSIUM SERPL-SCNC: 3.9 MEQ/L (ref 3.5–5.2)
RBC # BLD: 3.54 MILL/MM3 (ref 4.2–5.4)
SCAN OF BLOOD SMEAR: NORMAL
SCHISTOCYTES: ABNORMAL
SEG NEUTROPHILS: 7.8 %
SEGMENTED NEUTROPHILS ABSOLUTE COUNT: 0.2 THOU/MM3 (ref 1.8–7.7)
SODIUM BLD-SCNC: 141 MEQ/L (ref 135–145)
TARGET CELLS: ABNORMAL
WBC # BLD: 2.1 THOU/MM3 (ref 4.8–10.8)

## 2019-08-10 PROCEDURE — 71045 X-RAY EXAM CHEST 1 VIEW: CPT

## 2019-08-10 PROCEDURE — 99238 HOSP IP/OBS DSCHRG MGMT 30/<: CPT | Performed by: FAMILY MEDICINE

## 2019-08-10 PROCEDURE — 6370000000 HC RX 637 (ALT 250 FOR IP): Performed by: NURSE PRACTITIONER

## 2019-08-10 PROCEDURE — 6360000002 HC RX W HCPCS: Performed by: FAMILY MEDICINE

## 2019-08-10 PROCEDURE — 80048 BASIC METABOLIC PNL TOTAL CA: CPT

## 2019-08-10 PROCEDURE — 82948 REAGENT STRIP/BLOOD GLUCOSE: CPT

## 2019-08-10 PROCEDURE — 2709999900 HC NON-CHARGEABLE SUPPLY

## 2019-08-10 PROCEDURE — 36591 DRAW BLOOD OFF VENOUS DEVICE: CPT

## 2019-08-10 PROCEDURE — 85025 COMPLETE CBC W/AUTO DIFF WBC: CPT

## 2019-08-10 RX ORDER — AMOXICILLIN AND CLAVULANATE POTASSIUM 500; 125 MG/1; MG/1
1 TABLET, FILM COATED ORAL EVERY 8 HOURS SCHEDULED
Qty: 21 TABLET | Refills: 0 | Status: SHIPPED | OUTPATIENT
Start: 2019-08-10 | End: 2019-08-17

## 2019-08-10 RX ORDER — HEPARIN SODIUM (PORCINE) LOCK FLUSH IV SOLN 100 UNIT/ML 100 UNIT/ML
100 SOLUTION INTRAVENOUS ONCE
Status: DISCONTINUED | OUTPATIENT
Start: 2019-08-10 | End: 2019-08-10

## 2019-08-10 RX ORDER — ERGOCALCIFEROL (VITAMIN D2) 1250 MCG
50000 CAPSULE ORAL WEEKLY
Qty: 5 CAPSULE | Refills: 0 | Status: ON HOLD | OUTPATIENT
Start: 2019-08-15 | End: 2019-08-28

## 2019-08-10 RX ADMIN — ASPIRIN 81 MG: 81 TABLET ORAL at 09:09

## 2019-08-10 RX ADMIN — FLUTICASONE PROPIONATE 2 SPRAY: 50 SPRAY, METERED NASAL at 09:10

## 2019-08-10 RX ADMIN — NYSTATIN 500000 UNITS: 100000 SUSPENSION ORAL at 09:09

## 2019-08-10 RX ADMIN — AMOXICILLIN AND CLAVULANATE POTASSIUM 1 TABLET: 500; 125 TABLET, FILM COATED ORAL at 05:04

## 2019-08-10 RX ADMIN — ATORVASTATIN CALCIUM 80 MG: 80 TABLET, FILM COATED ORAL at 09:09

## 2019-08-10 RX ADMIN — CLOPIDOGREL BISULFATE 75 MG: 75 TABLET ORAL at 09:09

## 2019-08-10 RX ADMIN — AMIODARONE HYDROCHLORIDE 200 MG: 200 TABLET ORAL at 09:09

## 2019-08-10 RX ADMIN — HEPARIN 500 UNITS: 100 SYRINGE at 11:05

## 2019-08-10 RX ADMIN — PREGABALIN 200 MG: 50 CAPSULE ORAL at 09:09

## 2019-08-10 ASSESSMENT — PAIN SCALES - GENERAL: PAINLEVEL_OUTOF10: 0

## 2019-08-10 NOTE — DISCHARGE SUMMARY
infiltrates within the left perihilar region and right lung base. There is stable blunting of the right lateral costophrenic angle suggesting a small amount of pleural fluid. Follow-up chest radiographs    recommended to confirm complete resolution      Received 1 unit PRBCs following Hgb of 7.4. Hgb leveled out to 8.0 in the two days post-transfusion with pt reporting significant increase in energy levels. Diarrhea resolved two days prior to admission as well. She is discharged with 1 week augmentin. Exam:     Vitals:  Vitals:    08/09/19 2121 08/10/19 0000 08/10/19 0515 08/10/19 0900   BP: 121/60 (!) 107/53 (!) 122/58 (!) 117/56   Pulse: 85  80 84   Resp: 18  16 16   Temp: 97.7 °F (36.5 °C)   97.8 °F (36.6 °C)   TempSrc: Oral   Oral   SpO2: 95%  96% 92%   Weight:       Height:         Weight: Weight: 209 lb (94.8 kg)     24 hour intake/output:    Intake/Output Summary (Last 24 hours) at 8/10/2019 1556  Last data filed at 8/10/2019 7374  Gross per 24 hour   Intake 1159 ml   Output 1000 ml   Net 159 ml         General appearance:  No apparent distress, appears stated age and cooperative. HEENT:  Normal cephalic, atraumatic without obvious deformity. Pupils equal, round, and reactive to light. Extra ocular muscles intact. Conjunctivae/corneas clear. Neck: Supple, with full range of motion. No jugular venous distention. Trachea midline. Respiratory:  Normal respiratory effort. Clear to auscultation, bilaterally without Rales/Wheezes/Rhonchi. Cardiovascular:  Regular rate and rhythm with normal S1/S2 without murmurs, rubs or gallops. Abdomen: Soft, non-tender, non-distended with normal bowel sounds. Musculoskeletal:  No clubbing, cyanosis or edema bilaterally. Full range of motion without deformity. Skin: Skin color, texture, turgor normal.  No rashes or lesions. Neurologic:  Neurovascularly intact without any focal sensory/motor deficits.  Cranial nerves: II-XII intact, grossly non-focal.  Psychiatric: Alert and oriented, thought content appropriate, normal insight  Capillary Refill: Brisk,< 3 seconds   Peripheral Pulses: +2 palpable, equal bilaterally       Labs: For convenience and continuity at follow-up the following most recent labs are provided:      CBC:    Lab Results   Component Value Date    WBC 2.1 08/10/2019    HGB 8.0 08/10/2019    HCT 26.8 08/10/2019     08/10/2019       Renal:    Lab Results   Component Value Date     08/10/2019    K 3.9 08/10/2019    K 4.5 08/07/2019     08/10/2019    CO2 24 08/10/2019    BUN 12 08/10/2019    CREATININE 0.5 08/10/2019    CALCIUM 8.2 08/10/2019         Significant Diagnostic Studies    Radiology:   XR CHEST PORTABLE   Final Result      Resolved right basilar infiltrate or atelectasis. Mild central pulmonary venous congestion which may be chronic for this patient. Mild cardiomegaly status post CABG surgery. **This report has been created using voice recognition software. It may contain minor errors which are inherent in voice recognition technology. **      Final report electronically signed by Dr. Jaziel Barlow on 8/10/2019 2:58 AM      XR CHEST PORTABLE   Final Result      Improved pulmonary venous congestion however there is new mild haziness at the right base which may represent localized pulmonary edema, early pneumonia or atelectasis. **This report has been created using voice recognition software. It may contain minor errors which are inherent in voice recognition technology. **      Final report electronically signed by Dr. Jaziel Barlow on 8/9/2019 4:46 AM      XR CHEST PORTABLE   Final Result      Pulmonary venous congestion without evidence of pulmonary edema or pneumonia. **This report has been created using voice recognition software. It may contain minor errors which are inherent in voice recognition technology. **      Final report electronically signed by Dr. Jaziel Barlow on 8/8/2019 4:58 AM      XR CHEST PORTABLE   Final Result   1. Interval improved however mild residual infiltrates within the left perihilar region and right lung base. There is stable blunting of the right lateral costophrenic angle suggesting a small amount of pleural fluid. Follow-up chest radiographs    recommended to confirm complete resolution. **This report has been created using voice recognition software. It may contain minor errors which are inherent in voice recognition technology. **      Final report electronically signed by Dr. Aleksandar Little on 8/7/2019 10:43 AM             Consults:     IP CONSULT TO ONCOLOGY  IP CONSULT TO SOCIAL WORK  PALLIATIVE CARE EVAL  IP CONSULT TO HOME CARE NEEDS    Disposition:    [x] Home       [] TCU       [] Rehab       [] Psych       [] SNF       [] [place]       [] Other-    Condition at Discharge: Stable    Code Status:  Full Code    Patient Instructions: Activity: activity as tolerated  Diet: Carb control, low salt diet  Please follow up with oncology and PCP in 1-2 weeks. Follow-up visits:   Jeffrey Payan MD  [address] [place] [facility]  [phone]    Schedule an appointment as soon as possible for a visit in 1 week  For post hospital follow up    [address] [facility]  [phone]             Discharge Medications:      Farooq Ros \"Sheba\"   Home Medication Instructions OBT:[id number]    Printed on:08/10/19 1253   Medication Information                      amiodarone (CORDARONE) 200 MG tablet  TAKE 1 TABLET DAILY             amoxicillin-clavulanate (AUGMENTIN) 500-125 MG per tablet  Take 1 tablet by mouth every 8 hours for 7 days             aspirin EC 81 MG EC tablet  Take 1 tablet by mouth daily             atorvastatin (LIPITOR) 80 MG tablet  Take 1 tablet by mouth daily             blood glucose test strips (ASCENSIA AUTODISC VI;ONE TOUCH ULTRA TEST VI) strip  Test once daily.   Dispense One Touch

## 2019-08-11 ENCOUNTER — CARE COORDINATION (OUTPATIENT)
Dept: CASE MANAGEMENT | Age: 74
End: 2019-08-11

## 2019-08-11 DIAGNOSIS — E86.1 HYPOTENSION DUE TO HYPOVOLEMIA: Primary | ICD-10-CM

## 2019-08-11 DIAGNOSIS — I95.89 HYPOTENSION DUE TO HYPOVOLEMIA: Primary | ICD-10-CM

## 2019-08-11 PROCEDURE — 1111F DSCHRG MED/CURRENT MED MERGE: CPT | Performed by: FAMILY MEDICINE

## 2019-08-12 LAB — BLOOD CULTURE, ROUTINE: NORMAL

## 2019-08-12 NOTE — CARE COORDINATION
8/12/19, 8:33 AM    DISCHARGE BARRIERS    Patient discharged home on 8/10/19. Call to [facility] ([facility])-they have received AVS and staff has already been out to see her. No other needs at this time. 8/12/19, 8:39 AM    Discharge plan discussed by  and . Discharge plan reviewed with patient/ family. Patient/ family verbalize understanding of discharge plan and are in agreement with plan. Understanding was demonstrated using the teach back method.    Services After Discharge  Services At/After Discharge: Nursing Services, OT, PT([facility] [place])   IMM Letter  IMM Letter given to Patient/Family/Significant other/Guardian/POA/by[de-identified] updated  IMM Letter date given[de-identified] 08/09/19  IMM Letter time given[de-identified] 200

## 2019-08-14 ENCOUNTER — CARE COORDINATION (OUTPATIENT)
Dept: CASE MANAGEMENT | Age: 74
End: 2019-08-14

## 2019-08-14 DIAGNOSIS — C50.411 MALIGNANT NEOPLASM OF UPPER-OUTER QUADRANT OF RIGHT BREAST IN FEMALE, ESTROGEN RECEPTOR NEGATIVE (HCC): Primary | ICD-10-CM

## 2019-08-14 DIAGNOSIS — Z17.1 MALIGNANT NEOPLASM OF UPPER-OUTER QUADRANT OF RIGHT BREAST IN FEMALE, ESTROGEN RECEPTOR NEGATIVE (HCC): Primary | ICD-10-CM

## 2019-08-14 RX ORDER — SODIUM CHLORIDE 0.9 % (FLUSH) 0.9 %
10 SYRINGE (ML) INJECTION PRN
Status: CANCELLED | OUTPATIENT
Start: 2019-08-20

## 2019-08-14 RX ORDER — HEPARIN SODIUM (PORCINE) LOCK FLUSH IV SOLN 100 UNIT/ML 100 UNIT/ML
500 SOLUTION INTRAVENOUS PRN
Status: CANCELLED | OUTPATIENT
Start: 2019-08-20

## 2019-08-14 RX ORDER — MEPERIDINE HYDROCHLORIDE 50 MG/ML
12.5 INJECTION INTRAMUSCULAR; INTRAVENOUS; SUBCUTANEOUS ONCE
Status: CANCELLED | OUTPATIENT
Start: 2019-08-20

## 2019-08-14 RX ORDER — SODIUM CHLORIDE 0.9 % (FLUSH) 0.9 %
5 SYRINGE (ML) INJECTION PRN
Status: CANCELLED | OUTPATIENT
Start: 2019-08-20

## 2019-08-14 RX ORDER — SODIUM CHLORIDE 9 MG/ML
20 INJECTION, SOLUTION INTRAVENOUS ONCE
Status: CANCELLED | OUTPATIENT
Start: 2019-08-20

## 2019-08-14 RX ORDER — DIPHENHYDRAMINE HYDROCHLORIDE 50 MG/ML
50 INJECTION INTRAMUSCULAR; INTRAVENOUS ONCE
Status: CANCELLED | OUTPATIENT
Start: 2019-08-20

## 2019-08-14 RX ORDER — SODIUM CHLORIDE 9 MG/ML
INJECTION, SOLUTION INTRAVENOUS CONTINUOUS
Status: CANCELLED | OUTPATIENT
Start: 2019-08-20

## 2019-08-14 RX ORDER — EPINEPHRINE 1 MG/ML
0.3 INJECTION, SOLUTION, CONCENTRATE INTRAVENOUS PRN
Status: CANCELLED | OUTPATIENT
Start: 2019-08-20

## 2019-08-14 RX ORDER — METHYLPREDNISOLONE SODIUM SUCCINATE 125 MG/2ML
125 INJECTION, POWDER, LYOPHILIZED, FOR SOLUTION INTRAMUSCULAR; INTRAVENOUS ONCE
Status: CANCELLED | OUTPATIENT
Start: 2019-08-20

## 2019-08-14 ASSESSMENT — ENCOUNTER SYMPTOMS
NAUSEA: 0
FACIAL SWELLING: 0
ABDOMINAL DISTENTION: 0
COLOR CHANGE: 0
BLOOD IN STOOL: 0
COUGH: 1
VOMITING: 0
SORE THROAT: 0
DIARRHEA: 0
EYE DISCHARGE: 0
SHORTNESS OF BREATH: 1
BACK PAIN: 1
WHEEZING: 0
RECTAL PAIN: 0
TROUBLE SWALLOWING: 0
CHEST TIGHTNESS: 0
ABDOMINAL PAIN: 0
CONSTIPATION: 0

## 2019-08-15 ENCOUNTER — HOSPITAL ENCOUNTER (OUTPATIENT)
Dept: INFUSION THERAPY | Age: 74
Discharge: HOME | End: 2019-08-15
Payer: MEDICARE

## 2019-08-15 ENCOUNTER — TELEPHONE (OUTPATIENT)
Dept: CARDIOLOGY CLINIC | Age: 74
End: 2019-08-15

## 2019-08-15 ENCOUNTER — OFFICE VISIT (OUTPATIENT)
Dept: ONCOLOGY | Age: 74
End: 2019-08-15
Payer: MEDICARE

## 2019-08-15 VITALS
DIASTOLIC BLOOD PRESSURE: 55 MMHG | HEIGHT: 62 IN | WEIGHT: 205.2 LBS | SYSTOLIC BLOOD PRESSURE: 106 MMHG | RESPIRATION RATE: 18 BRPM | OXYGEN SATURATION: 95 % | TEMPERATURE: 98 F | BODY MASS INDEX: 37.76 KG/M2 | HEART RATE: 73 BPM

## 2019-08-15 VITALS
HEIGHT: 62 IN | HEART RATE: 77 BPM | DIASTOLIC BLOOD PRESSURE: 51 MMHG | RESPIRATION RATE: 18 BRPM | BODY MASS INDEX: 37.76 KG/M2 | WEIGHT: 205.2 LBS | TEMPERATURE: 98.2 F | OXYGEN SATURATION: 98 % | SYSTOLIC BLOOD PRESSURE: 95 MMHG

## 2019-08-15 DIAGNOSIS — Z17.1 MALIGNANT NEOPLASM OF UPPER-OUTER QUADRANT OF RIGHT BREAST IN FEMALE, ESTROGEN RECEPTOR NEGATIVE (HCC): Primary | ICD-10-CM

## 2019-08-15 DIAGNOSIS — D50.0 IRON DEFICIENCY ANEMIA DUE TO CHRONIC BLOOD LOSS: ICD-10-CM

## 2019-08-15 DIAGNOSIS — C50.411 MALIGNANT NEOPLASM OF UPPER-OUTER QUADRANT OF RIGHT BREAST IN FEMALE, ESTROGEN RECEPTOR NEGATIVE (HCC): Primary | ICD-10-CM

## 2019-08-15 DIAGNOSIS — C50.911 BREAST CANCER METASTASIZED TO AXILLARY LYMPH NODE, RIGHT (HCC): ICD-10-CM

## 2019-08-15 DIAGNOSIS — C77.3 BREAST CANCER METASTASIZED TO AXILLARY LYMPH NODE, RIGHT (HCC): ICD-10-CM

## 2019-08-15 DIAGNOSIS — D50.8 OTHER IRON DEFICIENCY ANEMIA: ICD-10-CM

## 2019-08-15 DIAGNOSIS — J96.21 ACUTE AND CHRONIC RESPIRATORY FAILURE WITH HYPOXIA (HCC): ICD-10-CM

## 2019-08-15 DIAGNOSIS — E11.42 DIABETIC POLYNEUROPATHY ASSOCIATED WITH TYPE 2 DIABETES MELLITUS (HCC): ICD-10-CM

## 2019-08-15 LAB
ALBUMIN SERPL-MCNC: 3.3 G/DL (ref 3.5–5.1)
ALP BLD-CCNC: 86 U/L (ref 38–126)
ALT SERPL-CCNC: 17 U/L (ref 11–66)
AST SERPL-CCNC: 15 U/L (ref 5–40)
BILIRUB SERPL-MCNC: 0.3 MG/DL (ref 0.3–1.2)
BILIRUBIN DIRECT: < 0.2 MG/DL (ref 0–0.3)
BUN, WHOLE BLOOD: 16 MG/DL (ref 8–26)
CHLORIDE, WHOLE BLOOD: 96 MEQ/L (ref 98–109)
CREATININE, WHOLE BLOOD: 0.9 MG/DL (ref 0.5–1.2)
ERYTHROCYTE [DISTWIDTH] IN BLOOD BY AUTOMATED COUNT: 22.2 % (ref 11.5–14.5)
ERYTHROCYTE [DISTWIDTH] IN BLOOD BY AUTOMATED COUNT: 59.6 FL (ref 35–45)
GFR, ESTIMATED: 65 ML/MIN/1.73M2
GLUCOSE, WHOLE BLOOD: 90 MG/DL (ref 70–108)
HCT VFR BLD CALC: 28 % (ref 37–47)
HEMOGLOBIN: 8.1 GM/DL (ref 12–16)
IONIZED CALCIUM, WHOLE BLOOD: 1.13 MMOL/L (ref 1.12–1.32)
MCH RBC QN AUTO: 22.1 PG (ref 26–33)
MCHC RBC AUTO-ENTMCNC: 28.9 GM/DL (ref 32.2–35.5)
MCV RBC AUTO: 76.5 FL (ref 81–99)
PLATELET # BLD: 226 THOU/MM3 (ref 130–400)
PMV BLD AUTO: 11.6 FL (ref 9.4–12.4)
POTASSIUM, WHOLE BLOOD: 5 MEQ/L (ref 3.5–4.9)
RBC # BLD: 3.66 MILL/MM3 (ref 4.2–5.4)
SEGMENTED NEUTROPHILS ABSOLUTE COUNT: 4.3 THOU/MM3 (ref 1.8–7.7)
SODIUM, WHOLE BLOOD: 134 MEQ/L (ref 138–146)
TOTAL CO2, WHOLE BLOOD: 27 MEQ/L (ref 23–33)
TOTAL PROTEIN: 6.2 G/DL (ref 6.1–8)
WBC # BLD: 8.1 THOU/MM3 (ref 4.8–10.8)

## 2019-08-15 PROCEDURE — G8400 PT W/DXA NO RESULTS DOC: HCPCS | Performed by: INTERNAL MEDICINE

## 2019-08-15 PROCEDURE — 80047 BASIC METABLC PNL IONIZED CA: CPT

## 2019-08-15 PROCEDURE — G8427 DOCREV CUR MEDS BY ELIG CLIN: HCPCS | Performed by: INTERNAL MEDICINE

## 2019-08-15 PROCEDURE — 1123F ACP DISCUSS/DSCN MKR DOCD: CPT | Performed by: INTERNAL MEDICINE

## 2019-08-15 PROCEDURE — 80076 HEPATIC FUNCTION PANEL: CPT

## 2019-08-15 PROCEDURE — 96365 THER/PROPH/DIAG IV INF INIT: CPT

## 2019-08-15 PROCEDURE — 3017F COLORECTAL CA SCREEN DOC REV: CPT | Performed by: INTERNAL MEDICINE

## 2019-08-15 PROCEDURE — 1111F DSCHRG MED/CURRENT MED MERGE: CPT | Performed by: INTERNAL MEDICINE

## 2019-08-15 PROCEDURE — 85027 COMPLETE CBC AUTOMATED: CPT

## 2019-08-15 PROCEDURE — G8598 ASA/ANTIPLAT THER USED: HCPCS | Performed by: INTERNAL MEDICINE

## 2019-08-15 PROCEDURE — 1036F TOBACCO NON-USER: CPT | Performed by: INTERNAL MEDICINE

## 2019-08-15 PROCEDURE — 3044F HG A1C LEVEL LT 7.0%: CPT | Performed by: INTERNAL MEDICINE

## 2019-08-15 PROCEDURE — 6360000002 HC RX W HCPCS: Performed by: INTERNAL MEDICINE

## 2019-08-15 PROCEDURE — 1090F PRES/ABSN URINE INCON ASSESS: CPT | Performed by: INTERNAL MEDICINE

## 2019-08-15 PROCEDURE — 36591 DRAW BLOOD OFF VENOUS DEVICE: CPT

## 2019-08-15 PROCEDURE — 2580000003 HC RX 258: Performed by: INTERNAL MEDICINE

## 2019-08-15 PROCEDURE — 99213 OFFICE O/P EST LOW 20 MIN: CPT | Performed by: INTERNAL MEDICINE

## 2019-08-15 PROCEDURE — 4040F PNEUMOC VAC/ADMIN/RCVD: CPT | Performed by: INTERNAL MEDICINE

## 2019-08-15 PROCEDURE — 99211 OFF/OP EST MAY X REQ PHY/QHP: CPT

## 2019-08-15 PROCEDURE — G8417 CALC BMI ABV UP PARAM F/U: HCPCS | Performed by: INTERNAL MEDICINE

## 2019-08-15 PROCEDURE — 2022F DILAT RTA XM EVC RTNOPTHY: CPT | Performed by: INTERNAL MEDICINE

## 2019-08-15 RX ORDER — SODIUM CHLORIDE 0.9 % (FLUSH) 0.9 %
10 SYRINGE (ML) INJECTION PRN
Status: DISCONTINUED | OUTPATIENT
Start: 2019-08-15 | End: 2019-08-16 | Stop reason: HOSPADM

## 2019-08-15 RX ORDER — SODIUM CHLORIDE 0.9 % (FLUSH) 0.9 %
10 SYRINGE (ML) INJECTION PRN
Status: CANCELLED | OUTPATIENT
Start: 2019-08-22

## 2019-08-15 RX ORDER — HEPARIN SODIUM (PORCINE) LOCK FLUSH IV SOLN 100 UNIT/ML 100 UNIT/ML
500 SOLUTION INTRAVENOUS PRN
Status: CANCELLED | OUTPATIENT
Start: 2019-08-22

## 2019-08-15 RX ORDER — DIPHENHYDRAMINE HYDROCHLORIDE 50 MG/ML
50 INJECTION INTRAMUSCULAR; INTRAVENOUS ONCE
Status: CANCELLED | OUTPATIENT
Start: 2019-08-22

## 2019-08-15 RX ORDER — SODIUM CHLORIDE 9 MG/ML
INJECTION, SOLUTION INTRAVENOUS CONTINUOUS
Status: CANCELLED | OUTPATIENT
Start: 2019-08-22

## 2019-08-15 RX ORDER — SODIUM CHLORIDE 0.9 % (FLUSH) 0.9 %
5 SYRINGE (ML) INJECTION PRN
Status: CANCELLED | OUTPATIENT
Start: 2019-08-22

## 2019-08-15 RX ORDER — HEPARIN SODIUM (PORCINE) LOCK FLUSH IV SOLN 100 UNIT/ML 100 UNIT/ML
500 SOLUTION INTRAVENOUS PRN
Status: DISCONTINUED | OUTPATIENT
Start: 2019-08-15 | End: 2019-08-16 | Stop reason: HOSPADM

## 2019-08-15 RX ORDER — SODIUM CHLORIDE 9 MG/ML
INJECTION, SOLUTION INTRAVENOUS CONTINUOUS
Status: DISCONTINUED | OUTPATIENT
Start: 2019-08-15 | End: 2019-08-15 | Stop reason: HOSPADM

## 2019-08-15 RX ORDER — METHYLPREDNISOLONE SODIUM SUCCINATE 125 MG/2ML
125 INJECTION, POWDER, LYOPHILIZED, FOR SOLUTION INTRAMUSCULAR; INTRAVENOUS ONCE
Status: CANCELLED | OUTPATIENT
Start: 2019-08-22

## 2019-08-15 RX ADMIN — SODIUM CHLORIDE: 9 INJECTION, SOLUTION INTRAVENOUS at 09:35

## 2019-08-15 RX ADMIN — Medication 10 ML: at 10:50

## 2019-08-15 RX ADMIN — FERUMOXYTOL 510 MG: 510 INJECTION INTRAVENOUS at 09:39

## 2019-08-15 RX ADMIN — Medication 500 UNITS: at 10:50

## 2019-08-15 RX ADMIN — Medication 10 ML: at 08:30

## 2019-08-15 RX ADMIN — Medication 10 ML: at 08:31

## 2019-08-15 NOTE — PROGRESS NOTES
Years since quittin.9    Smokeless tobacco: Never Used   Substance and Sexual Activity    Alcohol use: No    Drug use: No    Sexual activity: Not on file   Lifestyle    Physical activity:     Days per week: Not on file     Minutes per session: Not on file    Stress: Not on file   Relationships    Social connections:     Talks on phone: Not on file     Gets together: Not on file     Attends Latter day service: Not on file     Active member of club or organization: Not on file     Attends meetings of clubs or organizations: Not on file     Relationship status: Not on file    Intimate partner violence:     Fear of current or ex partner: Not on file     Emotionally abused: Not on file     Physically abused: Not on file     Forced sexual activity: Not on file   Other Topics Concern    Not on file   Social History Narrative    Not on file     Family History          Problem Relation Age of Onset    Diabetes Father     Cancer Father 80        Bone    Hypertension Mother     Heart Disease Mother     No Known Problems Sister     No Known Problems Brother     No Known Problems Brother     Breast Cancer Neg Hx     Colon Cancer Neg Hx      ROS     Review of Systems   Constitutional: Positive for activity change and fatigue. Negative for appetite change and fever. HENT: Negative for congestion, dental problem, facial swelling, hearing loss, mouth sores, nosebleeds, sore throat, tinnitus and trouble swallowing. Eyes: Negative for discharge and visual disturbance. Respiratory: Positive for cough and shortness of breath. Negative for chest tightness and wheezing. Cardiovascular: Positive for leg swelling. Negative for chest pain and palpitations. Gastrointestinal: Negative for abdominal distention, abdominal pain, blood in stool, constipation, diarrhea, nausea, rectal pain and vomiting. Endocrine: Negative for cold intolerance, polydipsia and polyuria.    Genitourinary: Negative for decreased urine volume, difficulty urinating, dysuria, flank pain, hematuria and urgency. Musculoskeletal: Positive for arthralgias and back pain. Negative for gait problem, joint swelling, myalgias and neck stiffness. Skin: Negative for color change, rash and wound. Neurological: Negative for dizziness, tremors, seizures, speech difficulty, weakness, light-headedness, numbness and headaches. Hematological: Negative for adenopathy. Does not bruise/bleed easily. Psychiatric/Behavioral: Negative for confusion and sleep disturbance. The patient is not nervous/anxious. Vitals     height is 5' 2.01\" (1.575 m) and weight is 205 lb 3.2 oz (93.1 kg). Her oral temperature is 98.2 °F (36.8 °C). Her blood pressure is 95/51 (abnormal) and her pulse is 77. Her respiration is 18 and oxygen saturation is 98%. Exam   Physical Exam   Constitutional: She is oriented to person, place, and time. She appears well-developed and well-nourished. No distress. HENT:   Head: Normocephalic. Mouth/Throat: Oropharynx is clear and moist. No oropharyngeal exudate. Eyes: Pupils are equal, round, and reactive to light. EOM are normal. Right eye exhibits no discharge. Left eye exhibits no discharge. No scleral icterus. Neck: Normal range of motion. Neck supple. No JVD present. No tracheal deviation present. No thyromegaly present. Cardiovascular: Normal rate and normal heart sounds. Exam reveals no gallop and no friction rub. No murmur heard. Pulmonary/Chest: Effort normal. No stridor. No respiratory distress. She has decreased breath sounds. She has no wheezes. She has no rales. She exhibits no tenderness. Right breast exhibits mass (3 cm mass palpable in the outer lower quadrant). Abdominal: Soft. Bowel sounds are normal. She exhibits no distension and no mass. There is no tenderness. There is no rebound. Musculoskeletal: Normal range of motion. She exhibits no edema. Good range of motion in all four extremities. masses are identified. 2. The common bile duct measures 9 mm which is prominent in caliber but likely related to prior cholecystectomy.         CBC today showed:   WBC 8.1, hemoglobin 8.1, MCV 76.5, platelet count 050,544. Iron studies showed ferritin: 14, iron: 22, iron saturation: 6  Assessment/Recommendations   1. Right breast triple negative breast cancer with biopsy-proven axillary lymph node involvement. PET scan showed questionable pulmonary nodule. It was not seen on prior CTA. The patient had CT of the chest to see whether nodule is amenable to CT-guided biopsy. Due to the size and location the CT-guided biopsy will have a low yield  I had a lengthy discussion with the patient and her family that although pulmonary nodule is suspicious we do not know definitely that this is a metastatic focus. My recommendation is to proceed with standard chemotherapy. The patient is older than 78years of age and she has history of CHF, so we started treatment with dose dense Taxol on August 1, 2019. Her cycle #1 was complicated by hospitalization for hypotension, pneumonia and anemia. Her first treatment with Taxol made her pre-existing peripheral neuropathy worse. Today the patient is afebrile. However she continues to complain of fatigue and significant peripheral neuropathy. Taxol is on hold today. Since the patient had has iron deficiency anemia she will receive Feraheme infusion today. 3. Pneumonia with pleural effusion. Improving with antibiotic treatment.    Electronically signed by     Magali Alvarado MD on 9/8/2019 at 7:45 PM

## 2019-08-15 NOTE — PROGRESS NOTES
Patient assessed for the following post feraheme  Dizziness   No  Lightheadedness  No      Acute nausea/vomiting No  Headache   No  Chest pain/pressure  No  Rash/itching   No  Shortness of breath  No    Patient kept for 30 minutes observation post infusion . Patient tolerated feraheme without any complications. Last vital signs:   BP (!) 106/55   Pulse 73   Temp 98 °F (36.7 °C) (Oral)   Resp 18   Ht 5' 2\" (1.575 m)   Wt 205 lb 3.2 oz (93.1 kg)   SpO2 95%   BMI 37.53 kg/m²         Patient instructed if experience any of the above symptoms following today's infusion,he/she is to notify MD immediately or go to the emergency department. Discharge instructions given to patient. Verbalizes understanding. Ambulated off unit per self with belongings.

## 2019-08-15 NOTE — PLAN OF CARE
Care plan reviewed with patient. Patient verbalized understanding of the plan of care and contribute to goal setting. Problem: Intellectual/Education/Knowledge Deficit  Goal: Teaching initiated upon admission  Outcome: Met This Shift  Note:   Patient verbalizes understanding to verbal information given on feraheme,action and possible side effects. Aware to call MD if develop complications. Intervention: Verbal/written education provided  Note:   Discuss feraheme     Problem: Discharge Planning  Goal: Knowledge of discharge instructions  Description  Knowledge of discharge instructions     Outcome: Met This Shift  Note:   Verbalized understanding of discharge instructions, follow ups and when to call doctor   Intervention: Discharge to appropriate level of care  Note:   Discuss discharge instructions, follow ups and when to call doctor. Problem: Falls - Risk of:  Goal: Will remain free from falls  Description  Will remain free from falls  Outcome: Met This Shift  Note:   Free from falls while in infusion center. Verbalized understanding of fall prevention and to ask for assistance with ambulation   Intervention: Assess risk factors for falls  Description  Assess risk factors for falls  Note:   Assess for fall risk, instruct to ask for assistance with ambulation      Problem: Infection - Central Venous Catheter-Associated Bloodstream Infection:  Goal: Will show no infection signs and symptoms  Description  Will show no infection signs and symptoms  Outcome: Met This Shift  Note:   Mediport site with no redness or warmth. Skin over port intact with no signs of breakdown noted. Patient verbalizes signs/symptoms of port infection and when to notify the physician.     Intervention: Infection risk assessment  Description  Infection risk assessment  Note:   Discuss port maintenance, infection prevention, signs and when to call

## 2019-08-19 ENCOUNTER — CARE COORDINATION (OUTPATIENT)
Dept: CASE MANAGEMENT | Age: 74
End: 2019-08-19

## 2019-08-19 DIAGNOSIS — C50.411 MALIGNANT NEOPLASM OF UPPER-OUTER QUADRANT OF RIGHT BREAST IN FEMALE, ESTROGEN RECEPTOR NEGATIVE (HCC): ICD-10-CM

## 2019-08-19 DIAGNOSIS — E86.1 HYPOTENSION DUE TO HYPOVOLEMIA: Primary | ICD-10-CM

## 2019-08-19 DIAGNOSIS — I95.89 HYPOTENSION DUE TO HYPOVOLEMIA: Primary | ICD-10-CM

## 2019-08-19 DIAGNOSIS — Z17.1 MALIGNANT NEOPLASM OF UPPER-OUTER QUADRANT OF RIGHT BREAST IN FEMALE, ESTROGEN RECEPTOR NEGATIVE (HCC): ICD-10-CM

## 2019-08-20 ENCOUNTER — HOSPITAL ENCOUNTER (INPATIENT)
Age: 74
LOS: 5 days | Discharge: HOME HEALTH | DRG: 291 | End: 2019-08-25
Attending: FAMILY MEDICINE | Admitting: INTERNAL MEDICINE
Payer: MEDICARE

## 2019-08-20 ENCOUNTER — APPOINTMENT (OUTPATIENT)
Dept: CT IMAGING | Age: 74
DRG: 291 | End: 2019-08-20
Payer: MEDICARE

## 2019-08-20 ENCOUNTER — HOSPITAL ENCOUNTER (OUTPATIENT)
Dept: INFUSION THERAPY | Age: 74
Discharge: HOME | DRG: 291 | End: 2019-08-20
Payer: MEDICARE

## 2019-08-20 ENCOUNTER — APPOINTMENT (OUTPATIENT)
Dept: GENERAL RADIOLOGY | Age: 74
DRG: 291 | End: 2019-08-20
Payer: MEDICARE

## 2019-08-20 VITALS
OXYGEN SATURATION: 74 % | HEART RATE: 74 BPM | RESPIRATION RATE: 18 BRPM | BODY MASS INDEX: 37.54 KG/M2 | TEMPERATURE: 97.6 F | WEIGHT: 204 LBS | DIASTOLIC BLOOD PRESSURE: 61 MMHG | HEIGHT: 62 IN | SYSTOLIC BLOOD PRESSURE: 121 MMHG

## 2019-08-20 DIAGNOSIS — D50.0 IRON DEFICIENCY ANEMIA DUE TO CHRONIC BLOOD LOSS: ICD-10-CM

## 2019-08-20 DIAGNOSIS — C50.912 MALIGNANT NEOPLASM OF LEFT FEMALE BREAST, UNSPECIFIED ESTROGEN RECEPTOR STATUS, UNSPECIFIED SITE OF BREAST (HCC): ICD-10-CM

## 2019-08-20 DIAGNOSIS — R09.02 HYPOXIA: Primary | ICD-10-CM

## 2019-08-20 DIAGNOSIS — N63.0 BREAST MASS: ICD-10-CM

## 2019-08-20 DIAGNOSIS — I50.43 ACUTE ON CHRONIC COMBINED SYSTOLIC AND DIASTOLIC CONGESTIVE HEART FAILURE (HCC): ICD-10-CM

## 2019-08-20 DIAGNOSIS — Z17.1 MALIGNANT NEOPLASM OF UPPER-OUTER QUADRANT OF RIGHT BREAST IN FEMALE, ESTROGEN RECEPTOR NEGATIVE (HCC): Primary | ICD-10-CM

## 2019-08-20 DIAGNOSIS — C50.411 MALIGNANT NEOPLASM OF UPPER-OUTER QUADRANT OF RIGHT BREAST IN FEMALE, ESTROGEN RECEPTOR NEGATIVE (HCC): Primary | ICD-10-CM

## 2019-08-20 PROBLEM — I50.23 ACUTE ON CHRONIC SYSTOLIC CHF (CONGESTIVE HEART FAILURE) (HCC): Status: ACTIVE | Noted: 2019-08-20

## 2019-08-20 LAB
ALBUMIN SERPL-MCNC: 3.6 G/DL (ref 3.5–5.1)
ALBUMIN SERPL-MCNC: 3.6 G/DL (ref 3.5–5.1)
ALLEN TEST: ABNORMAL
ALP BLD-CCNC: 129 U/L (ref 38–126)
ALP BLD-CCNC: 85 U/L (ref 38–126)
ALT SERPL-CCNC: 14 U/L (ref 11–66)
ALT SERPL-CCNC: 31 U/L (ref 11–66)
ANION GAP SERPL CALCULATED.3IONS-SCNC: 12 MEQ/L (ref 8–16)
ANISOCYTOSIS: PRESENT
APTT: 29.5 SECONDS (ref 22–38)
AST SERPL-CCNC: 16 U/L (ref 5–40)
AST SERPL-CCNC: 40 U/L (ref 5–40)
BASE EXCESS (CALCULATED): 0.5 MMOL/L (ref -2.5–2.5)
BASOPHILS # BLD: 1.1 %
BASOPHILS ABSOLUTE: 0.1 THOU/MM3 (ref 0–0.1)
BILIRUB SERPL-MCNC: 0.2 MG/DL (ref 0.3–1.2)
BILIRUB SERPL-MCNC: 0.4 MG/DL (ref 0.3–1.2)
BILIRUBIN DIRECT: < 0.2 MG/DL (ref 0–0.3)
BUN BLDV-MCNC: 15 MG/DL (ref 7–22)
BUN, WHOLE BLOOD: 16 MG/DL (ref 8–26)
CALCIUM SERPL-MCNC: 9.1 MG/DL (ref 8.5–10.5)
CHLORIDE BLD-SCNC: 97 MEQ/L (ref 98–111)
CHLORIDE, WHOLE BLOOD: 96 MEQ/L (ref 98–109)
CO2: 27 MEQ/L (ref 23–33)
COLLECTED BY:: ABNORMAL
CREAT SERPL-MCNC: 0.8 MG/DL (ref 0.4–1.2)
CREATININE, WHOLE BLOOD: 0.8 MG/DL (ref 0.5–1.2)
DEVICE: ABNORMAL
EKG ATRIAL RATE: 115 BPM
EKG ATRIAL RATE: 66 BPM
EKG ATRIAL RATE: 73 BPM
EKG P-R INTERVAL: 216 MS
EKG P-R INTERVAL: 216 MS
EKG Q-T INTERVAL: 434 MS
EKG Q-T INTERVAL: 478 MS
EKG Q-T INTERVAL: 492 MS
EKG QRS DURATION: 104 MS
EKG QRS DURATION: 164 MS
EKG QRS DURATION: 178 MS
EKG QTC CALCULATION (BAZETT): 471 MS
EKG QTC CALCULATION (BAZETT): 531 MS
EKG QTC CALCULATION (BAZETT): 548 MS
EKG R AXIS: -71 DEGREES
EKG R AXIS: -77 DEGREES
EKG R AXIS: 43 DEGREES
EKG T AXIS: -72 DEGREES
EKG T AXIS: 96 DEGREES
EKG T AXIS: 97 DEGREES
EKG VENTRICULAR RATE: 70 BPM
EKG VENTRICULAR RATE: 71 BPM
EKG VENTRICULAR RATE: 79 BPM
EOSINOPHIL # BLD: 0 %
EOSINOPHILS ABSOLUTE: 0 THOU/MM3 (ref 0–0.4)
ERYTHROCYTE [DISTWIDTH] IN BLOOD BY AUTOMATED COUNT: 22.7 % (ref 11.5–14.5)
ERYTHROCYTE [DISTWIDTH] IN BLOOD BY AUTOMATED COUNT: 23.2 % (ref 11.5–14.5)
ERYTHROCYTE [DISTWIDTH] IN BLOOD BY AUTOMATED COUNT: 59.2 FL (ref 35–45)
ERYTHROCYTE [DISTWIDTH] IN BLOOD BY AUTOMATED COUNT: 60.2 FL (ref 35–45)
GFR SERPL CREATININE-BSD FRML MDRD: 70 ML/MIN/1.73M2
GFR, ESTIMATED: 74 ML/MIN/1.73M2
GLUCOSE BLD-MCNC: 276 MG/DL (ref 70–108)
GLUCOSE, WHOLE BLOOD: 96 MG/DL (ref 70–108)
HCO3: 27 MMOL/L (ref 23–28)
HCT VFR BLD CALC: 29.6 % (ref 37–47)
HCT VFR BLD CALC: 32.5 % (ref 37–47)
HEMOGLOBIN: 8.7 GM/DL (ref 12–16)
HEMOGLOBIN: 9.4 GM/DL (ref 12–16)
HYPOCHROMIA: PRESENT
IFIO2: 2
IMMATURE GRANS (ABS): 0.32 THOU/MM3 (ref 0–0.07)
IMMATURE GRANULOCYTES: 3 %
INR BLD: 0.97 (ref 0.85–1.13)
IONIZED CALCIUM, WHOLE BLOOD: 1.15 MMOL/L (ref 1.12–1.32)
LYMPHOCYTES # BLD: 4.9 %
LYMPHOCYTES ABSOLUTE: 0.5 THOU/MM3 (ref 1–4.8)
MCH RBC QN AUTO: 22.4 PG (ref 26–33)
MCH RBC QN AUTO: 22.8 PG (ref 26–33)
MCHC RBC AUTO-ENTMCNC: 28.9 GM/DL (ref 32.2–35.5)
MCHC RBC AUTO-ENTMCNC: 29.4 GM/DL (ref 32.2–35.5)
MCV RBC AUTO: 77.4 FL (ref 81–99)
MCV RBC AUTO: 77.5 FL (ref 81–99)
MONOCYTES # BLD: 1.4 %
MONOCYTES ABSOLUTE: 0.1 THOU/MM3 (ref 0.4–1.3)
NUCLEATED RED BLOOD CELLS: 0 /100 WBC
O2 SATURATION: 93 %
OSMOLALITY CALCULATION: 282.7 MOSMOL/KG (ref 275–300)
PCO2: 48 MMHG (ref 35–45)
PH BLOOD GAS: 7.35 (ref 7.35–7.45)
PLATELET # BLD: 355 THOU/MM3 (ref 130–400)
PLATELET # BLD: 403 THOU/MM3 (ref 130–400)
PLATELET ESTIMATE: ADEQUATE
PMV BLD AUTO: 11 FL (ref 9.4–12.4)
PMV BLD AUTO: 11.6 FL (ref 9.4–12.4)
PO2: 71 MMHG (ref 71–104)
POIKILOCYTES: ABNORMAL
POTASSIUM SERPL-SCNC: 5.5 MEQ/L (ref 3.5–5.2)
POTASSIUM, WHOLE BLOOD: 4.9 MEQ/L (ref 3.5–4.9)
PRO-BNP: 1327 PG/ML (ref 0–1800)
PROCALCITONIN: 0.05 NG/ML (ref 0.01–0.09)
RBC # BLD: 3.82 MILL/MM3 (ref 4.2–5.4)
RBC # BLD: 4.2 MILL/MM3 (ref 4.2–5.4)
SCAN OF BLOOD SMEAR: NORMAL
SEG NEUTROPHILS: 89.4 %
SEGMENTED NEUTROPHILS ABSOLUTE COUNT: 6.7 THOU/MM3 (ref 1.8–7.7)
SEGMENTED NEUTROPHILS ABSOLUTE COUNT: 8.9 THOU/MM3 (ref 1.8–7.7)
SODIUM BLD-SCNC: 136 MEQ/L (ref 135–145)
SODIUM, WHOLE BLOOD: 135 MEQ/L (ref 138–146)
SOURCE, BLOOD GAS: ABNORMAL
TOTAL CO2, WHOLE BLOOD: 28 MEQ/L (ref 23–33)
TOTAL PROTEIN: 6.6 G/DL (ref 6.1–8)
TOTAL PROTEIN: 6.8 G/DL (ref 6.1–8)
TROPONIN T: < 0.01 NG/ML
WBC # BLD: 10 THOU/MM3 (ref 4.8–10.8)
WBC # BLD: 9.2 THOU/MM3 (ref 4.8–10.8)

## 2019-08-20 PROCEDURE — 2500000003 HC RX 250 WO HCPCS: Performed by: INTERNAL MEDICINE

## 2019-08-20 PROCEDURE — 84145 PROCALCITONIN (PCT): CPT

## 2019-08-20 PROCEDURE — 6370000000 HC RX 637 (ALT 250 FOR IP): Performed by: INTERNAL MEDICINE

## 2019-08-20 PROCEDURE — 96415 CHEMO IV INFUSION ADDL HR: CPT

## 2019-08-20 PROCEDURE — 6360000004 HC RX CONTRAST MEDICATION: Performed by: FAMILY MEDICINE

## 2019-08-20 PROCEDURE — 80076 HEPATIC FUNCTION PANEL: CPT

## 2019-08-20 PROCEDURE — 96367 TX/PROPH/DG ADDL SEQ IV INF: CPT

## 2019-08-20 PROCEDURE — 1200000003 HC TELEMETRY R&B

## 2019-08-20 PROCEDURE — 6360000002 HC RX W HCPCS: Performed by: FAMILY MEDICINE

## 2019-08-20 PROCEDURE — 80053 COMPREHEN METABOLIC PANEL: CPT

## 2019-08-20 PROCEDURE — 83880 ASSAY OF NATRIURETIC PEPTIDE: CPT

## 2019-08-20 PROCEDURE — 94640 AIRWAY INHALATION TREATMENT: CPT

## 2019-08-20 PROCEDURE — 80047 BASIC METABLC PNL IONIZED CA: CPT

## 2019-08-20 PROCEDURE — 2709999900 HC NON-CHARGEABLE SUPPLY

## 2019-08-20 PROCEDURE — 82803 BLOOD GASES ANY COMBINATION: CPT

## 2019-08-20 PROCEDURE — 2700000000 HC OXYGEN THERAPY PER DAY

## 2019-08-20 PROCEDURE — 99223 1ST HOSP IP/OBS HIGH 75: CPT | Performed by: INTERNAL MEDICINE

## 2019-08-20 PROCEDURE — 93005 ELECTROCARDIOGRAM TRACING: CPT | Performed by: FAMILY MEDICINE

## 2019-08-20 PROCEDURE — 85610 PROTHROMBIN TIME: CPT

## 2019-08-20 PROCEDURE — 84484 ASSAY OF TROPONIN QUANT: CPT

## 2019-08-20 PROCEDURE — 85027 COMPLETE CBC AUTOMATED: CPT

## 2019-08-20 PROCEDURE — 99285 EMERGENCY DEPT VISIT HI MDM: CPT

## 2019-08-20 PROCEDURE — 71046 X-RAY EXAM CHEST 2 VIEWS: CPT

## 2019-08-20 PROCEDURE — 99211 OFF/OP EST MAY X REQ PHY/QHP: CPT

## 2019-08-20 PROCEDURE — 36415 COLL VENOUS BLD VENIPUNCTURE: CPT

## 2019-08-20 PROCEDURE — 85025 COMPLETE CBC W/AUTO DIFF WBC: CPT

## 2019-08-20 PROCEDURE — 96374 THER/PROPH/DIAG INJ IV PUSH: CPT

## 2019-08-20 PROCEDURE — 96375 TX/PRO/DX INJ NEW DRUG ADDON: CPT

## 2019-08-20 PROCEDURE — 96413 CHEMO IV INFUSION 1 HR: CPT

## 2019-08-20 PROCEDURE — 6360000002 HC RX W HCPCS: Performed by: INTERNAL MEDICINE

## 2019-08-20 PROCEDURE — 2580000003 HC RX 258: Performed by: INTERNAL MEDICINE

## 2019-08-20 PROCEDURE — 85730 THROMBOPLASTIN TIME PARTIAL: CPT

## 2019-08-20 PROCEDURE — 93010 ELECTROCARDIOGRAM REPORT: CPT | Performed by: INTERNAL MEDICINE

## 2019-08-20 PROCEDURE — 36591 DRAW BLOOD OFF VENOUS DEVICE: CPT

## 2019-08-20 PROCEDURE — 71275 CT ANGIOGRAPHY CHEST: CPT

## 2019-08-20 PROCEDURE — 36600 WITHDRAWAL OF ARTERIAL BLOOD: CPT

## 2019-08-20 RX ORDER — ROPINIROLE 0.5 MG/1
0.5 TABLET, FILM COATED ORAL NIGHTLY
Status: DISCONTINUED | OUTPATIENT
Start: 2019-08-20 | End: 2019-08-25 | Stop reason: HOSPADM

## 2019-08-20 RX ORDER — GABAPENTIN 400 MG/1
800 CAPSULE ORAL EVERY EVENING
Status: DISCONTINUED | OUTPATIENT
Start: 2019-08-20 | End: 2019-08-25 | Stop reason: HOSPADM

## 2019-08-20 RX ORDER — METOPROLOL SUCCINATE 50 MG/1
50 TABLET, EXTENDED RELEASE ORAL DAILY
Status: DISCONTINUED | OUTPATIENT
Start: 2019-08-21 | End: 2019-08-24

## 2019-08-20 RX ORDER — DIPHENHYDRAMINE HYDROCHLORIDE 50 MG/ML
50 INJECTION INTRAMUSCULAR; INTRAVENOUS ONCE
Status: COMPLETED | OUTPATIENT
Start: 2019-08-20 | End: 2019-08-20

## 2019-08-20 RX ORDER — ONDANSETRON 4 MG/1
4 TABLET, FILM COATED ORAL EVERY 8 HOURS PRN
Status: DISCONTINUED | OUTPATIENT
Start: 2019-08-20 | End: 2019-08-25 | Stop reason: HOSPADM

## 2019-08-20 RX ORDER — IPRATROPIUM BROMIDE AND ALBUTEROL SULFATE 2.5; .5 MG/3ML; MG/3ML
3 SOLUTION RESPIRATORY (INHALATION) EVERY 4 HOURS PRN
Status: DISCONTINUED | OUTPATIENT
Start: 2019-08-20 | End: 2019-08-25 | Stop reason: HOSPADM

## 2019-08-20 RX ORDER — ENALAPRIL MALEATE 10 MG/1
20 TABLET ORAL 2 TIMES DAILY
Status: DISCONTINUED | OUTPATIENT
Start: 2019-08-20 | End: 2019-08-22

## 2019-08-20 RX ORDER — DIPHENHYDRAMINE HYDROCHLORIDE 50 MG/ML
50 INJECTION INTRAMUSCULAR; INTRAVENOUS ONCE
Status: CANCELLED | OUTPATIENT
Start: 2019-08-27

## 2019-08-20 RX ORDER — SODIUM CHLORIDE 9 MG/ML
INJECTION, SOLUTION INTRAVENOUS CONTINUOUS
Status: CANCELLED | OUTPATIENT
Start: 2019-08-27

## 2019-08-20 RX ORDER — SODIUM CHLORIDE 0.9 % (FLUSH) 0.9 %
10 SYRINGE (ML) INJECTION PRN
Status: CANCELLED | OUTPATIENT
Start: 2019-08-27

## 2019-08-20 RX ORDER — AMIODARONE HYDROCHLORIDE 200 MG/1
200 TABLET ORAL DAILY
Status: DISCONTINUED | OUTPATIENT
Start: 2019-08-21 | End: 2019-08-25 | Stop reason: HOSPADM

## 2019-08-20 RX ORDER — HEPARIN SODIUM (PORCINE) LOCK FLUSH IV SOLN 100 UNIT/ML 100 UNIT/ML
500 SOLUTION INTRAVENOUS PRN
Status: CANCELLED | OUTPATIENT
Start: 2019-08-27

## 2019-08-20 RX ORDER — ASPIRIN 81 MG/1
81 TABLET ORAL DAILY
Status: DISCONTINUED | OUTPATIENT
Start: 2019-08-21 | End: 2019-08-25 | Stop reason: HOSPADM

## 2019-08-20 RX ORDER — INSULIN GLARGINE 100 [IU]/ML
18 INJECTION, SOLUTION SUBCUTANEOUS NIGHTLY
Status: DISCONTINUED | OUTPATIENT
Start: 2019-08-20 | End: 2019-08-25 | Stop reason: HOSPADM

## 2019-08-20 RX ORDER — SODIUM CHLORIDE 9 MG/ML
20 INJECTION, SOLUTION INTRAVENOUS ONCE
Status: COMPLETED | OUTPATIENT
Start: 2019-08-20 | End: 2019-08-20

## 2019-08-20 RX ORDER — HEPARIN SODIUM (PORCINE) LOCK FLUSH IV SOLN 100 UNIT/ML 100 UNIT/ML
500 SOLUTION INTRAVENOUS PRN
Status: CANCELLED | OUTPATIENT
Start: 2019-08-20

## 2019-08-20 RX ORDER — BUMETANIDE 1 MG/1
1 TABLET ORAL 2 TIMES DAILY
Status: DISCONTINUED | OUTPATIENT
Start: 2019-08-21 | End: 2019-08-25 | Stop reason: HOSPADM

## 2019-08-20 RX ORDER — FUROSEMIDE 10 MG/ML
40 INJECTION INTRAMUSCULAR; INTRAVENOUS ONCE
Status: COMPLETED | OUTPATIENT
Start: 2019-08-20 | End: 2019-08-20

## 2019-08-20 RX ORDER — METOLAZONE 2.5 MG/1
2.5 TABLET ORAL DAILY
Status: DISCONTINUED | OUTPATIENT
Start: 2019-08-20 | End: 2019-08-24

## 2019-08-20 RX ORDER — ATORVASTATIN CALCIUM 80 MG/1
80 TABLET, FILM COATED ORAL DAILY
Status: DISCONTINUED | OUTPATIENT
Start: 2019-08-21 | End: 2019-08-25 | Stop reason: HOSPADM

## 2019-08-20 RX ORDER — SODIUM CHLORIDE 0.9 % (FLUSH) 0.9 %
10 SYRINGE (ML) INJECTION PRN
Status: DISCONTINUED | OUTPATIENT
Start: 2019-08-20 | End: 2019-08-21 | Stop reason: HOSPADM

## 2019-08-20 RX ORDER — CLOPIDOGREL BISULFATE 75 MG/1
75 TABLET ORAL DAILY
Status: DISCONTINUED | OUTPATIENT
Start: 2019-08-21 | End: 2019-08-25 | Stop reason: HOSPADM

## 2019-08-20 RX ORDER — SODIUM CHLORIDE 0.9 % (FLUSH) 0.9 %
5 SYRINGE (ML) INJECTION PRN
Status: CANCELLED | OUTPATIENT
Start: 2019-08-27

## 2019-08-20 RX ORDER — SPIRONOLACTONE 25 MG/1
25 TABLET ORAL DAILY
Status: DISCONTINUED | OUTPATIENT
Start: 2019-08-21 | End: 2019-08-24

## 2019-08-20 RX ORDER — SODIUM CHLORIDE 0.9 % (FLUSH) 0.9 %
10 SYRINGE (ML) INJECTION PRN
Status: CANCELLED | OUTPATIENT
Start: 2019-08-20

## 2019-08-20 RX ORDER — HEPARIN SODIUM (PORCINE) LOCK FLUSH IV SOLN 100 UNIT/ML 100 UNIT/ML
500 SOLUTION INTRAVENOUS PRN
Status: DISCONTINUED | OUTPATIENT
Start: 2019-08-20 | End: 2019-08-21 | Stop reason: HOSPADM

## 2019-08-20 RX ORDER — SODIUM CHLORIDE 0.9 % (FLUSH) 0.9 %
20 SYRINGE (ML) INJECTION PRN
Status: DISCONTINUED | OUTPATIENT
Start: 2019-08-20 | End: 2019-08-21 | Stop reason: HOSPADM

## 2019-08-20 RX ORDER — METHYLPREDNISOLONE SODIUM SUCCINATE 125 MG/2ML
125 INJECTION, POWDER, LYOPHILIZED, FOR SOLUTION INTRAMUSCULAR; INTRAVENOUS ONCE
Status: CANCELLED | OUTPATIENT
Start: 2019-08-27

## 2019-08-20 RX ORDER — SODIUM CHLORIDE 0.9 % (FLUSH) 0.9 %
20 SYRINGE (ML) INJECTION PRN
Status: CANCELLED | OUTPATIENT
Start: 2019-08-20

## 2019-08-20 RX ADMIN — Medication 500 UNITS: at 16:57

## 2019-08-20 RX ADMIN — SODIUM CHLORIDE 20 ML/HR: 9 INJECTION, SOLUTION INTRAVENOUS at 11:00

## 2019-08-20 RX ADMIN — METOLAZONE 2.5 MG: 2.5 TABLET ORAL at 21:24

## 2019-08-20 RX ADMIN — PACLITAXEL 360 MG: 6 INJECTION, SOLUTION INTRAVENOUS at 13:15

## 2019-08-20 RX ADMIN — ALBUTEROL SULFATE 2.5 MG: 2.5 SOLUTION RESPIRATORY (INHALATION) at 18:53

## 2019-08-20 RX ADMIN — FUROSEMIDE 40 MG: 10 INJECTION, SOLUTION INTRAMUSCULAR; INTRAVENOUS at 19:02

## 2019-08-20 RX ADMIN — IOPAMIDOL 80 ML: 755 INJECTION, SOLUTION INTRAVENOUS at 19:39

## 2019-08-20 RX ADMIN — FERUMOXYTOL 510 MG: 510 INJECTION INTRAVENOUS at 11:15

## 2019-08-20 RX ADMIN — Medication 20 ML: at 10:31

## 2019-08-20 RX ADMIN — Medication 10 ML: at 11:40

## 2019-08-20 RX ADMIN — DEXAMETHASONE SODIUM PHOSPHATE: 4 INJECTION, SOLUTION INTRAMUSCULAR; INTRAVENOUS at 11:42

## 2019-08-20 RX ADMIN — FAMOTIDINE 20 MG: 10 INJECTION, SOLUTION INTRAVENOUS at 12:41

## 2019-08-20 RX ADMIN — Medication 10 ML: at 16:57

## 2019-08-20 RX ADMIN — Medication 10 ML: at 10:30

## 2019-08-20 RX ADMIN — DIPHENHYDRAMINE HYDROCHLORIDE 50 MG: 50 INJECTION, SOLUTION INTRAMUSCULAR; INTRAVENOUS at 12:53

## 2019-08-20 ASSESSMENT — ENCOUNTER SYMPTOMS
RHINORRHEA: 0
DIARRHEA: 0
EYE DISCHARGE: 0
SHORTNESS OF BREATH: 0
EYE PAIN: 0
BACK PAIN: 0
NAUSEA: 0
WHEEZING: 0
COUGH: 1
VOMITING: 0
SORE THROAT: 0
ABDOMINAL PAIN: 0

## 2019-08-20 ASSESSMENT — PAIN SCALES - GENERAL: PAINLEVEL_OUTOF10: 0

## 2019-08-20 NOTE — ED PROVIDER NOTES
trigger      Insulin Pen Needle (B-D UF III MINI PEN NEEDLES) 31G X 5 MM MISC 1 each by Does not apply route daily  Qty: 100 each, Refills: 3      clopidogrel (PLAVIX) 75 MG tablet Take 1 tablet by mouth daily  Qty: 90 tablet, Refills: 3      amiodarone (CORDARONE) 200 MG tablet TAKE 1 TABLET DAILY  Qty: 90 tablet, Refills: 3      enalapril (VASOTEC) 20 MG tablet TAKE 1 TABLET TWICE A DAY  Qty: 180 tablet, Refills: 3      aspirin EC 81 MG EC tablet Take 1 tablet by mouth daily  Qty: 30 tablet, Refills: 3      pregabalin (LYRICA) 200 MG capsule Take 1 capsule by mouth 2 times daily for 30 days. Qty: 60 capsule, Refills: 3    Associated Diagnoses: Diabetic polyneuropathy associated with type 2 diabetes mellitus (HCC)      insulin glargine (LANTUS SOLOSTAR) 100 UNIT/ML injection pen Inject 18 Units into the skin nightly  Qty: 5.4 mL, Refills: 0    Associated Diagnoses: Diabetic polyneuropathy associated with type 2 diabetes mellitus ([address] Type 2 diabetes mellitus with hyperglycemia, with long-term current use of insulin (HCC)      gabapentin (NEURONTIN) 400 MG capsule Take 2 capsules by mouth every evening for 30 days. Qty: 90 capsule, Refills: 3    Associated Diagnoses: Diabetic polyneuropathy associated with type 2 diabetes mellitus ([address] Type 2 diabetes mellitus with hyperglycemia, with long-term current use of insulin (HCC)             ALLERGIES     is allergic to sulfa antibiotics. FAMILY HISTORY     She indicated that her mother is alive. She indicated that her father is . She indicated that her sister is alive. She indicated that both of her brothers are alive. She indicated that her maternal grandmother is . She indicated that her maternal grandfather is . She indicated that her paternal grandmother is . She indicated that her paternal grandfather is .  She indicated that the status of her neg hx is unknown.   family history includes Cancer (age of onset: 80) in her father; Diabetes in her father; Heart Disease in her mother; Hypertension in her mother; No Known Problems in her brother, brother, and sister. SOCIAL HISTORY      reports that she quit smoking about 30 years ago. Her smoking use included cigarettes. She has a 37.50 pack-year smoking history. She has never used smokeless tobacco. She reports that she does not drink alcohol or use drugs. PHYSICAL EXAM     INITIAL VITALS:  height is 5' 2\" (1.575 m) and weight is 196 lb 6.4 oz (89.1 kg). Her oral temperature is 97.7 °F (36.5 °C). Her blood pressure is 114/60 and her pulse is 74. Her respiration is 18 and oxygen saturation is 92%. Physical Exam   Constitutional: She is oriented to person, place, and time. She appears well-developed and well-nourished. Non-toxic appearance. HENT:   Head: Normocephalic and atraumatic. Right Ear: Tympanic membrane and external ear normal.   Left Ear: Tympanic membrane and external ear normal.   Nose: Nose normal.   Mouth/Throat: Oropharynx is clear and moist and mucous membranes are normal. No oropharyngeal exudate, posterior oropharyngeal edema or posterior oropharyngeal erythema. Eyes: Pupils are equal, round, and reactive to light. Conjunctivae and EOM are normal.   Neck: Normal range of motion. Neck supple. No JVD present. Cardiovascular: Normal rate, regular rhythm, normal heart sounds, intact distal pulses and normal pulses. Exam reveals no gallop and no friction rub. No murmur heard. Pulmonary/Chest: Effort normal and breath sounds normal. No stridor. No respiratory distress. She has no decreased breath sounds. She has no wheezes. She has no rhonchi. She has no rales. She exhibits no tenderness. Abdominal: Soft. Bowel sounds are normal. She exhibits no distension. There is no tenderness. There is no rebound, no guarding and no CVA tenderness. Musculoskeletal: Normal range of motion. She exhibits no edema.    Neurological: She is alert and oriented to person, normal limits   BRAIN NATRIURETIC PEPTIDE   APTT   PROTIME-INR   TROPONIN   PROCALCITONIN   ANION GAP   OSMOLALITY   SCAN OF BLOOD SMEAR   TROPONIN   TROPONIN   MAGNESIUM   ANION GAP   POCT GLUCOSE       EMERGENCY DEPARTMENT COURSE:   Vitals:    Vitals:    08/21/19 0443 08/21/19 0900 08/21/19 0904 08/21/19 1130   BP: 136/65 (!) 117/56  114/60   Pulse: 85 81  74   Resp: 16 16  18   Temp: 97.5 °F (36.4 °C) 97.9 °F (36.6 °C)  97.7 °F (36.5 °C)   TempSrc: Oral Oral  Oral   SpO2: 98% 96% 94% 92%   Weight: 196 lb 6.4 oz (89.1 kg)      Height:           5:30 PM: The patient was seen and evaluated. MDM:  The patient was seen and evaluated within the department today following low SpO2. Within the department, I observed the patient's vital signs to be within acceptable range. The patient was not in any acute distress. I appreciated a negative exam. Chest x-ray revealed Mild pulmonary venous congestion. Correlation with symptoms advised. CTA of chest revealed No acute pulmonary arterial embolism. Small bilateral pleural effusions with adjacent consolidation right greater than left. Correlation with symptoms is advised. 1.4 cm nodular consolidation in the left lung base. This is similar to prior study. Continued follow-up is advised. Previously seen nodule in the right upper lobe which was hypermetabolic on corresponding PET CT dated 6/20/2019 is smaller on today's exam. Continued follow-up is advised. Nodularity in the right breast and right axillary lymph node. Please correlate with mammogram. Laboratory work was completed and discussed with the patient. PCO2 was elevated at 48. Within the department, the patient was treated with Bumex, Zaroxolyn, Proventil and Lasix. I explained my proposed course of treatment to the patient, and they were amenable to my decision. The patient was admitted under Dr. Jose Pereira (hospitalist).      CRITICAL CARE:   None     CONSULTS:  Dr. Jose Pereira (hospitalist)    PROCEDURES:  None     FINAL IMPRESSION      1. Hypoxia    2. Malignant neoplasm of left female breast, unspecified estrogen receptor status, unspecified site of breast ([facility] [address]    3. Acute on chronic combined systolic and diastolic congestive heart failure [facility])          DISPOSITION/PLAN   Admit     PATIENT REFERRED TO:  Eder Reaves MD  [address] [place]  [phone]            DISCHARGE MEDICATIONS:  Current Discharge Medication List          (Please note that portions of this note were completed with a voice recognition program.  Efforts were made to edit the dictations but occasionally words are mis-transcribed.)    Scribe:  Nash Valderrama 8/20/19 5:30 PM Scribing for and in the presence of Nilda Gomez MD.    Signed by: Bjorn Abel, 08/21/19 12:27 PM    Provider:  I personally performed the services described in the documentation, reviewed and edited the documentation which was dictated to the scribe in my presence, and it accurately records my words and actions.     Nilda Gomez MD 8/20/19 12:27 PM        Nilda Gomez MD  08/21/19 6530

## 2019-08-20 NOTE — ONCOLOGY
Chemotherapy Administration    Pre-assessment Data: Antineoplastic Agents  Other:   See toxicity flow sheet for assessment [x]     Physician Notification of Concerns Related to Chemotherapy Administration:   Physician Notified Hope Sicard / Time of Notification      Interventions:   Lab work assessed  [x]   Height / Weight verified for dose [x]   Current MAR reviewed [x]   Emergency drugs available as appropriate [x]   Anaphylaxis assessment completed [x]   Pre-medications administered as ordered [x]   Blood return noted upon initiation of chemotherapy [x]   Blood return noted each 1-2ml of a vesicant medication if given IV push []   Blood return noted each 2-3ml of a non-vesicant medication if given IV push []   Monitor for signs / symptoms of hypersensitivity reaction [x]   Chemotherapy orders (drug/dose/rate) verified by 2 Chemo certified RNs [x]   Monitor IV site and blood return throughout the infusion of the medication [x]   Document IV site checks on the IV assessment form [x]   Document chemotherapy teaching on the Patient Education tab [x]   Document patient verbalizes understanding of medications being administered [x]   If IV infiltration, see ONS Guidelines []   Other:     Taxol []

## 2019-08-20 NOTE — PLAN OF CARE
Problem: Intellectual/Education/Knowledge Deficit  Goal: Teaching initiated upon admission  Outcome: Met This Shift  Note:   Patient verbalizes understanding to verbal information given on Feraheme and Taxol ,action and possible side effects. Aware to call MD if develop complications. Intervention: Verbal/written education provided  Note:   Chemotherapy Teaching     What is Chemotherapy   Drug action ? Method of Administration ? Handouts given ? Side Effects  Nausea/vomiting ? Diarrhea ? Fatigue ? Signs / Symptoms of infection ? Neutropenia ? Thrombocytopenia ? Alopecia ? neuropathy ? [place] diet &  the importance of fluids ? Micellaneous  Importance of nutrition ? Importance of oral hygiene ? When to call the MD ?   Monitoring labs ? Use of supportive services ? Explanation of Drug Regimen / Frequency  Taxol:  [id number]     Comments  Verbalized understanding to drug,action,side effects and when to call MD      Дмитрий reviewed, patient verbalizes understanding of medication being administered and potential side effects. Problem: Discharge Planning  Goal: Knowledge of discharge instructions  Description  Knowledge of discharge instructions     Outcome: Met This Shift  Note:   Verbalized understanding of discharge instructions, follow-up appointments, and when to call the physician. Intervention: Discharge to appropriate level of care  Note:   Discuss understanding of discharge instructions,follow-up appointments, and when to call the physician. Problem: Falls - Risk of:  Goal: Will remain free from falls  Description  Will remain free from falls  Outcome: Met This Shift  Note:   Free from falls while in O.P. Oncology. Intervention: Assess risk factors for falls  Description  Assess risk factors for falls  Note:   Discussed the need to use the call light for assistance when getting up to ambulate.       Problem: Infection - Central Venous Catheter-Associated Bloodstream Infection:  Goal: Will show no infection signs and symptoms  Description  Will show no infection signs and symptoms  Outcome: Met This Shift  Note:   Mediport site with no redness or warmth. Skin over port site intact with no signs of breakdown noted. Patient verbalizes signs/symptoms of port infection and when to notify the physician. Intervention: Infection risk assessment  Description  Infection risk assessment  Note:   Discuss port maintenance, infection prevention, signs and when to call Dr Nico Lucio reviewed with patient and spouse. Patient and spouse verbalize understanding of the plan of care and contribute to goal setting.

## 2019-08-20 NOTE — PROGRESS NOTES
Patient assessed for vital signs at end of treatment. Pulse ox continues to bounce between 70-88%, no changes with deep breathing, denies shortness of breath. Lungs sounds are diminished with some fine crackles noted. Hermila Mckinley NP, stopped by patient to listen to lungs and assess needs. Recommended to patient to go to ED for further evaluation for possible thoracentesis (patient has history of previous need). Due to radiology needs, instructed to hold Neulasta On Pro application.

## 2019-08-20 NOTE — PLAN OF CARE
Ms Pratt received paclitaxol infusion today. Post infusion vitals signs showed oxygen saturation in the 70s-80s. Patient denies SOB however on exam, she had crackles in the bilateral bases. Will send to ED for CXR as she has history of pleural effusions. May need thoracentesis.       Hermila Mckinley, APRN-CNP

## 2019-08-20 NOTE — PROGRESS NOTES
Patient assessed for the following post chemotherapy:    Dizziness   No  Lightheadedness  No      Acute nausea/vomiting No  Headache   No  Chest pain/pressure  No  Rash/itching   No  Shortness of breath  No    Patient kept for 20 minutes observation post infusion chemotherapy. Patient tolerated chemotherapy treatment Taxol without any complications. Last vital signs:   /61   Pulse 74   Temp 97.6 °F (36.4 °C) (Oral)   Resp 18   Ht 5' 2\" (1.575 m)   Wt 204 lb (92.5 kg)   SpO2 (!) 74% Comment: 70-88%  BMI 37.31 kg/m²     Attending physician notified of pulse oximetry fluctuating between 70-88% on room air, orders received: Yes and patient instructed to go to ED for further evaluation for possible needs a thoracentesis. Patient instructed if experience any of the above symptoms following today's infusion, she is to notify MD immediately or go to the emergency department. Discharge instructions given to patient. Verbalizes understanding. Ambulated off unit with rolling walker, accompanied by spouse, with belongings.

## 2019-08-21 PROBLEM — C50.912 MALIGNANT NEOPLASM OF LEFT FEMALE BREAST (HCC): Status: ACTIVE | Noted: 2019-07-25

## 2019-08-21 LAB
ANION GAP SERPL CALCULATED.3IONS-SCNC: 12 MEQ/L (ref 8–16)
BUN BLDV-MCNC: 17 MG/DL (ref 7–22)
CALCIUM SERPL-MCNC: 9.3 MG/DL (ref 8.5–10.5)
CHLORIDE BLD-SCNC: 98 MEQ/L (ref 98–111)
CO2: 28 MEQ/L (ref 23–33)
CREAT SERPL-MCNC: 0.9 MG/DL (ref 0.4–1.2)
ERYTHROCYTE [DISTWIDTH] IN BLOOD BY AUTOMATED COUNT: 23.2 % (ref 11.5–14.5)
ERYTHROCYTE [DISTWIDTH] IN BLOOD BY AUTOMATED COUNT: 58.8 FL (ref 35–45)
GFR SERPL CREATININE-BSD FRML MDRD: 61 ML/MIN/1.73M2
GLUCOSE BLD-MCNC: 145 MG/DL (ref 70–108)
GLUCOSE BLD-MCNC: 160 MG/DL (ref 70–108)
GLUCOSE BLD-MCNC: 160 MG/DL (ref 70–108)
GLUCOSE BLD-MCNC: 240 MG/DL (ref 70–108)
GLUCOSE BLD-MCNC: 259 MG/DL (ref 70–108)
GLUCOSE BLD-MCNC: 271 MG/DL (ref 70–108)
HCT VFR BLD CALC: 29.7 % (ref 37–47)
HEMOGLOBIN: 8.8 GM/DL (ref 12–16)
MAGNESIUM: 1.7 MG/DL (ref 1.6–2.4)
MCH RBC QN AUTO: 22.4 PG (ref 26–33)
MCHC RBC AUTO-ENTMCNC: 29.6 GM/DL (ref 32.2–35.5)
MCV RBC AUTO: 75.6 FL (ref 81–99)
PLATELET # BLD: 352 THOU/MM3 (ref 130–400)
PMV BLD AUTO: 11.2 FL (ref 9.4–12.4)
POTASSIUM SERPL-SCNC: 5.2 MEQ/L (ref 3.5–5.2)
RBC # BLD: 3.93 MILL/MM3 (ref 4.2–5.4)
SODIUM BLD-SCNC: 138 MEQ/L (ref 135–145)
TROPONIN T: < 0.01 NG/ML
TROPONIN T: < 0.01 NG/ML
WBC # BLD: 9.3 THOU/MM3 (ref 4.8–10.8)

## 2019-08-21 PROCEDURE — 80048 BASIC METABOLIC PNL TOTAL CA: CPT

## 2019-08-21 PROCEDURE — 83735 ASSAY OF MAGNESIUM: CPT

## 2019-08-21 PROCEDURE — 2709999900 HC NON-CHARGEABLE SUPPLY

## 2019-08-21 PROCEDURE — 82948 REAGENT STRIP/BLOOD GLUCOSE: CPT

## 2019-08-21 PROCEDURE — 6370000000 HC RX 637 (ALT 250 FOR IP): Performed by: FAMILY MEDICINE

## 2019-08-21 PROCEDURE — 1200000003 HC TELEMETRY R&B

## 2019-08-21 PROCEDURE — 85027 COMPLETE CBC AUTOMATED: CPT

## 2019-08-21 PROCEDURE — 6370000000 HC RX 637 (ALT 250 FOR IP): Performed by: INTERNAL MEDICINE

## 2019-08-21 PROCEDURE — 99223 1ST HOSP IP/OBS HIGH 75: CPT | Performed by: NUCLEAR MEDICINE

## 2019-08-21 PROCEDURE — 36415 COLL VENOUS BLD VENIPUNCTURE: CPT

## 2019-08-21 PROCEDURE — 99232 SBSQ HOSP IP/OBS MODERATE 35: CPT | Performed by: FAMILY MEDICINE

## 2019-08-21 PROCEDURE — 84484 ASSAY OF TROPONIN QUANT: CPT

## 2019-08-21 RX ORDER — DEXTROSE MONOHYDRATE 25 G/50ML
12.5 INJECTION, SOLUTION INTRAVENOUS PRN
Status: DISCONTINUED | OUTPATIENT
Start: 2019-08-21 | End: 2019-08-25 | Stop reason: HOSPADM

## 2019-08-21 RX ORDER — DEXTROSE MONOHYDRATE 50 MG/ML
100 INJECTION, SOLUTION INTRAVENOUS PRN
Status: DISCONTINUED | OUTPATIENT
Start: 2019-08-21 | End: 2019-08-25 | Stop reason: HOSPADM

## 2019-08-21 RX ORDER — NICOTINE POLACRILEX 4 MG
15 LOZENGE BUCCAL PRN
Status: DISCONTINUED | OUTPATIENT
Start: 2019-08-21 | End: 2019-08-25 | Stop reason: HOSPADM

## 2019-08-21 RX ADMIN — INSULIN GLARGINE 18 UNITS: 100 INJECTION, SOLUTION SUBCUTANEOUS at 00:06

## 2019-08-21 RX ADMIN — BUMETANIDE 1 MG: 1 TABLET ORAL at 21:36

## 2019-08-21 RX ADMIN — ROPINIROLE HYDROCHLORIDE 0.5 MG: 0.5 TABLET, FILM COATED ORAL at 00:14

## 2019-08-21 RX ADMIN — INSULIN LISPRO 1 UNITS: 100 INJECTION, SOLUTION INTRAVENOUS; SUBCUTANEOUS at 21:37

## 2019-08-21 RX ADMIN — METOLAZONE 2.5 MG: 2.5 TABLET ORAL at 09:05

## 2019-08-21 RX ADMIN — BUMETANIDE 1 MG: 1 TABLET ORAL at 09:05

## 2019-08-21 RX ADMIN — GABAPENTIN 800 MG: 400 CAPSULE ORAL at 00:11

## 2019-08-21 RX ADMIN — ATORVASTATIN CALCIUM 80 MG: 80 TABLET, FILM COATED ORAL at 09:06

## 2019-08-21 RX ADMIN — PREGABALIN 200 MG: 50 CAPSULE ORAL at 00:16

## 2019-08-21 RX ADMIN — INSULIN GLARGINE 18 UNITS: 100 INJECTION, SOLUTION SUBCUTANEOUS at 21:37

## 2019-08-21 RX ADMIN — AMIODARONE HYDROCHLORIDE 200 MG: 200 TABLET ORAL at 09:06

## 2019-08-21 RX ADMIN — SPIRONOLACTONE 25 MG: 25 TABLET ORAL at 09:05

## 2019-08-21 RX ADMIN — ENALAPRIL MALEATE 20 MG: 10 TABLET ORAL at 21:36

## 2019-08-21 RX ADMIN — CLOPIDOGREL BISULFATE 75 MG: 75 TABLET ORAL at 09:06

## 2019-08-21 RX ADMIN — GABAPENTIN 800 MG: 400 CAPSULE ORAL at 17:23

## 2019-08-21 RX ADMIN — ROPINIROLE HYDROCHLORIDE 0.5 MG: 0.5 TABLET, FILM COATED ORAL at 22:18

## 2019-08-21 RX ADMIN — PREGABALIN 200 MG: 50 CAPSULE ORAL at 21:37

## 2019-08-21 RX ADMIN — ASPIRIN 81 MG: 81 TABLET ORAL at 09:06

## 2019-08-21 RX ADMIN — METOPROLOL SUCCINATE 50 MG: 50 TABLET, FILM COATED, EXTENDED RELEASE ORAL at 09:06

## 2019-08-21 RX ADMIN — PREGABALIN 200 MG: 50 CAPSULE ORAL at 09:06

## 2019-08-21 RX ADMIN — ENALAPRIL MALEATE 20 MG: 10 TABLET ORAL at 09:05

## 2019-08-21 ASSESSMENT — PAIN SCALES - GENERAL
PAINLEVEL_OUTOF10: 0

## 2019-08-21 NOTE — PROGRESS NOTES
08/20/19  1736   AST 16 40   ALT 14 31   BILIDIR <0.2  --    BILITOT 0.2* 0.4   ALKPHOS 85 129*     Recent Labs     08/20/19  1736   INR 0.97     No results for input(s): CKTOTAL, TROPONINI in the last 72 hours. Urinalysis:    No results found for: [place], BACTERIA, RBCUA, BLOODU, [address], [address]    Radiology:  CTA Chest W WO Contrast   Final Result         1. No acute pulmonary arterial embolism. 2. Small bilateral pleural effusions with adjacent consolidation right greater than left. Correlation with symptoms is advised. 3. 1.4 cm nodular consolidation in the left lung base. This is similar to prior study. Continued follow-up is advised. 4. Previously seen nodule in the right upper lobe which was hypermetabolic on corresponding PET CT dated 6/20/2019 is smaller on today's exam. Continued follow-up is advised. 5. Nodularity in the right breast and right axillary lymph node. Please correlate with mammogram.      **This report has been created using voice recognition software. It may contain minor errors which are inherent in voice recognition technology. **      Final report electronically signed by Dr. Roslyn White on 8/20/2019 8:23 PM      XR CHEST STANDARD (2 VW)   Final Result   Mild pulmonary venous congestion. Correlation with symptoms advised. **This report has been created using voice recognition software. It may contain minor errors which are inherent in voice recognition technology. **      Final report electronically signed by Dr. Roslyn White on 8/20/2019 6:19 PM          Diet: DIET CARDIAC; Carb Control: 3 carb choices (45 gms)/meal; Daily Fluid Restriction: 1000 ml    DVT prophylaxis: [] Lovenox                                 [] SCDs                                 [] SQ Heparin                                 [] Encourage ambulation           [] Already on Anticoagulation     Disposition:    [] Home       [] TCU       [] Rehab       [] Psych       [] SNF       [] [address] [place] [address]

## 2019-08-21 NOTE — FLOWSHEET NOTE
Patient arrived per cart to 3B. Heart monitor applied and vitals taken. Admission paperwork completed. Explained to patient that [facility] is not responsible for any lost or stolen items. Patient verbalized understanding. Oriented to room and use of call light and bed controls. Patient denies pain or needs. No signs of distress noted. Bed locked & in low position, side-rails up x2. Call light in reach. Reminded patient to call nurse if any needs arise. Welcome to 3B folder given to patient which includes the following handouts:  1. Welcome Letter  2. Medication Side Effects  3. Patent Safety brochure  4. [facility] Nomination brochure  5. Employee Recognition Card  6.  M in the box Bedside Medication Teaching Sheet  7. 3B phone numbers & HIPPA code card

## 2019-08-21 NOTE — PLAN OF CARE
Problem: Falls - Risk of:  Goal: Will remain free from falls  Description  Will remain free from falls  8/21/2019 1245 by Jose Dahl RN  Outcome: Ongoing  Note:   Patient remains free from falls at this time. Uses front wheel walker and standby assistance. 8/21/2019 0118 by Sangeetha Velásquez RN  Outcome: Ongoing  Note:   Assessment & interventions provided throughout shift. Bed locked & in low position, call light in reach, side-rails up x2, bed alarm on, non-slip socks on when ambulating, reminded patient to use call light to call for assistance. Goal: Absence of physical injury  Description  Absence of physical injury  8/21/2019 1245 by Jose Dahl RN  Outcome: Ongoing  Note:   Patient remains free from physical injury at this time. Follows commands/verbalizes needs appropriately. 8/21/2019 0118 by Sangeetha Velásquez RN  Outcome: Ongoing     Problem: OXYGENATION/RESPIRATORY FUNCTION  Goal: Patient will maintain patent airway  8/21/2019 1245 by Jose Dahl RN  Outcome: Ongoing  Note:   Patient on RA at this time with SpO2 >90%. Patient denies feeling SOB at this time. 8/21/2019 0118 by Sangeetha Velásquez RN  Outcome: Ongoing  Note:   Lung sounds, pulse ox, and breathing monitored throughout shift. Discussed correct technique and importance of deep breathing & coughing exercises with patient. Patient able to demonstrate cough & deep breathing exercises to nurse. Goal: Patient will achieve/maintain normal respiratory rate/effort  Description  Respiratory rate and effort will be within normal limits for the patient  8/21/2019 1245 by Jose Dahl RN  Outcome: Ongoing  Note:   RR WNL at this time.  Patient on RA.   8/21/2019 0118 by Sangeetha Velásquez RN  Outcome: Ongoing     Problem: HEMODYNAMIC STATUS  Goal: Patient has stable vital signs and fluid balance  8/21/2019 1245 by Jose Dahl RN  Outcome: Ongoing  Note:   Vitals:    08/21/19 1130   BP: 114/60   Pulse: 74   Resp: 18   Temp: 97.7 °F (36.5 °C)   SpO2: 92%      8/21/2019 0118 by Sangeetha Brooks RN  Outcome: Ongoing  Note:   Ongoing assessment & interventions provided throughout shift. Patient on continuous telemetry monitoring, heart tones, vitals & pulses checked at least 3 times per shift & PRN. Vitals within acceptable limits. Peripheral pulses palpable. Problem: FLUID AND ELECTROLYTE IMBALANCE  Goal: Fluid and electrolyte balance are achieved/maintained  8/21/2019 1245 by Angel Childress RN  Outcome: Ongoing  Note:   Patient has mild BLE swelling that she is taking bumex for at this time. 8/21/2019 0118 by Sangeetha Brooks RN  Outcome: Ongoing  Note:   Labs checked per physician's orders. Problem: Pain Control  Goal: Maintain pain level at or below patient's acceptable level (or 5 if patient is unable to determine acceptable level)  8/21/2019 1245 by Angel Childress RN  Outcome: Ongoing  Note:   Patient denies pain at this time. 8/21/2019 0118 by Sangeetha Brooks RN  Outcome: Ongoing  Flowsheets (Taken 8/21/2019 0115)  Patient's Stated Pain Goal: No pain  Goal: Improvement in pain related behaviors BP/HR WNL  8/21/2019 1245 by Angel Childress RN  Outcome: Ongoing  Note:   Patient has had no complaints of pain at this time. 8/21/2019 0118 by Sangeetha Brooks RN  Outcome: Ongoing     Problem: Daily Care:  Goal: Daily care needs are met  Description  Daily care needs are met  8/21/2019 1245 by Angel Childress RN  Outcome: Ongoing  Note:   Patient able to perform daily cares independently at this time. 8/21/2019 0118 by Sangeetha Brooks RN  Outcome: Ongoing  Note:   Patient able to complete ADLs. Assistance provided as needed. Problem: Discharge Planning:  Goal: Patients continuum of care needs are met  Description  Patients continuum of care needs are met  8/21/2019 1245 by Angel Childress RN  Outcome: Ongoing  Note:   Patient plans to return home with [facility] when appropriate.    8/21/2019 0118 by Sangeetha Brooks RN  Outcome:

## 2019-08-21 NOTE — PROGRESS NOTES
1440 report given to primary RN Kaitlynn, pt resting in chair awake watching tv. Respirations unlabored.  8/20/2019 Electronically signed by Deyanira WINKLER/RN on 8/21/2019 at 2:41 PM

## 2019-08-21 NOTE — ED NOTES
Pt resting in bed. Updated pt on plan of care. Vitals reassessed. Call light in reach. Respirations regular.       Christine Gandhi RN  08/20/19 1196

## 2019-08-21 NOTE — H&P
Does not apply route daily 11/19/18  Yes Eder Reaves MD   clopidogrel (PLAVIX) 75 MG tablet Take 1 tablet by mouth daily 9/14/18  Yes [address], MD   amiodarone (CORDARONE) 200 MG tablet TAKE 1 TABLET DAILY 9/10/18  Yes [address], MD   enalapril (VASOTEC) 20 MG tablet TAKE 1 TABLET TWICE A DAY 9/10/18  Yes [address], MD   aspirin EC 81 MG EC tablet Take 1 tablet by mouth daily 4/11/18  Yes Harmony Penny MD   pregabalin (LYRICA) 200 MG capsule Take 1 capsule by mouth 2 times daily for 30 days. 6/19/19 8/15/19  Lazarus Earls, APRN - CNP   insulin glargine (LANTUS SOLOSTAR) 100 UNIT/ML injection pen Inject 18 Units into the skin nightly 6/5/19 8/15/19  Salima Sumner MD   gabapentin (NEURONTIN) 400 MG capsule Take 2 capsules by mouth every evening for 30 days. 4/29/19 8/15/19  Eder Reaves MD       Allergies:  Sulfa antibiotics    Social History:    TOBACCO:   reports that she quit smoking about 30 years ago. Her smoking use included cigarettes. She has a 37.50 pack-year smoking history. She has never used smokeless tobacco.  ETOH:   reports that she does not drink alcohol. Family History:     Reviewed in detail and negative for DM, CAD, Cancer, CVA. Positive as follows:        Problem Relation Age of Onset    Diabetes Father     Cancer Father 80        Bone    Hypertension Mother     Heart Disease Mother     No Known Problems Sister     No Known Problems Brother     No Known Problems Brother     Breast Cancer Neg Hx     Colon Cancer Neg Hx        Diet:  No diet orders on file    REVIEW OF SYSTEMS:   Pertinent positives as noted in the HPI. All other systems reviewed and negative. PHYSICAL EXAM:    /64   Pulse 82   Temp 97.6 °F (36.4 °C) (Oral)   Resp 20   Ht 5' 2\" (1.575 m)   Wt 204 lb (92.5 kg)   SpO2 94%   BMI 37.31 kg/m²     General appearance:  No apparent distress, appears stated age and cooperative.  On 2L  HEENT:  Normal cephalic, atraumatic

## 2019-08-22 LAB
ANION GAP SERPL CALCULATED.3IONS-SCNC: 12 MEQ/L (ref 8–16)
BUN BLDV-MCNC: 33 MG/DL (ref 7–22)
CALCIUM SERPL-MCNC: 9 MG/DL (ref 8.5–10.5)
CHLORIDE BLD-SCNC: 95 MEQ/L (ref 98–111)
CO2: 28 MEQ/L (ref 23–33)
CREAT SERPL-MCNC: 0.9 MG/DL (ref 0.4–1.2)
ERYTHROCYTE [DISTWIDTH] IN BLOOD BY AUTOMATED COUNT: 22.9 % (ref 11.5–14.5)
ERYTHROCYTE [DISTWIDTH] IN BLOOD BY AUTOMATED COUNT: 58 FL (ref 35–45)
GFR SERPL CREATININE-BSD FRML MDRD: 61 ML/MIN/1.73M2
GLUCOSE BLD-MCNC: 126 MG/DL (ref 70–108)
GLUCOSE BLD-MCNC: 128 MG/DL (ref 70–108)
GLUCOSE BLD-MCNC: 128 MG/DL (ref 70–108)
GLUCOSE BLD-MCNC: 151 MG/DL (ref 70–108)
GLUCOSE BLD-MCNC: 162 MG/DL (ref 70–108)
HCT VFR BLD CALC: 29.2 % (ref 37–47)
HEMOGLOBIN: 8.7 GM/DL (ref 12–16)
MCH RBC QN AUTO: 22.8 PG (ref 26–33)
MCHC RBC AUTO-ENTMCNC: 29.8 GM/DL (ref 32.2–35.5)
MCV RBC AUTO: 76.4 FL (ref 81–99)
PLATELET # BLD: 313 THOU/MM3 (ref 130–400)
PMV BLD AUTO: 11.2 FL (ref 9.4–12.4)
POTASSIUM SERPL-SCNC: 5.2 MEQ/L (ref 3.5–5.2)
RBC # BLD: 3.82 MILL/MM3 (ref 4.2–5.4)
SODIUM BLD-SCNC: 135 MEQ/L (ref 135–145)
WBC # BLD: 10.8 THOU/MM3 (ref 4.8–10.8)

## 2019-08-22 PROCEDURE — 99232 SBSQ HOSP IP/OBS MODERATE 35: CPT | Performed by: FAMILY MEDICINE

## 2019-08-22 PROCEDURE — 82948 REAGENT STRIP/BLOOD GLUCOSE: CPT

## 2019-08-22 PROCEDURE — 6370000000 HC RX 637 (ALT 250 FOR IP): Performed by: FAMILY MEDICINE

## 2019-08-22 PROCEDURE — 6370000000 HC RX 637 (ALT 250 FOR IP): Performed by: INTERNAL MEDICINE

## 2019-08-22 PROCEDURE — 85027 COMPLETE CBC AUTOMATED: CPT

## 2019-08-22 PROCEDURE — 6360000002 HC RX W HCPCS: Performed by: FAMILY MEDICINE

## 2019-08-22 PROCEDURE — 36415 COLL VENOUS BLD VENIPUNCTURE: CPT

## 2019-08-22 PROCEDURE — 1200000003 HC TELEMETRY R&B

## 2019-08-22 PROCEDURE — 99232 SBSQ HOSP IP/OBS MODERATE 35: CPT | Performed by: PHYSICIAN ASSISTANT

## 2019-08-22 PROCEDURE — 80048 BASIC METABOLIC PNL TOTAL CA: CPT

## 2019-08-22 PROCEDURE — 6360000002 HC RX W HCPCS: Performed by: STUDENT IN AN ORGANIZED HEALTH CARE EDUCATION/TRAINING PROGRAM

## 2019-08-22 RX ORDER — HEPARIN SODIUM (PORCINE) LOCK FLUSH IV SOLN 100 UNIT/ML 100 UNIT/ML
500 SOLUTION INTRAVENOUS DAILY
Status: DISCONTINUED | OUTPATIENT
Start: 2019-08-22 | End: 2019-08-25 | Stop reason: HOSPADM

## 2019-08-22 RX ORDER — ENALAPRIL MALEATE 10 MG/1
10 TABLET ORAL 2 TIMES DAILY
Status: DISCONTINUED | OUTPATIENT
Start: 2019-08-22 | End: 2019-08-24

## 2019-08-22 RX ORDER — ACETAMINOPHEN 325 MG/1
650 TABLET ORAL EVERY 4 HOURS PRN
Status: DISCONTINUED | OUTPATIENT
Start: 2019-08-22 | End: 2019-08-25 | Stop reason: HOSPADM

## 2019-08-22 RX ADMIN — PREGABALIN 200 MG: 50 CAPSULE ORAL at 09:05

## 2019-08-22 RX ADMIN — GABAPENTIN 800 MG: 400 CAPSULE ORAL at 20:50

## 2019-08-22 RX ADMIN — ASPIRIN 81 MG: 81 TABLET ORAL at 09:05

## 2019-08-22 RX ADMIN — ACETAMINOPHEN 650 MG: 325 TABLET ORAL at 14:58

## 2019-08-22 RX ADMIN — CLOPIDOGREL BISULFATE 75 MG: 75 TABLET ORAL at 09:05

## 2019-08-22 RX ADMIN — INSULIN LISPRO 2 UNITS: 100 INJECTION, SOLUTION INTRAVENOUS; SUBCUTANEOUS at 17:30

## 2019-08-22 RX ADMIN — HEPARIN 500 UNITS: 100 SYRINGE at 15:42

## 2019-08-22 RX ADMIN — ENOXAPARIN SODIUM 40 MG: 40 INJECTION SUBCUTANEOUS at 17:30

## 2019-08-22 RX ADMIN — ROPINIROLE HYDROCHLORIDE 0.5 MG: 0.5 TABLET, FILM COATED ORAL at 20:50

## 2019-08-22 RX ADMIN — ATORVASTATIN CALCIUM 80 MG: 80 TABLET, FILM COATED ORAL at 09:05

## 2019-08-22 RX ADMIN — AMIODARONE HYDROCHLORIDE 200 MG: 200 TABLET ORAL at 09:05

## 2019-08-22 RX ADMIN — PREGABALIN 200 MG: 50 CAPSULE ORAL at 20:50

## 2019-08-22 RX ADMIN — INSULIN LISPRO 2 UNITS: 100 INJECTION, SOLUTION INTRAVENOUS; SUBCUTANEOUS at 12:20

## 2019-08-22 RX ADMIN — INSULIN GLARGINE 18 UNITS: 100 INJECTION, SOLUTION SUBCUTANEOUS at 20:51

## 2019-08-22 ASSESSMENT — PAIN SCALES - GENERAL
PAINLEVEL_OUTOF10: 0
PAINLEVEL_OUTOF10: 6

## 2019-08-22 NOTE — PROGRESS NOTES
recognition software. It may contain minor errors which are inherent in voice recognition technology. **      Final report electronically signed by Dr. Shannon Brewer on 8/20/2019 6:19 PM          Diet: DIET CARDIAC; Carb Control: 3 carb choices (45 gms)/meal; Daily Fluid Restriction: 1000 ml    DVT prophylaxis: [x] Lovenox                                 [] SCDs                                 [] SQ Heparin                                 [] Encourage ambulation           [] Already on Anticoagulation     Disposition:    [x] Home       [] TCU       [] Rehab       [] Psych       [] SNF       [] [place]       [] Other-    Code Status: Prior      Assessment/Plan:      Active Hospital Problems    Diagnosis Date Noted    Hypoxia [R09.02]      Priority: High    Dyspnea on exertion [R06.09]      Priority: High    Acute on chronic systolic CHF (congestive heart failure) ([facility] [address] [I50.23] 08/20/2019    Malignant neoplasm of left female breast ([facility] [address] [C50.912] 07/25/2019       Acute hypoxemia, d/t fluid retention/CHF  -Ate high-sodium foods 2 days ago  -O2 sat 93%  -On 1L O2  -CXR 8/20/2019: Mild pulmonary venous congestion. Correlation with symptoms advised.   -Will repeat tomorrow  -Continue diuresis with bumex and metolazone  -Creatinine 0.9  -Seen by cardiology, appreciate specialist input  -BNP 1327     Right invasive ductal carcinoma  -Follows with Dr. Carole Morin     HFrEF, EF 40%, s/p ICD, compensated   -Ejection fraction of 40% per echo in May 2019  -Low-salt diet  -Elevate legs when resting    Iron deficiency anemia, chronic  -Hgb 8.7  -Receives iron infusions through oncology  -Continue to monitor  -Will transfuse if Hgb drops below 8     DM type 2, controlled  -latest A1C was 6.0% in 5/30/19  -Continue Lantus and sliding scale insulin  -Carb-controlled diet     Essential hypertension  -Continue enalapril  -Continue aldactone  -Continue metoprolol  -With holding parameters as pt is hypotensive

## 2019-08-23 ENCOUNTER — APPOINTMENT (OUTPATIENT)
Dept: GENERAL RADIOLOGY | Age: 74
DRG: 291 | End: 2019-08-23
Payer: MEDICARE

## 2019-08-23 LAB
ANION GAP SERPL CALCULATED.3IONS-SCNC: 9 MEQ/L (ref 8–16)
ANISOCYTOSIS: PRESENT
BASOPHILS # BLD: 0.5 %
BASOPHILS ABSOLUTE: 0 THOU/MM3 (ref 0–0.1)
BUN BLDV-MCNC: 37 MG/DL (ref 7–22)
CALCIUM SERPL-MCNC: 8.9 MG/DL (ref 8.5–10.5)
CHLORIDE BLD-SCNC: 97 MEQ/L (ref 98–111)
CO2: 29 MEQ/L (ref 23–33)
CREAT SERPL-MCNC: 0.9 MG/DL (ref 0.4–1.2)
EOSINOPHIL # BLD: 2 %
EOSINOPHILS ABSOLUTE: 0.2 THOU/MM3 (ref 0–0.4)
ERYTHROCYTE [DISTWIDTH] IN BLOOD BY AUTOMATED COUNT: 23 % (ref 11.5–14.5)
ERYTHROCYTE [DISTWIDTH] IN BLOOD BY AUTOMATED COUNT: 57.3 FL (ref 35–45)
GFR SERPL CREATININE-BSD FRML MDRD: 61 ML/MIN/1.73M2
GLUCOSE BLD-MCNC: 137 MG/DL (ref 70–108)
GLUCOSE BLD-MCNC: 144 MG/DL (ref 70–108)
GLUCOSE BLD-MCNC: 175 MG/DL (ref 70–108)
GLUCOSE BLD-MCNC: 179 MG/DL (ref 70–108)
GLUCOSE BLD-MCNC: 184 MG/DL (ref 70–108)
HCT VFR BLD CALC: 29.9 % (ref 37–47)
HEMOGLOBIN: 9.2 GM/DL (ref 12–16)
IMMATURE GRANS (ABS): 0.04 THOU/MM3 (ref 0–0.07)
IMMATURE GRANULOCYTES: 1 %
LYMPHOCYTES # BLD: 12.6 %
LYMPHOCYTES ABSOLUTE: 1.1 THOU/MM3 (ref 1–4.8)
MCH RBC QN AUTO: 23 PG (ref 26–33)
MCHC RBC AUTO-ENTMCNC: 30.8 GM/DL (ref 32.2–35.5)
MCV RBC AUTO: 74.8 FL (ref 81–99)
MONOCYTES # BLD: 4 %
MONOCYTES ABSOLUTE: 0.3 THOU/MM3 (ref 0.4–1.3)
NUCLEATED RED BLOOD CELLS: 0 /100 WBC
PLATELET # BLD: 271 THOU/MM3 (ref 130–400)
PMV BLD AUTO: 11 FL (ref 9.4–12.4)
POTASSIUM SERPL-SCNC: 5.1 MEQ/L (ref 3.5–5.2)
RBC # BLD: 4 MILL/MM3 (ref 4.2–5.4)
SEG NEUTROPHILS: 80.4 %
SEGMENTED NEUTROPHILS ABSOLUTE COUNT: 6.8 THOU/MM3 (ref 1.8–7.7)
SODIUM BLD-SCNC: 135 MEQ/L (ref 135–145)
WBC # BLD: 8.5 THOU/MM3 (ref 4.8–10.8)

## 2019-08-23 PROCEDURE — 6370000000 HC RX 637 (ALT 250 FOR IP): Performed by: FAMILY MEDICINE

## 2019-08-23 PROCEDURE — 71046 X-RAY EXAM CHEST 2 VIEWS: CPT

## 2019-08-23 PROCEDURE — 1200000003 HC TELEMETRY R&B

## 2019-08-23 PROCEDURE — 99232 SBSQ HOSP IP/OBS MODERATE 35: CPT | Performed by: PHYSICIAN ASSISTANT

## 2019-08-23 PROCEDURE — 99232 SBSQ HOSP IP/OBS MODERATE 35: CPT | Performed by: FAMILY MEDICINE

## 2019-08-23 PROCEDURE — 85025 COMPLETE CBC W/AUTO DIFF WBC: CPT

## 2019-08-23 PROCEDURE — 80048 BASIC METABOLIC PNL TOTAL CA: CPT

## 2019-08-23 PROCEDURE — 6360000002 HC RX W HCPCS: Performed by: STUDENT IN AN ORGANIZED HEALTH CARE EDUCATION/TRAINING PROGRAM

## 2019-08-23 PROCEDURE — 2580000003 HC RX 258: Performed by: STUDENT IN AN ORGANIZED HEALTH CARE EDUCATION/TRAINING PROGRAM

## 2019-08-23 PROCEDURE — 6360000002 HC RX W HCPCS: Performed by: FAMILY MEDICINE

## 2019-08-23 PROCEDURE — 2709999900 HC NON-CHARGEABLE SUPPLY

## 2019-08-23 PROCEDURE — 36415 COLL VENOUS BLD VENIPUNCTURE: CPT

## 2019-08-23 PROCEDURE — 82948 REAGENT STRIP/BLOOD GLUCOSE: CPT

## 2019-08-23 PROCEDURE — 6370000000 HC RX 637 (ALT 250 FOR IP): Performed by: INTERNAL MEDICINE

## 2019-08-23 RX ORDER — TRAMADOL HYDROCHLORIDE 50 MG/1
50 TABLET ORAL 2 TIMES DAILY PRN
Status: DISCONTINUED | OUTPATIENT
Start: 2019-08-23 | End: 2019-08-25 | Stop reason: HOSPADM

## 2019-08-23 RX ORDER — 0.9 % SODIUM CHLORIDE 0.9 %
250 INTRAVENOUS SOLUTION INTRAVENOUS ONCE
Status: COMPLETED | OUTPATIENT
Start: 2019-08-23 | End: 2019-08-23

## 2019-08-23 RX ADMIN — CLOPIDOGREL BISULFATE 75 MG: 75 TABLET ORAL at 09:34

## 2019-08-23 RX ADMIN — ACETAMINOPHEN 650 MG: 325 TABLET ORAL at 09:34

## 2019-08-23 RX ADMIN — ENOXAPARIN SODIUM 40 MG: 40 INJECTION SUBCUTANEOUS at 18:06

## 2019-08-23 RX ADMIN — PREGABALIN 200 MG: 50 CAPSULE ORAL at 09:34

## 2019-08-23 RX ADMIN — AMIODARONE HYDROCHLORIDE 200 MG: 200 TABLET ORAL at 09:34

## 2019-08-23 RX ADMIN — INSULIN GLARGINE 18 UNITS: 100 INJECTION, SOLUTION SUBCUTANEOUS at 20:52

## 2019-08-23 RX ADMIN — GABAPENTIN 800 MG: 400 CAPSULE ORAL at 20:44

## 2019-08-23 RX ADMIN — PREGABALIN 200 MG: 50 CAPSULE ORAL at 20:43

## 2019-08-23 RX ADMIN — ACETAMINOPHEN 650 MG: 325 TABLET ORAL at 00:28

## 2019-08-23 RX ADMIN — INSULIN LISPRO 2 UNITS: 100 INJECTION, SOLUTION INTRAVENOUS; SUBCUTANEOUS at 08:16

## 2019-08-23 RX ADMIN — TRAMADOL HYDROCHLORIDE 50 MG: 50 TABLET, FILM COATED ORAL at 20:44

## 2019-08-23 RX ADMIN — ACETAMINOPHEN 650 MG: 325 TABLET ORAL at 18:17

## 2019-08-23 RX ADMIN — INSULIN LISPRO 2 UNITS: 100 INJECTION, SOLUTION INTRAVENOUS; SUBCUTANEOUS at 12:01

## 2019-08-23 RX ADMIN — ATORVASTATIN CALCIUM 80 MG: 80 TABLET, FILM COATED ORAL at 09:34

## 2019-08-23 RX ADMIN — ASPIRIN 81 MG: 81 TABLET ORAL at 09:34

## 2019-08-23 RX ADMIN — INSULIN LISPRO 1 UNITS: 100 INJECTION, SOLUTION INTRAVENOUS; SUBCUTANEOUS at 20:44

## 2019-08-23 RX ADMIN — SODIUM CHLORIDE 250 ML: 9 INJECTION, SOLUTION INTRAVENOUS at 11:55

## 2019-08-23 RX ADMIN — INSULIN LISPRO 2 UNITS: 100 INJECTION, SOLUTION INTRAVENOUS; SUBCUTANEOUS at 18:03

## 2019-08-23 RX ADMIN — ROPINIROLE HYDROCHLORIDE 0.5 MG: 0.5 TABLET, FILM COATED ORAL at 20:43

## 2019-08-23 RX ADMIN — HEPARIN 500 UNITS: 100 SYRINGE at 15:27

## 2019-08-23 ASSESSMENT — PAIN DESCRIPTION - ORIENTATION
ORIENTATION: RIGHT;LEFT
ORIENTATION: LEFT;RIGHT
ORIENTATION: RIGHT;LEFT
ORIENTATION: LEFT;RIGHT
ORIENTATION: LEFT;RIGHT

## 2019-08-23 ASSESSMENT — PAIN DESCRIPTION - DESCRIPTORS
DESCRIPTORS: ACHING

## 2019-08-23 ASSESSMENT — PAIN - FUNCTIONAL ASSESSMENT
PAIN_FUNCTIONAL_ASSESSMENT: ACTIVITIES ARE NOT PREVENTED
PAIN_FUNCTIONAL_ASSESSMENT: ACTIVITIES ARE NOT PREVENTED

## 2019-08-23 ASSESSMENT — PAIN DESCRIPTION - FREQUENCY
FREQUENCY: CONTINUOUS
FREQUENCY: CONTINUOUS
FREQUENCY: INTERMITTENT

## 2019-08-23 ASSESSMENT — PAIN SCALES - GENERAL
PAINLEVEL_OUTOF10: 8
PAINLEVEL_OUTOF10: 4
PAINLEVEL_OUTOF10: 8
PAINLEVEL_OUTOF10: 7
PAINLEVEL_OUTOF10: 4
PAINLEVEL_OUTOF10: 8
PAINLEVEL_OUTOF10: 0

## 2019-08-23 ASSESSMENT — PAIN DESCRIPTION - LOCATION
LOCATION: ARM;LEG
LOCATION: ARM;LEG
LOCATION: ARM;SHOULDER

## 2019-08-23 ASSESSMENT — PAIN DESCRIPTION - PROGRESSION
CLINICAL_PROGRESSION: NOT CHANGED
CLINICAL_PROGRESSION: GRADUALLY WORSENING
CLINICAL_PROGRESSION: NOT CHANGED

## 2019-08-23 ASSESSMENT — PAIN DESCRIPTION - ONSET
ONSET: ON-GOING

## 2019-08-23 ASSESSMENT — PAIN DESCRIPTION - PAIN TYPE
TYPE: CHRONIC PAIN

## 2019-08-23 NOTE — PROGRESS NOTES
orthopnea, PND, or pedal edema. She is no longer on O2. Blood pressures are low today, 80s/50s. Discussed with Sugar Burrell, Cardiology, who agrees with plan to hold all diuretcs/anti-hypertensives, and start gentle fluid hydration. Medications:  Reviewed    Infusion Medications    dextrose       Scheduled Medications    enalapril  10 mg Oral BID    heparin flush  500 Units Intracatheter Daily    enoxaparin  40 mg Subcutaneous Q24H    insulin lispro  0-12 Units Subcutaneous TID WC    insulin lispro  0-6 Units Subcutaneous Nightly    amiodarone  200 mg Oral Daily    aspirin EC  81 mg Oral Daily    atorvastatin  80 mg Oral Daily    clopidogrel  75 mg Oral Daily    gabapentin  800 mg Oral QPM    insulin glargine  18 Units Subcutaneous Nightly    metoprolol succinate  50 mg Oral Daily    pregabalin  200 mg Oral BID    rOPINIRole  0.5 mg Oral Nightly    spironolactone  25 mg Oral Daily    bumetanide  1 mg Oral BID    metOLazone  2.5 mg Oral Daily     PRN Meds: acetaminophen, glucose, dextrose, glucagon (rDNA), dextrose, ipratropium-albuterol, ondansetron      Intake/Output Summary (Last 24 hours) at 8/23/2019 0950  Last data filed at 8/23/2019 0717  Gross per 24 hour   Intake 1270 ml   Output 3500 ml   Net -2230 ml       Diet:  DIET CARDIAC; Daily Fluid Restriction: 1500 ml    Exam:  BP (!) 98/54   Pulse 87   Temp 97.7 °F (36.5 °C) (Oral)   Resp 16   Ht 5' 2\" (1.575 m)   Wt 187 lb (84.8 kg)   SpO2 96%   BMI 34.20 kg/m²     General appearance: No apparent distress, appears stated age and cooperative. HEENT: Pupils equal, round, and reactive to light. Conjunctivae/corneas clear. Neck: Supple, with full range of motion. No jugular venous distention. Trachea midline. Respiratory:  Normal respiratory effort. Clear to auscultation, bilaterally without Rales/Wheezes/Rhonchi. On 1L O2. Cardiovascular: Regular rate and rhythm with normal S1/S2 without murmurs, rubs or gallops.   Abdomen: bolus. Discussed with cardiology who are on-board with current management. Would return home following discharge. Electronically signed by Rajat Mckeon MD on 8/23/2019 at 9:50 AM         Addendum 08/23/19 5:29 PM:    Following bolus, pt's blood pressure has improved, 109/57. Plan to continue to monitor her BP and restart medications gradually as possible, with any changes that might be necessary.      Electronically signed by Rajat Mckeon MD on 8/23/2019 at 5:32 PM

## 2019-08-23 NOTE — PROGRESS NOTES
Cardiology Progress Note      Patient:  Kimi Landrum  YOB: 1945  MRN: [id number]   Acct: [de-identified]  Admit Date:  8/20/2019  Primary Cardiologist: Margaretann Lennox MD  Pt seen by dr Mary Miller    Note per dr Judy Mancia:  Shortness of breath and hypoxemia.     REQUESTING PROVIDER:  Hospitalist Service     HISTORY OF PRESENT ILLNESS:  A 21-year-old lady with a history of  cardiomyopathy and ICD, history of hypertension and hyperlipidemia as  well as history of breast cancer, on treatment. The patient is getting  treatment for that, was supposed to have an infusion and was found to be  hypoxemic for which she was sent to the hospital and initial assessment  did show what looks like some fluid overload and bilateral small  effusions on the chest x-ray. She was started on diuretics. We were  consulted to assist in the management of the patient. The patient  denies any chest pain. She does have a chronic underlying shortness of  Breath. \"    Subjective (Events in last 24 hours):   Pt awake and alert. NAD. No cp or sob. Pt on room air.   No lightheadedness or dizziness or syncope  bp has been low today and yesterday and bp meds have been on hold    Net I/o -7.1 L  Weight down 15 lb    Objective:   BP (!) 86/50 Comment: manual  Pulse 84   Temp 98.3 °F (36.8 °C) (Oral)   Resp 16   Ht 5' 2\" (1.575 m)   Wt 187 lb (84.8 kg)   SpO2 96%   BMI 34.20 kg/m²        TELEMETRY: paced    Physical Exam:  General Appearance: alert and oriented to person, place and time, in no acute distress  Cardiovascular: normal rate, regular rhythm, normal S1 and S2, no murmurs, rubs, clicks, or gallops, distal pulses intact, no carotid bruits, no JVD  Pulmonary/Chest: clear to auscultation bilaterally- no wheezes, rales or rhonchi, normal air movement, no respiratory distress  Abdomen: soft, non-tender, non-distended, normal bowel sounds, no masses Extremities: no cyanosis, clubbing or edema, pulse Skin: warm and dry, no rash or erythema  Head: normocephalic and atraumatic  Eyes: pupils equal, round, and reactive to light  Neck: supple and non-tender without mass, no thyromegaly   Musculoskeletal: normal range of motion, no joint swelling, deformity or tenderness  Neurological: alert, oriented, normal speech, no focal findings or movement disorder noted    Medications:    sodium chloride  250 mL Intravenous Once    [Held by provider] enalapril  10 mg Oral BID    heparin flush  500 Units Intracatheter Daily    enoxaparin  40 mg Subcutaneous Q24H    insulin lispro  0-12 Units Subcutaneous TID WC    insulin lispro  0-6 Units Subcutaneous Nightly    [Held by provider] amiodarone  200 mg Oral Daily    aspirin EC  81 mg Oral Daily    atorvastatin  80 mg Oral Daily    clopidogrel  75 mg Oral Daily    gabapentin  800 mg Oral QPM    insulin glargine  18 Units Subcutaneous Nightly    [Held by provider] metoprolol succinate  50 mg Oral Daily    pregabalin  200 mg Oral BID    rOPINIRole  0.5 mg Oral Nightly    [Held by provider] spironolactone  25 mg Oral Daily    [Held by provider] bumetanide  1 mg Oral BID    [Held by provider] metOLazone  2.5 mg Oral Daily      dextrose         acetaminophen 650 mg Q4H PRN   glucose 15 g PRN   dextrose 12.5 g PRN   glucagon (rDNA) 1 mg PRN   dextrose 100 mL/hr PRN   ipratropium-albuterol 3 mL Q4H PRN   ondansetron 4 mg Q8H PRN       Lab Data:    Cardiac Enzymes:  No results for input(s): CKTOTAL, CKMB, CKMBINDEX, TROPONINI in the last 72 hours.     CBC:   Lab Results   Component Value Date    WBC 8.5 08/23/2019    RBC 4.00 08/23/2019    HGB 9.2 08/23/2019    HCT 29.9 08/23/2019     08/23/2019       CMP:    Lab Results   Component Value Date     08/23/2019    K 5.1 08/23/2019    K 4.5 08/07/2019    CL 97 08/23/2019    CO2 29 08/23/2019    BUN 37 08/23/2019    CREATININE 0.9 08/23/2019    LABGLOM 61 08/23/2019    GLUCOSE 137 08/23/2019    GLUCOSE 130

## 2019-08-24 LAB
ANION GAP SERPL CALCULATED.3IONS-SCNC: 9 MEQ/L (ref 8–16)
ANISOCYTOSIS: PRESENT
BASOPHILS # BLD: 0.4 %
BASOPHILS ABSOLUTE: 0 THOU/MM3 (ref 0–0.1)
BUN BLDV-MCNC: 27 MG/DL (ref 7–22)
CALCIUM SERPL-MCNC: 8.9 MG/DL (ref 8.5–10.5)
CHLORIDE BLD-SCNC: 101 MEQ/L (ref 98–111)
CO2: 26 MEQ/L (ref 23–33)
CREAT SERPL-MCNC: 0.7 MG/DL (ref 0.4–1.2)
EOSINOPHIL # BLD: 3.9 %
EOSINOPHILS ABSOLUTE: 0.3 THOU/MM3 (ref 0–0.4)
ERYTHROCYTE [DISTWIDTH] IN BLOOD BY AUTOMATED COUNT: 22.8 % (ref 11.5–14.5)
ERYTHROCYTE [DISTWIDTH] IN BLOOD BY AUTOMATED COUNT: 57.2 FL (ref 35–45)
GFR SERPL CREATININE-BSD FRML MDRD: 81 ML/MIN/1.73M2
GLUCOSE BLD-MCNC: 149 MG/DL (ref 70–108)
GLUCOSE BLD-MCNC: 151 MG/DL (ref 70–108)
GLUCOSE BLD-MCNC: 177 MG/DL (ref 70–108)
GLUCOSE BLD-MCNC: 178 MG/DL (ref 70–108)
GLUCOSE BLD-MCNC: 189 MG/DL (ref 70–108)
HCT VFR BLD CALC: 31.3 % (ref 37–47)
HEMOGLOBIN: 9.5 GM/DL (ref 12–16)
IMMATURE GRANS (ABS): 0.03 THOU/MM3 (ref 0–0.07)
IMMATURE GRANULOCYTES: 0 %
LYMPHOCYTES # BLD: 13.6 %
LYMPHOCYTES ABSOLUTE: 1 THOU/MM3 (ref 1–4.8)
MCH RBC QN AUTO: 22.8 PG (ref 26–33)
MCHC RBC AUTO-ENTMCNC: 30.4 GM/DL (ref 32.2–35.5)
MCV RBC AUTO: 75.1 FL (ref 81–99)
MONOCYTES # BLD: 2.9 %
MONOCYTES ABSOLUTE: 0.2 THOU/MM3 (ref 0.4–1.3)
NUCLEATED RED BLOOD CELLS: 0 /100 WBC
PLATELET # BLD: 269 THOU/MM3 (ref 130–400)
PMV BLD AUTO: 11.7 FL (ref 9.4–12.4)
POTASSIUM SERPL-SCNC: 4.9 MEQ/L (ref 3.5–5.2)
RBC # BLD: 4.17 MILL/MM3 (ref 4.2–5.4)
SEG NEUTROPHILS: 78.8 %
SEGMENTED NEUTROPHILS ABSOLUTE COUNT: 5.7 THOU/MM3 (ref 1.8–7.7)
SODIUM BLD-SCNC: 136 MEQ/L (ref 135–145)
TOTAL CK: 43 U/L (ref 30–135)
WBC # BLD: 7.2 THOU/MM3 (ref 4.8–10.8)

## 2019-08-24 PROCEDURE — 6370000000 HC RX 637 (ALT 250 FOR IP): Performed by: FAMILY MEDICINE

## 2019-08-24 PROCEDURE — 80048 BASIC METABOLIC PNL TOTAL CA: CPT

## 2019-08-24 PROCEDURE — 36415 COLL VENOUS BLD VENIPUNCTURE: CPT

## 2019-08-24 PROCEDURE — 99232 SBSQ HOSP IP/OBS MODERATE 35: CPT | Performed by: FAMILY MEDICINE

## 2019-08-24 PROCEDURE — 82550 ASSAY OF CK (CPK): CPT

## 2019-08-24 PROCEDURE — 6360000002 HC RX W HCPCS: Performed by: STUDENT IN AN ORGANIZED HEALTH CARE EDUCATION/TRAINING PROGRAM

## 2019-08-24 PROCEDURE — 6360000002 HC RX W HCPCS: Performed by: FAMILY MEDICINE

## 2019-08-24 PROCEDURE — 6370000000 HC RX 637 (ALT 250 FOR IP): Performed by: STUDENT IN AN ORGANIZED HEALTH CARE EDUCATION/TRAINING PROGRAM

## 2019-08-24 PROCEDURE — 85025 COMPLETE CBC W/AUTO DIFF WBC: CPT

## 2019-08-24 PROCEDURE — 99232 SBSQ HOSP IP/OBS MODERATE 35: CPT | Performed by: NURSE PRACTITIONER

## 2019-08-24 PROCEDURE — 6370000000 HC RX 637 (ALT 250 FOR IP): Performed by: INTERNAL MEDICINE

## 2019-08-24 PROCEDURE — 1200000003 HC TELEMETRY R&B

## 2019-08-24 PROCEDURE — 82948 REAGENT STRIP/BLOOD GLUCOSE: CPT

## 2019-08-24 RX ORDER — MIDODRINE HYDROCHLORIDE 2.5 MG/1
2.5 TABLET ORAL 3 TIMES DAILY PRN
Status: DISCONTINUED | OUTPATIENT
Start: 2019-08-24 | End: 2019-08-25 | Stop reason: HOSPADM

## 2019-08-24 RX ORDER — ENALAPRIL MALEATE 2.5 MG/1
2.5 TABLET ORAL DAILY
Status: DISCONTINUED | OUTPATIENT
Start: 2019-08-24 | End: 2019-08-25 | Stop reason: HOSPADM

## 2019-08-24 RX ORDER — MIDODRINE HYDROCHLORIDE 2.5 MG/1
2.5 TABLET ORAL
Status: DISCONTINUED | OUTPATIENT
Start: 2019-08-24 | End: 2019-08-24

## 2019-08-24 RX ORDER — METOPROLOL SUCCINATE 25 MG/1
25 TABLET, EXTENDED RELEASE ORAL DAILY
Status: DISCONTINUED | OUTPATIENT
Start: 2019-08-24 | End: 2019-08-25 | Stop reason: HOSPADM

## 2019-08-24 RX ADMIN — INSULIN LISPRO 1 UNITS: 100 INJECTION, SOLUTION INTRAVENOUS; SUBCUTANEOUS at 21:37

## 2019-08-24 RX ADMIN — MIDODRINE HYDROCHLORIDE 2.5 MG: 2.5 TABLET ORAL at 08:57

## 2019-08-24 RX ADMIN — ENOXAPARIN SODIUM 40 MG: 40 INJECTION SUBCUTANEOUS at 16:29

## 2019-08-24 RX ADMIN — HEPARIN 500 UNITS: 100 SYRINGE at 08:57

## 2019-08-24 RX ADMIN — INSULIN LISPRO 2 UNITS: 100 INJECTION, SOLUTION INTRAVENOUS; SUBCUTANEOUS at 16:25

## 2019-08-24 RX ADMIN — PREGABALIN 200 MG: 50 CAPSULE ORAL at 21:36

## 2019-08-24 RX ADMIN — AMIODARONE HYDROCHLORIDE 200 MG: 200 TABLET ORAL at 08:57

## 2019-08-24 RX ADMIN — INSULIN GLARGINE 18 UNITS: 100 INJECTION, SOLUTION SUBCUTANEOUS at 21:37

## 2019-08-24 RX ADMIN — ASPIRIN 81 MG: 81 TABLET ORAL at 08:56

## 2019-08-24 RX ADMIN — PREGABALIN 200 MG: 50 CAPSULE ORAL at 08:57

## 2019-08-24 RX ADMIN — TRAMADOL HYDROCHLORIDE 50 MG: 50 TABLET, FILM COATED ORAL at 08:56

## 2019-08-24 RX ADMIN — ENALAPRIL MALEATE 2.5 MG: 2.5 TABLET ORAL at 08:57

## 2019-08-24 RX ADMIN — CLOPIDOGREL BISULFATE 75 MG: 75 TABLET ORAL at 08:56

## 2019-08-24 RX ADMIN — ATORVASTATIN CALCIUM 80 MG: 80 TABLET, FILM COATED ORAL at 08:57

## 2019-08-24 RX ADMIN — METOPROLOL SUCCINATE 25 MG: 50 TABLET, FILM COATED, EXTENDED RELEASE ORAL at 08:57

## 2019-08-24 RX ADMIN — ROPINIROLE HYDROCHLORIDE 0.5 MG: 0.5 TABLET, FILM COATED ORAL at 21:37

## 2019-08-24 RX ADMIN — TRAMADOL HYDROCHLORIDE 50 MG: 50 TABLET, FILM COATED ORAL at 21:36

## 2019-08-24 RX ADMIN — INSULIN LISPRO 1 UNITS: 100 INJECTION, SOLUTION INTRAVENOUS; SUBCUTANEOUS at 08:59

## 2019-08-24 RX ADMIN — INSULIN LISPRO 2 UNITS: 100 INJECTION, SOLUTION INTRAVENOUS; SUBCUTANEOUS at 11:31

## 2019-08-24 RX ADMIN — GABAPENTIN 800 MG: 400 CAPSULE ORAL at 21:36

## 2019-08-24 ASSESSMENT — PAIN DESCRIPTION - PROGRESSION

## 2019-08-24 ASSESSMENT — PAIN DESCRIPTION - LOCATION
LOCATION: GENERALIZED
LOCATION: ARM
LOCATION: GENERALIZED

## 2019-08-24 ASSESSMENT — PAIN SCALES - GENERAL
PAINLEVEL_OUTOF10: 3
PAINLEVEL_OUTOF10: 8
PAINLEVEL_OUTOF10: 8

## 2019-08-24 ASSESSMENT — PAIN DESCRIPTION - PAIN TYPE
TYPE: CHRONIC PAIN

## 2019-08-24 ASSESSMENT — PAIN DESCRIPTION - DESCRIPTORS
DESCRIPTORS: ACHING;DISCOMFORT
DESCRIPTORS: ACHING
DESCRIPTORS: ACHING

## 2019-08-24 ASSESSMENT — PAIN DESCRIPTION - ORIENTATION
ORIENTATION: MID
ORIENTATION: MID

## 2019-08-24 ASSESSMENT — PAIN DESCRIPTION - FREQUENCY
FREQUENCY: CONTINUOUS
FREQUENCY: CONTINUOUS

## 2019-08-24 ASSESSMENT — PAIN DESCRIPTION - ONSET
ONSET: ON-GOING
ONSET: ON-GOING

## 2019-08-24 NOTE — PLAN OF CARE
repositioning and emotional support. Problem: Daily Care:  Goal: Daily care needs are met  Description  Daily care needs are met  Outcome: Ongoing  Note:   Patient readily discusses care with the care team.  Patient stated that they don't have any needs that are not currently being met. Patient stated upon hourly rounding that they do not have any further needs that need to be addressed. Care plan reviewed with patient. Patient verbalizes understanding of the plan of care and contribute to goal setting.

## 2019-08-24 NOTE — PROGRESS NOTES
Cardiology Progress Note      Patient:  Isabel De Paz  YOB: 1945  MRN: [id number]   Acct: [de-identified]  Admit Date:  8/20/2019  Primary Cardiologist: Argelia Gutiérrez MD    Per Dr. Kelsey Galaviz consult note:  REASON FOR CONSULTATION:  Shortness of breath and hypoxemia.     REQUESTING PROVIDER:  Hospitalist Service     HISTORY OF PRESENT ILLNESS:  A 69-year-old lady with a history of  cardiomyopathy and ICD, history of hypertension and hyperlipidemia as  well as history of breast cancer, on treatment. The patient is getting  treatment for that, was supposed to have an infusion and was found to be  hypoxemic for which she was sent to the hospital and initial assessment  did show what looks like some fluid overload and bilateral small  effusions on the chest x-ray. She was started on diuretics. We were  consulted to assist in the management of the patient. The patient  denies any chest pain. She does have a chronic underlying shortness of  breath. Subjective (Events in last 24 hours): Alert in nad. Denies chest pain or sob.  BP improved, VSS      Objective:   BP (!) 106/55   Pulse 89   Temp 97.5 °F (36.4 °C) (Oral)   Resp 19   Ht 5' 2\" (1.575 m)   Wt 188 lb 12.8 oz (85.6 kg)   SpO2 93%   BMI 34.53 kg/m²        TELEMETRY: Paced    Physical Exam:  General Appearance: alert and oriented to person, place and time, in no acute distress  Cardiovascular: normal rate, regular rhythm, normal S1 and S2, no murmurs, rubs, clicks, or gallops, distal pulses intact, no carotid bruits, no JVD  Pulmonary/Chest: clear to auscultation bilaterally- no wheezes, rales or rhonchi, normal air movement, no respiratory distress  Abdomen: soft, non-tender, non-distended, normal bowel sounds, no masses   Extremities: no cyanosis, clubbing or edema, pulses palpable   Skin: warm and dry, no rash or erythema  Head: normocephalic and atraumatic  Eyes: pupils equal, round, and reactive to light  Neck: supple and non-tender without mass, no thyromegaly   Musculoskeletal: normal range of motion, no joint swelling, deformity or tenderness  Neurological: alert, oriented, normal speech, no focal findings or movement disorder noted    Medications:    enalapril  2.5 mg Oral Daily    metoprolol succinate  25 mg Oral Daily    heparin flush  500 Units Intracatheter Daily    enoxaparin  40 mg Subcutaneous Q24H    insulin lispro  0-12 Units Subcutaneous TID WC    insulin lispro  0-6 Units Subcutaneous Nightly    amiodarone  200 mg Oral Daily    aspirin EC  81 mg Oral Daily    atorvastatin  80 mg Oral Daily    clopidogrel  75 mg Oral Daily    gabapentin  800 mg Oral QPM    insulin glargine  18 Units Subcutaneous Nightly    pregabalin  200 mg Oral BID    rOPINIRole  0.5 mg Oral Nightly    [Held by provider] bumetanide  1 mg Oral BID      dextrose         midodrine 2.5 mg TID PRN   traMADol 50 mg BID PRN   acetaminophen 650 mg Q4H PRN   glucose 15 g PRN   dextrose 12.5 g PRN   glucagon (rDNA) 1 mg PRN   dextrose 100 mL/hr PRN   ipratropium-albuterol 3 mL Q4H PRN   ondansetron 4 mg Q8H PRN       Diagnostics:  EK19  AV dual-paced rhythm with prolonged AV conduction with frequent ventricular-paced complexes and with occasional Premature ventricular complexes  Abnormal ECG  When compared with ECG of 20-AUG-2019 17:27,  Premature ventricular complexes are now Present  Ventricular rate has increased BY   9 BPM  Confirmed by Martín Davis MD ([id number]) on 2019 8:47:24 PM  Echo: 19  Summary   Ejection fraction is visually estimated at 40%.   There was moderate global hypokinesis of the left ventricle.   Hypokinetic motion of the basal inferior wall noted in the left ventricle.   Left Ventricular size is Mildly increased .   Moderately dilated left atrium.   The aortic valve leaflets were not well visualized.   Aortic valve appears tricuspid.   Aortic valve leaflets are somewhat thickened.   Aortic valve leaflets are Mildly bolus  · Fluid overload/acute on chronic systolic CHF: compensated  · CMP: EF 40 per echo 5/31/19  · CAD  · PVD  · DM  · HTN  · H/o breast cancer on chemo  · Anemia: primary following      Plan:  · Continue asa, plavix, statin  · Adjust antihypertensive doses  · Decrease bumex at discharge, hold aldactone and metolazone  · Daily wt  · 2 gm sodium restriction, 2 L fluid restriction  · F/U with CHF clinic 1 week after discharge to readjust CMP medications as needed.          Electronically signed by ROMAIN Tate CNP on 8/24/2019 at 5:30 PM

## 2019-08-24 NOTE — PROGRESS NOTES
1 dose of Ultram 50mg. Subjective: Pt states she is doing well today, denies SOB, chest tightness. Denies lightheadedness, dizziness, both while laying and when standing with walker. She denies any chest pain, palpitations, orthopnea, PND, or pedal edema. BP stable today, will gradually start her back on held medications and see how she does. Enalapril changed to 2.5 daily  Metolazone held  Bumex held  Metoprolol 25 daily  Aldactone held pending cardio discussion  Same with amiodarone  Start midodrine 2.5 TID scheduled - change to PRN    Discussed with Dr. Jennifer Gallego and Marley Moore, cardiology: plan to continue holding aldactone, they are on board with enalapril and metoprolol dosages, can restart amiodarone, make sure to f/u with heart failure clinic outpatient.         Medications:  Reviewed    Infusion Medications    dextrose       Scheduled Medications    midodrine  2.5 mg Oral TID WC    enalapril  2.5 mg Oral Daily    metoprolol succinate  25 mg Oral Daily    heparin flush  500 Units Intracatheter Daily    enoxaparin  40 mg Subcutaneous Q24H    insulin lispro  0-12 Units Subcutaneous TID WC    insulin lispro  0-6 Units Subcutaneous Nightly    [Held by provider] amiodarone  200 mg Oral Daily    aspirin EC  81 mg Oral Daily    atorvastatin  80 mg Oral Daily    clopidogrel  75 mg Oral Daily    gabapentin  800 mg Oral QPM    insulin glargine  18 Units Subcutaneous Nightly    pregabalin  200 mg Oral BID    rOPINIRole  0.5 mg Oral Nightly    [Held by provider] spironolactone  25 mg Oral Daily    [Held by provider] bumetanide  1 mg Oral BID    [Held by provider] metOLazone  2.5 mg Oral Daily     PRN Meds: traMADol, acetaminophen, glucose, dextrose, glucagon (rDNA), dextrose, ipratropium-albuterol, ondansetron      Intake/Output Summary (Last 24 hours) at 8/24/2019 0776  Last data filed at 8/24/2019 0439  Gross per 24 hour   Intake 1672.62 ml   Output 2000 ml   Net -327.38 ml       Diet:  DIET CARDIAC; Daily Fluid Restriction: 1500 ml    Exam:  BP (!) 110/58   Pulse 86   Temp 98.1 °F (36.7 °C) (Oral)   Resp 18   Ht 5' 2\" (1.575 m)   Wt 188 lb 12.8 oz (85.6 kg)   SpO2 92%   BMI 34.53 kg/m²     General appearance: No apparent distress, appears stated age and cooperative. HEENT: Pupils equal, round, and reactive to light. Conjunctivae/corneas clear. Neck: Supple, with full range of motion. No jugular venous distention. Trachea midline. Respiratory:  Normal respiratory effort. Clear to auscultation, bilaterally without Rales/Wheezes/Rhonchi. On 1L O2. Cardiovascular: Regular rate and rhythm with normal S1/S2 without murmurs, rubs or gallops. Abdomen: Soft, non-tender, non-distended with normal bowel sounds. Musculoskeletal: passive and active ROM x 4 extremities. Skin: Skin color, texture, turgor normal.  No rashes or lesions. Neurologic:  Neurovascularly intact without any focal sensory/motor deficits. Cranial nerves: II-XII intact, grossly non-focal.  Psychiatric: Alert and oriented, thought content appropriate, normal insight  Capillary Refill: Brisk,< 3 seconds   Peripheral Pulses: +2 palpable, equal bilaterally       Labs:   Recent Labs     08/22/19  0357 08/23/19  0403 08/24/19  0348   WBC 10.8 8.5 7.2   HGB 8.7* 9.2* 9.5*   HCT 29.2* 29.9* 31.3*    271 269     Recent Labs     08/22/19  0357 08/23/19  0403 08/24/19  0348    135 136   K 5.2 5.1 4.9   CL 95* 97* 101   CO2 28 29 26   BUN 33* 37* 27*   CREATININE 0.9 0.9 0.7   CALCIUM 9.0 8.9 8.9     No results for input(s): AST, ALT, BILIDIR, BILITOT, ALKPHOS in the last 72 hours. No results for input(s): INR in the last 72 hours. No results for input(s): [place] [facility] in the last 72 hours. Urinalysis:    No results found for: [place], BACTERIA, RBCUA, BLOODU, SPECGRAV, [address]    Radiology:  XR CHEST STANDARD (2 VW)   Final Result   1. Borderline heart size. Permanent pacemaker/defibrillator.  Metallic sternotomy sutures

## 2019-08-25 VITALS
OXYGEN SATURATION: 95 % | RESPIRATION RATE: 18 BRPM | HEART RATE: 86 BPM | BODY MASS INDEX: 34.24 KG/M2 | TEMPERATURE: 97.8 F | WEIGHT: 186.1 LBS | HEIGHT: 62 IN | DIASTOLIC BLOOD PRESSURE: 59 MMHG | SYSTOLIC BLOOD PRESSURE: 118 MMHG

## 2019-08-25 LAB
GLUCOSE BLD-MCNC: 145 MG/DL (ref 70–108)
GLUCOSE BLD-MCNC: 161 MG/DL (ref 70–108)

## 2019-08-25 PROCEDURE — 99239 HOSP IP/OBS DSCHRG MGMT >30: CPT | Performed by: FAMILY MEDICINE

## 2019-08-25 PROCEDURE — 6360000002 HC RX W HCPCS: Performed by: FAMILY MEDICINE

## 2019-08-25 PROCEDURE — 6370000000 HC RX 637 (ALT 250 FOR IP): Performed by: INTERNAL MEDICINE

## 2019-08-25 PROCEDURE — 6370000000 HC RX 637 (ALT 250 FOR IP): Performed by: FAMILY MEDICINE

## 2019-08-25 PROCEDURE — 6370000000 HC RX 637 (ALT 250 FOR IP): Performed by: STUDENT IN AN ORGANIZED HEALTH CARE EDUCATION/TRAINING PROGRAM

## 2019-08-25 PROCEDURE — 94640 AIRWAY INHALATION TREATMENT: CPT

## 2019-08-25 PROCEDURE — 82948 REAGENT STRIP/BLOOD GLUCOSE: CPT

## 2019-08-25 RX ORDER — ENALAPRIL MALEATE 2.5 MG/1
2.5 TABLET ORAL DAILY
Qty: 30 TABLET | Refills: 0 | Status: SHIPPED | OUTPATIENT
Start: 2019-08-26 | End: 2019-09-09 | Stop reason: SDUPTHER

## 2019-08-25 RX ORDER — MIDODRINE HYDROCHLORIDE 2.5 MG/1
2.5 TABLET ORAL 3 TIMES DAILY PRN
Qty: 30 TABLET | Refills: 0 | Status: ON HOLD | OUTPATIENT
Start: 2019-08-25 | End: 2019-09-01 | Stop reason: HOSPADM

## 2019-08-25 RX ORDER — HEPARIN SODIUM (PORCINE) LOCK FLUSH IV SOLN 100 UNIT/ML 100 UNIT/ML
500 SOLUTION INTRAVENOUS ONCE
Status: COMPLETED | OUTPATIENT
Start: 2019-08-25 | End: 2019-08-25

## 2019-08-25 RX ORDER — BUMETANIDE 0.5 MG/1
0.5 TABLET ORAL DAILY
Qty: 15 TABLET | Refills: 0 | Status: SHIPPED | OUTPATIENT
Start: 2019-08-25 | End: 2020-01-01

## 2019-08-25 RX ORDER — METOPROLOL SUCCINATE 25 MG/1
25 TABLET, EXTENDED RELEASE ORAL DAILY
Qty: 30 TABLET | Refills: 0 | Status: SHIPPED | OUTPATIENT
Start: 2019-08-26 | End: 2020-01-01 | Stop reason: ALTCHOICE

## 2019-08-25 RX ADMIN — IPRATROPIUM BROMIDE AND ALBUTEROL SULFATE 3 ML: .5; 3 SOLUTION RESPIRATORY (INHALATION) at 13:56

## 2019-08-25 RX ADMIN — ATORVASTATIN CALCIUM 80 MG: 80 TABLET, FILM COATED ORAL at 09:28

## 2019-08-25 RX ADMIN — CLOPIDOGREL BISULFATE 75 MG: 75 TABLET ORAL at 09:28

## 2019-08-25 RX ADMIN — INSULIN LISPRO 2 UNITS: 100 INJECTION, SOLUTION INTRAVENOUS; SUBCUTANEOUS at 09:26

## 2019-08-25 RX ADMIN — ASPIRIN 81 MG: 81 TABLET ORAL at 09:28

## 2019-08-25 RX ADMIN — AMIODARONE HYDROCHLORIDE 200 MG: 200 TABLET ORAL at 09:28

## 2019-08-25 RX ADMIN — ENALAPRIL MALEATE 2.5 MG: 2.5 TABLET ORAL at 09:28

## 2019-08-25 RX ADMIN — PREGABALIN 200 MG: 50 CAPSULE ORAL at 09:28

## 2019-08-25 RX ADMIN — HEPARIN 500 UNITS: 100 SYRINGE at 13:55

## 2019-08-25 RX ADMIN — METOPROLOL SUCCINATE 25 MG: 50 TABLET, FILM COATED, EXTENDED RELEASE ORAL at 09:28

## 2019-08-25 RX ADMIN — HEPARIN 500 UNITS: 100 SYRINGE at 09:27

## 2019-08-25 RX ADMIN — ACETAMINOPHEN 650 MG: 325 TABLET ORAL at 13:12

## 2019-08-25 RX ADMIN — INSULIN LISPRO 2 UNITS: 100 INJECTION, SOLUTION INTRAVENOUS; SUBCUTANEOUS at 11:18

## 2019-08-25 ASSESSMENT — PAIN DESCRIPTION - PROGRESSION
CLINICAL_PROGRESSION: NOT CHANGED
CLINICAL_PROGRESSION: GRADUALLY IMPROVING

## 2019-08-25 ASSESSMENT — PAIN DESCRIPTION - FREQUENCY
FREQUENCY: CONTINUOUS

## 2019-08-25 ASSESSMENT — PAIN DESCRIPTION - ORIENTATION
ORIENTATION: MID
ORIENTATION: UPPER
ORIENTATION: MID

## 2019-08-25 ASSESSMENT — PAIN DESCRIPTION - ONSET
ONSET: GRADUAL
ONSET: ON-GOING
ONSET: GRADUAL

## 2019-08-25 ASSESSMENT — PAIN DESCRIPTION - LOCATION
LOCATION: GENERALIZED
LOCATION: GENERALIZED
LOCATION: ARM

## 2019-08-25 ASSESSMENT — PAIN SCALES - GENERAL
PAINLEVEL_OUTOF10: 4
PAINLEVEL_OUTOF10: 3
PAINLEVEL_OUTOF10: 7

## 2019-08-25 ASSESSMENT — PAIN DESCRIPTION - PAIN TYPE
TYPE: CHRONIC PAIN
TYPE: ACUTE PAIN;CHRONIC PAIN
TYPE: CHRONIC PAIN

## 2019-08-25 ASSESSMENT — PAIN DESCRIPTION - DESCRIPTORS
DESCRIPTORS: ACHING

## 2019-08-25 ASSESSMENT — PAIN - FUNCTIONAL ASSESSMENT
PAIN_FUNCTIONAL_ASSESSMENT: PREVENTS OR INTERFERES SOME ACTIVE ACTIVITIES AND ADLS
PAIN_FUNCTIONAL_ASSESSMENT: PREVENTS OR INTERFERES SOME ACTIVE ACTIVITIES AND ADLS
PAIN_FUNCTIONAL_ASSESSMENT: ACTIVITIES ARE NOT PREVENTED

## 2019-08-25 NOTE — DISCHARGE SUMMARY
Weight: 186 lb 1.6 oz (84.4 kg)      Height:         Weight: Weight: 186 lb 1.6 oz (84.4 kg)     24 hour intake/output:    Intake/Output Summary (Last 24 hours) at 8/25/2019 1114  Last data filed at 8/25/2019 0449  Gross per 24 hour   Intake 750 ml   Output 900 ml   Net -150 ml         General appearance:  No apparent distress, appears stated age and cooperative. HEENT:  Normal cephalic, atraumatic without obvious deformity. Pupils equal, round, and reactive to light. Extra ocular muscles intact. Conjunctivae/corneas clear. Neck: Supple, with full range of motion. Respiratory:  Normal respiratory effort. Coarse breath sounds on both lung fields rhonchi. Expiratory wheezing appreciated. Cardiovascular:  Regular rate and rhythm with normal S1/S2 without murmurs, rubs or gallops. Abdomen: Soft, non-tender, non-distended with normal bowel sounds. Musculoskeletal:  No clubbing, cyanosis or edema bilaterally. Full range of motion without deformity. No shoulder/arms/legs tenderness. Extremities: 2+ DP pulses, dry skin on both legs, Mild 1+ pedal edema, chronic, per baseline. Skin: Skin color, texture, turgor normal.  No rashes or lesions. Neurologic:  Neurovascularly intact without any focal sensory/motor deficits. Cranial nerves: II-XII intact, grossly non-focal.  Psychiatric:  Alert and oriented, thought content appropriate, normal insight  Capillary Refill: Brisk,< 3 seconds   Peripheral Pulses: +2 palpable, equal bilaterally       Labs:  For convenience and continuity at follow-up the following most recent labs are provided:      CBC:    Lab Results   Component Value Date    WBC 7.2 08/24/2019    HGB 9.5 08/24/2019    HCT 31.3 08/24/2019     08/24/2019       Renal:    Lab Results   Component Value Date     08/24/2019    K 4.9 08/24/2019    K 4.5 08/07/2019     08/24/2019    CO2 26 08/24/2019    BUN 27 08/24/2019    CREATININE 0.7 08/24/2019    CALCIUM 8.9 08/24/2019 Significant Diagnostic Studies    Radiology:   XR CHEST STANDARD (2 VW)   Final Result   1. Borderline heart size. Permanent pacemaker/defibrillator. Metallic sternotomy sutures and vascular clips from prior surgery. Mediport right side, catheter tip at cavoatrial junction. 2. Chest otherwise unremarkable. No infiltrates seen. Questionable tiny pleural effusions seen posteriorly versus pleural thickening. .. Overall appearance improved from prior. **This report has been created using voice recognition software. It may contain minor errors which are inherent in voice recognition technology. **      Final report electronically signed by Dr. Lindsey Park on 8/23/2019 9:15 AM      CTA Chest W WO Contrast   Final Result         1. No acute pulmonary arterial embolism. 2. Small bilateral pleural effusions with adjacent consolidation right greater than left. Correlation with symptoms is advised. 3. 1.4 cm nodular consolidation in the left lung base. This is similar to prior study. Continued follow-up is advised. 4. Previously seen nodule in the right upper lobe which was hypermetabolic on corresponding PET CT dated 6/20/2019 is smaller on today's exam. Continued follow-up is advised. 5. Nodularity in the right breast and right axillary lymph node. Please correlate with mammogram.      **This report has been created using voice recognition software. It may contain minor errors which are inherent in voice recognition technology. **      Final report electronically signed by Dr. Eulalia Pearl on 8/20/2019 8:23 PM      XR CHEST STANDARD (2 VW)   Final Result   Mild pulmonary venous congestion. Correlation with symptoms advised. **This report has been created using voice recognition software. It may contain minor errors which are inherent in voice recognition technology. **      Final report electronically signed by Dr. Eulalia Pearl on 8/20/2019 6:19 PM             Consults:     IP CONSULT TO

## 2019-08-26 ENCOUNTER — TELEPHONE (OUTPATIENT)
Dept: FAMILY MEDICINE CLINIC | Age: 74
End: 2019-08-26

## 2019-08-26 ENCOUNTER — CARE COORDINATION (OUTPATIENT)
Dept: CASE MANAGEMENT | Age: 74
End: 2019-08-26

## 2019-08-26 DIAGNOSIS — C50.912 MALIGNANT NEOPLASM OF LEFT FEMALE BREAST, UNSPECIFIED ESTROGEN RECEPTOR STATUS, UNSPECIFIED SITE OF BREAST (HCC): Primary | ICD-10-CM

## 2019-08-26 RX ORDER — HYDROCODONE BITARTRATE AND ACETAMINOPHEN 5; 325 MG/1; MG/1
1 TABLET ORAL EVERY 6 HOURS PRN
Qty: 20 TABLET | Refills: 0 | Status: ON HOLD | OUTPATIENT
Start: 2019-08-26 | End: 2019-08-28

## 2019-08-26 NOTE — CARE COORDINATION
pain, SOB, lightheadedness, dizziness, and N/V/D/C. Patient reports fatigue and \"a little\" bilateral pedal edema. Patient advised she elevates when sitting, has HERB hose. Educated on importance of HERB hose and elevation, verbalized understanding. Patient denies difficulty with eating/drinking. Patient reports bowel movement since discharge. Patient denies bleeding or bloody stools due to anticoagulant therapy. Patient states she monitors weight, glucose, and blood pressure every morning. Patient states she has not yet this morning due to recently waking up. Advised she will check prior to [facility] nurse arriving this afternoon. Reviewed 1500 mL fluid restriction and low sodium diet. Reviewed CHF zone tool, patient states she has a copy at home. Reviewed new and changed medications. Reviewed parameters for Midodrine and to take PRN when systolic < 545, verbalized understanding. Patient decline complete medication review due to recently waking. Deferred to [facility] for complete medication review. Reviewed upcoming appointments, verbalized understanding. No scheduled appointments with PCP (1 week) and Cardiology (6 weeks). Patient declined CTN to schedule PCP or to transfer patient to Pre-Service. Patient states she will contact herself. Contact information provided. Patient denies additional needs or concerns at this time. Patient instructed to notify Pre-Service, Oncology, [facility], or CTN for any new or worsening symptoms, contact information provided. Patient verbalized understanding. CTN will continue to follow. New ACM referral for possible CC enrollment if criteria is met. Referral for chronic disease management and support with focus on blood pressure and CHF. Will route upon CTN completion. Staff message sent to ACM and ACM Lead. Scheduling two-day CTN follow up due to readmission and to follow up on unscheduled appointments.      Follow Up  Future Appointments   Date Time Provider Candy Freire

## 2019-08-27 ENCOUNTER — TELEPHONE (OUTPATIENT)
Dept: FAMILY MEDICINE CLINIC | Age: 74
End: 2019-08-27

## 2019-08-27 NOTE — TELEPHONE ENCOUNTER
We received a phone call from Hunter Rodrigez RN at [facility] stating that pts BP today at visit was 90/60.  Please advise and call pt at [phone]

## 2019-08-28 ENCOUNTER — APPOINTMENT (OUTPATIENT)
Dept: GENERAL RADIOLOGY | Age: 74
DRG: 073 | End: 2019-08-28
Payer: MEDICARE

## 2019-08-28 ENCOUNTER — HOSPITAL ENCOUNTER (INPATIENT)
Age: 74
LOS: 3 days | Discharge: NURSING, REHAB OR LONG-TERM CARE | DRG: 073 | End: 2019-09-01
Attending: HOSPITALIST | Admitting: HOSPITALIST
Payer: MEDICARE

## 2019-08-28 ENCOUNTER — TELEPHONE (OUTPATIENT)
Dept: ONCOLOGY | Age: 74
End: 2019-08-28

## 2019-08-28 DIAGNOSIS — T45.1X5A NEUROPATHY DUE TO CHEMOTHERAPEUTIC DRUG (HCC): ICD-10-CM

## 2019-08-28 DIAGNOSIS — G62.0 NEUROPATHY DUE TO CHEMOTHERAPEUTIC DRUG (HCC): ICD-10-CM

## 2019-08-28 DIAGNOSIS — R77.8 ELEVATED TROPONIN: Primary | ICD-10-CM

## 2019-08-28 DIAGNOSIS — R53.1 GENERALIZED WEAKNESS: ICD-10-CM

## 2019-08-28 PROBLEM — R79.89 ELEVATED TROPONIN: Status: ACTIVE | Noted: 2019-08-28

## 2019-08-28 LAB
ALBUMIN SERPL-MCNC: 3.6 G/DL (ref 3.5–5.1)
ALP BLD-CCNC: 97 U/L (ref 38–126)
ALT SERPL-CCNC: 19 U/L (ref 11–66)
ANION GAP SERPL CALCULATED.3IONS-SCNC: 13 MEQ/L (ref 8–16)
ANISOCYTOSIS: PRESENT
AST SERPL-CCNC: 15 U/L (ref 5–40)
BASOPHILS # BLD: 1 %
BASOPHILS ABSOLUTE: 0 THOU/MM3 (ref 0–0.1)
BILIRUB SERPL-MCNC: 0.4 MG/DL (ref 0.3–1.2)
BILIRUBIN URINE: NEGATIVE
BLOOD, URINE: NEGATIVE
BUN BLDV-MCNC: 44 MG/DL (ref 7–22)
CALCIUM SERPL-MCNC: 9.3 MG/DL (ref 8.5–10.5)
CHARACTER, URINE: CLEAR
CHLORIDE BLD-SCNC: 98 MEQ/L (ref 98–111)
CO2: 22 MEQ/L (ref 23–33)
COLOR: YELLOW
CREAT SERPL-MCNC: 0.9 MG/DL (ref 0.4–1.2)
EKG ATRIAL RATE: 64 BPM
EKG Q-T INTERVAL: 492 MS
EKG QRS DURATION: 174 MS
EKG QTC CALCULATION (BAZETT): 534 MS
EKG R AXIS: -82 DEGREES
EKG T AXIS: 86 DEGREES
EKG VENTRICULAR RATE: 71 BPM
EOSINOPHIL # BLD: 8.4 %
EOSINOPHILS ABSOLUTE: 0.3 THOU/MM3 (ref 0–0.4)
ERYTHROCYTE [DISTWIDTH] IN BLOOD BY AUTOMATED COUNT: 23.1 % (ref 11.5–14.5)
ERYTHROCYTE [DISTWIDTH] IN BLOOD BY AUTOMATED COUNT: 58.4 FL (ref 35–45)
GFR SERPL CREATININE-BSD FRML MDRD: 61 ML/MIN/1.73M2
GLUCOSE BLD-MCNC: 101 MG/DL (ref 70–108)
GLUCOSE BLD-MCNC: 132 MG/DL (ref 70–108)
GLUCOSE URINE: NEGATIVE MG/DL
HCT VFR BLD CALC: 28.9 % (ref 37–47)
HEMOGLOBIN: 8.7 GM/DL (ref 12–16)
IMMATURE GRANS (ABS): 0.01 THOU/MM3 (ref 0–0.07)
IMMATURE GRANULOCYTES: 0 %
KETONES, URINE: NEGATIVE
LEUKOCYTE ESTERASE, URINE: NEGATIVE
LYMPHOCYTES # BLD: 38.8 %
LYMPHOCYTES ABSOLUTE: 1.5 THOU/MM3 (ref 1–4.8)
MAGNESIUM: 1.8 MG/DL (ref 1.6–2.4)
MCH RBC QN AUTO: 22.7 PG (ref 26–33)
MCHC RBC AUTO-ENTMCNC: 30.1 GM/DL (ref 32.2–35.5)
MCV RBC AUTO: 75.5 FL (ref 81–99)
MONOCYTES # BLD: 3.3 %
MONOCYTES ABSOLUTE: 0.1 THOU/MM3 (ref 0.4–1.3)
NITRITE, URINE: NEGATIVE
NUCLEATED RED BLOOD CELLS: 0 /100 WBC
OSMOLALITY CALCULATION: 277.7 MOSMOL/KG (ref 275–300)
PH UA: 5.5 (ref 5–9)
PLATELET # BLD: 215 THOU/MM3 (ref 130–400)
PLATELET ESTIMATE: ADEQUATE
POTASSIUM SERPL-SCNC: 4.9 MEQ/L (ref 3.5–5.2)
PRO-BNP: 563.3 PG/ML (ref 0–1800)
PROTEIN UA: NEGATIVE
RBC # BLD: 3.83 MILL/MM3 (ref 4.2–5.4)
SCAN OF BLOOD SMEAR: NORMAL
SEG NEUTROPHILS: 48.2 %
SEGMENTED NEUTROPHILS ABSOLUTE COUNT: 1.9 THOU/MM3 (ref 1.8–7.7)
SODIUM BLD-SCNC: 133 MEQ/L (ref 135–145)
SPECIFIC GRAVITY, URINE: 1.01 (ref 1–1.03)
TOTAL PROTEIN: 6.5 G/DL (ref 6.1–8)
TROPONIN T: 0.01 NG/ML
TROPONIN T: < 0.01 NG/ML
TSH SERPL DL<=0.05 MIU/L-ACNC: 2.08 UIU/ML (ref 0.4–4.2)
UROBILINOGEN, URINE: 0.2 EU/DL (ref 0–1)
WBC # BLD: 3.9 THOU/MM3 (ref 4.8–10.8)

## 2019-08-28 PROCEDURE — 84443 ASSAY THYROID STIM HORMONE: CPT

## 2019-08-28 PROCEDURE — 2580000003 HC RX 258: Performed by: HOSPITALIST

## 2019-08-28 PROCEDURE — 81003 URINALYSIS AUTO W/O SCOPE: CPT

## 2019-08-28 PROCEDURE — 2709999900 HC NON-CHARGEABLE SUPPLY

## 2019-08-28 PROCEDURE — 72072 X-RAY EXAM THORAC SPINE 3VWS: CPT

## 2019-08-28 PROCEDURE — 93005 ELECTROCARDIOGRAM TRACING: CPT | Performed by: STUDENT IN AN ORGANIZED HEALTH CARE EDUCATION/TRAINING PROGRAM

## 2019-08-28 PROCEDURE — G0378 HOSPITAL OBSERVATION PER HR: HCPCS

## 2019-08-28 PROCEDURE — 96361 HYDRATE IV INFUSION ADD-ON: CPT

## 2019-08-28 PROCEDURE — 2580000003 HC RX 258: Performed by: STUDENT IN AN ORGANIZED HEALTH CARE EDUCATION/TRAINING PROGRAM

## 2019-08-28 PROCEDURE — 99220 PR INITIAL OBSERVATION CARE/DAY 70 MINUTES: CPT | Performed by: HOSPITALIST

## 2019-08-28 PROCEDURE — 84484 ASSAY OF TROPONIN QUANT: CPT

## 2019-08-28 PROCEDURE — 80053 COMPREHEN METABOLIC PANEL: CPT

## 2019-08-28 PROCEDURE — 93010 ELECTROCARDIOGRAM REPORT: CPT | Performed by: NUCLEAR MEDICINE

## 2019-08-28 PROCEDURE — 85025 COMPLETE CBC W/AUTO DIFF WBC: CPT

## 2019-08-28 PROCEDURE — 83880 ASSAY OF NATRIURETIC PEPTIDE: CPT

## 2019-08-28 PROCEDURE — 83735 ASSAY OF MAGNESIUM: CPT

## 2019-08-28 PROCEDURE — 71046 X-RAY EXAM CHEST 2 VIEWS: CPT

## 2019-08-28 PROCEDURE — 36415 COLL VENOUS BLD VENIPUNCTURE: CPT

## 2019-08-28 PROCEDURE — 96360 HYDRATION IV INFUSION INIT: CPT

## 2019-08-28 PROCEDURE — 99284 EMERGENCY DEPT VISIT MOD MDM: CPT

## 2019-08-28 PROCEDURE — 72100 X-RAY EXAM L-S SPINE 2/3 VWS: CPT

## 2019-08-28 PROCEDURE — 82948 REAGENT STRIP/BLOOD GLUCOSE: CPT

## 2019-08-28 RX ORDER — LANOLIN ALCOHOL/MO/W.PET/CERES
3000 CREAM (GRAM) TOPICAL DAILY
Status: DISCONTINUED | OUTPATIENT
Start: 2019-08-29 | End: 2019-08-29

## 2019-08-28 RX ORDER — 0.9 % SODIUM CHLORIDE 0.9 %
500 INTRAVENOUS SOLUTION INTRAVENOUS ONCE
Status: COMPLETED | OUTPATIENT
Start: 2019-08-28 | End: 2019-08-28

## 2019-08-28 RX ORDER — NICOTINE POLACRILEX 4 MG
15 LOZENGE BUCCAL PRN
Status: DISCONTINUED | OUTPATIENT
Start: 2019-08-28 | End: 2019-09-01 | Stop reason: HOSPADM

## 2019-08-28 RX ORDER — POTASSIUM CHLORIDE 20 MEQ/1
40 TABLET, EXTENDED RELEASE ORAL PRN
Status: DISCONTINUED | OUTPATIENT
Start: 2019-08-28 | End: 2019-09-01 | Stop reason: HOSPADM

## 2019-08-28 RX ORDER — POTASSIUM CHLORIDE 7.45 MG/ML
10 INJECTION INTRAVENOUS PRN
Status: DISCONTINUED | OUTPATIENT
Start: 2019-08-28 | End: 2019-09-01 | Stop reason: HOSPADM

## 2019-08-28 RX ORDER — PROCHLORPERAZINE MALEATE 10 MG
5 TABLET ORAL EVERY 6 HOURS PRN
Status: DISCONTINUED | OUTPATIENT
Start: 2019-08-28 | End: 2019-09-01 | Stop reason: HOSPADM

## 2019-08-28 RX ORDER — MIDODRINE HYDROCHLORIDE 2.5 MG/1
2.5 TABLET ORAL 3 TIMES DAILY PRN
Status: DISCONTINUED | OUTPATIENT
Start: 2019-08-28 | End: 2019-08-29

## 2019-08-28 RX ORDER — ACETAMINOPHEN 325 MG/1
650 TABLET ORAL EVERY 4 HOURS PRN
Status: DISCONTINUED | OUTPATIENT
Start: 2019-08-28 | End: 2019-09-01 | Stop reason: HOSPADM

## 2019-08-28 RX ORDER — SODIUM CHLORIDE 0.9 % (FLUSH) 0.9 %
10 SYRINGE (ML) INJECTION PRN
Status: DISCONTINUED | OUTPATIENT
Start: 2019-08-28 | End: 2019-09-01 | Stop reason: HOSPADM

## 2019-08-28 RX ORDER — ENALAPRIL MALEATE 2.5 MG/1
2.5 TABLET ORAL DAILY
Status: DISCONTINUED | OUTPATIENT
Start: 2019-08-29 | End: 2019-09-01 | Stop reason: HOSPADM

## 2019-08-28 RX ORDER — INSULIN GLARGINE 100 [IU]/ML
18 INJECTION, SOLUTION SUBCUTANEOUS NIGHTLY
Status: DISCONTINUED | OUTPATIENT
Start: 2019-08-28 | End: 2019-09-01 | Stop reason: HOSPADM

## 2019-08-28 RX ORDER — DEXTROSE MONOHYDRATE 25 G/50ML
12.5 INJECTION, SOLUTION INTRAVENOUS PRN
Status: DISCONTINUED | OUTPATIENT
Start: 2019-08-28 | End: 2019-09-01 | Stop reason: HOSPADM

## 2019-08-28 RX ORDER — FLUTICASONE PROPIONATE 50 MCG
2 SPRAY, SUSPENSION (ML) NASAL DAILY
Status: DISCONTINUED | OUTPATIENT
Start: 2019-08-29 | End: 2019-09-01 | Stop reason: HOSPADM

## 2019-08-28 RX ORDER — METFORMIN HYDROCHLORIDE 750 MG/1
750 TABLET, EXTENDED RELEASE ORAL 2 TIMES DAILY WITH MEALS
Status: DISCONTINUED | OUTPATIENT
Start: 2019-08-29 | End: 2019-09-01 | Stop reason: HOSPADM

## 2019-08-28 RX ORDER — DEXTROSE MONOHYDRATE 50 MG/ML
100 INJECTION, SOLUTION INTRAVENOUS PRN
Status: DISCONTINUED | OUTPATIENT
Start: 2019-08-28 | End: 2019-09-01 | Stop reason: HOSPADM

## 2019-08-28 RX ORDER — IPRATROPIUM BROMIDE AND ALBUTEROL SULFATE 2.5; .5 MG/3ML; MG/3ML
3 SOLUTION RESPIRATORY (INHALATION) EVERY 4 HOURS PRN
Status: DISCONTINUED | OUTPATIENT
Start: 2019-08-28 | End: 2019-09-01 | Stop reason: HOSPADM

## 2019-08-28 RX ORDER — SODIUM CHLORIDE 0.9 % (FLUSH) 0.9 %
10 SYRINGE (ML) INJECTION EVERY 12 HOURS SCHEDULED
Status: DISCONTINUED | OUTPATIENT
Start: 2019-08-28 | End: 2019-09-01 | Stop reason: HOSPADM

## 2019-08-28 RX ORDER — ONDANSETRON 2 MG/ML
4 INJECTION INTRAMUSCULAR; INTRAVENOUS EVERY 6 HOURS PRN
Status: DISCONTINUED | OUTPATIENT
Start: 2019-08-28 | End: 2019-09-01 | Stop reason: HOSPADM

## 2019-08-28 RX ORDER — CLOPIDOGREL BISULFATE 75 MG/1
75 TABLET ORAL DAILY
Status: DISCONTINUED | OUTPATIENT
Start: 2019-08-29 | End: 2019-09-01 | Stop reason: HOSPADM

## 2019-08-28 RX ORDER — ATORVASTATIN CALCIUM 80 MG/1
80 TABLET, FILM COATED ORAL NIGHTLY
Status: DISCONTINUED | OUTPATIENT
Start: 2019-08-28 | End: 2019-09-01 | Stop reason: HOSPADM

## 2019-08-28 RX ORDER — BUMETANIDE 1 MG/1
0.5 TABLET ORAL DAILY
Status: DISCONTINUED | OUTPATIENT
Start: 2019-08-29 | End: 2019-09-01 | Stop reason: HOSPADM

## 2019-08-28 RX ORDER — LIDOCAINE AND PRILOCAINE 25; 25 MG/G; MG/G
CREAM TOPICAL PRN
Status: DISCONTINUED | OUTPATIENT
Start: 2019-08-28 | End: 2019-09-01 | Stop reason: HOSPADM

## 2019-08-28 RX ORDER — AMIODARONE HYDROCHLORIDE 200 MG/1
200 TABLET ORAL DAILY
Status: DISCONTINUED | OUTPATIENT
Start: 2019-08-29 | End: 2019-09-01 | Stop reason: HOSPADM

## 2019-08-28 RX ORDER — METOPROLOL SUCCINATE 25 MG/1
25 TABLET, EXTENDED RELEASE ORAL DAILY
Status: DISCONTINUED | OUTPATIENT
Start: 2019-08-29 | End: 2019-09-01 | Stop reason: HOSPADM

## 2019-08-28 RX ORDER — SODIUM CHLORIDE 9 MG/ML
INJECTION, SOLUTION INTRAVENOUS CONTINUOUS
Status: DISCONTINUED | OUTPATIENT
Start: 2019-08-28 | End: 2019-08-28

## 2019-08-28 RX ORDER — ASPIRIN 81 MG/1
81 TABLET ORAL DAILY
Status: DISCONTINUED | OUTPATIENT
Start: 2019-08-29 | End: 2019-09-01 | Stop reason: HOSPADM

## 2019-08-28 RX ORDER — ONDANSETRON 4 MG/1
4 TABLET, FILM COATED ORAL EVERY 8 HOURS PRN
Status: DISCONTINUED | OUTPATIENT
Start: 2019-08-28 | End: 2019-09-01 | Stop reason: HOSPADM

## 2019-08-28 RX ORDER — ROPINIROLE 0.5 MG/1
0.5 TABLET, FILM COATED ORAL NIGHTLY
Status: DISCONTINUED | OUTPATIENT
Start: 2019-08-28 | End: 2019-09-01 | Stop reason: HOSPADM

## 2019-08-28 RX ORDER — SODIUM CHLORIDE 9 MG/ML
INJECTION, SOLUTION INTRAVENOUS CONTINUOUS
Status: ACTIVE | OUTPATIENT
Start: 2019-08-28 | End: 2019-08-29

## 2019-08-28 RX ORDER — GABAPENTIN 400 MG/1
800 CAPSULE ORAL EVERY EVENING
Status: DISCONTINUED | OUTPATIENT
Start: 2019-08-28 | End: 2019-08-29

## 2019-08-28 RX ADMIN — SODIUM CHLORIDE: 9 INJECTION, SOLUTION INTRAVENOUS at 17:56

## 2019-08-28 RX ADMIN — SODIUM CHLORIDE: 9 INJECTION, SOLUTION INTRAVENOUS at 23:40

## 2019-08-28 RX ADMIN — SODIUM CHLORIDE 500 ML: 9 INJECTION, SOLUTION INTRAVENOUS at 16:46

## 2019-08-28 ASSESSMENT — ENCOUNTER SYMPTOMS
SINUS PRESSURE: 0
SORE THROAT: 0
NAUSEA: 1
VOMITING: 1
VOMITING: 0
COUGH: 0
CONSTIPATION: 0
SHORTNESS OF BREATH: 0
SINUS PAIN: 0
NAUSEA: 0
EYE DISCHARGE: 0
DIARRHEA: 0
WHEEZING: 0
RHINORRHEA: 1
EYE REDNESS: 0
COUGH: 1
ABDOMINAL PAIN: 0
EYE PAIN: 0
EYE ITCHING: 0
BACK PAIN: 0
RHINORRHEA: 0

## 2019-08-28 ASSESSMENT — PAIN DESCRIPTION - FREQUENCY: FREQUENCY: CONTINUOUS

## 2019-08-28 ASSESSMENT — PAIN - FUNCTIONAL ASSESSMENT: PAIN_FUNCTIONAL_ASSESSMENT: PREVENTS OR INTERFERES SOME ACTIVE ACTIVITIES AND ADLS

## 2019-08-28 ASSESSMENT — PAIN DESCRIPTION - DESCRIPTORS: DESCRIPTORS: ACHING

## 2019-08-28 ASSESSMENT — PAIN DESCRIPTION - LOCATION: LOCATION: GENERALIZED

## 2019-08-28 ASSESSMENT — PAIN DESCRIPTION - PAIN TYPE: TYPE: CHRONIC PAIN

## 2019-08-28 ASSESSMENT — PAIN SCALES - GENERAL: PAINLEVEL_OUTOF10: 10

## 2019-08-28 NOTE — ED PROVIDER NOTES
and sore throat. Eyes: Negative for pain, discharge and visual disturbance. Respiratory: Negative for cough, shortness of breath and wheezing. Cardiovascular: Negative for chest pain, palpitations and leg swelling. Gastrointestinal: Negative for abdominal pain, diarrhea, nausea and vomiting. Genitourinary: Negative for difficulty urinating, dysuria, hematuria and vaginal discharge. Musculoskeletal: Negative for arthralgias, back pain, joint swelling and neck pain. Skin: Negative for pallor and rash. Neurological: Positive for numbness. Negative for dizziness, syncope, weakness, light-headedness and headaches. Hematological: Negative for adenopathy. Psychiatric/Behavioral: Negative for confusion and suicidal ideas. The patient is not nervous/anxious. PAST MEDICAL HISTORY    has a past medical history of Breast cancer ([facility] [address] CAD (coronary artery disease), CHF (congestive heart failure) ([facility] [address] Hyperlipidemia, Hypertension, ICD (implantable cardiac defibrillator), dual, in situ, Numbness and tingling, Pneumonia, Shingles, Sleep apnea, [facility] dual ICD, Type II or unspecified type diabetes mellitus without mention of complication, not stated as uncontrolled, and Ventricular arrhythmia. SURGICAL HISTORY      has a past surgical history that includes Diagnostic Cardiac Cath Lab Procedure (8-24-09); Cardiac pacemaker placement (9-08-09); transthoracic echocardiogram (07-11-11); cardiovascular stress test (01-27-10); Hysterectomy (1997); Coronary artery bypass graft; Cholecystectomy (2005); hernia repair; cardiovascular stress test (10-28-09); transthoracic echocardiogram (8-24-09); Cardioversion (8-29-09); pacemaker placement; Colonoscopy (Left, 1/9/2019); Breast biopsy (Right, 06/11/2019); and INSERTION / REMOVAL / REPLACEMENT VENOUS ACCESS CATHETER (Right, 7/31/2019).     CURRENT MEDICATIONS       Current Discharge Medication List      CONTINUE these medications which have NOT CHANGED dictated to the scribe in my presence, and it accurately records my words and actions.     Kurtis Apgar, PA-C 19 12:36 AM    ROSA Richards PA-C  19 0615

## 2019-08-29 PROBLEM — R55 SYNCOPE AND COLLAPSE: Status: ACTIVE | Noted: 2019-08-29

## 2019-08-29 LAB
ABO: NORMAL
ANION GAP SERPL CALCULATED.3IONS-SCNC: 10 MEQ/L (ref 8–16)
ANISOCYTOSIS: PRESENT
ANTIBODY SCREEN: NORMAL
BASOPHILS # BLD: 0.6 %
BASOPHILS ABSOLUTE: 0 THOU/MM3 (ref 0–0.1)
BUN BLDV-MCNC: 33 MG/DL (ref 7–22)
CALCIUM SERPL-MCNC: 8.5 MG/DL (ref 8.5–10.5)
CHLORIDE BLD-SCNC: 107 MEQ/L (ref 98–111)
CO2: 21 MEQ/L (ref 23–33)
CREAT SERPL-MCNC: 0.9 MG/DL (ref 0.4–1.2)
EKG ATRIAL RATE: 72 BPM
EKG Q-T INTERVAL: 492 MS
EKG QRS DURATION: 180 MS
EKG QTC CALCULATION (BAZETT): 538 MS
EKG R AXIS: -76 DEGREES
EKG T AXIS: 89 DEGREES
EKG VENTRICULAR RATE: 72 BPM
EOSINOPHIL # BLD: 6.8 %
EOSINOPHILS ABSOLUTE: 0.2 THOU/MM3 (ref 0–0.4)
ERYTHROCYTE [DISTWIDTH] IN BLOOD BY AUTOMATED COUNT: 23 % (ref 11.5–14.5)
ERYTHROCYTE [DISTWIDTH] IN BLOOD BY AUTOMATED COUNT: 59.6 FL (ref 35–45)
GFR SERPL CREATININE-BSD FRML MDRD: 61 ML/MIN/1.73M2
GLUCOSE BLD-MCNC: 101 MG/DL (ref 70–108)
GLUCOSE BLD-MCNC: 128 MG/DL (ref 70–108)
GLUCOSE BLD-MCNC: 180 MG/DL (ref 70–108)
GLUCOSE BLD-MCNC: 85 MG/DL (ref 70–108)
GLUCOSE BLD-MCNC: 94 MG/DL (ref 70–108)
HCT VFR BLD CALC: 26.3 % (ref 37–47)
HCT VFR BLD CALC: 28.7 % (ref 37–47)
HEMOGLOBIN: 7.8 GM/DL (ref 12–16)
HEMOGLOBIN: 8.6 GM/DL (ref 12–16)
IMMATURE GRANS (ABS): 0.01 THOU/MM3 (ref 0–0.07)
IMMATURE GRANULOCYTES: 0 %
LYMPHOCYTES # BLD: 39.5 %
LYMPHOCYTES ABSOLUTE: 1.2 THOU/MM3 (ref 1–4.8)
MCH RBC QN AUTO: 22.9 PG (ref 26–33)
MCHC RBC AUTO-ENTMCNC: 29.7 GM/DL (ref 32.2–35.5)
MCV RBC AUTO: 77.1 FL (ref 81–99)
MONOCYTES # BLD: 4.8 %
MONOCYTES ABSOLUTE: 0.1 THOU/MM3 (ref 0.4–1.3)
NUCLEATED RED BLOOD CELLS: 0 /100 WBC
PLATELET # BLD: 198 THOU/MM3 (ref 130–400)
POTASSIUM REFLEX MAGNESIUM: 4.5 MEQ/L (ref 3.5–5.2)
RBC # BLD: 3.41 MILL/MM3 (ref 4.2–5.4)
RH FACTOR: NORMAL
SEG NEUTROPHILS: 48 %
SEGMENTED NEUTROPHILS ABSOLUTE COUNT: 1.5 THOU/MM3 (ref 1.8–7.7)
SODIUM BLD-SCNC: 138 MEQ/L (ref 135–145)
TROPONIN T: 0.01 NG/ML
TROPONIN T: < 0.01 NG/ML
WBC # BLD: 3.1 THOU/MM3 (ref 4.8–10.8)

## 2019-08-29 PROCEDURE — 86900 BLOOD TYPING SEROLOGIC ABO: CPT

## 2019-08-29 PROCEDURE — 86850 RBC ANTIBODY SCREEN: CPT

## 2019-08-29 PROCEDURE — 97535 SELF CARE MNGMENT TRAINING: CPT

## 2019-08-29 PROCEDURE — 84484 ASSAY OF TROPONIN QUANT: CPT

## 2019-08-29 PROCEDURE — 6370000000 HC RX 637 (ALT 250 FOR IP): Performed by: HOSPITALIST

## 2019-08-29 PROCEDURE — 97530 THERAPEUTIC ACTIVITIES: CPT

## 2019-08-29 PROCEDURE — 86923 COMPATIBILITY TEST ELECTRIC: CPT

## 2019-08-29 PROCEDURE — 85018 HEMOGLOBIN: CPT

## 2019-08-29 PROCEDURE — 2709999900 HC NON-CHARGEABLE SUPPLY

## 2019-08-29 PROCEDURE — 97162 PT EVAL MOD COMPLEX 30 MIN: CPT

## 2019-08-29 PROCEDURE — P9016 RBC LEUKOCYTES REDUCED: HCPCS

## 2019-08-29 PROCEDURE — 6370000000 HC RX 637 (ALT 250 FOR IP): Performed by: PHYSICIAN ASSISTANT

## 2019-08-29 PROCEDURE — 6360000002 HC RX W HCPCS: Performed by: HOSPITALIST

## 2019-08-29 PROCEDURE — 99222 1ST HOSP IP/OBS MODERATE 55: CPT | Performed by: NURSE PRACTITIONER

## 2019-08-29 PROCEDURE — 6370000000 HC RX 637 (ALT 250 FOR IP): Performed by: STUDENT IN AN ORGANIZED HEALTH CARE EDUCATION/TRAINING PROGRAM

## 2019-08-29 PROCEDURE — 85025 COMPLETE CBC W/AUTO DIFF WBC: CPT

## 2019-08-29 PROCEDURE — 51798 US URINE CAPACITY MEASURE: CPT

## 2019-08-29 PROCEDURE — 2580000003 HC RX 258: Performed by: NURSE PRACTITIONER

## 2019-08-29 PROCEDURE — 97110 THERAPEUTIC EXERCISES: CPT

## 2019-08-29 PROCEDURE — 93005 ELECTROCARDIOGRAM TRACING: CPT | Performed by: HOSPITALIST

## 2019-08-29 PROCEDURE — 6370000000 HC RX 637 (ALT 250 FOR IP): Performed by: NURSE PRACTITIONER

## 2019-08-29 PROCEDURE — 1200000003 HC TELEMETRY R&B

## 2019-08-29 PROCEDURE — 80048 BASIC METABOLIC PNL TOTAL CA: CPT

## 2019-08-29 PROCEDURE — 93010 ELECTROCARDIOGRAM REPORT: CPT | Performed by: NUCLEAR MEDICINE

## 2019-08-29 PROCEDURE — 36415 COLL VENOUS BLD VENIPUNCTURE: CPT

## 2019-08-29 PROCEDURE — 85014 HEMATOCRIT: CPT

## 2019-08-29 PROCEDURE — 97166 OT EVAL MOD COMPLEX 45 MIN: CPT

## 2019-08-29 PROCEDURE — 2580000003 HC RX 258: Performed by: HOSPITALIST

## 2019-08-29 PROCEDURE — 96372 THER/PROPH/DIAG INJ SC/IM: CPT

## 2019-08-29 PROCEDURE — 36430 TRANSFUSION BLD/BLD COMPNT: CPT

## 2019-08-29 PROCEDURE — 86901 BLOOD TYPING SEROLOGIC RH(D): CPT

## 2019-08-29 PROCEDURE — 82948 REAGENT STRIP/BLOOD GLUCOSE: CPT

## 2019-08-29 RX ORDER — MIDODRINE HYDROCHLORIDE 2.5 MG/1
2.5 TABLET ORAL 3 TIMES DAILY PRN
Status: DISCONTINUED | OUTPATIENT
Start: 2019-08-29 | End: 2019-08-30

## 2019-08-29 RX ORDER — GUAIFENESIN 600 MG/1
600 TABLET, EXTENDED RELEASE ORAL 2 TIMES DAILY
Status: DISCONTINUED | OUTPATIENT
Start: 2019-08-29 | End: 2019-09-01 | Stop reason: HOSPADM

## 2019-08-29 RX ORDER — GABAPENTIN 400 MG/1
400 CAPSULE ORAL 3 TIMES DAILY
Status: DISCONTINUED | OUTPATIENT
Start: 2019-08-29 | End: 2019-09-01 | Stop reason: HOSPADM

## 2019-08-29 RX ORDER — 0.9 % SODIUM CHLORIDE 0.9 %
250 INTRAVENOUS SOLUTION INTRAVENOUS ONCE
Status: COMPLETED | OUTPATIENT
Start: 2019-08-29 | End: 2019-08-29

## 2019-08-29 RX ADMIN — ENOXAPARIN SODIUM 40 MG: 40 INJECTION SUBCUTANEOUS at 09:31

## 2019-08-29 RX ADMIN — GABAPENTIN 800 MG: 400 CAPSULE ORAL at 00:34

## 2019-08-29 RX ADMIN — GABAPENTIN 400 MG: 400 CAPSULE ORAL at 14:18

## 2019-08-29 RX ADMIN — METOPROLOL SUCCINATE 25 MG: 25 TABLET, EXTENDED RELEASE ORAL at 09:31

## 2019-08-29 RX ADMIN — CLOPIDOGREL BISULFATE 75 MG: 75 TABLET ORAL at 09:31

## 2019-08-29 RX ADMIN — ATORVASTATIN CALCIUM 80 MG: 80 TABLET, FILM COATED ORAL at 00:34

## 2019-08-29 RX ADMIN — BUMETANIDE 0.5 MG: 1 TABLET ORAL at 09:31

## 2019-08-29 RX ADMIN — GABAPENTIN 400 MG: 400 CAPSULE ORAL at 21:35

## 2019-08-29 RX ADMIN — Medication 3.3 MG: at 06:09

## 2019-08-29 RX ADMIN — ACETAMINOPHEN 650 MG: 325 TABLET ORAL at 14:42

## 2019-08-29 RX ADMIN — ACETAMINOPHEN 650 MG: 325 TABLET ORAL at 23:43

## 2019-08-29 RX ADMIN — ACETAMINOPHEN 650 MG: 325 TABLET ORAL at 06:04

## 2019-08-29 RX ADMIN — ENALAPRIL MALEATE 2.5 MG: 2.5 TABLET ORAL at 09:31

## 2019-08-29 RX ADMIN — SODIUM CHLORIDE 250 ML: 9 INJECTION, SOLUTION INTRAVENOUS at 15:28

## 2019-08-29 RX ADMIN — Medication 10 ML: at 21:34

## 2019-08-29 RX ADMIN — AMIODARONE HYDROCHLORIDE 200 MG: 200 TABLET ORAL at 09:31

## 2019-08-29 RX ADMIN — INSULIN LISPRO 2 UNITS: 100 INJECTION, SOLUTION INTRAVENOUS; SUBCUTANEOUS at 14:18

## 2019-08-29 RX ADMIN — ACETAMINOPHEN 650 MG: 325 TABLET ORAL at 00:34

## 2019-08-29 RX ADMIN — GUAIFENESIN 600 MG: 600 TABLET, EXTENDED RELEASE ORAL at 22:22

## 2019-08-29 RX ADMIN — MIDODRINE HYDROCHLORIDE 2.5 MG: 2.5 TABLET ORAL at 00:33

## 2019-08-29 RX ADMIN — METFORMIN HYDROCHLORIDE 750 MG: 750 TABLET, EXTENDED RELEASE ORAL at 09:32

## 2019-08-29 RX ADMIN — PREGABALIN 200 MG: 75 CAPSULE ORAL at 00:40

## 2019-08-29 RX ADMIN — ATORVASTATIN CALCIUM 80 MG: 80 TABLET, FILM COATED ORAL at 21:36

## 2019-08-29 RX ADMIN — ASPIRIN 81 MG: 81 TABLET ORAL at 09:31

## 2019-08-29 RX ADMIN — PREGABALIN 200 MG: 75 CAPSULE ORAL at 09:38

## 2019-08-29 RX ADMIN — INSULIN GLARGINE 18 UNITS: 100 INJECTION, SOLUTION SUBCUTANEOUS at 00:33

## 2019-08-29 ASSESSMENT — PAIN DESCRIPTION - FREQUENCY
FREQUENCY: CONTINUOUS
FREQUENCY: CONTINUOUS

## 2019-08-29 ASSESSMENT — PAIN DESCRIPTION - LOCATION
LOCATION: GENERALIZED

## 2019-08-29 ASSESSMENT — PAIN SCALES - GENERAL
PAINLEVEL_OUTOF10: 8
PAINLEVEL_OUTOF10: 0
PAINLEVEL_OUTOF10: 2
PAINLEVEL_OUTOF10: 4
PAINLEVEL_OUTOF10: 0
PAINLEVEL_OUTOF10: 8
PAINLEVEL_OUTOF10: 2
PAINLEVEL_OUTOF10: 10
PAINLEVEL_OUTOF10: 3
PAINLEVEL_OUTOF10: 0
PAINLEVEL_OUTOF10: 0

## 2019-08-29 ASSESSMENT — PAIN DESCRIPTION - DESCRIPTORS
DESCRIPTORS: ACHING

## 2019-08-29 ASSESSMENT — PAIN DESCRIPTION - PAIN TYPE
TYPE: CHRONIC PAIN

## 2019-08-29 ASSESSMENT — PAIN DESCRIPTION - ORIENTATION: ORIENTATION: MID

## 2019-08-29 NOTE — PROGRESS NOTES
[facility]  INPATIENT PHYSICAL THERAPY  EVALUATION  [facility] MED SURG 8B - 8B-28/028-A    Time In: 2898  Time Out: 1519  Time In: 1555  Time Out: 1610  Timed Code Treatment Minutes: 10 Minutes  Minutes: 24  Time interrupted by blood transfusion          Date: 2019  Patient Name: Samina Floyd,  Gender:  female        MRN: [id number]  : 1945  (75 y.o.)  Referral Date : 19   Referring Practitioner: Nicola Mueller MD  Diagnosis: elevated troponin  Additional Pertinent Hx: Per ED notes:74 y.o. female with medical history significant for CHF with systolic dysfunction, CAD, history of breast cancer currently on chemo, hypertension, hyperlipidemia, upper and lower extremity neuropathy, diabetes type 2; who presented to [facility] with generalized weakness and fatigue x1 week. Patient reports that she recently started chemotherapy in the past month. Pt had syncopal episode 3 days PTA     Restrictions/Precautions:  Restrictions/Precautions: Fall Risk, General Precautions  Position Activity Restriction  Other position/activity restrictions: neuropathy in bilateral feet     Subjective:  Chart Reviewed: Yes  Patient assessed for rehabilitation services?: Yes  Family / Caregiver Present: No  Subjective: RN okay with PT. Pt in bed agreeable to PT evaluation and treat. RN okay with continuing PT resuming session after starting blood transfusion this date. General:  Overall Orientation Status: Within Functional Limits    Vision: Within Functional Limits    Hearing: Within functional limits         Pain:  Yes(feet, ache, nurse gave medication earlier).   Pain Assessment  Pain Level: 8       Social/Functional History:    Lives With: Spouse  Type of Home: House  Home Layout: One level  Home Access: Stairs to enter with rails  Entrance Stairs - Number of Steps: 2  Entrance Stairs - Rails: Right  Home Equipment: Wheelchair-manual, 4 wheeled walker     Bathroom Shower/Tub: DM, numbness and tingling in feet, CHF, HTN, breast cancer, undergoing chemo  Pt needing blood transfusion, IV. Evolving status. REQUIRES PT FOLLOW UP: Yes    Discharge Recommendations:  Discharge Recommendations: [address], Patient would benefit from continued therapy after discharge    Patient Education:  PT Education: Plan of Care, Transfer Training, General Safety    Equipment Recommendations:  Equipment Needed: No    Plan:  Times per week: 3-5x/wk GM  Times per day: Daily  Current Treatment Recommendations: Strengthening, Functional Mobility Training, Neuromuscular Re-education, Home Exercise Program, Transfer Training, Gait Training, Safety Education & Training, Balance Training, Endurance Training, Stair training, Patient/Caregiver Education & Training    Goals:  Patient goals : Get stronger, return to [facility]  Short term goals  Time Frame for Short term goals: At discharge  Short term goal 1: Patient will ambulate 22' with RW and CGA to allow patient to navigate living environment with decreased difficulty. Short term goal 2: Patient will complete sit < > stand transfer with SBA to allow patient to stand to ambulate with decreased difficulty. Short term goal 3: Patient will complete stand pivot transfer with SBA to transfer bed to chair with decreased difficulty. Short term goal 4: Patient will complete supine < > sit transfer with modified independence to allow patient to transfer in/out of bed safely. Following session, patient left in safe position with all fall risk precautions in place. Educated pt not to get up without assistance.

## 2019-08-29 NOTE — H&P
frequency, hematuria and urgency. Musculoskeletal: Positive for gait problem. Negative for arthralgias, back pain, myalgias and neck pain. Neurological: Positive for syncope, weakness and numbness. Negative for dizziness, tremors, seizures, speech difficulty and headaches. Psychiatric/Behavioral: Negative for agitation, confusion and hallucinations. PHYSICAL EXAM:    BP (!) 100/56   Pulse 77   Temp 97.5 °F (36.4 °C) (Oral)   Resp 20   Ht 5' 2\" (1.575 m)   Wt 189 lb (85.7 kg)   SpO2 92%   BMI 34.57 kg/m²   Physical Exam   Constitutional: She appears well-developed and well-nourished. HENT:   Head: Normocephalic and atraumatic. Nose: Nose normal.   Eyes: Pupils are equal, round, and reactive to light. Conjunctivae and EOM are normal.   Neck: Neck supple. Cardiovascular: Normal rate, regular rhythm, normal heart sounds and intact distal pulses. Exam reveals no gallop and no friction rub. No murmur heard. Pulmonary/Chest: Effort normal and breath sounds normal. No respiratory distress. She has no wheezes. She has no rales. Course breath sounds through all lung fields   Abdominal: Soft. Bowel sounds are normal. She exhibits no distension. There is no tenderness. Musculoskeletal: Normal range of motion. She exhibits edema. Neurological: She is alert. Skin: Skin is warm and dry. Labs:     Recent Labs     08/28/19  1540   WBC 3.9*   HGB 8.7*   HCT 28.9*        Recent Labs     08/28/19  1540   *   K 4.9   CL 98   CO2 22*   BUN 44*   CREATININE 0.9   CALCIUM 9.3     Recent Labs     08/28/19  1540   AST 15   ALT 19   BILITOT 0.4   ALKPHOS 97     No results for input(s): INR in the last 72 hours. No results for input(s): [address] Prier in the last 72 hours. Urinalysis:   Lab Results   Component Value Date    NITRU NEGATIVE 08/28/2019    BLOODU NEGATIVE 08/28/2019    GLUCOSEU NEGATIVE 08/28/2019       Intake & Output:  No intake/output data recorded.   No

## 2019-08-29 NOTE — PROGRESS NOTES
Tita Hyatt 59  INPATIENT OCCUPATIONAL THERAPY  [facility] MED SURG 8B  EVALUATION    Time:    Time In: 69  Time Out: 0920  Timed Code Treatment Minutes: 23 Minutes  Minutes: 33          Date: 2019  Patient Name: Rios Cancino,   Gender: female      MRN: [id number]  : 1945  (75 y.o.)  Referring Practitioner: Dr. Ryan Ramírez. [facility]  Diagnosis: elevated troponin  Additional Pertinent Hx: 75 y.o. female with medical history significant for CHF with systolic dysfunction, CAD, history of breast cancer currently on chemo, hypertension, hyperlipidemia, upper and lower extremity neuropathy, diabetes type 2; who presented to [address] [place] [address] with generalized weakness and fatigue x1 week. Patient reports that she recently started chemotherapy in the past month. Restrictions/Precautions:  Restrictions/Precautions: Fall Risk  Position Activity Restriction  Other position/activity restrictions: neuropathy in bilateral feet     Subjective  Chart Reviewed: Yes, History and Physical, Orders, Progress Notes  Patient assessed for rehabilitation services?: Yes    Subjective: Pt lying in bed with daughter present. PT very talkative thorughout session.      Pain:  Pain Assessment  Patient Currently in Pain: Denies    Social/Functional History:  Lives With: Spouse  Type of Home: House  Home Layout: One level  Home Access: Stairs to enter with rails  Home Equipment: Rolling walker, Wheelchair-manual   Bathroom Shower/Tub: Tub/Shower unit, Shower chair with back  Bathroom Toilet: Handicap height  Bathroom Equipment: 3-in-1 commode, Tub transfer bench(Pt reporting spouse has ordered a tub transfer bench but not sure if it is in yet)  Bathroom Accessibility: Accessible    Receives Help From: Family  ADL Assistance: Needs assistance  Ambulation Assistance: Needs assistance  Transfer Assistance: Needs assistance    Active : No     Additional Comments: Pt reporting she was indep up until approx 2 days prior strength/endurance, balance and indep with ADL tasks. Performance deficits / Impairments: Decreased functional mobility , Decreased strength, Decreased endurance, Decreased ADL status, Decreased safe awareness, Decreased balance  Prognosis: Good  REQUIRES OT FOLLOW UP: Yes  Decision Making: Medium Complexity  Safety Devices in place: Yes  Type of devices: All fall risk precautions in place, Left in chair, Call light within reach, Gait belt, Nurse notified, Patient at risk for falls(daughter present )    Treatment Initiated: Treatment and education initiated within context of evaluation. Evaluation time included review of current medical information, gathering information related to past medical, social and functional history, completion of standardized testing, formal and informal observation of tasks, assessment of data and development of plan of care and goals. Treatment time included skilled education and facilitation of tasks to increase safety and independence with ADL's for improved functional independence and quality of life.      Discharge Recommendations:  [address], Patient would benefit from continued therapy after discharge    Patient Education:  OT Education: OT Role, Plan of Care  Patient Education: safety with transfers    Equipment Recommendations:  Equipment Needed: No    Plan:  Times per week: 5x  Current Treatment Recommendations: Strengthening, Functional Mobility Training, Patient/Caregiver Education & Training, Endurance Training, Balance Training, Safety Education & Training, Self-Care / ADL    Goals:  Patient goals : get stronger before going home with more rehab  Short term goals  Time Frame for Short term goals: 2 weeks   Short term goal 1: Pt to navigate to/from bathroom using AD with CGA and no LOB to complete toileting and other ADL tasks in bathroom   Short term goal 2: Pt to demo dynamic standing > 1 min with 0-1 UE support and CGA to complete clothign

## 2019-08-29 NOTE — CONSULTS
[facility]    Patient - Ruby Jain   MRN -  [id number]   [facility]   - 1945      Date of Admission -  2019  3:16 PM  Date of evaluation -  2019  Room - -028-A   Hospital Day - [address] [place], [place] Primary Care Physician - Kim Taylor MD       Reason for Consult    bilateral foot numbness on chemo    [address] Problems    Diagnosis Date Noted    Elevated troponin [R74.8] 2019    Neuropathy due to chemotherapeutic drug ([facility] [G62.0, T45.1X5A] 2019    Generalized weakness [R53.1] 2019    Malignant neoplasm of left female breast [facility]) [C50.912] 2019    Abdominal aortic aneurysm (AAA) without rupture ([facility] [I71.4] 2018    Type 2 diabetes mellitus with hyperglycemia, with long-term current use of insulin ([facility] [E11.65, Z79.4] 10/09/2017    Diabetic neuropathy ([facility] [E11.40] 10/03/2017    Hyperlipidemia [E78.5]     Hypertension [I10]      HPI   Ruby Jain is a 75 y.o. female with triple negative breast cancer s/p taxol on  and  who was admitted for fall at home, generalized weakness and hand and foot pain. She has been very weak since discharge from hospital on . She was having difficulty walking and fell on Monday and was unable to get up due to weakness. She is very weak and has been laying in bed most of the time since discharge. She now has bilateral pain in UE and LE which is chronic but has gotten worse but today it is better. She has been on neurontin and lyrica for neuropathy. She denies headache, SOB, chest pain, nausea but did have 1 episode of vomiting on Monday. She denies abdominal pain, diarrhea/constipation, pain or swelling in legs. Oncology History    Myrna Pratt is a 75 y. o. female who during recent hospitalization to [facility] shortness of breath was found to have right breast mass and from LAD to PDA. Circumflex had anterior marginal branch and posterior marginal branch.  HERNIA REPAIR      Multiple    HYSTERECTOMY      INSERTION / REMOVAL / REPLACEMENT VENOUS ACCESS CATHETER Right 2019    SINGLE LUMEN SMART PORT INSERTION performed by Cooper Abraham MD at [address] [facility] ECHOCARDIOGRAM  11    LV size mildly to moderately dilated. Systolic function markedly reduced. EF 25-30%. Severe diffuse hypokinesis. Grade 1 diastolic dysfunction. LA was mildly to moderately dilated. RV mildly dilated, systolic function mildly reduced. Mild MR and TR.  TRANSTHORACIC ECHOCARDIOGRAM  09    LV demonstrates severe hypokinesis of the inferior basal as well as  basal aneurysm being noted and paradoxical septal motion. EF 45-50%. LA is moderately dilated and AV demonstrates mild AV sclerosis w/o AS and AI. Trace MR and TV insufficiency. Diet    DIET CARDIAC;   Allergies    Sulfa antibiotics  Social History     Social History     Socioeconomic History    Marital status:      Spouse name: Not on file    Number of children: 2    Years of education: Not on file    Highest education level: Not on file   Occupational History    Occupation: retired   Social Needs    Financial resource strain: Not on file    Food insecurity:     Worry: Not on file     Inability: Not on file   Lumiant needs:     Medical: Not on file     Non-medical: Not on file   Tobacco Use    Smoking status: Former Smoker     Packs/day: 1.50     Years: 25.00     Pack years: 37.50     Types: Cigarettes     Last attempt to quit: 1988     Years since quittin.9    Smokeless tobacco: Never Used   Substance and Sexual Activity    Alcohol use: No    Drug use: No    Sexual activity: Not on file   Lifestyle    Physical activity:     Days per week: Not on file     Minutes per session: Not on file    Stress: Not on file   Relationships    Social connections: CHEST (2 VW) CLINICAL INFORMATION: Dyspnea. COMPARISON: 8/20/2019 TECHNIQUE: PA and lateral views of the chest were obtained. 1. Borderline heart size. Permanent pacemaker/defibrillator. Metallic sternotomy sutures and vascular clips from prior surgery. Mediport right side, catheter tip at cavoatrial junction. 2. Chest otherwise unremarkable. No infiltrates seen. Questionable tiny pleural effusions seen posteriorly versus pleural thickening. .. Overall appearance improved from prior. **This report has been created using voice recognition software. It may contain minor errors which are inherent in voice recognition technology. ** Final report electronically signed by Dr. Dolly Hugo on 8/23/2019 9:15 AM    Xr Chest Standard (2 Vw)    Result Date: 8/20/2019  PROCEDURE: XR CHEST (2 VW) CLINICAL INFORMATION: hypoxia. CHF+. COMPARISON: 8/10/2019 TECHNIQUE: PA and lateral views the chest. FINDINGS: Permanent left chest wall pacer. Right chest wall Port-A-Cath. Prominent interstitial/reticular markings similar to prior which may be related to mild pulmonary venous congestion. Cardiomegaly. Median sternotomy wires. No acute osseous findings. Degenerative changes thoracic spine. Trace bilateral pleural fluid versus thickening. Mild pulmonary venous congestion. Correlation with symptoms advised. **This report has been created using voice recognition software. It may contain minor errors which are inherent in voice recognition technology. ** Final report electronically signed by Dr. Scotty Abraham on 8/20/2019 6:19 PM    Xr Thoracic Spine (3 Views)    Result Date: 8/28/2019  PROCEDURE: XR THORACIC SPINE (3 VIEWS) CLINICAL INFORMATION: difficulty walking, hands tingling, breast cancer. COMPARISON: No prior study. TECHNIQUE: AP, lateral and swimmer's view FINDINGS: Median sternotomy wires. Right chest wall Port-A-Cath with tip in the SVC. Osteopenia. Degenerative changes with endplate spurring. Alignment is maintained.  No remain hyperinflated. No pleural effusion. No pneumothorax. Heart: There is stable mild cardiomegaly. Patient again status post CABG surgery. Mediastinum/sherlyn: No obvious mass or adenopathy. Skeleton: No significant bone or joint abnormality. Resolved right basilar infiltrate or atelectasis. Mild central pulmonary venous congestion which may be chronic for this patient. Mild cardiomegaly status post CABG surgery. **This report has been created using voice recognition software. It may contain minor errors which are inherent in voice recognition technology. ** Final report electronically signed by Dr. Arpita Shaw on 8/10/2019 2:58 AM    Xr Chest Portable    Result Date: 8/9/2019  PROCEDURE: XR CHEST PORTABLE CLINICAL INFORMATION: F/u imaging for pulmonary congestion. COMPARISON: Chest x-ray dated 8/8/2019 TECHNIQUE: AP Portable chest xray FINDINGS: Lines/tubes/devices: No change in the satisfactory positions of the right subclavian Mediport in left subclavian dual-lead AICD device. Lungs/pleura:  Lungs remain hyperinflated. The pulmonary venous congestion appears to have improved. There is mildly greater haziness at the right base which may be due to localized pulmonary edema, early pneumonia or atelectasis. Lungs are otherwise unchanged compared to the prior study. No pleural effusion. No pneumothorax. Heart: There is stable moderate cardiomegaly. Patient again status post CABG surgery. Mediastinum/sherlyn: No obvious mass or adenopathy. Skeleton: No significant bone or joint abnormality. Improved pulmonary venous congestion however there is new mild haziness at the right base which may represent localized pulmonary edema, early pneumonia or atelectasis. **This report has been created using voice recognition software. It may contain minor errors which are inherent in voice recognition technology. ** Final report electronically signed by Dr. Arpita Shaw on 8/9/2019 4:46 AM    Xr Chest Portable    Result Date: 8/8/2019  PROCEDURE: XR CHEST PORTABLE CLINICAL INFORMATION: f/u imaging. COMPARISON: Chest x-ray dated 8/7/2019 TECHNIQUE: AP Portable chest xray FINDINGS: Lines/tubes/devices: Stable positions of the left subclavian dual-lead AICD device and the right subclavian Mediport. Lungs/pleura: There is pulmonary vascular congestion. There is no pulmonary edema or focal pneumonia. No pleural effusion. No pneumothorax. Heart: There is stable mild to moderate cardiomegaly. The patient is status post sternotomy for CABG surgery. Mediastinum/sherlyn: No obvious mass or adenopathy. Skeleton: No significant bone or joint abnormality. Pulmonary venous congestion without evidence of pulmonary edema or pneumonia. **This report has been created using voice recognition software. It may contain minor errors which are inherent in voice recognition technology. ** Final report electronically signed by Dr. Juancarlos Painting on 8/8/2019 4:58 AM    Xr Chest Portable    Result Date: 8/7/2019  PROCEDURE: XR CHEST PORTABLE CLINICAL INFORMATION: Follow-up pneumonia. COMPARISON: 7/31/2019. TECHNIQUE: AP portable chest radiograph performed. FINDINGS: POSTSURGICAL CHANGES: 1. There are stable median sternotomy wires and mediastinal surgical clips. LINES/TUBES/MECHANICAL DEVICES: 1. There is a stable left subclavian pacemaker/AICD device with leads overlying the right atrium and the right ventricle. 2. There is a stable right subclavian central venous Mediport with the distal tip overlying the junction of the superior vena cava and right atrium. TRACHEA/HEART/MEDIASTINUM/HILUM: 1. There is stable mild to moderate enlargement of the cardiac silhouette. 2. There is stable mild atheromatous calcification at the aortic arch and stable tortuous course of the descending thoracic aorta. LUNG FIELDS: 1. Interval improved however mild residual infiltrates within the left perihilar region and right lung base.  There is stable blunting of the right lateral cancer  -  Last chemo with Taxol  8/20 C2/4  - Has planned chemotherapy on 9/4 but will delay this until her functional status and symptoms improve. .  Discussed this with patient and her  and they are in agreement. Case discussed with nurse and patient/family. Questions and concerns addressed.   Plan made in collaboration with Dr. Lisandra Hsu    Electronically signed by   ROMAIN Samuel NP on 8/29/2019 at 1:01 PM

## 2019-08-29 NOTE — PROGRESS NOTES
Informed of patient being in observation status. Requesting she bring in home medications in bottles.

## 2019-08-29 NOTE — CARE COORDINATION
8/29/19, 10:38 AM    DISCHARGE BARRIERS      Full assessment deferred as one was completed on 8/8/19 and patient was readmitted on 8/21. Patient reports information remains the same. She is current with [facility], nursing, PT & OT. Spoke with patient and daughter ST SCHROEDER. Patient has become weaker to the point of not being able to walk. Her  has been pushing her in a wheelchair. She has not been able to dress self for a couple of weeks, usually stays in her night clothes. Discussed ECF, patient and daughter agreeable. Want [facility]. Spoke with Evelyn Diaz at [facility], referral made.

## 2019-08-30 LAB
ANION GAP SERPL CALCULATED.3IONS-SCNC: 9 MEQ/L (ref 8–16)
ANISOCYTOSIS: PRESENT
BASOPHILS # BLD: 1.1 %
BASOPHILS ABSOLUTE: 0 THOU/MM3 (ref 0–0.1)
BUN BLDV-MCNC: 22 MG/DL (ref 7–22)
CALCIUM SERPL-MCNC: 8.8 MG/DL (ref 8.5–10.5)
CHLORIDE BLD-SCNC: 107 MEQ/L (ref 98–111)
CO2: 23 MEQ/L (ref 23–33)
CREAT SERPL-MCNC: 0.8 MG/DL (ref 0.4–1.2)
EOSINOPHIL # BLD: 7.1 %
EOSINOPHILS ABSOLUTE: 0.2 THOU/MM3 (ref 0–0.4)
ERYTHROCYTE [DISTWIDTH] IN BLOOD BY AUTOMATED COUNT: 23.4 % (ref 11.5–14.5)
ERYTHROCYTE [DISTWIDTH] IN BLOOD BY AUTOMATED COUNT: 60.4 FL (ref 35–45)
GFR SERPL CREATININE-BSD FRML MDRD: 70 ML/MIN/1.73M2
GLUCOSE BLD-MCNC: 116 MG/DL (ref 70–108)
GLUCOSE BLD-MCNC: 125 MG/DL (ref 70–108)
GLUCOSE BLD-MCNC: 158 MG/DL (ref 70–108)
GLUCOSE BLD-MCNC: 159 MG/DL (ref 70–108)
GLUCOSE BLD-MCNC: 262 MG/DL (ref 70–108)
HCT VFR BLD CALC: 28.1 % (ref 37–47)
HEMOGLOBIN: 8.5 GM/DL (ref 12–16)
IMMATURE GRANS (ABS): 0.05 THOU/MM3 (ref 0–0.07)
IMMATURE GRANULOCYTES: 2 %
LYMPHOCYTES # BLD: 41.6 %
LYMPHOCYTES ABSOLUTE: 1.1 THOU/MM3 (ref 1–4.8)
MCH RBC QN AUTO: 23.9 PG (ref 26–33)
MCHC RBC AUTO-ENTMCNC: 30.2 GM/DL (ref 32.2–35.5)
MCV RBC AUTO: 78.9 FL (ref 81–99)
MONOCYTES # BLD: 11.6 %
MONOCYTES ABSOLUTE: 0.3 THOU/MM3 (ref 0.4–1.3)
NUCLEATED RED BLOOD CELLS: 1 /100 WBC
PLATELET # BLD: 189 THOU/MM3 (ref 130–400)
PMV BLD AUTO: 11.4 FL (ref 9.4–12.4)
POTASSIUM SERPL-SCNC: 4.7 MEQ/L (ref 3.5–5.2)
RBC # BLD: 3.56 MILL/MM3 (ref 4.2–5.4)
SEG NEUTROPHILS: 36.7 %
SEGMENTED NEUTROPHILS ABSOLUTE COUNT: 1 THOU/MM3 (ref 1.8–7.7)
SODIUM BLD-SCNC: 139 MEQ/L (ref 135–145)
TROPONIN T: < 0.01 NG/ML
WBC # BLD: 2.7 THOU/MM3 (ref 4.8–10.8)

## 2019-08-30 PROCEDURE — 6370000000 HC RX 637 (ALT 250 FOR IP): Performed by: NURSE PRACTITIONER

## 2019-08-30 PROCEDURE — 6370000000 HC RX 637 (ALT 250 FOR IP): Performed by: HOSPITALIST

## 2019-08-30 PROCEDURE — 85025 COMPLETE CBC W/AUTO DIFF WBC: CPT

## 2019-08-30 PROCEDURE — 6370000000 HC RX 637 (ALT 250 FOR IP): Performed by: STUDENT IN AN ORGANIZED HEALTH CARE EDUCATION/TRAINING PROGRAM

## 2019-08-30 PROCEDURE — 80048 BASIC METABOLIC PNL TOTAL CA: CPT

## 2019-08-30 PROCEDURE — 99232 SBSQ HOSP IP/OBS MODERATE 35: CPT | Performed by: FAMILY MEDICINE

## 2019-08-30 PROCEDURE — 2580000003 HC RX 258: Performed by: HOSPITALIST

## 2019-08-30 PROCEDURE — 1200000003 HC TELEMETRY R&B

## 2019-08-30 PROCEDURE — 97530 THERAPEUTIC ACTIVITIES: CPT

## 2019-08-30 PROCEDURE — 36415 COLL VENOUS BLD VENIPUNCTURE: CPT

## 2019-08-30 PROCEDURE — 97116 GAIT TRAINING THERAPY: CPT

## 2019-08-30 PROCEDURE — 97110 THERAPEUTIC EXERCISES: CPT

## 2019-08-30 PROCEDURE — 6360000002 HC RX W HCPCS: Performed by: HOSPITALIST

## 2019-08-30 PROCEDURE — 84484 ASSAY OF TROPONIN QUANT: CPT

## 2019-08-30 PROCEDURE — 82948 REAGENT STRIP/BLOOD GLUCOSE: CPT

## 2019-08-30 RX ORDER — TRAMADOL HYDROCHLORIDE 50 MG/1
50 TABLET ORAL 2 TIMES DAILY PRN
Status: DISCONTINUED | OUTPATIENT
Start: 2019-08-30 | End: 2019-09-01 | Stop reason: HOSPADM

## 2019-08-30 RX ORDER — MIDODRINE HYDROCHLORIDE 5 MG/1
5 TABLET ORAL
Status: DISCONTINUED | OUTPATIENT
Start: 2019-08-30 | End: 2019-09-01 | Stop reason: HOSPADM

## 2019-08-30 RX ADMIN — METFORMIN HYDROCHLORIDE 750 MG: 750 TABLET, EXTENDED RELEASE ORAL at 17:18

## 2019-08-30 RX ADMIN — ACETAMINOPHEN 650 MG: 325 TABLET ORAL at 11:32

## 2019-08-30 RX ADMIN — Medication 10 ML: at 11:53

## 2019-08-30 RX ADMIN — GUAIFENESIN 600 MG: 600 TABLET, EXTENDED RELEASE ORAL at 22:06

## 2019-08-30 RX ADMIN — CLOPIDOGREL BISULFATE 75 MG: 75 TABLET ORAL at 11:32

## 2019-08-30 RX ADMIN — INSULIN LISPRO 1 UNITS: 100 INJECTION, SOLUTION INTRAVENOUS; SUBCUTANEOUS at 22:07

## 2019-08-30 RX ADMIN — ROPINIROLE HYDROCHLORIDE 0.5 MG: 0.5 TABLET, FILM COATED ORAL at 22:06

## 2019-08-30 RX ADMIN — AMIODARONE HYDROCHLORIDE 200 MG: 200 TABLET ORAL at 11:44

## 2019-08-30 RX ADMIN — GABAPENTIN 400 MG: 400 CAPSULE ORAL at 11:33

## 2019-08-30 RX ADMIN — MIDODRINE HYDROCHLORIDE 2.5 MG: 2.5 TABLET ORAL at 11:33

## 2019-08-30 RX ADMIN — TRAMADOL HYDROCHLORIDE 50 MG: 50 TABLET, FILM COATED ORAL at 17:54

## 2019-08-30 RX ADMIN — ASPIRIN 81 MG: 81 TABLET ORAL at 11:33

## 2019-08-30 RX ADMIN — INSULIN LISPRO 6 UNITS: 100 INJECTION, SOLUTION INTRAVENOUS; SUBCUTANEOUS at 13:31

## 2019-08-30 RX ADMIN — METFORMIN HYDROCHLORIDE 750 MG: 750 TABLET, EXTENDED RELEASE ORAL at 11:37

## 2019-08-30 RX ADMIN — ATORVASTATIN CALCIUM 80 MG: 80 TABLET, FILM COATED ORAL at 22:06

## 2019-08-30 RX ADMIN — BUMETANIDE 0.5 MG: 1 TABLET ORAL at 11:52

## 2019-08-30 RX ADMIN — GABAPENTIN 400 MG: 400 CAPSULE ORAL at 22:06

## 2019-08-30 RX ADMIN — ACETAMINOPHEN 650 MG: 325 TABLET ORAL at 04:51

## 2019-08-30 RX ADMIN — GUAIFENESIN 600 MG: 600 TABLET, EXTENDED RELEASE ORAL at 11:32

## 2019-08-30 RX ADMIN — ENOXAPARIN SODIUM 40 MG: 40 INJECTION SUBCUTANEOUS at 11:33

## 2019-08-30 RX ADMIN — MIDODRINE HYDROCHLORIDE 5 MG: 5 TABLET ORAL at 17:18

## 2019-08-30 RX ADMIN — INSULIN GLARGINE 18 UNITS: 100 INJECTION, SOLUTION SUBCUTANEOUS at 22:07

## 2019-08-30 RX ADMIN — GABAPENTIN 400 MG: 400 CAPSULE ORAL at 15:34

## 2019-08-30 ASSESSMENT — PAIN DESCRIPTION - LOCATION
LOCATION: GENERALIZED;LEG
LOCATION: GENERALIZED

## 2019-08-30 ASSESSMENT — PAIN DESCRIPTION - PAIN TYPE
TYPE: CHRONIC PAIN
TYPE: CHRONIC PAIN

## 2019-08-30 ASSESSMENT — PAIN SCALES - GENERAL
PAINLEVEL_OUTOF10: 0
PAINLEVEL_OUTOF10: 3
PAINLEVEL_OUTOF10: 10
PAINLEVEL_OUTOF10: 10
PAINLEVEL_OUTOF10: 3
PAINLEVEL_OUTOF10: 8

## 2019-08-30 NOTE — PROGRESS NOTES
PROGRESS NOTE      Patient:  Nicholas Huddleston      Unit/Bed:8B-28/028-A    YOB: 1945    MRN: [id number]       Acct: [de-identified]     PCP: Lupe Perez MD    Date of Admission: 8/28/2019    Chief Complaint: Fatigue and weakness    Hospital Course: 74 yo F PMHx CHF with EF 40% per echo May 2019 s/p ICD placement, CAD s/p CABG, DM2, HTN, HLD, and triple-negative breast cancer with mets to lung, on taxol, presents to [facility] for syncope and 1 episode of loss of consciousness on 8/26/2019 which lasted only a few seconds. She stayed on the floor for an extended period of time as she was unable to get herself up and had to wait until her  was able to get her into a chair. Denies lightheadedness, dizziness, palpitations, prior to syncope; her  witnessed the event and told pt she did not have any shaking or incontinence during LOC. She did not have any confusion following episode. She denies CP and SOB as well. She has had nausea and vomiting as well as poor appetite since 8/25/2019, which is when she was last discharged from [facility]. She is also experiencing increased numbness, tingling, and pain in all 4 extremities; her oncologist reportedly modified her gabapentin recently for this. She has a chronic dry cough. This is her third hospitalization in the last 1 month. Pt states she is willing to consider transitioning to a SNF is she qualifies for it.     8/29: Has productive cough, start mucinex as it is likely viral.     Subjective: Pt states she is doing well. She states she is having numbness, tingling, and aching pain in all 4 extremities and would like to know what she can do to manage this. States this has been present/progressively worsening since she started chemo. She denies any chest pain, sob, palpitations, lightheadedness, dizziness, orthopnea, PND or pedal edema. No complaints otherwise.          Medications:  Reviewed    Infusion Medications    dextrose       Scheduled Medications tenderness in extremities. Baseline trace edema in bL LEs. Skin: Skin color, texture, turgor normal.  No rashes or lesions. Neurologic:  Neurovascularly intact without any focal sensory/motor deficits. Cranial nerves: II-XII intact, grossly non-focal.  Psychiatric: Alert and oriented, thought content appropriate, normal insight  Capillary Refill: Brisk,< 3 seconds   Peripheral Pulses: +2 palpable, equal bilaterally       Labs:   Recent Labs     08/28/19  1540 08/29/19  0549 08/29/19  2101 08/30/19  0530   WBC 3.9* 3.1*  --  2.7*   HGB 8.7* 7.8* 8.6* 8.5*   HCT 28.9* 26.3* 28.7* 28.1*    198  --  189     Recent Labs     08/28/19  1540 08/29/19  0549 08/30/19  0530   * 138 139   K 4.9 4.5 4.7   CL 98 107 107   CO2 22* 21* 23   BUN 44* 33* 22   CREATININE 0.9 0.9 0.8   CALCIUM 9.3 8.5 8.8     Recent Labs     08/28/19  1540   AST 15   ALT 19   BILITOT 0.4   ALKPHOS 97     No results for input(s): INR in the last 72 hours. No results for input(s): Jossy Callander in the last 72 hours. Urinalysis:      Lab Results   Component Value Date    NITRU NEGATIVE 08/28/2019    BLOODU NEGATIVE 08/28/2019    GLUCOSEU NEGATIVE 08/28/2019       Radiology:  XR THORACIC SPINE (3 VIEWS)   Final Result   No definite acute fracture or subluxation. **This report has been created using voice recognition software. It may contain minor errors which are inherent in voice recognition technology. **      Final report electronically signed by Dr. Pierce Hagan on 8/28/2019 6:22 PM      XR LUMBAR SPINE (2-3 VIEWS)   Final Result   No acute fracture or subluxation. **This report has been created using voice recognition software. It may contain minor errors which are inherent in voice recognition technology. **      Final report electronically signed by Dr. Pierce Hagan on 8/28/2019 6:17 PM      XR CHEST STANDARD (2 VW)   Final Result   Stable radiographic appearance of the chest. No evidence of an acute

## 2019-08-30 NOTE — PROGRESS NOTES
[address] [place] [address] 8B  Occupational Therapy  Daily Note  Time:   Time In: 930  Time Out: 7970  Timed Code Treatment Minutes: 23 Minutes  Minutes: 23          Date: 2019  Patient Name: Samina Floyd,   Gender: female      Room: 8B-28/028-A  MRN: [id number]  : 1945  (75 y.o.)  Referring Practitioner: Dr. Terence Bernabe. [facility]  Diagnosis: elevated troponin  Additional Pertinent Hx: 75 y.o. female with medical history significant for CHF with systolic dysfunction, CAD, history of breast cancer currently on chemo, hypertension, hyperlipidemia, upper and lower extremity neuropathy, diabetes type 2; who presented to [facility] with generalized weakness and fatigue x1 week. Patient reports that she recently started chemotherapy in the past month. Restrictions/Precautions:  Restrictions/Precautions: Fall Risk, General Precautions  Position Activity Restriction  Other position/activity restrictions: neuropathy in bilateral feet     SUBJECTIVE: Pt seated upright in bed upon arrival, agreeable to OT session. RN okayed therapy session and present to monitor BP. Comment: BP read 100/58 lying flat; 102/56 sitting EOB; 68/53, 84/50 standing. PAIN: Denies. COGNITION: Decreased Problem Solving and Decreased Safety Awareness    ADL:   Feeding: Independent. Finishing breakfast tray. BALANCE:  Sitting Balance:  Stand By Assistance  Standing Balance: Contact Guard Assistance  x1-2 minute static stand for BP reading. BED MOBILITY:  Supine to Sit: Stand By Assistance   Scooting: Stand By Assistance     TRANSFERS:  Sit to Stand:  [employer]. Stand to Sit: Contact Guard Assistance. Comment: Min impulsive- Cues to stand with RW close to MAJO. FUNCTIONAL MOBILITY:  Assistive Device: Rolling Walker  Assist Level:  Contact Guard Assistance. Distance: Completed functional mobility from EOB to bedside chair at slow pace, no LOB noted.  Pt requires min cues for

## 2019-08-30 NOTE — FLOWSHEET NOTE
08/29/19 2120   Encounter Summary   Services provided to: Patient   Referral/Consult From: [employer] [facility] Spouse; Children   Continue Visiting Yes  (8/29)   Complexity of Encounter Moderate   Length of Encounter 30 minutes   Spiritual Assessment Completed Yes   Advance Care Planning Yes   Grief and Life Adjustment   Type New Diagnosis; Adjustment to illness   Assessment Approachable   Intervention Explored feelings, thoughts, concerns;Prayer;Discussed illness/injury and it's impact; End of life care   Outcome Expressed gratitude;Engaged in conversation;Receptive;Encouraged     Assessment: The patient is a 75 yr old female who entered into care due to her \"blood pressure dropping real low. \" She shared this has been a problem throughout her life, however now she is being treated with chemo (breast cancer) and she feels this has \"just worn her out. \" The patient shared some of her other illnesses she has overcome and wants to go to the [facility] for rehab. She stated \"feeling this is a great option so she can gain some strength back. \" Her  has been a \"real God-send through all of this. \" Stated she knows he is scared too and he just keeps busy trying to fix everything so it makes life easier for me. She shared some of his recent projects. - She also has a daughter who has provided as much care as possible, so the patient feels well supported. - Patient feels well supported by her Quaker. Plan: Continued support could help her contingent upon her treatment procedures as she doesn't know how long she will remain in our care. The spiritual care team will remain available. Patient notified of how to contact us should she feel it necessary.

## 2019-08-30 NOTE — TELEPHONE ENCOUNTER
Patient is having numbness, achiness and pain in her toes, feet and ankles. The pain is worse at night. Gabapentin is not helping.   Pharmacy: [facility]
Patient is on a small dose of gabapentin. Have her increase to 3 tablets at night to see if this helps. Try for 1 week and let me know if it has improved her symptoms. If so, I will call in a prescription for a higher dose.  Thanks
Patient notified and voiced understand.
medical evaluation

## 2019-08-31 LAB
ANION GAP SERPL CALCULATED.3IONS-SCNC: 11 MEQ/L (ref 8–16)
ANISOCYTOSIS: PRESENT
BASOPHILIA: ABNORMAL
BASOPHILS # BLD: 1.6 %
BASOPHILS ABSOLUTE: 0 THOU/MM3 (ref 0–0.1)
BUN BLDV-MCNC: 17 MG/DL (ref 7–22)
CALCIUM SERPL-MCNC: 8.8 MG/DL (ref 8.5–10.5)
CHLORIDE BLD-SCNC: 107 MEQ/L (ref 98–111)
CO2: 21 MEQ/L (ref 23–33)
CREAT SERPL-MCNC: 0.7 MG/DL (ref 0.4–1.2)
EKG ATRIAL RATE: 79 BPM
EKG P-R INTERVAL: 224 MS
EKG Q-T INTERVAL: 436 MS
EKG QRS DURATION: 110 MS
EKG QTC CALCULATION (BAZETT): 499 MS
EKG R AXIS: 14 DEGREES
EKG T AXIS: -84 DEGREES
EKG VENTRICULAR RATE: 79 BPM
ELLIPTOCYTES: ABNORMAL
EOSINOPHIL # BLD: 5.6 %
EOSINOPHILS ABSOLUTE: 0.1 THOU/MM3 (ref 0–0.4)
ERYTHROCYTE [DISTWIDTH] IN BLOOD BY AUTOMATED COUNT: 24.6 % (ref 11.5–14.5)
ERYTHROCYTE [DISTWIDTH] IN BLOOD BY AUTOMATED COUNT: 62.3 FL (ref 35–45)
GFR SERPL CREATININE-BSD FRML MDRD: 81 ML/MIN/1.73M2
GLUCOSE BLD-MCNC: 118 MG/DL (ref 70–108)
GLUCOSE BLD-MCNC: 119 MG/DL (ref 70–108)
GLUCOSE BLD-MCNC: 120 MG/DL (ref 70–108)
GLUCOSE BLD-MCNC: 133 MG/DL (ref 70–108)
GLUCOSE BLD-MCNC: 215 MG/DL (ref 70–108)
HCT VFR BLD CALC: 27.8 % (ref 37–47)
HEMOGLOBIN: 8.4 GM/DL (ref 12–16)
IMMATURE GRANS (ABS): 0.09 THOU/MM3 (ref 0–0.07)
IMMATURE GRANULOCYTES: 4 %
LYMPHOCYTES # BLD: 39.5 %
LYMPHOCYTES ABSOLUTE: 1 THOU/MM3 (ref 1–4.8)
MCH RBC QN AUTO: 23.9 PG (ref 26–33)
MCHC RBC AUTO-ENTMCNC: 30.2 GM/DL (ref 32.2–35.5)
MCV RBC AUTO: 79 FL (ref 81–99)
MONOCYTES # BLD: 22.6 %
MONOCYTES ABSOLUTE: 0.6 THOU/MM3 (ref 0.4–1.3)
NUCLEATED RED BLOOD CELLS: 2 /100 WBC
PLATELET # BLD: 205 THOU/MM3 (ref 130–400)
PLATELET ESTIMATE: ADEQUATE
PMV BLD AUTO: 10.7 FL (ref 9.4–12.4)
POIKILOCYTES: ABNORMAL
POTASSIUM SERPL-SCNC: 4.3 MEQ/L (ref 3.5–5.2)
RBC # BLD: 3.52 MILL/MM3 (ref 4.2–5.4)
SCAN OF BLOOD SMEAR: NORMAL
SEG NEUTROPHILS: 27.1 %
SEGMENTED NEUTROPHILS ABSOLUTE COUNT: 0.7 THOU/MM3 (ref 1.8–7.7)
SODIUM BLD-SCNC: 139 MEQ/L (ref 135–145)
WBC # BLD: 2.5 THOU/MM3 (ref 4.8–10.8)

## 2019-08-31 PROCEDURE — 99232 SBSQ HOSP IP/OBS MODERATE 35: CPT | Performed by: FAMILY MEDICINE

## 2019-08-31 PROCEDURE — 80048 BASIC METABOLIC PNL TOTAL CA: CPT

## 2019-08-31 PROCEDURE — 6370000000 HC RX 637 (ALT 250 FOR IP): Performed by: NURSE PRACTITIONER

## 2019-08-31 PROCEDURE — 2580000003 HC RX 258: Performed by: HOSPITALIST

## 2019-08-31 PROCEDURE — 2709999900 HC NON-CHARGEABLE SUPPLY

## 2019-08-31 PROCEDURE — 1200000003 HC TELEMETRY R&B

## 2019-08-31 PROCEDURE — 6370000000 HC RX 637 (ALT 250 FOR IP): Performed by: STUDENT IN AN ORGANIZED HEALTH CARE EDUCATION/TRAINING PROGRAM

## 2019-08-31 PROCEDURE — 6360000002 HC RX W HCPCS: Performed by: HOSPITALIST

## 2019-08-31 PROCEDURE — 6370000000 HC RX 637 (ALT 250 FOR IP): Performed by: HOSPITALIST

## 2019-08-31 PROCEDURE — 36415 COLL VENOUS BLD VENIPUNCTURE: CPT

## 2019-08-31 PROCEDURE — 82948 REAGENT STRIP/BLOOD GLUCOSE: CPT

## 2019-08-31 PROCEDURE — 93010 ELECTROCARDIOGRAM REPORT: CPT | Performed by: NUCLEAR MEDICINE

## 2019-08-31 PROCEDURE — 85025 COMPLETE CBC W/AUTO DIFF WBC: CPT

## 2019-08-31 PROCEDURE — 93005 ELECTROCARDIOGRAM TRACING: CPT | Performed by: FAMILY MEDICINE

## 2019-08-31 RX ADMIN — METFORMIN HYDROCHLORIDE 750 MG: 750 TABLET, EXTENDED RELEASE ORAL at 08:05

## 2019-08-31 RX ADMIN — FLUTICASONE PROPIONATE 2 SPRAY: 50 SPRAY, METERED NASAL at 08:04

## 2019-08-31 RX ADMIN — ENOXAPARIN SODIUM 40 MG: 40 INJECTION SUBCUTANEOUS at 08:04

## 2019-08-31 RX ADMIN — INSULIN GLARGINE 18 UNITS: 100 INJECTION, SOLUTION SUBCUTANEOUS at 21:54

## 2019-08-31 RX ADMIN — METFORMIN HYDROCHLORIDE 750 MG: 750 TABLET, EXTENDED RELEASE ORAL at 17:55

## 2019-08-31 RX ADMIN — GABAPENTIN 400 MG: 400 CAPSULE ORAL at 12:55

## 2019-08-31 RX ADMIN — CLOPIDOGREL BISULFATE 75 MG: 75 TABLET ORAL at 08:03

## 2019-08-31 RX ADMIN — Medication 10 ML: at 04:56

## 2019-08-31 RX ADMIN — MIDODRINE HYDROCHLORIDE 5 MG: 5 TABLET ORAL at 08:03

## 2019-08-31 RX ADMIN — GUAIFENESIN 600 MG: 600 TABLET, EXTENDED RELEASE ORAL at 08:03

## 2019-08-31 RX ADMIN — MIDODRINE HYDROCHLORIDE 5 MG: 5 TABLET ORAL at 12:55

## 2019-08-31 RX ADMIN — ATORVASTATIN CALCIUM 80 MG: 80 TABLET, FILM COATED ORAL at 21:56

## 2019-08-31 RX ADMIN — Medication 10 ML: at 08:04

## 2019-08-31 RX ADMIN — MIDODRINE HYDROCHLORIDE 5 MG: 5 TABLET ORAL at 17:55

## 2019-08-31 RX ADMIN — GABAPENTIN 400 MG: 400 CAPSULE ORAL at 21:56

## 2019-08-31 RX ADMIN — INSULIN LISPRO 2 UNITS: 100 INJECTION, SOLUTION INTRAVENOUS; SUBCUTANEOUS at 21:55

## 2019-08-31 RX ADMIN — ASPIRIN 81 MG: 81 TABLET ORAL at 08:03

## 2019-08-31 RX ADMIN — BUMETANIDE 0.5 MG: 1 TABLET ORAL at 08:04

## 2019-08-31 RX ADMIN — ENALAPRIL MALEATE 2.5 MG: 2.5 TABLET ORAL at 08:03

## 2019-08-31 RX ADMIN — AMIODARONE HYDROCHLORIDE 200 MG: 200 TABLET ORAL at 08:04

## 2019-08-31 RX ADMIN — METOPROLOL SUCCINATE 25 MG: 25 TABLET, EXTENDED RELEASE ORAL at 08:03

## 2019-08-31 RX ADMIN — GUAIFENESIN 600 MG: 600 TABLET, EXTENDED RELEASE ORAL at 21:56

## 2019-08-31 RX ADMIN — GABAPENTIN 400 MG: 400 CAPSULE ORAL at 08:03

## 2019-08-31 RX ADMIN — ROPINIROLE HYDROCHLORIDE 0.5 MG: 0.5 TABLET, FILM COATED ORAL at 21:56

## 2019-08-31 ASSESSMENT — PAIN SCALES - GENERAL
PAINLEVEL_OUTOF10: 0

## 2019-09-01 VITALS
BODY MASS INDEX: 34.95 KG/M2 | TEMPERATURE: 98.4 F | OXYGEN SATURATION: 93 % | DIASTOLIC BLOOD PRESSURE: 61 MMHG | WEIGHT: 189.9 LBS | HEIGHT: 62 IN | RESPIRATION RATE: 16 BRPM | SYSTOLIC BLOOD PRESSURE: 103 MMHG | HEART RATE: 85 BPM

## 2019-09-01 PROBLEM — T45.1X5A ANEMIA ASSOCIATED WITH CHEMOTHERAPY: Status: ACTIVE | Noted: 2019-09-01

## 2019-09-01 PROBLEM — I95.1 ORTHOSTATIC HYPOTENSION: Status: ACTIVE | Noted: 2019-08-06

## 2019-09-01 PROBLEM — D64.81 ANEMIA ASSOCIATED WITH CHEMOTHERAPY: Status: ACTIVE | Noted: 2019-09-01

## 2019-09-01 LAB — GLUCOSE BLD-MCNC: 85 MG/DL (ref 70–108)

## 2019-09-01 PROCEDURE — 2580000003 HC RX 258: Performed by: HOSPITALIST

## 2019-09-01 PROCEDURE — 6360000002 HC RX W HCPCS: Performed by: HOSPITALIST

## 2019-09-01 PROCEDURE — 6370000000 HC RX 637 (ALT 250 FOR IP): Performed by: STUDENT IN AN ORGANIZED HEALTH CARE EDUCATION/TRAINING PROGRAM

## 2019-09-01 PROCEDURE — 6370000000 HC RX 637 (ALT 250 FOR IP): Performed by: NURSE PRACTITIONER

## 2019-09-01 PROCEDURE — 99239 HOSP IP/OBS DSCHRG MGMT >30: CPT | Performed by: FAMILY MEDICINE

## 2019-09-01 PROCEDURE — 6370000000 HC RX 637 (ALT 250 FOR IP): Performed by: HOSPITALIST

## 2019-09-01 PROCEDURE — 82948 REAGENT STRIP/BLOOD GLUCOSE: CPT

## 2019-09-01 RX ORDER — POTASSIUM CHLORIDE 20 MEQ/1
40 TABLET, EXTENDED RELEASE ORAL PRN
Qty: 60 TABLET | Refills: 3 | DISCHARGE
Start: 2019-09-01 | End: 2020-01-01 | Stop reason: SDUPTHER

## 2019-09-01 RX ORDER — GABAPENTIN 400 MG/1
400 CAPSULE ORAL 3 TIMES DAILY
Qty: 90 CAPSULE | Refills: 0 | Status: SHIPPED | OUTPATIENT
Start: 2019-09-01 | End: 2019-01-01 | Stop reason: SDUPTHER

## 2019-09-01 RX ORDER — MIDODRINE HYDROCHLORIDE 5 MG/1
5 TABLET ORAL
Qty: 90 TABLET | Refills: 3 | DISCHARGE
Start: 2019-09-01 | End: 2020-01-01 | Stop reason: ALTCHOICE

## 2019-09-01 RX ORDER — GUAIFENESIN 600 MG/1
600 TABLET, EXTENDED RELEASE ORAL 2 TIMES DAILY
Status: ON HOLD | DISCHARGE
Start: 2019-09-01 | End: 2019-01-01

## 2019-09-01 RX ORDER — TRAMADOL HYDROCHLORIDE 50 MG/1
50 TABLET ORAL 2 TIMES DAILY PRN
Qty: 10 TABLET | Refills: 0 | Status: SHIPPED | OUTPATIENT
Start: 2019-09-01 | End: 2019-09-06

## 2019-09-01 RX ADMIN — GUAIFENESIN 600 MG: 600 TABLET, EXTENDED RELEASE ORAL at 08:29

## 2019-09-01 RX ADMIN — FLUTICASONE PROPIONATE 2 SPRAY: 50 SPRAY, METERED NASAL at 08:30

## 2019-09-01 RX ADMIN — GABAPENTIN 400 MG: 400 CAPSULE ORAL at 08:29

## 2019-09-01 RX ADMIN — ASPIRIN 81 MG: 81 TABLET ORAL at 08:29

## 2019-09-01 RX ADMIN — AMIODARONE HYDROCHLORIDE 200 MG: 200 TABLET ORAL at 08:29

## 2019-09-01 RX ADMIN — MIDODRINE HYDROCHLORIDE 5 MG: 5 TABLET ORAL at 08:29

## 2019-09-01 RX ADMIN — CLOPIDOGREL BISULFATE 75 MG: 75 TABLET ORAL at 08:29

## 2019-09-01 RX ADMIN — METFORMIN HYDROCHLORIDE 750 MG: 750 TABLET, EXTENDED RELEASE ORAL at 08:29

## 2019-09-01 RX ADMIN — BUMETANIDE 0.5 MG: 1 TABLET ORAL at 08:29

## 2019-09-01 RX ADMIN — Medication 10 ML: at 08:30

## 2019-09-01 RX ADMIN — ENALAPRIL MALEATE 2.5 MG: 2.5 TABLET ORAL at 08:29

## 2019-09-01 RX ADMIN — METOPROLOL SUCCINATE 25 MG: 25 TABLET, EXTENDED RELEASE ORAL at 08:29

## 2019-09-01 RX ADMIN — ENOXAPARIN SODIUM 40 MG: 40 INJECTION SUBCUTANEOUS at 08:29

## 2019-09-01 ASSESSMENT — PAIN SCALES - GENERAL: PAINLEVEL_OUTOF10: 0

## 2019-09-01 NOTE — PLAN OF CARE
Patient plans to go to [facility] at discharge, see sw note dated 8/29.
Problem: Pain:  Goal: Pain level will decrease  Description  Pain level will decrease  9/1/2019 0914 by Megan Cruz RN  Outcome: Ongoing  Note:   PRN Ultram     Problem: Pain:  Goal: Control of acute pain  Description  Control of acute pain  9/1/2019 0914 by Megan Cruz RN  Outcome: Ongoing  Note:   PRN Ultram     Problem: Pain:  Goal: Control of chronic pain  Description  Control of chronic pain  9/1/2019 0914 by Megan Cruz RN  Outcome: Ongoing  Note:   PRN Ultram     Problem: Risk for Impaired Skin Integrity  Goal: Tissue integrity - skin and mucous membranes  Description  Structural intactness and normal physiological function of skin and  mucous membranes. 9/1/2019 0914 by Megna Cruz RN  Outcome: Ongoing  Note:   Skin intact. See assessment. Problem: Falls - Risk of:  Goal: Will remain free from falls  Description  Will remain free from falls  9/1/2019 0914 by Megan Cruz RN  Outcome: Ongoing  Note:   Bed alarm is on. Call light is with in reach. Problem: Serum Glucose Level - Abnormal:  Goal: Ability to maintain appropriate glucose levels has stabilized  Description  Ability to maintain appropriate glucose levels has stabilized  9/1/2019 0914 by Megan Cruz RN  Outcome: Ongoing  Note:   ACHS     Problem: DISCHARGE BARRIERS  Goal: Patient's continuum of care needs are met  9/1/2019 0914 by Megan Cruz RN  Outcome: Ongoing  Note:   Care needs are being met. Problem: Discharge Planning:  Goal: Discharged to appropriate level of care  Description  Discharged to appropriate level of care  9/1/2019 0914 by Megan Cruz RN  Outcome: Ongoing  Note:   Discharge today to [facility]. Care plan reviewed with patient. Patient verbalize understanding of the plan of care and contribute to goal setting.
Problem: Pain:  Goal: Pain level will decrease  Description  Pain level will decrease  Outcome: Ongoing  Note:   Patient denies pain this shift, will continue to assess   Goal: Control of acute pain  Description  Control of acute pain  Outcome: Ongoing  Note:   Patient denies pain this shift, will continue to assess   Goal: Control of chronic pain  Description  Control of chronic pain  Outcome: Ongoing  Note:   Patient denies pain this shift, will continue to assess      Problem: Risk for Impaired Skin Integrity  Goal: Tissue integrity - skin and mucous membranes  Description  Structural intactness and normal physiological function of skin and  mucous membranes. Outcome: Ongoing  Note:   Patient turned frequently, no signs of skin breakdown.  Will continue to assess     Problem: Falls - Risk of:  Goal: Will remain free from falls  Description  Will remain free from falls  Outcome: Ongoing  Note:   Patient remains free from falls this shift, uses call light appropriately   Goal: Absence of physical injury  Description  Absence of physical injury  Outcome: Ongoing  Note:   Patient remains free from falls this shift, uses call light appropriately      Problem: Tissue Perfusion - Cardiopulmonary, Altered:  Goal: Hemodynamic stability will improve  Description  Hemodynamic stability will improve  Outcome: Ongoing  Note:   Patients vitals stable this shift, will continue to assess      Problem: Serum Glucose Level - Abnormal:  Goal: Ability to maintain appropriate glucose levels has stabilized  Description  Ability to maintain appropriate glucose levels has stabilized  Outcome: Ongoing  Note:   Patients glucose level within appropriate range this shift, will continue to assess      Problem: DISCHARGE BARRIERS  Goal: Patient's continuum of care needs are met  8/29/2019 2025 by Ravindra Beauchamp RN  Outcome: Ongoing  Note:   Pt plans to go to skilled nursing facility at discharge   8/29/2019 1445 by Estella Lockwood
34.6   Pain Assessment   Pain Assessment 0-10   Pain Level 10   Patient's Stated Pain Goal 4   Pain Type Chronic pain   Pain Location Generalized   Pain Descriptors Aching   Pain Frequency Continuous   Functional Pain Assessment Prevents or interferes some active activities and ADLs   Non-Pharmaceutical Pain Intervention(s) Emotional support   Oxygen Therapy   SpO2 92 %   Pulse Oximeter Device Mode Intermittent   Pulse Oximeter Device Location Finger   O2 Device None (Room air)     Telemetry monitor remains on patient. Problem: Serum Glucose Level - Abnormal:  Goal: Ability to maintain appropriate glucose levels has stabilized  Description  Ability to maintain appropriate glucose levels has stabilized  Outcome: Ongoing  Note:   Blood sugar 132 this shift. Patient administered Lantus per orders. Care plan reviewed with patient. Patient verbalizes understanding of the plan of care and contributes to goal setting.

## 2019-09-03 ENCOUNTER — CARE COORDINATION (OUTPATIENT)
Dept: CASE MANAGEMENT | Age: 74
End: 2019-09-03

## 2019-09-04 ENCOUNTER — HOSPITAL ENCOUNTER (OUTPATIENT)
Dept: INFUSION THERAPY | Age: 74
End: 2019-09-04

## 2019-09-09 ENCOUNTER — TELEPHONE (OUTPATIENT)
Dept: ONCOLOGY | Age: 74
End: 2019-09-09

## 2019-09-09 RX ORDER — ENALAPRIL MALEATE 20 MG/1
TABLET ORAL
Qty: 180 TABLET | Refills: 4 | OUTPATIENT
Start: 2019-09-09

## 2019-09-09 RX ORDER — ENALAPRIL MALEATE 2.5 MG/1
2.5 TABLET ORAL DAILY
Qty: 90 TABLET | Refills: 3 | Status: SHIPPED | OUTPATIENT
Start: 2019-09-09 | End: 2020-01-01

## 2019-09-09 RX ORDER — AMIODARONE HYDROCHLORIDE 200 MG/1
TABLET ORAL
Qty: 90 TABLET | Refills: 4 | Status: SHIPPED | OUTPATIENT
Start: 2019-09-09 | End: 2020-01-01 | Stop reason: SDUPTHER

## 2019-09-10 ENCOUNTER — OFFICE VISIT (OUTPATIENT)
Dept: CARDIOLOGY CLINIC | Age: 74
End: 2019-09-10
Payer: MEDICARE

## 2019-09-10 ENCOUNTER — TELEPHONE (OUTPATIENT)
Dept: CARDIOLOGY CLINIC | Age: 74
End: 2019-09-10

## 2019-09-10 VITALS
HEIGHT: 62 IN | SYSTOLIC BLOOD PRESSURE: 98 MMHG | HEART RATE: 76 BPM | OXYGEN SATURATION: 95 % | WEIGHT: 189.4 LBS | DIASTOLIC BLOOD PRESSURE: 58 MMHG | BODY MASS INDEX: 34.85 KG/M2

## 2019-09-10 DIAGNOSIS — I50.22 CHF (CONGESTIVE HEART FAILURE), NYHA CLASS III, CHRONIC, SYSTOLIC (HCC): Primary | ICD-10-CM

## 2019-09-10 DIAGNOSIS — R53.81 PHYSICAL DECONDITIONING: ICD-10-CM

## 2019-09-10 PROBLEM — C50.419 MALIGNANT NEOPLASM OF UPPER-OUTER QUADRANT OF FEMALE BREAST (HCC): Status: ACTIVE | Noted: 2019-07-25

## 2019-09-10 PROCEDURE — 99213 OFFICE O/P EST LOW 20 MIN: CPT | Performed by: NURSE PRACTITIONER

## 2019-09-10 PROCEDURE — 1090F PRES/ABSN URINE INCON ASSESS: CPT | Performed by: NURSE PRACTITIONER

## 2019-09-10 PROCEDURE — G8400 PT W/DXA NO RESULTS DOC: HCPCS | Performed by: NURSE PRACTITIONER

## 2019-09-10 PROCEDURE — G8417 CALC BMI ABV UP PARAM F/U: HCPCS | Performed by: NURSE PRACTITIONER

## 2019-09-10 PROCEDURE — 3017F COLORECTAL CA SCREEN DOC REV: CPT | Performed by: NURSE PRACTITIONER

## 2019-09-10 PROCEDURE — G8427 DOCREV CUR MEDS BY ELIG CLIN: HCPCS | Performed by: NURSE PRACTITIONER

## 2019-09-10 PROCEDURE — 1123F ACP DISCUSS/DSCN MKR DOCD: CPT | Performed by: NURSE PRACTITIONER

## 2019-09-10 PROCEDURE — G8598 ASA/ANTIPLAT THER USED: HCPCS | Performed by: NURSE PRACTITIONER

## 2019-09-10 PROCEDURE — 4040F PNEUMOC VAC/ADMIN/RCVD: CPT | Performed by: NURSE PRACTITIONER

## 2019-09-10 PROCEDURE — 1111F DSCHRG MED/CURRENT MED MERGE: CPT | Performed by: NURSE PRACTITIONER

## 2019-09-10 PROCEDURE — 1036F TOBACCO NON-USER: CPT | Performed by: NURSE PRACTITIONER

## 2019-09-10 RX ORDER — PANTOPRAZOLE SODIUM 40 MG/1
40 TABLET, DELAYED RELEASE ORAL DAILY
Status: ON HOLD | COMMUNITY
End: 2019-01-01

## 2019-09-10 ASSESSMENT — ENCOUNTER SYMPTOMS
COUGH: 0
ABDOMINAL DISTENTION: 0
SHORTNESS OF BREATH: 0
NAUSEA: 1
VOMITING: 1

## 2019-09-10 NOTE — PATIENT INSTRUCTIONS
You may receive a survey regarding the care you received during your visit. Your input is valuable to us. We encourage you to complete and return your survey. We hope you will choose us in the future for your healthcare needs.     Continue:  · Continue current medications  · Daily weights and record  · Fluid restriction of 2 Liters per day  · Limit sodium in diet to around 7339-6978 mg/day  · Monitor BP  · Activity as tolerated     Call the Heart Failure Clinic for any of the following symptoms: [phone]   Weight gain of 2-3 pounds in 1 day or 5 pounds in 1 week   Increased shortness of breath   Shortness of breath while laying down   Cough   Chest pain   Swelling in feet, ankles or legs   Tenderness or bloating in the abdomen   Fatigue    Decreased appetite or nausea    Confusion     See NH orders

## 2019-09-10 NOTE — PROGRESS NOTES
Heart Failure Clinic       Visit Date: 9/10/2019  Cardiologist:  Dr. Arturo Hair  Primary Care Physician: Dr. Lupe Perez MD    Nicholas He is a 75 y.o. female who presents today for:  Chief Complaint   Patient presents with    Congestive Heart Failure     new       HPI:   Nicholas He is a 75 y.o. female who presents to the office for a follow up patient visit in the heart failure clinic. Accompanied by   HX:  HFrEF (EF 40%), CAD, CABG (3V,2009), ICD, HTN, HLD, AAA (Dr Waldo Hatch), PVD, DM  Breast CA w/ mets to lung (new diagnosis - Dr Fili Shah)     Hospitalization:  [facility] 4x since June for various dx, has active breast cancer, shes had 2 chemo treatments, not sure of plan at this time d/t hospitalizations -  states stopping at Fili Shah office today to schedule appt. She had cancelled her F/U appt here d/t breast CA treatments. May/Luz - Pleural effusion - right thoracentesis x2. [id number]. Diuresed 13#. Discharged Bumex 0.5 BID   AUGUST 20-24th: Adm w/ SOB -  Mild pulmonary congestion, small pleural effusions, diuresed. BP dropped. Meds changed. Concerns today: dealing w/ N/V, decreased appetite. Very weak. Activity: working w/ therapy - deconditioned.   Wheelchair today  Diet: per NH    Patient has:  Chest Pain: No  SOB: No  Difficulty sleeping: No  Orthopnea/PND: No  Edema: Mild  Fatigue: YES  Abdominal bloating: No  Cough: little (normal for her)  Appetite: Poor  Any extra diuretic use: No  Weight gain: No  Home weight: Stable - 188-190  Home blood pressure: ,  HR 70s    Past Medical History:   Diagnosis Date    [facility]    right invasive ductal carcinoma    CAD (coronary artery disease)     sees Dr Arturo Hair    [facility]congest[facility] ([facility]     Hyperlipidemia     Hypertension     ICD (implantable cardiac defibrillator), dual, in situ     [employer]. Boris dual ICD    Numbness and tingling     both feet    Pneumonia     Shingles 12/2014    0    prochlorperazine (COMPAZINE) 5 MG tablet Take 1 tablet by mouth every 6 hours as needed for Nausea 30 tablet 2    lidocaine-prilocaine (EMLA) 2.5-2.5 % cream Apply topically prior to accessing port. 30 g 3    ondansetron (ZOFRAN) 4 MG tablet Take 1 tablet by mouth every 8 hours as needed for Nausea or Vomiting Indications: Nausea and Vomiting caused by Cancer Chemotherapy 90 tablet 3    metFORMIN (GLUCOPHAGE XR) 750 MG extended release tablet Take 1 tablet by mouth 2 times daily (with meals) 30 tablet 11    atorvastatin (LIPITOR) 80 MG tablet Take 1 tablet by mouth daily 90 tablet 1    rOPINIRole (REQUIP) 0.5 MG tablet Take 1 tablet by mouth nightly 30 tablet 11    vitamin B-12 (CYANOCOBALAMIN) 1000 MCG tablet Take 3,000 mcg by mouth daily      fluticasone (FLONASE) 50 MCG/ACT nasal spray 2 sprays by Nasal route daily 1 Bottle 1    clopidogrel (PLAVIX) 75 MG tablet Take 1 tablet by mouth daily 90 tablet 3    aspirin EC 81 MG EC tablet Take 1 tablet by mouth daily 30 tablet 3    insulin glargine (LANTUS SOLOSTAR) 100 UNIT/ML injection pen Inject 18 Units into the skin nightly 5.4 mL 0    ipratropium-albuterol (DUONEB) 0.5-2.5 (3) MG/3ML SOLN nebulizer solution Inhale 3 mLs into the lungs every 4 hours as needed for Shortness of Breath or Other (wheezing) (Patient not taking: Reported on 9/10/2019) 360 mL 5    blood glucose test strips (ASCENSIA AUTODISC VI;ONE TOUCH ULTRA TEST VI) strip Test once daily. Dispense One Touch Ultra Test Strips. Dx: Type 2 diabetes (250.00) 100 each 5    Insulin Pen Needle (B-D UF III MINI PEN NEEDLES) 31G X 5 MM MISC 1 each by Does not apply route daily 100 each 3     No current facility-administered medications for this visit. Allergies   Allergen Reactions    Sulfa Antibiotics Swelling       SUBJECTIVE:   Review of Systems   Constitutional: Positive for fatigue. Negative for activity change and appetite change.    Respiratory: Negative for cough and shortness ventricle.   Hypokinetic motion of the basal inferior wall noted in the left ventricle.   Left Ventricular size is Mildly increased .   Moderately dilated left atrium.   The aortic valve leaflets were not well visualized.   Aortic valve appears tricuspid.   Aortic valve leaflets are somewhat thickened.   Aortic valve leaflets are Mildly calcified.      Signature      ----------------------------------------------------------------   Electronically signed by Ethan Melendez MD (Interpreting   physician) on 05/31/2019 at 05:33 PM   ----------------------------------------------------------------      Findings      Mitral Valve   Mild to Moderate mitral regurgitation is present.      Aortic Valve   The aortic valve leaflets were not well visualized.   Aortic valve appears tricuspid.   Aortic valve leaflets are somewhat thickened.   Aortic valve leaflets are Mildly calcified.      Tricuspid Valve   Tricuspid valve was not well visualized.   Mild tricuspid regurgitation.      Pulmonic Valve   The pulmonic valve was not well visualized .   Mild pulmonic regurgitation visualized.      Left Atrium   Moderately dilated left atrium.      Left Ventricle   Ejection fraction is visually estimated at 40%.   There was moderate global hypokinesis of the left ventricle.   Hypokinetic motion of the basal inferior wall noted in the left ventricle.   Left Ventricular size is Mildly increased .      Right Atrium   Right atrial size was normal.      Right Ventricle   Mildly dilated right ventricle.      Pericardial Effusion   The pericardium was normal in appearance with no evidence of a pericardial   effusion.      Pleural Effusion   No evidence of pleural effusion.      Aorta / Great Vessels   -Aortic root dimension within normal limits.   -The Pulmonary artery is within normal limits.   -IVC size is within normal limits with normal respiratory phasic changes.     Stress: 12/29/17   Summary   Lexiscan EKG stress test is not

## 2019-09-11 ENCOUNTER — HOSPITAL ENCOUNTER (OUTPATIENT)
Dept: INFUSION THERAPY | Age: 74
Discharge: HOME | End: 2019-09-11
Payer: MEDICARE

## 2019-09-11 ENCOUNTER — OFFICE VISIT (OUTPATIENT)
Dept: ONCOLOGY | Age: 74
End: 2019-09-11
Payer: MEDICARE

## 2019-09-11 VITALS
OXYGEN SATURATION: 94 % | TEMPERATURE: 97.4 F | SYSTOLIC BLOOD PRESSURE: 82 MMHG | BODY MASS INDEX: 34.82 KG/M2 | HEIGHT: 62 IN | DIASTOLIC BLOOD PRESSURE: 52 MMHG | HEART RATE: 81 BPM | WEIGHT: 189.2 LBS | RESPIRATION RATE: 18 BRPM

## 2019-09-11 VITALS
SYSTOLIC BLOOD PRESSURE: 108 MMHG | OXYGEN SATURATION: 93 % | TEMPERATURE: 97.6 F | DIASTOLIC BLOOD PRESSURE: 57 MMHG | RESPIRATION RATE: 18 BRPM | HEART RATE: 79 BPM

## 2019-09-11 DIAGNOSIS — C50.411 MALIGNANT NEOPLASM OF UPPER-OUTER QUADRANT OF RIGHT BREAST IN FEMALE, ESTROGEN RECEPTOR NEGATIVE (HCC): ICD-10-CM

## 2019-09-11 DIAGNOSIS — E86.1 HYPOTENSION DUE TO HYPOVOLEMIA: Primary | ICD-10-CM

## 2019-09-11 DIAGNOSIS — Z17.1 MALIGNANT NEOPLASM OF UPPER-OUTER QUADRANT OF RIGHT BREAST IN FEMALE, ESTROGEN RECEPTOR NEGATIVE (HCC): ICD-10-CM

## 2019-09-11 DIAGNOSIS — D61.818 PANCYTOPENIA (HCC): ICD-10-CM

## 2019-09-11 DIAGNOSIS — R11.2 NAUSEA AND VOMITING, INTRACTABILITY OF VOMITING NOT SPECIFIED, UNSPECIFIED VOMITING TYPE: ICD-10-CM

## 2019-09-11 DIAGNOSIS — C50.911 BREAST CANCER METASTASIZED TO AXILLARY LYMPH NODE, RIGHT (HCC): ICD-10-CM

## 2019-09-11 DIAGNOSIS — C77.3 BREAST CANCER METASTASIZED TO AXILLARY LYMPH NODE, RIGHT (HCC): ICD-10-CM

## 2019-09-11 DIAGNOSIS — E86.0 DEHYDRATION: ICD-10-CM

## 2019-09-11 DIAGNOSIS — N63.0 BREAST MASS: Primary | ICD-10-CM

## 2019-09-11 DIAGNOSIS — I95.89 HYPOTENSION DUE TO HYPOVOLEMIA: Primary | ICD-10-CM

## 2019-09-11 DIAGNOSIS — D72.829 LEUKOCYTOSIS, UNSPECIFIED TYPE: ICD-10-CM

## 2019-09-11 DIAGNOSIS — D72.829 LEUKOCYTOSIS, UNSPECIFIED TYPE: Primary | ICD-10-CM

## 2019-09-11 LAB
ALBUMIN SERPL-MCNC: 3.5 G/DL (ref 3.5–5.1)
ALP BLD-CCNC: 107 U/L (ref 38–126)
ALT SERPL-CCNC: 13 U/L (ref 11–66)
AST SERPL-CCNC: 16 U/L (ref 5–40)
BILIRUB SERPL-MCNC: 0.3 MG/DL (ref 0.3–1.2)
BILIRUBIN DIRECT: < 0.2 MG/DL (ref 0–0.3)
BUN, WHOLE BLOOD: 15 MG/DL (ref 8–26)
CHLORIDE, WHOLE BLOOD: 99 MEQ/L (ref 98–109)
CREATININE, WHOLE BLOOD: 0.9 MG/DL (ref 0.5–1.2)
ERYTHROCYTE [DISTWIDTH] IN BLOOD BY AUTOMATED COUNT: 27.9 % (ref 11.5–14.5)
ERYTHROCYTE [DISTWIDTH] IN BLOOD BY AUTOMATED COUNT: 81 FL (ref 35–45)
GFR, ESTIMATED: 65 ML/MIN/1.73M2
GLUCOSE, WHOLE BLOOD: 92 MG/DL (ref 70–108)
HCT VFR BLD CALC: 36.9 % (ref 37–47)
HEMOGLOBIN: 10.9 GM/DL (ref 12–16)
IONIZED CALCIUM, WHOLE BLOOD: 1.22 MMOL/L (ref 1.12–1.32)
MCH RBC QN AUTO: 24.3 PG (ref 26–33)
MCHC RBC AUTO-ENTMCNC: 29.5 GM/DL (ref 32.2–35.5)
MCV RBC AUTO: 82.2 FL (ref 81–99)
PLATELET # BLD: 273 THOU/MM3 (ref 130–400)
PMV BLD AUTO: 10.5 FL (ref 9.4–12.4)
POTASSIUM, WHOLE BLOOD: 4.7 MEQ/L (ref 3.5–4.9)
RBC # BLD: 4.49 MILL/MM3 (ref 4.2–5.4)
SCAN OF BLOOD SMEAR: NORMAL
SEGMENTED NEUTROPHILS ABSOLUTE COUNT: 16.6 THOU/MM3 (ref 1.8–7.7)
SODIUM, WHOLE BLOOD: 138 MEQ/L (ref 138–146)
TOTAL CO2, WHOLE BLOOD: 30 MEQ/L (ref 23–33)
TOTAL PROTEIN: 6.7 G/DL (ref 6.1–8)
WBC # BLD: 19.1 THOU/MM3 (ref 4.8–10.8)

## 2019-09-11 PROCEDURE — 1036F TOBACCO NON-USER: CPT | Performed by: NURSE PRACTITIONER

## 2019-09-11 PROCEDURE — 6360000002 HC RX W HCPCS: Performed by: INTERNAL MEDICINE

## 2019-09-11 PROCEDURE — 36591 DRAW BLOOD OFF VENOUS DEVICE: CPT

## 2019-09-11 PROCEDURE — 99213 OFFICE O/P EST LOW 20 MIN: CPT | Performed by: NURSE PRACTITIONER

## 2019-09-11 PROCEDURE — 87040 BLOOD CULTURE FOR BACTERIA: CPT

## 2019-09-11 PROCEDURE — 99212 OFFICE O/P EST SF 10 MIN: CPT

## 2019-09-11 PROCEDURE — 87186 SC STD MICRODIL/AGAR DIL: CPT

## 2019-09-11 PROCEDURE — 87077 CULTURE AEROBIC IDENTIFY: CPT

## 2019-09-11 PROCEDURE — G8400 PT W/DXA NO RESULTS DOC: HCPCS | Performed by: NURSE PRACTITIONER

## 2019-09-11 PROCEDURE — 3017F COLORECTAL CA SCREEN DOC REV: CPT | Performed by: NURSE PRACTITIONER

## 2019-09-11 PROCEDURE — 1123F ACP DISCUSS/DSCN MKR DOCD: CPT | Performed by: NURSE PRACTITIONER

## 2019-09-11 PROCEDURE — 87086 URINE CULTURE/COLONY COUNT: CPT

## 2019-09-11 PROCEDURE — 4040F PNEUMOC VAC/ADMIN/RCVD: CPT | Performed by: NURSE PRACTITIONER

## 2019-09-11 PROCEDURE — 85027 COMPLETE CBC AUTOMATED: CPT

## 2019-09-11 PROCEDURE — 96365 THER/PROPH/DIAG IV INF INIT: CPT

## 2019-09-11 PROCEDURE — 96366 THER/PROPH/DIAG IV INF ADDON: CPT

## 2019-09-11 PROCEDURE — 1090F PRES/ABSN URINE INCON ASSESS: CPT | Performed by: NURSE PRACTITIONER

## 2019-09-11 PROCEDURE — G8598 ASA/ANTIPLAT THER USED: HCPCS | Performed by: NURSE PRACTITIONER

## 2019-09-11 PROCEDURE — 80076 HEPATIC FUNCTION PANEL: CPT

## 2019-09-11 PROCEDURE — 80047 BASIC METABLC PNL IONIZED CA: CPT

## 2019-09-11 PROCEDURE — 1111F DSCHRG MED/CURRENT MED MERGE: CPT | Performed by: NURSE PRACTITIONER

## 2019-09-11 PROCEDURE — G8417 CALC BMI ABV UP PARAM F/U: HCPCS | Performed by: NURSE PRACTITIONER

## 2019-09-11 PROCEDURE — 6360000002 HC RX W HCPCS: Performed by: NURSE PRACTITIONER

## 2019-09-11 PROCEDURE — 2580000003 HC RX 258: Performed by: INTERNAL MEDICINE

## 2019-09-11 PROCEDURE — 2580000003 HC RX 258: Performed by: NURSE PRACTITIONER

## 2019-09-11 PROCEDURE — G8427 DOCREV CUR MEDS BY ELIG CLIN: HCPCS | Performed by: NURSE PRACTITIONER

## 2019-09-11 RX ORDER — SODIUM CHLORIDE 0.9 % (FLUSH) 0.9 %
10 SYRINGE (ML) INJECTION PRN
Status: CANCELLED | OUTPATIENT
Start: 2019-09-11

## 2019-09-11 RX ORDER — HEPARIN SODIUM (PORCINE) LOCK FLUSH IV SOLN 100 UNIT/ML 100 UNIT/ML
500 SOLUTION INTRAVENOUS PRN
Status: CANCELLED | OUTPATIENT
Start: 2019-09-11

## 2019-09-11 RX ORDER — SODIUM CHLORIDE 0.9 % (FLUSH) 0.9 %
5 SYRINGE (ML) INJECTION PRN
Status: CANCELLED | OUTPATIENT
Start: 2019-09-11

## 2019-09-11 RX ORDER — SODIUM CHLORIDE 0.9 % (FLUSH) 0.9 %
10 SYRINGE (ML) INJECTION PRN
Status: DISCONTINUED | OUTPATIENT
Start: 2019-09-11 | End: 2019-09-12 | Stop reason: HOSPADM

## 2019-09-11 RX ORDER — SODIUM CHLORIDE 0.9 % (FLUSH) 0.9 %
20 SYRINGE (ML) INJECTION PRN
Status: DISCONTINUED | OUTPATIENT
Start: 2019-09-11 | End: 2019-09-12 | Stop reason: HOSPADM

## 2019-09-11 RX ORDER — SODIUM CHLORIDE 0.9 % (FLUSH) 0.9 %
20 SYRINGE (ML) INJECTION PRN
Status: CANCELLED | OUTPATIENT
Start: 2019-09-11

## 2019-09-11 RX ORDER — HEPARIN SODIUM (PORCINE) LOCK FLUSH IV SOLN 100 UNIT/ML 100 UNIT/ML
500 SOLUTION INTRAVENOUS PRN
Status: DISCONTINUED | OUTPATIENT
Start: 2019-09-11 | End: 2019-09-12 | Stop reason: HOSPADM

## 2019-09-11 RX ADMIN — Medication 20 ML: at 10:08

## 2019-09-11 RX ADMIN — Medication 10 ML: at 11:33

## 2019-09-11 RX ADMIN — Medication 10 ML: at 14:29

## 2019-09-11 RX ADMIN — Medication 20 ML: at 11:30

## 2019-09-11 RX ADMIN — HEPARIN SODIUM (PORCINE) LOCK FLUSH IV SOLN 100 UNIT/ML 500 UNITS: 100 SOLUTION at 14:29

## 2019-09-11 RX ADMIN — Medication 10 ML: at 10:07

## 2019-09-11 RX ADMIN — POTASSIUM CHLORIDE: 2 INJECTION, SOLUTION, CONCENTRATE INTRAVENOUS at 11:33

## 2019-09-11 NOTE — PROGRESS NOTES
Patient returned to infusion unit from appointment with physician. Plan to obtained blood cultures, urine culture and administer IV hydration.

## 2019-09-11 NOTE — PROGRESS NOTES
Patient tolerated lab draw via mediport without issues. Patient wheeled off unit to exam room for appointment with CNP accompanied by LPN.

## 2019-09-11 NOTE — PROGRESS NOTES
Patient assessed for the following post IV hydration:    Dizziness   No  Lightheadedness  No      Acute nausea/vomiting No  Headache   No  Chest pain/pressure  No  Rash/itching   No  Shortness of breath  No     Patient tolerated IV hydration without any complications. Last vital signs:   BP (!) 108/57   Pulse 79   Temp 97.6 °F (36.4 °C) (Oral)   Resp 18   SpO2 93%       Patient instructed if experience any of the above symptoms following today's infusion, she is to notify MD immediately or go to the emergency department. Discharge instructions given to patient. Verbalizes understanding. Patient wheeled off unit via wheelchair per RN accompanied by  with belongings.

## 2019-09-11 NOTE — PLAN OF CARE
Problem: Intellectual/Education/Knowledge Deficit  Goal: Teaching initiated upon admission  Outcome: Met This Shift  Note:   Patient verbalizes understanding to verbal information given on . 9NS with 20K IV fluids, including action and possible side effects. Aware to call MD if develop complications. Intervention: Verbal/written education provided  Note:   Verbal education provided on IV hydration prior to administration. Patient encouraged to increase PO fluid intake. Problem: Discharge Planning  Goal: Knowledge of discharge instructions  Description  Knowledge of discharge instructions     Outcome: Met This Shift  Note:   Patient verbalizes understanding of discharge instructions, follow up appointment, and when to call physician if needed   Intervention: Discharge to appropriate level of care  Note:   Discharge instructions given to pt and reviewed. Follow up appointments discussed. Problem: Falls - Risk of:  Goal: Will remain free from falls  Description  Will remain free from falls  Outcome: Met This Shift  Note:   Patient free of falls this visit. Intervention: Assess risk factors for falls  Note:   Fall risks assessed. Precautions discussed. Patient ambulated with 1 assist to and from wheelchair during outpatient visit. Problem: Infection - Central Venous Catheter-Associated Bloodstream Infection:  Goal: Will show no infection signs and symptoms  Description  Will show no infection signs and symptoms  Outcome: Met This Shift  Note:   Mediport site with no redness or warmth. Skin over port site intact with no signs of breakdown noted. Patient verbalizes signs/symptoms of port infection and when to notify the physician. Intervention: Infection risk assessment  Note:   Discuss port maintenance, infection prevention, signs of infection, and when to call the doctor. Care plan reviewed with patient and .   Patient and  verbalize understanding of the plan of care and contribute to goal setting.

## 2019-09-12 ENCOUNTER — TELEPHONE (OUTPATIENT)
Dept: ONCOLOGY | Age: 74
End: 2019-09-12

## 2019-09-12 NOTE — PROGRESS NOTES
that it is not much. She denies fever, chills, headache, SOB/JASSO, chest pain or palpitations, abdominal pain, diarrhea/constipation, new leg pain or swelling, new neurological symptoms. We discussed that chemotherapy will need to be postponed indefinitely as she has had to be hospitalized after each of her first 2 cycles. As she was hypertensive and feeling weak, we did urine and blood cultures (peripheral and from mediport)    The patient is allergic to sulfa antibiotics. Past Medical History  Dutch Gallardo  has a past medical history of Breast cancer ([facility] CAD (coronary artery disease), CHF (congestive heart failure) ([facility] Hyperlipidemia, Hypertension, ICD (implantable cardiac defibrillator), dual, in situ, Numbness and tingling, Pneumonia, Shingles, Sleep apnea, [employer] Boris dual ICD, Type II or unspecified type diabetes mellitus without mention of complication, not stated as uncontrolled, and Ventricular arrhythmia. Medications    Current Outpatient Medications:     pantoprazole (PROTONIX) 40 MG tablet, Take 40 mg by mouth daily, Disp: , Rfl:     amiodarone (CORDARONE) 200 MG tablet, TAKE 1 TABLET DAILY, Disp: 90 tablet, Rfl: 4    enalapril (VASOTEC) 2.5 MG tablet, Take 1 tablet by mouth daily, Disp: 90 tablet, Rfl: 3    gabapentin (NEURONTIN) 400 MG capsule, Take 1 capsule by mouth 3 times daily for 30 days. , Disp: 90 capsule, Rfl: 0    midodrine (PROAMATINE) 5 MG tablet, Take 1 tablet by mouth 3 times daily (with meals), Disp: 90 tablet, Rfl: 3    guaiFENesin (MUCINEX) 600 MG extended release tablet, Take 1 tablet by mouth 2 times daily, Disp: , Rfl:     potassium chloride (KLOR-CON M) 20 MEQ extended release tablet, Take 2 tablets by mouth as needed (Potassium Replacement), Disp: 60 tablet, Rfl: 3    metoprolol succinate (TOPROL XL) 25 MG extended release tablet, Take 1 tablet by mouth daily, Disp: 30 tablet, Rfl: 0    bumetanide (BUMEX) 0.5 MG tablet, Take 1 tablet by mouth daily, Disp: 15 tablet, Rfl: 0    prochlorperazine (COMPAZINE) 5 MG tablet, Take 1 tablet by mouth every 6 hours as needed for Nausea, Disp: 30 tablet, Rfl: 2    lidocaine-prilocaine (EMLA) 2.5-2.5 % cream, Apply topically prior to accessing port., Disp: 30 g, Rfl: 3    ondansetron (ZOFRAN) 4 MG tablet, Take 1 tablet by mouth every 8 hours as needed for Nausea or Vomiting Indications: Nausea and Vomiting caused by Cancer Chemotherapy, Disp: 90 tablet, Rfl: 3    metFORMIN (GLUCOPHAGE XR) 750 MG extended release tablet, Take 1 tablet by mouth 2 times daily (with meals), Disp: 30 tablet, Rfl: 11    atorvastatin (LIPITOR) 80 MG tablet, Take 1 tablet by mouth daily, Disp: 90 tablet, Rfl: 1    ipratropium-albuterol (DUONEB) 0.5-2.5 (3) MG/3ML SOLN nebulizer solution, Inhale 3 mLs into the lungs every 4 hours as needed for Shortness of Breath or Other (wheezing), Disp: 360 mL, Rfl: 5    rOPINIRole (REQUIP) 0.5 MG tablet, Take 1 tablet by mouth nightly, Disp: 30 tablet, Rfl: 11    blood glucose test strips (ASCENSIA AUTODISC VI;ONE TOUCH ULTRA TEST VI) strip, Test once daily. Dispense One Touch Ultra Test Strips.   Dx: Type 2 diabetes (250.00), Disp: 100 each, Rfl: 5    vitamin B-12 (CYANOCOBALAMIN) 1000 MCG tablet, Take 3,000 mcg by mouth daily, Disp: , Rfl:     fluticasone (FLONASE) 50 MCG/ACT nasal spray, 2 sprays by Nasal route daily, Disp: 1 Bottle, Rfl: 1    Insulin Pen Needle (B-D UF III MINI PEN NEEDLES) 31G X 5 MM MISC, 1 each by Does not apply route daily, Disp: 100 each, Rfl: 3    clopidogrel (PLAVIX) 75 MG tablet, Take 1 tablet by mouth daily, Disp: 90 tablet, Rfl: 3    aspirin EC 81 MG EC tablet, Take 1 tablet by mouth daily, Disp: 30 tablet, Rfl: 3    insulin glargine (LANTUS SOLOSTAR) 100 UNIT/ML injection pen, Inject 18 Units into the skin nightly, Disp: 5.4 mL, Rfl: 0    Review of Systems      Review of Systems  Negative except for what is noted in HPI  Objective:     Vitals:    09/11/19 1022   BP: (!) 82/52   Site: positive cocci. Peripheral blood cultures negative. Concern that positive blood culture may be due to contaminant. Will redraw blood cultures. Spoke with nurse at [facility] and she states that patient has not had fever, chills or any new symptoms and has been improving since her admission. Nausea and vomiting, intractability of vomiting not specified, unspecified vomiting type    Adjusted her compazine and zofran to scheduled compazine prior to meals and zofran prn   External Referral To Gastroenterology to evaluate her feeling that food and pills sometimes get stuck when swallowing. Hypotension -   Has been on midodrine  She was seen by the cardiologist about the hypotension and he adjusted her medications. Return in about 1 week (around 9/18/2019). Patient instructions given and reviewed.      Electronically signed by ROMAIN Scott NP on 9/13/2019 at 8:48 AM

## 2019-09-13 ENCOUNTER — HOSPITAL ENCOUNTER (OUTPATIENT)
Dept: INFUSION THERAPY | Age: 74
Discharge: HOME | End: 2019-09-13
Payer: MEDICARE

## 2019-09-13 VITALS
HEIGHT: 62 IN | RESPIRATION RATE: 18 BRPM | SYSTOLIC BLOOD PRESSURE: 106 MMHG | DIASTOLIC BLOOD PRESSURE: 69 MMHG | BODY MASS INDEX: 33.97 KG/M2 | WEIGHT: 184.6 LBS | HEART RATE: 77 BPM | TEMPERATURE: 97.6 F | OXYGEN SATURATION: 94 %

## 2019-09-13 DIAGNOSIS — N63.0 BREAST MASS: ICD-10-CM

## 2019-09-13 DIAGNOSIS — D61.818 PANCYTOPENIA (HCC): ICD-10-CM

## 2019-09-13 DIAGNOSIS — D61.818 PANCYTOPENIA (HCC): Primary | ICD-10-CM

## 2019-09-13 DIAGNOSIS — C50.912 MALIGNANT NEOPLASM OF LEFT FEMALE BREAST, UNSPECIFIED ESTROGEN RECEPTOR STATUS, UNSPECIFIED SITE OF BREAST (HCC): Primary | ICD-10-CM

## 2019-09-13 LAB
LACTIC ACID: 1.9 MMOL/L (ref 0.5–2.2)
ORGANISM: ABNORMAL
URINE CULTURE, ROUTINE: ABNORMAL

## 2019-09-13 PROCEDURE — 87040 BLOOD CULTURE FOR BACTERIA: CPT

## 2019-09-13 PROCEDURE — 6360000002 HC RX W HCPCS: Performed by: INTERNAL MEDICINE

## 2019-09-13 PROCEDURE — 2580000003 HC RX 258: Performed by: INTERNAL MEDICINE

## 2019-09-13 PROCEDURE — 36415 COLL VENOUS BLD VENIPUNCTURE: CPT

## 2019-09-13 PROCEDURE — 99999: CPT | Performed by: NURSE PRACTITIONER

## 2019-09-13 PROCEDURE — 83605 ASSAY OF LACTIC ACID: CPT

## 2019-09-13 PROCEDURE — 36591 DRAW BLOOD OFF VENOUS DEVICE: CPT

## 2019-09-13 RX ORDER — SODIUM CHLORIDE 0.9 % (FLUSH) 0.9 %
20 SYRINGE (ML) INJECTION PRN
Status: DISCONTINUED | OUTPATIENT
Start: 2019-09-13 | End: 2019-09-14 | Stop reason: HOSPADM

## 2019-09-13 RX ORDER — SODIUM CHLORIDE 0.9 % (FLUSH) 0.9 %
20 SYRINGE (ML) INJECTION PRN
Status: CANCELLED | OUTPATIENT
Start: 2019-09-13

## 2019-09-13 RX ORDER — HEPARIN SODIUM (PORCINE) LOCK FLUSH IV SOLN 100 UNIT/ML 100 UNIT/ML
500 SOLUTION INTRAVENOUS PRN
Status: CANCELLED | OUTPATIENT
Start: 2019-09-13

## 2019-09-13 RX ORDER — HEPARIN SODIUM (PORCINE) LOCK FLUSH IV SOLN 100 UNIT/ML 100 UNIT/ML
500 SOLUTION INTRAVENOUS PRN
Status: DISCONTINUED | OUTPATIENT
Start: 2019-09-13 | End: 2019-09-14 | Stop reason: HOSPADM

## 2019-09-13 RX ORDER — SODIUM CHLORIDE 0.9 % (FLUSH) 0.9 %
10 SYRINGE (ML) INJECTION PRN
Status: CANCELLED | OUTPATIENT
Start: 2019-09-13

## 2019-09-13 RX ORDER — SODIUM CHLORIDE 0.9 % (FLUSH) 0.9 %
10 SYRINGE (ML) INJECTION PRN
Status: DISCONTINUED | OUTPATIENT
Start: 2019-09-13 | End: 2019-09-14 | Stop reason: HOSPADM

## 2019-09-13 RX ADMIN — HEPARIN SODIUM (PORCINE) LOCK FLUSH IV SOLN 100 UNIT/ML 500 UNITS: 100 SOLUTION at 11:40

## 2019-09-13 RX ADMIN — Medication 20 ML: at 11:39

## 2019-09-13 NOTE — PROGRESS NOTES
Patient tolerated  Lab drawn from [facility] without any complications. Discharge instructions given to patient-verbalizes understanding. Transported off unit in wheelchair with belongings accompanied by spouse.

## 2019-09-14 LAB
BLOOD CULTURE, ROUTINE: ABNORMAL
ORGANISM: ABNORMAL

## 2019-09-17 DIAGNOSIS — C50.911 BREAST CANCER METASTASIZED TO AXILLARY LYMPH NODE, RIGHT (HCC): ICD-10-CM

## 2019-09-17 DIAGNOSIS — D50.8 OTHER IRON DEFICIENCY ANEMIA: Primary | ICD-10-CM

## 2019-09-17 DIAGNOSIS — C77.3 BREAST CANCER METASTASIZED TO AXILLARY LYMPH NODE, RIGHT (HCC): ICD-10-CM

## 2019-09-17 LAB — BLOOD CULTURE, ROUTINE: NORMAL

## 2019-09-18 ENCOUNTER — HOSPITAL ENCOUNTER (OUTPATIENT)
Dept: GENERAL RADIOLOGY | Age: 74
Discharge: HOME | End: 2019-09-18
Payer: MEDICARE

## 2019-09-18 ENCOUNTER — TELEPHONE (OUTPATIENT)
Dept: ONCOLOGY | Age: 74
End: 2019-09-18

## 2019-09-18 ENCOUNTER — HOSPITAL ENCOUNTER (OUTPATIENT)
Age: 74
Discharge: HOME | End: 2019-09-18
Payer: MEDICARE

## 2019-09-18 ENCOUNTER — HOSPITAL ENCOUNTER (OUTPATIENT)
Dept: INFUSION THERAPY | Age: 74
Discharge: HOME | End: 2019-09-18
Payer: MEDICARE

## 2019-09-18 ENCOUNTER — OFFICE VISIT (OUTPATIENT)
Dept: ONCOLOGY | Age: 74
End: 2019-09-18
Payer: MEDICARE

## 2019-09-18 VITALS
OXYGEN SATURATION: 95 % | HEIGHT: 62 IN | SYSTOLIC BLOOD PRESSURE: 100 MMHG | TEMPERATURE: 98.3 F | DIASTOLIC BLOOD PRESSURE: 54 MMHG | RESPIRATION RATE: 16 BRPM | BODY MASS INDEX: 33.9 KG/M2 | WEIGHT: 184.2 LBS | HEART RATE: 82 BPM

## 2019-09-18 VITALS
WEIGHT: 184.2 LBS | TEMPERATURE: 97.8 F | BODY MASS INDEX: 33.9 KG/M2 | HEIGHT: 62 IN | DIASTOLIC BLOOD PRESSURE: 67 MMHG | HEART RATE: 80 BPM | RESPIRATION RATE: 18 BRPM | SYSTOLIC BLOOD PRESSURE: 114 MMHG | OXYGEN SATURATION: 92 %

## 2019-09-18 DIAGNOSIS — C50.912 MALIGNANT NEOPLASM OF LEFT FEMALE BREAST, UNSPECIFIED ESTROGEN RECEPTOR STATUS, UNSPECIFIED SITE OF BREAST (HCC): Primary | ICD-10-CM

## 2019-09-18 DIAGNOSIS — R11.2 NAUSEA AND VOMITING, INTRACTABILITY OF VOMITING NOT SPECIFIED, UNSPECIFIED VOMITING TYPE: ICD-10-CM

## 2019-09-18 DIAGNOSIS — R11.2 NAUSEA AND VOMITING, INTRACTABILITY OF VOMITING NOT SPECIFIED, UNSPECIFIED VOMITING TYPE: Primary | ICD-10-CM

## 2019-09-18 DIAGNOSIS — D50.8 OTHER IRON DEFICIENCY ANEMIA: ICD-10-CM

## 2019-09-18 DIAGNOSIS — C50.911 BREAST CANCER METASTASIZED TO AXILLARY LYMPH NODE, RIGHT (HCC): ICD-10-CM

## 2019-09-18 DIAGNOSIS — N63.0 BREAST MASS: ICD-10-CM

## 2019-09-18 DIAGNOSIS — E86.0 DEHYDRATION: ICD-10-CM

## 2019-09-18 DIAGNOSIS — C77.3 BREAST CANCER METASTASIZED TO AXILLARY LYMPH NODE, RIGHT (HCC): ICD-10-CM

## 2019-09-18 LAB
BASOPHILS # BLD: 0.6 %
BASOPHILS ABSOLUTE: 0.1 THOU/MM3 (ref 0–0.1)
BUN, WHOLE BLOOD: 11 MG/DL (ref 8–26)
CHLORIDE, WHOLE BLOOD: 98 MEQ/L (ref 98–109)
CREATININE, WHOLE BLOOD: 1 MG/DL (ref 0.5–1.2)
EOSINOPHIL # BLD: 2.7 %
EOSINOPHILS ABSOLUTE: 0.3 THOU/MM3 (ref 0–0.4)
GFR, ESTIMATED: 57 ML/MIN/1.73M2
GLUCOSE, WHOLE BLOOD: 134 MG/DL (ref 70–108)
HCT VFR BLD CALC: 36.5 % (ref 37–47)
HEMOGLOBIN: 11.5 GM/DL (ref 12–16)
IMMATURE GRANS (ABS): 0.06 THOU/MM3 (ref 0–0.07)
IMMATURE GRANULOCYTES: 1 %
IONIZED CALCIUM, WHOLE BLOOD: 1.17 MMOL/L (ref 1.12–1.32)
LYMPHOCYTES # BLD: 12 %
LYMPHOCYTES ABSOLUTE: 1.3 THOU/MM3 (ref 1–4.8)
MCH RBC QN AUTO: 25.7 PG (ref 27–31)
MCHC RBC AUTO-ENTMCNC: 31.6 GM/DL (ref 33–37)
MCV RBC AUTO: 81 FL (ref 81–99)
MONOCYTES # BLD: 6.3 %
MONOCYTES ABSOLUTE: 0.7 THOU/MM3 (ref 0.4–1.3)
NUCLEATED RED BLOOD CELLS: 0 /100 WBC
PDW BLD-RTO: 20.5 % (ref 11.5–14.5)
PLATELET # BLD: 285 THOU/MM3 (ref 130–400)
PMV BLD AUTO: 8.7 FL (ref 7.4–10.4)
POTASSIUM, WHOLE BLOOD: 4.5 MEQ/L (ref 3.5–4.9)
RBC # BLD: 4.49 MILL/MM3 (ref 4.2–5.4)
SEG NEUTROPHILS: 77.9 %
SEGMENTED NEUTROPHILS ABSOLUTE COUNT: 8.5 THOU/MM3 (ref 1.8–7.7)
SODIUM, WHOLE BLOOD: 136 MEQ/L (ref 138–146)
TOTAL CO2, WHOLE BLOOD: 30 MEQ/L (ref 23–33)
WBC # BLD: 10.9 THOU/MM3 (ref 4.8–10.8)

## 2019-09-18 PROCEDURE — 1111F DSCHRG MED/CURRENT MED MERGE: CPT | Performed by: NURSE PRACTITIONER

## 2019-09-18 PROCEDURE — G8400 PT W/DXA NO RESULTS DOC: HCPCS | Performed by: NURSE PRACTITIONER

## 2019-09-18 PROCEDURE — 36591 DRAW BLOOD OFF VENOUS DEVICE: CPT

## 2019-09-18 PROCEDURE — 3017F COLORECTAL CA SCREEN DOC REV: CPT | Performed by: NURSE PRACTITIONER

## 2019-09-18 PROCEDURE — 4040F PNEUMOC VAC/ADMIN/RCVD: CPT | Performed by: NURSE PRACTITIONER

## 2019-09-18 PROCEDURE — G8598 ASA/ANTIPLAT THER USED: HCPCS | Performed by: NURSE PRACTITIONER

## 2019-09-18 PROCEDURE — 6360000002 HC RX W HCPCS: Performed by: INTERNAL MEDICINE

## 2019-09-18 PROCEDURE — 1123F ACP DISCUSS/DSCN MKR DOCD: CPT | Performed by: NURSE PRACTITIONER

## 2019-09-18 PROCEDURE — 2580000003 HC RX 258: Performed by: INTERNAL MEDICINE

## 2019-09-18 PROCEDURE — 1090F PRES/ABSN URINE INCON ASSESS: CPT | Performed by: NURSE PRACTITIONER

## 2019-09-18 PROCEDURE — 6360000002 HC RX W HCPCS: Performed by: NURSE PRACTITIONER

## 2019-09-18 PROCEDURE — 1036F TOBACCO NON-USER: CPT | Performed by: NURSE PRACTITIONER

## 2019-09-18 PROCEDURE — 96361 HYDRATE IV INFUSION ADD-ON: CPT

## 2019-09-18 PROCEDURE — G8417 CALC BMI ABV UP PARAM F/U: HCPCS | Performed by: NURSE PRACTITIONER

## 2019-09-18 PROCEDURE — 99211 OFF/OP EST MAY X REQ PHY/QHP: CPT

## 2019-09-18 PROCEDURE — 99213 OFFICE O/P EST LOW 20 MIN: CPT | Performed by: NURSE PRACTITIONER

## 2019-09-18 PROCEDURE — 96374 THER/PROPH/DIAG INJ IV PUSH: CPT

## 2019-09-18 PROCEDURE — 80047 BASIC METABLC PNL IONIZED CA: CPT

## 2019-09-18 PROCEDURE — 74019 RADEX ABDOMEN 2 VIEWS: CPT

## 2019-09-18 PROCEDURE — G8427 DOCREV CUR MEDS BY ELIG CLIN: HCPCS | Performed by: NURSE PRACTITIONER

## 2019-09-18 PROCEDURE — 85025 COMPLETE CBC W/AUTO DIFF WBC: CPT

## 2019-09-18 PROCEDURE — 2580000003 HC RX 258: Performed by: NURSE PRACTITIONER

## 2019-09-18 RX ORDER — SODIUM CHLORIDE 0.9 % (FLUSH) 0.9 %
10 SYRINGE (ML) INJECTION PRN
Status: DISCONTINUED | OUTPATIENT
Start: 2019-09-18 | End: 2019-09-19 | Stop reason: HOSPADM

## 2019-09-18 RX ORDER — HEPARIN SODIUM (PORCINE) LOCK FLUSH IV SOLN 100 UNIT/ML 100 UNIT/ML
500 SOLUTION INTRAVENOUS PRN
Status: CANCELLED | OUTPATIENT
Start: 2019-09-18

## 2019-09-18 RX ORDER — SODIUM CHLORIDE 0.9 % (FLUSH) 0.9 %
5 SYRINGE (ML) INJECTION PRN
Status: CANCELLED | OUTPATIENT
Start: 2019-09-18

## 2019-09-18 RX ORDER — SODIUM CHLORIDE 0.9 % (FLUSH) 0.9 %
10 SYRINGE (ML) INJECTION PRN
Status: CANCELLED | OUTPATIENT
Start: 2019-09-18

## 2019-09-18 RX ORDER — SODIUM CHLORIDE 0.9 % (FLUSH) 0.9 %
20 SYRINGE (ML) INJECTION PRN
Status: DISCONTINUED | OUTPATIENT
Start: 2019-09-18 | End: 2019-09-19 | Stop reason: HOSPADM

## 2019-09-18 RX ORDER — 0.9 % SODIUM CHLORIDE 0.9 %
500 INTRAVENOUS SOLUTION INTRAVENOUS ONCE
Status: CANCELLED | OUTPATIENT
Start: 2019-09-18

## 2019-09-18 RX ORDER — HEPARIN SODIUM (PORCINE) LOCK FLUSH IV SOLN 100 UNIT/ML 100 UNIT/ML
500 SOLUTION INTRAVENOUS PRN
Status: DISCONTINUED | OUTPATIENT
Start: 2019-09-18 | End: 2019-09-19 | Stop reason: HOSPADM

## 2019-09-18 RX ORDER — 0.9 % SODIUM CHLORIDE 0.9 %
500 INTRAVENOUS SOLUTION INTRAVENOUS ONCE
Status: COMPLETED | OUTPATIENT
Start: 2019-09-18 | End: 2019-09-18

## 2019-09-18 RX ORDER — ONDANSETRON 4 MG/1
4 TABLET, ORALLY DISINTEGRATING ORAL EVERY 8 HOURS PRN
Qty: 60 TABLET | Refills: 0 | Status: SHIPPED | OUTPATIENT
Start: 2019-09-18 | End: 2019-01-01 | Stop reason: ALTCHOICE

## 2019-09-18 RX ORDER — ONDANSETRON 2 MG/ML
4 INJECTION INTRAMUSCULAR; INTRAVENOUS ONCE
Status: COMPLETED | OUTPATIENT
Start: 2019-09-18 | End: 2019-09-18

## 2019-09-18 RX ORDER — SODIUM CHLORIDE 0.9 % (FLUSH) 0.9 %
20 SYRINGE (ML) INJECTION PRN
Status: CANCELLED | OUTPATIENT
Start: 2019-09-18

## 2019-09-18 RX ADMIN — Medication 10 ML: at 09:28

## 2019-09-18 RX ADMIN — SODIUM CHLORIDE 500 ML: 9 INJECTION, SOLUTION INTRAVENOUS at 11:31

## 2019-09-18 RX ADMIN — ONDANSETRON 4 MG: 2 INJECTION INTRAMUSCULAR; INTRAVENOUS at 11:31

## 2019-09-18 RX ADMIN — HEPARIN SODIUM (PORCINE) LOCK FLUSH IV SOLN 100 UNIT/ML 500 UNITS: 100 SOLUTION at 14:14

## 2019-09-18 RX ADMIN — Medication 20 ML: at 09:29

## 2019-09-18 RX ADMIN — Medication 10 ML: at 14:14

## 2019-09-18 NOTE — PATIENT INSTRUCTIONS
You will need to go to radiology to get an abdominal xray today.   Please keep appointment with gastroenterology for evaluation of your nausea and vomiting  Call if nausea and vomiting is getting worse or if you develop abdominal pain, fever or chills

## 2019-09-18 NOTE — TELEPHONE ENCOUNTER
Vidya called from [facility] and stated that the patient doesn`t take the Compazine or the Zofran because it is not helping her. Vidya is asking if katelin could have an order for Zofran IM as she got in today in the clinic.  Please advise and thanks

## 2019-09-18 NOTE — PROGRESS NOTES
Patient tolerated 2 hour IV hydration and IV zofran without any complications. Denies dizziness, lightheadedness,  vomiting, headache, chest pain/pressure, rash/itching, or an increase in SOB. Last vital signs:   BP (!) 100/54   Pulse 82   Temp 98.3 °F (36.8 °C) (Oral)   Resp 16   Ht 5' 2\" (1.575 m)   Wt 184 lb 3.2 oz (83.6 kg)   SpO2 95%   BMI 33.69 kg/m²     Patient instructed if they experience any of the above symptoms following today's visit she is to notify the physician immediately or go to the emergency department. Discharge instructions given to patient. Verbalizes understanding. Off unit in wheelchair in stable condition accompanied by her spouse with all belongings.

## 2019-09-18 NOTE — PROGRESS NOTES
Patient returned to infusion area in stable condition after seeing Physician. No chemotherapy today, Patient will get IV hydration and nausea medication.

## 2019-09-18 NOTE — PROGRESS NOTES
[facility] [address] [place] [address] Ami Cortes [facility]  [address] [address]  Dept: [phone]  Dept Fax: [phone]  Loc: [phone]      Jeffy Finn is a 75 y.o. female who presents today for Follow-up (Right Breast Cancer)      HPI:     This patient is followed by Dr. Yokasta Macario for history of breast cancer. She is here for labs and follow up for blood cultures and status of her nausea and vomiting. She states that she is still having intractable nausea and vomiting and has not been eating or drinking much over the last week. She denies diarrhea and has been having regular BMs. 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 She also states that she is having numbness and tingling in her hands similar to the neuropathy in her feet. She is currently on gabapentin. She denies fever, chills, headache, SOB/JASSO, chest pain or palpitations, abdominal pain, diarrhea/constipation, new leg pain or swelling. The patient is allergic to sulfa antibiotics. Past Medical History  Gentry Guillory  has a past medical history of Breast cancer ([address] CAD (coronary artery disease), CHF (congestive heart failure) ([facility] [address] Hyperlipidemia, Hypertension, ICD (implantable cardiac defibrillator), dual, in situ, Numbness and tingling, Pneumonia, Shingles, Sleep apnea, [facility] Boris dual ICD, Type II or unspecified type diabetes mellitus without mention of complication, not stated as uncontrolled, and Ventricular arrhythmia.     Medications    Current Outpatient Medications:     ondansetron (ZOFRAN ODT) 4 MG disintegrating tablet, Take 1 tablet by mouth every 8 hours as needed for Nausea or Vomiting, Disp: 60 tablet, Rfl: 0    pantoprazole (PROTONIX) 40 MG tablet, Take 40 mg by mouth daily, Disp: , Rfl:     amiodarone (CORDARONE) 200 MG tablet, TAKE 1 TABLET DAILY, Disp: 90 tablet, Rfl: 4    enalapril (VASOTEC) 2.5 MG tablet, Take 1 tablet by mouth daily, Disp: for follow-up. Diagnoses and all orders for this visit:    Nausea and vomiting, intractability of vomiting not specified, unspecified vomiting type  Is having intractable nausea and vomiting. She is maintaining her weight. - Will get XR ABDOMEN (2 VIEWS) today  to rule out ileus.  - She has scheduled appointment with gastroenterology tomorrow to follow up. - will give 500 ml hydration today     -Changed oral ondansetron to ODT.     ondansetron (ZOFRAN ODT) 4 MG disintegrating tablet; Take 1 tablet by mouth every 8 hours as needed for Nausea or Vomiting  Will continue prn compazine    UTI - resolved. No urinary tract symptoms  - Will discontinue oral ciprofloxacin as she has completed 7 days. Blood culture result  - repeat blood cultures of mediport and peripheral are no growth preliminary    Peripheral neuropathy  Increased numbness and tingling in fingers. Is on maximum dose of  Neurontin     Return if symptoms worsen or fail to improve. Patient instructions given and reviewed.      Electronically signed by ROMAIN Lancaster NP on 9/18/2019 at 11:09 AM

## 2019-09-19 LAB
BLOOD CULTURE, ROUTINE: NORMAL
BLOOD CULTURE, ROUTINE: NORMAL

## 2019-09-25 ENCOUNTER — TELEPHONE (OUTPATIENT)
Dept: CARDIOLOGY CLINIC | Age: 74
End: 2019-09-25

## 2019-09-27 PROBLEM — R77.8 ELEVATED TROPONIN: Status: RESOLVED | Noted: 2019-08-28 | Resolved: 2019-09-27

## 2019-09-27 PROBLEM — R79.89 ELEVATED TROPONIN: Status: RESOLVED | Noted: 2019-08-28 | Resolved: 2019-09-27

## 2019-10-01 LAB
BUN BLDV-MCNC: 13 MG/DL
CALCIUM SERPL-MCNC: 8.5 MG/DL
CHLORIDE BLD-SCNC: 103 MMOL/L
CO2: 30 MMOL/L
CREAT SERPL-MCNC: 0.9 MG/DL
GFR CALCULATED: 61
GLUCOSE BLD-MCNC: 66 MG/DL
POTASSIUM SERPL-SCNC: 4.8 MMOL/L
SODIUM BLD-SCNC: 142 MMOL/L

## 2019-10-02 ENCOUNTER — CARE COORDINATION (OUTPATIENT)
Dept: CARE COORDINATION | Age: 74
End: 2019-10-02

## 2019-10-03 ENCOUNTER — TELEPHONE (OUTPATIENT)
Dept: CARDIOLOGY CLINIC | Age: 74
End: 2019-10-03

## 2019-10-04 ENCOUNTER — HOSPITAL ENCOUNTER (OUTPATIENT)
Dept: GENERAL RADIOLOGY | Age: 74
Discharge: HOME | End: 2019-10-04
Payer: COMMERCIAL

## 2019-10-04 DIAGNOSIS — E13.49 OTHER DIABETIC NEUROLOGICAL COMPLICATION ASSOCIATED WITH OTHER SPECIFIED DIABETES MELLITUS (HCC): ICD-10-CM

## 2019-10-04 DIAGNOSIS — R13.10 DYSPHAGIA, UNSPECIFIED TYPE: ICD-10-CM

## 2019-10-04 DIAGNOSIS — R11.2 NAUSEA AND VOMITING, INTRACTABILITY OF VOMITING NOT SPECIFIED, UNSPECIFIED VOMITING TYPE: ICD-10-CM

## 2019-10-04 DIAGNOSIS — R13.13 PHARYNGEAL DYSPHAGIA: ICD-10-CM

## 2019-10-04 PROCEDURE — 6360000004 HC RX CONTRAST MEDICATION: Performed by: INTERNAL MEDICINE

## 2019-10-04 PROCEDURE — 74246 X-RAY XM UPR GI TRC 2CNTRST: CPT

## 2019-10-04 PROCEDURE — 6370000000 HC RX 637 (ALT 250 FOR IP): Performed by: INTERNAL MEDICINE

## 2019-10-04 PROCEDURE — A4641 RADIOPHARM DX AGENT NOC: HCPCS | Performed by: INTERNAL MEDICINE

## 2019-10-04 PROCEDURE — 74220 X-RAY XM ESOPHAGUS 1CNTRST: CPT

## 2019-10-04 PROCEDURE — 2500000003 HC RX 250 WO HCPCS: Performed by: INTERNAL MEDICINE

## 2019-10-04 RX ADMIN — BARIUM SULFATE 140 ML: 980 POWDER, FOR SUSPENSION ORAL at 08:39

## 2019-10-04 RX ADMIN — BARIUM SULFATE 1 TABLET: 700 TABLET ORAL at 08:40

## 2019-10-04 RX ADMIN — BARIUM SULFATE 70 ML: 0.6 SUSPENSION ORAL at 08:39

## 2019-10-04 RX ADMIN — ANTACID/ANTIFLATULENT 1 EACH: 380; 550; 10; 10 GRANULE, EFFERVESCENT ORAL at 08:40

## 2019-10-29 NOTE — PLAN OF CARE
Problem: Discharge Planning  Goal: Knowledge of discharge instructions  Description  Knowledge of discharge instructions     Outcome: Met This Shift  Note:   Verbalize understanding of discharge instructions, follow up appointments, and when to call Physician. Intervention: Discharge to appropriate level of care  Note:   Discuss understanding of discharge instructions, follow up appointments and when to call Physician. Problem: Falls - Risk of:  Goal: Will remain free from falls  Description  Will remain free from falls  Outcome: Met This Shift  Note:   Free from falls while in O.P. Oncology. Intervention: Assess risk factors for falls  Note:   Discussed the need to use the call light for assistance when getting up to ambulate. Problem: Infection - Central Venous Catheter-Associated Bloodstream Infection:  Goal: Will show no infection signs and symptoms  Description  Will show no infection signs and symptoms  Outcome: Met This Shift  Note:   Instructed to monitor for signs/symptoms of infection at medi-port site and call MD if problems develop. Intervention: Infection risk assessment  Note:   Mediport site with no redness or warmth. Skin over port site intact with no signs of breakdown noted. Patient verbalizes signs/symptoms of port infection and when to notify the physician. Care plan reviewed with patient and spouse. Patient and spouse verbalize understanding of the plan of care and contribute to goal setting.

## 2019-10-29 NOTE — PROGRESS NOTES
Patient tolerated lab draw via medi-port without any complications. Denies dizziness, lightheadedness, acute nausea or vomiting, headache, chest pain/pressure, rash/itching, or an increase in SOB. Last vital signs:   /65   Pulse 79   Temp 97.8 °F (36.6 °C) (Oral)   Resp 18   Ht 5' 2\" (1.575 m)   Wt 195 lb 9.6 oz (88.7 kg)   SpO2 96%   BMI 35.78 kg/m²     Patient instructed if they experience any of the above symptoms following today's visit she is to notify the physician immediately or go to the emergency department. Discharge instructions given to patient. Verbalizes understanding. Ambulated off unit per self accompanied by her spouse.

## 2019-11-05 PROBLEM — G62.0 POLYNEUROPATHY DUE TO DRUG (HCC): Status: ACTIVE | Noted: 2019-08-28

## 2020-01-01 ENCOUNTER — HOSPITAL ENCOUNTER (OUTPATIENT)
Dept: INFUSION THERAPY | Age: 75
Discharge: HOME | End: 2020-06-16
Payer: MEDICARE

## 2020-01-01 ENCOUNTER — HOSPITAL ENCOUNTER (OUTPATIENT)
Dept: RADIATION ONCOLOGY | Age: 75
Discharge: HOME | End: 2020-02-14
Attending: RADIOLOGY
Payer: MEDICARE

## 2020-01-01 ENCOUNTER — HOSPITAL ENCOUNTER (OUTPATIENT)
Dept: RADIATION ONCOLOGY | Age: 75
Discharge: HOME | End: 2020-03-16
Attending: RADIOLOGY
Payer: MEDICARE

## 2020-01-01 ENCOUNTER — CARE COORDINATION (OUTPATIENT)
Dept: CARE COORDINATION | Age: 75
End: 2020-01-01

## 2020-01-01 ENCOUNTER — TELEPHONE (OUTPATIENT)
Dept: ONCOLOGY | Age: 75
End: 2020-01-01

## 2020-01-01 ENCOUNTER — HOSPITAL ENCOUNTER (OUTPATIENT)
Dept: RADIATION ONCOLOGY | Age: 75
Discharge: HOME | End: 2020-03-19
Attending: RADIOLOGY
Payer: MEDICARE

## 2020-01-01 ENCOUNTER — HOSPITAL ENCOUNTER (OUTPATIENT)
Dept: RADIATION ONCOLOGY | Age: 75
Discharge: HOME | End: 2020-03-02
Attending: RADIOLOGY
Payer: MEDICARE

## 2020-01-01 ENCOUNTER — HOSPITAL ENCOUNTER (OUTPATIENT)
Dept: RADIATION ONCOLOGY | Age: 75
Discharge: HOME | End: 2020-01-28
Payer: MEDICARE

## 2020-01-01 ENCOUNTER — TELEPHONE (OUTPATIENT)
Dept: FAMILY MEDICINE CLINIC | Age: 75
End: 2020-01-01

## 2020-01-01 ENCOUNTER — TELEPHONE (OUTPATIENT)
Dept: CARDIOLOGY CLINIC | Age: 75
End: 2020-01-01

## 2020-01-01 ENCOUNTER — HOSPITAL ENCOUNTER (OUTPATIENT)
Dept: CT IMAGING | Age: 75
Discharge: HOME | End: 2020-01-29
Payer: MEDICARE

## 2020-01-01 ENCOUNTER — HOSPITAL ENCOUNTER (OUTPATIENT)
Dept: RADIATION ONCOLOGY | Age: 75
Discharge: HOME | End: 2020-02-27
Attending: RADIOLOGY
Payer: MEDICARE

## 2020-01-01 ENCOUNTER — OFFICE VISIT (OUTPATIENT)
Dept: SURGERY | Age: 75
End: 2020-01-01
Payer: MEDICARE

## 2020-01-01 ENCOUNTER — HOSPITAL ENCOUNTER (OUTPATIENT)
Dept: INFUSION THERAPY | Age: 75
Discharge: HOME | End: 2020-03-11
Payer: MEDICARE

## 2020-01-01 ENCOUNTER — HOSPITAL ENCOUNTER (OUTPATIENT)
Dept: CT IMAGING | Age: 75
Discharge: HOME | End: 2020-01-31
Payer: MEDICARE

## 2020-01-01 ENCOUNTER — OFFICE VISIT (OUTPATIENT)
Dept: CARDIOLOGY CLINIC | Age: 75
End: 2020-01-01
Payer: MEDICARE

## 2020-01-01 ENCOUNTER — HOSPITAL ENCOUNTER (OUTPATIENT)
Dept: INFUSION THERAPY | Age: 75
Discharge: HOME | End: 2020-08-24
Payer: MEDICARE

## 2020-01-01 ENCOUNTER — HOSPITAL ENCOUNTER (OUTPATIENT)
Dept: RADIATION ONCOLOGY | Age: 75
Discharge: HOME | End: 2020-01-27
Payer: MEDICARE

## 2020-01-01 ENCOUNTER — HOSPITAL ENCOUNTER (OUTPATIENT)
Dept: CT IMAGING | Age: 75
Discharge: HOME | End: 2020-08-26
Payer: MEDICARE

## 2020-01-01 ENCOUNTER — HOSPITAL ENCOUNTER (OUTPATIENT)
Dept: RADIATION ONCOLOGY | Age: 75
Discharge: HOME | End: 2020-02-10
Attending: RADIOLOGY
Payer: MEDICARE

## 2020-01-01 ENCOUNTER — HOSPITAL ENCOUNTER (OUTPATIENT)
Dept: INFUSION THERAPY | Age: 75
Discharge: HOME | End: 2020-08-28
Payer: MEDICARE

## 2020-01-01 ENCOUNTER — HOSPITAL ENCOUNTER (OUTPATIENT)
Dept: RADIATION ONCOLOGY | Age: 75
Discharge: HOME | End: 2020-03-20
Attending: RADIOLOGY
Payer: MEDICARE

## 2020-01-01 ENCOUNTER — OFFICE VISIT (OUTPATIENT)
Dept: ONCOLOGY | Age: 75
End: 2020-01-01
Payer: MEDICARE

## 2020-01-01 ENCOUNTER — HOSPITAL ENCOUNTER (OUTPATIENT)
Dept: RADIATION ONCOLOGY | Age: 75
Discharge: HOME | End: 2020-03-10
Attending: RADIOLOGY
Payer: MEDICARE

## 2020-01-01 ENCOUNTER — HOSPITAL ENCOUNTER (OUTPATIENT)
Dept: RADIATION ONCOLOGY | Age: 75
Discharge: HOME | End: 2020-02-26
Attending: RADIOLOGY
Payer: MEDICARE

## 2020-01-01 ENCOUNTER — HOSPITAL ENCOUNTER (OUTPATIENT)
Dept: RADIATION ONCOLOGY | Age: 75
Discharge: HOME | End: 2020-02-18
Attending: RADIOLOGY
Payer: MEDICARE

## 2020-01-01 ENCOUNTER — HOSPITAL ENCOUNTER (OUTPATIENT)
Dept: RADIATION ONCOLOGY | Age: 75
Discharge: HOME | End: 2020-03-13
Attending: RADIOLOGY
Payer: MEDICARE

## 2020-01-01 ENCOUNTER — HOSPITAL ENCOUNTER (OUTPATIENT)
Dept: ULTRASOUND IMAGING | Age: 75
Discharge: HOME | End: 2020-09-08
Payer: MEDICARE

## 2020-01-01 ENCOUNTER — HOSPITAL ENCOUNTER (OUTPATIENT)
Dept: RADIATION ONCOLOGY | Age: 75
Discharge: HOME | End: 2020-03-09
Attending: RADIOLOGY
Payer: MEDICARE

## 2020-01-01 ENCOUNTER — HOSPITAL ENCOUNTER (OUTPATIENT)
Dept: RADIATION ONCOLOGY | Age: 75
Discharge: HOME | End: 2020-02-17
Attending: RADIOLOGY
Payer: MEDICARE

## 2020-01-01 ENCOUNTER — HOSPITAL ENCOUNTER (OUTPATIENT)
Dept: CT IMAGING | Age: 75
Discharge: HOME | End: 2020-01-09
Payer: MEDICARE

## 2020-01-01 ENCOUNTER — HOSPITAL ENCOUNTER (OUTPATIENT)
Dept: WOMENS IMAGING | Age: 75
Discharge: HOME | End: 2020-07-01
Payer: MEDICARE

## 2020-01-01 ENCOUNTER — HOSPITAL ENCOUNTER (OUTPATIENT)
Dept: PET IMAGING | Age: 75
Discharge: HOME | End: 2020-06-22
Payer: MEDICARE

## 2020-01-01 ENCOUNTER — HOSPITAL ENCOUNTER (OUTPATIENT)
Dept: INFUSION THERAPY | Age: 75
End: 2020-01-01
Payer: MEDICARE

## 2020-01-01 ENCOUNTER — NURSE ONLY (OUTPATIENT)
Dept: CARDIOLOGY CLINIC | Age: 75
End: 2020-01-01
Payer: MEDICARE

## 2020-01-01 ENCOUNTER — HOSPITAL ENCOUNTER (OUTPATIENT)
Dept: RADIATION ONCOLOGY | Age: 75
Discharge: HOME | End: 2020-02-28
Attending: RADIOLOGY
Payer: MEDICARE

## 2020-01-01 ENCOUNTER — TELEPHONE (OUTPATIENT)
Dept: SPIRITUAL SERVICES | Facility: CLINIC | Age: 75
End: 2020-01-01

## 2020-01-01 ENCOUNTER — HOSPITAL ENCOUNTER (OUTPATIENT)
Dept: RADIATION ONCOLOGY | Age: 75
Discharge: HOME | End: 2020-01-29
Attending: RADIOLOGY
Payer: MEDICARE

## 2020-01-01 ENCOUNTER — HOSPITAL ENCOUNTER (OUTPATIENT)
Dept: RADIATION ONCOLOGY | Age: 75
Discharge: HOME | End: 2020-02-06
Attending: RADIOLOGY
Payer: MEDICARE

## 2020-01-01 ENCOUNTER — HOSPITAL ENCOUNTER (OUTPATIENT)
Dept: RADIATION ONCOLOGY | Age: 75
Discharge: HOME | End: 2020-02-24
Attending: RADIOLOGY
Payer: MEDICARE

## 2020-01-01 ENCOUNTER — HOSPITAL ENCOUNTER (OUTPATIENT)
Dept: RADIATION ONCOLOGY | Age: 75
Discharge: HOME | End: 2020-03-11
Attending: RADIOLOGY
Payer: MEDICARE

## 2020-01-01 ENCOUNTER — HOSPITAL ENCOUNTER (OUTPATIENT)
Dept: WOMENS IMAGING | Age: 75
Discharge: HOME | End: 2020-06-15
Payer: MEDICARE

## 2020-01-01 ENCOUNTER — HOSPITAL ENCOUNTER (EMERGENCY)
Age: 75
End: 2020-10-05
Attending: EMERGENCY MEDICINE
Payer: MEDICARE

## 2020-01-01 ENCOUNTER — HOSPITAL ENCOUNTER (OUTPATIENT)
Dept: CT IMAGING | Age: 75
Discharge: HOME | End: 2020-04-09
Payer: MEDICARE

## 2020-01-01 ENCOUNTER — HOSPITAL ENCOUNTER (OUTPATIENT)
Dept: GENERAL RADIOLOGY | Age: 75
Discharge: HOME | End: 2020-09-08
Payer: MEDICARE

## 2020-01-01 ENCOUNTER — OFFICE VISIT (OUTPATIENT)
Dept: FAMILY MEDICINE CLINIC | Age: 75
End: 2020-01-01
Payer: MEDICARE

## 2020-01-01 ENCOUNTER — APPOINTMENT (OUTPATIENT)
Dept: CT IMAGING | Age: 75
DRG: 291 | End: 2020-01-01
Payer: MEDICARE

## 2020-01-01 ENCOUNTER — HOSPITAL ENCOUNTER (OUTPATIENT)
Dept: RADIATION ONCOLOGY | Age: 75
Discharge: HOME | End: 2020-03-17
Attending: RADIOLOGY
Payer: MEDICARE

## 2020-01-01 ENCOUNTER — APPOINTMENT (OUTPATIENT)
Dept: GENERAL RADIOLOGY | Age: 75
DRG: 291 | End: 2020-01-01
Payer: MEDICARE

## 2020-01-01 ENCOUNTER — HOSPITAL ENCOUNTER (OUTPATIENT)
Dept: RADIATION ONCOLOGY | Age: 75
Discharge: HOME | End: 2020-02-21
Attending: RADIOLOGY
Payer: MEDICARE

## 2020-01-01 ENCOUNTER — HOSPITAL ENCOUNTER (OUTPATIENT)
Dept: RADIATION ONCOLOGY | Age: 75
Discharge: HOME | End: 2020-02-07
Attending: RADIOLOGY
Payer: MEDICARE

## 2020-01-01 ENCOUNTER — HOSPITAL ENCOUNTER (INPATIENT)
Age: 75
LOS: 9 days | Discharge: NURSING, REHAB OR LONG-TERM CARE | DRG: 291 | End: 2020-09-24
Attending: EMERGENCY MEDICINE | Admitting: INTERNAL MEDICINE
Payer: MEDICARE

## 2020-01-01 ENCOUNTER — HOSPITAL ENCOUNTER (OUTPATIENT)
Dept: RADIATION ONCOLOGY | Age: 75
Discharge: HOME | End: 2020-03-18
Attending: RADIOLOGY
Payer: MEDICARE

## 2020-01-01 ENCOUNTER — HOSPITAL ENCOUNTER (OUTPATIENT)
Dept: RADIATION ONCOLOGY | Age: 75
Discharge: HOME | End: 2020-02-19
Attending: RADIOLOGY
Payer: MEDICARE

## 2020-01-01 ENCOUNTER — OFFICE VISIT (OUTPATIENT)
Dept: SURGERY | Age: 75
End: 2020-01-01

## 2020-01-01 ENCOUNTER — HOSPITAL ENCOUNTER (OUTPATIENT)
Dept: GENERAL RADIOLOGY | Age: 75
End: 2020-01-01
Payer: MEDICARE

## 2020-01-01 ENCOUNTER — APPOINTMENT (OUTPATIENT)
Dept: RADIATION ONCOLOGY | Age: 75
End: 2020-01-01
Attending: RADIOLOGY
Payer: MEDICARE

## 2020-01-01 ENCOUNTER — HOSPITAL ENCOUNTER (OUTPATIENT)
Dept: RADIATION ONCOLOGY | Age: 75
Discharge: HOME | End: 2020-03-06
Attending: RADIOLOGY
Payer: MEDICARE

## 2020-01-01 ENCOUNTER — HOSPITAL ENCOUNTER (OUTPATIENT)
Dept: RADIATION ONCOLOGY | Age: 75
Discharge: HOME | End: 2020-03-03
Attending: RADIOLOGY
Payer: MEDICARE

## 2020-01-01 ENCOUNTER — HOSPITAL ENCOUNTER (OUTPATIENT)
Dept: RADIATION ONCOLOGY | Age: 75
Discharge: HOME | End: 2020-03-05
Attending: RADIOLOGY
Payer: MEDICARE

## 2020-01-01 ENCOUNTER — HOSPITAL ENCOUNTER (OUTPATIENT)
Dept: RADIATION ONCOLOGY | Age: 75
Discharge: HOME | End: 2020-03-23
Attending: RADIOLOGY
Payer: MEDICARE

## 2020-01-01 ENCOUNTER — HOSPITAL ENCOUNTER (OUTPATIENT)
Dept: CT IMAGING | Age: 75
Discharge: HOME | End: 2020-03-06
Payer: MEDICARE

## 2020-01-01 ENCOUNTER — HOSPITAL ENCOUNTER (OUTPATIENT)
Dept: RADIATION ONCOLOGY | Age: 75
Discharge: HOME | End: 2020-02-11
Attending: RADIOLOGY
Payer: MEDICARE

## 2020-01-01 ENCOUNTER — HOSPITAL ENCOUNTER (OUTPATIENT)
Age: 75
Discharge: HOME | End: 2020-06-03
Payer: MEDICARE

## 2020-01-01 ENCOUNTER — HOSPITAL ENCOUNTER (OUTPATIENT)
Dept: RADIATION ONCOLOGY | Age: 75
Discharge: HOME | End: 2020-02-12
Attending: RADIOLOGY
Payer: MEDICARE

## 2020-01-01 ENCOUNTER — HOSPITAL ENCOUNTER (OUTPATIENT)
Dept: RADIATION ONCOLOGY | Age: 75
Discharge: HOME | End: 2020-03-04
Attending: RADIOLOGY
Payer: MEDICARE

## 2020-01-01 ENCOUNTER — HOSPITAL ENCOUNTER (OUTPATIENT)
Dept: RADIATION ONCOLOGY | Age: 75
Discharge: HOME | End: 2020-03-12
Attending: RADIOLOGY
Payer: MEDICARE

## 2020-01-01 ENCOUNTER — APPOINTMENT (OUTPATIENT)
Dept: ULTRASOUND IMAGING | Age: 75
DRG: 291 | End: 2020-01-01
Payer: MEDICARE

## 2020-01-01 ENCOUNTER — HOSPITAL ENCOUNTER (OUTPATIENT)
Dept: RADIATION ONCOLOGY | Age: 75
Discharge: HOME | End: 2020-02-20
Attending: RADIOLOGY
Payer: MEDICARE

## 2020-01-01 ENCOUNTER — HOSPITAL ENCOUNTER (OUTPATIENT)
Dept: RADIATION ONCOLOGY | Age: 75
Discharge: HOME | End: 2020-02-25
Attending: RADIOLOGY
Payer: MEDICARE

## 2020-01-01 ENCOUNTER — HOSPITAL ENCOUNTER (OUTPATIENT)
Dept: RADIATION ONCOLOGY | Age: 75
Discharge: HOME | End: 2020-02-13
Attending: RADIOLOGY
Payer: MEDICARE

## 2020-01-01 VITALS
DIASTOLIC BLOOD PRESSURE: 64 MMHG | BODY MASS INDEX: 38.83 KG/M2 | HEIGHT: 62 IN | HEART RATE: 64 BPM | WEIGHT: 211 LBS | SYSTOLIC BLOOD PRESSURE: 134 MMHG

## 2020-01-01 VITALS
DIASTOLIC BLOOD PRESSURE: 59 MMHG | WEIGHT: 210.8 LBS | RESPIRATION RATE: 18 BRPM | BODY MASS INDEX: 38.79 KG/M2 | SYSTOLIC BLOOD PRESSURE: 122 MMHG | HEART RATE: 80 BPM | TEMPERATURE: 97.7 F | HEIGHT: 62 IN | OXYGEN SATURATION: 95 %

## 2020-01-01 VITALS
RESPIRATION RATE: 20 BRPM | HEART RATE: 78 BPM | BODY MASS INDEX: 38.81 KG/M2 | TEMPERATURE: 97.6 F | HEIGHT: 62 IN | DIASTOLIC BLOOD PRESSURE: 63 MMHG | SYSTOLIC BLOOD PRESSURE: 132 MMHG | WEIGHT: 210.9 LBS | OXYGEN SATURATION: 95 %

## 2020-01-01 VITALS
BODY MASS INDEX: 38.74 KG/M2 | OXYGEN SATURATION: 91 % | SYSTOLIC BLOOD PRESSURE: 120 MMHG | WEIGHT: 210.5 LBS | DIASTOLIC BLOOD PRESSURE: 80 MMHG | RESPIRATION RATE: 18 BRPM | TEMPERATURE: 97.9 F | HEART RATE: 80 BPM | HEIGHT: 62 IN

## 2020-01-01 VITALS
TEMPERATURE: 97.2 F | DIASTOLIC BLOOD PRESSURE: 64 MMHG | BODY MASS INDEX: 36.95 KG/M2 | HEART RATE: 86 BPM | OXYGEN SATURATION: 95 % | WEIGHT: 202 LBS | SYSTOLIC BLOOD PRESSURE: 112 MMHG | RESPIRATION RATE: 20 BRPM

## 2020-01-01 VITALS
HEIGHT: 62 IN | BODY MASS INDEX: 40.3 KG/M2 | SYSTOLIC BLOOD PRESSURE: 99 MMHG | DIASTOLIC BLOOD PRESSURE: 59 MMHG | TEMPERATURE: 69 F | OXYGEN SATURATION: 93 % | WEIGHT: 219 LBS

## 2020-01-01 VITALS
BODY MASS INDEX: 40.04 KG/M2 | DIASTOLIC BLOOD PRESSURE: 66 MMHG | HEIGHT: 62 IN | SYSTOLIC BLOOD PRESSURE: 106 MMHG | HEART RATE: 81 BPM | WEIGHT: 217.6 LBS

## 2020-01-01 VITALS
RESPIRATION RATE: 20 BRPM | TEMPERATURE: 97.7 F | HEART RATE: 81 BPM | DIASTOLIC BLOOD PRESSURE: 54 MMHG | WEIGHT: 208.11 LBS | OXYGEN SATURATION: 90 % | HEIGHT: 62 IN | SYSTOLIC BLOOD PRESSURE: 107 MMHG | BODY MASS INDEX: 38.3 KG/M2

## 2020-01-01 VITALS
HEIGHT: 62 IN | RESPIRATION RATE: 18 BRPM | DIASTOLIC BLOOD PRESSURE: 58 MMHG | HEART RATE: 70 BPM | TEMPERATURE: 97.5 F | SYSTOLIC BLOOD PRESSURE: 104 MMHG | WEIGHT: 198.2 LBS | OXYGEN SATURATION: 96 % | BODY MASS INDEX: 36.47 KG/M2

## 2020-01-01 VITALS
SYSTOLIC BLOOD PRESSURE: 156 MMHG | BODY MASS INDEX: 38.16 KG/M2 | HEIGHT: 62 IN | DIASTOLIC BLOOD PRESSURE: 90 MMHG | WEIGHT: 207.4 LBS | HEART RATE: 80 BPM

## 2020-01-01 VITALS
SYSTOLIC BLOOD PRESSURE: 103 MMHG | HEART RATE: 72 BPM | WEIGHT: 228 LBS | BODY MASS INDEX: 41.96 KG/M2 | OXYGEN SATURATION: 97 % | TEMPERATURE: 98.2 F | RESPIRATION RATE: 18 BRPM | DIASTOLIC BLOOD PRESSURE: 54 MMHG | HEIGHT: 62 IN

## 2020-01-01 VITALS
RESPIRATION RATE: 18 BRPM | OXYGEN SATURATION: 90 % | SYSTOLIC BLOOD PRESSURE: 103 MMHG | WEIGHT: 232.8 LBS | DIASTOLIC BLOOD PRESSURE: 55 MMHG | HEIGHT: 62 IN | BODY MASS INDEX: 42.84 KG/M2 | TEMPERATURE: 98.1 F | HEART RATE: 69 BPM

## 2020-01-01 VITALS
RESPIRATION RATE: 24 BRPM | OXYGEN SATURATION: 92 % | SYSTOLIC BLOOD PRESSURE: 124 MMHG | WEIGHT: 223 LBS | BODY MASS INDEX: 40.79 KG/M2 | TEMPERATURE: 97.5 F | HEART RATE: 70 BPM | DIASTOLIC BLOOD PRESSURE: 68 MMHG

## 2020-01-01 VITALS
SYSTOLIC BLOOD PRESSURE: 124 MMHG | RESPIRATION RATE: 20 BRPM | HEART RATE: 79 BPM | OXYGEN SATURATION: 94 % | DIASTOLIC BLOOD PRESSURE: 80 MMHG | TEMPERATURE: 97.2 F

## 2020-01-01 VITALS
RESPIRATION RATE: 18 BRPM | HEART RATE: 76 BPM | TEMPERATURE: 97.8 F | SYSTOLIC BLOOD PRESSURE: 119 MMHG | OXYGEN SATURATION: 97 % | DIASTOLIC BLOOD PRESSURE: 65 MMHG

## 2020-01-01 VITALS — OXYGEN SATURATION: 97 % | DIASTOLIC BLOOD PRESSURE: 38 MMHG | SYSTOLIC BLOOD PRESSURE: 61 MMHG

## 2020-01-01 VITALS
OXYGEN SATURATION: 94 % | HEART RATE: 77 BPM | BODY MASS INDEX: 42.55 KG/M2 | WEIGHT: 231.2 LBS | HEIGHT: 62 IN | SYSTOLIC BLOOD PRESSURE: 110 MMHG | DIASTOLIC BLOOD PRESSURE: 54 MMHG

## 2020-01-01 VITALS
DIASTOLIC BLOOD PRESSURE: 82 MMHG | OXYGEN SATURATION: 85 % | HEIGHT: 62 IN | SYSTOLIC BLOOD PRESSURE: 132 MMHG | WEIGHT: 206.2 LBS | BODY MASS INDEX: 37.94 KG/M2 | HEART RATE: 79 BPM

## 2020-01-01 VITALS
DIASTOLIC BLOOD PRESSURE: 60 MMHG | HEART RATE: 83 BPM | SYSTOLIC BLOOD PRESSURE: 103 MMHG | HEIGHT: 62 IN | WEIGHT: 228.8 LBS | BODY MASS INDEX: 42.1 KG/M2

## 2020-01-01 DIAGNOSIS — T45.1X5A ANEMIA ASSOCIATED WITH CHEMOTHERAPY: Primary | ICD-10-CM

## 2020-01-01 DIAGNOSIS — J90 PLEURAL EFFUSION: ICD-10-CM

## 2020-01-01 DIAGNOSIS — C78.00 MALIGNANT NEOPLASM METASTATIC TO LUNG, UNSPECIFIED LATERALITY (HCC): ICD-10-CM

## 2020-01-01 DIAGNOSIS — R06.02 SOB (SHORTNESS OF BREATH): ICD-10-CM

## 2020-01-01 DIAGNOSIS — D64.81 ANEMIA ASSOCIATED WITH CHEMOTHERAPY: Primary | ICD-10-CM

## 2020-01-01 DIAGNOSIS — I50.22 CHF (CONGESTIVE HEART FAILURE), NYHA CLASS II, CHRONIC, SYSTOLIC (HCC): ICD-10-CM

## 2020-01-01 DIAGNOSIS — C77.3 BREAST CANCER METASTASIZED TO AXILLARY LYMPH NODE, RIGHT (HCC): ICD-10-CM

## 2020-01-01 DIAGNOSIS — R91.8 LUNG NODULES: ICD-10-CM

## 2020-01-01 DIAGNOSIS — C50.911 BREAST CANCER METASTASIZED TO AXILLARY LYMPH NODE, RIGHT (HCC): ICD-10-CM

## 2020-01-01 LAB
ABO CHECK: NORMAL
ALBUMIN SERPL-MCNC: 3.4 G/DL (ref 3.5–5.1)
ALBUMIN SERPL-MCNC: 3.8 G/DL (ref 3.5–5.1)
ALLEN TEST: ABNORMAL
ALLEN TEST: POSITIVE
ALP BLD-CCNC: 103 U/L (ref 38–126)
ALP BLD-CCNC: 112 U/L (ref 38–126)
ALT SERPL-CCNC: 20 U/L (ref 11–66)
ALT SERPL-CCNC: 21 U/L (ref 11–66)
AMMONIA: 49 UMOL/L (ref 11–60)
ANAEROBIC CULTURE: NORMAL
ANION GAP SERPL CALCULATED.3IONS-SCNC: 11 MEQ/L (ref 8–16)
ANION GAP SERPL CALCULATED.3IONS-SCNC: 12 MEQ/L (ref 8–16)
ANION GAP SERPL CALCULATED.3IONS-SCNC: 5 MEQ/L (ref 8–16)
ANION GAP SERPL CALCULATED.3IONS-SCNC: 5 MEQ/L (ref 8–16)
ANION GAP SERPL CALCULATED.3IONS-SCNC: 7 MEQ/L (ref 8–16)
ANION GAP SERPL CALCULATED.3IONS-SCNC: 9 MEQ/L (ref 8–16)
ANISOCYTOSIS: PRESENT
ANTIBODY SCREEN: NORMAL
AST SERPL-CCNC: 25 U/L (ref 5–40)
AST SERPL-CCNC: 27 U/L (ref 5–40)
AVERAGE GLUCOSE: 102 MG/DL (ref 70–126)
BACTERIA: ABNORMAL
BACTERIA: NORMAL
BASE EXCESS (CALCULATED): 11.1 MMOL/L (ref -2.5–2.5)
BASE EXCESS (CALCULATED): 9.7 MMOL/L (ref -2.5–2.5)
BASOPHILIA: ABNORMAL
BASOPHILIA: ABNORMAL
BASOPHILS # BLD: 0.4 %
BASOPHILS # BLD: 0.5 %
BASOPHILS # BLD: 0.7 %
BASOPHILS # BLD: 0.7 %
BASOPHILS # BLD: 0.8 %
BASOPHILS # BLD: 1 %
BASOPHILS ABSOLUTE: 0 THOU/MM3 (ref 0–0.1)
BASOPHILS ABSOLUTE: 0 THOU/MM3 (ref 0–0.1)
BASOPHILS ABSOLUTE: 0.1 THOU/MM3 (ref 0–0.1)
BILIRUB SERPL-MCNC: 0.4 MG/DL (ref 0.3–1.2)
BILIRUB SERPL-MCNC: 0.5 MG/DL (ref 0.3–1.2)
BILIRUBIN DIRECT: < 0.2 MG/DL (ref 0–0.3)
BILIRUBIN URINE: NEGATIVE
BILIRUBIN URINE: NEGATIVE
BLOOD CULTURE, ROUTINE: NORMAL
BLOOD, URINE: ABNORMAL
BLOOD, URINE: NEGATIVE
BODY FLUID CULTURE, STERILE: NORMAL
BODY FLUID RBC: ABNORMAL /CUMM
BUN BLDV-MCNC: 26 MG/DL (ref 7–22)
BUN BLDV-MCNC: 29 MG/DL (ref 7–22)
BUN BLDV-MCNC: 30 MG/DL (ref 7–22)
BUN BLDV-MCNC: 32 MG/DL (ref 7–22)
BUN BLDV-MCNC: 32 MG/DL (ref 7–22)
BUN BLDV-MCNC: 33 MG/DL (ref 7–22)
BUN BLDV-MCNC: 34 MG/DL (ref 7–22)
BUN BLDV-MCNC: 36 MG/DL (ref 7–22)
BUN BLDV-MCNC: 36 MG/DL (ref 7–22)
BUN BLDV-MCNC: 39 MG/DL (ref 7–22)
BUN BLDV-MCNC: 42 MG/DL (ref 7–22)
BUN BLDV-MCNC: 43 MG/DL (ref 7–22)
BUN BLDV-MCNC: 43 MG/DL (ref 7–22)
CALCIUM SERPL-MCNC: 8.4 MG/DL (ref 8.5–10.5)
CALCIUM SERPL-MCNC: 8.5 MG/DL (ref 8.5–10.5)
CALCIUM SERPL-MCNC: 8.5 MG/DL (ref 8.5–10.5)
CALCIUM SERPL-MCNC: 8.6 MG/DL (ref 8.5–10.5)
CALCIUM SERPL-MCNC: 8.6 MG/DL (ref 8.5–10.5)
CALCIUM SERPL-MCNC: 8.7 MG/DL (ref 8.5–10.5)
CALCIUM SERPL-MCNC: 9 MG/DL (ref 8.5–10.5)
CALCIUM SERPL-MCNC: 9 MG/DL (ref 8.5–10.5)
CALCIUM SERPL-MCNC: 9.1 MG/DL (ref 8.5–10.5)
CASTS: ABNORMAL /LPF
CASTS: ABNORMAL /LPF
CASTS: NORMAL /LPF
CASTS: NORMAL /LPF
CHARACTER, BODY FLUID: ABNORMAL
CHARACTER, URINE: CLEAR
CHARACTER, URINE: CLEAR
CHLORIDE BLD-SCNC: 100 MEQ/L (ref 98–111)
CHLORIDE BLD-SCNC: 101 MEQ/L (ref 98–111)
CHLORIDE BLD-SCNC: 102 MEQ/L (ref 98–111)
CHLORIDE BLD-SCNC: 102 MEQ/L (ref 98–111)
CHLORIDE BLD-SCNC: 103 MEQ/L (ref 98–111)
CHLORIDE BLD-SCNC: 104 MEQ/L (ref 98–111)
CHLORIDE BLD-SCNC: 105 MEQ/L (ref 98–111)
CHLORIDE BLD-SCNC: 106 MEQ/L (ref 98–111)
CHLORIDE BLD-SCNC: 109 MEQ/L (ref 98–111)
CHLORIDE BLD-SCNC: 95 MEQ/L (ref 98–111)
CHLORIDE BLD-SCNC: 95 MEQ/L (ref 98–111)
CHLORIDE BLD-SCNC: 96 MEQ/L (ref 98–111)
CHLORIDE BLD-SCNC: 99 MEQ/L (ref 98–111)
CHLORIDE, WHOLE BLOOD: 107 MEQ/L (ref 98–109)
CHLORIDE, WHOLE BLOOD: 111 MEQ/L (ref 98–109)
CHOLESTEROL, TOTAL: 83 MG/DL (ref 100–199)
CO2: 27 MEQ/L (ref 23–33)
CO2: 28 MEQ/L (ref 23–33)
CO2: 29 MEQ/L (ref 23–33)
CO2: 31 MEQ/L (ref 23–33)
CO2: 31 MEQ/L (ref 23–33)
CO2: 32 MEQ/L (ref 23–33)
CO2: 33 MEQ/L (ref 23–33)
CO2: 33 MEQ/L (ref 23–33)
CO2: 34 MEQ/L (ref 23–33)
CO2: 36 MEQ/L (ref 23–33)
CO2: 38 MEQ/L (ref 23–33)
CO2: 38 MEQ/L (ref 23–33)
CO2: 42 MEQ/L (ref 23–33)
COLLECTED BY:: ABNORMAL
COLLECTED BY:: ABNORMAL
COLOR: ABNORMAL
COLOR: YELLOW
COLOR: YELLOW
CREAT SERPL-MCNC: 0.9 MG/DL (ref 0.4–1.2)
CREAT SERPL-MCNC: 1.2 MG/DL (ref 0.4–1.2)
CREAT SERPL-MCNC: 1.3 MG/DL (ref 0.4–1.2)
CREAT SERPL-MCNC: 1.4 MG/DL (ref 0.4–1.2)
CREAT SERPL-MCNC: 1.4 MG/DL (ref 0.4–1.2)
CREAT SERPL-MCNC: 1.6 MG/DL (ref 0.4–1.2)
CREAT SERPL-MCNC: 1.7 MG/DL (ref 0.4–1.2)
CREAT SERPL-MCNC: 1.8 MG/DL (ref 0.4–1.2)
CREATININE, WHOLE BLOOD: 1.4 MG/DL (ref 0.5–1.2)
CREATININE, WHOLE BLOOD: 1.8 MG/DL (ref 0.5–1.2)
CRYSTALS: ABNORMAL
CRYSTALS: NORMAL
DEVICE: ABNORMAL
DEVICE: ABNORMAL
EKG ATRIAL RATE: 63 BPM
EKG ATRIAL RATE: 72 BPM
EKG P-R INTERVAL: 184 MS
EKG Q-T INTERVAL: 494 MS
EKG Q-T INTERVAL: 502 MS
EKG QRS DURATION: 204 MS
EKG QRS DURATION: 216 MS
EKG QTC CALCULATION (BAZETT): 557 MS
EKG QTC CALCULATION (BAZETT): 591 MS
EKG R AXIS: -74 DEGREES
EKG R AXIS: -74 DEGREES
EKG T AXIS: 102 DEGREES
EKG T AXIS: 103 DEGREES
EKG VENTRICULAR RATE: 74 BPM
EKG VENTRICULAR RATE: 86 BPM
EOSINOPHIL # BLD: 0.6 %
EOSINOPHIL # BLD: 1.6 %
EOSINOPHIL # BLD: 1.7 %
EOSINOPHIL # BLD: 1.8 %
EOSINOPHIL # BLD: 2.5 %
EOSINOPHIL # BLD: 2.7 %
EOSINOPHILS ABSOLUTE: 0.1 THOU/MM3 (ref 0–0.4)
EOSINOPHILS ABSOLUTE: 0.3 THOU/MM3 (ref 0–0.4)
EPITHELIAL CELLS, UA: ABNORMAL /HPF
EPITHELIAL CELLS, UA: NORMAL /HPF
ERYTHROCYTE [DISTWIDTH] IN BLOOD BY AUTOMATED COUNT: 18.6 % (ref 11.5–14.5)
ERYTHROCYTE [DISTWIDTH] IN BLOOD BY AUTOMATED COUNT: 19.9 % (ref 11.5–14.5)
ERYTHROCYTE [DISTWIDTH] IN BLOOD BY AUTOMATED COUNT: 21.5 % (ref 11.5–14.5)
ERYTHROCYTE [DISTWIDTH] IN BLOOD BY AUTOMATED COUNT: 22.1 % (ref 11.5–14.5)
ERYTHROCYTE [DISTWIDTH] IN BLOOD BY AUTOMATED COUNT: 22.3 % (ref 11.5–14.5)
ERYTHROCYTE [DISTWIDTH] IN BLOOD BY AUTOMATED COUNT: 22.5 % (ref 11.5–14.5)
ERYTHROCYTE [DISTWIDTH] IN BLOOD BY AUTOMATED COUNT: 23.1 % (ref 11.5–14.5)
ERYTHROCYTE [DISTWIDTH] IN BLOOD BY AUTOMATED COUNT: 23.3 % (ref 11.5–14.5)
ERYTHROCYTE [DISTWIDTH] IN BLOOD BY AUTOMATED COUNT: 23.4 % (ref 11.5–14.5)
ERYTHROCYTE [DISTWIDTH] IN BLOOD BY AUTOMATED COUNT: 23.8 % (ref 11.5–14.5)
ERYTHROCYTE [DISTWIDTH] IN BLOOD BY AUTOMATED COUNT: 25 % (ref 11.5–14.5)
ERYTHROCYTE [DISTWIDTH] IN BLOOD BY AUTOMATED COUNT: 25.3 % (ref 11.5–14.5)
ERYTHROCYTE [DISTWIDTH] IN BLOOD BY AUTOMATED COUNT: 25.6 % (ref 11.5–14.5)
ERYTHROCYTE [DISTWIDTH] IN BLOOD BY AUTOMATED COUNT: 54.2 FL (ref 35–45)
ERYTHROCYTE [DISTWIDTH] IN BLOOD BY AUTOMATED COUNT: 58.7 FL (ref 35–45)
ERYTHROCYTE [DISTWIDTH] IN BLOOD BY AUTOMATED COUNT: 59.3 FL (ref 35–45)
ERYTHROCYTE [DISTWIDTH] IN BLOOD BY AUTOMATED COUNT: 60.6 FL (ref 35–45)
ERYTHROCYTE [DISTWIDTH] IN BLOOD BY AUTOMATED COUNT: 60.8 FL (ref 35–45)
ERYTHROCYTE [DISTWIDTH] IN BLOOD BY AUTOMATED COUNT: 60.9 FL (ref 35–45)
ERYTHROCYTE [DISTWIDTH] IN BLOOD BY AUTOMATED COUNT: 61 FL (ref 35–45)
ERYTHROCYTE [DISTWIDTH] IN BLOOD BY AUTOMATED COUNT: 61.6 FL (ref 35–45)
ERYTHROCYTE [DISTWIDTH] IN BLOOD BY AUTOMATED COUNT: 62.3 FL (ref 35–45)
ERYTHROCYTE [DISTWIDTH] IN BLOOD BY AUTOMATED COUNT: 62.6 FL (ref 35–45)
ERYTHROCYTE [DISTWIDTH] IN BLOOD BY AUTOMATED COUNT: 63.4 FL (ref 35–45)
ERYTHROCYTE [DISTWIDTH] IN BLOOD BY AUTOMATED COUNT: 66.4 FL (ref 35–45)
ERYTHROCYTE [DISTWIDTH] IN BLOOD BY AUTOMATED COUNT: 67.3 FL (ref 35–45)
FOLATE: 3.8 NG/ML (ref 4.8–24.2)
GFR SERPL CREATININE-BSD FRML MDRD: 27 ML/MIN/1.73M2
GFR SERPL CREATININE-BSD FRML MDRD: 29 ML/MIN/1.73M2
GFR SERPL CREATININE-BSD FRML MDRD: 31 ML/MIN/1.73M2
GFR SERPL CREATININE-BSD FRML MDRD: 36 ML/MIN/1.73M2
GFR SERPL CREATININE-BSD FRML MDRD: 36 ML/MIN/1.73M2
GFR SERPL CREATININE-BSD FRML MDRD: 40 ML/MIN/1.73M2
GFR SERPL CREATININE-BSD FRML MDRD: 44 ML/MIN/1.73M2
GFR SERPL CREATININE-BSD FRML MDRD: 61 ML/MIN/1.73M2
GFR, ESTIMATED: 29 ML/MIN/1.73M2
GFR, ESTIMATED: 39 ML/MIN/1.73M2
GLUCOSE BLD-MCNC: 106 MG/DL (ref 70–108)
GLUCOSE BLD-MCNC: 111 MG/DL (ref 70–108)
GLUCOSE BLD-MCNC: 115 MG/DL (ref 70–108)
GLUCOSE BLD-MCNC: 123 MG/DL (ref 70–108)
GLUCOSE BLD-MCNC: 125 MG/DL (ref 70–108)
GLUCOSE BLD-MCNC: 129 MG/DL (ref 70–108)
GLUCOSE BLD-MCNC: 130 MG/DL (ref 70–108)
GLUCOSE BLD-MCNC: 132 MG/DL (ref 70–108)
GLUCOSE BLD-MCNC: 133 MG/DL (ref 70–108)
GLUCOSE BLD-MCNC: 140 MG/DL (ref 70–108)
GLUCOSE BLD-MCNC: 145 MG/DL (ref 70–108)
GLUCOSE BLD-MCNC: 150 MG/DL (ref 70–108)
GLUCOSE BLD-MCNC: 153 MG/DL (ref 70–108)
GLUCOSE BLD-MCNC: 154 MG/DL (ref 70–108)
GLUCOSE BLD-MCNC: 157 MG/DL (ref 70–108)
GLUCOSE BLD-MCNC: 158 MG/DL (ref 70–108)
GLUCOSE BLD-MCNC: 158 MG/DL (ref 70–108)
GLUCOSE BLD-MCNC: 166 MG/DL (ref 70–108)
GLUCOSE BLD-MCNC: 171 MG/DL (ref 70–108)
GLUCOSE BLD-MCNC: 174 MG/DL (ref 70–108)
GLUCOSE BLD-MCNC: 175 MG/DL (ref 70–108)
GLUCOSE BLD-MCNC: 179 MG/DL (ref 70–108)
GLUCOSE BLD-MCNC: 181 MG/DL (ref 70–108)
GLUCOSE BLD-MCNC: 185 MG/DL (ref 70–108)
GLUCOSE BLD-MCNC: 187 MG/DL (ref 70–108)
GLUCOSE BLD-MCNC: 188 MG/DL (ref 70–108)
GLUCOSE BLD-MCNC: 194 MG/DL (ref 70–108)
GLUCOSE BLD-MCNC: 204 MG/DL (ref 70–108)
GLUCOSE BLD-MCNC: 205 MG/DL (ref 70–108)
GLUCOSE BLD-MCNC: 206 MG/DL (ref 70–108)
GLUCOSE BLD-MCNC: 207 MG/DL (ref 70–108)
GLUCOSE BLD-MCNC: 211 MG/DL (ref 70–108)
GLUCOSE BLD-MCNC: 217 MG/DL (ref 70–108)
GLUCOSE BLD-MCNC: 221 MG/DL (ref 70–108)
GLUCOSE BLD-MCNC: 225 MG/DL (ref 70–108)
GLUCOSE BLD-MCNC: 235 MG/DL (ref 70–108)
GLUCOSE BLD-MCNC: 235 MG/DL (ref 70–108)
GLUCOSE BLD-MCNC: 259 MG/DL (ref 70–108)
GLUCOSE BLD-MCNC: 260 MG/DL (ref 70–108)
GLUCOSE BLD-MCNC: 264 MG/DL (ref 70–108)
GLUCOSE BLD-MCNC: 484 MG/DL (ref 70–108)
GLUCOSE BLD-MCNC: 72 MG/DL (ref 70–108)
GLUCOSE BLD-MCNC: 80 MG/DL (ref 70–108)
GLUCOSE BLD-MCNC: 82 MG/DL (ref 70–108)
GLUCOSE BLD-MCNC: 95 MG/DL (ref 70–108)
GLUCOSE BLD-MCNC: 95 MG/DL (ref 70–108)
GLUCOSE, FLUID: 115 MG/DL
GLUCOSE, URINE: NEGATIVE MG/DL
GLUCOSE, URINE: NEGATIVE MG/DL
GLUCOSE, WHOLE BLOOD: 65 MG/DL (ref 70–108)
GLUCOSE, WHOLE BLOOD: 91 MG/DL (ref 70–108)
GRAM STAIN RESULT: NORMAL
HBA1C MFR BLD: 5.4 % (ref 4.4–6.4)
HBA1C MFR BLD: 6.2 %
HCO3: 37 MMOL/L (ref 23–28)
HCO3: 38 MMOL/L (ref 23–28)
HCT VFR BLD CALC: 26.2 % (ref 37–47)
HCT VFR BLD CALC: 27 % (ref 37–47)
HCT VFR BLD CALC: 27.4 % (ref 37–47)
HCT VFR BLD CALC: 27.5 % (ref 37–47)
HCT VFR BLD CALC: 27.6 % (ref 37–47)
HCT VFR BLD CALC: 28.2 % (ref 37–47)
HCT VFR BLD CALC: 28.6 % (ref 37–47)
HCT VFR BLD CALC: 29 % (ref 37–47)
HCT VFR BLD CALC: 29.8 % (ref 37–47)
HCT VFR BLD CALC: 30.3 % (ref 37–47)
HCT VFR BLD CALC: 31 % (ref 37–47)
HCT VFR BLD CALC: 31.3 % (ref 37–47)
HCT VFR BLD CALC: 31.7 % (ref 37–47)
HCT VFR BLD CALC: 34.5 % (ref 37–47)
HDLC SERPL-MCNC: 40 MG/DL
HEMOGLOBIN: 7.4 GM/DL (ref 12–16)
HEMOGLOBIN: 7.6 GM/DL (ref 12–16)
HEMOGLOBIN: 7.7 GM/DL (ref 12–16)
HEMOGLOBIN: 7.7 GM/DL (ref 12–16)
HEMOGLOBIN: 7.8 GM/DL (ref 12–16)
HEMOGLOBIN: 7.9 GM/DL (ref 12–16)
HEMOGLOBIN: 8 GM/DL (ref 12–16)
HEMOGLOBIN: 8.3 GM/DL (ref 12–16)
HEMOGLOBIN: 8.5 GM/DL (ref 12–16)
HEMOGLOBIN: 8.6 GM/DL (ref 12–16)
HEMOGLOBIN: 8.6 GM/DL (ref 12–16)
HEMOGLOBIN: 8.8 GM/DL (ref 12–16)
HEMOGLOBIN: 9.2 GM/DL (ref 12–16)
HEMOGLOBIN: 9.3 GM/DL (ref 12–16)
HYPOCHROMIA: PRESENT
IFIO2: 40
IFIO2: 6
IMMATURE GRANS (ABS): 0.03 THOU/MM3 (ref 0–0.07)
IMMATURE GRANS (ABS): 0.03 THOU/MM3 (ref 0–0.07)
IMMATURE GRANS (ABS): 0.06 THOU/MM3 (ref 0–0.07)
IMMATURE GRANS (ABS): 0.09 THOU/MM3 (ref 0–0.07)
IMMATURE GRANS (ABS): 0.13 THOU/MM3 (ref 0–0.07)
IMMATURE GRANS (ABS): 0.48 THOU/MM3 (ref 0–0.07)
IMMATURE GRANULOCYTES: 0.4 %
IMMATURE GRANULOCYTES: 0.5 %
IMMATURE GRANULOCYTES: 0.6 %
IMMATURE GRANULOCYTES: 0.7 %
IMMATURE GRANULOCYTES: 1.3 %
IMMATURE GRANULOCYTES: 6.7 %
IONIZED CALCIUM, WHOLE BLOOD: 1.09 MMOL/L (ref 1.12–1.32)
IONIZED CALCIUM, WHOLE BLOOD: 1.13 MMOL/L (ref 1.12–1.32)
IRON: 32 UG/DL (ref 50–170)
KETONES, URINE: NEGATIVE
KETONES, URINE: NEGATIVE
LD, FLUID: 101 U/L
LD: 207 U/L (ref 100–190)
LDL CHOLESTEROL CALCULATED: 27 MG/DL
LEGIONELLA PNEUMOPHILIA AG, URINE: NEGATIVE
LEUKOCYTE ESTERASE, URINE: NEGATIVE
LEUKOCYTE ESTERASE, URINE: NEGATIVE
LYMPHOCYTES # BLD: 2.3 %
LYMPHOCYTES # BLD: 6 %
LYMPHOCYTES # BLD: 6.4 %
LYMPHOCYTES # BLD: 7.9 %
LYMPHOCYTES # BLD: 8.5 %
LYMPHOCYTES # BLD: 8.8 %
LYMPHOCYTES ABSOLUTE: 0.3 THOU/MM3 (ref 1–4.8)
LYMPHOCYTES ABSOLUTE: 0.4 THOU/MM3 (ref 1–4.8)
LYMPHOCYTES ABSOLUTE: 0.5 THOU/MM3 (ref 1–4.8)
LYMPHOCYTES ABSOLUTE: 0.5 THOU/MM3 (ref 1–4.8)
LYMPHOCYTES ABSOLUTE: 0.6 THOU/MM3 (ref 1–4.8)
LYMPHOCYTES ABSOLUTE: 0.7 THOU/MM3 (ref 1–4.8)
MAGNESIUM: 1.8 MG/DL (ref 1.6–2.4)
MAGNESIUM: 1.9 MG/DL (ref 1.6–2.4)
MAGNESIUM: 2 MG/DL (ref 1.6–2.4)
MAGNESIUM: 2 MG/DL (ref 1.6–2.4)
MCH RBC QN AUTO: 21.2 PG (ref 26–33)
MCH RBC QN AUTO: 21.3 PG (ref 26–33)
MCH RBC QN AUTO: 21.5 PG (ref 26–33)
MCH RBC QN AUTO: 21.6 PG (ref 26–33)
MCH RBC QN AUTO: 21.7 PG (ref 26–33)
MCH RBC QN AUTO: 21.7 PG (ref 26–33)
MCH RBC QN AUTO: 21.8 PG (ref 26–33)
MCH RBC QN AUTO: 21.8 PG (ref 26–33)
MCH RBC QN AUTO: 22.3 PG (ref 26–33)
MCH RBC QN AUTO: 22.4 PG (ref 26–33)
MCH RBC QN AUTO: 26.4 PG (ref 26–33)
MCHC RBC AUTO-ENTMCNC: 26.7 GM/DL (ref 32.2–35.5)
MCHC RBC AUTO-ENTMCNC: 27.5 GM/DL (ref 32.2–35.5)
MCHC RBC AUTO-ENTMCNC: 27.7 GM/DL (ref 32.2–35.5)
MCHC RBC AUTO-ENTMCNC: 28 GM/DL (ref 32.2–35.5)
MCHC RBC AUTO-ENTMCNC: 28 GM/DL (ref 32.2–35.5)
MCHC RBC AUTO-ENTMCNC: 28.1 GM/DL (ref 32.2–35.5)
MCHC RBC AUTO-ENTMCNC: 28.1 GM/DL (ref 32.2–35.5)
MCHC RBC AUTO-ENTMCNC: 28.2 GM/DL (ref 32.2–35.5)
MCHC RBC AUTO-ENTMCNC: 28.4 GM/DL (ref 32.2–35.5)
MCHC RBC AUTO-ENTMCNC: 28.4 GM/DL (ref 32.2–35.5)
MCHC RBC AUTO-ENTMCNC: 28.5 GM/DL (ref 32.2–35.5)
MCHC RBC AUTO-ENTMCNC: 29.3 GM/DL (ref 32.2–35.5)
MCHC RBC AUTO-ENTMCNC: 29.3 GM/DL (ref 32.2–35.5)
MCV RBC AUTO: 76.1 FL (ref 81–99)
MCV RBC AUTO: 76.2 FL (ref 81–99)
MCV RBC AUTO: 76.3 FL (ref 81–99)
MCV RBC AUTO: 76.5 FL (ref 81–99)
MCV RBC AUTO: 76.8 FL (ref 81–99)
MCV RBC AUTO: 77.1 FL (ref 81–99)
MCV RBC AUTO: 77.1 FL (ref 81–99)
MCV RBC AUTO: 77.3 FL (ref 81–99)
MCV RBC AUTO: 77.3 FL (ref 81–99)
MCV RBC AUTO: 77.6 FL (ref 81–99)
MCV RBC AUTO: 78.7 FL (ref 81–99)
MCV RBC AUTO: 81.2 FL (ref 81–99)
MCV RBC AUTO: 90.1 FL (ref 81–99)
MISCELLANEOUS LAB TEST RESULT: ABNORMAL
MISCELLANEOUS LAB TEST RESULT: NORMAL
MONOCYTES # BLD: 10.5 %
MONOCYTES # BLD: 6.6 %
MONOCYTES # BLD: 7.6 %
MONOCYTES # BLD: 7.9 %
MONOCYTES # BLD: 9 %
MONOCYTES # BLD: 9.8 %
MONOCYTES ABSOLUTE: 0.5 THOU/MM3 (ref 0.4–1.3)
MONOCYTES ABSOLUTE: 0.5 THOU/MM3 (ref 0.4–1.3)
MONOCYTES ABSOLUTE: 0.6 THOU/MM3 (ref 0.4–1.3)
MONOCYTES ABSOLUTE: 0.7 THOU/MM3 (ref 0.4–1.3)
MONOCYTES ABSOLUTE: 1 THOU/MM3 (ref 0.4–1.3)
MONOCYTES ABSOLUTE: 1.1 THOU/MM3 (ref 0.4–1.3)
MONONUCLEAR CELLS BODY FLUID: 96.8 %
MRSA SCREEN RT-PCR: NEGATIVE
MRSA SCREEN: NORMAL
NITRITE, URINE: NEGATIVE
NITRITE, URINE: NEGATIVE
NUCLEATED RED BLOOD CELLS: 0 /100 WBC
O2 SATURATION: 84 %
O2 SATURATION: 93 %
PATHOLOGIST REVIEW: ABNORMAL
PCO2: 63 MMHG (ref 35–45)
PCO2: 63 MMHG (ref 35–45)
PH BLOOD GAS: 7.37 (ref 7.35–7.45)
PH BLOOD GAS: 7.38 (ref 7.35–7.45)
PH FLUID: 7.46
PH UA: 6 (ref 5–9)
PH UA: 7.5 (ref 5–9)
PLATELET # BLD: 100 THOU/MM3 (ref 130–400)
PLATELET # BLD: 103 THOU/MM3 (ref 130–400)
PLATELET # BLD: 104 THOU/MM3 (ref 130–400)
PLATELET # BLD: 109 THOU/MM3 (ref 130–400)
PLATELET # BLD: 109 THOU/MM3 (ref 130–400)
PLATELET # BLD: 114 THOU/MM3 (ref 130–400)
PLATELET # BLD: 115 THOU/MM3 (ref 130–400)
PLATELET # BLD: 122 THOU/MM3 (ref 130–400)
PLATELET # BLD: 127 THOU/MM3 (ref 130–400)
PLATELET # BLD: 137 THOU/MM3 (ref 130–400)
PLATELET # BLD: 147 THOU/MM3 (ref 130–400)
PLATELET # BLD: 157 THOU/MM3 (ref 130–400)
PLATELET # BLD: 162 THOU/MM3 (ref 130–400)
PLATELET ESTIMATE: ADEQUATE
PLATELET ESTIMATE: ADEQUATE
PMV BLD AUTO: 12.6 FL (ref 9.4–12.4)
PMV BLD AUTO: ABNORMAL FL (ref 9.4–12.4)
PO2: 51 MMHG (ref 71–104)
PO2: 70 MMHG (ref 71–104)
POC CREATININE WHOLE BLOOD: 0.9 MG/DL (ref 0.5–1.2)
POC CREATININE WHOLE BLOOD: 1 MG/DL (ref 0.5–1.2)
POIKILOCYTES: ABNORMAL
POIKILOCYTES: ABNORMAL
POLYMORPHONUCLEAR CELLS BODY FLUID: 3.2 %
POTASSIUM SERPL-SCNC: 3 MEQ/L (ref 3.5–5.2)
POTASSIUM SERPL-SCNC: 3.6 MEQ/L (ref 3.5–5.2)
POTASSIUM SERPL-SCNC: 3.7 MEQ/L (ref 3.5–5.2)
POTASSIUM SERPL-SCNC: 4.1 MEQ/L (ref 3.5–5.2)
POTASSIUM SERPL-SCNC: 4.3 MEQ/L (ref 3.5–5.2)
POTASSIUM SERPL-SCNC: 4.5 MEQ/L (ref 3.5–5.2)
POTASSIUM SERPL-SCNC: 4.5 MEQ/L (ref 3.5–5.2)
POTASSIUM SERPL-SCNC: 4.7 MEQ/L (ref 3.5–5.2)
POTASSIUM SERPL-SCNC: 4.7 MEQ/L (ref 3.5–5.2)
POTASSIUM SERPL-SCNC: 4.8 MEQ/L (ref 3.5–5.2)
POTASSIUM SERPL-SCNC: 5.1 MEQ/L (ref 3.5–5.2)
POTASSIUM SERPL-SCNC: 5.3 MEQ/L (ref 3.5–5.2)
POTASSIUM SERPL-SCNC: 5.7 MEQ/L (ref 3.5–5.2)
POTASSIUM, WHOLE BLOOD: 4.3 MEQ/L (ref 3.5–4.9)
POTASSIUM, WHOLE BLOOD: 4.6 MEQ/L (ref 3.5–4.9)
PRO-BNP: 5069 PG/ML (ref 0–1800)
PRO-BNP: 5658 PG/ML (ref 0–1800)
PRO-BNP: 7547 PG/ML (ref 0–1800)
PRO-BNP: 9242 PG/ML (ref 0–1800)
PRO-BNP: ABNORMAL PG/ML (ref 0–1800)
PRO-BNP: ABNORMAL PG/ML (ref 0–1800)
PROCALCITONIN: 0.07 NG/ML (ref 0.01–0.09)
PROTEIN FLUID: 2.1 GM/DL
PROTEIN UA: NEGATIVE MG/DL
PROTEIN UA: NEGATIVE MG/DL
RBC # BLD: 3.44 MILL/MM3 (ref 4.2–5.4)
RBC # BLD: 3.52 MILL/MM3 (ref 4.2–5.4)
RBC # BLD: 3.53 MILL/MM3 (ref 4.2–5.4)
RBC # BLD: 3.55 MILL/MM3 (ref 4.2–5.4)
RBC # BLD: 3.57 MILL/MM3 (ref 4.2–5.4)
RBC # BLD: 3.58 MILL/MM3 (ref 4.2–5.4)
RBC # BLD: 3.65 MILL/MM3 (ref 4.2–5.4)
RBC # BLD: 3.71 MILL/MM3 (ref 4.2–5.4)
RBC # BLD: 3.8 MILL/MM3 (ref 4.2–5.4)
RBC # BLD: 3.85 MILL/MM3 (ref 4.2–5.4)
RBC # BLD: 4.05 MILL/MM3 (ref 4.2–5.4)
RBC # BLD: 4.06 MILL/MM3 (ref 4.2–5.4)
RBC # BLD: 4.25 MILL/MM3 (ref 4.2–5.4)
RBC URINE: ABNORMAL /HPF
RBC URINE: NORMAL /HPF
REASON FOR REJECTION: NORMAL
REJECTED TEST: NORMAL
RENAL EPITHELIAL, UA: ABNORMAL
RENAL EPITHELIAL, UA: NORMAL
RH FACTOR: NORMAL
SARS-COV-2, NAAT: NOT DETECTED
SARS-COV-2, PCR: NOT DETECTED
SCAN OF BLOOD SMEAR: NORMAL
SCAN OF BLOOD SMEAR: NORMAL
SEG NEUTROPHILS: 77 %
SEG NEUTROPHILS: 78.1 %
SEG NEUTROPHILS: 79.1 %
SEG NEUTROPHILS: 79.3 %
SEG NEUTROPHILS: 81.8 %
SEG NEUTROPHILS: 88.2 %
SEGMENTED NEUTROPHILS ABSOLUTE COUNT: 12.4 THOU/MM3 (ref 1.8–7.7)
SEGMENTED NEUTROPHILS ABSOLUTE COUNT: 4.5 THOU/MM3 (ref 1.8–7.7)
SEGMENTED NEUTROPHILS ABSOLUTE COUNT: 5 THOU/MM3 (ref 1.8–7.7)
SEGMENTED NEUTROPHILS ABSOLUTE COUNT: 5.5 THOU/MM3 (ref 1.8–7.7)
SEGMENTED NEUTROPHILS ABSOLUTE COUNT: 5.5 THOU/MM3 (ref 1.8–7.7)
SEGMENTED NEUTROPHILS ABSOLUTE COUNT: 6.4 THOU/MM3 (ref 1.8–7.7)
SEGMENTED NEUTROPHILS ABSOLUTE COUNT: 7.7 THOU/MM3 (ref 1.8–7.7)
SODIUM BLD-SCNC: 140 MEQ/L (ref 135–145)
SODIUM BLD-SCNC: 140 MEQ/L (ref 135–145)
SODIUM BLD-SCNC: 141 MEQ/L (ref 135–145)
SODIUM BLD-SCNC: 141 MEQ/L (ref 135–145)
SODIUM BLD-SCNC: 142 MEQ/L (ref 135–145)
SODIUM BLD-SCNC: 143 MEQ/L (ref 135–145)
SODIUM BLD-SCNC: 144 MEQ/L (ref 135–145)
SODIUM BLD-SCNC: 145 MEQ/L (ref 135–145)
SODIUM BLD-SCNC: 145 MEQ/L (ref 135–145)
SODIUM BLD-SCNC: 149 MEQ/L (ref 135–145)
SODIUM, WHOLE BLOOD: 144 MEQ/L (ref 138–146)
SODIUM, WHOLE BLOOD: 146 MEQ/L (ref 138–146)
SOURCE, BLOOD GAS: ABNORMAL
SOURCE, BLOOD GAS: ABNORMAL
SPECIFIC GRAVITY UA: 1.01 (ref 1–1.03)
SPECIFIC GRAVITY UA: 1.01 (ref 1–1.03)
SPECIMEN: ABNORMAL
SPHEROCYTES: ABNORMAL
STREP PNEUMO AG, UR: NEGATIVE
TARGET CELLS: ABNORMAL
TOTAL IRON BINDING CAPACITY: 300 UG/DL (ref 171–450)
TOTAL NUCLEATED CELLS BODY FLUID: 974 /CUMM (ref 0–500)
TOTAL PROTEIN: 6 G/DL (ref 6.1–8)
TOTAL PROTEIN: 6.3 G/DL (ref 6.1–8)
TOTAL PROTEIN: 6.6 G/DL (ref 6.1–8)
TOTAL VOLUME RECEIVED BODY FLUID: 70 ML
TRIGL SERPL-MCNC: 79 MG/DL (ref 0–199)
TROPONIN T: 0.05 NG/ML
TROPONIN T: 0.06 NG/ML
TROPONIN T: 0.06 NG/ML
TROPONIN T: 0.07 NG/ML
URINE CULTURE, ROUTINE: NORMAL
UROBILINOGEN, URINE: 0.2 EU/DL (ref 0–1)
UROBILINOGEN, URINE: 1 EU/DL (ref 0–1)
VANCOMYCIN RESISTANT ENTEROCOCCUS: NEGATIVE
VITAMIN B-12: > 2000 PG/ML (ref 211–911)
WBC # BLD: 14.1 THOU/MM3 (ref 4.8–10.8)
WBC # BLD: 14.1 THOU/MM3 (ref 4.8–10.8)
WBC # BLD: 5.9 THOU/MM3 (ref 4.8–10.8)
WBC # BLD: 6.1 THOU/MM3 (ref 4.8–10.8)
WBC # BLD: 6.2 THOU/MM3 (ref 4.8–10.8)
WBC # BLD: 6.7 THOU/MM3 (ref 4.8–10.8)
WBC # BLD: 6.8 THOU/MM3 (ref 4.8–10.8)
WBC # BLD: 6.8 THOU/MM3 (ref 4.8–10.8)
WBC # BLD: 7.1 THOU/MM3 (ref 4.8–10.8)
WBC # BLD: 7.1 THOU/MM3 (ref 4.8–10.8)
WBC # BLD: 8.1 THOU/MM3 (ref 4.8–10.8)
WBC # BLD: 9.6 THOU/MM3 (ref 4.8–10.8)
WBC # BLD: 9.7 THOU/MM3 (ref 4.8–10.8)
WBC UA: ABNORMAL /HPF
WBC UA: NORMAL /HPF
YEAST: ABNORMAL
YEAST: NORMAL

## 2020-01-01 PROCEDURE — 85025 COMPLETE CBC W/AUTO DIFF WBC: CPT

## 2020-01-01 PROCEDURE — 82948 REAGENT STRIP/BLOOD GLUCOSE: CPT

## 2020-01-01 PROCEDURE — 6370000000 HC RX 637 (ALT 250 FOR IP): Performed by: STUDENT IN AN ORGANIZED HEALTH CARE EDUCATION/TRAINING PROGRAM

## 2020-01-01 PROCEDURE — 4040F PNEUMOC VAC/ADMIN/RCVD: CPT | Performed by: INTERNAL MEDICINE

## 2020-01-01 PROCEDURE — 1036F TOBACCO NON-USER: CPT | Performed by: SURGERY

## 2020-01-01 PROCEDURE — 4040F PNEUMOC VAC/ADMIN/RCVD: CPT | Performed by: SURGERY

## 2020-01-01 PROCEDURE — G8399 PT W/DXA RESULTS DOCUMENT: HCPCS | Performed by: NURSE PRACTITIONER

## 2020-01-01 PROCEDURE — 71045 X-RAY EXAM CHEST 1 VIEW: CPT

## 2020-01-01 PROCEDURE — 77385 HC NTSTY MODUL RAD TX DLVR SMPL: CPT | Performed by: RADIOLOGY

## 2020-01-01 PROCEDURE — 36415 COLL VENOUS BLD VENIPUNCTURE: CPT

## 2020-01-01 PROCEDURE — 94660 CPAP INITIATION&MGMT: CPT

## 2020-01-01 PROCEDURE — 99213 OFFICE O/P EST LOW 20 MIN: CPT | Performed by: NUCLEAR MEDICINE

## 2020-01-01 PROCEDURE — 77336 RADIATION PHYSICS CONSULT: CPT | Performed by: RADIOLOGY

## 2020-01-01 PROCEDURE — 1123F ACP DISCUSS/DSCN MKR DOCD: CPT | Performed by: NURSE PRACTITIONER

## 2020-01-01 PROCEDURE — 6360000004 HC RX CONTRAST MEDICATION: Performed by: INTERNAL MEDICINE

## 2020-01-01 PROCEDURE — 85027 COMPLETE CBC AUTOMATED: CPT

## 2020-01-01 PROCEDURE — G8417 CALC BMI ABV UP PARAM F/U: HCPCS | Performed by: INTERNAL MEDICINE

## 2020-01-01 PROCEDURE — 87070 CULTURE OTHR SPECIMN AEROBIC: CPT

## 2020-01-01 PROCEDURE — G8400 PT W/DXA NO RESULTS DOC: HCPCS | Performed by: NURSE PRACTITIONER

## 2020-01-01 PROCEDURE — 6370000000 HC RX 637 (ALT 250 FOR IP): Performed by: INTERNAL MEDICINE

## 2020-01-01 PROCEDURE — 99232 SBSQ HOSP IP/OBS MODERATE 35: CPT | Performed by: INTERNAL MEDICINE

## 2020-01-01 PROCEDURE — 1090F PRES/ABSN URINE INCON ASSESS: CPT | Performed by: SURGERY

## 2020-01-01 PROCEDURE — 99232 SBSQ HOSP IP/OBS MODERATE 35: CPT | Performed by: NURSE PRACTITIONER

## 2020-01-01 PROCEDURE — 1036F TOBACCO NON-USER: CPT | Performed by: NURSE PRACTITIONER

## 2020-01-01 PROCEDURE — 87641 MR-STAPH DNA AMP PROBE: CPT

## 2020-01-01 PROCEDURE — 4040F PNEUMOC VAC/ADMIN/RCVD: CPT | Performed by: NURSE PRACTITIONER

## 2020-01-01 PROCEDURE — 6360000002 HC RX W HCPCS: Performed by: INTERNAL MEDICINE

## 2020-01-01 PROCEDURE — 99211 OFF/OP EST MAY X REQ PHY/QHP: CPT

## 2020-01-01 PROCEDURE — 99223 1ST HOSP IP/OBS HIGH 75: CPT | Performed by: INTERNAL MEDICINE

## 2020-01-01 PROCEDURE — 6360000002 HC RX W HCPCS: Performed by: STUDENT IN AN ORGANIZED HEALTH CARE EDUCATION/TRAINING PROGRAM

## 2020-01-01 PROCEDURE — 99214 OFFICE O/P EST MOD 30 MIN: CPT | Performed by: INTERNAL MEDICINE

## 2020-01-01 PROCEDURE — 83735 ASSAY OF MAGNESIUM: CPT

## 2020-01-01 PROCEDURE — 77412 RADIATION TX DELIVERY LVL 3: CPT | Performed by: RADIOLOGY

## 2020-01-01 PROCEDURE — 87205 SMEAR GRAM STAIN: CPT

## 2020-01-01 PROCEDURE — 2580000003 HC RX 258: Performed by: STUDENT IN AN ORGANIZED HEALTH CARE EDUCATION/TRAINING PROGRAM

## 2020-01-01 PROCEDURE — 99213 OFFICE O/P EST LOW 20 MIN: CPT | Performed by: NURSE PRACTITIONER

## 2020-01-01 PROCEDURE — 97530 THERAPEUTIC ACTIVITIES: CPT

## 2020-01-01 PROCEDURE — 97163 PT EVAL HIGH COMPLEX 45 MIN: CPT

## 2020-01-01 PROCEDURE — 97110 THERAPEUTIC EXERCISES: CPT

## 2020-01-01 PROCEDURE — 80048 BASIC METABOLIC PNL TOTAL CA: CPT

## 2020-01-01 PROCEDURE — 4040F PNEUMOC VAC/ADMIN/RCVD: CPT | Performed by: NUCLEAR MEDICINE

## 2020-01-01 PROCEDURE — 99214 OFFICE O/P EST MOD 30 MIN: CPT | Performed by: NUCLEAR MEDICINE

## 2020-01-01 PROCEDURE — 36600 WITHDRAWAL OF ARTERIAL BLOOD: CPT

## 2020-01-01 PROCEDURE — 1090F PRES/ABSN URINE INCON ASSESS: CPT | Performed by: NURSE PRACTITIONER

## 2020-01-01 PROCEDURE — 2060000000 HC ICU INTERMEDIATE R&B

## 2020-01-01 PROCEDURE — 92526 ORAL FUNCTION THERAPY: CPT

## 2020-01-01 PROCEDURE — G8482 FLU IMMUNIZE ORDER/ADMIN: HCPCS | Performed by: NUCLEAR MEDICINE

## 2020-01-01 PROCEDURE — 87040 BLOOD CULTURE FOR BACTERIA: CPT

## 2020-01-01 PROCEDURE — 51702 INSERT TEMP BLADDER CATH: CPT

## 2020-01-01 PROCEDURE — A9552 F18 FDG: HCPCS | Performed by: INTERNAL MEDICINE

## 2020-01-01 PROCEDURE — 2700000000 HC OXYGEN THERAPY PER DAY

## 2020-01-01 PROCEDURE — G8417 CALC BMI ABV UP PARAM F/U: HCPCS | Performed by: NURSE PRACTITIONER

## 2020-01-01 PROCEDURE — G8427 DOCREV CUR MEDS BY ELIG CLIN: HCPCS | Performed by: SURGERY

## 2020-01-01 PROCEDURE — 32555 ASPIRATE PLEURA W/ IMAGING: CPT

## 2020-01-01 PROCEDURE — G8427 DOCREV CUR MEDS BY ELIG CLIN: HCPCS | Performed by: NURSE PRACTITIONER

## 2020-01-01 PROCEDURE — G8400 PT W/DXA NO RESULTS DOC: HCPCS | Performed by: NUCLEAR MEDICINE

## 2020-01-01 PROCEDURE — 84484 ASSAY OF TROPONIN QUANT: CPT

## 2020-01-01 PROCEDURE — 77332 RADIATION TREATMENT AID(S): CPT | Performed by: RADIOLOGY

## 2020-01-01 PROCEDURE — 83550 IRON BINDING TEST: CPT

## 2020-01-01 PROCEDURE — 2580000003 HC RX 258: Performed by: INTERNAL MEDICINE

## 2020-01-01 PROCEDURE — G8427 DOCREV CUR MEDS BY ELIG CLIN: HCPCS | Performed by: INTERNAL MEDICINE

## 2020-01-01 PROCEDURE — 99238 HOSP IP/OBS DSCHRG MGMT 30/<: CPT | Performed by: INTERNAL MEDICINE

## 2020-01-01 PROCEDURE — 83986 ASSAY PH BODY FLUID NOS: CPT

## 2020-01-01 PROCEDURE — 1090F PRES/ABSN URINE INCON ASSESS: CPT | Performed by: INTERNAL MEDICINE

## 2020-01-01 PROCEDURE — 6370000000 HC RX 637 (ALT 250 FOR IP): Performed by: PHYSICIAN ASSISTANT

## 2020-01-01 PROCEDURE — 77080 DXA BONE DENSITY AXIAL: CPT

## 2020-01-01 PROCEDURE — 71260 CT THORAX DX C+: CPT

## 2020-01-01 PROCEDURE — 97535 SELF CARE MNGMENT TRAINING: CPT

## 2020-01-01 PROCEDURE — 88112 CYTOPATH CELL ENHANCE TECH: CPT

## 2020-01-01 PROCEDURE — G0444 DEPRESSION SCREEN ANNUAL: HCPCS | Performed by: NURSE PRACTITIONER

## 2020-01-01 PROCEDURE — 6360000004 HC RX CONTRAST MEDICATION: Performed by: EMERGENCY MEDICINE

## 2020-01-01 PROCEDURE — G8400 PT W/DXA NO RESULTS DOC: HCPCS | Performed by: SURGERY

## 2020-01-01 PROCEDURE — 82746 ASSAY OF FOLIC ACID SERUM: CPT

## 2020-01-01 PROCEDURE — 82945 GLUCOSE OTHER FLUID: CPT

## 2020-01-01 PROCEDURE — G8427 DOCREV CUR MEDS BY ELIG CLIN: HCPCS | Performed by: NUCLEAR MEDICINE

## 2020-01-01 PROCEDURE — 99214 OFFICE O/P EST MOD 30 MIN: CPT | Performed by: NURSE PRACTITIONER

## 2020-01-01 PROCEDURE — 84145 PROCALCITONIN (PCT): CPT

## 2020-01-01 PROCEDURE — 77338 DESIGN MLC DEVICE FOR IMRT: CPT | Performed by: RADIOLOGY

## 2020-01-01 PROCEDURE — U0002 COVID-19 LAB TEST NON-CDC: HCPCS

## 2020-01-01 PROCEDURE — 1123F ACP DISCUSS/DSCN MKR DOCD: CPT | Performed by: INTERNAL MEDICINE

## 2020-01-01 PROCEDURE — 82140 ASSAY OF AMMONIA: CPT

## 2020-01-01 PROCEDURE — 93000 ELECTROCARDIOGRAM COMPLETE: CPT | Performed by: NUCLEAR MEDICINE

## 2020-01-01 PROCEDURE — G8400 PT W/DXA NO RESULTS DOC: HCPCS | Performed by: INTERNAL MEDICINE

## 2020-01-01 PROCEDURE — G8399 PT W/DXA RESULTS DOCUMENT: HCPCS | Performed by: SURGERY

## 2020-01-01 PROCEDURE — G8482 FLU IMMUNIZE ORDER/ADMIN: HCPCS | Performed by: SURGERY

## 2020-01-01 PROCEDURE — 84157 ASSAY OF PROTEIN OTHER: CPT

## 2020-01-01 PROCEDURE — 84520 ASSAY OF UREA NITROGEN: CPT

## 2020-01-01 PROCEDURE — 3430000000 HC RX DIAGNOSTIC RADIOPHARMACEUTICAL: Performed by: INTERNAL MEDICINE

## 2020-01-01 PROCEDURE — 51701 INSERT BLADDER CATHETER: CPT

## 2020-01-01 PROCEDURE — 99231 SBSQ HOSP IP/OBS SF/LOW 25: CPT | Performed by: PHYSICIAN ASSISTANT

## 2020-01-01 PROCEDURE — 36430 TRANSFUSION BLD/BLD COMPNT: CPT

## 2020-01-01 PROCEDURE — G8399 PT W/DXA RESULTS DOCUMENT: HCPCS | Performed by: INTERNAL MEDICINE

## 2020-01-01 PROCEDURE — G8417 CALC BMI ABV UP PARAM F/U: HCPCS | Performed by: NUCLEAR MEDICINE

## 2020-01-01 PROCEDURE — 36415 COLL VENOUS BLD VENIPUNCTURE: CPT | Performed by: NURSE PRACTITIONER

## 2020-01-01 PROCEDURE — 86923 COMPATIBILITY TEST ELECTRIC: CPT

## 2020-01-01 PROCEDURE — G8482 FLU IMMUNIZE ORDER/ADMIN: HCPCS | Performed by: INTERNAL MEDICINE

## 2020-01-01 PROCEDURE — 82565 ASSAY OF CREATININE: CPT

## 2020-01-01 PROCEDURE — 93010 ELECTROCARDIOGRAM REPORT: CPT | Performed by: NUCLEAR MEDICINE

## 2020-01-01 PROCEDURE — 83880 ASSAY OF NATRIURETIC PEPTIDE: CPT

## 2020-01-01 PROCEDURE — 87075 CULTR BACTERIA EXCEPT BLOOD: CPT

## 2020-01-01 PROCEDURE — 94761 N-INVAS EAR/PLS OXIMETRY MLT: CPT

## 2020-01-01 PROCEDURE — 76642 ULTRASOUND BREAST LIMITED: CPT

## 2020-01-01 PROCEDURE — 51798 US URINE CAPACITY MEASURE: CPT

## 2020-01-01 PROCEDURE — G8482 FLU IMMUNIZE ORDER/ADMIN: HCPCS | Performed by: NURSE PRACTITIONER

## 2020-01-01 PROCEDURE — 93005 ELECTROCARDIOGRAM TRACING: CPT | Performed by: EMERGENCY MEDICINE

## 2020-01-01 PROCEDURE — 6360000002 HC RX W HCPCS: Performed by: EMERGENCY MEDICINE

## 2020-01-01 PROCEDURE — 93283 PRGRMG EVAL IMPLANTABLE DFB: CPT | Performed by: NUCLEAR MEDICINE

## 2020-01-01 PROCEDURE — 77300 RADIATION THERAPY DOSE PLAN: CPT | Performed by: RADIOLOGY

## 2020-01-01 PROCEDURE — 87081 CULTURE SCREEN ONLY: CPT

## 2020-01-01 PROCEDURE — 99291 CRITICAL CARE FIRST HOUR: CPT

## 2020-01-01 PROCEDURE — 1123F ACP DISCUSS/DSCN MKR DOCD: CPT | Performed by: NUCLEAR MEDICINE

## 2020-01-01 PROCEDURE — 99212 OFFICE O/P EST SF 10 MIN: CPT | Performed by: NURSE PRACTITIONER

## 2020-01-01 PROCEDURE — G0279 TOMOSYNTHESIS, MAMMO: HCPCS

## 2020-01-01 PROCEDURE — 0W993ZZ DRAINAGE OF RIGHT PLEURAL CAVITY, PERCUTANEOUS APPROACH: ICD-10-PCS | Performed by: INTERNAL MEDICINE

## 2020-01-01 PROCEDURE — 3209999900 CT GUIDE RADIATION THERAPY NO CHARGE

## 2020-01-01 PROCEDURE — 71275 CT ANGIOGRAPHY CHEST: CPT

## 2020-01-01 PROCEDURE — G8417 CALC BMI ABV UP PARAM F/U: HCPCS | Performed by: SURGERY

## 2020-01-01 PROCEDURE — 77290 THER RAD SIMULAJ FIELD CPLX: CPT | Performed by: RADIOLOGY

## 2020-01-01 PROCEDURE — 1036F TOBACCO NON-USER: CPT | Performed by: NUCLEAR MEDICINE

## 2020-01-01 PROCEDURE — 77331 SPECIAL RADIATION DOSIMETRY: CPT | Performed by: RADIOLOGY

## 2020-01-01 PROCEDURE — 6360000004 HC RX CONTRAST MEDICATION: Performed by: STUDENT IN AN ORGANIZED HEALTH CARE EDUCATION/TRAINING PROGRAM

## 2020-01-01 PROCEDURE — 2500000003 HC RX 250 WO HCPCS: Performed by: STUDENT IN AN ORGANIZED HEALTH CARE EDUCATION/TRAINING PROGRAM

## 2020-01-01 PROCEDURE — 83036 HEMOGLOBIN GLYCOSYLATED A1C: CPT

## 2020-01-01 PROCEDURE — 89050 BODY FLUID CELL COUNT: CPT

## 2020-01-01 PROCEDURE — 84155 ASSAY OF PROTEIN SERUM: CPT

## 2020-01-01 PROCEDURE — 80047 BASIC METABLC PNL IONIZED CA: CPT

## 2020-01-01 PROCEDURE — 77334 RADIATION TREATMENT AID(S): CPT | Performed by: RADIOLOGY

## 2020-01-01 PROCEDURE — 82803 BLOOD GASES ANY COMBINATION: CPT

## 2020-01-01 PROCEDURE — 80076 HEPATIC FUNCTION PANEL: CPT

## 2020-01-01 PROCEDURE — 96375 TX/PRO/DX INJ NEW DRUG ADDON: CPT

## 2020-01-01 PROCEDURE — 88305 TISSUE EXAM BY PATHOLOGIST: CPT

## 2020-01-01 PROCEDURE — G8399 PT W/DXA RESULTS DOCUMENT: HCPCS | Performed by: NUCLEAR MEDICINE

## 2020-01-01 PROCEDURE — 81001 URINALYSIS AUTO W/SCOPE: CPT

## 2020-01-01 PROCEDURE — 1036F TOBACCO NON-USER: CPT | Performed by: INTERNAL MEDICINE

## 2020-01-01 PROCEDURE — 70470 CT HEAD/BRAIN W/O & W/DYE: CPT

## 2020-01-01 PROCEDURE — 1123F ACP DISCUSS/DSCN MKR DOCD: CPT | Performed by: SURGERY

## 2020-01-01 PROCEDURE — 2500000003 HC RX 250 WO HCPCS: Performed by: INTERNAL MEDICINE

## 2020-01-01 PROCEDURE — 6370000000 HC RX 637 (ALT 250 FOR IP): Performed by: NURSE PRACTITIONER

## 2020-01-01 PROCEDURE — 87102 FUNGUS ISOLATION CULTURE: CPT

## 2020-01-01 PROCEDURE — 86901 BLOOD TYPING SEROLOGIC RH(D): CPT

## 2020-01-01 PROCEDURE — 99213 OFFICE O/P EST LOW 20 MIN: CPT | Performed by: SURGERY

## 2020-01-01 PROCEDURE — 86850 RBC ANTIBODY SCREEN: CPT

## 2020-01-01 PROCEDURE — 92610 EVALUATE SWALLOWING FUNCTION: CPT

## 2020-01-01 PROCEDURE — 87116 MYCOBACTERIA CULTURE: CPT

## 2020-01-01 PROCEDURE — 96374 THER/PROPH/DIAG INJ IV PUSH: CPT | Performed by: NURSE PRACTITIONER

## 2020-01-01 PROCEDURE — 80061 LIPID PANEL: CPT

## 2020-01-01 PROCEDURE — 86900 BLOOD TYPING SEROLOGIC ABO: CPT

## 2020-01-01 PROCEDURE — 77280 THER RAD SIMULAJ FIELD SMPL: CPT | Performed by: RADIOLOGY

## 2020-01-01 PROCEDURE — 87449 NOS EACH ORGANISM AG IA: CPT

## 2020-01-01 PROCEDURE — 1090F PRES/ABSN URINE INCON ASSESS: CPT | Performed by: NUCLEAR MEDICINE

## 2020-01-01 PROCEDURE — 77321 SPECIAL TELETX PORT PLAN: CPT | Performed by: RADIOLOGY

## 2020-01-01 PROCEDURE — 83615 LACTATE (LD) (LDH) ENZYME: CPT

## 2020-01-01 PROCEDURE — 92950 HEART/LUNG RESUSCITATION CPR: CPT

## 2020-01-01 PROCEDURE — 82374 ASSAY BLOOD CARBON DIOXIDE: CPT

## 2020-01-01 PROCEDURE — 99284 EMERGENCY DEPT VISIT MOD MDM: CPT

## 2020-01-01 PROCEDURE — 80053 COMPREHEN METABOLIC PANEL: CPT

## 2020-01-01 PROCEDURE — 87086 URINE CULTURE/COLONY COUNT: CPT

## 2020-01-01 PROCEDURE — P9016 RBC LEUKOCYTES REDUCED: HCPCS

## 2020-01-01 PROCEDURE — 99202 OFFICE O/P NEW SF 15 MIN: CPT | Performed by: RADIOLOGY

## 2020-01-01 PROCEDURE — 93005 ELECTROCARDIOGRAM TRACING: CPT | Performed by: STUDENT IN AN ORGANIZED HEALTH CARE EDUCATION/TRAINING PROGRAM

## 2020-01-01 PROCEDURE — 99024 POSTOP FOLLOW-UP VISIT: CPT | Performed by: SURGERY

## 2020-01-01 PROCEDURE — 93307 TTE W/O DOPPLER COMPLETE: CPT

## 2020-01-01 PROCEDURE — 96374 THER/PROPH/DIAG INJ IV PUSH: CPT

## 2020-01-01 PROCEDURE — 82607 VITAMIN B-12: CPT

## 2020-01-01 PROCEDURE — 83036 HEMOGLOBIN GLYCOSYLATED A1C: CPT | Performed by: NURSE PRACTITIONER

## 2020-01-01 PROCEDURE — 94760 N-INVAS EAR/PLS OXIMETRY 1: CPT

## 2020-01-01 PROCEDURE — 71250 CT THORAX DX C-: CPT

## 2020-01-01 PROCEDURE — 6360000002 HC RX W HCPCS

## 2020-01-01 PROCEDURE — 2500000003 HC RX 250 WO HCPCS

## 2020-01-01 PROCEDURE — 83540 ASSAY OF IRON: CPT

## 2020-01-01 PROCEDURE — 87899 AGENT NOS ASSAY W/OPTIC: CPT

## 2020-01-01 PROCEDURE — 97166 OT EVAL MOD COMPLEX 45 MIN: CPT

## 2020-01-01 PROCEDURE — 77301 RADIOTHERAPY DOSE PLAN IMRT: CPT | Performed by: RADIOLOGY

## 2020-01-01 PROCEDURE — 78815 PET IMAGE W/CT SKULL-THIGH: CPT

## 2020-01-01 PROCEDURE — 87500 VANOMYCIN DNA AMP PROBE: CPT

## 2020-01-01 RX ORDER — INSULIN GLARGINE 100 [IU]/ML
18 INJECTION, SOLUTION SUBCUTANEOUS NIGHTLY
DISCHARGE
Start: 2020-01-01

## 2020-01-01 RX ORDER — PREGABALIN 200 MG/1
200 CAPSULE ORAL 2 TIMES DAILY
Qty: 60 CAPSULE | Refills: 2 | Status: SHIPPED | OUTPATIENT
Start: 2020-01-01 | End: 2020-01-01 | Stop reason: SDUPTHER

## 2020-01-01 RX ORDER — ATORVASTATIN CALCIUM 80 MG/1
80 TABLET, FILM COATED ORAL NIGHTLY
Status: DISCONTINUED | OUTPATIENT
Start: 2020-01-01 | End: 2020-01-01 | Stop reason: HOSPADM

## 2020-01-01 RX ORDER — ACETAMINOPHEN 325 MG/1
650 TABLET ORAL EVERY 6 HOURS PRN
Status: DISCONTINUED | OUTPATIENT
Start: 2020-01-01 | End: 2020-01-01 | Stop reason: HOSPADM

## 2020-01-01 RX ORDER — HEPARIN SODIUM (PORCINE) LOCK FLUSH IV SOLN 100 UNIT/ML 100 UNIT/ML
500 SOLUTION INTRAVENOUS PRN
Status: CANCELLED | OUTPATIENT
Start: 2020-01-01

## 2020-01-01 RX ORDER — FUROSEMIDE 10 MG/ML
20 INJECTION INTRAMUSCULAR; INTRAVENOUS ONCE
Status: COMPLETED | OUTPATIENT
Start: 2020-01-01 | End: 2020-01-01

## 2020-01-01 RX ORDER — FLUTICASONE PROPIONATE 50 MCG
1 SPRAY, SUSPENSION (ML) NASAL DAILY PRN
COMMUNITY

## 2020-01-01 RX ORDER — FAMOTIDINE 20 MG/1
20 TABLET, FILM COATED ORAL DAILY
Status: DISCONTINUED | OUTPATIENT
Start: 2020-01-01 | End: 2020-01-01 | Stop reason: HOSPADM

## 2020-01-01 RX ORDER — ROPINIROLE 0.5 MG/1
TABLET, FILM COATED ORAL
Qty: 30 TABLET | Refills: 0 | Status: SHIPPED | OUTPATIENT
Start: 2020-01-01 | End: 2020-01-01 | Stop reason: SDUPTHER

## 2020-01-01 RX ORDER — 0.9 % SODIUM CHLORIDE 0.9 %
20 INTRAVENOUS SOLUTION INTRAVENOUS ONCE
Status: DISCONTINUED | OUTPATIENT
Start: 2020-01-01 | End: 2020-01-01 | Stop reason: HOSPADM

## 2020-01-01 RX ORDER — POLYETHYLENE GLYCOL 3350 17 G/17G
17 POWDER, FOR SOLUTION ORAL DAILY
Status: DISCONTINUED | OUTPATIENT
Start: 2020-01-01 | End: 2020-01-01 | Stop reason: HOSPADM

## 2020-01-01 RX ORDER — PROMETHAZINE HYDROCHLORIDE 25 MG/1
12.5 TABLET ORAL EVERY 6 HOURS PRN
Status: DISCONTINUED | OUTPATIENT
Start: 2020-01-01 | End: 2020-01-01 | Stop reason: HOSPADM

## 2020-01-01 RX ORDER — CLOPIDOGREL BISULFATE 75 MG/1
75 TABLET ORAL DAILY
Status: DISCONTINUED | OUTPATIENT
Start: 2020-01-01 | End: 2020-01-01 | Stop reason: HOSPADM

## 2020-01-01 RX ORDER — POTASSIUM CHLORIDE 1500 MG/1
20 TABLET, FILM COATED, EXTENDED RELEASE ORAL 2 TIMES DAILY
COMMUNITY

## 2020-01-01 RX ORDER — AMIODARONE HYDROCHLORIDE 200 MG/1
TABLET ORAL
Qty: 90 TABLET | Refills: 4 | Status: SHIPPED | OUTPATIENT
Start: 2020-01-01

## 2020-01-01 RX ORDER — MIDODRINE HYDROCHLORIDE 2.5 MG/1
2.5 TABLET ORAL
Status: DISCONTINUED | OUTPATIENT
Start: 2020-01-01 | End: 2020-01-01 | Stop reason: HOSPADM

## 2020-01-01 RX ORDER — EPINEPHRINE 0.1 MG/ML
SYRINGE (ML) INJECTION DAILY PRN
Status: COMPLETED | OUTPATIENT
Start: 2020-01-01 | End: 2020-01-01

## 2020-01-01 RX ORDER — BUMETANIDE 1 MG/1
1 TABLET ORAL DAILY
Status: DISCONTINUED | OUTPATIENT
Start: 2020-01-01 | End: 2020-01-01

## 2020-01-01 RX ORDER — INSULIN GLARGINE 100 [IU]/ML
18 INJECTION, SOLUTION SUBCUTANEOUS NIGHTLY
Status: ON HOLD | COMMUNITY
End: 2020-01-01 | Stop reason: HOSPADM

## 2020-01-01 RX ORDER — POTASSIUM CHLORIDE 20 MEQ/1
40 TABLET, EXTENDED RELEASE ORAL DAILY
Qty: 180 TABLET | Refills: 1 | Status: SHIPPED | OUTPATIENT
Start: 2020-01-01 | End: 2020-01-01 | Stop reason: CLARIF

## 2020-01-01 RX ORDER — SODIUM CHLORIDE 9 MG/ML
250 INJECTION, SOLUTION INTRAVENOUS CONTINUOUS
Status: DISCONTINUED | OUTPATIENT
Start: 2020-01-01 | End: 2020-01-01 | Stop reason: HOSPADM

## 2020-01-01 RX ORDER — FERROUS SULFATE 325(65) MG
325 TABLET ORAL 2 TIMES DAILY WITH MEALS
Status: DISCONTINUED | OUTPATIENT
Start: 2020-01-01 | End: 2020-01-01 | Stop reason: HOSPADM

## 2020-01-01 RX ORDER — DEXTROSE MONOHYDRATE 50 MG/ML
100 INJECTION, SOLUTION INTRAVENOUS PRN
Status: DISCONTINUED | OUTPATIENT
Start: 2020-01-01 | End: 2020-01-01 | Stop reason: HOSPADM

## 2020-01-01 RX ORDER — BUMETANIDE 1 MG/1
1 TABLET ORAL 2 TIMES DAILY
Qty: 60 TABLET | Refills: 5 | Status: ON HOLD | OUTPATIENT
Start: 2020-01-01 | End: 2020-01-01 | Stop reason: SDUPTHER

## 2020-01-01 RX ORDER — PREGABALIN 200 MG/1
200 CAPSULE ORAL 2 TIMES DAILY
Status: CANCELLED | OUTPATIENT
Start: 2020-01-01 | End: 2020-11-04

## 2020-01-01 RX ORDER — ROPINIROLE 0.5 MG/1
0.5 TABLET, FILM COATED ORAL DAILY
COMMUNITY
End: 2020-01-01

## 2020-01-01 RX ORDER — SODIUM CHLORIDE 0.9 % (FLUSH) 0.9 %
20 SYRINGE (ML) INJECTION PRN
Status: CANCELLED | OUTPATIENT
Start: 2020-01-01

## 2020-01-01 RX ORDER — ENALAPRIL MALEATE 2.5 MG/1
TABLET ORAL
Qty: 90 TABLET | Refills: 3 | Status: SHIPPED | OUTPATIENT
Start: 2020-01-01 | End: 2020-01-01

## 2020-01-01 RX ORDER — BUMETANIDE 0.25 MG/ML
2 INJECTION, SOLUTION INTRAMUSCULAR; INTRAVENOUS ONCE
Status: COMPLETED | OUTPATIENT
Start: 2020-01-01 | End: 2020-01-01

## 2020-01-01 RX ORDER — ROPINIROLE 0.5 MG/1
TABLET, FILM COATED ORAL
Qty: 30 TABLET | Refills: 5 | Status: SHIPPED | OUTPATIENT
Start: 2020-01-01

## 2020-01-01 RX ORDER — NICOTINE POLACRILEX 4 MG
15 LOZENGE BUCCAL PRN
Status: DISCONTINUED | OUTPATIENT
Start: 2020-01-01 | End: 2020-01-01 | Stop reason: HOSPADM

## 2020-01-01 RX ORDER — METOLAZONE 5 MG/1
5 TABLET ORAL DAILY
Qty: 10 TABLET | Refills: 0 | Status: ON HOLD | OUTPATIENT
Start: 2020-01-01 | End: 2020-01-01 | Stop reason: HOSPADM

## 2020-01-01 RX ORDER — DEXTROSE MONOHYDRATE 25 G/50ML
12.5 INJECTION, SOLUTION INTRAVENOUS PRN
Status: DISCONTINUED | OUTPATIENT
Start: 2020-01-01 | End: 2020-01-01 | Stop reason: HOSPADM

## 2020-01-01 RX ORDER — BUMETANIDE 0.5 MG/1
0.5 TABLET ORAL 2 TIMES DAILY
Qty: 30 TABLET | Refills: 0
Start: 2020-01-01 | End: 2020-01-01

## 2020-01-01 RX ORDER — BUMETANIDE 0.25 MG/ML
1 INJECTION, SOLUTION INTRAMUSCULAR; INTRAVENOUS 2 TIMES DAILY
Status: DISCONTINUED | OUTPATIENT
Start: 2020-01-01 | End: 2020-01-01

## 2020-01-01 RX ORDER — ACETAMINOPHEN 650 MG/1
650 SUPPOSITORY RECTAL EVERY 6 HOURS PRN
Status: DISCONTINUED | OUTPATIENT
Start: 2020-01-01 | End: 2020-01-01 | Stop reason: HOSPADM

## 2020-01-01 RX ORDER — AMOXICILLIN AND CLAVULANATE POTASSIUM 250; 62.5 MG/5ML; MG/5ML
250 POWDER, FOR SUSPENSION ORAL 2 TIMES DAILY
Qty: 50 ML | Refills: 0 | Status: SHIPPED | OUTPATIENT
Start: 2020-01-01 | End: 2020-01-01

## 2020-01-01 RX ORDER — 0.9 % SODIUM CHLORIDE 0.9 %
250 INTRAVENOUS SOLUTION INTRAVENOUS ONCE
Status: CANCELLED
Start: 2020-01-01

## 2020-01-01 RX ORDER — SODIUM CHLORIDE 0.9 % (FLUSH) 0.9 %
10 SYRINGE (ML) INJECTION PRN
Status: DISCONTINUED | OUTPATIENT
Start: 2020-01-01 | End: 2020-01-01 | Stop reason: HOSPADM

## 2020-01-01 RX ORDER — SODIUM CHLORIDE 0.9 % (FLUSH) 0.9 %
10 SYRINGE (ML) INJECTION EVERY 12 HOURS SCHEDULED
Status: DISCONTINUED | OUTPATIENT
Start: 2020-01-01 | End: 2020-01-01 | Stop reason: HOSPADM

## 2020-01-01 RX ORDER — ROPINIROLE 0.5 MG/1
0.5 TABLET, FILM COATED ORAL NIGHTLY
Status: DISCONTINUED | OUTPATIENT
Start: 2020-01-01 | End: 2020-01-01 | Stop reason: HOSPADM

## 2020-01-01 RX ORDER — ATORVASTATIN CALCIUM 80 MG/1
TABLET, FILM COATED ORAL
Qty: 90 TABLET | Refills: 3 | Status: SHIPPED | OUTPATIENT
Start: 2020-01-01

## 2020-01-01 RX ORDER — FOLIC ACID 1 MG/1
1 TABLET ORAL DAILY
Status: DISCONTINUED | OUTPATIENT
Start: 2020-01-01 | End: 2020-01-01 | Stop reason: HOSPADM

## 2020-01-01 RX ORDER — PREGABALIN 200 MG/1
200 CAPSULE ORAL 2 TIMES DAILY
Qty: 60 CAPSULE | Refills: 2 | Status: ON HOLD | OUTPATIENT
Start: 2020-01-01 | End: 2020-01-01 | Stop reason: SDUPTHER

## 2020-01-01 RX ORDER — METFORMIN HYDROCHLORIDE 750 MG/1
750 TABLET, EXTENDED RELEASE ORAL 2 TIMES DAILY
COMMUNITY

## 2020-01-01 RX ORDER — ATORVASTATIN CALCIUM 80 MG/1
TABLET, FILM COATED ORAL
Qty: 90 TABLET | Refills: 0 | Status: ON HOLD | OUTPATIENT
Start: 2020-01-01 | End: 2020-01-01

## 2020-01-01 RX ORDER — ENALAPRIL MALEATE 2.5 MG/1
1 TABLET ORAL DAILY
Status: CANCELLED | OUTPATIENT
Start: 2020-01-01

## 2020-01-01 RX ORDER — ONDANSETRON 2 MG/ML
4 INJECTION INTRAMUSCULAR; INTRAVENOUS EVERY 6 HOURS PRN
Status: DISCONTINUED | OUTPATIENT
Start: 2020-01-01 | End: 2020-01-01 | Stop reason: HOSPADM

## 2020-01-01 RX ORDER — ENALAPRIL MALEATE 2.5 MG/1
2.5 TABLET ORAL 2 TIMES DAILY
Qty: 30 TABLET | Refills: 0
Start: 2020-01-01 | End: 2020-01-01

## 2020-01-01 RX ORDER — POTASSIUM CHLORIDE 1500 MG/1
40 TABLET, FILM COATED, EXTENDED RELEASE ORAL DAILY
Qty: 60 TABLET | Refills: 5 | Status: ON HOLD | OUTPATIENT
Start: 2020-01-01 | End: 2020-01-01

## 2020-01-01 RX ORDER — BUMETANIDE 1 MG/1
1 TABLET ORAL DAILY
Qty: 60 TABLET | Refills: 0 | DISCHARGE
Start: 2020-01-01

## 2020-01-01 RX ORDER — POTASSIUM CHLORIDE 1500 MG/1
20 TABLET, FILM COATED, EXTENDED RELEASE ORAL DAILY
COMMUNITY
End: 2020-01-01 | Stop reason: SDUPTHER

## 2020-01-01 RX ORDER — BUMETANIDE 0.5 MG/1
TABLET ORAL
Qty: 180 TABLET | Refills: 3 | Status: SHIPPED | OUTPATIENT
Start: 2020-01-01 | End: 2020-01-01 | Stop reason: SDUPTHER

## 2020-01-01 RX ORDER — POTASSIUM CHLORIDE 20 MEQ/1
40 TABLET, EXTENDED RELEASE ORAL 2 TIMES DAILY
Status: DISCONTINUED | OUTPATIENT
Start: 2020-01-01 | End: 2020-01-01 | Stop reason: HOSPADM

## 2020-01-01 RX ORDER — POTASSIUM CHLORIDE 20 MEQ/1
40 TABLET, EXTENDED RELEASE ORAL ONCE
Status: COMPLETED | OUTPATIENT
Start: 2020-01-01 | End: 2020-01-01

## 2020-01-01 RX ORDER — METFORMIN HYDROCHLORIDE 750 MG/1
TABLET, EXTENDED RELEASE ORAL
Qty: 60 TABLET | Refills: 3 | Status: ON HOLD | OUTPATIENT
Start: 2020-01-01 | End: 2020-01-01

## 2020-01-01 RX ORDER — FLUDEOXYGLUCOSE F 18 200 MCI/ML
16 INJECTION, SOLUTION INTRAVENOUS
Status: COMPLETED | OUTPATIENT
Start: 2020-01-01 | End: 2020-01-01

## 2020-01-01 RX ORDER — SODIUM CHLORIDE 0.9 % (FLUSH) 0.9 %
10 SYRINGE (ML) INJECTION PRN
Status: CANCELLED | OUTPATIENT
Start: 2020-01-01

## 2020-01-01 RX ORDER — ASPIRIN 81 MG/1
81 TABLET ORAL DAILY
Status: DISCONTINUED | OUTPATIENT
Start: 2020-01-01 | End: 2020-01-01 | Stop reason: HOSPADM

## 2020-01-01 RX ORDER — PREGABALIN 200 MG/1
200 CAPSULE ORAL 2 TIMES DAILY
Qty: 20 CAPSULE | Refills: 0 | Status: SHIPPED | OUTPATIENT
Start: 2020-01-01 | End: 2020-12-23

## 2020-01-01 RX ORDER — BUMETANIDE 1 MG/1
1 TABLET ORAL DAILY
Status: DISCONTINUED | OUTPATIENT
Start: 2020-01-01 | End: 2020-01-01 | Stop reason: HOSPADM

## 2020-01-01 RX ORDER — INSULIN GLARGINE 100 [IU]/ML
18 INJECTION, SOLUTION SUBCUTANEOUS NIGHTLY
Status: DISCONTINUED | OUTPATIENT
Start: 2020-01-01 | End: 2020-01-01 | Stop reason: HOSPADM

## 2020-01-01 RX ORDER — AMIODARONE HYDROCHLORIDE 200 MG/1
200 TABLET ORAL DAILY
Status: DISCONTINUED | OUTPATIENT
Start: 2020-01-01 | End: 2020-01-01 | Stop reason: HOSPADM

## 2020-01-01 RX ORDER — UBIDECARENONE 75 MG
1000 CAPSULE ORAL 3 TIMES DAILY
COMMUNITY

## 2020-01-01 RX ORDER — BUMETANIDE 1 MG/1
1 TABLET ORAL 2 TIMES DAILY
COMMUNITY
End: 2020-01-01 | Stop reason: SDUPTHER

## 2020-01-01 RX ORDER — BUMETANIDE 0.25 MG/ML
2 INJECTION, SOLUTION INTRAMUSCULAR; INTRAVENOUS 2 TIMES DAILY
Status: DISCONTINUED | OUTPATIENT
Start: 2020-01-01 | End: 2020-01-01

## 2020-01-01 RX ORDER — METOPROLOL SUCCINATE 25 MG/1
25 TABLET, EXTENDED RELEASE ORAL DAILY
Status: DISCONTINUED | OUTPATIENT
Start: 2020-01-01 | End: 2020-01-01

## 2020-01-01 RX ORDER — METOLAZONE 5 MG/1
5 TABLET ORAL DAILY
Status: DISCONTINUED | OUTPATIENT
Start: 2020-01-01 | End: 2020-01-01

## 2020-01-01 RX ORDER — BUMETANIDE 1 MG/1
1 TABLET ORAL 2 TIMES DAILY
Qty: 90 TABLET | Refills: 1 | Status: SHIPPED | OUTPATIENT
Start: 2020-01-01 | End: 2020-01-01 | Stop reason: CLARIF

## 2020-01-01 RX ADMIN — AZITHROMYCIN 500 MG: 500 INJECTION, POWDER, LYOPHILIZED, FOR SOLUTION INTRAVENOUS at 11:50

## 2020-01-01 RX ADMIN — PREGABALIN 200 MG: 75 CAPSULE ORAL at 09:17

## 2020-01-01 RX ADMIN — INSULIN LISPRO 1 UNITS: 100 INJECTION, SOLUTION INTRAVENOUS; SUBCUTANEOUS at 09:16

## 2020-01-01 RX ADMIN — Medication 10 ML: at 21:24

## 2020-01-01 RX ADMIN — CLOPIDOGREL BISULFATE 75 MG: 75 TABLET ORAL at 08:34

## 2020-01-01 RX ADMIN — ROPINIROLE HYDROCHLORIDE 0.5 MG: 0.5 TABLET, FILM COATED ORAL at 20:33

## 2020-01-01 RX ADMIN — AMIODARONE HYDROCHLORIDE 200 MG: 200 TABLET ORAL at 08:28

## 2020-01-01 RX ADMIN — Medication 10 ML: at 20:25

## 2020-01-01 RX ADMIN — ROPINIROLE HYDROCHLORIDE 0.5 MG: 0.5 TABLET, FILM COATED ORAL at 21:23

## 2020-01-01 RX ADMIN — FAMOTIDINE 20 MG: 20 TABLET, FILM COATED ORAL at 08:12

## 2020-01-01 RX ADMIN — POLYETHYLENE GLYCOL 3350 17 G: 17 POWDER, FOR SOLUTION ORAL at 08:38

## 2020-01-01 RX ADMIN — ASPIRIN 81 MG: 81 TABLET ORAL at 08:27

## 2020-01-01 RX ADMIN — BUMETANIDE 1 MG: 0.25 INJECTION INTRAMUSCULAR; INTRAVENOUS at 08:43

## 2020-01-01 RX ADMIN — BUMETANIDE 1 MG: 0.25 INJECTION INTRAMUSCULAR; INTRAVENOUS at 08:34

## 2020-01-01 RX ADMIN — ASPIRIN 81 MG: 81 TABLET ORAL at 08:42

## 2020-01-01 RX ADMIN — CEFTRIAXONE SODIUM 1 G: 1 INJECTION, POWDER, FOR SOLUTION INTRAMUSCULAR; INTRAVENOUS at 09:41

## 2020-01-01 RX ADMIN — AZITHROMYCIN 500 MG: 500 INJECTION, POWDER, LYOPHILIZED, FOR SOLUTION INTRAVENOUS at 08:17

## 2020-01-01 RX ADMIN — FERROUS SULFATE TAB 325 MG (65 MG ELEMENTAL FE) 325 MG: 325 (65 FE) TAB at 08:24

## 2020-01-01 RX ADMIN — PREGABALIN 200 MG: 75 CAPSULE ORAL at 08:27

## 2020-01-01 RX ADMIN — METOLAZONE 5 MG: 5 TABLET ORAL at 20:44

## 2020-01-01 RX ADMIN — BUMETANIDE 1 MG: 0.25 INJECTION INTRAMUSCULAR; INTRAVENOUS at 22:23

## 2020-01-01 RX ADMIN — ASPIRIN 81 MG: 81 TABLET ORAL at 20:26

## 2020-01-01 RX ADMIN — CEFTRIAXONE SODIUM 1 G: 1 INJECTION, POWDER, FOR SOLUTION INTRAMUSCULAR; INTRAVENOUS at 09:37

## 2020-01-01 RX ADMIN — PREGABALIN 200 MG: 75 CAPSULE ORAL at 20:35

## 2020-01-01 RX ADMIN — INSULIN LISPRO 2 UNITS: 100 INJECTION, SOLUTION INTRAVENOUS; SUBCUTANEOUS at 12:42

## 2020-01-01 RX ADMIN — INSULIN GLARGINE 18 UNITS: 100 INJECTION, SOLUTION SUBCUTANEOUS at 21:19

## 2020-01-01 RX ADMIN — INSULIN GLARGINE 18 UNITS: 100 INJECTION, SOLUTION SUBCUTANEOUS at 20:36

## 2020-01-01 RX ADMIN — IOPAMIDOL 85 ML: 755 INJECTION, SOLUTION INTRAVENOUS at 09:24

## 2020-01-01 RX ADMIN — PREGABALIN 200 MG: 75 CAPSULE ORAL at 20:52

## 2020-01-01 RX ADMIN — ENOXAPARIN SODIUM 40 MG: 40 INJECTION SUBCUTANEOUS at 21:08

## 2020-01-01 RX ADMIN — INSULIN LISPRO 2 UNITS: 100 INJECTION, SOLUTION INTRAVENOUS; SUBCUTANEOUS at 12:09

## 2020-01-01 RX ADMIN — PREGABALIN 200 MG: 75 CAPSULE ORAL at 21:23

## 2020-01-01 RX ADMIN — ACETAZOLAMIDE SODIUM 500 MG: 500 INJECTION, POWDER, LYOPHILIZED, FOR SOLUTION INTRAVENOUS at 08:40

## 2020-01-01 RX ADMIN — AMIODARONE HYDROCHLORIDE 200 MG: 200 TABLET ORAL at 08:37

## 2020-01-01 RX ADMIN — FUROSEMIDE 20 MG: 10 INJECTION, SOLUTION INTRAMUSCULAR; INTRAVENOUS at 17:12

## 2020-01-01 RX ADMIN — PIPERACILLIN AND TAZOBACTAM 3.38 G: 3; .375 INJECTION, POWDER, LYOPHILIZED, FOR SOLUTION INTRAVENOUS at 10:20

## 2020-01-01 RX ADMIN — PREGABALIN 200 MG: 75 CAPSULE ORAL at 20:04

## 2020-01-01 RX ADMIN — INSULIN LISPRO 3 UNITS: 100 INJECTION, SOLUTION INTRAVENOUS; SUBCUTANEOUS at 18:12

## 2020-01-01 RX ADMIN — FERROUS SULFATE TAB 325 MG (65 MG ELEMENTAL FE) 325 MG: 325 (65 FE) TAB at 18:41

## 2020-01-01 RX ADMIN — ACETAMINOPHEN 650 MG: 325 TABLET ORAL at 08:37

## 2020-01-01 RX ADMIN — PREGABALIN 200 MG: 75 CAPSULE ORAL at 08:42

## 2020-01-01 RX ADMIN — FOLIC ACID 1 MG: 1 TABLET ORAL at 09:16

## 2020-01-01 RX ADMIN — ASPIRIN 81 MG: 81 TABLET ORAL at 08:38

## 2020-01-01 RX ADMIN — ACETAMINOPHEN 650 MG: 650 SUPPOSITORY RECTAL at 03:50

## 2020-01-01 RX ADMIN — AMIODARONE HYDROCHLORIDE 200 MG: 200 TABLET ORAL at 08:34

## 2020-01-01 RX ADMIN — POLYETHYLENE GLYCOL 3350 17 G: 17 POWDER, FOR SOLUTION ORAL at 08:35

## 2020-01-01 RX ADMIN — PIPERACILLIN AND TAZOBACTAM 3.38 G: 3; .375 INJECTION, POWDER, LYOPHILIZED, FOR SOLUTION INTRAVENOUS at 02:40

## 2020-01-01 RX ADMIN — METOLAZONE 5 MG: 5 TABLET ORAL at 08:37

## 2020-01-01 RX ADMIN — AZITHROMYCIN 500 MG: 500 INJECTION, POWDER, LYOPHILIZED, FOR SOLUTION INTRAVENOUS at 10:13

## 2020-01-01 RX ADMIN — PREGABALIN 200 MG: 75 CAPSULE ORAL at 08:38

## 2020-01-01 RX ADMIN — ASPIRIN 81 MG: 81 TABLET ORAL at 09:16

## 2020-01-01 RX ADMIN — AZITHROMYCIN 500 MG: 500 INJECTION, POWDER, LYOPHILIZED, FOR SOLUTION INTRAVENOUS at 08:40

## 2020-01-01 RX ADMIN — Medication 10 ML: at 08:43

## 2020-01-01 RX ADMIN — CLOPIDOGREL BISULFATE 75 MG: 75 TABLET ORAL at 09:17

## 2020-01-01 RX ADMIN — FAMOTIDINE 20 MG: 20 TABLET, FILM COATED ORAL at 08:34

## 2020-01-01 RX ADMIN — INSULIN GLARGINE 18 UNITS: 100 INJECTION, SOLUTION SUBCUTANEOUS at 20:53

## 2020-01-01 RX ADMIN — FAMOTIDINE 20 MG: 20 TABLET, FILM COATED ORAL at 08:42

## 2020-01-01 RX ADMIN — SACUBITRIL AND VALSARTAN 1 TABLET: 24; 26 TABLET, FILM COATED ORAL at 20:33

## 2020-01-01 RX ADMIN — INSULIN GLARGINE 18 UNITS: 100 INJECTION, SOLUTION SUBCUTANEOUS at 21:07

## 2020-01-01 RX ADMIN — CLOPIDOGREL BISULFATE 75 MG: 75 TABLET ORAL at 20:26

## 2020-01-01 RX ADMIN — INSULIN LISPRO 1 UNITS: 100 INJECTION, SOLUTION INTRAVENOUS; SUBCUTANEOUS at 08:29

## 2020-01-01 RX ADMIN — ENOXAPARIN SODIUM 40 MG: 40 INJECTION SUBCUTANEOUS at 20:33

## 2020-01-01 RX ADMIN — AZITHROMYCIN 500 MG: 500 INJECTION, POWDER, LYOPHILIZED, FOR SOLUTION INTRAVENOUS at 10:24

## 2020-01-01 RX ADMIN — METOPROLOL SUCCINATE 25 MG: 25 TABLET, FILM COATED, EXTENDED RELEASE ORAL at 08:28

## 2020-01-01 RX ADMIN — ENOXAPARIN SODIUM 40 MG: 40 INJECTION SUBCUTANEOUS at 20:25

## 2020-01-01 RX ADMIN — FERROUS SULFATE TAB 325 MG (65 MG ELEMENTAL FE) 325 MG: 325 (65 FE) TAB at 18:12

## 2020-01-01 RX ADMIN — Medication 10 ML: at 08:35

## 2020-01-01 RX ADMIN — POTASSIUM CHLORIDE 40 MEQ: 1500 TABLET, EXTENDED RELEASE ORAL at 09:17

## 2020-01-01 RX ADMIN — ATORVASTATIN CALCIUM 80 MG: 80 TABLET, FILM COATED ORAL at 20:35

## 2020-01-01 RX ADMIN — ATORVASTATIN CALCIUM 80 MG: 80 TABLET, FILM COATED ORAL at 20:54

## 2020-01-01 RX ADMIN — MIDODRINE HYDROCHLORIDE 2.5 MG: 2.5 TABLET ORAL at 11:59

## 2020-01-01 RX ADMIN — INSULIN LISPRO 2 UNITS: 100 INJECTION, SOLUTION INTRAVENOUS; SUBCUTANEOUS at 13:20

## 2020-01-01 RX ADMIN — POLYETHYLENE GLYCOL 3350 17 G: 17 POWDER, FOR SOLUTION ORAL at 08:13

## 2020-01-01 RX ADMIN — Medication 10 ML: at 23:14

## 2020-01-01 RX ADMIN — FERROUS SULFATE TAB 325 MG (65 MG ELEMENTAL FE) 325 MG: 325 (65 FE) TAB at 17:14

## 2020-01-01 RX ADMIN — Medication 10 ML: at 08:40

## 2020-01-01 RX ADMIN — ATORVASTATIN CALCIUM 80 MG: 80 TABLET, FILM COATED ORAL at 22:25

## 2020-01-01 RX ADMIN — INSULIN LISPRO 1 UNITS: 100 INJECTION, SOLUTION INTRAVENOUS; SUBCUTANEOUS at 11:48

## 2020-01-01 RX ADMIN — INSULIN LISPRO 1 UNITS: 100 INJECTION, SOLUTION INTRAVENOUS; SUBCUTANEOUS at 08:34

## 2020-01-01 RX ADMIN — INSULIN LISPRO 1 UNITS: 100 INJECTION, SOLUTION INTRAVENOUS; SUBCUTANEOUS at 18:41

## 2020-01-01 RX ADMIN — PREGABALIN 200 MG: 75 CAPSULE ORAL at 21:08

## 2020-01-01 RX ADMIN — IOPAMIDOL 80 ML: 755 INJECTION, SOLUTION INTRAVENOUS at 19:07

## 2020-01-01 RX ADMIN — BUMETANIDE 2 MG: 0.25 INJECTION INTRAMUSCULAR; INTRAVENOUS at 08:42

## 2020-01-01 RX ADMIN — PIPERACILLIN AND TAZOBACTAM 3.38 G: 3; .375 INJECTION, POWDER, LYOPHILIZED, FOR SOLUTION INTRAVENOUS at 17:48

## 2020-01-01 RX ADMIN — AMIODARONE HYDROCHLORIDE 200 MG: 200 TABLET ORAL at 09:22

## 2020-01-01 RX ADMIN — ROPINIROLE HYDROCHLORIDE 0.5 MG: 0.5 TABLET, FILM COATED ORAL at 20:44

## 2020-01-01 RX ADMIN — SACUBITRIL AND VALSARTAN 1 TABLET: 24; 26 TABLET, FILM COATED ORAL at 20:04

## 2020-01-01 RX ADMIN — METOLAZONE 5 MG: 5 TABLET ORAL at 08:34

## 2020-01-01 RX ADMIN — POTASSIUM CHLORIDE 40 MEQ: 1500 TABLET, EXTENDED RELEASE ORAL at 08:23

## 2020-01-01 RX ADMIN — Medication 10 ML: at 09:18

## 2020-01-01 RX ADMIN — MIDODRINE HYDROCHLORIDE 2.5 MG: 2.5 TABLET ORAL at 09:16

## 2020-01-01 RX ADMIN — BUMETANIDE 1 MG: 0.25 INJECTION INTRAMUSCULAR; INTRAVENOUS at 20:35

## 2020-01-01 RX ADMIN — PIPERACILLIN AND TAZOBACTAM 3.38 G: 3; .375 INJECTION, POWDER, LYOPHILIZED, FOR SOLUTION INTRAVENOUS at 02:15

## 2020-01-01 RX ADMIN — INSULIN GLARGINE 18 UNITS: 100 INJECTION, SOLUTION SUBCUTANEOUS at 20:10

## 2020-01-01 RX ADMIN — ASPIRIN 81 MG: 81 TABLET ORAL at 08:23

## 2020-01-01 RX ADMIN — PREGABALIN 200 MG: 75 CAPSULE ORAL at 08:12

## 2020-01-01 RX ADMIN — MIDODRINE HYDROCHLORIDE 2.5 MG: 2.5 TABLET ORAL at 17:01

## 2020-01-01 RX ADMIN — INSULIN LISPRO 1 UNITS: 100 INJECTION, SOLUTION INTRAVENOUS; SUBCUTANEOUS at 12:06

## 2020-01-01 RX ADMIN — FERROUS SULFATE TAB 325 MG (65 MG ELEMENTAL FE) 325 MG: 325 (65 FE) TAB at 08:42

## 2020-01-01 RX ADMIN — INSULIN GLARGINE 18 UNITS: 100 INJECTION, SOLUTION SUBCUTANEOUS at 21:26

## 2020-01-01 RX ADMIN — FOLIC ACID 1 MG: 1 TABLET ORAL at 14:01

## 2020-01-01 RX ADMIN — IOPAMIDOL 80 ML: 755 INJECTION, SOLUTION INTRAVENOUS at 10:34

## 2020-01-01 RX ADMIN — SACUBITRIL AND VALSARTAN 1 TABLET: 24; 26 TABLET, FILM COATED ORAL at 23:10

## 2020-01-01 RX ADMIN — AZITHROMYCIN 500 MG: 500 INJECTION, POWDER, LYOPHILIZED, FOR SOLUTION INTRAVENOUS at 09:25

## 2020-01-01 RX ADMIN — Medication 10 ML: at 20:35

## 2020-01-01 RX ADMIN — ATORVASTATIN CALCIUM 80 MG: 80 TABLET, FILM COATED ORAL at 21:08

## 2020-01-01 RX ADMIN — ROPINIROLE HYDROCHLORIDE 0.5 MG: 0.5 TABLET, FILM COATED ORAL at 20:04

## 2020-01-01 RX ADMIN — MIDODRINE HYDROCHLORIDE 2.5 MG: 2.5 TABLET ORAL at 17:14

## 2020-01-01 RX ADMIN — METOLAZONE 5 MG: 5 TABLET ORAL at 08:42

## 2020-01-01 RX ADMIN — ENOXAPARIN SODIUM 40 MG: 40 INJECTION SUBCUTANEOUS at 20:04

## 2020-01-01 RX ADMIN — BUMETANIDE 2 MG: 0.25 INJECTION INTRAMUSCULAR; INTRAVENOUS at 21:24

## 2020-01-01 RX ADMIN — PREGABALIN 200 MG: 75 CAPSULE ORAL at 20:25

## 2020-01-01 RX ADMIN — FLUDEOXYGLUCOSE F 18 16 MILLICURIE: 200 INJECTION, SOLUTION INTRAVENOUS at 10:18

## 2020-01-01 RX ADMIN — SODIUM CHLORIDE 250 ML: 9 INJECTION, SOLUTION INTRAVENOUS at 14:50

## 2020-01-01 RX ADMIN — Medication 1 MG: at 09:39

## 2020-01-01 RX ADMIN — BUMETANIDE 1 MG: 0.25 INJECTION INTRAMUSCULAR; INTRAVENOUS at 08:38

## 2020-01-01 RX ADMIN — ROPINIROLE HYDROCHLORIDE 0.5 MG: 0.5 TABLET, FILM COATED ORAL at 20:54

## 2020-01-01 RX ADMIN — Medication 10 ML: at 20:45

## 2020-01-01 RX ADMIN — MIDODRINE HYDROCHLORIDE 2.5 MG: 2.5 TABLET ORAL at 12:08

## 2020-01-01 RX ADMIN — ENOXAPARIN SODIUM 40 MG: 40 INJECTION SUBCUTANEOUS at 20:34

## 2020-01-01 RX ADMIN — BUMETANIDE 1 MG: 1 TABLET ORAL at 08:12

## 2020-01-01 RX ADMIN — INSULIN GLARGINE 18 UNITS: 100 INJECTION, SOLUTION SUBCUTANEOUS at 22:24

## 2020-01-01 RX ADMIN — CEFTRIAXONE SODIUM 1 G: 1 INJECTION, POWDER, FOR SOLUTION INTRAMUSCULAR; INTRAVENOUS at 10:56

## 2020-01-01 RX ADMIN — BUMETANIDE 2 MG: 0.25 INJECTION INTRAMUSCULAR; INTRAVENOUS at 21:08

## 2020-01-01 RX ADMIN — INSULIN LISPRO 6 UNITS: 100 INJECTION, SOLUTION INTRAVENOUS; SUBCUTANEOUS at 17:47

## 2020-01-01 RX ADMIN — CLOPIDOGREL BISULFATE 75 MG: 75 TABLET ORAL at 08:38

## 2020-01-01 RX ADMIN — INSULIN LISPRO 1 UNITS: 100 INJECTION, SOLUTION INTRAVENOUS; SUBCUTANEOUS at 17:13

## 2020-01-01 RX ADMIN — FAMOTIDINE 20 MG: 20 TABLET, FILM COATED ORAL at 08:27

## 2020-01-01 RX ADMIN — PIPERACILLIN AND TAZOBACTAM 3.38 G: 3; .375 INJECTION, POWDER, LYOPHILIZED, FOR SOLUTION INTRAVENOUS at 10:33

## 2020-01-01 RX ADMIN — POTASSIUM CHLORIDE 40 MEQ: 1500 TABLET, EXTENDED RELEASE ORAL at 21:18

## 2020-01-01 RX ADMIN — MIDODRINE HYDROCHLORIDE 2.5 MG: 2.5 TABLET ORAL at 08:13

## 2020-01-01 RX ADMIN — ENOXAPARIN SODIUM 40 MG: 40 INJECTION SUBCUTANEOUS at 21:24

## 2020-01-01 RX ADMIN — ATORVASTATIN CALCIUM 80 MG: 80 TABLET, FILM COATED ORAL at 21:24

## 2020-01-01 RX ADMIN — ROPINIROLE HYDROCHLORIDE 0.5 MG: 0.5 TABLET, FILM COATED ORAL at 22:25

## 2020-01-01 RX ADMIN — FAMOTIDINE 20 MG: 20 TABLET, FILM COATED ORAL at 08:41

## 2020-01-01 RX ADMIN — FAMOTIDINE 20 MG: 20 TABLET, FILM COATED ORAL at 20:26

## 2020-01-01 RX ADMIN — AMIODARONE HYDROCHLORIDE 200 MG: 200 TABLET ORAL at 08:13

## 2020-01-01 RX ADMIN — ROPINIROLE HYDROCHLORIDE 0.5 MG: 0.5 TABLET, FILM COATED ORAL at 20:25

## 2020-01-01 RX ADMIN — INSULIN LISPRO 1 UNITS: 100 INJECTION, SOLUTION INTRAVENOUS; SUBCUTANEOUS at 17:04

## 2020-01-01 RX ADMIN — FOLIC ACID 1 MG: 1 TABLET ORAL at 08:24

## 2020-01-01 RX ADMIN — PREGABALIN 200 MG: 75 CAPSULE ORAL at 20:33

## 2020-01-01 RX ADMIN — Medication 10 ML: at 20:34

## 2020-01-01 RX ADMIN — PREGABALIN 200 MG: 75 CAPSULE ORAL at 08:34

## 2020-01-01 RX ADMIN — CEFTRIAXONE SODIUM 1 G: 1 INJECTION, POWDER, FOR SOLUTION INTRAMUSCULAR; INTRAVENOUS at 10:10

## 2020-01-01 RX ADMIN — FAMOTIDINE 20 MG: 20 TABLET, FILM COATED ORAL at 09:17

## 2020-01-01 RX ADMIN — ACETAMINOPHEN 650 MG: 325 TABLET ORAL at 13:21

## 2020-01-01 RX ADMIN — POLYETHYLENE GLYCOL 3350 17 G: 17 POWDER, FOR SOLUTION ORAL at 09:33

## 2020-01-01 RX ADMIN — Medication 10 ML: at 22:32

## 2020-01-01 RX ADMIN — ASPIRIN 81 MG: 81 TABLET ORAL at 08:12

## 2020-01-01 RX ADMIN — PREGABALIN 200 MG: 75 CAPSULE ORAL at 22:23

## 2020-01-01 RX ADMIN — MIDODRINE HYDROCHLORIDE 2.5 MG: 2.5 TABLET ORAL at 15:07

## 2020-01-01 RX ADMIN — CLOPIDOGREL BISULFATE 75 MG: 75 TABLET ORAL at 08:12

## 2020-01-01 RX ADMIN — POLYETHYLENE GLYCOL 3350 17 G: 17 POWDER, FOR SOLUTION ORAL at 09:22

## 2020-01-01 RX ADMIN — AMIODARONE HYDROCHLORIDE 200 MG: 200 TABLET ORAL at 09:16

## 2020-01-01 RX ADMIN — ASPIRIN 81 MG: 81 TABLET ORAL at 08:34

## 2020-01-01 RX ADMIN — FOLIC ACID 1 MG: 1 TABLET ORAL at 08:28

## 2020-01-01 RX ADMIN — Medication 10 ML: at 08:14

## 2020-01-01 RX ADMIN — INSULIN LISPRO 3 UNITS: 100 INJECTION, SOLUTION INTRAVENOUS; SUBCUTANEOUS at 13:58

## 2020-01-01 RX ADMIN — POTASSIUM CHLORIDE 40 MEQ: 20 TABLET, EXTENDED RELEASE ORAL at 14:52

## 2020-01-01 RX ADMIN — CLOPIDOGREL BISULFATE 75 MG: 75 TABLET ORAL at 08:42

## 2020-01-01 RX ADMIN — Medication 10 ML: at 09:17

## 2020-01-01 RX ADMIN — ASPIRIN 81 MG: 81 TABLET ORAL at 09:17

## 2020-01-01 RX ADMIN — AMIODARONE HYDROCHLORIDE 200 MG: 200 TABLET ORAL at 08:42

## 2020-01-01 RX ADMIN — IOPAMIDOL 80 ML: 755 INJECTION, SOLUTION INTRAVENOUS at 14:19

## 2020-01-01 RX ADMIN — INSULIN LISPRO 1 UNITS: 100 INJECTION, SOLUTION INTRAVENOUS; SUBCUTANEOUS at 17:11

## 2020-01-01 RX ADMIN — FOLIC ACID 1 MG: 1 TABLET ORAL at 09:17

## 2020-01-01 RX ADMIN — FERROUS SULFATE TAB 325 MG (65 MG ELEMENTAL FE) 325 MG: 325 (65 FE) TAB at 17:03

## 2020-01-01 RX ADMIN — CEFTRIAXONE SODIUM 1 G: 1 INJECTION, POWDER, FOR SOLUTION INTRAMUSCULAR; INTRAVENOUS at 08:40

## 2020-01-01 RX ADMIN — INSULIN LISPRO 1 UNITS: 100 INJECTION, SOLUTION INTRAVENOUS; SUBCUTANEOUS at 09:18

## 2020-01-01 RX ADMIN — INSULIN GLARGINE 18 UNITS: 100 INJECTION, SOLUTION SUBCUTANEOUS at 21:52

## 2020-01-01 RX ADMIN — FOLIC ACID 1 MG: 1 TABLET ORAL at 08:12

## 2020-01-01 RX ADMIN — AZITHROMYCIN 500 MG: 500 INJECTION, POWDER, LYOPHILIZED, FOR SOLUTION INTRAVENOUS at 11:41

## 2020-01-01 RX ADMIN — POTASSIUM CHLORIDE 40 MEQ: 1500 TABLET, EXTENDED RELEASE ORAL at 20:52

## 2020-01-01 RX ADMIN — BUMETANIDE 1 MG: 1 TABLET ORAL at 09:16

## 2020-01-01 RX ADMIN — Medication 1 MG: at 09:35

## 2020-01-01 RX ADMIN — Medication 10 ML: at 08:42

## 2020-01-01 RX ADMIN — SACUBITRIL AND VALSARTAN 1 TABLET: 24; 26 TABLET, FILM COATED ORAL at 13:59

## 2020-01-01 RX ADMIN — BUMETANIDE 1 MG: 0.25 INJECTION INTRAMUSCULAR; INTRAVENOUS at 20:26

## 2020-01-01 RX ADMIN — FERROUS SULFATE TAB 325 MG (65 MG ELEMENTAL FE) 325 MG: 325 (65 FE) TAB at 08:28

## 2020-01-01 RX ADMIN — AZITHROMYCIN 500 MG: 500 INJECTION, POWDER, LYOPHILIZED, FOR SOLUTION INTRAVENOUS at 09:11

## 2020-01-01 RX ADMIN — ENOXAPARIN SODIUM 40 MG: 40 INJECTION SUBCUTANEOUS at 22:24

## 2020-01-01 RX ADMIN — PIPERACILLIN AND TAZOBACTAM 3.38 G: 3; .375 INJECTION, POWDER, LYOPHILIZED, FOR SOLUTION INTRAVENOUS at 15:20

## 2020-01-01 RX ADMIN — ATORVASTATIN CALCIUM 80 MG: 80 TABLET, FILM COATED ORAL at 20:24

## 2020-01-01 RX ADMIN — FAMOTIDINE 20 MG: 20 TABLET, FILM COATED ORAL at 08:23

## 2020-01-01 RX ADMIN — MIDODRINE HYDROCHLORIDE 2.5 MG: 2.5 TABLET ORAL at 18:12

## 2020-01-01 RX ADMIN — FERROUS SULFATE TAB 325 MG (65 MG ELEMENTAL FE) 325 MG: 325 (65 FE) TAB at 09:16

## 2020-01-01 RX ADMIN — BUMETANIDE 2 MG: 0.25 INJECTION, SOLUTION INTRAMUSCULAR; INTRAVENOUS at 14:51

## 2020-01-01 RX ADMIN — INSULIN LISPRO 2 UNITS: 100 INJECTION, SOLUTION INTRAVENOUS; SUBCUTANEOUS at 11:59

## 2020-01-01 RX ADMIN — FERROUS SULFATE TAB 325 MG (65 MG ELEMENTAL FE) 325 MG: 325 (65 FE) TAB at 08:12

## 2020-01-01 RX ADMIN — ROPINIROLE HYDROCHLORIDE 0.5 MG: 0.5 TABLET, FILM COATED ORAL at 20:35

## 2020-01-01 RX ADMIN — ATORVASTATIN CALCIUM 80 MG: 80 TABLET, FILM COATED ORAL at 20:33

## 2020-01-01 RX ADMIN — PREGABALIN 200 MG: 75 CAPSULE ORAL at 08:23

## 2020-01-01 RX ADMIN — POTASSIUM CHLORIDE 40 MEQ: 1500 TABLET, EXTENDED RELEASE ORAL at 20:25

## 2020-01-01 RX ADMIN — FERROUS SULFATE TAB 325 MG (65 MG ELEMENTAL FE) 325 MG: 325 (65 FE) TAB at 18:27

## 2020-01-01 RX ADMIN — METOPROLOL SUCCINATE 25 MG: 25 TABLET, FILM COATED, EXTENDED RELEASE ORAL at 17:03

## 2020-01-01 RX ADMIN — FOLIC ACID 1 MG: 1 TABLET ORAL at 08:42

## 2020-01-01 RX ADMIN — CLOPIDOGREL BISULFATE 75 MG: 75 TABLET ORAL at 08:28

## 2020-01-01 RX ADMIN — INSULIN LISPRO 2 UNITS: 100 INJECTION, SOLUTION INTRAVENOUS; SUBCUTANEOUS at 17:01

## 2020-01-01 RX ADMIN — ATORVASTATIN CALCIUM 80 MG: 80 TABLET, FILM COATED ORAL at 20:04

## 2020-01-01 RX ADMIN — Medication 10 ML: at 08:25

## 2020-01-01 RX ADMIN — Medication 1 MG: at 09:45

## 2020-01-01 RX ADMIN — BUMETANIDE 1 MG: 1 TABLET ORAL at 09:17

## 2020-01-01 RX ADMIN — POTASSIUM CHLORIDE 40 MEQ: 1500 TABLET, EXTENDED RELEASE ORAL at 08:12

## 2020-01-01 RX ADMIN — CLOPIDOGREL BISULFATE 75 MG: 75 TABLET ORAL at 08:23

## 2020-01-01 RX ADMIN — Medication 10 ML: at 21:07

## 2020-01-01 RX ADMIN — ROPINIROLE HYDROCHLORIDE 0.5 MG: 0.5 TABLET, FILM COATED ORAL at 21:08

## 2020-01-01 RX ADMIN — ENOXAPARIN SODIUM 40 MG: 40 INJECTION SUBCUTANEOUS at 20:52

## 2020-01-01 ASSESSMENT — ENCOUNTER SYMPTOMS
TROUBLE SWALLOWING: 0
COUGH: 0
COUGH: 0
NAUSEA: 0
SHORTNESS OF BREATH: 0
WHEEZING: 0
TROUBLE SWALLOWING: 0
EYE DISCHARGE: 0
NAUSEA: 0
NAUSEA: 0
TROUBLE SWALLOWING: 0
VOMITING: 0
ABDOMINAL PAIN: 0
BACK PAIN: 0
CONSTIPATION: 0
SORE THROAT: 0
SORE THROAT: 0
SHORTNESS OF BREATH: 1
BLOOD IN STOOL: 0
ABDOMINAL DISTENTION: 0
SINUS PRESSURE: 0
COLOR CHANGE: 1
NAUSEA: 0
COUGH: 0
NAUSEA: 0
CONSTIPATION: 0
BACK PAIN: 1
SHORTNESS OF BREATH: 0
ABDOMINAL PAIN: 0
SINUS PAIN: 0
ABDOMINAL DISTENTION: 0
TROUBLE SWALLOWING: 0
WHEEZING: 0
VOMITING: 0
ABDOMINAL DISTENTION: 0
CONSTIPATION: 0
SHORTNESS OF BREATH: 1
COLOR CHANGE: 1
SORE THROAT: 0
EYE REDNESS: 0
COUGH: 1
SINUS PRESSURE: 0
ABDOMINAL PAIN: 0
DIARRHEA: 0
WHEEZING: 0
SHORTNESS OF BREATH: 1
DIARRHEA: 0
VOMITING: 0
CHEST TIGHTNESS: 0
SORE THROAT: 0
ABDOMINAL PAIN: 0
COLOR CHANGE: 0
COUGH: 1
ABDOMINAL DISTENTION: 0
ABDOMINAL PAIN: 0
DIARRHEA: 0
CHEST TIGHTNESS: 0
DIARRHEA: 0
EYE DISCHARGE: 0
RECTAL PAIN: 0
ABDOMINAL PAIN: 0
CHEST TIGHTNESS: 0
BACK PAIN: 1
CONSTIPATION: 0
CHEST TIGHTNESS: 0
WHEEZING: 0
COLOR CHANGE: 0
BACK PAIN: 1
COLOR CHANGE: 0
NAUSEA: 0
FACIAL SWELLING: 0
SORE THROAT: 0
COUGH: 0
ABDOMINAL DISTENTION: 0
VOMITING: 0
SORE THROAT: 0
BLOOD IN STOOL: 0
COLOR CHANGE: 0
SHORTNESS OF BREATH: 1
WHEEZING: 0
VOICE CHANGE: 0
ABDOMINAL DISTENTION: 0
CHEST TIGHTNESS: 0
COUGH: 0
VOMITING: 0
VOICE CHANGE: 0
ABDOMINAL PAIN: 0
RECTAL PAIN: 0
WHEEZING: 0
SORE THROAT: 0
BLOOD IN STOOL: 0
EYE DISCHARGE: 0
COLOR CHANGE: 0
TROUBLE SWALLOWING: 0
COLOR CHANGE: 0
COLOR CHANGE: 0
ABDOMINAL DISTENTION: 0
EYE DISCHARGE: 0
SORE THROAT: 0
BACK PAIN: 1
BLOOD IN STOOL: 0
SHORTNESS OF BREATH: 1
BLOOD IN STOOL: 0
VOMITING: 0
FACIAL SWELLING: 0
FACIAL SWELLING: 0
SINUS PAIN: 0
BLOOD IN STOOL: 0
CONSTIPATION: 0
CHEST TIGHTNESS: 0
RECTAL PAIN: 0
CONSTIPATION: 0
SHORTNESS OF BREATH: 1
COUGH: 0
RHINORRHEA: 0
NAUSEA: 0
NAUSEA: 0
WHEEZING: 0
EYE PAIN: 0
BACK PAIN: 0
WHEEZING: 0
NAUSEA: 0
SORE THROAT: 0
DIARRHEA: 0
RHINORRHEA: 0
STRIDOR: 0
ABDOMINAL PAIN: 0
COUGH: 0
TROUBLE SWALLOWING: 0
COUGH: 0
EYE DISCHARGE: 0
FACIAL SWELLING: 0
ABDOMINAL PAIN: 0
TROUBLE SWALLOWING: 0
VOMITING: 0
DIARRHEA: 0
COUGH: 0
SHORTNESS OF BREATH: 1
WHEEZING: 0
CHEST TIGHTNESS: 0
ABDOMINAL DISTENTION: 0
SHORTNESS OF BREATH: 0
VOMITING: 0
VOICE CHANGE: 0
SHORTNESS OF BREATH: 1
COUGH: 0
BLOOD IN STOOL: 0
VOMITING: 0
RECTAL PAIN: 0
SHORTNESS OF BREATH: 1

## 2020-01-01 ASSESSMENT — PAIN SCALES - GENERAL
PAINLEVEL_OUTOF10: 2
PAINLEVEL_OUTOF10: 0
PAINLEVEL_OUTOF10: 3
PAINLEVEL_OUTOF10: 0

## 2020-01-01 ASSESSMENT — PATIENT HEALTH QUESTIONNAIRE - PHQ9
5. POOR APPETITE OR OVEREATING: 1
8. MOVING OR SPEAKING SO SLOWLY THAT OTHER PEOPLE COULD HAVE NOTICED. OR THE OPPOSITE, BEING SO FIGETY OR RESTLESS THAT YOU HAVE BEEN MOVING AROUND A LOT MORE THAN USUAL: 0
SUM OF ALL RESPONSES TO PHQ9 QUESTIONS 1 & 2: 4
10. IF YOU CHECKED OFF ANY PROBLEMS, HOW DIFFICULT HAVE THESE PROBLEMS MADE IT FOR YOU TO DO YOUR WORK, TAKE CARE OF THINGS AT HOME, OR GET ALONG WITH OTHER PEOPLE: 0
4. FEELING TIRED OR HAVING LITTLE ENERGY: 3
6. FEELING BAD ABOUT YOURSELF - OR THAT YOU ARE A FAILURE OR HAVE LET YOURSELF OR YOUR FAMILY DOWN: 1
9. THOUGHTS THAT YOU WOULD BE BETTER OFF DEAD, OR OF HURTING YOURSELF: 0
7. TROUBLE CONCENTRATING ON THINGS, SUCH AS READING THE NEWSPAPER OR WATCHING TELEVISION: 0
SUM OF ALL RESPONSES TO PHQ QUESTIONS 1-9: 9
3. TROUBLE FALLING OR STAYING ASLEEP: 0
2. FEELING DOWN, DEPRESSED OR HOPELESS: 3
1. LITTLE INTEREST OR PLEASURE IN DOING THINGS: 1
SUM OF ALL RESPONSES TO PHQ QUESTIONS 1-9: 9

## 2020-01-02 NOTE — CARE COORDINATION
1 tablet by mouth daily 12/18/19   Michel Lane MD   LANTUS SOLOSTAR 100 UNIT/ML injection pen INJECT 20 UNITS UNDER THE SKIN NIGHTLY 12/17/19   Breanna Eckert MD   pregabalin (LYRICA) 200 MG capsule Take 1 capsule by mouth 2 times daily for 90 days. 12/12/19 3/11/20  Breanna Eckert MD   amiodarone (CORDARONE) 200 MG tablet TAKE 1 TABLET DAILY 9/9/19   Michel Swift MD   enalapril (VASOTEC) 2.5 MG tablet Take 1 tablet by mouth daily 9/9/19   Michel Lane MD   midodrine (PROAMATINE) 5 MG tablet Take 1 tablet by mouth 3 times daily (with meals) 9/1/19   Socorro Culver MD   potassium chloride (KLOR-CON M) 20 MEQ extended release tablet Take 2 tablets by mouth as needed (Potassium Replacement) 9/1/19   Socorro Culver MD   metoprolol succinate (TOPROL XL) 25 MG extended release tablet Take 1 tablet by mouth daily 8/26/19   Karla Ricks MD   bumetanide (BUMEX) 0.5 MG tablet Take 1 tablet by mouth daily 8/25/19   Karla Ricks MD   metFORMIN (GLUCOPHAGE XR) 750 MG extended release tablet Take 1 tablet by mouth 2 times daily (with meals) 7/8/19   ROMAIN Cruz CNP   atorvastatin (LIPITOR) 80 MG tablet Take 1 tablet by mouth daily 6/5/19   Monico Rollins MD   ipratropium-albuterol (DUONEB) 0.5-2.5 (3) MG/3ML SOLN nebulizer solution Inhale 3 mLs into the lungs every 4 hours as needed for Shortness of Breath or Other (wheezing) 6/4/19   ROMAIN Forde CNP   blood glucose test strips (ASCENSIA AUTODISC VI;ONE TOUCH ULTRA TEST VI) strip Test once daily. Dispense One Touch Ultra Test Strips.   Dx: Type 2 diabetes (250.00) 12/19/18   Breanna Eckert MD   fluticasone (FLONASE) 50 MCG/ACT nasal spray 2 sprays by Nasal route daily 12/3/18   Breanna Eckert MD   Insulin Pen Needle (B-D UF III MINI PEN NEEDLES) 31G X 5 MM MISC 1 each by Does not apply route daily 11/19/18   Breanna Eckert MD   aspirin EC 81 MG EC tablet Take 1 tablet by mouth daily 4/11/18   Matheus Thomas MD       Future Appointments   Date Time Provider Candy Freire   1/9/2020  9:20 AM STR CT IMAGING RM1  OP EXPRESS STRZ OUT EXP STR Radiolog   1/20/2020 11:15 AM [address], MD SRPX Heart [facility] [place]   1/21/2020  1:30 PM STR OUT PT ONC INJ RM 05 STRZ OP ONC [place] HOD   2/18/2020 10:45 AM Cristino Alvarez MD Fam Med CG [facility] - [place]   3/11/2020 11:40 AM Pam Martin MD Oncology [facility] - [place]   4/13/2020  2:30 PM Tiffani Alcantar MD Adv Surg [facility] - [place]   6/17/2020 12:30 PM SCHEDULE, FERMIN PACER NURSE SRPX PCR PUT [facility] - [place]   10/28/2020  9:20 AM STR CT IMAGING RM1  OP EXPRESS STRZ OUT EXP STR Radiolog   11/2/2020 10:30 AM Ruma Baker PA-C SRPX CT/CV [facility] - [place]     ,   Diabetes Assessment    Meal Planning:  Avoidance of concentrated sweets   How often do you test your blood sugar?:  Daily   Do you have barriers with adherence to non-pharmacologic self-management interventions?  (Nutrition/Exercise/Self-Monitoring):  No   Have you ever had to go to the ED for symptoms of low blood sugar?:  No       No patient-reported symptoms   Do you have hyperglycemia symptoms?:  No   Do you have hypoglycemia symptoms?:  Yes   Frequency of Episodes:  1 Per:  Month   Last Blood Sugar Value:  63   Blood Sugar Monitoring Regimen:  Once a Day, Morning Fasting   Blood Sugar Trends:  No Change      ,   Congestive Heart Failure Assessment    Do you understand a low sodium diet?:  Yes  Do you understand how to read food labels?:  Yes  How many restaurant meals do you eat per week?:  0  Do you salt your food before tasting it?:  No     No patient-reported symptoms      Symptoms:   None:  Yes      Symptom course:  stable  Salt intake watch compared to last visit:  stable     ,   COPD Assessment    Does the patient understand envrionmental exposure?:  Yes  Does the patient have a nebulizer?:  Yes  Does the patient use a space with inhaled medications?:  No     No patient-reported symptoms         Symptoms:      Symptom course:  stable  Increase use of rapid

## 2020-01-16 NOTE — CARE COORDINATION
Surg [facility] [place]   6/17/2020 12:30 PM SCHEDULE, FERMIN DONYA NURSE SRPX PCR PUT [facility] [place]   10/28/2020  9:20 AM STR CT IMAGING RM1  OP EXPRESS STRZ OUT EXP STR Radiolog   11/2/2020 10:30 AM Ever Morse PA-C SRPX CT/CV [facility] [place]     ,   Diabetes Assessment    Meal Planning:  Avoidance of concentrated sweets   How often do you test your blood sugar?:  Daily   Do you have barriers with adherence to non-pharmacologic self-management interventions?  (Nutrition/Exercise/Self-Monitoring):  No   Have you ever had to go to the ED for symptoms of low blood sugar?:  No       No patient-reported symptoms   Do you have hyperglycemia symptoms?:  No   Do you have hypoglycemia symptoms?:  No   Last Blood Sugar Value:  95   Blood Sugar Monitoring Regimen:  Morning Fasting   Blood Sugar Trends:  No Change      ,   Congestive Heart Failure Assessment    Do you understand a low sodium diet?:  Yes  Do you understand how to read food labels?:  Yes  How many restaurant meals do you eat per week?:  0  Do you salt your food before tasting it?:  No     No patient-reported symptoms      Symptoms:   None:  Yes      Symptom course:  stable  Patient-reported weight (lb):  207  Weight trend:  stable  Salt intake watch compared to last visit:  stable     ,   COPD Assessment    Does the patient understand envrionmental exposure?:  Yes  Does the patient have a nebulizer?:  Yes  Does the patient use a space with inhaled medications?:  No     No patient-reported symptoms         Symptoms:   None:  Yes      Symptom course:  stable  Increase use of rapid acting/rescue inhaled medications?:  No  Change in chronic cough?:  No/At Baseline  Change in sputum?:  No/At Baseline     ,   General Assessment    Do you have any symptoms that are causing concern?:  No      and Care Coordination Episodes    Type: Amb Care Coordination  Episode: Complex Care  Noted: 10/24/2019  Comments: CTN referral

## 2020-01-20 NOTE — PROGRESS NOTES
wall extending into lateral wall, indicative of RCA lesion. Bowel and soft tissue attenuation interfering w/ counts of lateral wall. EF 29% w/ severe septal and inferolateral hypokinesis w/ very mild lateral wall hypokinesis being noted.  CARDIOVERSION  8-29-09    CHOLECYSTECTOMY  2005    Laparoscopic    COLONOSCOPY Left 1/9/2019    COLONOSCOPY performed by Pedro Anguiano MD at [facility] CATH LAB PROCEDURE  8-24-09    Multivessel disease demonstrated by LAD having 70-80%  proximal lesion and napkin-ring lesion at bifurcation w/ D2. D2 appears to be medium large caliber vessel which has an ostial lesion of 70-80%. left -to-left collaterals as well as left-to-right collaterals emanating from LAD to PDA. Circumflex had anterior marginal branch and posterior marginal branch.  HERNIA REPAIR      Multiple    HYSTERECTOMY  1997    INSERTION / REMOVAL / REPLACEMENT VENOUS ACCESS CATHETER Right 7/31/2019    SINGLE LUMEN SMART PORT INSERTION performed by Norris Camarena MD at [facility] 9 N/A 12/3/2019    SINGLE LUMEN HGFSGEFOY REMOVAL RIGHT SUBCLAVIAN performed by Norris Camarena MD at [facility] ECHOCARDIOGRAM  07-11-11    LV size mildly to moderately dilated. Systolic function markedly reduced. EF 25-30%. Severe diffuse hypokinesis. Grade 1 diastolic dysfunction. LA was mildly to moderately dilated. RV mildly dilated, systolic function mildly reduced. Mild MR and TR.  TRANSTHORACIC ECHOCARDIOGRAM  8-24-09    LV demonstrates severe hypokinesis of the inferior basal as well as  basal aneurysm being noted and paradoxical septal motion. EF 45-50%. LA is moderately dilated and AV demonstrates mild AV sclerosis w/o AS and AI. Trace MR and TV insufficiency.       Family History   Problem Relation Age of Onset    Diabetes Father     Cancer Father 80        Bone    Hypertension Mother     Heart Disease 2 diabetes (250.00) 100 each 5    fluticasone (FLONASE) 50 MCG/ACT nasal spray 2 sprays by Nasal route daily 1 Bottle 1    Insulin Pen Needle (B-D UF III MINI PEN NEEDLES) 31G X 5 MM MISC 1 each by Does not apply route daily 100 each 3    aspirin EC 81 MG EC tablet Take 1 tablet by mouth daily 30 tablet 3    gabapentin (NEURONTIN) 400 MG capsule Take 1 capsule by mouth 3 times daily for 30 days. 90 capsule 0     No current facility-administered medications for this visit. Allergies   Allergen Reactions    Sulfa Antibiotics Swelling     Health Maintenance   Topic Date Due    DTaP/Tdap/Td vaccine (1 - Tdap) 01/03/1956    Shingles Vaccine (1 of 2) 01/03/1994    Annual Wellness Visit (AWV)  05/29/2019    Lipid screen  05/31/2020    TSH testing  08/28/2020    Creatinine monitoring  10/01/2020    Potassium monitoring  12/03/2020    Flu vaccine  Completed    Pneumococcal 65+ years Vaccine  Completed    DEXA (modify frequency per FRAX score)  Addressed       Subjective:  Review of Systems  General:   No fever, no chills, some fatigue or weight loss  Pulmonary:    No dyspnea, no wheezing  Cardiac:    Denies recent chest pain,   GI:     No nausea or vomiting, no abdominal pain  Neuro:     No dizziness or light headedness,   Musculoskeletal:  No recent active issues  Extremities:   No edema, no obvious claudication       Objective:  Physical Exam  BP (!) 164/100   Pulse 80   Ht 5' 2\" (1.575 m)   Wt 207 lb 6.4 oz (94.1 kg)   BMI 37.93 kg/m²   General:   Well developed, well nourished  Lungs:    Clear to auscultation  Heart:    Normal S1 S2, Slight murmur. no rubs, no gallops  Abdomen:   Soft, non tender, no organomegalies, positive bowel sounds  Extremities:   No edema, no cyanosis, good peripheral pulses  Neurological:   Awake, alert, oriented. No obvious focal deficits  Musculoskelatal:  No obvious deformities    Assessment:      Diagnosis Orders   1.  Coronary artery disease involving coronary bypass

## 2020-01-20 NOTE — PROGRESS NOTES
Pt here for 8 month follow up. Pt c/o a little swelling in feet and numbness in hands and feet. Pt denies chest pain, SOB, lightheadedness, dizziness, palpitations.

## 2020-01-27 NOTE — PROGRESS NOTES
Aravind Keisha Sinclairkadi  1/27/2020    Chaperone: No    Advanced Directives     Power of  Living Will    Not on File Not on File        Vitals:    01/27/20 0921   BP: (!) 107/54   Pulse: 81   Resp: 20   Temp: 97.7 °F (36.5 °C)   SpO2: 90%    : Wt Readings from Last 3 Encounters:   01/27/20 208 lb 1.8 oz (94.4 kg)   01/20/20 207 lb 6.4 oz (94.1 kg)   12/18/19 207 lb 12.8 oz (94.3 kg)      Height: 5' 2.01\" (157.5 cm)     Lab Results   Component Value Date    CREATININE 0.9 10/01/2019     Lab Results   Component Value Date    BUN 13 10/01/2019     Mediport: no    Pacemaker/ICD: yes, card on file    Previous XRT: no    Past Medical History:   Diagnosis Date    Breast cancer ([facility] [address] 06/2019    right invasive ductal carcinoma    CAD (coronary artery disease)     sees Dr Dann Navarro CHF (congestive heart failure) ([facility] [address]     Hyperlipidemia     Hypertension     ICD (implantable cardiac defibrillator), dual, in situ     [facility] dual ICD    Numbness and tingling     both feet    Pneumonia     Shingles 12/2014    Sleep apnea     St Boris dual ICD     Type II or unspecified type diabetes mellitus without mention of complication, not stated as uncontrolled     Ventricular arrhythmia      Past Surgical History:   Procedure Laterality Date    BRE[facility] 06/11/2019    [facility]    BREAST LUMPECTOMY Right 12/3/2019    RIGHT BREAST LUMPECTOMY, SENTINAL LYMPH NODE BIOPSY, TARGETED AXILLARY NODE DISSECTION, PREOP NEEDLE LOC X 2 performed by Volodymyr Abdalla MD at [address] [place] [address]  9-08-09    [facility]    CARDIOVASCULAR STRESS TEST  01-27-10    Excellent treadmill exercise. Improved functional capacity to functional class 1 completed 8 METS. Hemodynamic response was appropriate. No ischemic ECG changes noted.  CARDIOVASCULAR STRESS TEST  10-28-09    No evidence of stress induced ischemia.  Evidence of  prior transmural MI demonstrated by transient fixed defects of inferior wall extending into lateral wall, indicative of RCA lesion. Bowel and soft tissue attenuation interfering w/ counts of lateral wall. EF 29% w/ severe septal and inferolateral hypokinesis w/ very mild lateral wall hypokinesis being noted.  CARDIOVERSION  8-29-09    CHOLECYSTECTOMY  2005    Laparoscopic    COLONOSCOPY Left 1/9/2019    COLONOSCOPY performed by Lynn Douglas MD at [facility] CATH LAB PROCEDURE  8-24-09    Multivessel disease demonstrated by LAD having 70-80%  proximal lesion and napkin-ring lesion at bifurcation w/ D2. D2 appears to be medium large caliber vessel which has an ostial lesion of 70-80%. left -to-left collaterals as well as left-to-right collaterals emanating from LAD to PDA. Circumflex had anterior marginal branch and posterior marginal branch.  HERNIA REPAIR      Multiple    HYSTERECTOMY  1997    INSERTION / REMOVAL / REPLACEMENT VENOUS ACCESS CATHETER Right 7/31/2019    SINGLE LUMEN SMART PORT INSERTION performed by Manuel Jarvis MD at [facility] 9 N/A 12/3/2019    SINGLE LUMEN BLDIHJCWN REMOVAL RIGHT SUBCLAVIAN performed by Manuel Jarvis MD at [address] [place] [address] ECHOCARDIOGRAM  07-11-11    LV size mildly to moderately dilated. Systolic function markedly reduced. EF 25-30%. Severe diffuse hypokinesis. Grade 1 diastolic dysfunction. LA was mildly to moderately dilated. RV mildly dilated, systolic function mildly reduced. Mild MR and TR.  TRANSTHORACIC ECHOCARDIOGRAM  8-24-09    LV demonstrates severe hypokinesis of the inferior basal as well as  basal aneurysm being noted and paradoxical septal motion. EF 45-50%. LA is moderately dilated and AV demonstrates mild AV sclerosis w/o AS and AI. Trace MR and TV insufficiency.       Allergies   Allergen Reactions    Sulfa Antibiotics Swelling        Current Outpatient Medications:     vitamin B-12 (CYANOCOBALAMIN) 100 MCG tablet, Take 1,000 mcg by mouth three times daily, Disp: , Rfl:     rOPINIRole (REQUIP) 0.5 MG tablet, Take 0.5 mg by mouth daily, Disp: , Rfl:     clopidogrel (PLAVIX) 75 MG tablet, Take 1 tablet by mouth daily, Disp: 90 tablet, Rfl: 3    LANTUS SOLOSTAR 100 UNIT/ML injection pen, INJECT 20 UNITS UNDER THE SKIN NIGHTLY, Disp: 15 mL, Rfl: 4    pregabalin (LYRICA) 200 MG capsule, Take 1 capsule by mouth 2 times daily for 90 days. , Disp: 60 capsule, Rfl: 2    amiodarone (CORDARONE) 200 MG tablet, TAKE 1 TABLET DAILY, Disp: 90 tablet, Rfl: 4    enalapril (VASOTEC) 2.5 MG tablet, Take 1 tablet by mouth daily (Patient taking differently: Take 2.5 mg by mouth 2 times daily Indications: Patient taking BID ), Disp: 90 tablet, Rfl: 3    bumetanide (BUMEX) 0.5 MG tablet, Take 1 tablet by mouth daily (Patient taking differently: Take 0.5 mg by mouth 2 times daily Indications: Pt taking BID ), Disp: 15 tablet, Rfl: 0    metFORMIN (GLUCOPHAGE XR) 750 MG extended release tablet, Take 1 tablet by mouth 2 times daily (with meals), Disp: 30 tablet, Rfl: 11    atorvastatin (LIPITOR) 80 MG tablet, Take 1 tablet by mouth daily, Disp: 90 tablet, Rfl: 1    blood glucose test strips (ASCENSIA AUTODISC VI;ONE TOUCH ULTRA TEST VI) strip, Test once daily. Dispense One Touch Ultra Test Strips.   Dx: Type 2 diabetes (250.00), Disp: 100 each, Rfl: 5    fluticasone (FLONASE) 50 MCG/ACT nasal spray, 2 sprays by Nasal route daily, Disp: 1 Bottle, Rfl: 1    Insulin Pen Needle (B-D UF III MINI PEN NEEDLES) 31G X 5 MM MISC, 1 each by Does not apply route daily, Disp: 100 each, Rfl: 3    aspirin EC 81 MG EC tablet, Take 1 tablet by mouth daily, Disp: 30 tablet, Rfl: 3    midodrine (PROAMATINE) 5 MG tablet, Take 1 tablet by mouth 3 times daily (with meals), Disp: 90 tablet, Rfl: 3    potassium chloride (KLOR-CON M) 20 MEQ extended release tablet, Take 2 tablets by mouth as needed (Potassium Replacement), Disp: 60 tablet, Rfl: 3    ipratropium-albuterol

## 2020-01-27 NOTE — H&P
Radiation Oncology Consultation  Encounter Date: 2020 4:44 PM    Ms. Tavia Fowler is a 67 y.o. female  : 1945  MRN: [id number]  [facility]  Requesting Provider: Susan Ro MD   PCP:  Jean-Pierre Melendez MD  Surgeon:  Kenny Bledsoe MD  Reason for request: Breast Cancer      CONSULTANT: Len Conde      CHIEF COMPLAINT: M09.456 - Multifocal invasive, poorly differentiated ductal carcinoma, Grade 3, ER- MT- H2N-, Ki67 25%  1) Upper inner quadrant, 2.4 x 2.2 x 1.9 cm, Pathological Stage T2 N1a MX, Stage IIB  2) Upper outer quadrant, 0.9 x 1.4 x 0.8 cm, Pathological Stage T1c N1a MX, Stage IIA  One of three axillary lymph nodes positive. Negative margins (4 mm). No LVI. DCIS+ Grade 1. LCIS-  Questionable small lung metastasis on PET. HISTORY OF PRESENT ILLNESS: Patient is a 58-year-old female who was hospitalized in May 2019 for shortness of breath due to decompensated CHF. During her workup,a CTA was done and she was found to have a mass in the upper inner quadrant of the right breast and what was thought to be a lymph node (later determined to be a second primary) in the upper outer quadrant. Biopsy was performed on 19 confirming invasive ductal carcinoma involving both lesions. The tissue from the presumed lymph node did not show definite evidence that this was in fact a lymph node with only some scant lymphoid tissue present and no capsule. PET scan showed a questionable pulmonary nodule that was not seen on the CTA, but it was not amenable to biopsy. Patient was treated with neoadjuvant chemotherapy using dose dense Taxol started on 19 and resulting in hospitalization for hypotension, pneumonia and anemia. She also required hospitalization after Cycle 2. She was hospitalized a third time on 19, after a fall from which she could not get up, requiring a one month hospitalization. She was then admitted to a SNF for PT and rehab.   She was discharged home on 10/15/19. PET scan on 11/11/19 showed no new findings suspicious for metastatic disease. The pulmonary nodule was still present but with much less metabolic activity, but still questionable for a metastatic focus. Because of the difficulty tolerating chemotherapy and her cardiac problems, it was felt that she would not be able to tolerate further treatment. She therefore proceeded to undergo lumpectomy, removing both masses, and axillary sampling performed on 12/03/19. Pathology is as noted above with two separate primaries plus one positive axillary node. A recent CT of the chest with contrast performed on 01/9/20 did not show any evidence of metastatic disease within the lung parenchyma or thoracic lymph nodes, or bone. There was stable enlargement of both adrenal glands but metastatic disease was not excludable. Consultation has now been requested for evaluation and discussion regarding radiation therapy as completion of the local treatment of her malignancy.       Past Medical History:   Diagnosis Date    Breast cancer ([facility] [address] 06/2019    right invasive ductal carcinoma    CAD (coronary artery disease)     sees Dr Finis Litten CHF (congestive heart failure) ([facility] [address]     Hyperlipidemia     Hypertension     ICD (implantable cardiac defibrillator), dual, in situ     [facility] dual ICD    Numbness and tingling     both feet    Pneumonia     Shingles 12/2014    Sleep apnea     [facility] dual ICD     Type II or unspecified type diabetes mellitus without mention of complication, not stated as uncontrolled     Ventricular arrhythmia     Diabetes mellitus         Past Surgical History:   Procedure Laterality Date    BREAST BIOPSY Right 06/11/2019    [address] [place] [address]-    BREAST LUMPECTOMY Right 12/3/2019    RIGHT BREAST LUMPECTOMY, SENTINAL LYMPH NODE BIOPSY, TARGETED AXILLARY NODE DISSECTION, PREOP NEEDLE LOC X 2 performed by Manuel Jarvis MD at [address] [place] [address]  9-08-09    [facility]    CARDIOVASCULAR STRESS TEST  01-27-10    Excellent treadmill exercise. Improved functional capacity to functional class 1 completed 8 METS. Hemodynamic response was appropriate. No ischemic ECG changes noted.  CARDIOVASCULAR STRESS TEST  10-28-09    No evidence of stress induced ischemia. Evidence of  prior transmural MI demonstrated by transient fixed defects of inferior wall extending into lateral wall, indicative of RCA lesion. Bowel and soft tissue attenuation interfering w/ counts of lateral wall. EF 29% w/ severe septal and inferolateral hypokinesis w/ very mild lateral wall hypokinesis being noted.  CARDIOVERSION  8-29-09    CHOLECYSTECTOMY  2005    Laparoscopic    COLONOSCOPY Left 1/9/2019    COLONOSCOPY performed by Brenna Clement MD at [address] [place] [address] CATH LAB PROCEDURE  8-24-09    Multivessel disease demonstrated by LAD having 70-80%  proximal lesion and napkin-ring lesion at bifurcation w/ D2. D2 appears to be medium large caliber vessel which has an ostial lesion of 70-80%. left -to-left collaterals as well as left-to-right collaterals emanating from LAD to PDA. Circumflex had anterior marginal branch and posterior marginal branch.  HERNIA REPAIR      Multiple    HYSTERECTOMY  1997    INSERTION / REMOVAL / REPLACEMENT VENOUS ACCESS CATHETER Right 7/31/2019    SINGLE LUMEN SMART PORT INSERTION performed by Aaron Pichardo MD at / [facility] 9 N/A 12/3/2019    SINGLE LUMEN ZYCLQQHRU REMOVAL RIGHT SUBCLAVIAN performed by Aaron Pichardo MD at [address] [place] [address] ECHOCARDIOGRAM  07-11-11    LV size mildly to moderately dilated. Systolic function markedly reduced. EF 25-30%. Severe diffuse hypokinesis. Grade 1 diastolic dysfunction. LA was mildly to moderately dilated. RV mildly dilated, systolic function mildly reduced. Mild MR and TR.      TRANSTHORACIC ECHOCARDIOGRAM  8-24-09    LV demonstrates Fear of current or ex partner: Not on file     Emotionally abused: Not on file     Physically abused: Not on file     Forced sexual activity: Not on file   Other Topics Concern    Not on file   Social History Narrative    Not on file         ALLERGIES:   Allergies   Allergen Reactions    Sulfa Antibiotics Swelling of face          Current Outpatient Medications   Medication Sig Dispense Refill    vitamin B-12 (CYANOCOBALAMIN) 100 MCG tablet Take 1,000 mcg by mouth three times daily      rOPINIRole (REQUIP) 0.5 MG tablet Take 0.5 mg by mouth daily      clopidogrel (PLAVIX) 75 MG tablet Take 1 tablet by mouth daily 90 tablet 3    LANTUS SOLOSTAR 100 UNIT/ML injection pen INJECT 20 UNITS UNDER THE SKIN NIGHTLY 15 mL 4    pregabalin (LYRICA) 200 MG capsule Take 1 capsule by mouth 2 times daily for 90 days.  60 capsule 2    amiodarone (CORDARONE) 200 MG tablet TAKE 1 TABLET DAILY 90 tablet 4    enalapril (VASOTEC) 2.5 MG tablet Take 1 tablet by mouth daily (Patient taking differently: Take 2.5 mg by mouth 2 times daily Indications: Patient taking BID ) 90 tablet 3    midodrine (PROAMATINE) 5 MG tablet Take 1 tablet by mouth 3 times daily (with meals) 90 tablet 3    potassium chloride (KLOR-CON M) 20 MEQ extended release tablet Take 2 tablets by mouth as needed (Potassium Replacement) 60 tablet 3    bumetanide (BUMEX) 0.5 MG tablet Take 1 tablet by mouth daily (Patient taking differently: Take 0.5 mg by mouth 2 times daily Indications: Pt taking BID ) 15 tablet 0    metFORMIN (GLUCOPHAGE XR) 750 MG extended release tablet Take 1 tablet by mouth 2 times daily (with meals) 30 tablet 11    atorvastatin (LIPITOR) 80 MG tablet Take 1 tablet by mouth daily 90 tablet 1    ipratropium-albuterol (DUONEB) 0.5-2.5 (3) MG/3ML SOLN nebulizer solution Inhale 3 mLs into the lungs every 4 hours as needed for Shortness of Breath or Other (wheezing) 360 mL 5    blood glucose test strips (ASCENSIA AUTODISC VI;ONE TOUCH ULTRA TEST VI) strip Test once daily. Dispense One Touch Ultra Test Strips. Dx: Type 2 diabetes (250.00) 100 each 5    fluticasone (FLONASE) 50 MCG/ACT nasal spray 2 sprays by Nasal route daily 1 Bottle 1    Insulin Pen Needle (B-D UF III MINI PEN NEEDLES) 31G X 5 MM MISC 1 each by Does not apply route daily 100 each 3    aspirin EC 81 MG EC tablet Take 1 tablet by mouth daily 30 tablet 3     No current facility-administered medications for this encounter. No outpatient medications have been marked as taking for the 20 encounter [facility] Encounter) with Pepito Orozco MD.          LABORATORY STUDIES:   CBC:   Lab Results   Component Value Date    WBC 7.6 10/29/2019    RBC 3.74 10/29/2019    HGB 10.6 10/29/2019    HCT 34.1 10/29/2019    MCV 91.2 10/29/2019    MCH 28.3 10/29/2019    MCHC 31.1 10/29/2019    RDW 20.5 2019     10/29/2019    MPV 8.7 2019     CMP:  Lab Results   Component Value Date     10/01/2019    K 3.9 2019     10/01/2019    CO2 30 10/01/2019    BUN 13 10/01/2019    CREATININE 0.9 10/01/2019    LABGLOM 61 10/01/2019    LABGLOM 81 2019    GLUCOSE 66 10/01/2019    GLUCOSE 130 2012    PROT 6.6 10/29/2019    LABALBU 3.7 10/29/2019    LABALBU 4.5 2012    CALCIUM 8.5 10/01/2019    BILITOT 0.3 10/29/2019    ALKPHOS 71 10/29/2019    AST 14 10/29/2019    ALT 11 10/29/2019     Onc labs:   Lab Results   Component Value Date     2019         PATHOLOGY: See above      RADIOLOGIC STUDIES: See above      REVIEW OF SYSTEMS:  All organ systems were reviewed and were unremarkable expect as noted above and for peripheral neuropathy involving mostly the distal lower extremities but to a lesser extent the fingers. PHYSICAL EXAMINATION:   VITAL SIGNS: Height 1.575 m; Wt. 94.4 kg; /54; Pulse 81; Resp 20; O2Sat 90%;  Temp 36.5 C  ECOG PERFORMANCE STATUS: 1 PAIN RATIN/10  GENERAL: Pleasant well-developed, well-nourished white female given, and potential risks and side effects to include fatigue, skin reaction which can sometimes be severe, and possible damage to the lung. Fortunately this is a right sided lesion so that the heart should be able to be spared. We also have discussed what she can expect from a cosmetic standpoint with possible changes in the size, shape, texture and pigmentation of the breast.  We have reviewed the consent form and informed consent was obtained. Her questions and those of her  were answered to their stated satisfaction. PLAN:  1. Patient has been scheduled for simulation on 01/29/19.  2. Radiation therapy to the right breast and loco-regional lymphatics, including the internal mammary nodes, with a boost to the tumor bed, will begin after completion of treatment planning. Thank you for allowing my assistance in the care of your patient. Electronically signed by Sofía Chou MD on 1/27/2020 at 4:44 PM       ATTESTATION: 90 minutes spent actively reviewing patient data; 60 minutes face-to-face time,  >50% spent in counseling/discussion/education. CC:  ACC:      This document was created using a voice-recognition program.  Computer generated transcription errors may be present.

## 2020-01-28 NOTE — PROGRESS NOTES
[facility]  [facility], [address]  Phone: [phone]   Toll Free: [phone]   Fax: [phone]    RADIATION ONCOLOGY EDUCATION    CHIEF COMPLAINT: Lily Leavitt presents to radiation oncology today for education prior to starting radiation treatment to Right breast and lymph nodes. HISTORY OF PRESENT ILLNESS: Lily Leavitt was diagnosed with multifocal invasive poorly differentiated ductal carcinoma of right breast in June 2019. She underwent treatment with chemotherapy, followed by surgical resection. She has now been referred to start radiation treatment. EXAMINATION:   CONSTITUTIONAL: Lily Leavitt is a pleasant well developed adult female. NEUROLOGICAL: She is alert and oriented x3 in no acute distress. Clear mentation. MOOD/AFFECT: Appropriate for place and situation. PLAN:   1. Discussed with patient the steps involved with set up and initiation of radiation therapy (including treatment simulation and verification). Also reviewed the logistics of treatment (day, time and number of treatments). Expected and potential side effects (both short and long-term) were discussed in detail. 2. Skin care and moisturization instructions were discussed in detail. 3. Patient was agreeable to Massage referral which will be placed once treatment is initiated. She has already seen physical therapy. 4. Lily Leavitt had the opportunity to ask questions, and indicated that her questions were satisfactorily addressed. ATTESTATION: I spent 40 minutes face to face with Lily Leavitt , more than half of that time was spent counseling and coordinating care.

## 2020-02-04 NOTE — CARE COORDINATION
Ambulatory Care Coordination Note  2/4/2020  CM Risk Score: 8  Charlson 10 Year Mortality Risk Score: 100%     ACC: Karin Fofana    Summary Note: I spoke with the patient for continued Care Coordination follow up and education. Patient states she is doing good. Denies chest pain, tightness or palpations. Breathing is at baseline. No current coughing or wheezing. Instructed patient on importance of early symptom recognition to prevent hospitalization and ED visits. Weight remains stable around 207 lbs. Patient states BS's continue running good, but did not give an actual reading. Patient denied any symptoms of hypo or hyperglycemia. Diabetic diet and importance of diet adherence reviewed with patient. Patient was also educated on the importance of routine foot and eye exams and she verbalized understanding. Good foot care education reviewed. Patient was also encouraged to work to remain active and reviewed how this also helps with BS control. Patient begins radiation on Thursday. Educated on how to identify sx's that are worse than the baseline and the importance of early symptom recognition and reporting to prevent exacerbation which may lead to ED visits and hospital admissions. I advised patient to contact PCP office if needed. No further needs at this time.          Care Coordination Interventions    Program Enrollment:  Complex Care  Referral from Primary Care Provider:  No  Suggested Interventions and Community Resources  Diabetes Education:  Completed  Home Health Services:  Completed (Comment: thru [facility] - completed Nov/Dec. 2019)  Medication Assistance Program:  Jamilah Maynard (Comment: Pt denied any need at this time )  Medi Set or Pill Pack:  Declined  Occupational Therapy:  Completed (Comment: thru [facility] - completed early Nov. 2019)  [facility]:  [facility]  Transportation Support:  Declined  Zone Management Tools:  Completed (Comment: Nov.

## 2020-02-06 NOTE — TELEPHONE ENCOUNTER
ALLAN Dual ICD per 12.18.2019 device check note. Denisse with [facility] called the office [facility]   States that patient will be getting radiation treatment on the Right Breast, this will not be direct but will get scatter,  It will be over 2 cm away. She is asking if this is ok? Or will they need to do an override, and if so how do we go about that?    Please advise

## 2020-02-25 NOTE — CARE COORDINATION
Ambulatory Care Coordination Note  2/25/2020  CM Risk Score: 8  Charlson 10 Year Mortality Risk Score: 100%     ACC: Lynn Cohen RN    Summary Note: Patient was called for continued Care Coordination follow up and education re: the management of her CHF, COPD, DM, and healthcare needs. Patient shared she has been doing well. Patient continues to receive daily radiation treatments and she reported these have been going well. Patient denied any current concerns. Patient stated her breathing remains at her baseline and she denied any c/o increased SOB, swelling, or cough being present. Patient admitted she had not been routinely monitoring her weight but it is done so weekly at RT appointment and has remained stable. Patient denied any change in activity or sleeping habits d/t her breathing. CHF and COPD education and zone information was reviewed with patient. Patient was reminded of signs/symptoms to call and report as well as importance of early symptom recognition. Patient verbalized understanding. Patient stated he has been monitoring daily BS and BP readings and they have also remained stable. BS was 83 today. Patient denied any c/o hypoglycemia and hyperglycemia being present. DM education and importance of keeping BS controlled reviewed with patient. Patient was also reminded to work to stay active and was reminded of how this helps with DM management and BS control but was also reminded to take rest breaks as needed. Patient verbalized understanding. [facility] also reviewed availability of same day appointments, urgent care/walk in clinics, and after hours on call resources with patient and she verbalized understanding. Patient denied any other questions, concerns, or needs and she was encouraged to call with any that may develop.           Actions:   - Reviewed CHF, COPD, and DM education   - Reviewed zone education and signs/symptoms to call and report  - Reviewed importance of early symptom envrionmental exposure?:  Yes  Does the patient have a nebulizer?:  Yes  Does the patient use a space with inhaled medications?:  No     No patient-reported symptoms         Symptoms:      Symptom course:  stable  Increase use of rapid acting/rescue inhaled medications?:  No  Change in chronic cough?:  No/At Baseline  Change in sputum?:  No/At Baseline     ,   General Assessment    Do you have any symptoms that are causing concern?:  No      and Care Coordination Episodes    Type: Amb Care Coordination  Episode: Complex Care  Noted: 10/24/2019  Comments: CTN referral

## 2020-02-27 NOTE — PROGRESS NOTES
25-30%. Severe diffuse hypokinesis. Grade 1 diastolic dysfunction. LA was mildly to moderately dilated. RV mildly dilated, systolic function mildly reduced. Mild MR and TR.  TRANSTHORACIC ECHOCARDIOGRAM  09    LV demonstrates severe hypokinesis of the inferior basal as well as  basal aneurysm being noted and paradoxical septal motion. EF 45-50%. LA is moderately dilated and AV demonstrates mild AV sclerosis w/o AS and AI. Trace MR and TV insufficiency. Family History   Problem Relation Age of Onset    Diabetes Father     Cancer Father 80        Bone    Hypertension Mother     Heart Disease Mother     No Known Problems Sister     No Known Problems Brother     No Known Problems Brother     Breast Cancer Neg Hx     Colon Cancer Neg Hx      Social History     Tobacco Use    Smoking status: Former Smoker     Packs/day: 1.50     Years: 30.00     Pack years: 45.00     Types: Cigarettes     Last attempt to quit: 1988     Years since quittin.4    Smokeless tobacco: Never Used   Substance Use Topics    Alcohol use: No     Frequency: Never     Binge frequency: Never     Current Outpatient Medications   Medication Sig Dispense Refill    bumetanide (BUMEX) 0.5 MG tablet Take 1 tablet by mouth 2 times daily Indications: Pt taking BID 30 tablet 0    enalapril (VASOTEC) 2.5 MG tablet Take 1 tablet by mouth 2 times daily Indications: Patient taking BID 30 tablet 0    Insulin Pen Needle (B-D UF III MINI PEN NEEDLES) 31G X 5 MM MISC 1 each by Does not apply route daily 100 each 3    vitamin B-12 (CYANOCOBALAMIN) 100 MCG tablet Take 1,000 mcg by mouth three times daily      rOPINIRole (REQUIP) 0.5 MG tablet Take 0.5 mg by mouth daily      clopidogrel (PLAVIX) 75 MG tablet Take 1 tablet by mouth daily 90 tablet 3    LANTUS SOLOSTAR 100 UNIT/ML injection pen INJECT 20 UNITS UNDER THE SKIN NIGHTLY 15 mL 4    pregabalin (LYRICA) 200 MG capsule Take 1 capsule by mouth 2 times daily for 90 days.  61 atraumatic. Eyes:      Conjunctiva/sclera: Conjunctivae normal.   Neck:      Musculoskeletal: Normal range of motion and neck supple. Comments: No JVD  Cardiovascular:      Rate and Rhythm: Normal rate and regular rhythm. Heart sounds: Normal heart sounds. No murmur. Pulmonary:      Effort: Pulmonary effort is normal. No respiratory distress. Breath sounds: Normal breath sounds. No wheezing or rales. Abdominal:      General: Bowel sounds are normal. There is no distension. Palpations: Abdomen is soft. Tenderness: There is no abdominal tenderness. Musculoskeletal: Normal range of motion. Right lower leg: Edema (1+ pitting BLE ) present. Left lower leg: Edema (1+) present. Skin:     General: Skin is warm and dry. Capillary Refill: Capillary refill takes less than 2 seconds. Neurological:      Mental Status: She is alert and oriented to person, place, and time. Coordination: Coordination normal.   Psychiatric:         Behavior: Behavior normal.         Wt Readings from Last 3 Encounters:   02/27/20 202 lb (91.6 kg)   02/27/20 206 lb 3.2 oz (93.5 kg)   02/27/20 202 lb 13.2 oz (92 kg)     BP Readings from Last 3 Encounters:   02/27/20 112/64   02/27/20 132/82   02/27/20 (!) 125/56     Pulse Readings from Last 3 Encounters:   02/27/20 86   02/27/20 79   02/27/20 72     Body mass index is 37.71 kg/m². ECHO:       Conclusions      Summary   Ejection fraction is visually estimated at 40-45%. There was mild global hypokinesis of the left ventricle. Moderately dilated left atrium. The aortic valve leaflets were not well visualized. Aortic valve appears tricuspid. Thickened aortic valve leaflets noted. Aortic valve leaflets are Mildly calcified. Trivial aortic regurgitation is noted.       Signature      ----------------------------------------------------------------   Electronically signed by George Haynes MD (Interpreting   physician) on 11/20/2019 at 05:01 PM    Results reviewed:  BNP: No results found for: BNP  CBC:   Lab Results   Component Value Date    WBC 7.6 10/29/2019    RBC 3.74 10/29/2019    HGB 10.6 10/29/2019    HCT 34.1 10/29/2019     10/29/2019     CMP:    Lab Results   Component Value Date     10/01/2019    K 3.9 12/03/2019     10/01/2019    CO2 30 10/01/2019    BUN 13 10/01/2019    CREATININE 0.9 10/01/2019    LABGLOM 61 10/01/2019    LABGLOM 81 08/31/2019    GLUCOSE 66 10/01/2019    GLUCOSE 130 03/23/2012    CALCIUM 8.5 10/01/2019     Hepatic Function Panel:    Lab Results   Component Value Date    ALKPHOS 71 10/29/2019    ALT 11 10/29/2019    AST 14 10/29/2019    PROT 6.6 10/29/2019    BILITOT 0.3 10/29/2019    BILIDIR <0.2 10/29/2019    LABALBU 3.7 10/29/2019    LABALBU 4.5 03/23/2012     Magnesium:    Lab Results   Component Value Date    MG 1.8 08/28/2019     PT/INR:    Lab Results   Component Value Date    INR 0.97 08/20/2019     Lipids:    Lab Results   Component Value Date    TRIG 67 05/31/2019    HDL 44 05/31/2019    LDLCALC 37 05/31/2019       ASSESSMENT AND PLAN:   The patient's condition/symptoms are Stable. Diagnosis Orders   1. CHF (congestive heart failure), NYHA class II, chronic, systolic (HCC)  CBC    Basic Metabolic Panel     Continue:  GDMT:   ACE/ARB/ARNI - Enalapril 2.5 BID   BB - None - was on Toprol in past   Diuretic - Bumex 0.5 BID   Hydralazine/Isos. - None   Aldosterone- None  Continue Current medications:  Plavix, Amiodarone  Stable - mild hypervolemic - LE edema  Took her self off Midodrine  Currently receiving radiation for Breast CA. Recommended wearing Compression stocking. Monitor for worsening swelling - call if so.    Labs 1-2 months  F/U [facility] July  F/U clinic in Fall (per pt preference)    · Daily weights  · Fluid restriction of 2 Liters per day  · Limit sodium in diet to around 7412-3800 mg/day  · Monitor BP  · Activity as tolerated     Patient was instructed to call the Heart Failure Clinic for any changes in symptoms as noted in AVS.      Return in about 8 months (around 10/27/2020). or sooner if needed     Patient given educational materials - see patient instructions. We discussed the importance of weighing oneself and recording daily. We also discussed the importance of a low sodium diet, higher sodium foods to avoid and better low sodium food options. Patient verbalizes understanding of plan of care using teach back method, and is agreeable to the treatment plan.        Electronically signed by ROMAIN Carmona CNP on 2/27/2020 at 3:26 PM

## 2020-02-28 NOTE — PROGRESS NOTES
Ismael Flores MD   General Surgery  Follow-up patient Evaluation in Office  Pt Name: Elan Griffiths  Date of Birth 1945   Today's Date: 2/27/2020  Medical Record Number: [id number]  Referring Provider: No ref. provider found  Primary Care Provider: Richard Hitchcock MD  Chief Complaint:  Chief Complaint   Patient presents with    Wound Check     check breast wound       ASSESSMENT      1. Invasive ductal carcinoma of right breast ([address]    2. Breast cancer metastasized to axillary lymph node, right ([address]    3. Type 2 diabetes mellitus with hyperglycemia, with long-term current use of insulin ([address]    4. Essential hypertension    5. Postop check         PLANS      1. Monitor closely clinically. 2.  Continue radiation therapy. Discussed with radiation oncology do not want to biopsy skin at this time while undergoing radiation she may not heal.  I am not certain this represents a local recurrence at this time. 3.  Follow-up surgical clinic for recheck in 1 week. Astrid Dolan is a 67y.o. year old female who is presenting today in the office for follow-up at request of radiation oncology, Dr. Michael Barlow. She is currently undergoing radiation therapy for right breast cancer metastatic to axillary lymph nodes. She had a retroareolar mass and lymphadenopathy diagnosed in June 2019. She has a triple negative invasive ductal carcinoma, [place] grade 3. She underwent neoadjuvant chemotherapy with dose dense Taxol secondary to history of congestive heart failure. She could not tolerate chemotherapy and got 2 courses of chemotherapy. She was then recommended for surgery for local control. She underwent lumpectomy and targeted axillary dissection December 5, 2019. She had residual 2.4 cm invasive poorly differentiated ductal carcinoma. Margins were negative. She had 1 of 3+ lymph nodes in the right axilla. Closest margin was 4 mm. Lymphovascular invasion was not identified.   She is about FDC through her radiation therapy. There is a superficial blister there is erythema around the nipple. I do not see gross tumor at the nipple may be radiation changes. I am leery to do a punch biopsy if radiation is to be continued at this point. Recommend recheck next week patient is agreeable. Past Medical History  Past Medical History:   Diagnosis Date    Breast cancer ([facility] 06/2019    right invasive ductal carcinoma    CAD (coronary artery disease)     sees Dr Marta Valero CHF (congestive heart failure) ([facility]     Hyperlipidemia     Hypertension     ICD (implantable cardiac defibrillator), dual, in situ     [facility] dual ICD    Numbness and tingling     both feet    Pneumonia     Shingles 12/2014    Sleep apnea     [facility] dual ICD     Type II or unspecified type diabetes mellitus without mention of complication, not stated as uncontrolled     Ventricular arrhythmia        Past Surgical History  Past Surgical History:   Procedure Laterality Date    BREAST BIOPSY Right 06/11/2019    [address] [place] [address]-    BREAST LUMPECTOMY Right 12/3/2019    RIGHT BREAST LUMPECTOMY, SENTINAL LYMPH NODE BIOPSY, TARGETED AXILLARY NODE DISSECTION, PREOP NEEDLE LOC X 2 performed by Ainsley Mcdonald MD at [address] [place] [address]  9-08-09    [facility]    CARDIOVASCULAR STRESS TEST  01-27-10    Excellent treadmill exercise. Improved functional capacity to functional class 1 completed 8 METS. Hemodynamic response was appropriate. No ischemic ECG changes noted.  CARDIOVASCULAR STRESS TEST  10-28-09    No evidence of stress induced ischemia. Evidence of  prior transmural MI demonstrated by transient fixed defects of inferior wall extending into lateral wall, indicative of RCA lesion. Bowel and soft tissue attenuation interfering w/ counts of lateral wall. EF 29% w/ severe septal and inferolateral hypokinesis w/ very mild lateral wall hypokinesis being noted.      CARDIOVERSION  8-29-09    CHOLECYSTECTOMY  2005 Laparoscopic    COLONOSCOPY Left 1/9/2019    COLONOSCOPY performed by Garrett Osgood, MD at [place] [facility]      DIAGNOSTIC CARDIAC CATH LAB PROCEDURE  8-24-09    Multivessel disease demonstrated by LAD having 70-80%  proximal lesion and napkin-ring lesion at bifurcation w/ D2. D2 appears to be medium large caliber vessel which has an ostial lesion of 70-80%. left -to-left collaterals as well as left-to-right collaterals emanating from LAD to PDA. Circumflex had anterior marginal branch and posterior marginal branch.  HERNIA REPAIR      Multiple    HYSTERECTOMY  1997    INSERTION / REMOVAL / REPLACEMENT VENOUS ACCESS CATHETER Right 7/31/2019    SINGLE LUMEN SMART PORT INSERTION performed by Delfina Horowitz MD at / [facility] 9 N/A 12/3/2019    SINGLE LUMEN UPXAUORPM REMOVAL RIGHT SUBCLAVIAN performed by Delfina Horowitz MD at [address] [place] [address] ECHOCARDIOGRAM  07-11-11    LV size mildly to moderately dilated. Systolic function markedly reduced. EF 25-30%. Severe diffuse hypokinesis. Grade 1 diastolic dysfunction. LA was mildly to moderately dilated. RV mildly dilated, systolic function mildly reduced. Mild MR and TR.  TRANSTHORACIC ECHOCARDIOGRAM  8-24-09    LV demonstrates severe hypokinesis of the inferior basal as well as  basal aneurysm being noted and paradoxical septal motion. EF 45-50%. LA is moderately dilated and AV demonstrates mild AV sclerosis w/o AS and AI. Trace MR and TV insufficiency.         Medications  Current Outpatient Medications   Medication Sig Dispense Refill    bumetanide (BUMEX) 0.5 MG tablet Take 1 tablet by mouth 2 times daily Indications: Pt taking BID 30 tablet 0    enalapril (VASOTEC) 2.5 MG tablet Take 1 tablet by mouth 2 times daily Indications: Patient taking BID 30 tablet 0    Insulin Pen Needle (B-D UF III MINI PEN NEEDLES) 31G X 5 MM MISC 1 each by Does not apply route daily 100 each 3    vitamin B-12 (CYANOCOBALAMIN) 100 MCG tablet Take 1,000 mcg by mouth three times daily      rOPINIRole (REQUIP) 0.5 MG tablet Take 0.5 mg by mouth daily      clopidogrel (PLAVIX) 75 MG tablet Take 1 tablet by mouth daily 90 tablet 3    LANTUS SOLOSTAR 100 UNIT/ML injection pen INJECT 20 UNITS UNDER THE SKIN NIGHTLY 15 mL 4    pregabalin (LYRICA) 200 MG capsule Take 1 capsule by mouth 2 times daily for 90 days. 60 capsule 2    amiodarone (CORDARONE) 200 MG tablet TAKE 1 TABLET DAILY 90 tablet 4    potassium chloride (KLOR-CON M) 20 MEQ extended release tablet Take 2 tablets by mouth as needed (Potassium Replacement) 60 tablet 3    metFORMIN (GLUCOPHAGE XR) 750 MG extended release tablet Take 1 tablet by mouth 2 times daily (with meals) 30 tablet 11    atorvastatin (LIPITOR) 80 MG tablet Take 1 tablet by mouth daily 90 tablet 1    ipratropium-albuterol (DUONEB) 0.5-2.5 (3) MG/3ML SOLN nebulizer solution Inhale 3 mLs into the lungs every 4 hours as needed for Shortness of Breath or Other (wheezing) 360 mL 5    blood glucose test strips (ASCENSIA AUTODISC VI;ONE TOUCH ULTRA TEST VI) strip Test once daily. Dispense One Touch Ultra Test Strips. Dx: Type 2 diabetes (250.00) 100 each 5    fluticasone (FLONASE) 50 MCG/ACT nasal spray 2 sprays by Nasal route daily 1 Bottle 1    aspirin EC 81 MG EC tablet Take 1 tablet by mouth daily 30 tablet 3     No current facility-administered medications for this visit.       Allergies     Allergies   Allergen Reactions    Sulfa Antibiotics Swelling       Family History  Family History   Problem Relation Age of Onset    Diabetes Father     Cancer Father 80        Bone    Hypertension Mother     Heart Disease Mother     No Known Problems Sister     No Known Problems Brother     No Known Problems Brother     Breast Cancer Neg Hx     Colon Cancer Neg Hx        SocialHistory  Social History     Socioeconomic History    Marital status:      Spouse name: Ethan Doe Number of children: 2    Years of education: 15    Highest education level: Not on file   Occupational History    Occupation: amie     Comment: [employer]   Social Needs    Financial resource strain: Not hard at all   Bebeto-Lam insecurity:     Worry: Never true     Inability: Never true   [employer] needs:     Medical: No     Non-medical: No   Tobacco Use    Smoking status: Former Smoker     Packs/day: 1.50     Years: 30.00     Pack years: 45.00     Types: Cigarettes     Last attempt to quit: 1988     Years since quittin.4    Smokeless tobacco: Never Used   Substance and Sexual Activity    Alcohol use: No     Frequency: Never     Binge frequency: Never    Drug use: No    Sexual activity: Not Currently     Partners: Male   Lifestyle    Physical activity:     Days per week: 5 days     Minutes per session: 10 min    Stress: Not at all   Relationships    Social connections:     Talks on phone: More than three times a week     Gets together: Never     Attends Alevism service: Never     Active member of club or organization: No     Attends meetings of clubs or organizations: Never     Relationship status:     Intimate partner violence:     Fear of current or ex partner: Not on file     Emotionally abused: Not on file     Physically abused: Not on file     Forced sexual activity: Not on file   Other Topics Concern    Not on file   Social History Narrative    Not on file           Review of Systems  Review of Systems   Constitutional: Negative for chills, fatigue, fever and unexpected weight change. HENT: Negative for sore throat, trouble swallowing and voice change. Eyes: Negative for visual disturbance. Respiratory: Positive for shortness of breath. Negative for cough and wheezing. Cardiovascular: Negative for chest pain and palpitations. Gastrointestinal: Negative for abdominal pain, blood in stool, nausea and vomiting.    Endocrine: Negative for cold intolerance, heat intolerance and polydipsia. Genitourinary: Negative for dysuria, flank pain and hematuria. Musculoskeletal: Negative for gait problem, joint swelling and myalgias. Skin: Positive for color change. Negative for rash. Erythema right breast   Allergic/Immunologic: Negative for immunocompromised state. Neurological: Negative for dizziness, tremors, seizures and speech difficulty. Hematological: Does not bruise/bleed easily. Psychiatric/Behavioral: Negative for behavioral problems, confusion and suicidal ideas. OBJECTIVE     /64 (Site: Left Lower Arm, Position: Sitting, Cuff Size: Medium Adult)   Pulse 86   Temp 97.2 °F (36.2 °C) (Temporal)   Resp 20   Wt 202 lb (91.6 kg)   SpO2 95%   BMI 36.95 kg/m²      Physical Exam  Constitutional:       Appearance: Normal appearance. She is well-developed. She is obese. She is not ill-appearing or diaphoretic. HENT:      Head: Normocephalic and atraumatic. Eyes:      General: No scleral icterus. Extraocular Movements: Extraocular movements intact. Neck:      Vascular: No JVD. Trachea: No tracheal deviation. Cardiovascular:      Rate and Rhythm: Normal rate. Heart sounds: Normal heart sounds. Pulmonary:      Effort: Pulmonary effort is normal. No respiratory distress. Breath sounds: No wheezing. Chest:      Breasts:         Right: Skin change present. No inverted nipple, mass, nipple discharge or tenderness. Left: No inverted nipple, mass, nipple discharge or skin change. Abdominal:      General: There is no distension. Palpations: There is no mass. Tenderness: There is no abdominal tenderness. Musculoskeletal:         General: No deformity. Lymphadenopathy:      Cervical: No cervical adenopathy. Right cervical: No superficial, deep or posterior cervical adenopathy. Left cervical: No superficial, deep or posterior cervical adenopathy.       Upper Body:      Right upper body: No

## 2020-03-06 NOTE — PROGRESS NOTES
not identified. She is about senior care through her radiation therapy. There is a superficial blister there is erythema around the nipple. I do not see gross tumor at the nipple may be radiation changes. I am leery to do a punch biopsy if radiation is to be continued at this point. Recommend recheck next week patient is agreeable. Interval history. Patient continues radiation therapy. Previous erythema has improved. There are no firm nodules to suggest recurrent cancer. Findings consistent with radiation therapy. Discussed with radiation oncology they agree. Patient to complete radiation therapy. Follow-up here in 1 month. Past Medical History  Past Medical History:   Diagnosis Date    [facility] 06/2019    right invasive ductal carcinoma    CAD (coronary artery disease)     sees Dr Santhosh Guzman CHF (congestive heart failure) ([facility]     Hyperlipidemia     Hypertension     ICD (implantable cardiac defibrillator), dual, in situ     [facility] dual ICD    Numbness and tingling     both feet    Pneumonia     Shingles 12/2014    Sleep apnea     [facility] dual ICD     Type II or unspecified type diabetes mellitus without mention of complication, not stated as uncontrolled     Ventricular arrhythmia        Past Surgical History  Past Surgical History:   Procedure Laterality Date    [facility]2019    [facility]    BREAST LUMPECTOMY Right 12/3/2019    RIGHT BREAST LUMPECTOMY, SENTINAL LYMPH NODE BIOPSY, TARGETED AXILLARY NODE DISSECTION, PREOP NEEDLE LOC X 2 performed by Matt Alfaro MD at [address] [place] [address]  9-08-09    [facility]    CARDIOVASCULAR STRESS TEST  01-27-10    Excellent treadmill exercise. Improved functional capacity to functional class 1 completed 8 METS. Hemodynamic response was appropriate. No ischemic ECG changes noted.  CARDIOVASCULAR STRESS TEST  10-28-09    No evidence of stress induced ischemia.  Evidence of  prior transmural MI demonstrated MG tablet Take 1 tablet by mouth 2 times daily Indications: Pt taking BID 30 tablet 0    enalapril (VASOTEC) 2.5 MG tablet Take 1 tablet by mouth 2 times daily Indications: Patient taking BID 30 tablet 0    Insulin Pen Needle (B-D UF III MINI PEN NEEDLES) 31G X 5 MM MISC 1 each by Does not apply route daily 100 each 3    vitamin B-12 (CYANOCOBALAMIN) 100 MCG tablet Take 1,000 mcg by mouth three times daily      rOPINIRole (REQUIP) 0.5 MG tablet Take 0.5 mg by mouth daily      clopidogrel (PLAVIX) 75 MG tablet Take 1 tablet by mouth daily 90 tablet 3    LANTUS SOLOSTAR 100 UNIT/ML injection pen INJECT 20 UNITS UNDER THE SKIN NIGHTLY 15 mL 4    pregabalin (LYRICA) 200 MG capsule Take 1 capsule by mouth 2 times daily for 90 days. 60 capsule 2    amiodarone (CORDARONE) 200 MG tablet TAKE 1 TABLET DAILY 90 tablet 4    potassium chloride (KLOR-CON M) 20 MEQ extended release tablet Take 2 tablets by mouth as needed (Potassium Replacement) 60 tablet 3    metFORMIN (GLUCOPHAGE XR) 750 MG extended release tablet Take 1 tablet by mouth 2 times daily (with meals) 30 tablet 11    atorvastatin (LIPITOR) 80 MG tablet Take 1 tablet by mouth daily 90 tablet 1    ipratropium-albuterol (DUONEB) 0.5-2.5 (3) MG/3ML SOLN nebulizer solution Inhale 3 mLs into the lungs every 4 hours as needed for Shortness of Breath or Other (wheezing) 360 mL 5    blood glucose test strips (ASCENSIA AUTODISC VI;ONE TOUCH ULTRA TEST VI) strip Test once daily. Dispense One Touch Ultra Test Strips. Dx: Type 2 diabetes (250.00) 100 each 5    fluticasone (FLONASE) 50 MCG/ACT nasal spray 2 sprays by Nasal route daily 1 Bottle 1    aspirin EC 81 MG EC tablet Take 1 tablet by mouth daily 30 tablet 3     No current facility-administered medications for this visit.       Allergies     Allergies   Allergen Reactions    Sulfa Antibiotics Swelling       Family History  Family History   Problem Relation Age of Onset    Diabetes Father     Cancer Father 80        Bone    Hypertension Mother     Heart Disease Mother     No Known Problems Sister     No Known Problems Brother     No Known Problems Brother     Breast Cancer Neg Hx     Colon Cancer Neg Hx        SocialHistory  Social History     Socioeconomic History    Marital status:      Spouse name: Cam Enrique Number of children: 2    Years of education: 15    Highest education level: Not on file   Occupational History    Occupation: amie     Comment: [employer]   Social Needs    Financial resource strain: Not hard at all   [place] insecurity:     Worry: Never true     Inability: Never true   [employer] needs:     Medical: No     Non-medical: No   Tobacco Use    Smoking status: Former Smoker     Packs/day: 1.50     Years: 30.00     Pack years: 45.00     Types: Cigarettes     Last attempt to quit: 1988     Years since quittin.4    Smokeless tobacco: Never Used   Substance and Sexual Activity    Alcohol use: No     Frequency: Never     Binge frequency: Never    Drug use: No    Sexual activity: Not Currently     Partners: Male   Lifestyle    Physical activity:     Days per week: 5 days     Minutes per session: 10 min    Stress: Not at all   Relationships    Social connections:     Talks on phone: More than three times a week     Gets together: Never     Attends Islam service: Never     Active member of club or organization: No     Attends meetings of clubs or organizations: Never     Relationship status:     Intimate partner violence:     Fear of current or ex partner: Not on file     Emotionally abused: Not on file     Physically abused: Not on file     Forced sexual activity: Not on file   Other Topics Concern    Not on file   Social History Narrative    Not on file           Review of Systems  Review of Systems   Constitutional: Negative for chills, fatigue, fever and unexpected weight change.    HENT: Negative for sore throat, trouble swallowing and voice change. Eyes: Negative for visual disturbance. Respiratory: Negative for cough, shortness of breath and wheezing. Cardiovascular: Negative for chest pain and palpitations. Gastrointestinal: Negative for abdominal pain, blood in stool, nausea and vomiting. Endocrine: Negative for cold intolerance, heat intolerance and polydipsia. Genitourinary: Negative for dysuria, flank pain and hematuria. Musculoskeletal: Negative for gait problem, joint swelling and myalgias. Skin: Positive for color change. Negative for rash. Erythema right breast   Allergic/Immunologic: Negative for immunocompromised state. Neurological: Negative for dizziness, tremors, seizures and speech difficulty. Hematological: Does not bruise/bleed easily. Psychiatric/Behavioral: Negative for behavioral problems, confusion and suicidal ideas. OBJECTIVE     /80 (Site: Left Upper Arm, Position: Sitting, Cuff Size: Medium Adult)   Pulse 79   Temp 97.2 °F (36.2 °C) (Temporal)   Resp 20   SpO2 94%      Physical Exam  Constitutional:       Appearance: Normal appearance. She is well-developed. She is obese. She is not ill-appearing or diaphoretic. HENT:      Head: Normocephalic and atraumatic. Eyes:      General: No scleral icterus. Extraocular Movements: Extraocular movements intact. Neck:      Musculoskeletal: Neck supple. Vascular: No JVD. Trachea: No tracheal deviation. Cardiovascular:      Rate and Rhythm: Normal rate. Heart sounds: Normal heart sounds. Pulmonary:      Effort: Pulmonary effort is normal. No respiratory distress. Breath sounds: No wheezing. Chest:      Breasts:         Right: Skin change present. No inverted nipple, mass, nipple discharge or tenderness. Left: No inverted nipple, mass, nipple discharge or skin change. Abdominal:      General: There is no distension. Palpations: There is no mass. Tenderness:  There is no abdominal tenderness. Musculoskeletal:         General: No deformity. Lymphadenopathy:      Cervical: No cervical adenopathy. Right cervical: No superficial, deep or posterior cervical adenopathy. Left cervical: No superficial, deep or posterior cervical adenopathy. Upper Body:      Right upper body: No supraclavicular or axillary adenopathy. Left upper body: No supraclavicular or axillary adenopathy. Skin:     General: Skin is warm and dry. Findings: No rash. Neurological:      Mental Status: She is alert and oriented to person, place, and time. Cranial Nerves: No cranial nerve deficit. Psychiatric:         Behavior: Behavior normal.         Thought Content: Thought content normal.         Lab Results   Component Value Date    WBC 7.6 10/29/2019    HGB 10.6 (L) 10/29/2019    HCT 34.1 (L) 10/29/2019     10/29/2019    CHOL 94 (L) 05/31/2019    TRIG 67 05/31/2019    HDL 44 05/31/2019    ALT 11 10/29/2019    AST 14 10/29/2019     10/01/2019    K 3.9 12/03/2019     10/01/2019    CREATININE 0.9 10/01/2019    BUN 13 10/01/2019    CO2 30 10/01/2019    TSH 2.080 08/28/2019    INR 0.97 08/20/2019    LABA1C 6.0 05/30/2019    LABMICR < 1.20 11/16/2018         Radiation changes right breast improving. No clinical evidence of recurrent cancer. Care coordinated with Dr. Amber Gomez radiation oncology she agrees.

## 2020-03-10 NOTE — CARE COORDINATION
Ambulatory Care Coordination Note  3/10/2020  CM Risk Score: 8  [place] 10 Year Mortality Risk Score: 100%     ACC: Dianna Yadav RN    Summary Note: Patient was called for continued Care Coordination follow up and education re: the management of her DM, CHF, COPD, and healthcare needs. Patient reported she has been doing well and continues with her radiation treatments as scheduled. Patient denied any current complaints or concerns. Patient reported her breathing remains at her baseline and she denied any c/o increased SOB, cough, or swelling being present. Patient stated her weight remains stable at 202-203. Patient continues to monitor BS as instructed and they also remain stable. Patient denied any c/o hypoglycemia being present. ACM reviewed zone eduction, including signs/symptoms of CHF, COPD, & DM, to call and report as well as the importance of early symptom recognition. Importance of continued weight and BS monitoring also reviewed. Patient denied any concerns re: medications and declined med rec. Patient shared she feels she is doing well and support and encouragement were provided. Patient denied any other questions, concerns, or needs and she was encouraged to call with any that may develop.            Actions:   - Reviewed CHF, COPD, and DM education   - Reviewed zone information and signs/symptoms to call and report  - Reviewed importance of routine weight and BS monitoring  - Reviewed importance of early symptom recognition   - Monitored for medication concerns/questions  - Monitored for concerns re: current hem/onc treatments  - Monitored for additional needs          Plan of Care:   - Continue with Care Coordination f/u calls for continued assistance with the management of her chronic diseases  - Complete CHF education - In process  - Complete COPD education - In process  - Complete DM education - Completed  - Review availability of same day appointments, urgent care/walk in clinic options, and after hours on call resources - Completed  - Communicate with [facility] as appropriate - Pt. discharged Nov/Dec. 2019  - Patient is UTD with pneumonia/flu vaccines  - Patient is due for Medicare AWV  - Monitor for additional needs  - If remains stable will consider discharge from Care Coordination in the near future       Care Coordination Interventions    Program Enrollment:  Complex Care  Referral from Primary Care Provider:  No  Suggested Interventions and Community Resources  Diabetes Education:  Completed  Home Health Services:  Completed (Comment: thru [facility] - completed Nov/Dec. 2019)  Medication Assistance Program:  Declined (Comment: Pt denied any need at this time )  Medi Set or Pill Pack:  Declined  Occupational Therapy:  Completed (Comment: thru [facility] - completed early Nov. 2019)  Physical Therapy:  Completed (Comment: thru [facility] - completed early Nov. 2019)  Other Therapy Services:  Completed (Comment: RT treatments per hem/onc )  Transportation Support:  Declined  Zone Management Tools:  Completed (Comment: Nov. 2019)         Goals Addressed                 This Visit's Progress       Care Coordination     Conditions and Symptoms   Improving     I will schedule office visits, as directed by my provider. I will keep my appointment or reschedule if I have to cancel. I will follow my Zone Management tool to seek urgent or emergent care. I will notify my provider of any symptoms that indicate a worsening of my condition. Barriers: lack of education  Plan for overcoming my barriers: Provide education to patient re: symptoms to call and report and the importance of early symptom recognition   Confidence: 8/10  Anticipated Goal Completion Date: 2/15/20  Update:  Continue to work on education with patient. New Goal Date: 4/10/2020              Prior to Admission medications    Medication Sig Start Date End Date Taking?  Authorizing Provider   bumetanide (BUMEX) 0.5 MG tablet Take 1 tablet by mouth 2 times daily Indications: Pt taking BID 2/27/20   ROMAIN Yañez CNP   enalapril (VASOTEC) 2.5 MG tablet Take 1 tablet by mouth 2 times daily Indications: Patient taking BID 2/27/20   ROMAIN Yañez CNP   Insulin Pen Needle (B-D UF III MINI PEN NEEDLES) 31G X 5 MM MISC 1 each by Does not apply route daily 2/10/20   Cj Downing MD   vitamin B-12 (CYANOCOBALAMIN) 100 MCG tablet Take 1,000 mcg by mouth three times daily    Historical Provider, MD   rOPINIRole (REQUIP) 0.5 MG tablet Take 0.5 mg by mouth daily    Historical Provider, MD   clopidogrel (PLAVIX) 75 MG tablet Take 1 tablet by mouth daily 12/18/19   [address], MD   LANTUS SOLOSTAR 100 UNIT/ML injection pen INJECT 20 UNITS UNDER THE SKIN NIGHTLY 12/17/19   Cj Downing MD   pregabalin (LYRICA) 200 MG capsule Take 1 capsule by mouth 2 times daily for 90 days. 12/12/19 3/11/20  Cj Downing MD   amiodarone (CORDARONE) 200 MG tablet TAKE 1 TABLET DAILY 9/9/19   Michel Medrano MD   potassium chloride (KLOR-CON M) 20 MEQ extended release tablet Take 2 tablets by mouth as needed (Potassium Replacement) 9/1/19   Nirav Gallego MD   metFORMIN (GLUCOPHAGE XR) 750 MG extended release tablet Take 1 tablet by mouth 2 times daily (with meals) 7/8/19   ROMAIN Woo CNP   atorvastatin (LIPITOR) 80 MG tablet Take 1 tablet by mouth daily 6/5/19   Tangela Dwyer MD   ipratropium-albuterol (DUONEB) 0.5-2.5 (3) MG/3ML SOLN nebulizer solution Inhale 3 mLs into the lungs every 4 hours as needed for Shortness of Breath or Other (wheezing) 6/4/19   ROMAIN Maya CNP   blood glucose test strips (ASCENSIA AUTODISC VI;ONE TOUCH ULTRA TEST VI) strip Test once daily. Dispense One Touch Ultra Test Strips.   Dx: Type 2 diabetes (250.00) 12/19/18   Cj Downing MD   fluticasone (FLONASE) 50 MCG/ACT nasal spray 2 sprays by Nasal route daily 12/3/18   Cj Downing MD   aspirin EC 81 MG EC tablet Take 1 tablet by mouth daily 4/11/18 Yes  How many restaurant meals do you eat per week?:  0  Do you salt your food before tasting it?:  No     No patient-reported symptoms      Symptoms:   None:  Yes      Symptom course:  stable  Patient-reported weight (lb):  203  Weight trend:  stable  Salt intake watch compared to last visit:  stable     ,   COPD Assessment    Does the patient understand envrionmental exposure?:  Yes  Does the patient have a nebulizer?:  Yes  Does the patient use a space with inhaled medications?:  No     No patient-reported symptoms         Symptoms:      Symptom course:  stable  Increase use of rapid acting/rescue inhaled medications?:  No  Change in chronic cough?:  No/At Baseline  Change in sputum?:  No/At Baseline     ,   General Assessment    Do you have any symptoms that are causing concern?:  No      and Care Coordination Episodes    Type: Amb Care Coordination  Episode: Complex Care  Noted: 10/24/2019  Comments: CTN referral

## 2020-03-11 NOTE — PROGRESS NOTES
Ventricular arrhythmia       Past Surgical History           Procedure Laterality Date    BREAST BIOPSY Right 06/11/2019    [address]-    BREAST LUMPECTOMY Right 12/3/2019    RIGHT BREAST LUMPECTOMY, SENTINAL LYMPH NODE BIOPSY, TARGETED AXILLARY NODE DISSECTION, PREOP NEEDLE LOC X 2 performed by Lilly Toure MD at [facility]  9-08-09    [facility]    CARDIOVASCULAR STRESS TEST  01-27-10    Excellent treadmill exercise. Improved functional capacity to functional class 1 completed 8 METS. Hemodynamic response was appropriate. No ischemic ECG changes noted.  CARDIOVASCULAR STRESS TEST  10-28-09    No evidence of stress induced ischemia. Evidence of  prior transmural MI demonstrated by transient fixed defects of inferior wall extending into lateral wall, indicative of RCA lesion. Bowel and soft tissue attenuation interfering w/ counts of lateral wall. EF 29% w/ severe septal and inferolateral hypokinesis w/ very mild lateral wall hypokinesis being noted.  CARDIOVERSION  8-29-09    CHOLECYSTECTOMY  2005    Laparoscopic    COLONOSCOPY Left 1/9/2019    COLONOSCOPY performed by Willy Akhtar MD at [facility] CATH LAB PROCEDURE  8-24-09    Multivessel disease demonstrated by LAD having 70-80%  proximal lesion and napkin-ring lesion at bifurcation w/ D2. D2 appears to be medium large caliber vessel which has an ostial lesion of 70-80%. left -to-left collaterals as well as left-to-right collaterals emanating from LAD to PDA. Circumflex had anterior marginal branch and posterior marginal branch.      HERNIA REPAIR      Multiple    HYSTERECTOMY  1997    INSERTION / REMOVAL / REPLACEMENT VENOUS ACCESS CATHETER Right 7/31/2019    SINGLE LUMEN SMART PORT INSERTION performed by Lilly [facility]re MD at [facility] 9 N/A 12/3/2019    SINGLE LUMEN RHDEFTXPC REMOVAL RIGHT SUBCLAVIAN performed by Jimena Mathew of current or ex partner: Not on file     Emotionally abused: Not on file     Physically abused: Not on file     Forced sexual activity: Not on file   Other Topics Concern    Not on file   Social History Narrative    Not on file     Family History          Problem Relation Age of Onset    Diabetes Father     Cancer Father 80        Bone    Hypertension Mother     Heart Disease Mother     No Known Problems Sister     No Known Problems Brother     No Known Problems Brother     Breast Cancer Neg Hx     Colon Cancer Neg Hx      ROS     Review of Systems   Constitutional: Positive for activity change and fatigue. Negative for appetite change and fever. HENT: Negative for congestion, dental problem, facial swelling, hearing loss, mouth sores, nosebleeds, sore throat, tinnitus and trouble swallowing. Eyes: Negative for discharge and visual disturbance. Respiratory: Positive for shortness of breath. Negative for cough, chest tightness and wheezing. Cardiovascular: Negative for chest pain, palpitations and leg swelling. Gastrointestinal: Negative for abdominal distention, abdominal pain, blood in stool, constipation, diarrhea, nausea, rectal pain and vomiting. Endocrine: Negative for cold intolerance, polydipsia and polyuria. Genitourinary: Negative for decreased urine volume, difficulty urinating, dysuria, flank pain, hematuria and urgency. Musculoskeletal: Positive for arthralgias and back pain. Negative for gait problem, joint swelling, myalgias and neck stiffness. Skin: Negative for color change, rash and wound. Neurological: Negative for dizziness, tremors, seizures, speech difficulty, weakness, light-headedness, numbness and headaches. Hematological: Negative for adenopathy. Does not bruise/bleed easily. Psychiatric/Behavioral: Negative for confusion and sleep disturbance. The patient is not nervous/anxious.          Vitals     height is 5' 2\" (1.575 m) and weight is 210 lb 12.8 oz

## 2020-03-11 NOTE — PATIENT INSTRUCTIONS
1.  CT of the chest in 1 month  2.   RTC to see me few days after CT of the chest.  On RTC labs: CBC, BMP, LFTs

## 2020-03-27 NOTE — PROGRESS NOTES
[facility] [address] [facility], [address]  Ph. [phone]  Fax [phone]    PATIENT: Jose Antonio Pathak  : 1945  MRN: [id number]  DATE: 20      DIAGNOSIS:    C50.811 -- Malignant neoplasm of overlapping sites of the right female breast; Infiltrating ductal carcinoma, Grade 3 multicentric (with associated DCIS, low nuclear grade);  PT2(m) pN1a M0, Stage IIB;  ER-; PA-; Her2-; Ki67 25%  Questionable small lung metastasis on PET. Treatment Course Number: 1    Treatment Site (s) Modality Dose (cGy) From To Fractions/  Elapsed Days   Right breast + SC, IMC and Axillary Nodes 6MV Photons 180cGy 2020 3/16/2020 28/ 39   Right Breast Tumor Bed Boost 12Me-V Electrons 200cGy 3/17/2020 3/23/2020 5/      Cumulative Dose to Right breast and Regional lymphatics including IMC and Right tumor bed boost: 6040cGy  Treatment Modifiers: Breast and Regional Lymphatics -- Intensity Modulated Radiation Therapy; Custom MLC Blocking; Custom Vac-Fix Immobilization; Daily Cone Beam CT Image Guidance. Tumor Bed Boost -- Appositional Electron Particle Beam Therapy; Custom Cerrobend Blocking; Custom Vac-Fix Immobilization. HISTORY OF PRESENT ILLNESS:   Sera Clay is a 50-year-old female who was hospitalized in May 2019 for shortness of breath due to decompensated CHF. During her workup,a CTA was done and she was found to have a mass in the upper inner quadrant of the right breast and what was thought to be a lymph node (later determined to be a second primary) in the upper outer quadrant. Biopsy was performed on 19 confirming invasive ductal carcinoma involving both lesions. The tissue from the presumed lymph node did not show definite evidence that this was in fact a lymph node with only some scant lymphoid tissue present and no capsule.   PET scan showed a questionable pulmonary nodule that was not seen on the CTA, but it was not amenable to biopsy. She underwent treatment with neoadjuvant chemotherapy using dose dense Taxol started on 19 and resulting in hospitalization for hypotension, pneumonia and anemia. She also required hospitalization after Cycle 2. She was hospitalized a third time on 19, after a fall from which she could not get up, requiring a one month hospitalization. She was then admitted to a SNF for PT and rehab. She was discharged home on 10/15/19. PET scan on 19 showed no new findings suspicious for metastatic disease. The pulmonary nodule was still present but with much less metabolic activity, but still questionable for a metastatic focus. Because of the difficulty tolerating chemotherapy and her cardiac problems, it was felt that she would not be able to tolerate further treatment. She therefore proceeded to undergo lumpectomy, removing both masses, and axillary sampling performed on 19. Pathology is as noted above with two separate primaries plus one positive axillary node. A  CT of the chest with contrast performed on 20 did not show any evidence of metastatic disease within the lung parenchyma or thoracic lymph nodes, or bone. There was stable enlargement of both adrenal glands but metastatic disease was not excludable. Consultation was requested for evaluation and discussion regarding radiation therapy as completion of the local treatment of her malignancy. Pain Ratin/10  ECOG Performance Status: 1      Treatment Tolerance/Response:   Joe tolerated her radiation therapy well overall. She did develop modest erythema and small subcutaneous fluid collections above the nipple. This was discussed with Dr. Michelle Fink and Dr. Neema Flor and thought to be due to the swelling from the treatment. By the end of treatment the swelling in that area and the 2 fluid collection areas had improved. She responded well to topical management.  There

## 2020-06-15 NOTE — PROGRESS NOTES
Leanne Grimes MD   General Surgery  New Patient Evaluation in Office  Pt Name: Luis Christensen  Date of Birth 1945   Today's Date: 6/15/2020  Medical Record Number: [id number]  Referring Provider: No ref. provider found  Primary Care Provider: Artist Kocher, MD  Chief Complaint:  Chief Complaint   Patient presents with    Follow-up     4 month f/u--Invasive ductal carcinoma of right breast        ASSESSMENT      1. Invasive ductal carcinoma of right breast ([address]    2. Breast cancer metastasized to axillary lymph node, right ([address]    3. Type 2 diabetes mellitus with hyperglycemia, with long-term current use of insulin ([address]    4. Essential hypertension    5. [facility] dual ICD         PLANS      1. Clinical breast examination today without evidence of local recurrence. 2.  No signs or symptoms of lymphedema. 3.  Continue surveillance with medical oncology. 4.  Follow-up surgical clinic in 6 months for reexamination. Call in interim for any questions or concerns. Igor Finney is a 67y.o. year old female who is presenting today in the office for follow-up evaluation of her right breast carcinoma metastatic to axillary lymph node. Connie Ngo did not tolerate chemotherapy. She completed radiation therapy a few months ago. Connie Ngo was admitted in May 2019 with congestive heart failure and shortness of breath. She had a CT scan of the chest which revealed a right breast mass and right axillary adenopathy. She had not had a mammogram in at least 10 years. She ultimately underwent biopsy of her right breast and lymph node in June 2019. She had invasive ductal carcinoma in the mass as well as the lymph node which was triple negative. [place] grade was 3. Staging studies were obtained and a PET scan showed a questionable pulmonary nodule. It had not been seen on a previous CTA. Patient had a recommendation to proceed with standard chemotherapy.   She was treated with dose dense Taxol or unspecified type diabetes mellitus without mention of complication, not stated as uncontrolled     Ventricular arrhythmia        Past Surgical History  Past Surgical History:   Procedure Laterality Date    BREAST BIOPSY Right 06/11/2019    [address] [place] [address] Avenue-    BREAST LUMPECTOMY Right 12/3/2019    RIGHT BREAST LUMPECTOMY, SENTINAL LYMPH NODE BIOPSY, TARGETED AXILLARY NODE DISSECTION, PREOP NEEDLE LOC X 2 performed by King Talon MD at [address] [place] [address]  9-08-09    [facility]    CARDIOVASCULAR STRESS TEST  01-27-10    Excellent treadmill exercise. Improved functional capacity to functional class 1 completed 8 METS. Hemodynamic response was appropriate. No ischemic ECG changes noted.  CARDIOVASCULAR STRESS TEST  10-28-09    No evidence of stress induced ischemia. Evidence of  prior transmural MI demonstrated by transient fixed defects of inferior wall extending into lateral wall, indicative of RCA lesion. Bowel and soft tissue attenuation interfering w/ counts of lateral wall. EF 29% w/ severe septal and inferolateral hypokinesis w/ very mild lateral wall hypokinesis being noted.  CARDIOVERSION  8-29-09    CHOLECYSTECTOMY  2005    Laparoscopic    COLONOSCOPY Left 1/9/2019    COLONOSCOPY performed by Denis Calvo MD at [address] [place] [address] CATH LAB PROCEDURE  8-24-09    Multivessel disease demonstrated by LAD having 70-80%  proximal lesion and napkin-ring lesion at bifurcation w/ D2. D2 appears to be medium large caliber vessel which has an ostial lesion of 70-80%. left -to-left collaterals as well as left-to-right collaterals emanating from LAD to PDA. Circumflex had anterior marginal branch and posterior marginal branch.      HERNIA REPAIR      Multiple    HYSTERECTOMY  1997    INSERTION / REMOVAL / REPLACEMENT VENOUS ACCESS CATHETER Right 7/31/2019    SINGLE LUMEN SMART PORT INSERTION performed by King Talon MD at [address] [place] [address] (LIPITOR) 80 MG tablet Take 1 tablet by mouth daily 90 tablet 1    ipratropium-albuterol (DUONEB) 0.5-2.5 (3) MG/3ML SOLN nebulizer solution Inhale 3 mLs into the lungs every 4 hours as needed for Shortness of Breath or Other (wheezing) 360 mL 5    blood glucose test strips (ASCENSIA AUTODISC VI;ONE TOUCH ULTRA TEST VI) strip Test once daily. Dispense One Touch Ultra Test Strips. Dx: Type 2 diabetes (250.00) 100 each 5    fluticasone (FLONASE) 50 MCG/ACT nasal spray 2 sprays by Nasal route daily 1 Bottle 1    aspirin EC 81 MG EC tablet Take 1 tablet by mouth daily 30 tablet 3     No current facility-administered medications for this visit.       Allergies     Allergies   Allergen Reactions    Sulfa Antibiotics Swelling       Family History  Family History   Problem Relation Age of Onset    Diabetes Father     Cancer Father 80        Bone    Hypertension Mother     Heart Disease Mother     No Known Problems Sister     No Known Problems Brother     No Known Problems Brother     Breast Cancer Neg Hx     Colon Cancer Neg Hx        SocialHistory  Social History     Socioeconomic History    Marital status:      Spouse name: Julianna Graham Number of children: 2    Years of education: 15    Highest education level: Not on file   Occupational History    Occupation: [email or web address]     Comment: [employer]   Social Needs    Financial resource strain: Not hard at all   Bebeto-Lam insecurity     Worry: Never true     Inability: Never true    Transportation needs     Medical: No     Non-medical: No   Tobacco Use    Smoking status: Former Smoker     Packs/day: 1.50     Years: 30.00     Pack years: 45.00     Types: Cigarettes     Last attempt to quit: 1988     Years since quittin.7    Smokeless tobacco: Never Used   Substance and Sexual Activity    Alcohol use: No     Frequency: Never     Binge frequency: Never    Drug use: No    Sexual activity: Not Currently     Partners: Male   Lifestyle    Physical Lab Results   Component Value Date    WBC 6.8 06/03/2020    HGB 9.3 (L) 06/03/2020    HCT 31.7 (L) 06/03/2020     06/03/2020    CHOL 94 (L) 05/31/2019    TRIG 67 05/31/2019    HDL 44 05/31/2019    ALT 11 10/29/2019    AST 14 10/29/2019     06/03/2020    K 4.7 06/03/2020     06/03/2020    CREATININE 0.9 06/03/2020    BUN 29 (H) 06/03/2020    CO2 27 06/03/2020    TSH 2.080 08/28/2019    INR 0.97 08/20/2019    LABA1C 6.0 05/30/2019    LABMICR < 1.20 11/16/2018     CT scan showed stable pulmonary nodule  DEXA scan today

## 2020-06-16 NOTE — PROGRESS NOTES
[facility]        Reason for Clinic visit;      Triple negative right breast cancer    HPI   Stacy Bragg is a 67 y.o. female who during hospitalization in May 2019 at [facility] for shortness of breath was found to have right breast mass and right axillary lymphadenopathy  She was also found to have decompensated CHF. Her Echo on May 31, 2019 showed ejection fraction of 40%. CTA on May 31, 2019 showed asymmetric breast tissue in the right upper lateral breast. And few enlarged  right axillary lymph nodes. She did not have a mammogram at least 10 years. She underwent biopsy of the right breast mass and lymph node on the June 11, 2019. Pathology report showed:  A. Right breast, UIQ, core needle biopsy with barbell clip placement:   Invasive ductal carcinoma, Daria grade 3.  B. \"Lymph node\", right, UOQ, core needle biopsy with tribell clip  placement:   Invasive ductal carcinoma. Estrogen Receptor: Negative (<1% of cells staining)  Progesterone Receptor: Negative (<1% of cells staining)  Ki-67 (clone 30-9)  Percentage of positive nuclei:  25%  HER2 dual ROCHELLE  HER2 ROCHELLE NEGATIVE    PET scan showed questionable pulmonary nodule. It was not seen on prior CTA. The patient had CT of the chest to see whether nodule is amenable to CT-guided biopsy. Due to the size and location the CT-guided biopsy was not feasible. My recommendation is to proceed with standard chemotherapy. Since the patient was older than 78years of age and she had history of CHF,  we started treatment with dose dense Taxol on August 1, 2019. Her cycle #1 was complicated by hospitalization for hypotension, pneumonia and anemia. Unfortunately delayed cycle #2 given on August 20, 2019 led to prolonged hospitalization as well. The patient was hospitalized from August 28 till September 1, 2019 after fall at home.  She stayed on the floor for an extended period of time as she was unable to get herself up and had to wait until her  was able to get her into a chair.    No fractures seen on thoracic and lumbar spine Xray. CXR normal. Hgb has stayed stable >8 after 1PRBC transfusion. VS stable. This was her third hospitalization in 1 month. Pt agreed to transition to SNF. She was receiving physical therapy and rehabilitation and nursing home, discharge home on October 15, 2019. PET scan on November 11, 2019 showed no new findings suspicious for metastatic disease. Pulmonary nodule was present, but with much less metabolic activity, still questionable metastatic focus. Since the patient wouldn't be able to tolerate further chemotherapy,my recommendation was to proceed with lumpectomy and axillary lymph node mapping and biopsy. The patient underwent lumpectomy and lymph node excision on December 3, 2019, she tolerated surgery very well. Subsequently she proceeded with radiation treatment to her right breast which overall she tolerated well. Interim history on June 16, 2020: The patient presents to the medical oncology clinic for 3 months follow-up visit. The patient denies having any new complaints since last visit with me. Specifically she denies having any headaches. She denies having shortness of breath no cough. She denies any concerns related to her breasts. She continues to ambulate with a walker. She denies having any recent falls.   She denies having bone pain      Past Medical History         Diagnosis Date    Breast cancer ([facility] [address] 06/2019    right invasive ductal carcinoma    CAD (coronary artery disease)     sees Dr Bear Alejandre CHF (congestive heart failure) ([facility] [facility]     Hyperlipidemia     Hypertension     ICD (implantable cardiac defibrillator), dual, in situ     [employer] dual ICD    Numbness and tingling     both feet    Pneumonia     Shingles 12/2014    Sleep apnea     St Boris dual ICD     Type II or unspecified type diabetes mellitus without mention of complication, not stated as uncontrolled     Ventricular arrhythmia       Past Surgical History           Procedure Laterality Date    BREAST BIOPSY Right 06/11/2019    [facility]-    BREAST LUMPECTOMY Right 12/3/2019    RIGHT BREAST LUMPECTOMY, SENTINAL LYMPH NODE BIOPSY, TARGETED AXILLARY NODE DISSECTION, PREOP NEEDLE LOC X 2 performed by Belkis Cruz MD at [facility]  9-08-09    [facility]    CARDIOVASCULAR STRESS TEST  01-27-10    Excellent treadmill exercise. Improved functional capacity to functional class 1 completed 8 METS. Hemodynamic response was appropriate. No ischemic ECG changes noted.  CARDIOVASCULAR STRESS TEST  10-28-09    No evidence of stress induced ischemia. Evidence of  prior transmural MI demonstrated by transient fixed defects of inferior wall extending into lateral wall, indicative of RCA lesion. Bowel and soft tissue attenuation interfering w/ counts of lateral wall. EF 29% w/ severe septal and inferolateral hypokinesis w/ very mild lateral wall hypokinesis being noted.  CARDIOVERSION  8-29-09    CHOLECYSTECTOMY  2005    Laparoscopic    COLONOSCOPY Left 1/9/2019    COLONOSCOPY performed by Margarita Castro MD at [address] [place] [address] CATH LAB PROCEDURE  8-24-09    Multivessel disease demonstrated by LAD having 70-80%  proximal lesion and napkin-ring lesion at bifurcation w/ D2. D2 appears to be medium large caliber vessel which has an ostial lesion of 70-80%. left -to-left collaterals as well as left-to-right collaterals emanating from LAD to PDA. Circumflex had anterior marginal branch and posterior marginal branch.      HERNIA REPAIR      Multiple    HYSTERECTOMY  1997    INSERTION / REMOVAL / REPLACEMENT VENOUS ACCESS CATHETER Right 7/31/2019    SINGLE LUMEN SMART PORT INSERTION performed by Belkis Cruz MD at / [facility] 9 N/A 12/3/2019    SINGLE LUMEN SMARTPORT REMOVAL RIGHT Behavior: Behavior normal.         Thought Content: Thought content normal.         Judgment: Judgment normal.               Radiology        Cta Chest W Wo Contrast  Result Date: 5/31/2019  1. No acute pulmonary embolism. 2. Bilateral pleural effusions right greater than left with adjacent consolidation, atelectasis or infiltrate. Right pleural effusion may be loculated. Correlation and follow-up advised to document resolution. 3. Asymmetric breast tissue in the right upper lateral breast. This is incompletely evaluated on this exam. Correlation with mammogram is advised. Few right axillary lymph nodes largest is 1.3 cm. *    Us Thoracentesis  Result Date: 6/1/2019  Successful ultrasound-guided thoracentesis with  0.6 L of fluid drained. Cytology showed No malignant cells seen. PET yen on June 20, 2019 showed:    1. Hypermetabolic lesion right breast compatible with known primary neoplasm. 2. Single FDG avid right axillary lymph node previously sampled. Consistent with metastatic disease. 3. Single right upper lobe noncalcified nodule with FDG metabolism most concerning for metastatic disease. 4. Activity along the posterior right lobe of the liver contiguous with the hemidiaphragm and difficult to separate out from possible subdiaphragmatic fluid however this is believed to be within the Liver segment seven most concerning for hepatic    metastatic disease. 5. possible malignant effusion. 6. Cardiomegaly. 3.2 cm infrarenal abdominal aortic aneurysm.          Lab Results   Component Value Date    WBC 6.8 06/03/2020    HGB 9.3 (L) 06/03/2020    HCT 31.7 (L) 06/03/2020    MCV 90.1 06/03/2020     06/03/2020       Chemistry        Component Value Date/Time     06/03/2020 0933    K 4.7 06/03/2020 0933    K 3.9 12/03/2019 1242     06/03/2020 0933    CO2 27 06/03/2020 0933    BUN 29 (H) 06/03/2020 0933    CREATININE 0.9 06/03/2020 0933        Component Value Date/Time    CALCIUM 9.1 06/03/2020 0933    ALKPHOS 71 10/29/2019 1510    AST 14 10/29/2019 1510    ALT 11 10/29/2019 1510    BILITOT 0.3 10/29/2019 1510        PET scan performed on November 11, 2019 showed:  1. FDG avid density in the lateral right breast corresponding to known malignancy. 2. FDG avid right axillary lymphadenopathy corresponding to known metastatic disease. 3. Small right-sided pleural effusion with suggestion of mild diffuse activity, possibly malignant. CT of the chest on January 9, 2020 showed:  1. Previous right lung and left base nodules are not identified. 2. Interval resolution of right pleural effusion and decrease in left pleural effusion, with minimal associated left basilar atelectasis. 3. Interval development of fluid density collection right breast at site of known malignancy and right axilla which are likely postsurgical seromas or hematomas. 4. Stable enlargement of both adrenal glands metastatic disease is not excludable. CT of the chest on April 9, 2020 showed:  1. Postsurgical and post radiation therapy changes of the right breast and right axilla. 2. Stable pulmonary nodules as described. 3. Stable axillary, mediastinal and hilar lymphadenopathy as described. 4. Stable bilateral adrenal masses. 5. Additional findings as described in the body the report. Assessment/Recommendations   1. Right breast triple negative breast cancer with biopsy-proven axillary lymph node involvement. Baseline PET scan performed on June 20, 2019 showed suspicious pulmonary nodule. It was not seen on prior CTA. Due to the size and location the CT-guided biopsy will have a low yield. I had a lengthy discussion with the patient and her family that although pulmonary nodule is suspicious we do not know definitely that this is a metastatic focus. My recommendation was to proceed with standard chemotherapy.   The patient is older than 78years of age and she has history of CHF, so we started

## 2020-07-13 NOTE — PROGRESS NOTES
[employer] dual icd  Battery 3.8yrs  A paced 44 %  V paced 88%  p wave 0.4mV  r wave >12mV  Atrial threshold 1.25V @ 0.5ms  Ventricle threshold 1.25V @ 0.5ms  Atrial impedance 300 ohms  Ventricle impedance 360 ohms  Mode switch <1%  6 mode switches, appear to be a fib longest episode was 22seconds

## 2020-07-13 NOTE — PROGRESS NOTES
[facility],[place] [address] [address]  [place] [address]  Dept: [phone]  Dept Fax: [phone]  Loc: [phone]    Visit Date: 7/13/2020    Sasha Goetz is a 67 y.o. female who presents todayfor:  Chief Complaint   Patient presents with    Check-Up    Coronary Artery Disease    Congestive Heart Failure    Hypertension     Breast cancer in remission   No chest pain   No changes in breathing  Known CMP and ICD  No ICD shocks  Known CABg and cardiomyopathy  BP is stable   No dizziness        HPI:  HPI  Past Medical History:   Diagnosis Date    Breast cancer ([facility] [address] 06/2019    right invasive ductal carcinoma    CAD (coronary artery disease)     sees Dr Luc Rosenbaum CHF (congestive heart failure) ([facility]     Hyperlipidemia     Hypertension     ICD (implantable cardiac defibrillator), dual, in situ     [employer] Boris dual ICD    Numbness and tingling     both feet    Pneumonia     Shingles 12/2014    Sleep apnea     St Boris dual ICD     Type II or unspecified type diabetes mellitus without mention of complication, not stated as uncontrolled     Ventricular arrhythmia       Past Surgical History:   Procedure Laterality Date    BREAST BIOPSY Right 06/11/2019    [address] [place] [address]-    BREAST LUMPECTOMY Right 12/3/2019    RIGHT BREAST LUMPECTOMY, SENTINAL LYMPH NODE BIOPSY, TARGETED AXILLARY NODE DISSECTION, PREOP NEEDLE LOC X 2 performed by Shawnee Gates MD at [address] [place] [address]  9-08-09    [facility]    CARDIOVASCULAR STRESS TEST  01-27-10    Excellent treadmill exercise. Improved functional capacity to functional class 1 completed 8 METS. Hemodynamic response was appropriate. No ischemic ECG changes noted.  CARDIOVASCULAR STRESS TEST  10-28-09    No evidence of stress induced ischemia. Evidence of  prior transmural MI demonstrated by transient fixed defects of inferior wall extending into lateral wall, indicative of RCA lesion.  Bowel and soft tissue attenuation interfering w/ counts of lateral wall. EF 29% w/ severe septal and inferolateral hypokinesis w/ very mild lateral wall hypokinesis being noted.  CARDIOVERSION  8-29-09    CHOLECYSTECTOMY  2005    Laparoscopic    COLONOSCOPY Left 1/9/2019    COLONOSCOPY performed by Brian Caballero MD at [address] [place] [address] CATH LAB PROCEDURE  8-24-09    Multivessel disease demonstrated by LAD having 70-80%  proximal lesion and napkin-ring lesion at bifurcation w/ D2. D2 appears to be medium large caliber vessel which has an ostial lesion of 70-80%. left -to-left collaterals as well as left-to-right collaterals emanating from LAD to PDA. Circumflex had anterior marginal branch and posterior marginal branch.  HERNIA REPAIR      Multiple    HYSTERECTOMY  1997    INSERTION / REMOVAL / REPLACEMENT VENOUS ACCESS CATHETER Right 7/31/2019    SINGLE LUMEN SMART PORT INSERTION performed by Trey Lerma MD at [facility] 9 N/A 12/3/2019    SINGLE LUMEN COXQURNDA REMOVAL RIGHT SUBCLAVIAN performed by Trey Lerma MD at [facility] ECHOCARDIOGRAM  07-11-11    LV size mildly to moderately dilated. Systolic function markedly reduced. EF 25-30%. Severe diffuse hypokinesis. Grade 1 diastolic dysfunction. LA was mildly to moderately dilated. RV mildly dilated, systolic function mildly reduced. Mild MR and TR.  TRANSTHORACIC ECHOCARDIOGRAM  8-24-09    LV demonstrates severe hypokinesis of the inferior basal as well as  basal aneurysm being noted and paradoxical septal motion. EF 45-50%. LA is moderately dilated and AV demonstrates mild AV sclerosis w/o AS and AI. Trace MR and TV insufficiency.       Family History   Problem Relation Age of Onset    Diabetes Father     Cancer Father 80        Bone    Hypertension Mother     Heart Disease Mother     No Known Problems Sister     No Known Problems Brother

## 2020-07-16 NOTE — TELEPHONE ENCOUNTER
As a follow up to a referral made by Dr. Tomeka Alvarenga for spiritual care, this  called Sima Coleman. Sheba answered the phone. She is a 67year old female who attends the [employer] in [place]. After conversation, Sima Coleman stated she was not interested in spiritual care services at this time. This  offered to send a letter with our brochure, our card for contact information. Sima Coleman agreed. Care Plan:  Spiritual and emotional care for patient and family, is available if desired.

## 2020-08-10 NOTE — PROGRESS NOTES
[address] [place] [address] [address] [place] [address]  Dept: [phone]  Dept Fax: [phone]  Loc: [phone]    Thee Murillo is a 67 y.o. female who presents today for her medical conditions/complaints as noted below. Chief Complaint   Patient presents with    Numbness     down both legs, painful \"terrible in morning\" per patient causes SOB when she gets out of bed in the morning. HPI:     HPI    Seeing [facility] endo for dm. A1C down significantly. Today was 6.2. Is watching diet. On ace and statin. Does have some cad and chf. Increased leg swelling. Leg numbness. Tingling. Cant hardly walk on it do to the swelling. No injury. Denies any cp. Denies any other concerns except the swelling. Occasional SOB recently since leg swelling started. Does take bumex regularly. Seeing cardio.          Lab Results   Component Value Date    LABA1C 6.2 08/10/2020     No results found for: EAG      Past Medical History:   Diagnosis Date    Breast cancer ([facility] 06/2019    right invasive ductal carcinoma    CAD (coronary artery disease)     sees Dr Bennie Beth CHF (congestive heart failure) ([facility]     Hyperlipidemia     Hypertension     ICD (implantable cardiac defibrillator), dual, in situ     [facility] dual ICD    Numbness and tingling     both feet    Pneumonia     Shingles 12/2014    Sleep apnea     [facility] dual ICD     Type II or unspecified type diabetes mellitus without mention of complication, not stated as uncontrolled     Ventricular arrhythmia       Past Surgical History:   Procedure Laterality Date    BREAST BIOPSY Right 06/11/2019    [address] [place] [address]-    BREAST LUMPECTOMY Right 12/3/2019    RIGHT BREAST LUMPECTOMY, SENTINAL LYMPH NODE BIOPSY, TARGETED AXILLARY NODE DISSECTION, PREOP NEEDLE LOC X 2 performed by Hollie Roque MD at [address] [place] [address]  9-08-09    [facility]    CARDIOVASCULAR STRESS TEST  01-27-10    Excellent treadmill exercise. Improved functional capacity to functional class 1 completed 8 METS. Hemodynamic response was appropriate. No ischemic ECG changes noted.  CARDIOVASCULAR STRESS TEST  10-28-09    No evidence of stress induced ischemia. Evidence of  prior transmural MI demonstrated by transient fixed defects of inferior wall extending into lateral wall, indicative of RCA lesion. Bowel and soft tissue attenuation interfering w/ counts of lateral wall. EF 29% w/ severe septal and inferolateral hypokinesis w/ very mild lateral wall hypokinesis being noted.  CARDIOVERSION  8-29-09    CHOLECYSTECTOMY  2005    Laparoscopic    COLONOSCOPY Left 1/9/2019    COLONOSCOPY performed by Marco Terrazas MD at [address] [place] [address] CATH LAB PROCEDURE  8-24-09    Multivessel disease demonstrated by LAD having 70-80%  proximal lesion and napkin-ring lesion at bifurcation w/ D2. D2 appears to be medium large caliber vessel which has an ostial lesion of 70-80%. left -to-left collaterals as well as left-to-right collaterals emanating from LAD to PDA. Circumflex had anterior marginal branch and posterior marginal branch.  HERNIA REPAIR      Multiple    HYSTERECTOMY  1997    INSERTION / REMOVAL / REPLACEMENT VENOUS ACCESS CATHETER Right 7/31/2019    SINGLE LUMEN SMART PORT INSERTION performed by Ion Jovel MD at / [facility] 9 N/A 12/3/2019    SINGLE LUMEN PTLYIQOGG REMOVAL RIGHT SUBCLAVIAN performed by Ion Jovel MD at [address] [place] [address] ECHOCARDIOGRAM  07-11-11    LV size mildly to moderately dilated. Systolic function markedly reduced. EF 25-30%. Severe diffuse hypokinesis. Grade 1 diastolic dysfunction. LA was mildly to moderately dilated. RV mildly dilated, systolic function mildly reduced. Mild MR and TR.      TRANSTHORACIC ECHOCARDIOGRAM  8-24-09    LV demonstrates severe hypokinesis of the inferior basal as well as  basal aneurysm being noted and paradoxical septal motion. EF 45-50%. LA is moderately dilated and AV demonstrates mild AV sclerosis w/o AS and AI. Trace MR and TV insufficiency. Family History   Problem Relation Age of Onset    Diabetes Father     Cancer Father 80        Bone    Hypertension Mother     Heart Disease Mother     No Known Problems Sister     No Known Problems Brother     No Known Problems Brother     Breast Cancer Neg Hx     Colon Cancer Neg Hx        Social History     Tobacco Use    Smoking status: Former Smoker     Packs/day: 1.50     Years: 30.00     Pack years: 45.00     Types: Cigarettes     Last attempt to quit: 1988     Years since quittin.9    Smokeless tobacco: Never Used   Substance Use Topics    Alcohol use: No     Frequency: Never     Binge frequency: Never      Current Outpatient Medications   Medication Sig Dispense Refill    metFORMIN (GLUCOPHAGE-XR) 750 MG extended release tablet TAKE ONE (1) TABLET BY MOUTH TWO (2) TIMES A DAY. 60 tablet 3    amiodarone (CORDARONE) 200 MG tablet TAKE 1 TABLET DAILY 90 tablet 4    atorvastatin (LIPITOR) 80 MG tablet TAKE 1 TABLET DAILY 90 tablet 0    pregabalin (LYRICA) 200 MG capsule Take 1 capsule by mouth 2 times daily for 90 days. 60 capsule 2    bumetanide (BUMEX) 0.5 MG tablet TAKE 1 TABLET TWICE A  tablet 3    rOPINIRole (REQUIP) 0.5 MG tablet TAKE ONE (1) TABLET BY MOUTH ONCE DAILY AT BEDTIME.  30 tablet 5    enalapril (VASOTEC) 2.5 MG tablet Take 1 tablet by mouth 2 times daily Indications: Patient taking BID 30 tablet 0    Insulin Pen Needle (B-D UF III MINI PEN NEEDLES) 31G X 5 MM MISC 1 each by Does not apply route daily 100 each 3    vitamin B-12 (CYANOCOBALAMIN) 100 MCG tablet Take 1,000 mcg by mouth three times daily      clopidogrel (PLAVIX) 75 MG tablet Take 1 tablet by mouth daily 90 tablet 3    LANTUS SOLOSTAR 100 UNIT/ML injection pen INJECT 20 UNITS UNDER THE SKIN NIGHTLY 15 mL 4    potassium chloride (KLOR-CON M) 20 MEQ extended release tablet Take 2 tablets by mouth as needed (Potassium Replacement) 60 tablet 3    ipratropium-albuterol (DUONEB) 0.5-2.5 (3) MG/3ML SOLN nebulizer solution Inhale 3 mLs into the lungs every 4 hours as needed for Shortness of Breath or Other (wheezing) 360 mL 5    blood glucose test strips (ASCENSIA AUTODISC VI;ONE TOUCH ULTRA TEST VI) strip Test once daily. Dispense One Touch Ultra Test Strips. Dx: Type 2 diabetes (250.00) 100 each 5    fluticasone (FLONASE) 50 MCG/ACT nasal spray 2 sprays by Nasal route daily 1 Bottle 1    aspirin EC 81 MG EC tablet Take 1 tablet by mouth daily 30 tablet 3     No current facility-administered medications for this visit. Allergies   Allergen Reactions    Sulfa Antibiotics Swelling       Health Maintenance   Topic Date Due    DTaP/Tdap/Td vaccine (1 - Tdap) 01/03/1963    Shingles Vaccine (1 of 2) 01/03/1994    Annual Wellness Visit (AWV)  05/29/2019    Lipid screen  05/31/2020    TSH testing  08/28/2020    Flu vaccine (1) 09/01/2020    Potassium monitoring  06/03/2021    Creatinine monitoring  06/03/2021    Pneumococcal 65+ yrs at Risk Vaccine  Completed    DEXA (modify frequency per FRAX score)  Addressed    Hepatitis A vaccine  Aged Out    Hib vaccine  Aged Out    Meningococcal (ACWY) vaccine  Aged Out       Subjective:      Review of Systems   Constitutional: Negative for activity change, appetite change, chills, diaphoresis, fatigue, fever and unexpected weight change. HENT: Negative for congestion, ear pain, postnasal drip, sinus pressure and sore throat. Eyes: Negative for visual disturbance. Respiratory: Negative for cough, chest tightness, shortness of breath, wheezing and stridor. Cardiovascular: Positive for leg swelling. Negative for chest pain and palpitations.    Gastrointestinal: Negative for abdominal distention, abdominal pain, constipation, diarrhea, nausea and vomiting. Endocrine: Negative for polydipsia, polyphagia and polyuria. Genitourinary: Negative for decreased urine volume, difficulty urinating, dysuria, flank pain, frequency, hematuria and urgency. Musculoskeletal: Negative for arthralgias, back pain, gait problem, joint swelling, myalgias and neck pain. Skin: Negative for color change, pallor and rash. Neurological: Negative for dizziness, syncope, weakness, light-headedness, numbness and headaches. Hematological: Negative for adenopathy. Psychiatric/Behavioral: Negative for behavioral problems, self-injury and sleep disturbance. The patient is not nervous/anxious. Objective:     Physical Exam  Vitals signs and nursing note reviewed. Constitutional:       Appearance: Normal appearance. She is well-developed. HENT:      Head: Normocephalic. Right Ear: Tympanic membrane and external ear normal.      Left Ear: Tympanic membrane and external ear normal.      Nose: Nose normal.      Right Sinus: No maxillary sinus tenderness. Left Sinus: No maxillary sinus tenderness. Mouth/Throat:      Pharynx: Uvula midline. Eyes:      Pupils: Pupils are equal, round, and reactive to light. Neck:      Musculoskeletal: Normal range of motion and neck supple. Thyroid: No thyromegaly. Vascular: No carotid bruit or JVD. Trachea: Trachea normal.   Cardiovascular:      Rate and Rhythm: Normal rate and regular rhythm. Heart sounds: Normal heart sounds. No murmur. Pulmonary:      Effort: Pulmonary effort is normal. No respiratory distress. Breath sounds: Normal breath sounds. No decreased breath sounds, wheezing, rhonchi or rales. Chest:      Chest wall: No tenderness. Abdominal:      General: Bowel sounds are normal. There is no distension. Palpations: Abdomen is soft. There is no mass. Tenderness: There is no abdominal tenderness.    Musculoskeletal: Normal range of motion. General: No tenderness or deformity. Comments: Bilateral lower leg pitting edema   Lymphadenopathy:      Cervical: No cervical adenopathy. Skin:     General: Skin is warm and dry. Findings: No erythema or rash. Neurological:      Mental Status: She is alert and oriented to person, place, and time. Cranial Nerves: No cranial nerve deficit. Sensory: No sensory deficit. Motor: No abnormal muscle tone. Coordination: Coordination normal.      Gait: Gait normal.   Psychiatric:         Behavior: Behavior normal.         Thought Content: Thought content normal.         Judgment: Judgment normal.       /68 (Site: Left Upper Arm, Position: Sitting, Cuff Size: Large Adult)   Pulse 70   Temp 97.5 °F (36.4 °C) (Temporal)   Resp 24   Wt 223 lb (101.2 kg)   SpO2 92%   BMI 40.79 kg/m²     Assessment:       Diagnosis Orders   1. Type 2 diabetes mellitus with diabetic polyneuropathy, without long-term current use of insulin (HCC)  POCT glycosylated hemoglobin (Hb A1C)   2. Acute on chronic systolic CHF (congestive heart failure) ([address]     3. Neuropathy due to chemotherapeutic drug ([address]     4. Morbidly obese ([address]         Plan:       DM stable at this point  CHF worse. Recommend taking extra bumex for the next 5-7 days. Avoid sodium  Ed for cp  Monitor for increase sob  Wear reid hose regularly  Cad stable  On statin for lipids. Cont. Lose weight  Hx of neuropathy related to chemo    Discussed use, benefit, and side effects of prescribed medications. Barriers tomedication compliance addressed. All patient questions answered. Pt voiced understanding. No follow-ups on file. Orders Placed This Encounter   Procedures    POCT glycosylated hemoglobin (Hb A1C)     No orders of the defined types were placed in this encounter.        Reccommended tobacco cessation options including pharmacologicmethods, counseled great than 3 minutes during this visit:  Yes  []  No []     Patient given educational materials - see patient instructions. Discussed use, benefit, and sideeffects of prescribed medications. All patient questions answered. Pt voiced understanding. Reviewed health maintenance. Instructed to continue current medications, diet and exercise. Patient agreed with treatment plan. Follow up as directed.      Electronically signed by ROMAIN Carballo CNP on 8/15/2020 at 3:39 PM

## 2020-08-12 NOTE — TELEPHONE ENCOUNTER
Livier Belen states over the past 3 weeks she has noticed increased fatigue, shortness of breath, and mild mid chest pain. She has appt with you 9/16/20 and was wondering if she should see you sooner.

## 2020-08-15 PROBLEM — E66.01 MORBIDLY OBESE (HCC): Status: ACTIVE | Noted: 2020-01-01

## 2020-08-24 NOTE — PATIENT INSTRUCTIONS
1.  Transfuse 1 unit of PRBCs today  2. Please call  [facility] office to schedule follow-up as soon as possible. The patient has history of CHF. She has pleural effusion and bilateral leg swelling  3. CT of the chest without contrast within the next 2-3 days  4. RTC to see me on August 28, 2020.   On RTC labs: CBC, BMP

## 2020-08-24 NOTE — PROGRESS NOTES
[facility]        Reason for Clinic visit;      Triple negative right breast cancer    HPI   Lissa Denny is a 67 y.o. female who during hospitalization in May 2019 at [facility] for shortness of breath was found to have right breast mass and right axillary lymphadenopathy  She was also found to have decompensated CHF. Her Echo on May 31, 2019 showed ejection fraction of 40%. CTA on May 31, 2019 showed asymmetric breast tissue in the right upper lateral breast. And few enlarged  right axillary lymph nodes. She did not have a mammogram at least 10 years. She underwent biopsy of the right breast mass and lymph node on the June 11, 2019. Pathology report showed:  A. Right breast, UIQ, core needle biopsy with barbell clip placement:   Invasive ductal carcinoma, Diana grade 3.  B. \"Lymph node\", right, UOQ, core needle biopsy with tribell clip  placement:   Invasive ductal carcinoma. Estrogen Receptor: Negative (<1% of cells staining)  Progesterone Receptor: Negative (<1% of cells staining)  Ki-67 (clone 30-9)  Percentage of positive nuclei:  25%  HER2 dual ROCHELLE  HER2 ROCHELLE NEGATIVE    PET scan showed questionable pulmonary nodule. It was not seen on prior CTA. The patient had CT of the chest to see whether nodule is amenable to CT-guided biopsy. Due to the size and location the CT-guided biopsy was not feasible. My recommendation is to proceed with standard chemotherapy. Since the patient was older than 78years of age and she had history of CHF,  we started treatment with dose dense Taxol on August 1, 2019. Her cycle #1 was complicated by hospitalization for hypotension, pneumonia and anemia. Unfortunately delayed cycle #2 given on August 20, 2019 led to prolonged hospitalization as well. The patient was hospitalized from August 28 till September 1, 2019 after fall at home.  She stayed on the floor for an extended period of time as she was unable to get herself up and had to wait until her  was able to get her into a chair.    No fractures seen on thoracic and lumbar spine Xray. CXR normal. Hgb has stayed stable >8 after 1PRBC transfusion. VS stable. This was her third hospitalization in 1 month. Pt agreed to transition to SNF. She was receiving physical therapy and rehabilitation and nursing home, discharge home on October 15, 2019. PET scan on November 11, 2019 showed no new findings suspicious for metastatic disease. Pulmonary nodule was present, but with much less metabolic activity, still questionable metastatic focus. Since the patient wouldn't be able to tolerate further chemotherapy,my recommendation was to proceed with lumpectomy and axillary lymph node mapping and biopsy. The patient underwent lumpectomy and lymph node excision on December 3, 2019, she tolerated surgery very well. Subsequently she proceeded with radiation treatment to her right breast which overall she tolerated well. Interim history on August 24, 2020: The patient presents to the medical oncology clinic for 2 months follow-up visit. Patient had contacted my office due to worsening shortness of breath, leg heaviness and weakness. She reports that her shortness of breath is slowly but steadily worsening. She has dyspnea with minimal activity. She denies having any chest pain. Denies having any productive cough. She denies having any fevers. She denies any concerns related to her breasts. She continues to ambulate with a walker. She denies having any recent falls.   She denies having bone pain      Past Medical History         Diagnosis Date    [facility]    right invasive ductal carcinoma    CAD (coronary artery disease)     sees Dr Bri Crum CHF (congestive heart failure) ([facility] [address]     Hyperlipidemia     Hypertension     ICD (implantable cardiac defibrillator), dual, in situ     [facility] [facility] dual ICD    Numbness and tingling     both feet    Pneumonia  Shingles 12/2014    Sleep apnea     [facility] dual ICD     Type II or unspecified type diabetes mellitus without mention of complication, not stated as uncontrolled     Ventricular arrhythmia       Past Surgical History           Procedure Laterality Date    BREAST BIOPSY Right 06/11/2019    [address] [place] [address]-    BREAST LUMPECTOMY Right 12/3/2019    RIGHT BREAST LUMPECTOMY, SENTINAL LYMPH NODE BIOPSY, TARGETED AXILLARY NODE DISSECTION, PREOP NEEDLE LOC X 2 performed by [facility] Est[facility]  9[facility]    [facility]    CARDIOVASCULAR STRESS TEST  01-27-10    Excellent treadmill exercise. Improved functional capacity to functional class 1 completed 8 METS. Hemodynamic response was appropriate. No ischemic ECG changes noted.  CARDIOVASCULAR STRESS TEST  10-28-09    No evidence of stress induced ischemia. Evidence of  prior transmural MI demonstrated by transient fixed defects of inferior wall extending into lateral wall, indicative of RCA lesion. Bowel and soft tissue attenuation interfering w/ counts of lateral wall. EF 29% w/ severe septal and inferolateral hypokinesis w/ very mild lateral wall hypokinesis being noted.  CARDIOVERSION  8-29-09    CHOLECYSTECTOMY  2005    Laparoscopic    COLONOSCOPY Left 1/9/2019    COLONOSCOPY performed by Kavon Paul MD at [address] [place] [address] CATH LAB PROCEDURE  8-24-09    Multivessel disease demonstrated by LAD having 70-80%  proximal lesion and napkin-ring lesion at bifurcation w/ D2. D2 appears to be medium large caliber vessel which has an ostial lesion of 70-80%. left -to-left collaterals as well as left-to-right collaterals emanating from LAD to PDA. Circumflex had anterior marginal branch and posterior marginal branch.      HERNIA REPAIR      Multiple    HYSTERECTOMY  1997    INSERTION / REMOVAL / REPLACEMENT VENOUS ACCESS CATHETER Right 7/31/2019    SINGLE LUMEN SMART PORT INSERTION [facility] N/A 12/3/2019    SINGLE LUMEN ONZNZRIQI REMOVAL RIGHT SUBCLAVIAN performed by Faith Patterson MD at [facility] ECHOCARDIOGRAM  11    LV size mildly to moderately dilated. Systolic function markedly reduced. EF 25-30%. Severe diffuse hypokinesis. Grade 1 diastolic dysfunction. LA was mildly to moderately dilated. RV mildly dilated, systolic function mildly reduced. Mild MR and TR.  TRANSTHORACIC ECHOCARDIOGRAM  09    LV demonstrates severe hypokinesis of the inferior basal as well as  basal aneurysm being noted and paradoxical septal motion. EF 45-50%. LA is moderately dilated and AV demonstrates mild AV sclerosis w/o AS and AI. Trace MR and TV insufficiency.       Allergies   Sulfa antibiotics  Social History     Social History     Socioeconomic History    Marital status:      Spouse name: Meaghan Isidro Number of children: 2    Years of education: 15    Highest education level: Not on file   Occupational History    Occupation: [employer]     Comment: [employer]   Social Needs    Financial resource strain: Not hard at all   TPACK insecurity     Worry: Never true     Inability: Never true   [employer] needs     Medical: No     Non-medical: No   Tobacco Use    Smoking status: Former Smoker     Packs/day: 1.50     Years: 30.00     Pack years: 45.00     Types: Cigarettes     Last attempt to quit: 1988     Years since quittin.9    Smokeless tobacco: Never Used   Substance and Sexual Activity    Alcohol use: No     Frequency: Never     Binge frequency: Never    Drug use: No    Sexual activity: Not Currently     Partners: Male   Lifestyle    Physical activity     Days per week: 5 days     Minutes per session: 10 min    Stress: Not at all   Relationships    Social connections     Talks on phone: More than three times a week     Gets together: Never     Attends [employer] service: Never     Active member of club or organization: No     Attends meetings of clubs or organizations: Never     Relationship status:     Intimate partner violence     Fear of current or ex partner: Not on file     Emotionally abused: Not on file     Physically abused: Not on file     Forced sexual activity: Not on file   Other Topics Concern    Not on file   Social History Narrative    Not on file     Family History          Problem Relation Age of Onset    Diabetes Father     Cancer Father 80        Bone    Hypertension Mother     Heart Disease Mother     No Known Problems Sister     No Known Problems Brother     No Known Problems Brother     Breast Cancer Neg Hx     Colon Cancer Neg Hx      ROS     Review of Systems   Constitutional: Positive for activity change and fatigue. Negative for appetite change and fever. HENT: Negative for congestion, dental problem, facial swelling, hearing loss, mouth sores, nosebleeds, sore throat, tinnitus and trouble swallowing. Eyes: Negative for discharge and visual disturbance. Respiratory: Positive for shortness of breath. Negative for cough, chest tightness and wheezing. Cardiovascular: Negative for chest pain, palpitations and leg swelling. Gastrointestinal: Negative for abdominal distention, abdominal pain, blood in stool, constipation, diarrhea, nausea, rectal pain and vomiting. Endocrine: Negative for cold intolerance, polydipsia and polyuria. Genitourinary: Negative for decreased urine volume, difficulty urinating, dysuria, flank pain, hematuria and urgency. Musculoskeletal: Positive for arthralgias and back pain. Negative for gait problem, joint swelling, myalgias and neck stiffness. Skin: Negative for color change, rash and wound. Neurological: Negative for dizziness, tremors, seizures, speech difficulty, weakness, light-headedness, numbness and headaches. Hematological: Negative for adenopathy. Does not bruise/bleed easily. Psychiatric/Behavioral: Negative for confusion and sleep disturbance. The patient is not nervous/anxious. Vitals     height is 5' 2\" (1.575 m) and weight is 232 lb 12.8 oz (105.6 kg). Her infrared temperature is 98.1 °F (36.7 °C). Her blood pressure is 103/55 (abnormal) and her pulse is 69. Her respiration is 18 and oxygen saturation is 90%. Exam   Physical Exam  Vitals signs reviewed. Constitutional:       General: She is not in acute distress. Appearance: She is well-developed. HENT:      Head: Normocephalic. Mouth/Throat:      Pharynx: No oropharyngeal exudate. Eyes:      General: No scleral icterus. Right eye: No discharge. Left eye: No discharge. Pupils: Pupils are equal, round, and reactive to light. Neck:      Musculoskeletal: Normal range of motion and neck supple. Thyroid: No thyromegaly. Vascular: No JVD. Trachea: No tracheal deviation. Cardiovascular:      Rate and Rhythm: Normal rate. Heart sounds: Normal heart sounds. No murmur. No friction rub. No gallop. Pulmonary:      Effort: Pulmonary effort is normal. No respiratory distress. Breath sounds: No stridor. Examination of the right-lower field reveals decreased breath sounds. Decreased breath sounds present. No wheezing or rales. Chest:      Chest wall: No tenderness. Breasts:         Right: Mass (S/p lumpectomy. Lumpectomy lumpectomy incision and lymph node excision are healing nicely) present. Abdominal:      General: Bowel sounds are normal. There is no distension. Palpations: Abdomen is soft. There is no mass. Tenderness: There is no abdominal tenderness. There is no rebound. Musculoskeletal: Normal range of motion. Comments: Good range of motion in all four extremities. Lymphadenopathy:      Cervical: No cervical adenopathy. Skin:     General: Skin is warm. Findings: No erythema or rash.    Neurological:      Mental Status: She is alert and oriented to person, place, and time. Cranial Nerves: No cranial nerve deficit. Motor: No abnormal muscle tone. Deep Tendon Reflexes: Reflexes are normal and symmetric. Psychiatric:         Behavior: Behavior normal.         Thought Content: Thought content normal.         Judgment: Judgment normal.               Radiology        Cta Chest W Wo Contrast  Result Date: 5/31/2019  1. No acute pulmonary embolism. 2. Bilateral pleural effusions right greater than left with adjacent consolidation, atelectasis or infiltrate. Right pleural effusion may be loculated. Correlation and follow-up advised to document resolution. 3. Asymmetric breast tissue in the right upper lateral breast. This is incompletely evaluated on this exam. Correlation with mammogram is advised. Few right axillary lymph nodes largest is 1.3 cm. *    Us Thoracentesis  Result Date: 6/1/2019  Successful ultrasound-guided thoracentesis with  0.6 L of fluid drained. Cytology showed No malignant cells seen. PET yen on June 20, 2019 showed:    1. Hypermetabolic lesion right breast compatible with known primary neoplasm. 2. Single FDG avid right axillary lymph node previously sampled. Consistent with metastatic disease. 3. Single right upper lobe noncalcified nodule with FDG metabolism most concerning for metastatic disease. 4. Activity along the posterior right lobe of the liver contiguous with the hemidiaphragm and difficult to separate out from possible subdiaphragmatic fluid however this is believed to be within the Liver segment seven most concerning for hepatic    metastatic disease. 5. possible malignant effusion. 6. Cardiomegaly. 3.2 cm infrarenal abdominal aortic aneurysm.          Lab Results   Component Value Date    WBC 7.1 08/28/2020    HGB 8.6 (L) 08/28/2020    HCT 30.3 (L) 08/28/2020    MCV 78.7 (L) 08/28/2020     (L) 08/28/2020       Chemistry        Component Value Date/Time     yield.   I had a lengthy discussion with the patient and her family that although pulmonary nodule is suspicious we do not know definitely that this is a metastatic focus. My recommendation was to proceed with standard chemotherapy. The patient is older than 78years of age and she has history of CHF, so we started treatment with dose dense Taxol on August 1, 2019. Her cycle #1 was complicated by hospitalization for hypotension, pneumonia and anemia. In addition Taxol made her pre-existing peripheral neuropathy worse. The patient received second dose of Taxol on August 20, 2019. Cycle #2 was complicated by prolonged hospitalization secondary to failure to thrive, fall. The patient underwent rehabilitation at [facility] from which she was discharged on October 15, 2019. The patient had a PET scan on November 11, 2019 that showed no new findings suspicious for metastatic disease. Pulmonary nodule was present, but with much less metabolic activity, still questionable metastatic focus. Since the patient was not  candidate for further systemic chemotherapy, my recommendation was to proceed with lumpectomy and axillary lymph node excision. She had the surgery on December 3, 2019. She tolerated surgery well. CT of the chest at the beginning of January 2020 showed that right lung and left lung base nodules were not identified. Therefore the patient was referred to radiation oncologist and she completed  radiotherapy in March 2020. She had CT of the chest on April 9, 2020 that showed stable pulmonary nodules, stable mediastinal and hilar lymphadenopathy. Stable bilateral adrenal masses. The patient denies having any worsening respiratory symptoms. 2.  Worsening shortness of breath. On the physical examination the patient has decreased breath sounds over the right lung. She will have CT of the chest without the contrast during the next 2 3 days.   Has history of CHF therefore we are referring her to her cardiologist Dr. Kosta eRa. Pleural effusion and bilateral leg swelling is concerning for exacerbation of her CHF. 3.  Anemia. The patient's hemoglobin is 7.6 today, platelet count 572,[phone]. The patient has CHF, will transfuse 1 unit of blood afterwards she will receive Lasix.     Lesli Cross MD on 8/31/2020 at 11:21 PM

## 2020-08-24 NOTE — PROGRESS NOTES
Patient tolerated transfusion of 1 unit PRBC's without any complications. Patient kept for 20 minutes observation post transfusion. Denies dizziness, lightheadedness, acute nausea or vomiting, headache, chest pain/pressure, rash/itching, or an increase in SOB. Last vital signs:   /65   Pulse 76   Temp 97.8 °F (36.6 °C) (Oral)   Resp 18 Comment: lungs with few scattered crackles at the bases  SpO2 97%     Patient instructed if they experience any of the above symptoms following today's transfusion,he/she is to notify the physician immediately or go to the emergency department. Discharge instructions given to patient. Verbalizes understanding, Off unit in wheelchair in stable condition accompanied by a family.

## 2020-08-24 NOTE — PLAN OF CARE
Problem: Musculor/Skeletal Functional Status  Goal: Absence of falls  Outcome: Met This Shift  Note: Free from falls while in O.P. Oncology. Intervention: Fall precautions  Note: Discussed the need to use the call light for assistance when getting up to ambulate. Problem: Intellectual/Education/Knowledge Deficit  Goal: Teaching initiated upon admission  Outcome: Met This Shift  Note: Patient verbalizes understanding to verbal information given on 1 unit PRBC's,action and possible side effects. Aware to call MD if develop complications. Intervention: Verbal/written education provided  Note: Patient educated blood product transfusion protocol:    Patient receiving 1 unit PRBC's:      - Blood product transfusion information sheet given: questions answered and consent signed  - Take vital signs/ monitor lungs sound prior to transfusion  - Monitor patient for 15 minutes after transfusion started  - Take vital signs / monitor lungs sound in 15 minutes and post transfusion  - Assess IV site   - Monitor patient closely for potential transfusion reaction    Call MD if develop complications once discharged. Problem: Discharge Planning  Goal: Knowledge of discharge instructions  Description: Knowledge of discharge instructions  Outcome: Met This Shift  Note: Verbalize understanding of discharge instructions, follow up appointments, and when to call Physician. Intervention: Interaction with patient/family and care team  Note: Discuss understanding of discharge instructions, follow up appointments and when to call Physician. Care plan reviewed with patient and spouse. Patient and spouse verbalize understanding of the plan of care and contribute to goal setting.

## 2020-08-26 NOTE — PROGRESS NOTES
demonstrated by transient fixed defects of inferior wall extending into lateral wall, indicative of RCA lesion. Bowel and soft tissue attenuation interfering w/ counts of lateral wall. EF 29% w/ severe septal and inferolateral hypokinesis w/ very mild lateral wall hypokinesis being noted.  CARDIOVERSION  8-29-09    CHOLECYSTECTOMY  2005    Laparoscopic    COLONOSCOPY Left 1/9/2019    COLONOSCOPY performed by Molly Wynn MD at [address] [place] [address] CATH LAB PROCEDURE  8-24-09    Multivessel disease demonstrated by LAD having 70-80%  proximal lesion and napkin-ring lesion at bifurcation w/ D2. D2 appears to be medium large caliber vessel which has an ostial lesion of 70-80%. left -to-left collaterals as well as left-to-right collaterals emanating from LAD to PDA. Circumflex had anterior marginal branch and posterior marginal branch.  HERNIA REPAIR      Multiple    HYSTERECTOMY  1997    INSERTION / REMOVAL / REPLACEMENT VENOUS ACCESS CATHETER Right 7/31/2019    SINGLE LUMEN SMART PORT INSERTION performed by Selena Colon MD at [facility] 9 N/A 12/3/2019    SINGLE LUMEN EBCSPOBXN REMOVAL RIGHT SUBCLAVIAN performed by Selena Colon MD at [facility] ECHOCARDIOGRAM  07-11-11    LV size mildly to moderately dilated. Systolic function markedly reduced. EF 25-30%. Severe diffuse hypokinesis. Grade 1 diastolic dysfunction. LA was mildly to moderately dilated. RV mildly dilated, systolic function mildly reduced. Mild MR and TR.  TRANSTHORACIC ECHOCARDIOGRAM  8-24-09    LV demonstrates severe hypokinesis of the inferior basal as well as  basal aneurysm being noted and paradoxical septal motion. EF 45-50%. LA is moderately dilated and AV demonstrates mild AV sclerosis w/o AS and AI. Trace MR and TV insufficiency.       Family History   Problem Relation Age of Onset    Diabetes Father     Cancer Father 80 Bone    Hypertension Mother     Heart Disease Mother     No Known Problems Sister     No Known Problems Brother     No Known Problems Brother     Breast Cancer Neg Hx     Colon Cancer Neg Hx      Social History     Tobacco Use    Smoking status: Former Smoker     Packs/day: 1.50     Years: 30.00     Pack years: 45.00     Types: Cigarettes     Last attempt to quit: 1988     Years since quittin.9    Smokeless tobacco: Never Used   Substance Use Topics    Alcohol use: No     Frequency: Never     Binge frequency: Never      Current Outpatient Medications   Medication Sig Dispense Refill    metFORMIN (GLUCOPHAGE-XR) 750 MG extended release tablet TAKE ONE (1) TABLET BY MOUTH TWO (2) TIMES A DAY. 60 tablet 3    amiodarone (CORDARONE) 200 MG tablet TAKE 1 TABLET DAILY 90 tablet 4    atorvastatin (LIPITOR) 80 MG tablet TAKE 1 TABLET DAILY 90 tablet 0    pregabalin (LYRICA) 200 MG capsule Take 1 capsule by mouth 2 times daily for 90 days. 60 capsule 2    bumetanide (BUMEX) 0.5 MG tablet TAKE 1 TABLET TWICE A  tablet 3    rOPINIRole (REQUIP) 0.5 MG tablet TAKE ONE (1) TABLET BY MOUTH ONCE DAILY AT BEDTIME.  30 tablet 5    enalapril (VASOTEC) 2.5 MG tablet Take 1 tablet by mouth 2 times daily Indications: Patient taking BID 30 tablet 0    Insulin Pen Needle (B-D UF III MINI PEN NEEDLES) 31G X 5 MM MISC 1 each by Does not apply route daily 100 each 3    vitamin B-12 (CYANOCOBALAMIN) 100 MCG tablet Take 1,000 mcg by mouth three times daily      clopidogrel (PLAVIX) 75 MG tablet Take 1 tablet by mouth daily 90 tablet 3    LANTUS SOLOSTAR 100 UNIT/ML injection pen INJECT 20 UNITS UNDER THE SKIN NIGHTLY 15 mL 4    potassium chloride (KLOR-CON M) 20 MEQ extended release tablet Take 2 tablets by mouth as needed (Potassium Replacement) 60 tablet 3    ipratropium-albuterol (DUONEB) 0.5-2.5 (3) MG/3ML SOLN nebulizer solution Inhale 3 mLs into the lungs every 4 hours as needed for Shortness of Breath or Other (wheezing) 360 mL 5    blood glucose test strips (ASCENSIA AUTODISC VI;ONE TOUCH ULTRA TEST VI) strip Test once daily. Dispense One Touch Ultra Test Strips. Dx: Type 2 diabetes (250.00) 100 each 5    fluticasone (FLONASE) 50 MCG/ACT nasal spray 2 sprays by Nasal route daily 1 Bottle 1    aspirin EC 81 MG EC tablet Take 1 tablet by mouth daily 30 tablet 3     No current facility-administered medications for this visit. Allergies   Allergen Reactions    Sulfa Antibiotics Swelling     Health Maintenance   Topic Date Due    DTaP/Tdap/Td vaccine (1 - Tdap) 01/03/1963    Shingles Vaccine (1 of 2) 01/03/1994    Annual Wellness Visit (AWV)  05/29/2019    Lipid screen  05/31/2020    TSH testing  08/28/2020    Flu vaccine (1) 09/01/2020    Potassium monitoring  06/03/2021    Creatinine monitoring  06/03/2021    Pneumococcal 65+ yrs at Risk Vaccine  Completed    DEXA (modify frequency per FRAX score)  Addressed    Hepatitis A vaccine  Aged Out    Hib vaccine  Aged Out    Meningococcal (ACWY) vaccine  Aged Out       Subjective:  Review of Systems  General:   No fever, no chills, No fatigue or weight loss  Pulmonary:    worsening  dyspnea, no wheezing  Cardiac:    Denies recent chest pain,   GI:     No nausea or vomiting, no abdominal pain  Neuro:     No dizziness or light headedness,   Musculoskeletal:  No recent active issues  Extremities:   more edema, no obvious claudication       Objective:  Physical Exam  /60   Pulse 83   Ht 5' 2\" (1.575 m)   Wt 228 lb 12.8 oz (103.8 kg)   BMI 41.85 kg/m²   General:   Well developed, well nourished  Lungs:    Clear to auscultation  Heart:    Normal S1 S2, Slight murmur. no rubs, no gallops  Abdomen:   Soft, non tender, no organomegalies, positive bowel sounds  Extremities:   moderate edema, no cyanosis, good peripheral pulses  Neurological:   Awake, alert, oriented.  No obvious focal deficits  Musculoskelatal:  No obvious deformities    Assessment:      Diagnosis Orders   1. CHF (congestive heart failure), NYHA class II, chronic, systolic (HCC)  EKG 12 lead   2. Essential hypertension  EKG 12 lead   3. Dilated cardiomyopathy (HCC)  EKG 12 lead   4. ICD (implantable cardioverter-defibrillator) in place  EKG 12 lead   5. SOB (shortness of breath)  EKG 12 lead   likely CHF worsening   Fluid overload  ECG in office was done today. I reviewed the ECG. No acute findings      Plan:  No follow-ups on file. Double bumex for 3 days  Getting CT chest today   Patient was advised to report to the ER if he has recurrent symptoms with specific instructions given about severity and duration of symptoms  Re assess in 2 weeks  Continue risk factor modification and medical management  Thank you for allowing me to participate in the care of your patient. Please don't hesitate to contact me regarding any further issues related to the patient care    Orders Placed:  Orders Placed This Encounter   Procedures    EKG 12 lead     Order Specific Question:   Reason for Exam?     Answer: Other       Medications Prescribed:  No orders of the defined types were placed in this encounter. Discussed use, benefit, and side effects of prescribed medications. All patient questions answered. Pt voicedunderstanding. Instructed to continue current medications, diet and exercise. Continue risk factor modification and medical management. Patient agreed with treatment plan. Follow up as directed.     Electronically signedby Richard Shaikh MD on 8/26/2020 at 9:40 AM

## 2020-08-28 NOTE — PATIENT INSTRUCTIONS
1.  Diagnostic thoracentesis, please send fluid for cytology. The patient was on Plavix, she stopped Plavix today  2. RTC to see me in 1 week.   On RTC labs: CBC, BMP

## 2020-08-31 NOTE — PROGRESS NOTES
Heart Failure Clinic       Visit Date: 8/31/2020  Cardiologist:  Dr. Norma Heller  Primary Care Physician: Dr. Sophie Esquivel MD    Louisa Gonzalez is a 67 y.o. female who presents today for:  Chief Complaint   Patient presents with    Follow-up    Congestive Heart Failure       HPI:   Louisa Gonzalez is a 67 y.o. female who presents to the office for a follow up patient visit in the heart failure clinic. Accompanied by son    TYPE HF: HFrEF (EF 40%)  Device: ICD   HX:  CAD, CABG (3V,2009), HTN, HLD, AAA (Dr Pond), PVD, DM  Breast CA w/ mets to lung (Dr Chapin, Dr Baljeet Nowak     Dry Wt:  [phone]    Hospitalization:  Aug 2019  8/24 - Sonia Lonnie gave 1 unit w/ Lasix 20 IV. OV Baki 8/26 = increased JASSO, LE edema. Double Bumex x 3 days. CT scan   8/26/20  Impression    1. Interval development of moderate loculated right pleural effusion    2. New slightly lobulated spiculated 1 cm nodule right upper lung. Concerning for metastatic disease.           Today:  Very SOB, Significant LE edema  Bumex doubled from 0.5 BID to 1 BID 3 days ago - has not seen any improvement.    Appears Wt up 20# in past month  Activity: Wheelchair, very winded w/ activity  Diet: Drinking tea \"all day long\"    Patient has:  Chest Pain: No  SOB: YES   Orthopnea/PND: Yes  AMBER: No  Edema: YES  Fatigue: YES  Abdominal bloating: slightl  Cough: \"little bit\"  Appetite:good  Any extra diuretic use: see HPI  Weight gain: Yes  Home weight: not check  Home blood pressure: 100s    Past Medical History:   Diagnosis Date    Breast cancer ([facility] [address] 06/2019    right invasive ductal carcinoma    CAD (coronary artery disease)     sees Dr Norma Heller    CHF (congestive heart failure) ([facility] [address]     Hyperlipidemia     Hypertension     ICD (implantable cardiac defibrillator), dual, in situ     [facility] dual ICD    Numbness and tingling     both feet    Pneumonia     Shingles 12/2014    Sleep apnea     [facility] [employer] dual ICD     Type II or unspecified type diabetes mellitus without mention of complication, not stated as uncontrolled     Ventricular arrhythmia      Past Surgical History:   Procedure Laterality Date    BREAST BIOPSY Right 06/11/2019    [address]-    BREAST LUMPECTOMY Right 12/3/2019    RIGHT BREAST LUMPECTOMY, SENTINAL LYMPH NODE BIOPSY, TARGETED AXILLARY NODE DISSECTION, PREOP NEEDLE LOC X 2 performed by Segun Gallagher MD at [address] [place] [address]  9-08-09    [facility]    CARDIOVASCULAR STRESS TEST  01-27-10    Excellent treadmill exercise. Improved functional capacity to functional class 1 completed 8 METS. Hemodynamic response was appropriate. No ischemic ECG changes noted.  CARDIOVASCULAR STRESS TEST  10-28-09    No evidence of stress induced ischemia. Evidence of  prior transmural MI demonstrated by transient fixed defects of inferior wall extending into lateral wall, indicative of RCA lesion. Bowel and soft tissue attenuation interfering w/ counts of lateral wall. EF 29% w/ severe septal and inferolateral hypokinesis w/ very mild lateral wall hypokinesis being noted.  CARDIOVERSION  8-29-09    CHOLECYSTECTOMY  2005    Laparoscopic    COLONOSCOPY Left 1/9/2019    COLONOSCOPY performed by Sandip Gaxiola MD at [address] [place] [address] CATH LAB PROCEDURE  8-24-09    Multivessel disease demonstrated by LAD having 70-80%  proximal lesion and napkin-ring lesion at bifurcation w/ D2. D2 appears to be medium large caliber vessel which has an ostial lesion of 70-80%. left -to-left collaterals as well as left-to-right collaterals emanating from LAD to PDA. Circumflex had anterior marginal branch and posterior marginal branch.      HERNIA REPAIR      Multiple    HYSTERECTOMY  1997    INSERTION / REMOVAL / REPLACEMENT VENOUS ACCESS CATHETER Right 7/31/2019    SINGLE LUMEN SMART PORT INSERTION performed by Segun Gallagher MD at [facility] 9 N/A 12/3/2019 SINGLE LUMEN SMARTPORT REMOVAL RIGHT SUBCLAVIAN performed by Segun Gallagher MD at [facility] ECHOCARDIOGRAM  11    LV size mildly to moderately dilated. Systolic function markedly reduced. EF 25-30%. Severe diffuse hypokinesis. Grade 1 diastolic dysfunction. LA was mildly to moderately dilated. RV mildly dilated, systolic function mildly reduced. Mild MR and TR.  TRANSTHORACIC ECHOCARDIOGRAM  09    LV demonstrates severe hypokinesis of the inferior basal as well as  basal aneurysm being noted and paradoxical septal motion. EF 45-50%. LA is moderately dilated and AV demonstrates mild AV sclerosis w/o AS and AI. Trace MR and TV insufficiency. Family History   Problem Relation Age of Onset    Diabetes Father     Cancer Father 80        Bone    Hypertension Mother     Heart Disease Mother     No Known Problems Sister     No Known Problems Brother     No Known Problems Brother     Breast Cancer Neg Hx     Colon Cancer Neg Hx      Social History     Tobacco Use    Smoking status: Former Smoker     Packs/day: 1.50     Years: 30.00     Pack years: 45.00     Types: Cigarettes     Last attempt to quit: 1988     Years since quittin.9    Smokeless tobacco: Never Used   Substance Use Topics    Alcohol use: No     Frequency: Never     Binge frequency: Never     Current Outpatient Medications   Medication Sig Dispense Refill    metOLazone (ZAROXOLYN) 5 MG tablet Take 1 tablet by mouth daily 10 tablet 0    enalapril (VASOTEC) 2.5 MG tablet TAKE 1 TABLET DAILY 90 tablet 3    potassium chloride (KLOR-CON M) 20 MEQ TBCR extended release tablet Take 2 tablets by mouth daily = 40 meq 60 tablet 5    bumetanide (BUMEX) 1 MG tablet Take 1 tablet by mouth 2 times daily 60 tablet 5    metFORMIN (GLUCOPHAGE-XR) 750 MG extended release tablet TAKE ONE (1) TABLET BY MOUTH TWO (2) TIMES A DAY.  60 tablet 3    amiodarone (CORDARONE) 200 MG tablet TAKE 1 TABLET DAILY 90 tablet 4    atorvastatin (LIPITOR) 80 MG tablet TAKE 1 TABLET DAILY 90 tablet 0    pregabalin (LYRICA) 200 MG capsule Take 1 capsule by mouth 2 times daily for 90 days. 60 capsule 2    rOPINIRole (REQUIP) 0.5 MG tablet TAKE ONE (1) TABLET BY MOUTH ONCE DAILY AT BEDTIME. 30 tablet 5    Insulin Pen Needle (B-D UF III MINI PEN NEEDLES) 31G X 5 MM MISC 1 each by Does not apply route daily 100 each 3    vitamin B-12 (CYANOCOBALAMIN) 100 MCG tablet Take 1,000 mcg by mouth three times daily      clopidogrel (PLAVIX) 75 MG tablet Take 1 tablet by mouth daily 90 tablet 3    LANTUS SOLOSTAR 100 UNIT/ML injection pen INJECT 20 UNITS UNDER THE SKIN NIGHTLY 15 mL 4    ipratropium-albuterol (DUONEB) 0.5-2.5 (3) MG/3ML SOLN nebulizer solution Inhale 3 mLs into the lungs every 4 hours as needed for Shortness of Breath or Other (wheezing) 360 mL 5    blood glucose test strips (ASCENSIA AUTODISC VI;ONE TOUCH ULTRA TEST VI) strip Test once daily. Dispense One Touch Ultra Test Strips. Dx: Type 2 diabetes (250.00) 100 each 5    fluticasone (FLONASE) 50 MCG/ACT nasal spray 2 sprays by Nasal route daily 1 Bottle 1    aspirin EC 81 MG EC tablet Take 1 tablet by mouth daily 30 tablet 3     No current facility-administered medications for this visit. Allergies   Allergen Reactions    Sulfa Antibiotics Swelling       SUBJECTIVE:   Review of Systems   Constitutional: Positive for fatigue. Negative for activity change and appetite change. Respiratory: Positive for cough and shortness of breath. Cardiovascular: Positive for leg swelling. Negative for chest pain and palpitations. Gastrointestinal: Negative for abdominal distention. Neurological: Negative for weakness, light-headedness and headaches. Hematological: Negative for adenopathy. Psychiatric/Behavioral: Negative for sleep disturbance.        OBJECTIVE:   Today's Vitals:  BP (!) 110/54   Pulse 77   Ht 5' 2\" (1.575 m)   Wt 231 lb 3.2 oz (104.9 kg)   SpO2 94%   BMI 42.29 kg/m²     Physical Exam  Vitals signs reviewed. Constitutional:       General: She is not in acute distress. Appearance: Normal appearance. She is well-developed. She is not diaphoretic. HENT:      Head: Normocephalic and atraumatic. Eyes:      Conjunctiva/sclera: Conjunctivae normal.   Neck:      Musculoskeletal: Normal range of motion and neck supple. Comments: + JVD right  Cardiovascular:      Rate and Rhythm: Normal rate and regular rhythm. Heart sounds: Normal heart sounds. No murmur. Pulmonary:      Effort: Pulmonary effort is normal. No respiratory distress. Breath sounds: Normal breath sounds. No wheezing or rales. Comments: Diminished R base  Abdominal:      General: Bowel sounds are normal. There is no distension. Palpations: Abdomen is soft. Tenderness: There is no abdominal tenderness. Musculoskeletal: Normal range of motion. Right lower leg: Edema (3-4+ pitting) present. Left lower leg: Edema (3-4+ pitting) present. Skin:     General: Skin is warm and dry. Capillary Refill: Capillary refill takes less than 2 seconds. Neurological:      Mental Status: She is alert and oriented to person, place, and time. Coordination: Coordination normal.   Psychiatric:         Behavior: Behavior normal.         Wt Readings from Last 3 Encounters:   08/31/20 231 lb 3.2 oz (104.9 kg)   08/28/20 228 lb (103.4 kg)   08/26/20 228 lb 12.8 oz (103.8 kg)     BP Readings from Last 3 Encounters:   08/31/20 (!) 110/54   08/28/20 (!) 103/54   08/26/20 103/60     Pulse Readings from Last 3 Encounters:   08/31/20 77   08/28/20 72   08/26/20 83     Body mass index is 42.29 kg/m². ECHO:    Summary   Ejection fraction is visually estimated at 40-45%. There was mild global hypokinesis of the left ventricle. Moderately dilated left atrium. The aortic valve leaflets were not well visualized. Aortic valve appears tricuspid.    Thickened aortic valve leaflets noted. Aortic valve leaflets are Mildly calcified. Trivial aortic regurgitation is noted. Signature      ----------------------------------------------------------------   Electronically signed by Virginia Bustamante MD (Interpreting   physician) on 11/20/2019 at 05:01 PM   ----------------------------------------------------------------    Results reviewed:  BNP: No results found for: BNP  CBC:   Lab Results   Component Value Date    WBC 7.1 08/28/2020    RBC 3.85 08/28/2020    HGB 8.6 08/28/2020    HCT 30.3 08/28/2020     08/28/2020     CMP:    Lab Results   Component Value Date     08/28/2020     06/03/2020    K 4.6 08/28/2020    K 4.7 06/03/2020    K 3.9 12/03/2019     06/03/2020    CO2 27 08/28/2020    BUN 32 08/28/2020    CREATININE 1.4 08/28/2020    CREATININE 0.9 06/03/2020    LABGLOM 61 06/03/2020    GLUCOSE 95 06/03/2020    GLUCOSE 130 03/23/2012    CALCIUM 9.1 06/03/2020     Hepatic Function Panel:    Lab Results   Component Value Date    ALKPHOS 103 08/24/2020    ALT 20 08/24/2020    AST 25 08/24/2020    PROT 6.3 08/24/2020    BILITOT 0.5 08/24/2020    BILIDIR <0.2 08/24/2020    LABALBU 3.4 08/24/2020    LABALBU 4.5 03/23/2012     Magnesium:    Lab Results   Component Value Date    MG 1.8 08/28/2019     PT/INR:    Lab Results   Component Value Date    INR 0.97 08/20/2019     Lipids:    Lab Results   Component Value Date    TRIG 67 05/31/2019    HDL 44 05/31/2019    LDLCALC 37 05/31/2019       ASSESSMENT AND PLAN:   The patient's condition/symptoms are Stable. Diagnosis Orders   1.  CHF (congestive heart failure), NYHA class III, acute on chronic, systolic (HCC)  Brain Natriuretic Peptide     Continue:  GDMT:   ACE/ARB/ARNI - Enalapril 2.5/day   BB - None   Diuretic - Bumex 1 BID   Vasodilator - none   MRA - None  Digoxin - None   Antiarrhythmic - Amiodarone  Continue Current medications:  Plavix  Stable but VERY Hypervolemic  Bumex 2 mg IV now  Kcl 40 po now  Take Metolazone 5 mg x 2 days - w/ extra Kcl 20 meq  Take Bumex 2 mg tomorrow AM then continue 1 mg twice daily  ACE wrap bilateral lower legs - put on in AM, off PM.    F/U in clinic on Thursday  GO to ED if worsening symptoms!!    ADDENDUM:    BNP added to blood on 8/28 - 7546 (last was 563, last year)    · Daily weights  · Fluid restriction of 2 Liters per day  · Limit sodium in diet to around 7204-1670 mg/day  · Monitor BP  · Activity as tolerated     Patient was instructed to call the [facility] for any changes in symptoms as noted in AVS.      Return in about 3 days (around 9/3/2020). or sooner if needed     Patient given educational materials - see patient instructions. We discussed the importance of weighing oneself and recording daily. We also discussed the importance of a low sodium diet, higher sodium foods to avoid and better low sodium food options. Patient verbalizes understanding of plan of care using teach back method, and is agreeable to the treatment plan.        Electronically signed by ROMAIN Gunter CNP on 8/31/2020 at 3:41 PM

## 2020-08-31 NOTE — PATIENT INSTRUCTIONS
You may receive a survey regarding the care you received during your visit. Your input is valuable to us. We encourage you to complete and return your survey. We hope you will choose us in the future for your healthcare needs. Continue:  · Continue current medications  · Daily weights and record  · Fluid restriction of 2 Liters per day  · Limit sodium in diet to around 8319-7239 mg/day  · Monitor BP  · Activity as tolerated     Call the Heart Failure Clinic for any of the following symptoms: [phone]   Weight gain of 2-3 pounds in 1 day or 5 pounds in 1 week   Increased shortness of breath   Shortness of breath while laying down   Cough   Chest pain   Swelling in feet, ankles or legs   Tenderness or bloating in the abdomen   Fatigue    Decreased appetite or nausea    Confusion       Take Metolazone 5 mg tomorrow AM and Wednesday AM - 1 hour before Bumex. Take extra Kcl 20 meq x 2 days.      Take Bumex 2 mg tomorrow AM then continue 1 mg twice daily    ACE wrap bilateral lower legs - put on in AM, off PM.

## 2020-08-31 NOTE — PROGRESS NOTES
IV started right AC  IV bumex and po potassium given per order  IV d/c   Patient tolerated procedure without complication or complaint

## 2020-09-03 NOTE — PROGRESS NOTES
Heart Failure Clinic       Visit Date: 9/3/2020  Cardiologist:  Dr. Main Booker  Primary Care Physician: Dr. Bart Ledesma MD    Dominic Cohn is a 67 y.o. female who presents today for:  Chief Complaint   Patient presents with    Congestive Heart Failure       HPI:   Dominic Cohn is a 67 y.o. female who presents to the office for a follow up patient visit in the heart failure clinic. Accompanied by     TYPE HF: HFrEF (EF 40%)  Device: ICD   HX:  CAD, CABG (3V,2009), HTN, HLD, AAA (Dr Pond), PVD, DM  Breast CA w/ mets to lung (Dr Chapin, Dr Federica Middleton     Dry Wt:  [phone]     Hospitalization:  Aug 2019  8/24 - Aron Wright gave 1 unit w/ Lasix 20 IV. OV Baki 8/26 = increased JASSO, LE edema. Double Bumex x 3 days. CT scan   8/26/20  Impression    1. Interval development of moderate loculated right pleural effusion    2. New slightly lobulated spiculated 1 cm nodule right upper lung. Concerning for metastatic disease.             OV 8/31 - Very SOB, Significant LE edema  Bumex doubled from 0.5 BID to 1 BID 3 days ago - has not seen any improvement. Appears Wt up 20# in past month  IV Bumex 2 mg given   Metolazone 5 mg  x 2 days  Today: Wt down 12#. Swelling improved, still 2+ pitting. Still appears SOB. Overall she states feeling \"about the same\"  Urine output was minimal w/ IV Bumex, better w/ Metolazone. Activity: Wheelchair - can walk room/room in house. Diet: poor appetite    Patient has:  Chest Pain: No  SOB: YES  Orthopnea/PND: No  AMBER: sleeps in bed 1 pillow  Edema: Yes - slight improved  Fatigue:  Yes  Abdominal bloating: No  Cough: No  Appetite: poor  Any extra diuretic use: see HPI  Weight gain: No  Home weight: No  Home blood pressure: 100s    Past Medical History:   Diagnosis Date    Breast cancer ([facility] 06/2019    right invasive ductal carcinoma    CAD (coronary artery disease)     sees Dr Miguel Dominguez CHF (congestive heart failure) ([facility]     Hyperlipidemia     Hypertension HYSTERECTOMY  1997    INSERTION / REMOVAL / REPLACEMENT VENOUS ACCESS CATHETER Right 2019    SINGLE LUMEN SMART PORT INSERTION performed by Payton Fernandez MD at / [facility] 9 N/A 12/3/2019    SINGLE LUMEN DEKTESUSY REMOVAL RIGHT SUBCLAVIAN performed by Payton Fernandez MD at [facility] ECHOCARDIOGRAM  11    LV size mildly to moderately dilated. Systolic function markedly reduced. EF 25-30%. Severe diffuse hypokinesis. Grade 1 diastolic dysfunction. LA was mildly to moderately dilated. RV mildly dilated, systolic function mildly reduced. Mild MR and TR.  TRANSTHORACIC ECHOCARDIOGRAM  09    LV demonstrates severe hypokinesis of the inferior basal as well as  basal aneurysm being noted and paradoxical septal motion. EF 45-50%. LA is moderately dilated and AV demonstrates mild AV sclerosis w/o AS and AI. Trace MR and TV insufficiency.       Family History   Problem Relation Age of Onset    Diabetes Father     Cancer Father 80        Bone    Hypertension Mother     Heart Disease Mother     No Known Problems Sister     No Known Problems Brother     No Known Problems Brother     Breast Cancer Neg Hx     Colon Cancer Neg Hx      Social History     Tobacco Use    Smoking status: Former Smoker     Packs/day: 1.50     Years: 30.00     Pack years: 45.00     Types: Cigarettes     Last attempt to quit: 1988     Years since quittin.9    Smokeless tobacco: Never Used   Substance Use Topics    Alcohol use: No     Frequency: Never     Binge frequency: Never     Current Outpatient Medications   Medication Sig Dispense Refill    metOLazone (ZAROXOLYN) 5 MG tablet Take 1 tablet by mouth daily 10 tablet 0    enalapril (VASOTEC) 2.5 MG tablet TAKE 1 TABLET DAILY 90 tablet 3    potassium chloride (KLOR-CON M) 20 MEQ TBCR extended release tablet Take 2 tablets by mouth daily = 40 meq 60 tablet 5    bumetanide (BUMEX) 1 MG tablet Take 1 tablet by mouth 2 times daily 60 tablet 5    metFORMIN (GLUCOPHAGE-XR) 750 MG extended release tablet TAKE ONE (1) TABLET BY MOUTH TWO (2) TIMES A DAY. 60 tablet 3    amiodarone (CORDARONE) 200 MG tablet TAKE 1 TABLET DAILY 90 tablet 4    atorvastatin (LIPITOR) 80 MG tablet TAKE 1 TABLET DAILY 90 tablet 0    pregabalin (LYRICA) 200 MG capsule Take 1 capsule by mouth 2 times daily for 90 days. 60 capsule 2    rOPINIRole (REQUIP) 0.5 MG tablet TAKE ONE (1) TABLET BY MOUTH ONCE DAILY AT BEDTIME. 30 tablet 5    Insulin Pen Needle (B-D UF III MINI PEN NEEDLES) 31G X 5 MM MISC 1 each by Does not apply route daily 100 each 3    vitamin B-12 (CYANOCOBALAMIN) 100 MCG tablet Take 1,000 mcg by mouth three times daily      clopidogrel (PLAVIX) 75 MG tablet Take 1 tablet by mouth daily 90 tablet 3    LANTUS SOLOSTAR 100 UNIT/ML injection pen INJECT 20 UNITS UNDER THE SKIN NIGHTLY 15 mL 4    ipratropium-albuterol (DUONEB) 0.5-2.5 (3) MG/3ML SOLN nebulizer solution Inhale 3 mLs into the lungs every 4 hours as needed for Shortness of Breath or Other (wheezing) 360 mL 5    blood glucose test strips (ASCENSIA AUTODISC VI;ONE TOUCH ULTRA TEST VI) strip Test once daily. Dispense One Touch Ultra Test Strips. Dx: Type 2 diabetes (250.00) 100 each 5    fluticasone (FLONASE) 50 MCG/ACT nasal spray 2 sprays by Nasal route daily 1 Bottle 1    aspirin EC 81 MG EC tablet Take 1 tablet by mouth daily 30 tablet 3     No current facility-administered medications for this visit. Allergies   Allergen Reactions    Sulfa Antibiotics Swelling       SUBJECTIVE:   Review of Systems   Constitutional: Positive for fatigue. Negative for activity change and appetite change. Respiratory: Positive for shortness of breath. Negative for cough. Cardiovascular: Positive for leg swelling. Negative for chest pain and palpitations. Gastrointestinal: Negative for abdominal distention. Neurological: Positive for weakness.  Negative for light-headedness and headaches. Hematological: Negative for adenopathy. Psychiatric/Behavioral: Negative for sleep disturbance. OBJECTIVE:   Today's Vitals:  BP (!) 99/59   Temp (!) 69 °F (20.6 °C)   Ht 5' 2\" (1.575 m)   Wt 219 lb (99.3 kg)   SpO2 93%   BMI 40.06 kg/m²     Physical Exam  Vitals signs reviewed. Constitutional:       General: She is not in acute distress. Appearance: Normal appearance. She is well-developed. She is not diaphoretic. HENT:      Head: Normocephalic and atraumatic. Eyes:      Conjunctiva/sclera: Conjunctivae normal.   Neck:      Musculoskeletal: Normal range of motion and neck supple. Comments: Slight JVD right  Cardiovascular:      Rate and Rhythm: Normal rate and regular rhythm. Heart sounds: Normal heart sounds. No murmur. Pulmonary:      Effort: Pulmonary effort is normal. No respiratory distress. Breath sounds: Rales (right base) present. No wheezing. Abdominal:      General: Bowel sounds are normal. There is no distension. Palpations: Abdomen is soft. Tenderness: There is no abdominal tenderness. Musculoskeletal: Normal range of motion. Right lower leg: Edema (2-3+ pitting) present. Left lower leg: Edema (2-3+ pitting) present. Skin:     General: Skin is warm and dry. Capillary Refill: Capillary refill takes less than 2 seconds. Neurological:      Mental Status: She is alert and oriented to person, place, and time. Coordination: Coordination normal.   Psychiatric:         Behavior: Behavior normal.         Wt Readings from Last 3 Encounters:   09/03/20 219 lb (99.3 kg)   08/31/20 231 lb 3.2 oz (104.9 kg)   08/28/20 228 lb (103.4 kg)     BP Readings from Last 3 Encounters:   09/03/20 (!) 99/59   08/31/20 (!) 110/54   08/28/20 (!) 103/54     Pulse Readings from Last 3 Encounters:   08/31/20 77   08/28/20 72   08/26/20 83     Body mass index is 40.06 kg/m².     ECHO:   Summary   Ejection fraction is visually estimated at 40-45%.   There was mild global hypokinesis of the left ventricle.   Moderately dilated left atrium.   The aortic valve leaflets were not well visualized.   Aortic valve appears tricuspid.   Thickened aortic valve leaflets noted.   Aortic valve leaflets are Mildly calcified.   Trivial aortic regurgitation is noted.      Signature      ----------------------------------------------------------------   Electronically signed by Everett Martinez MD (Interpreting   physician) on 11/20/2019 at 05:01 PM   ----------------------------------------------------------------    Results reviewed:  BNP: No results found for: BNP  CBC:   Lab Results   Component Value Date    WBC 7.1 08/28/2020    RBC 3.85 08/28/2020    HGB 8.6 08/28/2020    HCT 30.3 08/28/2020     08/28/2020     CMP:    Lab Results   Component Value Date     09/03/2020    K 5.7 09/03/2020    K 3.9 12/03/2019     09/03/2020    CO2 29 09/03/2020    BUN 43 09/03/2020    CREATININE 1.8 09/03/2020    LABGLOM 27 09/03/2020    GLUCOSE 80 09/03/2020    GLUCOSE 130 03/23/2012    CALCIUM 9.0 09/03/2020     Hepatic Function Panel:    Lab Results   Component Value Date    ALKPHOS 103 08/24/2020    ALT 20 08/24/2020    AST 25 08/24/2020    PROT 6.3 08/24/2020    BILITOT 0.5 08/24/2020    BILIDIR <0.2 08/24/2020    LABALBU 3.4 08/24/2020    LABALBU 4.5 03/23/2012     Magnesium:    Lab Results   Component Value Date    MG 1.8 09/03/2020     PT/INR:    Lab Results   Component Value Date    INR 0.97 08/20/2019     Lipids:    Lab Results   Component Value Date    TRIG 67 05/31/2019    HDL 44 05/31/2019    LDLCALC 37 05/31/2019       ASSESSMENT AND PLAN:   The patient's condition/symptoms are Stable. Diagnosis Orders   1. CHF (congestive heart failure), NYHA class III, acute on chronic, systolic (HCC)  Basic Metabolic Panel    Brain Natriuretic Peptide    Magnesium   2. Ischemic cardiomyopathy     3. SOB (shortness of breath)     4.  Pleural effusion, right       Continue:  GDMT:   ACE/ARB/ARNI - Enalapril 2.5/day   BB - None   Diuretic - Bumex 1 BID, kcl 20/day   Vasodilator - None   MRA - None  Digoxin - None   Antiarrhythmic - Amio  Continue Current medications:  Plavix  Little improvement - wt is down 12# in 3 days. Swelling improved. Still appears SOB. Overall she feels the same. Called and was able to move Thoracentesis up few days - scheduled for Tues 9/8 at 8 am.  Instructed hold ASA/Plavix  BMP, BNP, Mg today  Hold any further diuresis pending labs. F/U in clinic on 9/15    ADDENDUM:  Results for Zuhair Clinton" (MRN [id number]) as of 9/3/2020 14:48   Ref. Range 8/28/2020 09:30 9/3/2020 13:05   Sodium Latest Ref Range: 135 - 145 meq/L 144 149 (H)   Potassium Latest Ref Range: 3.5 - 5.2 meq/L  5.7 (H)   Chloride Latest Ref Range: 98 - 111 meq/L  109   CO2 Latest Ref Range: 23 - 33 meq/L 27 29   BUN Latest Ref Range: 7 - 22 mg/dL 32 (H) 43 (H)   Creatinine Latest Ref Range: 0.4 - 1.2 mg/dL  1.8 (H)   CREATININE, WHOLE BLOOD Latest Ref Range: 0.5 - 1.2 mg/dl 1.4 (H)    Anion Gap Latest Ref Range: 8.0 - 16.0 meq/L  11.0   Est, Glom Filt Rate Latest Units: ml/min/1.73m2  27 (A)   GFR Est Latest Units: ml/min/1.73m2 39    Ionized Calcium, WB Latest Ref Range: 1.12 - 1.32 mmol/L 1.13    Magnesium Latest Ref Range: 1.6 - 2.4 mg/dL  1.8   Glucose Latest Ref Range: 70 - 108 mg/dL  80   Calcium Latest Ref Range: 8.5 - 10.5 mg/dL  9.0   Potassium, Whole Blood Latest Ref Range: 3.5 - 4.9 meq/l 4.6    Glucose, Whole Blood Latest Ref Range: 70 - 108 mg/dl 91    Chloride, Whole Blood Latest Ref Range: 98 - 109 meq/l 107    Pro-BNP Latest Ref Range: 0.0 - 1800.0 pg/mL 7547.0 (H) 62417.0 (H)     BMP indicating she is getting dry, Na 149, BUN 43 - however creatinine bump could be from venous congestion given that BNP has increased even more. Called and instructed Rocio Bayron not to take any Kcl today or tomorrow then resume 20/day.   Continue Bumex 1 BID. Continues w/ notable signs of fluid overload - Will attempt slower diuresis w/ Metolazone 2.5 tomorrow and one dose Sunday (1 hr before Bumex)   BMP, BNP on Tuesday - after thoracentesis. GO to ED if any worsening symptoms. Pt notified of above instructions. · Daily weights  · Fluid restriction of 2 Liters per day  · Limit sodium in diet to around 5153-1551 mg/day  · Monitor BP  · Activity as tolerated     Patient was instructed to call the [facility] for any changes in symptoms as noted in AVS.      Return in about 2 weeks (around 9/17/2020). or sooner if needed     Patient given educational materials - see patient instructions. We discussed the importance of weighing oneself and recording daily. We also discussed the importance of a low sodium diet, higher sodium foods to avoid and better low sodium food options. Patient verbalizes understanding of plan of care using teach back method, and is agreeable to the treatment plan.        Electronically signed by ROMAIN Kendall CNP on 9/3/2020 at 3:28 PM

## 2020-09-03 NOTE — PATIENT INSTRUCTIONS
You may receive a survey regarding the care you received during your visit. Your input is valuable to us. We encourage you to complete and return your survey. We hope you will choose us in the future for your healthcare needs.     Continue:  · Continue current medications  · Daily weights and record  · Fluid restriction of 2 Liters per day  · Limit sodium in diet to around 5835-0967 mg/day  · Monitor BP  · Activity as tolerated     Call the Heart Failure Clinic for any of the following symptoms: [phone]   Weight gain of 2-3 pounds in 1 day or 5 pounds in 1 week   Increased shortness of breath   Shortness of breath while laying down   Cough   Chest pain   Swelling in feet, ankles or legs   Tenderness or bloating in the abdomen   Fatigue    Decreased appetite or nausea    Confusion

## 2020-09-07 NOTE — PROGRESS NOTES
[facility]        Reason for Clinic visit;      Triple negative right breast cancer    BUNNY Mullen is a 67 y.o. female who during hospitalization in May 2019 at [facility] for shortness of breath was found to have right breast mass and right axillary lymphadenopathy  She was also found to have decompensated CHF. Her Echo on May 31, 2019 showed ejection fraction of 40%. CTA on May 31, 2019 showed asymmetric breast tissue in the right upper lateral breast. And few enlarged  right axillary lymph nodes. She did not have a mammogram at least 10 years. She underwent biopsy of the right breast mass and lymph node on the June 11, 2019. Pathology report showed:  A. Right breast, UIQ, core needle biopsy with barbell clip placement:   Invasive ductal carcinoma, Daria grade 3.  B. \"Lymph node\", right, UOQ, core needle biopsy with tribell clip  placement:   Invasive ductal carcinoma. Estrogen Receptor: Negative (<1% of cells staining)  Progesterone Receptor: Negative (<1% of cells staining)  Ki-67 (clone 30-9)  Percentage of positive nuclei:  25%  HER2 dual ROCHELLE  HER2 ROCHELLE NEGATIVE    PET scan showed questionable pulmonary nodule. It was not seen on prior CTA. The patient had CT of the chest to see whether nodule is amenable to CT-guided biopsy. Due to the size and location the CT-guided biopsy was not feasible. My recommendation is to proceed with standard chemotherapy. Since the patient was older than 78years of age and she had history of CHF,  we started treatment with dose dense Taxol on August 1, 2019. Her cycle #1 was complicated by hospitalization for hypotension, pneumonia and anemia. Unfortunately delayed cycle #2 given on August 20, 2019 led to prolonged hospitalization as well. The patient was hospitalized from August 28 till September 1, 2019 after fall at home.  She stayed on the floor for an extended period of time as she was unable to get herself up and had to wait until her  was able to get her into a chair.    No fractures seen on thoracic and lumbar spine Xray. CXR normal. Hgb has stayed stable >8 after 1PRBC transfusion. VS stable. This was her third hospitalization in 1 month. Pt agreed to transition to SNF. She was receiving physical therapy and rehabilitation and nursing home, discharge home on October 15, 2019. PET scan on November 11, 2019 showed no new findings suspicious for metastatic disease. Pulmonary nodule was present, but with much less metabolic activity, still questionable metastatic focus. Since the patient wouldn't be able to tolerate further chemotherapy,my recommendation was to proceed with lumpectomy and axillary lymph node mapping and biopsy. The patient underwent lumpectomy and lymph node excision on December 3, 2019, she tolerated surgery very well. Subsequently she proceeded with radiation treatment to her right breast which overall she tolerated well. Interim history on August 28, 2020: The patient presents to the medical oncology clinic to review results of imaging studies and discuss further management. The patient had contacted my office due to worsening shortness of breath, leg heaviness and weakness. She reports that her shortness of breath is slowly but steadily worsening. She has dyspnea with minimal activity. She denies having any chest pain. Denies having any productive cough. She denies having any fevers. She denies any concerns related to her breasts. She continues to ambulate with a walker. She denies having any recent falls. She denies having bone pain  During last visit that the patient received blood transfusion for hemoglobin 7.8.   Did not improved significantly her shortness of breath    Past Medical History         Diagnosis Date    Breast cancer ([facility] [address] 06/2019    right invasive ductal carcinoma    CAD (coronary artery disease)     sees Dr Kaya Small CHF (congestive heart failure) ([facility] [address]     Hyperlipidemia     Hypertension     ICD (implantable cardiac defibrillator), dual, in situ     [facility] dual ICD    Numbness and tingling     both feet    Pneumonia     Shingles 12/2014    Sleep apnea     [facility] dual ICD     Type II or unspecified type diabetes mellitus without mention of complication, not stated as uncontrolled     Ventricular arrhythmia       Past Surgical History           Procedure Laterality Date    BREAST BIOPSY Right 06/11/2019    [address]-    BREAST LUMPECTOMY Right 12/3/2019    RIGHT BREAST LUMPECTOMY, SENTINAL LYMPH NODE BIOPSY, TARGETED AXILLARY NODE DISSECTION, PREOP NEEDLE LOC X 2 performed by Madelin Funez MD at [address] [place] [address]  9-08-09    [facility]    CARDIOVASCULAR STRESS TEST  01-27-10    Excellent treadmill exercise. Improved functional capacity to functional class 1 completed 8 METS. Hemodynamic response was appropriate. No ischemic ECG changes noted.  CARDIOVASCULAR STRESS TEST  10-28-09    No evidence of stress induced ischemia. Evidence of  prior transmural MI demonstrated by transient fixed defects of inferior wall extending into lateral wall, indicative of RCA lesion. Bowel and soft tissue attenuation interfering w/ counts of lateral wall. EF 29% w/ severe septal and inferolateral hypokinesis w/ very mild lateral wall hypokinesis being noted.  CARDIOVERSION  8-29-09    CHOLECYSTECTOMY  2005    Laparoscopic    COLONOSCOPY Left 1/9/2019    COLONOSCOPY performed by Brittaney Amador MD at [address] [place] [address] CATH LAB PROCEDURE  8-24-09    Multivessel disease demonstrated by LAD having 70-80%  proximal lesion and napkin-ring lesion at bifurcation w/ D2. D2 appears to be medium large caliber vessel which has an ostial lesion of 70-80%. left -to-left collaterals as well as left-to-right collaterals emanating from LAD to PDA.  Circumflex had anterior marginal branch and posterior marginal branch.  HERNIA REPAIR      Multiple    HYSTERECTOMY      INSERTION / REMOVAL / REPLACEMENT VENOUS ACCESS CATHETER Right 2019    SINGLE LUMEN SMART PORT INSERTION performed by Lyle Eckert MD at [facility] 9 N/A 12/3/2019    SINGLE LUMEN MEJYADMYC REMOVAL RIGHT SUBCLAVIAN performed by Lyle Eckert MD at [facility] ECHOCARDIOGRAM  11    LV size mildly to moderately dilated. Systolic function markedly reduced. EF 25-30%. Severe diffuse hypokinesis. Grade 1 diastolic dysfunction. LA was mildly to moderately dilated. RV mildly dilated, systolic function mildly reduced. Mild MR and TR.  TRANSTHORACIC ECHOCARDIOGRAM  09    LV demonstrates severe hypokinesis of the inferior basal as well as  basal aneurysm being noted and paradoxical septal motion. EF 45-50%. LA is moderately dilated and AV demonstrates mild AV sclerosis w/o AS and AI. Trace MR and TV insufficiency.       Allergies   Sulfa antibiotics  Social History     Social History     Socioeconomic History    Marital status:      Spouse name: Maddy Glasgow Number of children: 2    Years of education: 15    Highest education level: Not on file   Occupational History    Occupation: [employer]     Comment: [employer]   Social Needs    Financial resource strain: Not hard at all   [employer] insecurity     Worry: Never true     Inability: Never true   [employer] needs     Medical: No     Non-medical: No   Tobacco Use    Smoking status: Former Smoker     Packs/day: 1.50     Years: 30.00     Pack years: 45.00     Types: Cigarettes     Last attempt to quit: 1988     Years since quittin.9    Smokeless tobacco: Never Used   Substance and Sexual Activity    Alcohol use: No     Frequency: Never     Binge frequency: Never    Drug use: No    Sexual activity: Not Currently     Partners: Male   Lifestyle    Physical activity     Days per week: 5 days     Minutes per session: 10 min    Stress: Not at all   Relationships    Social connections     Talks on phone: More than three times a week     Gets together: Never     Attends [employer] service: Never     Active member of club or organization: No     Attends meetings of clubs or organizations: Never     Relationship status:     Intimate partner violence     Fear of current or ex partner: Not on file     Emotionally abused: Not on file     Physically abused: Not on file     Forced sexual activity: Not on file   Other Topics Concern    Not on file   Social History Narrative    Not on file     Family History          Problem Relation Age of Onset    Diabetes Father     Cancer Father 95        Bone    Hypertension Mother     Heart Disease Mother     No Known Problems Sister     No Known Problems Brother     No Known Problems Brother     Breast Cancer Neg Hx     Colon Cancer Neg Hx      ROS     Review of Systems   Constitutional: Positive for activity change and fatigue. Negative for appetite change and fever. HENT: Negative for congestion, dental problem, facial swelling, hearing loss, mouth sores, nosebleeds, sore throat, tinnitus and trouble swallowing. Eyes: Negative for discharge and visual disturbance. Respiratory: Positive for shortness of breath. Negative for cough, chest tightness and wheezing. Cardiovascular: Negative for chest pain, palpitations and leg swelling. Gastrointestinal: Negative for abdominal distention, abdominal pain, blood in stool, constipation, diarrhea, nausea, rectal pain and vomiting. Endocrine: Negative for cold intolerance, polydipsia and polyuria. Genitourinary: Negative for decreased urine volume, difficulty urinating, dysuria, flank pain, hematuria and urgency. Musculoskeletal: Positive for arthralgias and back pain. Negative for gait problem, joint swelling, myalgias and neck stiffness. Skin: Negative for color change, rash and wound.    Neurological: Negative for dizziness, tremors, seizures, speech difficulty, weakness, light-headedness, numbness and headaches. Hematological: Negative for adenopathy. Does not bruise/bleed easily. Psychiatric/Behavioral: Negative for confusion and sleep disturbance. The patient is not nervous/anxious. Vitals     height is 5' 2\" (1.575 m) and weight is 228 lb (103.4 kg). Her infrared temperature is 98.2 °F (36.8 °C). Her blood pressure is 103/54 (abnormal) and her pulse is 72. Her respiration is 18 and oxygen saturation is 97%. Exam   Physical Exam  Vitals signs reviewed. Constitutional:       General: She is not in acute distress. Appearance: She is well-developed. HENT:      Head: Normocephalic. Mouth/Throat:      Pharynx: No oropharyngeal exudate. Eyes:      General: No scleral icterus. Right eye: No discharge. Left eye: No discharge. Pupils: Pupils are equal, round, and reactive to light. Neck:      Musculoskeletal: Normal range of motion and neck supple. Thyroid: No thyromegaly. Vascular: No JVD. Trachea: No tracheal deviation. Cardiovascular:      Rate and Rhythm: Normal rate. Heart sounds: Normal heart sounds. No murmur. No friction rub. No gallop. Pulmonary:      Effort: Pulmonary effort is normal. No respiratory distress. Breath sounds: No stridor. Examination of the right-lower field reveals decreased breath sounds. Decreased breath sounds present. No wheezing or rales. Chest:      Chest wall: No tenderness. Breasts:         Right: Mass (S/p lumpectomy. Lumpectomy lumpectomy incision and lymph node excision are healing nicely) present. Abdominal:      General: Bowel sounds are normal. There is no distension. Palpations: Abdomen is soft. There is no mass. Tenderness: There is no abdominal tenderness. There is no rebound. Musculoskeletal: Normal range of motion.       Comments: Good range of motion in all four extremities. Lymphadenopathy:      Cervical: No cervical adenopathy. Skin:     General: Skin is warm. Findings: No erythema or rash. Neurological:      Mental Status: She is alert and oriented to person, place, and time. Cranial Nerves: No cranial nerve deficit. Motor: No abnormal muscle tone. Deep Tendon Reflexes: Reflexes are normal and symmetric. Psychiatric:         Behavior: Behavior normal.         Thought Content: Thought content normal.         Judgment: Judgment normal.               Radiology        Cta Chest W Wo Contrast  Result Date: 5/31/2019  1. No acute pulmonary embolism. 2. Bilateral pleural effusions right greater than left with adjacent consolidation, atelectasis or infiltrate. Right pleural effusion may be loculated. Correlation and follow-up advised to document resolution. 3. Asymmetric breast tissue in the right upper lateral breast. This is incompletely evaluated on this exam. Correlation with mammogram is advised. Few right axillary lymph nodes largest is 1.3 cm. *    Us Thoracentesis  Result Date: 6/1/2019  Successful ultrasound-guided thoracentesis with  0.6 L of fluid drained. Cytology showed No malignant cells seen. PET yen on June 20, 2019 showed:    1. Hypermetabolic lesion right breast compatible with known primary neoplasm. 2. Single FDG avid right axillary lymph node previously sampled. Consistent with metastatic disease. 3. Single right upper lobe noncalcified nodule with FDG metabolism most concerning for metastatic disease. 4. Activity along the posterior right lobe of the liver contiguous with the hemidiaphragm and difficult to separate out from possible subdiaphragmatic fluid however this is believed to be within the Liver segment seven most concerning for hepatic    metastatic disease. 5. possible malignant effusion. 6. Cardiomegaly. 3.2 cm infrarenal abdominal aortic aneurysm.          Lab Results   Component Value Date    WBC 7.1 08/28/2020    HGB 8.6 (L) 08/28/2020    HCT 30.3 (L) 08/28/2020    MCV 78.7 (L) 08/28/2020     (L) 08/28/2020       Chemistry        Component Value Date/Time     (H) 09/03/2020 1305    K 5.7 (H) 09/03/2020 1305    K 3.9 12/03/2019 1242     09/03/2020 1305    CO2 29 09/03/2020 1305    BUN 43 (H) 09/03/2020 1305    CREATININE 1.8 (H) 09/03/2020 1305        Component Value Date/Time    CALCIUM 9.0 09/03/2020 1305    ALKPHOS 103 08/24/2020 1141    AST 25 08/24/2020 1141    ALT 20 08/24/2020 1141    BILITOT 0.5 08/24/2020 1141        PET scan performed on November 11, 2019 showed:  1. FDG avid density in the lateral right breast corresponding to known malignancy. 2. FDG avid right axillary lymphadenopathy corresponding to known metastatic disease. 3. Small right-sided pleural effusion with suggestion of mild diffuse activity, possibly malignant. CT of the chest on January 9, 2020 showed:  1. Previous right lung and left base nodules are not identified. 2. Interval resolution of right pleural effusion and decrease in left pleural effusion, with minimal associated left basilar atelectasis. 3. Interval development of fluid density collection right breast at site of known malignancy and right axilla which are likely postsurgical seromas or hematomas. 4. Stable enlargement of both adrenal glands metastatic disease is not excludable. CT of the chest on April 9, 2020 showed:  1. Postsurgical and post radiation therapy changes of the right breast and right axilla. 2. Stable pulmonary nodules as described. 3. Stable axillary, mediastinal and hilar lymphadenopathy as described. 4. Stable bilateral adrenal masses. 5. Additional findings as described in the body the report. CT of the chest on August 26, 2020 showed:  1. Interval development of moderate loculated right pleural effusion    2. New slightly lobulated spiculated 1 cm nodule right upper lung.  Concerning for metastatic disease. Assessment/Recommendations   1. Right breast triple negative breast cancer with biopsy-proven axillary lymph node involvement. Baseline PET scan performed on June 20, 2019 showed suspicious pulmonary nodule. It was not seen on prior CTA. Due to the size and location the CT-guided biopsy will have a low yield. I had a lengthy discussion with the patient and her family that although pulmonary nodule is suspicious we do not know definitely that this is a metastatic focus. My recommendation was to proceed with standard chemotherapy. The patient is older than 78years of age and she has history of CHF, so we started treatment with dose dense Taxol on August 1, 2019. Her cycle #1 was complicated by hospitalization for hypotension, pneumonia and anemia. In addition Taxol made her pre-existing peripheral neuropathy worse. The patient received second dose of Taxol on August 20, 2019. Cycle #2 was complicated by prolonged hospitalization secondary to failure to thrive, fall. The patient underwent rehabilitation at skilled nursing facility from which she was discharged on October 15, 2019. The patient had a PET scan on November 11, 2019 that showed no new findings suspicious for metastatic disease. Pulmonary nodule was present, but with much less metabolic activity, still questionable metastatic focus. Since the patient was not  candidate for further systemic chemotherapy, my recommendation was to proceed with lumpectomy and axillary lymph node excision. She had the surgery on December 3, 2019. She tolerated surgery well. CT of the chest at the beginning of January 2020 showed that right lung and left lung base nodules were not identified. Therefore the patient was referred to radiation oncologist and she completed  radiotherapy in March 2020. She had CT of the chest on April 9, 2020 that showed stable pulmonary nodules, stable mediastinal and hilar lymphadenopathy.   Stable bilateral adrenal masses. The patient denies having any worsening respiratory symptoms. 2.  Worsening shortness of breath. On the physical examination the patient has decreased breath sounds over the right lung. CT of the chest that showed interval development of moderate moderate loculated right pleural effusion. Since the patient might have metastatic disease as well and exacerbation of CHF, I am referring her to IR for diagnostic and therapeutic thoracentesis. 3.  Anemia. The patient's hemoglobin is 8.6 today, improved from 7.6 on Monday after blood transfusion. 4.  Right pulmonary nodule. It is not really new. The patient had it on CT scan and PET scan in the June 2019. Then it disappeared. Still highly suspicious for metastatic disease. 5.  Bilateral leg swelling.   Concerning for CHF, patient has follow-up with cardiologist    Livier Navarro MD on 9/7/2020 at 8:26 AM

## 2020-09-15 PROBLEM — J90 PLEURAL EFFUSION, RIGHT: Status: ACTIVE | Noted: 2020-01-01

## 2020-09-15 PROBLEM — C50.911 INVASIVE DUCTAL CARCINOMA OF BREAST, FEMALE, RIGHT (HCC): Status: ACTIVE | Noted: 2020-01-01

## 2020-09-15 NOTE — PROGRESS NOTES
Heart Failure Clinic       Visit Date: 9/15/2020  Cardiologist:  Dr. Gonzalo Bhakta  Primary Care Physician: Dr. Brandon Cooper MD    Vanessa Collet is a 67 y.o. female who presents today for:  Chief Complaint   Patient presents with    Congestive Heart Failure       HPI:   Vanessa Collet is a 67 y.o. female who presents to the office for a follow up patient visit in the heart failure clinic. Accompanied by     TYPE HF: HFrEF (EF 40%)  Device: ICD   HX:  CAD, CABG (3V,2009), HTN, HLD, AAA (Dr Pond), PVD, DM  Breast CA w/ mets to lung (Dr Chapin, Dr Gabriel Robles     Dry QD:  [phone]     Hospitalization:  Aug 2019  8/24 - Connor Donahue gave 1 unit w/ Lasix 20 IV. OV Baki 8/26 = increased JASSO, LE edema.  Double Bumex x 3 days.    CT scan   8/26/20  Impression    1. Interval development of moderate loculated right pleural effusion    2. New slightly lobulated spiculated 1 cm nodule right upper lung. Concerning for metastatic disease.           OV 8/31 - Very SOB, Significant LE edema  Bumex doubled from 0.5 BID to 1 BID 3 days ago - has not seen any improvement. Appears Wt up 20# in past month  IV Bumex 2 mg given   Metolazone 5 mg  x 2 days  OV 9/3 = down 12# = wt 219#  Thoracentesis 9/8 = 400 ml off right    Concerns today: Still fatigued, tired. Sleeps all day per . LE edema continues - 3+ pitting BLE  Appears SOB at rest.  Sats 85-90% today. Discussed treatment at length, some improvement last week, worsening again today - have attempted outpatient treatment w/ overall no improvement   +SOB, orthopnea, fatigue, LE edema  BP low at home - 80s.        Past Medical History:   Diagnosis Date    Breast cancer ([facility] 06/2019    right invasive ductal carcinoma    CAD (coronary artery disease)     sees Dr Kaya Small CHF (congestive heart failure) ([facility] [facility]     Hyperlipidemia     Hypertension     ICD (implantable cardiac defibrillator), dual, in situ     [employer] dual ICD    Numbness and tingling SMART PORT INSERTION performed by Ion Jovel MD at / [facility] 9 N/A 12/3/2019    SINGLE LUMEN [id number] REMOVAL RIGHT SUBCLAVIAN performed by Ion Jovel MD at [facility] ECHOCARDIOGRAM  11    LV size mildly to moderately dilated. Systolic function markedly reduced. EF 25-30%. Severe diffuse hypokinesis. Grade 1 diastolic dysfunction. LA was mildly to moderately dilated. RV mildly dilated, systolic function mildly reduced. Mild MR and TR.  TRANSTHORACIC ECHOCARDIOGRAM  09    LV demonstrates severe hypokinesis of the inferior basal as well as  basal aneurysm being noted and paradoxical septal motion. EF 45-50%. LA is moderately dilated and AV demonstrates mild AV sclerosis w/o AS and AI. Trace MR and TV insufficiency. Family History   Problem Relation Age of Onset    Diabetes Father     Cancer Father 80        Bone    Hypertension Mother     Heart Disease Mother     No Known Problems Sister     No Known Problems Brother     No Known Problems Brother     Breast Cancer Neg Hx     Colon Cancer Neg Hx      Social History     Tobacco Use    Smoking status: Former Smoker     Packs/day: 1.50     Years: 30.00     Pack years: 45.00     Types: Cigarettes     Last attempt to quit: 1988     Years since quittin.0    Smokeless tobacco: Never Used   Substance Use Topics    Alcohol use: No     Frequency: Never     Binge frequency: Never     Current Outpatient Medications   Medication Sig Dispense Refill    pregabalin (LYRICA) 200 MG capsule Take 1 capsule by mouth 2 times daily for 90 days.  60 capsule 2    metOLazone (ZAROXOLYN) 5 MG tablet Take 1 tablet by mouth daily (Patient taking differently: Take 5 mg by mouth as needed ) 10 tablet 0    enalapril (VASOTEC) 2.5 MG tablet TAKE 1 TABLET DAILY 90 tablet 3    potassium chloride (KLOR-CON M) 20 MEQ TBCR extended release tablet Take 2 tablets by mouth daily = 40 meq 60 tablet 5    bumetanide (BUMEX) 1 MG tablet Take 1 tablet by mouth 2 times daily 60 tablet 5    metFORMIN (GLUCOPHAGE-XR) 750 MG extended release tablet TAKE ONE (1) TABLET BY MOUTH TWO (2) TIMES A DAY. 60 tablet 3    amiodarone (CORDARONE) 200 MG tablet TAKE 1 TABLET DAILY 90 tablet 4    atorvastatin (LIPITOR) 80 MG tablet TAKE 1 TABLET DAILY 90 tablet 0    rOPINIRole (REQUIP) 0.5 MG tablet TAKE ONE (1) TABLET BY MOUTH ONCE DAILY AT BEDTIME. 30 tablet 5    Insulin Pen Needle (B-D UF III MINI PEN NEEDLES) 31G X 5 MM MISC 1 each by Does not apply route daily 100 each 3    vitamin B-12 (CYANOCOBALAMIN) 100 MCG tablet Take 1,000 mcg by mouth three times daily      clopidogrel (PLAVIX) 75 MG tablet Take 1 tablet by mouth daily 90 tablet 3    LANTUS SOLOSTAR 100 UNIT/ML injection pen INJECT 20 UNITS UNDER THE SKIN NIGHTLY 15 mL 4    ipratropium-albuterol (DUONEB) 0.5-2.5 (3) MG/3ML SOLN nebulizer solution Inhale 3 mLs into the lungs every 4 hours as needed for Shortness of Breath or Other (wheezing) 360 mL 5    blood glucose test strips (ASCENSIA AUTODISC VI;ONE TOUCH ULTRA TEST VI) strip Test once daily. Dispense One Touch Ultra Test Strips. Dx: Type 2 diabetes (250.00) 100 each 5    fluticasone (FLONASE) 50 MCG/ACT nasal spray 2 sprays by Nasal route daily 1 Bottle 1    aspirin EC 81 MG EC tablet Take 1 tablet by mouth daily 30 tablet 3     No current facility-administered medications for this visit.       Allergies   Allergen Reactions    Sulfa Antibiotics Swelling       SUBJECTIVE:   Review of Systems    OBJECTIVE:   Today's Vitals:  /66   Pulse 81   Ht 5' 2\" (1.575 m)   Wt 217 lb 9.6 oz (98.7 kg)   BMI 39.80 kg/m²     Physical Exam    Wt Readings from Last 3 Encounters:   09/15/20 217 lb 9.6 oz (98.7 kg)   09/03/20 219 lb (99.3 kg)   08/31/20 231 lb 3.2 oz (104.9 kg)     BP Readings from Last 3 Encounters:   09/15/20 106/66   09/03/20 (!) 99/59   08/31/20 (!) 110/54     Pulse Readings from Last 3 Component Value Date    MG 1.8 09/03/2020     PT/INR:    Lab Results   Component Value Date    INR 0.97 08/20/2019     Lipids:    Lab Results   Component Value Date    TRIG 67 05/31/2019    HDL 44 05/31/2019    LDLCALC 37 05/31/2019       ASSESSMENT AND PLAN:   The patient's condition/symptoms are Unstable: send to ED . Diagnosis Orders   1. CHF (congestive heart failure), NYHA class III, acute on chronic, systolic (HCC)  Basic Metabolic Panel    Brain Natriuretic Peptide    Hemoglobin    Hematocrit     CHF NYHA IV systolic acute on chronic  Have been attempting optimization OP last few weeks w/ little improvement. Wts are down 217# from 231# however worsening SOB, sats 84% on RA. Crackles to RLL - recent thoracentesis, ??reaccumulation. Hx Breast CA w/ lung mets. Stress last year + =  ? Need for further workup. Need Repeat ECHO = 11/2019 EF 40-45%, ? worsening  Pt agrees to go to ED for admission. Nurse took her and  down  Called report to ED. · Daily weights  · Fluid restriction of 2 Liters per day  · Limit sodium in diet to around 2838-5356 mg/day  · Monitor BP  · Activity as tolerated     Patient was instructed to call the [facility] for any changes in symptoms as noted in AVS.      No follow-ups on file. or sooner if needed     Patient given educational materials - see patient instructions. We discussed the importance of weighing oneself and recording daily. We also discussed the importance of a low sodium diet, higher sodium foods to avoid and better low sodium food options. Patient verbalizes understanding of plan of care using teach back method, and is agreeable to the treatment plan.        Electronically signed by ROMAIN Kevin CNP on 9/15/2020 at 1:15 PM

## 2020-09-15 NOTE — PROGRESS NOTES
Pt admitted to  4K1 from ED. Complaints: Shortness of breath, LE edema & Wt. Gain  IIV site free of s/s of infection or infiltration. Vital signs obtained. Assessment and data collection initiated. Oriented to room. All questions answered with no further questions at this time. Fall prevention and safety brochure discussed with patient. The best day to schedule a follow up Dr appointment is:  Monday aSuzannemSuzanne Medina, HENRY 9/15/2020 6:45 PM

## 2020-09-15 NOTE — PROGRESS NOTES
Formulation and discussion of sedation / procedure plans, risks, benefits, side effects and alternatives with patient and/or responsible adult completed. The patient was counseled at length about the risks of amy Covid-19 during their perioperative period and any recovery window from their procedure. The patient was made aware that amy Covid-19  may worsen their prognosis for recovering from their procedure  and lend to a higher morbidity and/or mortality risk. All material risks, benefits, and reasonable alternatives including postponing the procedure were discussed. The patient does wish to proceed with the procedure at this time.         Electronically signed by Ayse Nguyen MD on 9/15/2020 at 6:56 PM

## 2020-09-15 NOTE — PROGRESS NOTES
[facility]  Sedation/Analgesia Post Sedation Record    Pt Name: Michelle Clakre  MRN: [id number]  YOB: 1945  Procedure Performed By: Lisa Baker MD  Primary Care Physician: Te Correa MD    POST-PROCEDURE    Physicians/Assistants: Lisa Baker MD    Procedure Performed: US R thoro      Sedation/Anesthesia: [x] Local [] Conscious Sedation with Fentanyl & Versed     Specimens Removed:  [x] Yes [] No        Disposition of Specimen:  [x] Pathology [] Disposed       Complications:   [x]None Immediate []Other:       Post-procedure Diagnosis/Findings:    0.65 L removed. Estimated blood loss:          None        Recommendations: Follow post-procedure orders.          Lisa Baker MD  Electronically signed 9/15/2020 at 6:56 PM

## 2020-09-15 NOTE — PATIENT INSTRUCTIONS
You may receive a survey regarding the care you received during your visit. Your input is valuable to us. We encourage you to complete and return your survey. We hope you will choose us in the future for your healthcare needs.     Continue:  · Continue current medications  · Daily weights and record  · Fluid restriction of 2 Liters per day  · Limit sodium in diet to around 8004-7111 mg/day  · Monitor BP  · Activity as tolerated     Call the Heart Failure Clinic for any of the following symptoms: [phone]   Weight gain of 2-3 pounds in 1 day or 5 pounds in 1 week   Increased shortness of breath   Shortness of breath while laying down   Cough   Chest pain   Swelling in feet, ankles or legs   Tenderness or bloating in the abdomen   Fatigue    Decreased appetite or nausea    Confusion

## 2020-09-15 NOTE — ED PROVIDER NOTES
[address] [address]          Pt Name: Sonia Musa  MRN: [id number]  [place] 1945  Date of evaluation: 9/15/2020  Treating Resident Physician: Filiberto Carty DO  Supervising Physician: [id number]       Chief Complaint   Patient presents with    Shortness of Breath    Leg Swelling     History obtained from the patient. HISTORY OF PRESENT ILLNESS    HPI  Sonia Musa is a 67 y.o. female who presents to the emergency department for evaluation of shortness of breath and bilateral leg swelling. Patient states this is been worsening and progressing over the past year, however this week she noted acute changes and had difficulty performing her ADLs secondary to dyspnea on exertion. She has recently had a therapeutic and diagnostic thoracentesis. She denies any fever, chest pain, numbness, tingling. The patient has no other acute complaints at this time. REVIEW OF SYSTEMS   Review of Systems   Constitutional: Positive for fatigue. Negative for chills and fever. HENT: Negative for ear discharge, ear pain, postnasal drip, rhinorrhea, sinus pressure, sinus pain and sore throat. Eyes: Negative for pain, discharge, redness and visual disturbance. Respiratory: Positive for cough and shortness of breath. Negative for chest tightness and wheezing. Cardiovascular: Positive for leg swelling. Negative for chest pain and palpitations. Gastrointestinal: Negative for abdominal pain, constipation, diarrhea, nausea and vomiting. Endocrine: Negative for cold intolerance and heat intolerance. Genitourinary: Negative for difficulty urinating, dysuria, flank pain and urgency. Musculoskeletal: Negative for back pain, neck pain and neck stiffness. Skin: Negative for color change and rash. Neurological: Negative for dizziness, syncope, weakness, light-headedness, numbness and headaches.          PAST MEDICAL AND SURGICAL HISTORY     Past Medical History:   Diagnosis Date    Breast cancer ([facility] [address] 06/2019    right invasive ductal carcinoma    CAD (coronary artery disease)     sees Dr Luc Rosenbaum CHF (congestive heart failure) ([facility] [address]     Hyperlipidemia     Hypertension     ICD (implantable cardiac defibrillator), dual, in situ     [employer] dual ICD    Numbness and tingling     both feet    Pneumonia     Shingles 12/2014    Sleep apnea     [facility] [employer] dual ICD     Type II or unspecified type diabetes mellitus without mention of complication, not stated as uncontrolled     Ventricular arrhythmia      Past Surgical History:   Procedure Laterality Date    BREAST BIOPSY Right 06/11/2019    [address]-    BREAST LUMPECTOMY Right 12/3/2019    RIGHT BREAST LUMPECTOMY, SENTINAL LYMPH NODE BIOPSY, TARGETED AXILLARY NODE DISSECTION, PREOP NEEDLE LOC X 2 performed by Shawnee Gates MD at [address] [place] [address]  9-08-09    [facility]    CARDIOVASCULAR STRESS TEST  01-27-10    Excellent treadmill exercise. Improved functional capacity to functional class 1 completed 8 METS. Hemodynamic response was appropriate. No ischemic ECG changes noted.  CARDIOVASCULAR STRESS TEST  10-28-09    No evidence of stress induced ischemia. Evidence of  prior transmural MI demonstrated by transient fixed defects of inferior wall extending into lateral wall, indicative of RCA lesion. Bowel and soft tissue attenuation interfering w/ counts of lateral wall. EF 29% w/ severe septal and inferolateral hypokinesis w/ very mild lateral wall hypokinesis being noted.      CARDIOVERSION  8-29-09    CHOLECYSTECTOMY  2005    Laparoscopic    COLONOSCOPY Left 1/9/2019    COLONOSCOPY performed by Sumaya Townsend MD at [address] [place] [address] CATH LAB PROCEDURE  8-24-09    Multivessel disease demonstrated by LAD having 70-80%  proximal lesion and napkin-ring lesion at bifurcation w/ D2. D2 appears to be medium large caliber vessel which has an ostial lesion of 70-80%. left -to-left collaterals as well as left-to-right collaterals emanating from LAD to PDA. Circumflex had anterior marginal branch and posterior marginal branch.  HERNIA REPAIR      Multiple    HYSTERECTOMY  1997    INSERTION / REMOVAL / REPLACEMENT VENOUS ACCESS CATHETER Right 7/31/2019    SINGLE LUMEN SMART PORT INSERTION performed by Luann Parks MD at [facility] 9 N/A 12/3/2019    SINGLE LUMEN [id number] REMOVAL RIGHT SUBCLAVIAN performed by Luann Parks MD at [facility] ECHOCARDIOGRAM  07-11-11    LV size mildly to moderately dilated. Systolic function markedly reduced. EF 25-30%. Severe diffuse hypokinesis. Grade 1 diastolic dysfunction. LA was mildly to moderately dilated. RV mildly dilated, systolic function mildly reduced. Mild MR and TR.  TRANSTHORACIC ECHOCARDIOGRAM  8-24-09    LV demonstrates severe hypokinesis of the inferior basal as well as  basal aneurysm being noted and paradoxical septal motion. EF 45-50%. LA is moderately dilated and AV demonstrates mild AV sclerosis w/o AS and AI. Trace MR and TV insufficiency. MEDICATIONS   No current facility-administered medications for this encounter. Current Outpatient Medications:     pregabalin (LYRICA) 200 MG capsule, Take 1 capsule by mouth 2 times daily for 90 days. , Disp: 60 capsule, Rfl: 2    metOLazone (ZAROXOLYN) 5 MG tablet, Take 1 tablet by mouth daily (Patient taking differently: Take 5 mg by mouth as needed ), Disp: 10 tablet, Rfl: 0    enalapril (VASOTEC) 2.5 MG tablet, TAKE 1 TABLET DAILY, Disp: 90 tablet, Rfl: 3    potassium chloride (KLOR-CON M) 20 MEQ TBCR extended release tablet, Take 2 tablets by mouth daily = 40 meq, Disp: 60 tablet, Rfl: 5    bumetanide (BUMEX) 1 MG tablet, Take 1 tablet by mouth 2 times daily, Disp: 60 tablet, Rfl: 5    metFORMIN (GLUCOPHAGE-XR) 750 MG extended release tablet, TAKE ONE (1) TABLET BY MOUTH TWO (2) TIMES A DAY., Disp: 60 tablet, Rfl: 3    amiodarone (CORDARONE) 200 MG tablet, TAKE 1 TABLET DAILY, Disp: 90 tablet, Rfl: 4    atorvastatin (LIPITOR) 80 MG tablet, TAKE 1 TABLET DAILY, Disp: 90 tablet, Rfl: 0    rOPINIRole (REQUIP) 0.5 MG tablet, TAKE ONE (1) TABLET BY MOUTH ONCE DAILY AT BEDTIME., Disp: 30 tablet, Rfl: 5    Insulin Pen Needle (B-D UF III MINI PEN NEEDLES) 31G X 5 MM MISC, 1 each by Does not apply route daily, Disp: 100 each, Rfl: 3    vitamin B-12 (CYANOCOBALAMIN) 100 MCG tablet, Take 1,000 mcg by mouth three times daily, Disp: , Rfl:     clopidogrel (PLAVIX) 75 MG tablet, Take 1 tablet by mouth daily, Disp: 90 tablet, Rfl: 3    LANTUS SOLOSTAR 100 UNIT/ML injection pen, INJECT 20 UNITS UNDER THE SKIN NIGHTLY, Disp: 15 mL, Rfl: 4    ipratropium-albuterol (DUONEB) 0.5-2.5 (3) MG/3ML SOLN nebulizer solution, Inhale 3 mLs into the lungs every 4 hours as needed for Shortness of Breath or Other (wheezing), Disp: 360 mL, Rfl: 5    blood glucose test strips (ASCENSIA AUTODISC VI;ONE TOUCH ULTRA TEST VI) strip, Test once daily. Dispense One Touch Ultra Test Strips.   Dx: Type 2 diabetes (250.00), Disp: 100 each, Rfl: 5    fluticasone (FLONASE) 50 MCG/ACT nasal spray, 2 sprays by Nasal route daily, Disp: 1 Bottle, Rfl: 1    aspirin EC 81 MG EC tablet, Take 1 tablet by mouth daily, Disp: 30 tablet, Rfl: 3      SOCIAL HISTORY     Social History     Social History Narrative    Not on file     Social History     Tobacco Use    Smoking status: Former Smoker     Packs/day: 1.50     Years: 30.00     Pack years: 45.00     Types: Cigarettes     Last attempt to quit: 1988     Years since quittin.0    Smokeless tobacco: Never Used   Substance Use Topics    Alcohol use: No     Frequency: Never     Binge frequency: Never    Drug use: No         ALLERGIES     Allergies   Allergen Reactions    Sulfa Antibiotics Swelling         FAMILY HISTORY Family History   Problem Relation Age of Onset    Diabetes Father     Cancer Father 80        Bone    Hypertension Mother     Heart Disease Mother     No Known Problems Sister     No Known Problems Brother     No Known Problems Brother     Breast Cancer Neg Hx     Colon Cancer Neg Hx          PREVIOUS RECORDS   Previous records reviewed: Patient was seen in January 2018 for bilateral foot and leg pain. [facility] PHYSICAL EXAM     ED Triage Vitals [09/15/20 1320]   BP Temp Temp Source Pulse Resp SpO2 Height Weight   109/63 97.8 °F (36.6 °C) Oral 80 22 (!) 75 % -- 217 lb 9.6 oz (98.7 kg)     Initial vital signs and nursing assessment reviewed and normal except for significant hypoxia and tachypnea. Pulsoximetry is normal per my interpretation. This corrected with 2 L supplemental oxygen via nasal cannula. Additional Vital Signs:  Vitals:    09/15/20 1549   BP:    Pulse: 79   Resp: 22   Temp:    SpO2: 95%       Physical Exam  Constitutional:       General: She is not in acute distress. Appearance: She is obese. She is not diaphoretic. HENT:      Mouth/Throat:      Mouth: Mucous membranes are moist.      Pharynx: Oropharynx is clear. No oropharyngeal exudate or posterior oropharyngeal erythema. Eyes:      General: No scleral icterus. Right eye: No discharge. Left eye: No discharge. Extraocular Movements: Extraocular movements intact. Conjunctiva/sclera: Conjunctivae normal.   Neck:      Musculoskeletal: Normal range of motion and neck supple. No neck rigidity. Cardiovascular:      Rate and Rhythm: Normal rate and regular rhythm. Pulses: Normal pulses. Heart sounds: No murmur. No friction rub. No gallop. Pulmonary:      Effort: Pulmonary effort is normal.      Breath sounds: Examination of the right-middle field reveals rales. Examination of the left-middle field reveals rales. Examination of the right-lower field reveals rales.  Examination of the left-lower field reveals rales. Rales present. No wheezing or rhonchi. Abdominal:      Palpations: Abdomen is soft. Tenderness: There is no abdominal tenderness. There is no guarding or rebound. Musculoskeletal:      Right lower leg: Edema present. Left lower leg: Edema present. Comments: Bilateral lower extremity edema 3-4+ with pitting up to the knee   Skin:     General: Skin is warm and dry. Capillary Refill: Capillary refill takes less than 2 seconds. Coloration: Skin is not cyanotic. Findings: No ecchymosis. Neurological:      General: No focal deficit present. Mental Status: She is alert and oriented to person, place, and time. MEDICAL DECISION MAKING   Initial Assessment: Elderly-appearing female in no acute distress. Vitals stable with initial hypoxia resolved on 3 L nasal cannula. Diffuse crackles in both lungs. Bilateral pitting edema up to the knee. BNP 9000, CT shows moderate pleural effusion and enlarging right apical lung mass, no evidence of pulmonary embolism. No acute ischemia visualized on EKG, however troponin is elevated at 0.59. Hemoglobin is 8 and at baseline with acute LILO creatinine 1.7. Contacted hospital medicine for admission concerning the patient's hypoxia, elevated troponin, fluid status and LILO. They recommended I contact IR for therapeutic thoracentesis which was arranged with Dr. Adamaris Willams. Repeated EKG in the emergency department which did not reveal any development of ischemic changes. The patient is agreeable to the plan of admission.       ED RESULTS   Laboratory results:  Labs Reviewed   CBC WITH AUTO DIFFERENTIAL - Abnormal; Notable for the following components:       Result Value    RBC 4.05 (*)     Hemoglobin 8.6 (*)     Hematocrit 31.0 (*)     MCV 76.5 (*)     MCH 21.2 (*)     MCHC 27.7 (*)     RDW-CV 22.5 (*)     RDW-SD 61.0 (*)     Platelets 103 (*)     Lymphocytes Absolute 0.5 (*)     All other components within normal limits TROPONIN - Abnormal; Notable for the following components:    Troponin T 0.059 (*)     All other components within normal limits   COVID-19       Radiologic studies results:  CTA Chest W WO Contrast   Final Result       1. No evidence of pulmonary embolus or acute intrathoracic abnormality. 2. Persistent cardiomegaly and moderate right pleural effusion with pulmonary vascular congestion, similar to 8/26/2020.   3. Right upper lobe nodule measuring 1.2 cm which has increased in size compared to 7/8/2019 concerning for malignancy. **This report has been created using voice recognition software. It may contain minor errors which are inherent in voice recognition technology. **      Final report electronically signed by Dr. Lo Gallego MD on 9/15/2020 2:33 PM      [address]    (Results Pending)       ED Medications administered this visit:   Medications   iopamidol (ISOVUE-370) 76 % injection 80 mL (80 mLs Intravenous Given 9/15/20 1419)           FINAL DISPOSITION     Final diagnoses:   Leg swelling   Dyspnea on exertion   Hypoxia   Acute on chronic congestive heart failure, unspecified heart failure type ([address]     Condition: condition: stable  Dispo: Admit to telemetry      This transcription was electronically signed. Parts of this transcriptions may have been dictated by use of voice recognition software and electronically transcribed, and parts may have been transcribed with the assistance of an ED scribe. The transcription may contain errors not detected in proofreading. Please refer to my supervising physician's documentation if my documentation differs.     Electronically Signed: Areli Bazzi, 09/15/20, 5:38 PM       Arlei Bazzi,   Resident  09/15/20 [address] [facility]  Resident  09/15/20 [id number]

## 2020-09-15 NOTE — H&P
Hospitalist H+P      Patient:  Elizabethsonal Patterson    Unit/Bed:08/008A  YOB: 1945  MRN: [id number]   Acct: [de-identified]     PCP: Dk Washington MD  Date of Admission: 9/15/2020        Assessment and Plan:        1. Hypoxia: We will check COVID-19, will contact interventional radiology to see if they can do a thoracentesis on the right pleural effusion, will send pleural effusion for laboratory studies. 2. Right pleural effusion: Contact interventional radiology for ultrasound thoracentesis, fluid will be sent for pleural studies  3. Diabetes mellitus type 2: We will hold home medication place on insulin sliding scale will adjust as needed. We will not order Lantus secondary to possible hypoglycemic episode. 4. Pancytopenia: Possibly due to recent chemo therapy, will contact oncology for possible consult. 5. Invasive ductal carcinoma: We will consult oncology Dr. Capirce Sanchez. CC: Shortness of breath  Leg swelling    HPI: 71-year-old morbidly obese  female presents emergency department for evaluation of shortness of breath and bilateral leg swelling. Patient states her shortness of breath and leg swelling is been getting worse over the last year however this week she is just more changes had difficulty performing her ADLs secondary to more dyspnea on exertion. She is recently had a therapeutic and diagnostic thoracentesis denies any fever, chest pain, numbness, tingling at this time. Her past medical history includes ventricular arrhythmias diabetes ICD placement, hypertension, hyperlipidemia CHF, invasive ductal carcinoma breast right side. ROS (14 point review of systems completed. Pertinent positives noted. Otherwise ROS is negative) :    Shortness of breath  Cough Fatigue, leg swelling    PMH:  Per HPI and       Diagnosis Date    Breast cancer ([facility] [address] 06/2019    right invasive ductal carcinoma    CAD (coronary artery disease)     sees Dr Ksota Rea    CHF (congestive heart failure) ([facility] [address] REPAIR      Multiple    HYSTERECTOMY      INSERTION / REMOVAL / REPLACEMENT VENOUS ACCESS CATHETER Right 2019    SINGLE LUMEN SMART PORT INSERTION performed by Rodriguez Horta MD at [facility] 9 N/A 12/3/2019    SINGLE LUMEN MTVBABAVX REMOVAL RIGHT SUBCLAVIAN performed by Rodriguez Horta MD at [facility] ECHOCARDIOGRAM  11    LV size mildly to moderately dilated. Systolic function markedly reduced. EF 25-30%. Severe diffuse hypokinesis. Grade 1 diastolic dysfunction. LA was mildly to moderately dilated. RV mildly dilated, systolic function mildly reduced. Mild MR and TR.  TRANSTHORACIC ECHOCARDIOGRAM  09    LV demonstrates severe hypokinesis of the inferior basal as well as  basal aneurysm being noted and paradoxical septal motion. EF 45-50%. LA is moderately dilated and AV demonstrates mild AV sclerosis w/o AS and AI. Trace MR and TV insufficiency.       FHX:       Problem Relation Age of Onset    Diabetes Father     Cancer Father 80        Bone    Hypertension Mother     Heart Disease Mother     No Known Problems Sister     No Known Problems Brother     No Known Problems Brother     Breast Cancer Neg Hx     Colon Cancer Neg Hx      SOCHX:   Social History     Socioeconomic History    Marital status:      Spouse name: Shelvia Frankel Number of children: 2    Years of education: 15    Highest education level: None   Occupational History    Occupation: [employer]     Comment: [employer]   Social Needs    Financial resource strain: Not hard at all   [place]Lam insecurity     Worry: Never true     Inability: Never true    Transportation needs     Medical: No     Non-medical: No   Tobacco Use    Smoking status: Former Smoker     Packs/day: 1.50     Years: 30.00     Pack years: 45.00     Types: Cigarettes     Last attempt to quit: 1988     Years since quittin.0    Smokeless tobacco: Never Used   Substance and Sexual Activity    Alcohol use: No     Frequency: Never     Binge frequency: Never    Drug use: No    Sexual activity: Not Currently     Partners: Male   Lifestyle    Physical activity     Days per week: 5 days     Minutes per session: 10 min    Stress: Not at all   Relationships    Social connections     Talks on phone: More than three times a week     Gets together: Never     Attends Faith service: Never     Active member of club or organization: No     Attends meetings of clubs or organizations: Never     Relationship status:     Intimate partner violence     Fear of current or ex partner: None     Emotionally abused: None     Physically abused: None     Forced sexual activity: None   Other Topics Concern    None   Social History Narrative    None      Allergies: Sulfa antibiotics  Medications:          PHYSICAL EXAM:    /71   Pulse 79   Temp 97.8 °F (36.6 °C) (Oral)   Resp 22   Wt 217 lb 9.6 oz (98.7 kg)   SpO2 95%   BMI 39.80 kg/m²     General appearance:  No apparent distress, appears stated age and cooperative. HEENT:  Normal cephalic, atraumatic without obvious deformity. Pupils equal, round, and reactive to light. Extra ocular muscles intact. Conjunctivae/corneas clear. Neck: Supple, with full range of motion. no jugular venous distention. Trachea midline. no carotid bruits  Respiratory: Slightly labored respiratory effort. Bibasilar crackles,  Cardiovascular:  Regular rate and rhythm with normal S1/S2 without murmurs, rubs or gallops. PMI non displaced  Abdomen: Soft, non-tender, non-distended with normal bowel sounds. No guarding, rebound. Musculoskeletal:  No clubbing, cyanosis. edema bilaterally lower extremities. Full range of motion without deformity. Skin: Skin color, texture, turgor normal.  No rashes or lesions, or suspicious lesions. Neurologic:  Neurovascularly intact without any focal sensory/motor deficits.  Cranial nerves: II-XII intact, grossly non-focal.  Psychiatric: Alert and oriented, thought content appropriate, normal insight  Capillary Refill: Brisk,< 2 seconds   Peripheral Pulses: +2 palpable, equal bilaterally upper and lower extremities  Lymphatics: no lymphadenopathy    Data: (All radiographs, tracings, PFTs, and imaging are personally viewed and interpreted unless otherwise noted).  COVID-19 negative   WBC 6.7   Hemoglobin 8.6   Platelets 729   Potassium 5.3   Creatinine 1.7   BNP 9242   Troponin 0 0.059   EKG normal sinus rhythm no acute changes per my read  Recent Labs     09/15/20  1242 09/15/20  1340   WBC  --  6.7   HGB 8.3* 8.6*   HCT 29.8* 31.0*   PLT  --  122*      Recent Labs     09/15/20  1242      K 5.3*      CO2 28   BUN 43*   CREATININE 1.7*   CALCIUM 9.0       No results for input(s): AST, ALT, BILIDIR, BILITOT, ALKPHOS in the last 72 hours.  No results for input(s): INR in the last 72 hours.  No results for input(s): [place] Jerome in the last 72 hours. Radiology reports-images reviewed in PACS  Ct Chest Wo Contrast    Result Date: 8/26/2020  PROCEDURE: CT CHEST WO CONTRAST CLINICAL INFORMATION: SOB (shortness of breath), Pleural effusion, Malignant neoplasm metastatic to lung, unspecified laterality ([address] . COMPARISON: 4/9/2020 TECHNIQUE: 2-D multiplanar noncontrast images of the chest. All CT scans at this facility use dose modulation, iterative reconstruction, and/or weight-based dosing when appropriate to reduce radiation dose to as low as reasonably achievable. FINDINGS: No mediastinal or hilar adenopathy by size criteria. No axillary lymphadenopathy. Heart size is enlarged. Prior CABG. AICD over the left chest. No pericardial effusion. Interval development of right-sided pleural effusion which is loculated in appearance. Is associated right basilar atelectasis. Slightly lobulated 1 cm nodule is seen in the right upper lobe image 17 series 2.  This is not identified on the prior exam. No additional lung masses are identified . Reticular densities are present at the peripheral upper lobes which may be radiation treatment change. Upper abdomen Persistent fullness of the adrenal glands. Multiple small retrocrural lymph nodes which are not definitively seen previously. Probable constipation. Bones No suspicious bone lesions are identified. 1. Interval development of moderate loculated right pleural effusion 2. New slightly lobulated spiculated 1 cm nodule right upper lung. Concerning for metastatic disease. **This report has been created using voice recognition software. It may contain minor errors which are inherent in voice recognition technology. ** Final report electronically signed by Dr. Muaro Albert on 8/26/2020 2:49 PM    Cta Chest W Wo Contrast    Result Date: 9/15/2020  PROCEDURE: CTA CHEST W WO CONTRAST CLINICAL INFORMATION: shortness of breath, hx cancer. Leg swelling and shortness of breath. COMPARISON: CT chest dated 8/26/2020. TECHNIQUE: 3 mm axial images were obtained through the chest during the administration of 80 mL [id number]-370 injected in the right forearm. A non-contrast localizer was obtained. 3D reconstructions were performed on the scanner to include MIP coronal and sagittal images through the chest. All CT scans at this facility use dose modulation, iterative reconstruction, and/or weight-based dosing when appropriate to reduce radiation dose to as low as reasonably achievable. FINDINGS:  There is a good contrast bolus within the pulmonary arteries, adequate for evaluation to the subsegmental level. There are no filling defects within the pulmonary arteries to suggest pulmonary embolus. There is stable mural calcification in the aortic arch. No aneurysm or dissection is present. No axillary, mediastinal or hilar lymphadenopathy is identified. The heart is enlarged, stable compared to prior exam. There is stable left-sided cardiac pacer/defibrillator generator with stable leads in the right heart. There is a moderate right pleural effusion, similar to prior CT. Pulmonary vessels are prominent anteriorly along with mild interstitial prominence. Redemonstration of a nodule in the right upper lobe posteriorly. This measures 1.2 cm and appears to be mildly increased in size compared to 7/8/2019 when it measured 1 cm. Visualized upper abdominal solid organs are unremarkable. There are stable degenerative changes of the thoracic spine. 1. No evidence of pulmonary embolus or acute intrathoracic abnormality. 2. Persistent cardiomegaly and moderate right pleural effusion with pulmonary vascular congestion, similar to 8/26/2020. 3. Right upper lobe nodule measuring 1.2 cm which has increased in size compared to 7/8/2019 concerning for malignancy. **This report has been created using voice recognition software. It may contain minor errors which are inherent in voice recognition technology. ** Final report electronically signed by Dr. Lindsay Swann MD on 9/15/2020 2:33 PM    Xr Chest 1 View    Result Date: 9/8/2020  PROCEDURE: XR CHEST 1 VIEW CLINICAL INFORMATION: Status post ultrasound-guided right thoracentesis. COMPARISON: 8/28/2019. TECHNIQUE: Single frontal view of the chest performed. FINDINGS: POSTSURGICAL CHANGES: 1. There are stable median sternotomy wires and mediastinal surgical clips. LINES/TUBES/MECHANICAL DEVICES: 1. There is a stable left subclavian pacemaker/AICD device with leads overlying the right atrium and the right ventricle. TRACHEA/HEART/MEDIASTINUM/HILUM: 1. There is stable mild enlargement of the cardiac silhouette. 2. There is stable mild atheromatous calcification at the aortic arch and a tortuous course of the descending thoracic aorta. LUNG FIELDS: 1. The patient is status post ultrasound-guided right thoracentesis. There was complete aspiration of the right pleural effusion on sonographic imaging.  On the current chest radiograph there is mild pleural reaction at the right lateral costophrenic angle. There is no pneumothorax. 2. There are prominent interstitial markings throughout both lung fields. This also minimal pleural reaction along the right minor fissure. OTHER: None. PNEUMOTHORAX: None. OSSEOUS STRUCTURES: 1. No acute osseous abnormality. 2. The bony structures are osteopenic. 1. The patient is status post ultrasound-guided right thoracentesis. There was complete aspiration of the right pleural effusion on sonographic imaging. On the current chest radiograph there is mild pleural reaction at the right lateral costophrenic angle. There is no pneumothorax. 2. There are prominent interstitial markings throughout both lung fields. This also minimal pleural reaction along the right minor fissure. **This report has been created using voice recognition software. It may contain minor errors which are inherent in voice recognition technology. ** Final report electronically signed by Dr. Yon Ellis on 9/8/2020 8:26 AM    Us Thoracentesis    Result Date: 9/8/2020  PROCEDURE: US THORACENTESIS CLINICAL INFORMATION: Right pleural effusion; history of breast carcinoma. COMPARISON: 6/5/2019. PHYSICIAN PERFORMING PROCEDURE: Yanci Espinoza M.D. PROCEDURE:  Informed consent was obtained. Limited sonographic imaging of the posterior chest was performed for localization of the pleural effusion. The skin overlying the pleural effusion was marked. The puncture site was prepped and draped in a sterile fashion and locally anesthetized with 2% lidocaine. The distal tip of a 5 Bengali one-step catheter was advance into the pleural effusion. A 70 ml specimen was obtained to be sent to the laboratory for analysis as per the orders of the referring physician. .  A total of 400 mL clear yellow pleural fluid was then aspirated and discarded. The one-step catheter was then removed. Estimated blood loss: None. The patient tolerated the procedure well and and there were no immediate complications.      1.

## 2020-09-15 NOTE — ED PROVIDER NOTES
ATTENDING ATTESTATION  Evaluation of [facility].   Patient seen and examined by me. Case discussed and care plan developed with resident. I agree with the note and plan as documented by him, except if my documentation differs. I reviewed the medical, surgical, family and social history, medications and allergies. I have reviewed the nursing documentation. I have reviewed the patient's vital signs. Patient c/o shortness of breath especially with exertion over the past few days. Patient actually hypoxic with SPO2 75% on room air. Physical exam is notable for well appearing, no acute distress. Rales mid lung field bilaterally. Systolic murmur. Equal pulses in all 4 extremities. Abdomen is nontender. She does have 1+ pain edema bilaterally lower extremities. SpO2 91% on 2L NC.     MDM: 58-year-old female who presents for evaluation of shortness of breath. Patient found to have elevated BNP, elevated troponin with signs of fluid overload. Patient also does have a moderate pleural effusion. We did discuss thoracentesis with interventional radiology who agreed to the procedure. Patient currently on Plavix. EKG performed today not showing changes compared to her previous EKG from 826. We did repeat EKG at 1625 which shows ventricular paced rhythm. There is no evidence of Sgarbossa criteria. Ventricular rate is 86 with QRS of 216. Plan: Admit with IR consult. Likely inpatient cardiology consult as well. Discussed with Dr. Yumiko Rodney. Please see the residents' completed note for final disposition except as documented on this attestation. Diagnosis, treatment and disposition plans were discussed and agreed upon by patient. This transcription was electronically signed. It was dictated by use of voice recognition software and electronically transcribed. The transcription may contain errors not detected in proofreading.      I was present for the critical portion following procedures: None       Electronically signed by Alanis MD on 9/15/20 at 4:26 PM ARLENT        Nereida Harper MD  09/16/20 1043

## 2020-09-15 NOTE — ED NOTES
Called [facility] and informed them that the patient is on their way to the unit. Patient transported via bed in a stable condition.      Song Brooks  09/15/20 [id number]

## 2020-09-16 NOTE — CONSULTS
Acute on chronic chf combine chf  Isch CMP  elev troponin  ICD  RT pleural effusion s/ thorace    Plan    Gentle diuresis  Echo  Functional study when stable  [id number]      Maribell Keene MD

## 2020-09-16 NOTE — CARE COORDINATION
9/16/20, 2:39 PM EDT  DISCHARGE PLANNING EVALUATION:    Christen Pratt       Admitted from: ED 9/15/2020/ Boo 34 day: 1   Location: [address] [place] [address]-A Reason for admit: Hypoxia [R09.02] Status: IP  Admit order signed?: yes  PMH:  has a past medical history of Breast cancer ([facility] [address] CAD (coronary artery disease), CHF (congestive heart failure) ([facility] [address] Hyperlipidemia, Hypertension, ICD (implantable cardiac defibrillator), dual, in situ, Numbness and tingling, Pneumonia, Shingles, Sleep apnea, [place] dual ICD, Type II or unspecified type diabetes mellitus without mention of complication, not stated as uncontrolled, and Ventricular arrhythmia. Procedure:   9/15 CT Chest  1. No evidence of pulmonary embolus or acute intrathoracic abnormality. 2. Persistent cardiomegaly and moderate right pleural effusion with pulmonary vascular congestion, similar to 8/26/2020.   3. Right upper lobe nodule measuring 1.2 cm which has increased in size compared to 7/8/2019 concerning for malignancy. 9/15 Right Thoracentesis = 650 ml removed  9/15 ECHO planned  Medications:  Scheduled Meds:   insulin glargine  18 Units Subcutaneous Nightly    amiodarone  200 mg Oral Daily    aspirin EC  81 mg Oral Daily    atorvastatin  80 mg Oral Nightly    clopidogrel  75 mg Oral Daily    metOLazone  5 mg Oral Daily    pregabalin  200 mg Oral BID    rOPINIRole  0.5 mg Oral Nightly    sodium chloride flush  10 mL Intravenous 2 times per day    polyethylene glycol  17 g Oral Daily    enoxaparin  40 mg Subcutaneous Q24H    famotidine  20 mg Oral Daily    bumetanide  1 mg Intravenous BID    insulin lispro  0-6 Units Subcutaneous TID WC    insulin lispro  0-3 Units Subcutaneous Nightly     Continuous Infusions:   dextrose        Pertinent Info/Orders/Treatment Plan:  Client admitted with Hypoxia treated with IV Diuretics, Oxygen 2L; Cardiology (chf) Oncology (pancytopenia) following.  H 8; monitor  Diet: DIET CARB CONTROL;   Smoking status:  reports that she quit smoking about 32 years ago. Her smoking use included cigarettes. She has a 45.00 pack-year smoking history. She has never used smokeless tobacco.   PCP: Anay Agrawal MD  Readmission 30 days or less: no  Readmission Risk Score: 22%    Discharge Planning Evaluation  Current Residence:  Private Residence  Living Arrangements:  Spouse/Significant Other   Support Systems:  Spouse/Significant Other, Family Members, Children  Current Services PTA:     Potential Assistance Needed:  N/A  Potential Assistance Purchasing Medications:  No  Does patient want to participate in local refill/ meds to beds program?  No  Type of Home Care Services:  None  Patient expects to be discharged to:  home w/ spouse  Expected Discharge date:  09/18/20  Follow Up Appointment: Best Day/ Time: Wednesday AM    Patient Goals/Plan/Treatment Preferences: met with client; denied needs as plans home with spouse independently as PTA, current with [facility] Clinic, has nebulizer; Carla Massed in past, has walker, WC, monitor for home oxygen eval if not weaned off  Transportation/Food Security/Housekeeping Addressed:  No issues identified.     Evaluation: no

## 2020-09-16 NOTE — PLAN OF CARE
Problem: Falls - Risk of:  Goal: Will remain free from falls  Description: Will remain free from falls  Outcome: Ongoing  Note: Patient free of falls this shift. Patient on falling star program. Fall band intact. RN visually checks on patient with hourly rounds. Patient tolerates ambulation each shift. Problem: Falls - Risk of:  Goal: Absence of physical injury  Description: Absence of physical injury  Outcome: Ongoing  Note: Patient free of injury this shift. Problem: Discharge Planning:  Goal: Discharged to appropriate level of care  Description: Discharged to appropriate level of care  Outcome: Ongoing  Note: Discharge needs are assessed daily     Problem: Serum Glucose Level - Abnormal:  Goal: Ability to maintain appropriate glucose levels has stabilized  Description: Ability to maintain appropriate glucose levels has stabilized  Outcome: Ongoing  Note: Chem sticks being monitored ACHS. Oral and prescribed insulins being given as needed. Patient being monitored for hyper and hypoglycemia. Problem: Tissue Perfusion - Cardiopulmonary, Altered:  Goal: Hemodynamic stability will improve  Description: Hemodynamic stability will improve  Outcome: Ongoing  Note: Vitals monitored and recorded. Patient hemodynamically stable. Cardiac monitor in place with strips being read every four hours. Care plan reviewed with patient. Patient verbalizes understanding of the plan of care and contribute to goal setting.

## 2020-09-16 NOTE — PROGRESS NOTES
Hospitalist Progress Note      Patient:  Grady Toro    Unit/Bed:4K-01/001-A  YOB: 1945  MRN: [id number]   Acct: [de-identified]   PCP: Lily Rivas MD  Date of Admission: 9/15/2020    Assessment/Plan:     # Acute on Chronic CHF - NYHA IV.  HRrEF (40-45%) per old ECHO 11/2019. Progressive SOB, fatigue, orthopnea, bilateral lower extremity edema with recent outpatient CHF clinic management. She has been progressively getting worse, despite optimal medical therapy. On admission Pro-BNP is 9242, Troponin level at 0.059 but trending down to 0.048. Dry weight is between 205-210 pounds and her current weight is 214, almost at goal. Per chart review, since 8/24/20 she has been seeing CHF clinic and her weight dropped to 217lbs from 231lbs with medical outpatient management. Patient is currently on Bumex 1mg BID and metolazone 5mg with goal to decrease edema, improve shortness of breath. - Will continue with Bumex 1mg BID and monitor daily weight, intake and output. We have to be careful with metolazone as it can worsen kidney function. Will consider Nephrology for further recommendation.   - Limit sodium < 2 g/day  - Fluid restriction to 2 L/day  - Will monitor renal function with daily BMP  - Daily weight  - Cardiology following. Will attempt functional study when stable. - ECHO pending      # Right pleural infusion s/p thoracentesis - History of breast cancer. CTA chest shows right upper lobe nodule measuring 1.2 cm which has increased in size compared to 7/8/2019 concerning for malignancy. Pleural fluid shows total nucleated cells to be 974 which is on the line between being either transudative or exudative. The diferential includes malignancy, constricitive pericarditis, pulmonary embolism, pneumonia. Patient has been hypoxic saturating between 78-85%.     - Continue to monitor vital signs with goal to keep spO2 >92%     # Diabetes mellitus type II - Hold home Metformin and Lantus. Will place on insulin sliding scale  Lantus and will adjust as needed. 9/16 Blood sugars have been between 123-188. Continue with monitoring. # Invasive ductal carcinoma s/p lumpectomy - last chemotherapy and radiation in 2019.  - Heme/Onc has been consulted. Appreciate recommendation. # Pancytopenia: possibly due to chemo therapy. # Hyperlipidemia - will continue Lipitor 80mg      # Diabetic Neuropathy - will continue Lyrica 200mg     # Hx of ventricular arrhythmia - will continue amiodarone 200mg     # Hx of CAD: will continue ASA 81mg, Plavix 75mg. Expected discharge date:  9/20/20    Disposition:    [x] Home       [] TCU       [] Rehab       [] Psych       [] SNF       [] [place]       [] Other-    Chief Complaint: shortness of breath, lower leg edema    Hospital Treatment Course: (Prior to today)   48-year-old morbidly obese  female presents emergency department for evaluation of shortness of breath and bilateral leg swelling. She has a significant PMHx of CHF HFrEF (EF 40%-45%, CAD, CABG (3V,2009), HTN, HLD, AAA, PVD, DM,   Breast CA w/ mets to lung. Patient states her shortness of breath and leg swelling is been getting worse over the last year however this week she is has difficulty with performing her ADLs secondary to more dyspnea on exertion. She  recently had a therapeutic and diagnostic thoracentesis. Denies any fever, chest pain, numbness, tingling at this time. Her past medical history includes ventricular arrhythmias diabetes ICD placement, hypertension, hyperlipidemia CHF, invasive ductal carcinoma of the right breast.   Per chart review, she is followed in the heart failure clinic. Her dry weight is between 205-210 pounds. On August 24 she was given 1 unit of Lasix 20 g IV.  On August 26 she continued to have an increase in shortness of breath on exertion, lower extremity edema, and her Bumex was doubled for 3 days.  On August 31 she was very short of breath and was put on IV Bumex 2 mg, metolazone 5 mg x 2 days. Yesterday at the heart failure clinic, she continued to be fatigued, tired, saturating between 85-90%, with shortness of breath at rest, or lower extremity edema. Recently she had a therapeutic and diagnostic thoracentesis. 9/16/20: Patient is currently on Bumex 1mg BID and Metolazone 5mg. ECHO - pending. Subjective (past 24 hours):   Patient is laying comfortably in bed. She states that she feels better since her admission to the hospital. Denies having chest pain or shortness of breath. Denies abdominal pain. Denies sick contacts recently. States that for the past few weeks she has felt fatigue, more short of breath and unable to sleep at night. Past medical history, family history, social history and allergies reviewed again and is unchanged since admission. ROS (12 point review of systems completed. Pertinent positives noted.  Otherwise ROS is negative)     Medications:  Reviewed    Infusion Medications    dextrose       Scheduled Medications    insulin glargine  18 Units Subcutaneous Nightly    amiodarone  200 mg Oral Daily    aspirin EC  81 mg Oral Daily    atorvastatin  80 mg Oral Nightly    clopidogrel  75 mg Oral Daily    metOLazone  5 mg Oral Daily    pregabalin  200 mg Oral BID    rOPINIRole  0.5 mg Oral Nightly    sodium chloride flush  10 mL Intravenous 2 times per day    polyethylene glycol  17 g Oral Daily    enoxaparin  40 mg Subcutaneous Q24H    famotidine  20 mg Oral Daily    bumetanide  1 mg Intravenous BID    insulin lispro  0-6 Units Subcutaneous TID WC    insulin lispro  0-3 Units Subcutaneous Nightly     PRN Meds: perflutren lipid microspheres, sodium chloride flush, acetaminophen **OR** acetaminophen, promethazine **OR** ondansetron, glucose, dextrose, glucagon (rDNA), dextrose      Intake/Output Summary (Last 24 hours) at 9/16/2020 1874  Last data filed at 9/16/2020 1503  Gross per 24 hour   Intake 1015 ml   Output 3000 ml   Net -1985 ml       Diet:  DIET CARB CONTROL; Exam:  BP (!) 124/58   Pulse 75   Temp 98.6 °F (37 °C) (Oral)   Resp 20   Ht 5' 2\" (1.575 m)   Wt 214 lb 8 oz (97.3 kg)   SpO2 91%   BMI 39.23 kg/m²   General appearance: No apparent distress, appears stated age and cooperative. HEENT: Pupils equal, round, and reactive to light. Conjunctivae/corneas clear. Neck: Supple, with full range of motion. No jugular venous distention. Trachea midline. Respiratory:  Bilateral crackles appreciated. Cardiovascular: Regular rate and rhythm with normal S1/S2 without murmurs, rubs or gallops. Abdomen: Soft, non-tender, non-distended with normal bowel sounds. Musculoskeletal: passive and active ROM x 4 extremities. +2 pitting edema of bilateral lower extremities  Skin: Skin color, texture, turgor normal.  No rashes or lesions. Neurologic:  Neurovascularly intact without any focal sensory/motor deficits. Cranial nerves: II-XII intact, grossly non-focal.  Psychiatric: Alert and oriented, thought content appropriate, normal insight  Capillary Refill: Brisk,< 3 seconds   Peripheral Pulses: +2 palpable, equal bilaterally     Labs:   Recent Labs     09/15/20  1242 09/15/20  1340 09/16/20  0520   WBC  --  6.7 5.9   HGB 8.3* 8.6* 8.0*   HCT 29.8* 31.0* 28.6*   PLT  --  122* 104*     Recent Labs     09/15/20  1242 09/16/20  0520    144   K 5.3* 4.8    106   CO2 28 31   BUN 43* 39*   CREATININE 1.7* 1.6*   CALCIUM 9.0 8.7     No results for input(s): AST, ALT, BILIDIR, BILITOT, ALKPHOS in the last 72 hours. No results for input(s): INR in the last 72 hours. No results for input(s): Bradley Lust in the last 72 hours.     Microbiology:    Blood culture #1:   Lab Results   Component Value Date    BC No growth-preliminaryNo growth 09/13/2019       Blood culture #2:No results found for: BLOODCULT2    Organism:  Lab Results   Component Value Date ORG Enterobacter aerogenes 09/11/2019         Lab Results   Component Value Date    LABGRAM  09/15/2020     Few segmented neutrophils observed. No organisms observed. performed on cytospun specimen       MRSA culture only:No results found for: [address]     Urine culture:   Lab Results   Component Value Date    [facility]  09/11/2019     Robins count: >100,000 CFU/mLEnterobacter species which may be initially susceptible tothird generation cephalosporins may develop resistance withinthree to four days of initiation of this antimicrobialtherapy. Respiratory culture: No results found for: CULTRESP    Aerobic and Anaerobic :  No results found for: LABAERO  Lab Results   Component Value Date    LABANAE No growth-preliminary  No growth   06/05/2019       Urinalysis:      Lab Results   Component Value Date    NITRU NEGATIVE 08/28/2019    BLOODU NEGATIVE 08/28/2019    GLUCOSEU NEGATIVE 08/28/2019       Radiology:  US THORACENTESIS   Final Result      Successful ultrasound-guided thoracentesis with  0.65 L of fluid drained. **This report has been created using voice recognition software. It may contain minor errors which are inherent in voice recognition technology. **      Final report electronically signed by Dr. Daniel Nice on 9/15/2020 6:56 PM      CTA Chest W WO Contrast   Final Result       1. No evidence of pulmonary embolus or acute intrathoracic abnormality. 2. Persistent cardiomegaly and moderate right pleural effusion with pulmonary vascular congestion, similar to 8/26/2020.   3. Right upper lobe nodule measuring 1.2 cm which has increased in size compared to 7/8/2019 concerning for malignancy. **This report has been created using voice recognition software. It may contain minor errors which are inherent in voice recognition technology. **      Final report electronically signed by Dr. Anai Romero MD on 9/15/2020 2:33 PM        Cta Chest W Wo Contrast    Result Date: has been created using voice recognition software. It may contain minor errors which are inherent in voice recognition technology. ** Final report electronically signed by Dr. Mira Manriquez MD on 9/15/2020 2:33 PM    Us Thoracentesis    Result Date: 9/15/2020  PROCEDURE: US THORACENTESIS CLINICAL INFORMATION: moderate right pleural effusion on CT . COMPARISON: CT 9/15/2020 PROCEDURE: The benefits and the risk of the procedure were explained to the patient. The patient was given an opportunity to ask questions. Signed consent was obtained. Limited ultrasound exam of the right chest showed moderate amount of pleural fluid. Using ultrasound guidance, a site was marked on the skin. The right side of the posterior chest was prepped and draped using the usual sterile technique and the skin was anesthetized with 2 percent lidocaine. A 5 Tajik one-step catheter was introduced to the right pleural space. 0.65 L of theresa colored fluid drained. The catheter was then removed. The patient tolerated the procedure well with no complications. Specimen sent for laboratory studies as requested. Estimated blood loss is 0 cc. Patient left the department in good condition. Successful ultrasound-guided thoracentesis with  0.65 L of fluid drained. **This report has been created using voice recognition software. It may contain minor errors which are inherent in voice recognition technology. ** Final report electronically signed by Dr. Judy David on 9/15/2020 6:56 PM    Xr Chest Pa Inspiration 1 Vw    Result Date: 9/15/2020  PROCEDURE: XR CHEST PA INSPIRATION 1 VW CLINICAL INFORMATION: right thoracentesis. COMPARISON: CT 9/15/2020 TECHNIQUE: AP upright view of the chest. FINDINGS: Right pleural fluid is moderately improved. No gross pneumothorax. Moderate congestion again noted. There are mild airspace opacities bilaterally particularly within the perihilar regions suggesting pulmonary edema.  Moderate enlargement of the cardiopericardial silhouette is unchanged. No pneumothorax post right thoracentesis. **This report has been created using voice recognition software. It may contain minor errors which are inherent in voice recognition technology. ** Final report electronically signed by Dr. Otto Johnson on 9/15/2020 6:54 PM      Support Devices (date placed):  [] ETT []Oral / [] Nasal  [] Gastric Tube [] OG / [] NG    [] Central Venous Line (Specify Site):  [] Urinary Catheter  [] Arterial Line (Specify Site)  [] Peripheral IV access  [] Other:    DVT prophylaxis: [x] Lovenox                                 [] SCDs                                 [] SQ Heparin                                 [] Encourage ambulation           [] Already on Anticoagulation     Code Status: Full Code    Tele:   [x] yes             [] no    Electronically signed by Jaleesa Thurston MD on 9/16/2020 at 3:25 PM

## 2020-09-16 NOTE — PROGRESS NOTES
Patient known to CHF clinic and was sent to ER following appointment yesterday. Re-educated patient regarding heart failure management by explaining the importance of obtaining daily weights at the same time every day with approximately the same amount of clothing, how to recognize symptoms, low sodium diet and taking prescribed medications as ordered. Patient was receptive to the education given and verbalized understanding.

## 2020-09-16 NOTE — PROGRESS NOTES
Pharmacy Medication History Note      List of current medications patient is taking is complete. Source of information: patient/Surescripts    Changes made to medication list:  Medications added/doses adjusted:  Flonase nasal spray-patient takes on an as needed basis  Vitamin B-12-patient takes 1000mcg three times a day. Other notes (ex. Recent course of antibiotics, Coumadin dosing):  Patient very confident and knowledgeable about her home medications. Denies use of other OTC or herbal medications.       Allergies reviewed      Electronically signed by Conrad Augustine on 9/16/2020 at 11:03 AM

## 2020-09-17 NOTE — FLOWSHEET NOTE
[place] [id number] PROGRESS NOTE      Patient: Cande Griffin  Room #: 4K-01/001-A            YOB: 1945  Age: 67 y.o. Gender: female            Admit Date & Time: 9/15/2020  1:10 PM    Assessment:  Pt is a 67year old female who is sleeping and has been sleeping for a while. Patient's  is in the room and engaged in conversation with me. He told me patient is admitted due to fluid in her body. Patient's  also said patient have had previous cancer treatment before. He told me patient had some fluid taken out but still has more fluid in her body. Patient and his wife have adult children who are providing family support. Patient is a member of [employer]. Interventions:  During the encounter  provided encouragement and nurtured hope.  offered consolation and active listening presence. Outcomes:  (Patient and family are receptive to 's visit and engaged in conversation. Plan:  1.  will follow up with patient and family to learn how patient is coping and how she is finding meanings and purpose through her illness.      Electronically signed by Manuel Fountain, on 9/17/2020 at 4:33 PM.  [place]  [phone]

## 2020-09-17 NOTE — PROGRESS NOTES
Assistance: Needs assistance  Ambulation Assistance: Independent  Transfer Assistance: Independent          Additional Comments: Pt stating she was indep with all ADL tasks. Pt alsos \"I can wash clothes and can cook. \"    VISION:WNL    HEARING:  WNL    COGNITION: WNL    RANGE OF MOTION:  Bilateral Upper Extremity:  WNL    STRENGTH:  Bilateral Upper Extremity:  bilateral UE general deconditioning and weakness throughout    ADL:   Feeding: with set-up. Grooming: Stand By Assistance. in chair. BALANCE:  Sitting Balance:  Minimal Assistance. to CGA at EOB with pt noted to have ataxic/jerking movemetns at times. Gi Rodriguez RN in room upon return to sitting EOB and aware  Standing Balance: Moderate Assistance, X 2. inisde guille stedy. Pt initially min A x1 and CGA x1 but with noted increased ataxic/jerking movements and sudden lean to left side requirign increased assistance and frequent cues to sit upright    BED MOBILITY:  Sit to Supine: Maximum Assistance, X 2      TRANSFERS:  Sit to Stand:  [employer], Moderate Assistance. from bedside chair into giulle stedy, max x2 from seat of guille stedy d/t ataxic/jerking movements and lateral lean to left side. Stand to Sit: Maximum Assistance, X 2. Onto EOB for safe and controlled decent    FUNCTIONAL MOBILITY:  OTR to further assess       Activity Tolerance:  Patient tolerance of  treatment: fair. Pt with icnreased ataxic/jerking movements requiing 2 assist for safety       Assessment:  Assessment: Pt dmeo decrease in PLOF requiring 2 assist this date to complete transfers and ADL tasks. Pt would benefit from skilled OT services to increase her abiliyt to be more indep with ADL tasks and tranfser by requiring less assistance.   Performance deficits / Impairments: Decreased functional mobility , Decreased endurance, Decreased ADL status, Decreased balance, Decreased strength, Decreased safe awareness  Prognosis: Fair  REQUIRES OT FOLLOW UP: Yes  Decision Making: Medium Complexity    Treatment Initiated: Treatment and education initiated within context of evaluation. Evaluation time included review of current medical information, gathering information related to past medical, social and functional history, completion of standardized testing, formal and informal observation of tasks, assessment of data and development of plan of care and goals. Treatment time included skilled education and facilitation of tasks to increase safety and independence with ADL's for improved functional independence and quality of life. Discharge Recommendations:  Patient would benefit from continued therapy after discharge, Continue to assess pending progress, Subacute/Skilled Nursing Facility    Patient Education:  OT Education: OT Role, Plan of Care  Patient Education: safety with ADL tasks and tranfsers    Equipment Recommendations: Other: continue to assess    Plan:  Times per week: 5x  Current Treatment Recommendations: Patient/Caregiver Education & Training, Strengthening, Equipment Evaluation, Education, & procurement, Balance Training, Functional Mobility Training, Endurance Training, Self-Care / ADL, Safety Education & Training. See long-term goal time frame for expected duration of plan of care. If no long-term goals established, a short length of stay is anticipated.     Goals:  Patient goals : get strogner and go back home  Short term goals  Time Frame for Short term goals: by discharge  Short term goal 1: Pt to sit EOB unsupport completing ADL tasks with SBA and no vcs for safety  Short term goal 2: Pt to complete sit to stand from various surfaces includng BSC  with min A x1 to increase indep with toileting tasks  Short term goal 3: pt to increase endurance to stand at midline > 1 min with consistent balance of min A x1 and min cues to increase participation in ADL tks  Short term goal 4: Pt to complete UB ADL tasks with min A  Short term goal 5: OTR to further assess transfers and mobility         Following session, patient left in safe position with all fall risk precautions in place.

## 2020-09-17 NOTE — CARE COORDINATION
DISCHARGE/PLANNING EVALUATION  9/17/20, 1:25 PM EDT    Reason for Referral:  \"ECF placement\"  Mental Status:  Alert and oriented   Decision Making:  Makes decisions with her spouse  Family/Social/Home Environment:   SW spoke to patient and spouse also present. They live in a one story home without a basement. They do housekeeping together. She has not driven for 4 years. Her spouse is able to drive. She has a walk-in bathtub. She will use it as a shower but has the built in seat and 2 grab bars. She was not on home o2 nor did she sleep with a pap of any sort. She uses a wheeled walker with seat. They have 2 children with one in the area and one in [place]. Current Services including food security, transportation and housekeeping:   See above  Current Equipment:  See above  Payment Source:  Medicare and [employer]  Concerns or Barriers to Discharge:  SW advised that therapy felt she needed an ECF and she agreed. She had been at [facility] last year but was agreeable to [facility] or [place] if no bed at [facility] acute provider list with quality measures, geographic area and applicable managed care information provided. Questions regarding selection process answered:  She prefers the [facility], she was agreeable to [facility],     Teach Back Method used with patient and spouse regarding care plan and needs  Patient and spouse verbalize understanding of the plan of care and contribute to goal setting. Patient goals, treatment preferences and discharge plan:  Referral was made to the [facility], spoke to Marcio Kerr with ECF. They have a bed and they will review.     Electronically signed by MOLLY Avery on 9/17/2020 at 1:25 PM

## 2020-09-17 NOTE — PROGRESS NOTES
Nutrition Education    Received c/s - Heart Failure diet education    · Attempted to review information with Patient - pt. Wouldn't wake despite calling name several times. · Left written educational materials provided on low sodium diet along contact name and number.     Electronically signed by Katya Salas RD, JARRETT on 9/17/20 at 11:30 AM EDT    Contact: [phone]

## 2020-09-17 NOTE — PROGRESS NOTES
[facility]    Patient - Natacha Klein   MRN -  [id number]   [facility] # - [de-identified]   - 1945      Date of Admission -  9/15/2020  1:10 PM  Date of evaluation -  2020  Room - -001-A   Hospital Day - [address], [place] Primary Care Physician - Yelitza Bloom MD       Reason for Consult    Pancytopenia, persistent pleural effusion (?) malignancy  [address] Problems    Diagnosis Date Noted    Hypoxia [R09.02]      Priority: High    Pleural effusion, right [J90] 09/15/2020    Invasive ductal carcinoma of breast, female, right ([address] [place] 09/15/2020    Morbidly obese ([address] [E66.01] 08/15/2020    Elevated troponin [R79.89] 2019    Pancytopenia ([facility] [D61.818]     Type 2 diabetes mellitus with hyperglycemia, with long-term current use of insulin ([address] [E11.65, Z79.4] 10/09/2017     HPI/Subjective   Natacha Klein is a 67 y.o. female admitted for SOB and bilateral lower extremity edema. Pt states SOB & bilateral lower extremity edema has worsened more than before and affecting ADLs with related JASSO. PMH includes ventricular arrhythmias, DM, ICD placement, HTN, HLD, CHF, and invasive ductal carcinoma of the right breast, triple (-). Pt admits to SOB, cough, fatigue, JASSO, and bilateral lower extremity edema. Pt found to be hypoxic in ED at 75% RA, hypoxia resolved with application of 2 lpm nc. Afebrile. Recent thoracentesis noted from chart. Over the last 24 hours:   Pt resting in bed, eyes closed.  at bedside. Pt oxygenation has worsened, now on BIPAP. Did not wake pt. Will follow case and see as needed. Oncology History   Invasive ductal carcinoma of the right breast, triple (-). Poor tolerance of chemotherapy regimen in 2019 followed by lumpectomy, lymph node excision (2019) and radiation treatment, well tolerated. Hx blood transfusions.   Cytology from thoracentesis pending final results, preliminary results show few segmented neutrophils, no fungus.   Meds    Current Medications    cefTRIAXone (ROCEPHIN) IV  1 g Intravenous Q24H    azithromycin  500 mg Intravenous Q24H    insulin glargine  18 Units Subcutaneous Nightly    amiodarone  200 mg Oral Daily    aspirin EC  81 mg Oral Daily    atorvastatin  80 mg Oral Nightly    clopidogrel  75 mg Oral Daily    metOLazone  5 mg Oral Daily    pregabalin  200 mg Oral BID    rOPINIRole  0.5 mg Oral Nightly    sodium chloride flush  10 mL Intravenous 2 times per day    polyethylene glycol  17 g Oral Daily    enoxaparin  40 mg Subcutaneous Q24H    famotidine  20 mg Oral Daily    bumetanide  1 mg Intravenous BID    insulin lispro  0-6 Units Subcutaneous TID WC    insulin lispro  0-3 Units Subcutaneous Nightly     perflutren lipid microspheres, sodium chloride flush, acetaminophen **OR** acetaminophen, promethazine **OR** ondansetron, glucose, dextrose, glucagon (rDNA), dextrose  IV Drips/Infusions   dextrose       Past Medical History         Diagnosis Date    Breast cancer ([facility] 06/2019    right invasive ductal carcinoma    CAD (coronary artery disease)     sees Dr Hoang Favors    CHF (congestive heart failure) ([facility]     Hyperlipidemia     Hypertension     ICD (implantable cardiac defibrillator), dual, in situ     [facility] dual ICD    Numbness and tingling     both feet    Pneumonia     Shingles 12/2014    Sleep apnea     [facility] dual ICD     Type II or unspecified type diabetes mellitus without mention of complication, not stated as uncontrolled     Ventricular arrhythmia       Past Surgical History           Procedure Laterality Date    BREAST BIOPSY Right 06/11/2019    [address] [place] [address]-    BREAST LUMPECTOMY Right 12/3/2019    RIGHT BREAST LUMPECTOMY, SENTINAL LYMPH NODE BIOPSY, TARGETED AXILLARY NODE DISSECTION, PREOP NEEDLE LOC X 2 performed by Trey Lerma MD at [address] [place] [address]  9-08-09    [facility]  CARDIOVASCULAR STRESS TEST  01-27-10    Excellent treadmill exercise. Improved functional capacity to functional class 1 completed 8 METS. Hemodynamic response was appropriate. No ischemic ECG changes noted.  CARDIOVASCULAR STRESS TEST  10-28-09    No evidence of stress induced ischemia. Evidence of  prior transmural MI demonstrated by transient fixed defects of inferior wall extending into lateral wall, indicative of RCA lesion. Bowel and soft tissue attenuation interfering w/ counts of lateral wall. EF 29% w/ severe septal and inferolateral hypokinesis w/ very mild lateral wall hypokinesis being noted.  CARDIOVERSION  8-29-09    CHOLECYSTECTOMY  2005    Laparoscopic    COLONOSCOPY Left 1/9/2019    COLONOSCOPY performed by Josh Ramirez MD at [facility] CATH LAB PROCEDURE  8-24-09    Multivessel disease demonstrated by LAD having 70-80%  proximal lesion and napkin-ring lesion at bifurcation w/ D2. D2 appears to be medium large caliber vessel which has an ostial lesion of 70-80%. left -to-left collaterals as well as left-to-right collaterals emanating from LAD to PDA. Circumflex had anterior marginal branch and posterior marginal branch.  HERNIA REPAIR      Multiple    HYSTERECTOMY  1997    INSERTION / REMOVAL / REPLACEMENT VENOUS ACCESS CATHETER Right 7/31/2019    SINGLE LUMEN SMART PORT INSERTION performed by Melinda Honeycutt MD at / [facility] 9 N/A 12/3/2019    SINGLE LUMEN WWCFMIWPF REMOVAL RIGHT SUBCLAVIAN performed by Melinda Honeycutt MD at [facility] ECHOCARDIOGRAM  07-11-11    LV size mildly to moderately dilated. Systolic function markedly reduced. EF 25-30%. Severe diffuse hypokinesis. Grade 1 diastolic dysfunction. LA was mildly to moderately dilated. RV mildly dilated, systolic function mildly reduced. Mild MR and TR.      TRANSTHORACIC ECHOCARDIOGRAM  8-24-09    LV demonstrates severe hypokinesis of the inferior basal as well as  basal aneurysm being noted and paradoxical septal motion. EF 45-50%. LA is moderately dilated and AV demonstrates mild AV sclerosis w/o AS and AI. Trace MR and TV insufficiency.  VASCULAR SURGERY      cabg harvests from legs     Diet    DIET CARB CONTROL;   Allergies    Sulfa antibiotics  Social History     Social History     Socioeconomic History    Marital status:      Spouse name: Yannick Mehta Number of children: 2    Years of education: 15    Highest education level: Not on file   Occupational History    Occupation: [employer]     Comment: [employer]   Social Needs    Financial resource strain: Not hard at all   "[employer] insecurity     Worry: Never true     Inability: Never true   [place]is[place] needs     Medical: No     Non-medical: No   Tobacco Use    Smoking status: Former Smoker     Packs/day: 1.50     Years: 30.00     Pack years: 45.00     Types: Cigarettes     Last attempt to quit: 1988     Years since quittin.0    Smokeless tobacco: Never Used   Substance and Sexual Activity    Alcohol use: No     Frequency: Never     Binge frequency: Never    Drug use: No    Sexual activity: Not Currently     Partners: Male   Lifestyle    Physical activity     Days per week: 5 days     Minutes per session: 10 min    Stress: Not at all   Relationships    Social connections     Talks on phone: More than three times a week     Gets together: Never     Attends Judaism service: Never     Active member of club or organization: No     Attends meetings of clubs or organizations: Never     Relationship status:     Intimate partner violence     Fear of current or ex partner: Not on file     Emotionally abused: Not on file     Physically abused: Not on file     Forced sexual activity: Not on file   Other Topics Concern    Not on file   Social History Narrative    Not on file     Family History          Problem Relation Age of Onset    Diabetes Father     Cancer Father 80        Bone    Hypertension Mother     Heart Disease Mother     No Known Problems Sister     No Known Problems Brother     No Known Problems Brother     Breast Cancer Neg Hx     Colon Cancer Neg Hx      ROS     Review of Systems   Pertinent review of systems noted in HPI, all other ROS negative. Vitals     height is 5' 2\" (1.575 m) and weight is 214 lb 8 oz (97.3 kg). Her oral temperature is 98.2 °F (36.8 °C). Her blood pressure is 105/53 (abnormal) and her pulse is 82. Her respiration is 22 and oxygen saturation is 95%. O2 Flow Rate (L/min): 6 L/min    Exam   Physical Exam *Pt resting with eyes closed, BIPAP on, physical exam deferred. Will continue to follow pt and see as needed. *  General appearance: No apparent distress, resting with eyes closed. HEENT:  BIPAP mask in place. Neck: Supple. Trachea midline. Respiratory:  Normal respiratory effort. Skin: Skin color, texture, turgor normal.      *The rest of the physical exam was deferred. Labs   CBC  Recent Labs     09/15/20  1340 09/16/20  0520 09/17/20  0532   WBC 6.7 5.9 9.6   RBC 4.05* 3.71* 3.55*   HGB 8.6* 8.0* 7.7*   HCT 31.0* 28.6* 27.0*   MCV 76.5* 77.1* 76.1*   MCH 21.2* 21.6* 21.7*   MCHC 27.7* 28.0* 28.5*   * 104* 100*   MPV  --  ---- ----      BMP  Recent Labs     09/15/20  1242 09/16/20  0520 09/17/20  0532    144 143   K 5.3* 4.8 4.3    106 102   CO2 28 31 34*   BUN 43* 39* 34*   CREATININE 1.7* 1.6* 1.4*   GLUCOSE 123* 106 154*   MG  --  1.8 1.8   CALCIUM 9.0 8.7 8.7     LFT  No results for input(s): AST, ALT, ALB, BILITOT, ALKPHOS, LIPASE in the last 72 hours. Invalid input(s): AMYLASE  INR  No results for input(s): INR, PROTIME in the last 72 hours. PTT  No results for input(s): APTT in the last 72 hours.     Radiology        Ct Chest Wo Contrast    Result Date: 8/26/2020  PROCEDURE: CT CHEST WO CONTRAST CLINICAL INFORMATION: SOB (shortness of breath), Pleural effusion, Malignant neoplasm metastatic to lung, unspecified laterality ([address] . COMPARISON: 4/9/2020 TECHNIQUE: 2-D multiplanar noncontrast images of the chest. All CT scans at this facility use dose modulation, iterative reconstruction, and/or weight-based dosing when appropriate to reduce radiation dose to as low as reasonably achievable. FINDINGS: No mediastinal or hilar adenopathy by size criteria. No axillary lymphadenopathy. Heart size is enlarged. Prior CABG. AICD over the left chest. No pericardial effusion. Interval development of right-sided pleural effusion which is loculated in appearance. Is associated right basilar atelectasis. Slightly lobulated 1 cm nodule is seen in the right upper lobe image 17 series 2. This is not identified on the prior exam. No additional lung masses are identified . Reticular densities are present at the peripheral upper lobes which may be radiation treatment change. Upper abdomen Persistent fullness of the adrenal glands. Multiple small retrocrural lymph nodes which are not definitively seen previously. Probable constipation. Bones No suspicious bone lesions are identified. 1. Interval development of moderate loculated right pleural effusion 2. New slightly lobulated spiculated 1 cm nodule right upper lung. Concerning for metastatic disease. **This report has been created using voice recognition software. It may contain minor errors which are inherent in voice recognition technology. ** Final report electronically signed by Dr. Mega Su on 8/26/2020 2:49 PM    Cta Chest W Wo Contrast    Result Date: 9/15/2020  PROCEDURE: CTA CHEST W WO CONTRAST CLINICAL INFORMATION: shortness of breath, hx cancer. Leg swelling and shortness of breath. COMPARISON: CT chest dated 8/26/2020. TECHNIQUE: 3 mm axial images were obtained through the chest during the administration of 80 mL Isovue-370 injected in the right forearm. A non-contrast localizer was obtained.   3D reconstructions were performed on the scanner to include MIP coronal and sagittal images through the chest. All CT scans at this facility use dose modulation, iterative reconstruction, and/or weight-based dosing when appropriate to reduce radiation dose to as low as reasonably achievable. FINDINGS:  There is a good contrast bolus within the pulmonary arteries, adequate for evaluation to the subsegmental level. There are no filling defects within the pulmonary arteries to suggest pulmonary embolus. There is stable mural calcification in the aortic arch. No aneurysm or dissection is present. No axillary, mediastinal or hilar lymphadenopathy is identified. The heart is enlarged, stable compared to prior exam. There is stable left-sided cardiac pacer/defibrillator generator with stable leads in the right heart. There is a moderate right pleural effusion, similar to prior CT. Pulmonary vessels are prominent anteriorly along with mild interstitial prominence. Redemonstration of a nodule in the right upper lobe posteriorly. This measures 1.2 cm and appears to be mildly increased in size compared to 7/8/2019 when it measured 1 cm. Visualized upper abdominal solid organs are unremarkable. There are stable degenerative changes of the thoracic spine. 1. No evidence of pulmonary embolus or acute intrathoracic abnormality. 2. Persistent cardiomegaly and moderate right pleural effusion with pulmonary vascular congestion, similar to 8/26/2020. 3. Right upper lobe nodule measuring 1.2 cm which has increased in size compared to 7/8/2019 concerning for malignancy. **This report has been created using voice recognition software. It may contain minor errors which are inherent in voice recognition technology. ** Final report electronically signed by Dr. Phill Bland MD on 9/15/2020 2:33 PM    Xr Chest Portable    Result Date: 9/17/2020  PROCEDURE: XR CHEST PORTABLE CLINICAL INFORMATION: hypoxia, new fever .  COMPARISON: 9/15/2020 TECHNIQUE: Portable upright FINDINGS: Patient is rotated and leaning to the left. Motion artifact. Severe cardiomegaly. Bilateral extensive airspace infiltrates left pleural effusion. EKG leads overlie the chest. Midline sternotomy. AICD over the left chest.     Cardiomegaly and bilateral infiltrates likely pulmonary edema. Pneumonic infiltrates are not excludable **This report has been created using voice recognition software. It may contain minor errors which are inherent in voice recognition technology. ** Final report electronically signed by Dr. Maria De Jesus Grove on 9/17/2020 2:17 AM    Xr Chest 1 View    Result Date: 9/8/2020  PROCEDURE: XR CHEST 1 VIEW CLINICAL INFORMATION: Status post ultrasound-guided right thoracentesis. COMPARISON: 8/28/2019. TECHNIQUE: Single frontal view of the chest performed. FINDINGS: POSTSURGICAL CHANGES: 1. There are stable median sternotomy wires and mediastinal surgical clips. LINES/TUBES/MECHANICAL DEVICES: 1. There is a stable left subclavian pacemaker/AICD device with leads overlying the right atrium and the right ventricle. TRACHEA/HEART/MEDIASTINUM/HILUM: 1. There is stable mild enlargement of the cardiac silhouette. 2. There is stable mild atheromatous calcification at the aortic arch and a tortuous course of the descending thoracic aorta. LUNG FIELDS: 1. The patient is status post ultrasound-guided right thoracentesis. There was complete aspiration of the right pleural effusion on sonographic imaging. On the current chest radiograph there is mild pleural reaction at the right lateral costophrenic angle. There is no pneumothorax. 2. There are prominent interstitial markings throughout both lung fields. This also minimal pleural reaction along the right minor fissure. OTHER: None. PNEUMOTHORAX: None. OSSEOUS STRUCTURES: 1. No acute osseous abnormality. 2. The bony structures are osteopenic. 1. The patient is status post ultrasound-guided right thoracentesis.  There was complete aspiration of the right pleural effusion on sonographic imaging. On the current chest radiograph there is mild pleural reaction at the right lateral costophrenic angle. There is no pneumothorax. 2. There are prominent interstitial markings throughout both lung fields. This also minimal pleural reaction along the right minor fissure. **This report has been created using voice recognition software. It may contain minor errors which are inherent in voice recognition technology. ** Final report electronically signed by Dr. Kina Rogers on 9/8/2020 8:26 AM    Us Thoracentesis    Result Date: 9/15/2020  PROCEDURE: US THORACENTESIS CLINICAL INFORMATION: moderate right pleural effusion on CT . COMPARISON: CT 9/15/2020 PROCEDURE: The benefits and the risk of the procedure were explained to the patient. The patient was given an opportunity to ask questions. Signed consent was obtained. Limited ultrasound exam of the right chest showed moderate amount of pleural fluid. Using ultrasound guidance, a site was marked on the skin. The right side of the posterior chest was prepped and draped using the usual sterile technique and the skin was anesthetized with 2 percent lidocaine. A 5 [place] one-step catheter was introduced to the right pleural space. 0.65 L of theresa colored fluid drained. The catheter was then removed. The patient tolerated the procedure well with no complications. Specimen sent for laboratory studies as requested. Estimated blood loss is 0 cc. Patient left the department in good condition. Successful ultrasound-guided thoracentesis with  0.65 L of fluid drained. **This report has been created using voice recognition software. It may contain minor errors which are inherent in voice recognition technology. ** Final report electronically signed by Dr. Corbin Perry on 9/15/2020 6:56 PM    Us Thoracentesis    Result Date: 9/8/2020  PROCEDURE: US THORACENTESIS CLINICAL INFORMATION: Right pleural effusion; history of breast carcinoma. COMPARISON: 6/5/2019. PHYSICIAN PERFORMING PROCEDURE: Saintclair Lauth M.D. PROCEDURE:  Informed consent was obtained. Limited sonographic imaging of the posterior chest was performed for localization of the pleural effusion. The skin overlying the pleural effusion was marked. The puncture site was prepped and draped in a sterile fashion and locally anesthetized with 2% lidocaine. The distal tip of a 5 [place] citizen one-step catheter was advance into the pleural effusion. A 70 ml specimen was obtained to be sent to the laboratory for analysis as per the orders of the referring physician. .  A total of 400 mL clear yellow pleural fluid was then aspirated and discarded. The one-step catheter was then removed. Estimated blood loss: None. The patient tolerated the procedure well and and there were no immediate complications. 1.  Uncomplicated ultrasound guided diagnostic and therapeutic thoracentesis. **This report has been created using voice recognition software. It may contain minor errors which are inherent in voice recognition technology. ** Final report electronically signed by Dr. Lakhwinder Rockwell on 9/8/2020 8:37 AM    Xr Chest Pa Inspiration 1 Vw    Result Date: 9/15/2020  PROCEDURE: XR CHEST PA INSPIRATION 1 VW CLINICAL INFORMATION: right thoracentesis. COMPARISON: CT 9/15/2020 TECHNIQUE: AP upright view of the chest. FINDINGS: Right pleural fluid is moderately improved. No gross pneumothorax. Moderate congestion again noted. There are mild airspace opacities bilaterally particularly within the perihilar regions suggesting pulmonary edema. Moderate enlargement of the cardiopericardial silhouette is unchanged. No pneumothorax post right thoracentesis. **This report has been created using voice recognition software. It may contain minor errors which are inherent in voice recognition technology. ** Final report electronically signed by Dr. Jaspreet Pettit on 9/15/2020 6:54 PM      Assessment/Recommendations    1. Pancytopenia -  WBC WNL. RBC chronically low. Platelets low, likely reactive. Hgb 7.7, Hx of iron deficiency anemia. Will check B12 & folate. Oral ferrous sulfate tablets. 2.  Pleural effusion - CHF exacerbation vs (?) malignant pleural effusion-preliminary results-few segmented neutrophils observed. Wait for final results. Previous thoracentesis 6/4/2019 transudative. Will continue to follow case and see as needed. Please call with questions/concerns. Case discussed with nurse and patient/family. Questions and concerns addressed.   Plan made in collaboration with Dr. Wong     Electronically signed by   ROMAIN Wallis - CNP on 9/17/2020 at 2:44 PM

## 2020-09-17 NOTE — PROGRESS NOTES
[address] [address]  INPATIENT PHYSICAL THERAPY  EVALUATION  [facility] ICU STEPDOWN TELEMETRY 4K - 4K-01/001-A    Time In: 1058  Time Out: 1124  Timed Code Treatment Minutes: 10 Minutes  Minutes: 26          Date: 2020  Patient Name: Tani Elizalde,  Gender:  female        MRN: [id number]  : 1945  (67 y.o.)      Referring Practitioner: Feliciano Burt MD  Diagnosis: hypoxia  Additional Pertinent Hx: from H&P: 69-year-old morbidly obese  female presents emergency department for evaluation of shortness of breath and bilateral leg swelling. Patient states her shortness of breath and leg swelling is been getting worse over the last year however this week she is just more changes had difficulty performing her ADLs secondary to more dyspnea on exertion. She is recently had a therapeutic and diagnostic thoracentesis denies any fever, chest pain, numbness, tingling at this time. Her past medical history includes ventricular arrhythmias diabetes ICD placement, hypertension, hyperlipidemia CHF, invasive ductal carcinoma breast right side. Restrictions/Precautions:  Restrictions/Precautions: General Precautions, Fall Risk(on oxygen - nonrebreather)  Position Activity Restriction  Other position/activity restrictions: pt requires assistance to get up. Recommend guille sprague for transfers    Subjective:  Chart Reviewed: Yes  Patient assessed for rehabilitation services?: Yes  Family / Caregiver Present: No  Other (Comment): pt was difficult to arouse from sleep, requiring loud verbal cueing and sternal rub  Subjective: Pt was sleeping upon PT arrival. Pt was difficult to arouse from sleep, requiring max verbal cueing and sternal rub. Pt was agreeable to therapy upon waking, and was very drowsy until seated upright. Nurse stated to put rebreather on standby and use 6L O2 via nasal cannula.      General:  Follows Commands: Within Functional Limits         Hearing: ([place])         Pain: does not state week: 3-5x GM  Times per day: Daily  Current Treatment Recommendations: Strengthening, Functional Mobility Training, Transfer Training, Balance Training, Endurance Training, Stair training    Goals:  Patient goals : discharge from hospital  Short term goals  Time Frame for Short term goals: by hospital discharge  Short term goal 1: pt will transfer from supine to sit with min assist x 1. Short term goal 2: pt will transfer from sit to/from stand with min assist x 1. Short term goal 3: pt will ambulate 21' with min assist x1 and least restrictive device to mobilize around her room. Long term goals  Time Frame for Long term goals : N/A due to estimated short LOS. Following session, patient left in chair with call light, tray table in reach, and nurse notified of pt's position. Spoke with SW about pt's need for a SNF.

## 2020-09-17 NOTE — PROGRESS NOTES
Physician Progress Note      PATIENT:               Velva Severance  CSN #:                  [id number]  :                       1945  ADMIT DATE:       9/15/2020 1:10 PM  [address] [place] DATE:  RESPONDING  PROVIDER #:        Ana Lemus MD          QUERY TEXT:    Pt admitted with shortness of breath , hypoxia , acute on chronic combined chf   .  Pt noted to have Oxygen saturation of 75% on room air . If possible,   please document in the progress notes and discharge summary if you are   evaluating and/or treating any of the following: The medical record reflects the following:  Risk Factors: Acute on chronic chf with pleural effusion  Clinical Indicators: Oxygen saturation on room air on admission 75 % with   tachypnea up to 42 Resp /min  Treatment: admission , supportive oxygen up to 5 liters, imaging,   thoracentisis, diruetics    Thank you,  Scott Matos RN, BSN, [address] [place] [address]   P: [phone]  F: [phone]  Options provided:  -- Acute respiratory failure with hypoxia  -- Other - I will add my own diagnosis  -- Disagree - Not applicable / Not valid  -- Disagree - Clinically unable to determine / Unknown  -- Refer to Clinical Documentation Reviewer    PROVIDER RESPONSE TEXT:    This patient is in acute respiratory failure with hypoxia.     Query created by: Major Lemus on 2020 1:30 PM      Electronically signed by:  Ana Lemus MD 2020 10:25 PM

## 2020-09-17 NOTE — CARE COORDINATION
9/17/20, 3:25 PM EDT    DISCHARGE PLANNING EVALUATION      SW received a call from [facility]. They are able to take clinically. YARELIS updated the nurse and patient's spouse.   9/17/20, 3:27 PM EDT    DISCHARGE PLANNING EVALUATION      [facility]ent[facility] smith also informed that they only need one COVID test and this was completed Sept 15

## 2020-09-17 NOTE — CONSULTS
[address] [id number]                                  CONSULTATION    PATIENT NAME: Nora Hall                :        1945  MED REC NO:   [id number]                           ROOM:       0001  ACCOUNT NO:   [de-identified]                           ADMIT DATE: 09/15/2020  PROVIDER:     Shanti Chu. Nedra Toro M.D.    Stephanie Dillonier:  2020    REASON FOR CONSULTATION:  Congestive heart failure with recurrent  right-sided pleural effusion and recurrent thoracentesis. PRIMARY CARDIOLOGIST:  Yulisa Tolbert MD    History obtained from the patient and electronic medical record. HISTORY OF PRESENT ILLNESS:  This is a 48-year-old pleasant   female patient, presented with worsening of shortness of breath and  worsening of leg swelling over the last few weeks and getting worse and  so she came to the emergency room. The swelling of the leg got worse to  an extent that performing activity of daily living has been impaired,  and she has orthopnea of three pillows. She had a history of recent  thoracentesis prior to this admission. The patient has no history of  fever or chills. No nausea, vomiting, diarrhea. No chest pain. She  has orthopnea and the patient has history of congestive heart failure;  dilated cardiomyopathy, nonischemic; hypertension, hyperlipidemia. She  has invasive ductal carcinoma of the right breast, status post  lumpectomy a year ago with postsurgical radiation. So cardiology  evaluation was sought in view of the congestive heart failure; recurrent  right-sided pleural effusion requiring thoracentesis, ultrasound guided. REVIEW OF SYSTEMS:  Negative except the above mentioned ones.     PAST MEDICAL HISTORY:  Includes the following:  Basically, the patient  with history of dilated cardiomyopathy, congestive heart failure,  hypertension, hyperlipidemia, history of [employer]. Boris defibrillator,  diabetes. PAST SURGICAL HISTORY:  History of vascular surgery, echocardiogram,  history of port and ICD placement, hysterectomy, hernia repair,  diagnostic cardiac catheterization in 2009. Coronary artery disease  with LAD around 70% to 80%, and she has a history of coronary artery  bypass, history of St. Boris device placement in 2009 and breast  lumpectomy 2019. FAMILY HISTORY:  Positive for hypertension, heart disease, diabetes. SOCIAL HISTORY:  She is a former smoker, quit in 1988, up to 45-pack  years of smoking. No alcohol. No drug use. ALLERGIES:  She is allergic to SULFA. MEDICATIONS:  Home medications prior to current admission include  amiodarone, aspirin, Lipitor, Bumex 1 mg twice a day, Plavix, Vasotec,  Flonase, insulin, metformin, metolazone, potassium, Requip, and Lyrica. PHYSICAL EXAMINATION:  GENERAL:  Looks sick, in moderate respiratory distress, now on oxygen. She is not using oxygen at home. VITALS SIGNS:  Blood pressure is 124/50, pulse rate 81, temperature  98.6, respiratory rate 20. HEENT:  Pale conjunctivae. Anicteric sclerae. Pupils are equal and  reactive bilaterally to light. NECK:  No lymphadenopathy. No goiter. No JVD. Engorged neck veins. CHEST:  Bilateral decreased air entry with some basilar crackles. CARDIOVASCULAR:  Arteries are felt; carotid, femoral.  S1, S2 well  heard. There is an ejection systolic murmur grade 4/6 best heard on the  aortic area. No galloped rhythm. ABDOMEN:  Soft, nontender, but obese. GENITALIA:  No CVA, flank or suprapubic tenderness. LOCOMOTOR:  Bilateral pedal pretibial edema of +3. SKIN:  No rashes, ulcers or nodules. CNS:  Alert, awake, and oriented to time, person, and place. Cranial  nerves are intact. Sensation is intact to light touch and pain. Motor:  Normal muscle strength and tone. Appropriate mood and affect. WORKUP:  EKG, ventricularly paced rhythm. BUN 9242.   Troponin 0.059,  0.048 and previous troponin was negative. Lipid panel within normal  limits. Blood chemistry:  Sodium and potassium within normal limits. BUN 39, creatinine 1.6 today and yesterday BUN 49 and creatinine 1.7, so  BUN and creatinine are getting better with diuresis, so suggesting that  the diuresis is appropriate for this patient. White blood cells 5.9,  hemoglobin 8, platelets 022. The patient had a CT of the chest on  admission and so in view of that, she has been noted to have no  pulmonary thromboembolism, but there is a present cardiomegaly, moderate  right pleural effusion and pulmonary vascular congestion and right  pulmonary nodule has been noted and thoracentesis of 650 mL of fluid has  been taken, ultrasound-guided on the right side. The patient had an echocardiogram in 2019 and that revealed ejection  fraction 40% to 45%, aortic valve is thickened and mildly calcified with  mild mitral aortic regurgitation and actually no recent cardiac  catheterization, but there is a peripheral angiogram done in 2018,  showed significant disease. No recent stress test.    ASSESSMENT:  1. Acute hypoxic respiratory failure secondary to acute congestive  heart failure exacerbation with pulmonary congestion. 2.  Acute-on-chronic combined systolic and diastolic congestive heart  failure exacerbation. 3.  Severe ischemic cardiomyopathy. 4.  Right side pleural effusion of moderate degree, status post  thoracentesis and history of previous thoracentesis. 5.  Elevated troponin secondary to congestive heart failure and  pulmonary congestion and hypoxia. 6.  Coronary artery disease, status post previous bypass. 7.  History of peripheral artery disease. 8.  Severe anemia with hemoglobin of 8.   MCV is 77 and MCV, MCH, MCHC  are low, so looks like microcytic hypochromic anemia and so one should  evaluate for any underlying iron deficiency, and so I will leave the  anemia workup to the hospitalist team and iron is _____ low, or possible  iron deficiency anemia, need further evaluation and workup. 9.  Diabetes. 10.  Hypertension. 11.  Hyperlipidemia. 12.  History of ICD placement. PLAN AND RECOMMENDATIONS:  At this level, this patient is in congestive  heart failure of significant degree with marked leg swelling, leg  elevation is paramount and continue with gentle IV diuresis. Continue  with medication for underlying cardiomyopathy and coronary artery  disease. There is an elevated troponin and we will continue to trend the  troponin, could be probably secondary to congestive heart failure  exacerbation, so there is nothing we have to trend. I am not sure one should evaluate if the effusion could be congestive  heart failure related and could be related to underlying history of  invasive ductal breast carcinoma and so one should evaluate if the  malignant effusion or nonmalignant, that needs to be evaluated. Nonetheless, if malignant, should consider Pleur-evac  Otherwise, from  cardiac point of view, we will continue with gentle diuresis, and once  the patient is diuresed well in view of the mild elevation of troponin,  we may need to do functional study prior to discharge. We will get an echocardiogram.  Based on the course, we will gauge  further care. I discussed with the patient the plan of care. The  patient has marked leg edema, marked fluid overload and she needs gentle  but persistent diuresis until she gets better, while she is in the  hospital.    Thank you for allowing to participate in the care of this patient. I  will follow up with you. Richi Ashraf.  Sami Mccormack M.D.    D: 09/16/2020 11:54:26       T: 09/16/2020 13:22:14     [email or web address]  Job#: [id number]     Doc#: [id number]    CC:

## 2020-09-17 NOTE — PROGRESS NOTES
1500- Patient currently lying in bed with eyes closed. Bi-pap in place and tolerating well. Vitals were completed and documented. Lethargic, does not follow commands. Responds to painful stimuli. RN notified. Family present at bedside. Patient positioned in bed with pillows and call light in reach. Respiratory in to adjust settings with bi-pap machine. No further needs addressed at this time. 1576- Physician currently rounding on patient. Orders for Bi-pap machine to be removed and O2 delivered per nasal cannula. Patient currently on 6L per nasal cannula. Sternum rubbed by primary RN. Follows commands such as squeezing hands. Oriented to place. Patient shortly returned to her eyes being closed. Will continue to monitor remainder of shift. 2624- Patient arrived back from CT scan. Alert and orient x3. Patient currently eating supper.  at bedside. Voices no further concerns at this time. 2230- Patient lying in bed with eyes closed. Bed alarm active. Bedside table and call light within reach. No further concerns voiced at this time. BI-Pap machine in place, tolerating well. Report given to Son, RN.

## 2020-09-17 NOTE — PROGRESS NOTES
Patient SPO2 sat noted to be 83% on telemetry monitor. Upon entering room, patient found to be sliding self out of bed towards floor. Bed alarm on and set, but not yet triggered based on settings. Call light in pateint's hand. Patient states she \"has to potty\". Patient reminded she needs to call for assistance to bathroom d/t to bilateral extremity weakness. Patient very weak at this time, unable to pull torso and upper half into upright position with help of staff. Two staff members assisted to side of bed. Patient leaning over side of bed towards commode, but not supporting weight. Attempted to stand patient with two staff members. Patient legs buckled and unable to bear weight. She repeatedly attempts to get to bedside commode without success and against staff recommendation. She just states \"I have to potty\" and when told she may fall, she states \"just pick me up off the floor. \". Patient returned to bed with max assist and placed on bedpan to void. Patient had small urinary incontinence during movement and attempt to get up from bed but states \"I'm all done,\" while on bedpan without successful void. Patient catheterized in order to obtain ordered UA, and received 2000cc of urine with cath. Patient had no prior complaints of pain or distention, but urge to void. During movement and repositioning patient de-sated to 78%. Patient already on 6L NC, placed on non-rebreather to improve oxygenation. Sats improved to 94% after several minutes on non-rebreather and eventually placed back on 6L NC with SPO2 maintained at 93%. Spoke with Dr. Megan Domingo regarding changes in weakness. She will see patient to re-evaluate.

## 2020-09-17 NOTE — PROGRESS NOTES
Upon rounding, noted that pt is more lethargic than prior. Remains on Bipap, will open eyes to voice, but does not respond or maintain open eyes. Sternal rubbing patient, she raises both arms and moans but then returns to sleep. She will squeeze RN's hands intermittently but will not follow other commands. BP 88/51. Dr. Phoebe Beebe notified.

## 2020-09-17 NOTE — CARE COORDINATION
9/17/20, 2:53 PM EDT    DISCHARGE ON GOING [address] day: 2  Location: -01/001-A Reason for admit: Hypoxia [R09.02]   Procedure:   9/15 CT Chest  1. No evidence of pulmonary embolus or acute intrathoracic abnormality. 2. Persistent cardiomegaly and moderate right pleural effusion with pulmonary vascular congestion, similar to 8/26/2020.   3. Right upper lobe nodule measuring 1.2 cm which has increased in size compared to 7/8/2019 concerning for malignancy. 9/15 Right Thoracentesis = 650 ml removed  9/15 ECHO EF 30-35%  9/17 CXR  Cardiomegaly and bilateral infiltrates likely pulmonary edema  Treatment Plan of Care:   Client admitted with Hypoxia; Cardiology (chf) Oncology (pancytopenia) following. Barriers to Discharge: Creatinine 1.4, H 7.7; monitor. 40% FIO2 Versus Oxygen 4-6L continued.  IV AB x2, IV Diuretics continued  PCP: Anay Agrawal MD  Readmission Risk Score: 23%  Patient Goals/Plan/Treatment Preferences:   lives with spouse, has walker, nebulizer; plans possible new [facility] when medically cleared, current with [facility], has nebulizer; Carla Massed in past, has walker, WC, therapy recommends ECF

## 2020-09-17 NOTE — PROGRESS NOTES
Hospitalist Progress Note      Patient:  Rachael Pain    Unit/Bed:4K-01/001-A  YOB: 1945  MRN: [id number]   Acct: [de-identified]   PCP: Srikanth Umaan MD  Date of Admission: 9/15/2020    Assessment/Plan:     # Acute hypoxic respiratory failure due to CHF exacerbation - NYHA IV.  HRrEF (40-45%) per old ECHO 11/2019. Progressive SOB, fatigue, orthopnea, bilateral lower extremity edema with recent outpatient CHF clinic management. She has been progressively getting worse, despite optimal medical therapy. On admission Pro-BNP is 9242, Troponin level at 0.059 but trending down to 0.048. Dry weight is between 205-210 pounds and her current weight is 214, almost at goal. Per chart review, since 8/24/20 she has been seeing CHF clinic and her weight dropped to 217lbs from 231lbs with medical outpatient management. Patient is currently on Bumex 1mg BID and metolazone 5mg with goal to decrease edema, improve shortness of breath. - Will continue with Bumex 1mg BID and monitor daily weight, intake and output. We have to be careful with metolazone as it can worsen kidney function. Will consider Nephrology for further recommendation.   - Limit sodium < 2 g/day  - Fluid restriction to 2 L/day  - Will monitor renal function with daily BMP  - Daily weight  - Cardiology following. Will attempt functional study when stable. - ECHO pending     9/17/20: Results of ECHO show ejection fraction at 20-35%, which has significantly decreased from previous ECHO, worsening of CHF. This morning pt has an increase in oxygen demand despite saturation between 91-95% on 6L. Placed on BiPAP. Chest Xray shows bilateral pulmonary infiltrates. One febrile episode overnight. Start Rocephin 1g and Azithromycin 500mg. Will continue with Bumex 1g and monitor urine output, since admission patient has diuresed ~5L  Will continue to monitor vital signs, urine output.         # Right pleural infusion s/p thoracentesis - History of breast cancer. CTA chest shows right upper lobe nodule measuring 1.2 cm which has increased in size compared to 7/8/2019 concerning for malignancy. Pleural fluid shows total nucleated cells to be 974 which is on the line between being either transudative or exudative. The diferential includes malignancy, constricitive pericarditis, pulmonary embolism, pneumonia. Patient has been hypoxic saturating between 78-85%. - Continue to monitor vital signs with goal to keep spO2 >92%    9/17/20: Culture-final report pending. Initiated BiPAP due to increase in oxygen demand. Currently saturating 94%. Will continue to monitor     # Diabetes mellitus type II - Hold home Metformin and Lantus. Will place on insulin sliding scale  Lantus and will adjust as needed. 9/16 Blood sugars have been between 123-188. Continue with monitoring. # Invasive ductal carcinoma s/p lumpectomy - last chemotherapy and radiation in 2019.  - Heme/Onc has been consulted. Appreciate recommendation. # Pancytopenia: possibly due to chemo therapy. # Hyperlipidemia - will continue Lipitor 80mg      # Diabetic Neuropathy - will continue Lyrica 200mg     # Hx of ventricular arrhythmia - will continue amiodarone 200mg     # Hx of CAD: will continue ASA 81mg, Plavix 75mg. Expected discharge date:  9/20/20    Disposition:    [x] Home       [] TCU       [] Rehab       [] Psych       [] SNF       [] [place]       [] Other-    Chief Complaint: shortness of breath, lower leg edema    Hospital Treatment Course: (Prior to today)   50-year-old morbidly obese  female presents emergency department for evaluation of shortness of breath and bilateral leg swelling. She has a significant PMHx of CHF HFrEF (EF 40%-45%, CAD, CABG (3V,2009), HTN, HLD, AAA, PVD, DM,   Breast CA w/ mets to lung.  Patient states her shortness of breath and leg swelling is been getting worse over the last year however this week she is has difficulty with performing her ADLs secondary to more dyspnea on exertion. She  recently had a therapeutic and diagnostic thoracentesis. Denies any fever, chest pain, numbness, tingling at this time. Her past medical history includes ventricular arrhythmias diabetes ICD placement, hypertension, hyperlipidemia CHF, invasive ductal carcinoma of the right breast.   Per chart review, she is followed in the heart failure clinic. Her dry weight is between 205-210 pounds. On August 24 she was given 1 unit of Lasix 20 g IV. On August 26 she continued to have an increase in shortness of breath on exertion, lower extremity edema, and her Bumex was doubled for 3 days. On August 31 she was very short of breath and was put on IV Bumex 2 mg, metolazone 5 mg x 2 days. Yesterday at the heart failure clinic, she continued to be fatigued, tired, saturating between 85-90%, with shortness of breath at rest, or lower extremity edema. Recently she had a therapeutic and diagnostic thoracentesis. 9/16/20: Patient is currently on Bumex 1mg BID and Metolazone 5mg. ECHO - pending. 9/17/20: Overnight patient was confused and appeared to be weak. She was unable to void and urinary cath was placed that drained 2L of urine. She spiked one time fever of 102.9 and was started on Azithromycin 500mg and Rocephin 1g, Chest Xray shows bilateral pulmonary infiltrates. She was placed on BiPAP due to an increase in oxygen demand. Pt responded only to sternal rub. Her blood gas reveals pH 7.28, pCO2 63, pO2 51, Bicarb 38. Subjective (past 24 hours):     Patient is in no acute distress. When speaking to her this morning, she seems slow with responses. Denies chest pain, denies shortness of breath. Denies abdominal pain, denies nausea or vomiting. Past medical history, family history, social history and allergies reviewed again and is unchanged since admission.     ROS (12 point review of systems completed. Pertinent positives noted. Otherwise ROS is negative)     Medications:  Reviewed    Infusion Medications    dextrose       Scheduled Medications    cefTRIAXone (ROCEPHIN) IV  1 g Intravenous Q24H    azithromycin  500 mg Intravenous Q24H    insulin glargine  18 Units Subcutaneous Nightly    amiodarone  200 mg Oral Daily    aspirin EC  81 mg Oral Daily    atorvastatin  80 mg Oral Nightly    clopidogrel  75 mg Oral Daily    metOLazone  5 mg Oral Daily    pregabalin  200 mg Oral BID    rOPINIRole  0.5 mg Oral Nightly    sodium chloride flush  10 mL Intravenous 2 times per day    polyethylene glycol  17 g Oral Daily    enoxaparin  40 mg Subcutaneous Q24H    famotidine  20 mg Oral Daily    bumetanide  1 mg Intravenous BID    insulin lispro  0-6 Units Subcutaneous TID WC    insulin lispro  0-3 Units Subcutaneous Nightly     PRN Meds: perflutren lipid microspheres, sodium chloride flush, acetaminophen **OR** acetaminophen, promethazine **OR** ondansetron, glucose, dextrose, glucagon (rDNA), dextrose      Intake/Output Summary (Last 24 hours) at 9/17/2020 1323  Last data filed at 9/17/2020 0452  Gross per 24 hour   Intake 525 ml   Output 3200 ml   Net -2675 ml       Diet:  DIET CARB CONTROL; Exam:  BP (!) 105/53   Pulse 82   Temp 98.2 °F (36.8 °C) (Oral)   Resp 22   Ht 5' 2\" (1.575 m)   Wt 214 lb 8 oz (97.3 kg)   SpO2 95% Comment: will adjust fio2 as patient tolerates  BMI 39.23 kg/m²   General appearance: No apparent distress, somnolent  HEENT: Pupils equal, round, and reactive to light. Conjunctivae/corneas clear. Neck: Supple, with full range of motion. No jugular venous distention. Trachea midline. Respiratory:  Bilateral crackles appreciated. Cardiovascular: Regular rate and rhythm with normal S1/S2 without murmurs, rubs or gallops. Abdomen: Soft, non-tender, non-distended with normal bowel sounds. Musculoskeletal: passive and active ROM x 4 extremities.  +2 pitting edema of bilateral lower extremities  Skin: Skin color, texture, turgor normal.  No rashes or lesions. Neurologic:  Neurovascularly intact without any focal sensory/motor deficits. Cranial nerves: II-XII intact, grossly non-focal.  Psychiatric: Alert and oriented, thought content appropriate, normal insight  Capillary Refill: Brisk,< 3 seconds   Peripheral Pulses: +2 palpable, equal bilaterally     Labs:   Recent Labs     09/15/20  1340 09/16/20  0520 09/17/20  0532   WBC 6.7 5.9 9.6   HGB 8.6* 8.0* 7.7*   HCT 31.0* 28.6* 27.0*   * 104* 100*     Recent Labs     09/15/20  1242 09/16/20  0520 09/17/20  0532    144 143   K 5.3* 4.8 4.3    106 102   CO2 28 31 34*   BUN 43* 39* 34*   CREATININE 1.7* 1.6* 1.4*   CALCIUM 9.0 8.7 8.7     No results for input(s): AST, ALT, BILIDIR, BILITOT, ALKPHOS in the last 72 hours. No results for input(s): INR in the last 72 hours. No results for input(s): Elke Kalata in the last 72 hours. Microbiology:    Blood culture #1:   Lab Results   Component Value Date    BC No growth-preliminary  09/16/2020       Blood culture #2:No results found for: Aubree Emma    Organism:  Lab Results   Component Value Date    ORG Enterobacter aerogenes 09/11/2019         Lab Results   Component Value Date    LABGRAM  09/15/2020     Few segmented neutrophils observed. No organisms observed. performed on cytospun specimen       MRSA culture only:No results found for: [facility]    Urine culture:   Lab Results   Component Value Date    [place]  09/11/2019     [place] count: >100,000 CFU/mLEnterobacter species which may be initially susceptible tothird generation cephalosporins may develop resistance withinthree to four days of initiation of this antimicrobialtherapy.        Respiratory culture: No results found for: CULTRESP    Aerobic and Anaerobic :  No results found for: LABAERO  Lab Results   Component Value Date    LABANAE No growth-preliminary   09/15/2020       Urinalysis: Lab Results   Component Value Date    NITRU NEGATIVE 09/17/2020    WBCUA NONE SEEN 09/17/2020    BACTERIA NONE SEEN 09/17/2020    RBCUA 0-2 09/17/2020    BLOODU NEGATIVE 09/17/2020    SPECGRAV 1.011 09/17/2020    GLUCOSEU NEGATIVE 08/28/2019       Radiology:  XR CHEST PORTABLE   Final Result   Cardiomegaly and bilateral infiltrates likely pulmonary edema. Pneumonic infiltrates are not excludable            **This report has been created using voice recognition software. It may contain minor errors which are inherent in voice recognition technology. **      Final report electronically signed by Dr. Lacey Isidro on 9/17/2020 2:17 AM      US THORACENTESIS   Final Result      Successful ultrasound-guided thoracentesis with  0.65 L of fluid drained. **This report has been created using voice recognition software. It may contain minor errors which are inherent in voice recognition technology. **      Final report electronically signed by Dr. Kandace Daly on 9/15/2020 6:56 PM      CTA Chest W WO Contrast   Final Result       1. No evidence of pulmonary embolus or acute intrathoracic abnormality. 2. Persistent cardiomegaly and moderate right pleural effusion with pulmonary vascular congestion, similar to 8/26/2020.   3. Right upper lobe nodule measuring 1.2 cm which has increased in size compared to 7/8/2019 concerning for malignancy. **This report has been created using voice recognition software. It may contain minor errors which are inherent in voice recognition technology. **      Final report electronically signed by Dr. Viv Salmon MD on 9/15/2020 2:33 PM        Cta Chest W Wo Contrast    Result Date: 9/15/2020  PROCEDURE: CTA CHEST W WO CONTRAST CLINICAL INFORMATION: shortness of breath, hx cancer. Leg swelling and shortness of breath. COMPARISON: CT chest dated 8/26/2020.  TECHNIQUE: 3 mm axial images were obtained through the chest during the administration of 80 mL Isovue-370 injected in the right forearm. A non-contrast localizer was obtained. 3D reconstructions were performed on the scanner to include MIP coronal and sagittal images through the chest. All CT scans at this facility use dose modulation, iterative reconstruction, and/or weight-based dosing when appropriate to reduce radiation dose to as low as reasonably achievable. FINDINGS:  There is a good contrast bolus within the pulmonary arteries, adequate for evaluation to the subsegmental level. There are no filling defects within the pulmonary arteries to suggest pulmonary embolus. There is stable mural calcification in the aortic arch. No aneurysm or dissection is present. No axillary, mediastinal or hilar lymphadenopathy is identified. The heart is enlarged, stable compared to prior exam. There is stable left-sided cardiac pacer/defibrillator generator with stable leads in the right heart. There is a moderate right pleural effusion, similar to prior CT. Pulmonary vessels are prominent anteriorly along with mild interstitial prominence. Redemonstration of a nodule in the right upper lobe posteriorly. This measures 1.2 cm and appears to be mildly increased in size compared to 7/8/2019 when it measured 1 cm. Visualized upper abdominal solid organs are unremarkable. There are stable degenerative changes of the thoracic spine. 1. No evidence of pulmonary embolus or acute intrathoracic abnormality. 2. Persistent cardiomegaly and moderate right pleural effusion with pulmonary vascular congestion, similar to 8/26/2020. 3. Right upper lobe nodule measuring 1.2 cm which has increased in size compared to 7/8/2019 concerning for malignancy. **This report has been created using voice recognition software. It may contain minor errors which are inherent in voice recognition technology. ** Final report electronically signed by Dr. Viv Salmon MD on 9/15/2020 2:33 PM    Us Thoracentesis    Result Date: 9/15/2020  PROCEDURE: US THORACENTESIS CLINICAL INFORMATION: moderate right pleural effusion on CT . COMPARISON: CT 9/15/2020 PROCEDURE: The benefits and the risk of the procedure were explained to the patient. The patient was given an opportunity to ask questions. Signed consent was obtained. Limited ultrasound exam of the right chest showed moderate amount of pleural fluid. Using ultrasound guidance, a site was marked on the skin. The right side of the posterior chest was prepped and draped using the usual sterile technique and the skin was anesthetized with 2 percent lidocaine. A 5 Czech one-step catheter was introduced to the right pleural space. 0.65 L of theresa colored fluid drained. The catheter was then removed. The patient tolerated the procedure well with no complications. Specimen sent for laboratory studies as requested. Estimated blood loss is 0 cc. Patient left the department in good condition. Successful ultrasound-guided thoracentesis with  0.65 L of fluid drained. **This report has been created using voice recognition software. It may contain minor errors which are inherent in voice recognition technology. ** Final report electronically signed by Dr. Jose Alfredo Gómez on 9/15/2020 6:56 PM    Xr Chest Pa Inspiration 1 Vw    Result Date: 9/15/2020  PROCEDURE: XR CHEST PA INSPIRATION 1 VW CLINICAL INFORMATION: right thoracentesis. COMPARISON: CT 9/15/2020 TECHNIQUE: AP upright view of the chest. FINDINGS: Right pleural fluid is moderately improved. No gross pneumothorax. Moderate congestion again noted. There are mild airspace opacities bilaterally particularly within the perihilar regions suggesting pulmonary edema. Moderate enlargement of the cardiopericardial silhouette is unchanged. No pneumothorax post right thoracentesis. **This report has been created using voice recognition software. It may contain minor errors which are inherent in voice recognition technology. ** Final report electronically signed by Dr. Golden Cid on 9/15/2020 6:54 PM      Support Devices (date placed):  [] ETT []Oral / [] Nasal  [] Gastric Tube [] OG / [] NG    [] Central Venous Line (Specify Site):  [] Urinary Catheter  [] Arterial Line (Specify Site)  [] Peripheral IV access  [] Other:    DVT prophylaxis: [x] Lovenox                                 [] SCDs                                 [] SQ Heparin                                 [] Encourage ambulation           [] Already on Anticoagulation     Code Status: Full Code    Tele:   [x] yes             [] no    Electronically signed by Jose A Dhaliwal MD on 9/17/2020 at 1:23 PM

## 2020-09-18 NOTE — PROGRESS NOTES
9/17/20 2030- Patient alert and orient x3. Speech clear. PERRL brisk. Mucous membranes moist and pink. Follows commands and cooperative with staff. Hand grasp strong equally bilaterally. Lung sounds diminished throughout anterior and posterior with crackles in the bases. Denies any difficulty breathing. Denies any chest pain. Frequent congested and non productive cough. Band-Aid located on posterior right side from recent procedure. Dry, clean and intact. No redness or swelling around area. Denies any other pain. Pedal push and pull fair and equal bilaterally. Buttocks pink and blanchable. Positioned onto right side. Voices no further concerns at this time. Bedside table and call light within reach. Bed alarm active.

## 2020-09-18 NOTE — PROGRESS NOTES
Hospitalist Progress Note      Patient:  Louisa Gonzalez    Unit/Bed:4K-01/001-A  YOB: 1945  MRN: [id number]   Acct: [de-identified]   PCP: Sophie Esquivel MD  Date of Admission: 9/15/2020    Assessment/Plan:     # Acute hypoxic respiratory failure due to CHF exacerbation and possible Pneumonia - NYHA IV.  HRrEF (40-45%) per old ECHO 11/2019. Progressive SOB, fatigue, orthopnea, bilateral lower extremity edema with recent outpatient CHF clinic management. She has been progressively getting worse, despite optimal medical therapy. On admission Pro-BNP is 9242, Troponin level at 0.059 but trending down to 0.048. Dry weight is between 205-210 pounds and her current weight is 214, almost at goal. Per chart review, since 8/24/20 she has been seeing CHF clinic and her weight dropped to 217lbs from 231lbs with medical outpatient management. Patient is currently on Bumex 1mg BID and metolazone 5mg with goal to decrease edema, improve shortness of breath. - Will continue with Bumex 1mg BID and monitor daily weight, intake and output. We have to be careful with metolazone as it can worsen kidney function. Will consider Nephrology for further recommendation.   - Limit sodium < 2 g/day  - Fluid restriction to 2 L/day  - Will monitor renal function with daily BMP  - Daily weight  - Cardiology following. Will attempt functional study when stable. - ECHO pending     9/17/20: Results of ECHO show ejection fraction at 20-35%, which has significantly decreased from previous ECHO, worsening of CHF. This morning pt has an increase in oxygen demand despite saturation between 91-95% on 6L. Placed on BiPAP. Chest Xray shows bilateral pulmonary infiltrates. One febrile episode overnight. Start Rocephin 1g and Azithromycin 500mg.    Will continue with Bumex 1g and monitor urine output, since admission patient has diuresed ~5L  Will continue to monitor vital signs, urine output. 9/18/20: Since admission total fluid loss is ~7L. Will continue with IV diuresis and increase Bumex to 2g BID as the current weight is 224, up from 214 on admission. Currently on 6L of O2 nasal canula. BiPAP PRN. Still presence of bilateral crackles. Check chest x-ray in the morning for evaluation of pulmonary infiltrates. Will continue with Azithromycin and Rocephin until clinically improves. She remains afebrile  Cardiology following, appreciate recommendations. Will resume vasotec when blood pressure tolerates and renal function is stable. # Right pleural infusion s/p thoracentesis - History of breast cancer. CTA chest shows right upper lobe nodule measuring 1.2 cm which has increased in size compared to 7/8/2019 concerning for malignancy. Pleural fluid shows total nucleated cells to be 974 which is on the line between being either transudative or exudative. The diferential includes malignancy, constricitive pericarditis, pulmonary embolism, pneumonia. Patient has been hypoxic saturating between 78-85%. - Continue to monitor vital signs with goal to keep spO2 >92%    9/17/20: Culture-final report pending. Initiated BiPAP due to increase in oxygen demand. Currently saturating 94%. Will continue to monitor    9/18/20 Cytology - pending     # Diabetes mellitus type II - Hold home Metformin and Lantus. Will place on insulin sliding scale  Lantus and will adjust as needed. 9/16 Blood sugars have been between 123-188. Continue with monitoring. # Invasive ductal carcinoma s/p lumpectomy - last chemotherapy and radiation in 2019.  - Heme/Onc has been consulted. Appreciate recommendation. # Pancytopenia: possibly due to chemo therapy. 9/18:  Will start folic acid 1 mg      # Hyperlipidemia - will continue Lipitor 80mg      # Diabetic Neuropathy - will continue Lyrica 200mg     # Hx of ventricular arrhythmia - will continue amiodarone 200mg     # Hx of CAD: will continue ASA 81mg, Plavix 75mg. Expected discharge date:  9/20/20    Disposition:    [x] Home       [] TCU       [] Rehab       [] Psych       [] SNF       [] [place]       [] Other-    Chief Complaint: shortness of breath, lower leg edema    Hospital Treatment Course: (Prior to today)   58-year-old morbidly obese  female presents emergency department for evaluation of shortness of breath and bilateral leg swelling. She has a significant PMHx of CHF HFrEF (EF 40%-45%, CAD, CABG (3V,2009), HTN, HLD, AAA, PVD, DM,   Breast CA w/ mets to lung. Patient states her shortness of breath and leg swelling is been getting worse over the last year however this week she is has difficulty with performing her ADLs secondary to more dyspnea on exertion. She  recently had a therapeutic and diagnostic thoracentesis. Denies any fever, chest pain, numbness, tingling at this time. Her past medical history includes ventricular arrhythmias diabetes ICD placement, hypertension, hyperlipidemia CHF, invasive ductal carcinoma of the right breast.   Per chart review, she is followed in the heart failure clinic. Her dry weight is between 205-210 pounds. On August 24 she was given 1 unit of Lasix 20 g IV. On August 26 she continued to have an increase in shortness of breath on exertion, lower extremity edema, and her Bumex was doubled for 3 days. On August 31 she was very short of breath and was put on IV Bumex 2 mg, metolazone 5 mg x 2 days. Yesterday at the heart failure clinic, she continued to be fatigued, tired, saturating between 85-90%, with shortness of breath at rest, or lower extremity edema. Recently she had a therapeutic and diagnostic thoracentesis. 9/16/20: Patient is currently on Bumex 1mg BID and Metolazone 5mg. ECHO - pending. 9/17/20: Overnight patient was confused and appeared to be weak. She was unable to void and urinary cath was placed that drained 2L of urine.  She spiked one time fever of 102.9 and was started on Azithromycin 500mg and Rocephin 1g, Chest Xray shows bilateral pulmonary infiltrates. She was placed on BiPAP due to an increase in oxygen demand. Pt responded only to sternal rub. Her blood gas reveals pH 7.28, pCO2 63, pO2 51, Bicarb 38.    9/18 Patient oxygen saturation dropped to the 80s. She was placed on BiPAP 40% FiO2. On nasal canula her O2 needs increased to 6L. CT negative for any intracranial abnormalities. Subjective (past 24 hours):     Patient is up in bed this morning, eating breakfast. Her daughter is at the bedsite. States that she feels fine and has no complains. She denies chest pain or shortness of breath, denies abdominal pain, denies nausea or vomiting. Past medical history, family history, social history and allergies reviewed again and is unchanged since admission. ROS (12 point review of systems completed. Pertinent positives noted.  Otherwise ROS is negative)     Medications:  Reviewed    Infusion Medications    dextrose       Scheduled Medications    cefTRIAXone (ROCEPHIN) IV  1 g Intravenous Q24H    azithromycin  500 mg Intravenous Q24H    ferrous sulfate  325 mg Oral BID WC    insulin glargine  18 Units Subcutaneous Nightly    amiodarone  200 mg Oral Daily    aspirin EC  81 mg Oral Daily    atorvastatin  80 mg Oral Nightly    clopidogrel  75 mg Oral Daily    metOLazone  5 mg Oral Daily    pregabalin  200 mg Oral BID    rOPINIRole  0.5 mg Oral Nightly    sodium chloride flush  10 mL Intravenous 2 times per day    polyethylene glycol  17 g Oral Daily    enoxaparin  40 mg Subcutaneous Q24H    famotidine  20 mg Oral Daily    bumetanide  1 mg Intravenous BID    insulin lispro  0-6 Units Subcutaneous TID WC    insulin lispro  0-3 Units Subcutaneous Nightly     PRN Meds: perflutren lipid microspheres, sodium chloride flush, acetaminophen **OR** acetaminophen, promethazine **OR** ondansetron, glucose, dextrose, glucagon (rDNA), dextrose      Intake/Output Summary (Last 24 hours) at 9/18/2020 0902  Last data filed at 9/18/2020 0356  Gross per 24 hour   Intake 886.76 ml   Output 2700 ml   Net -1813.24 ml       Diet:  DIET CARB CONTROL; Exam:  /66   Pulse 70   Temp 97.9 °F (36.6 °C) (Axillary)   Resp 20   Ht 5' 2\" (1.575 m)   Wt 224 lb 6.4 oz (101.8 kg)   SpO2 96%   BMI 41.04 kg/m²   General appearance: No apparent distress, somnolent  HEENT: Pupils equal, round, and reactive to light. Conjunctivae/corneas clear. Neck: Supple, with full range of motion. No jugular venous distention. Trachea midline. Respiratory:  Bilateral crackles appreciated. Cardiovascular: Regular rate and rhythm with normal S1/S2 without murmurs, rubs or gallops. Abdomen: Soft, non-tender, non-distended with normal bowel sounds. Musculoskeletal: passive and active ROM x 4 extremities. +2 pitting edema of bilateral lower extremities  Skin: Skin color, texture, turgor normal.  No rashes or lesions. Neurologic:  Neurovascularly intact without any focal sensory/motor deficits. Cranial nerves: II-XII intact, grossly non-focal.  Psychiatric: Alert and oriented, thought content appropriate, normal insight  Capillary Refill: Brisk,< 3 seconds   Peripheral Pulses: +2 palpable, equal bilaterally     Labs:   Recent Labs     09/16/20  0520 09/17/20  0532 09/17/20 1917   WBC 5.9 9.6 8.1   HGB 8.0* 7.7* 9.2*   HCT 28.6* 27.0* 34.5*   * 100* 115*     Recent Labs     09/17/20  0532 09/17/20 1917 09/18/20  0538    143 145   K 4.3 4.5 4.1    99 102   CO2 34* 33 36*   BUN 34* 33* 33*   CREATININE 1.4* 1.3* 1.2   CALCIUM 8.7 8.6 8.7     Recent Labs     09/17/20 1917   AST 27   ALT 21   BILITOT 0.4   ALKPHOS 112     No results for input(s): INR in the last 72 hours. No results for input(s): Daleen Cocks in the last 72 hours.     Microbiology:    Blood culture #1:   Lab Results   Component Value Date    BC No growth-preliminary  09/17/2020 Blood culture #2:No results found for: BLOODCULT2    Organism:  Lab Results   Component Value Date    ORG Enterobacter aerogenes 09/11/2019         Lab Results   Component Value Date    LABGRAM  09/15/2020     Few segmented neutrophils observed. No organisms observed. performed on cytospun specimen       MRSA culture only:No results found for: [address]    Urine culture:   Lab Results   Component Value Date    Spencer Speaks  09/11/2019     [place] count: >100,000 CFU/mLEnterobacter species which may be initially susceptible tothird generation cephalosporins may develop resistance withinthree to four days of initiation of this antimicrobialtherapy. Respiratory culture: No results found for: CULTRESP    Aerobic and Anaerobic :  No results found for: LABAERO  Lab Results   Component Value Date    LABANAE No growth-preliminary   09/15/2020       Urinalysis:      Lab Results   Component Value Date    NITRU NEGATIVE 09/17/2020    WBCUA NONE SEEN 09/17/2020    BACTERIA NONE SEEN 09/17/2020    RBCUA 0-2 09/17/2020    BLOODU NEGATIVE 09/17/2020    SPECGRAV 1.011 09/17/2020    GLUCOSEU NEGATIVE 08/28/2019       Radiology:  CT HEAD W WO CONTRAST   Final Result    No evidence of acute intracranial abnormality. **This report has been created using voice recognition software. It may contain minor errors which are inherent in voice recognition technology. **      Final report electronically signed by Dr. Corean Meigs, MD on 9/17/2020 7:22 PM      XR CHEST PORTABLE   Final Result   Cardiomegaly and bilateral infiltrates likely pulmonary edema. Pneumonic infiltrates are not excludable            **This report has been created using voice recognition software. It may contain minor errors which are inherent in voice recognition technology. **      Final report electronically signed by Dr. Sheng Young on 9/17/2020 2:17 AM      US THORACENTESIS   Final Result      Successful ultrasound-guided thoracentesis with 0.65 L of fluid drained. **This report has been created using voice recognition software. It may contain minor errors which are inherent in voice recognition technology. **      Final report electronically signed by Dr. Corbin Perry on 9/15/2020 6:56 PM      CTA Chest W WO Contrast   Final Result       1. No evidence of pulmonary embolus or acute intrathoracic abnormality. 2. Persistent cardiomegaly and moderate right pleural effusion with pulmonary vascular congestion, similar to 8/26/2020.   3. Right upper lobe nodule measuring 1.2 cm which has increased in size compared to 7/8/2019 concerning for malignancy. **This report has been created using voice recognition software. It may contain minor errors which are inherent in voice recognition technology. **      Final report electronically signed by Dr. Mireya Acosta MD on 9/15/2020 2:33 PM      XR CHEST PORTABLE    (Results Pending)     Cta Chest W Wo Contrast    Result Date: 9/15/2020  PROCEDURE: CTA CHEST W WO CONTRAST CLINICAL INFORMATION: shortness of breath, hx cancer. Leg swelling and shortness of breath. COMPARISON: CT chest dated 8/26/2020. TECHNIQUE: 3 mm axial images were obtained through the chest during the administration of 80 mL Isovue-370 injected in the right forearm. A non-contrast localizer was obtained. 3D reconstructions were performed on the scanner to include MIP coronal and sagittal images through the chest. All CT scans at this facility use dose modulation, iterative reconstruction, and/or weight-based dosing when appropriate to reduce radiation dose to as low as reasonably achievable. FINDINGS:  There is a good contrast bolus within the pulmonary arteries, adequate for evaluation to the subsegmental level. There are no filling defects within the pulmonary arteries to suggest pulmonary embolus. There is stable mural calcification in the aortic arch. No aneurysm or dissection is present.  No axillary, mediastinal or hilar lymphadenopathy is identified. The heart is enlarged, stable compared to prior exam. There is stable left-sided cardiac pacer/defibrillator generator with stable leads in the right heart. There is a moderate right pleural effusion, similar to prior CT. Pulmonary vessels are prominent anteriorly along with mild interstitial prominence. Redemonstration of a nodule in the right upper lobe posteriorly. This measures 1.2 cm and appears to be mildly increased in size compared to 7/8/2019 when it measured 1 cm. Visualized upper abdominal solid organs are unremarkable. There are stable degenerative changes of the thoracic spine. 1. No evidence of pulmonary embolus or acute intrathoracic abnormality. 2. Persistent cardiomegaly and moderate right pleural effusion with pulmonary vascular congestion, similar to 8/26/2020. 3. Right upper lobe nodule measuring 1.2 cm which has increased in size compared to 7/8/2019 concerning for malignancy. **This report has been created using voice recognition software. It may contain minor errors which are inherent in voice recognition technology. ** Final report electronically signed by Dr. Vick Talavera MD on 9/15/2020 2:33 PM    Us Thoracentesis    Result Date: 9/15/2020  PROCEDURE: US THORACENTESIS CLINICAL INFORMATION: moderate right pleural effusion on CT . COMPARISON: CT 9/15/2020 PROCEDURE: The benefits and the risk of the procedure were explained to the patient. The patient was given an opportunity to ask questions. Signed consent was obtained. Limited ultrasound exam of the right chest showed moderate amount of pleural fluid. Using ultrasound guidance, a site was marked on the skin. The right side of the posterior chest was prepped and draped using the usual sterile technique and the skin was anesthetized with 2 percent lidocaine. A 5 Hungarian one-step catheter was introduced to the right pleural space. 0.65 L of theresa colored fluid drained.  The catheter was then removed. The patient tolerated the procedure well with no complications. Specimen sent for laboratory studies as requested. Estimated blood loss is 0 cc. Patient left the department in good condition. Successful ultrasound-guided thoracentesis with  0.65 L of fluid drained. **This report has been created using voice recognition software. It may contain minor errors which are inherent in voice recognition technology. ** Final report electronically signed by Dr. Cameron Chaudhary on 9/15/2020 6:56 PM    Xr Chest Pa Inspiration 1 Vw    Result Date: 9/15/2020  PROCEDURE: XR CHEST PA INSPIRATION 1 VW CLINICAL INFORMATION: right thoracentesis. COMPARISON: CT 9/15/2020 TECHNIQUE: AP upright view of the chest. FINDINGS: Right pleural fluid is moderately improved. No gross pneumothorax. Moderate congestion again noted. There are mild airspace opacities bilaterally particularly within the perihilar regions suggesting pulmonary edema. Moderate enlargement of the cardiopericardial silhouette is unchanged. No pneumothorax post right thoracentesis. **This report has been created using voice recognition software. It may contain minor errors which are inherent in voice recognition technology. ** Final report electronically signed by Dr. Cameron Chaudhary on 9/15/2020 6:54 PM      Support Devices (date placed):  [] ETT []Oral / [] Nasal  [] Gastric Tube [] OG / [] NG    [] Central Venous Line (Specify Site):  [] Urinary Catheter  [] Arterial Line (Specify Site)  [] Peripheral IV access  [] Other:    DVT prophylaxis: [x] Lovenox                                 [] SCDs                                 [] SQ Heparin                                 [] Encourage ambulation           [] Already on Anticoagulation     Code Status: Full Code    Tele:   [x] yes             [] no    Electronically signed by Edilma Altamirano MD on 9/18/2020 at 9:02 AM

## 2020-09-18 NOTE — PROGRESS NOTES
Cardiology Progress Note      Patient:  Oscar Scott  YOB: 1945  MRN: [id number]   Acct: [de-identified]  [facility] Date:  9/15/2020  Primary Cardiologist: Dr. Mode Stein, seen by Dr. Ephraim Davis    Per Dr. Jer Restrepo consult note:  REASON FOR CONSULTATION:  Congestive heart failure with recurrent  right-sided pleural effusion and recurrent thoracentesis.     PRIMARY CARDIOLOGIST:  Jyothi Flor MD     History obtained from the patient and electronic medical record.     HISTORY OF PRESENT ILLNESS:  This is a 76-year-old pleasant   female patient, presented with worsening of shortness of breath and  worsening of leg swelling over the last few weeks and getting worse and  so she came to the emergency room. The swelling of the leg got worse to  an extent that performing activity of daily living has been impaired,  and she has orthopnea of three pillows. She had a history of recent  thoracentesis prior to this admission. The patient has no history of  fever or chills. No nausea, vomiting, diarrhea. No chest pain. She  has orthopnea and the patient has history of congestive heart failure;  dilated cardiomyopathy, nonischemic; hypertension, hyperlipidemia. She  has invasive ductal carcinoma of the right breast, status post  lumpectomy a year ago with postsurgical radiation. So cardiology  evaluation was sought in view of the congestive heart failure; recurrent  right-sided pleural effusion requiring thoracentesis, ultrasound guided.     Subjective (Events in last 24 hours): F/U CHF, pleural effusion with thoracentesis. Resting in bed on 5L O2 down from 6L. Reports feeling better today. Denies sob currently. Denies chest pain. LE less swollen. B/P lower normal. Paced rhythm. -6.9L net fluid. Wt not documented for yesterday. Creatinine stable.       Objective:   /66   Pulse 70   Temp 97.9 °F (36.6 °C) (Axillary)   Resp 20   Ht 5' 2\" (1.575 m)   Wt 224 lb 6.4 oz (101.8 kg)   SpO2 96%   BMI 41.04 15 g, PRN  dextrose, 12.5 g, PRN  glucagon (rDNA), 1 mg, PRN  dextrose, 100 mL/hr, PRN        Diagnostics:  EK/15/20  Ventricular-paced rhythm with frequent AV dual-paced complexes  Abnormal ECG  When compared with ECG of 15-SEP-2020 13:18,  Ventricular rate has increased BY  12 BPM  Confirmed by Romeo Cannon   Limited Echo: 20  Summary   Left Ventricular size is Mildly increased . Normal left ventricular wall thickness. There was severe global hypokinesis of the left ventricle. Systolic function was severely reduced. Ejection fraction is visually estimated in the range of 30% to 35%. The left atrium is Moderately dilated. Signature      ----------------------------------------------------------------   Electronically signed by Maryuri Milian MD   Stress: 19  Summary   This Nuclear Medicine study was abnormal .   moderate to large inferior lateral infarct   mild lisa infarct ischemia   abnormal LV function      Recommendation   Clinical correlation is recommended. Signatures      ----------------------------------------------------------------   Electronically signed by Myesha Casey MD         Lab Data:    Cardiac Enzymes:  No results for input(s): CKTOTAL, CKMB, CKMBINDEX, TROPONINI in the last 72 hours.     CBC:   Lab Results   Component Value Date    WBC 8.1 2020    RBC 4.25 2020    HGB 9.2 2020    HCT 34.5 2020     2020       CMP:    Lab Results   Component Value Date     2020    K 4.1 2020    K 3.9 2019     2020    CO2 36 2020    BUN 33 2020    CREATININE 1.2 2020    LABGLOM 44 2020    GLUCOSE 111 2020    GLUCOSE 130 2012    CALCIUM 8.7 2020       Hepatic Function Panel:    Lab Results   Component Value Date    ALKPHOS 112 2020    ALT 21 2020    AST 27 2020    PROT 6.6 2020    BILITOT 0.4 2020    BILIDIR <0.2 2020 LABALBU 3.8 09/17/2020    LABALBU 4.5 03/23/2012       Magnesium:    Lab Results   Component Value Date    MG 1.9 09/18/2020       PT/INR:    Lab Results   Component Value Date    INR 0.97 08/20/2019       HgBA1c:    Lab Results   Component Value Date    LABA1C 5.4 09/15/2020       FLP:    Lab Results   Component Value Date    TRIG 79 09/16/2020    HDL 40 09/16/2020    LDLCALC 27 09/16/2020       TSH:    Lab Results   Component Value Date    TSH 2.080 08/28/2019         Assessment:  · Acute hypoxic resp failure 2/2 acute CHF exacerbation  · Acute on chronic combined systolic and diastolic CHF  · ? PNA: on ATB, attending following  · Severe ICMP s/p ICD  New lower EF 30-35, down from 40-45 per echo 11/20/19  · Moderate right side pleural effusion s/p thoracentesis  ? R/t CHF, ?  Possible malignant r/t h/o invasive ductal breast carcinoma  Oncology following  History of recent right thoracentesis 9/8/2020  · Elevated troponin: 0.059->0.057->0.048->0.068 likely 2/2 CHF, pulmonary congestion, hypoxia-no chest pain  · CAD: H/o CABG, stress test 11/2019 negative for ischemia  · HTN  · HLD  · DM type 2  · H/o PAD  · H/o breast cancer s/p lumpectom      Plan:  Daily weight, I&O  · 2 g sodium, 2 L fluid restriction  · Diuresis, on bumex and metolazone  · Monitor electrolytes, replace prn  · Monitor renal function  · Follow repeat chest x-ray in am  · Consider functional study once diuresed  · Continue amiodarone, asa, lipitor, plavix  · Resume vasotec when B/P tolerates and renal stable  · Not on BB prior to admission-patient unsure why-may consider toprol when CHF stabilized and if BP tolerates   · Will follow         Electronically signed by ROMAIN Rivas CNP on 9/18/2020 at 11:43 AM

## 2020-09-18 NOTE — PROGRESS NOTES
[facility]    Patient - Grady Toro   MRN -  [id number]   [facility] # - [de-identified]   - 1945      Date of Admission -  9/15/2020  1:10 PM  Date of evaluation -  2020  Room - -001-A   Hospital Day - 41 Nya Moctezuma MD Primary Care Physician - Lily Rivas MD       Reason for Consult    Pancytopenia - question if related to chemotherapy   Breast cancer   [address] Problems    Diagnosis Date Noted    Hypoxia [R09.02]      Priority: High    Pleural effusion, right [J90] 09/15/2020    Invasive ductal carcinoma of breast, female, right ([facility] 09/15/2020    Morbidly obese ([address] [E66.01] 08/15/2020    Elevated troponin [R79.89] 2019    Pancytopenia ([facility] [D61.818]     Type 2 diabetes mellitus with hyperglycemia, with long-term current use of insulin ([facility] [E11.65, Z79.4] 10/09/2017     HPI/Subjective   Grady Toro is a 67 y.o. female admitted for shortness of breath and bilateral lower extremity edema. Per our prior progress note \" Pt states SOB & bilateral lower extremity edema has worsened more than before and affecting ADLs with related JASSO. PMH includes ventricular arrhythmias, DM, ICD placement, HTN, HLD, CHF, and invasive ductal carcinoma of the right breast, triple (-). Pt admits to SOB, cough, fatigue, JASSO, and bilateral lower extremity edema. Pt found to be hypoxic in ED at 75% RA, hypoxia resolved with application of 2 lpm nc. Afebrile. Recent thoracentesis noted from chart. \". Over the last 24 hours:   Patient is sitting up in bed watching tv, her daughter is at the bedside. The patient states she is feeling better today she is currently on nasal cannula oxygen. BiPAP was discontinued this a.m. She denies fever, chills, chest pain, abdominal pain, nausea or vomiting. She reports her shortness of breath is slowly improving.   She feels leg edema is improving as well.  Oncology History   Per Dr. Carlie Morfin note on 8/28/20:     Cande Griffin is a 67 y.o. female who during hospitalization in May 2019 at [facility] for shortness of breath was found to have right breast mass and right axillary lymphadenopathy  She was also found to have decompensated CHF. Her Echo on May 31, 2019 showed ejection fraction of 40%. CTA on May 31, 2019 showed asymmetric breast tissue in the right upper lateral breast. And few enlarged  right axillary lymph nodes. She did not have a mammogram at least 10 years. She underwent biopsy of the right breast mass and lymph node on the June 11, 2019. Pathology report showed:  A. Right breast, UIQ, core needle biopsy with barbell clip placement:   Invasive ductal carcinoma, [place] grade 3.  B. \"Lymph node\", right, UOQ, core needle biopsy with tribell clip  placement:   Invasive ductal carcinoma. Estrogen Receptor: Negative (<1% of cells staining)  Progesterone Receptor: Negative (<1% of cells staining)  Ki-67 (clone 30-9)  Percentage of positive nuclei:  25%  HER2 dual ROCHELLE  HER2 ROCHELLE NEGATIVE     PET scan showed questionable pulmonary nodule. It was not seen on prior CTA. The patient had CT of the chest to see whether nodule is amenable to CT-guided biopsy. Due to the size and location the CT-guided biopsy was not feasible. My recommendation is to proceed with standard chemotherapy. Since the patient was older than 78years of age and she had history of CHF,  we started treatment with dose dense Taxol on August 1, 2019. Her cycle #1 was complicated by hospitalization for hypotension, pneumonia and anemia. Unfortunately delayed cycle #2 given on August 20, 2019 led to prolonged hospitalization as well. The patient was hospitalized from August 28 till September 1, 2019 after fall at home.  She stayed on the floor for an extended period of time as she was unable to get herself up and had to wait until her  was able to get her into a chair.    No fractures seen on thoracic and lumbar spine Xray. CXR normal. Hgb has stayed stable >8 after 1PRBC transfusion. VS stable. This was her third hospitalization in 1 month. Pt agreed to transition to SNF. She was receiving physical therapy and rehabilitation and nursing home, discharge home on October 15, 2019. PET scan on November 11, 2019 showed no new findings suspicious for metastatic disease. Pulmonary nodule was present, but with much less metabolic activity, still questionable metastatic focus. Since the patient wouldn't be able to tolerate further chemotherapy,my recommendation was to proceed with lumpectomy and axillary lymph node mapping and biopsy. The patient underwent lumpectomy and lymph node excision on December 3, 2019, she tolerated surgery very well. Subsequently she proceeded with radiation treatment to her right breast which overall she tolerated well. The patient had contacted my office due to worsening shortness of breath, leg heaviness and weakness. She reports that her shortness of breath is slowly but steadily worsening. She has dyspnea with minimal activity. She denies having any chest pain. Denies having any productive cough. She denies having any fevers. She denies any concerns related to her breasts. She continues to ambulate with a walker. She denies having any recent falls. She denies having bone pain  During last visit that the patient received blood transfusion for hemoglobin 7.8.   Did not improved significantly her shortness of breath  Meds    Current Medications    folic acid  1 mg Oral Daily    cefTRIAXone (ROCEPHIN) IV  1 g Intravenous Q24H    azithromycin  500 mg Intravenous Q24H    ferrous sulfate  325 mg Oral BID     insulin glargine  18 Units Subcutaneous Nightly    amiodarone  200 mg Oral Daily    aspirin EC  81 mg Oral Daily    atorvastatin  80 mg Oral Nightly    clopidogrel  75 mg Oral Daily    metOLazone  5 mg Oral Daily    pregabalin  200 mg Oral BID    rOPINIRole  0.5 mg Oral Nightly    sodium chloride flush  10 mL Intravenous 2 times per day    polyethylene glycol  17 g Oral Daily    enoxaparin  40 mg Subcutaneous Q24H    famotidine  20 mg Oral Daily    bumetanide  1 mg Intravenous BID    insulin lispro  0-6 Units Subcutaneous TID WC    insulin lispro  0-3 Units Subcutaneous Nightly     perflutren lipid microspheres, sodium chloride flush, acetaminophen **OR** acetaminophen, promethazine **OR** ondansetron, glucose, dextrose, glucagon (rDNA), dextrose  IV Drips/Infusions   dextrose       Past Medical History         Diagnosis Date    Breast cancer ([facility] 06/2019    right invasive ductal carcinoma    CAD (coronary artery disease)     sees Dr Norma Heller    CH[facility]congest[facility] failure) [facility]     Hyperlipidemia     Hypertension     ICD (implantable cardiac defibrillator), dual, in situ     [place] dual ICD    Numbness and tingling     both feet    Pneumonia     Shingles 12/2014    Sleep apnea     [place] dual ICD     Type II or unspecified type diabetes mellitus without mention of complication, not stated as uncontrolled     Ventricular arrhythmia       Past Surgical History           Procedure Laterality Date    BREAST BIOPSY Right 06/11/2019    [address] [place] [address]-    BREAST LUMPECTOMY Right 12/3/2019    RIGHT BREAST LUMPECTOMY, SENTINAL LYMPH NODE BIOPSY, TARGETED AXILLARY NODE DISSECTION, PREOP NEEDLE LOC X 2 performed by Marisela Way MD at [address] [place] [address]  9-08-09    [facility]    CARDIOVASCULAR STRESS TEST  01-27-10    Excellent treadmill exercise. Improved functional capacity to functional class 1 completed 8 METS. Hemodynamic response was appropriate. No ischemic ECG changes noted.  CARDIOVASCULAR STRESS TEST  10-28-09    No evidence of stress induced ischemia.  Evidence of  prior transmural MI demonstrated by transient fixed defects of inferior wall extending into lateral wall, indicative of RCA lesion. Bowel and soft tissue attenuation interfering w/ counts of lateral wall. EF 29% w/ severe septal and inferolateral hypokinesis w/ very mild lateral wall hypokinesis being noted.  CARDIOVERSION  8-29-09    CHOLECYSTECTOMY  2005    Laparoscopic    COLONOSCOPY Left 1/9/2019    COLONOSCOPY performed by Ruben Davis MD at [address] [place] [address] CATH LAB PROCEDURE  8-24-09    Multivessel disease demonstrated by LAD having 70-80%  proximal lesion and napkin-ring lesion at bifurcation w/ D2. D2 appears to be medium large caliber vessel which has an ostial lesion of 70-80%. left -to-left collaterals as well as left-to-right collaterals emanating from LAD to PDA. Circumflex had anterior marginal branch and posterior marginal branch.  HERNIA REPAIR      Multiple    HYSTERECTOMY  1997    INSERTION / REMOVAL / REPLACEMENT VENOUS ACCESS CATHETER Right 7/31/2019    SINGLE LUMEN SMART PORT INSERTION performed by Carlos Eduardo Newberry MD at [facility] 9 N/A 12/3/2019    SINGLE LUMEN [id number] REMOVAL RIGHT SUBCLAVIAN performed by Carlos Eduardo Newberry MD at [address] [place] [address] ECHOCARDIOGRAM  07-11-11    LV size mildly to moderately dilated. Systolic function markedly reduced. EF 25-30%. Severe diffuse hypokinesis. Grade 1 diastolic dysfunction. LA was mildly to moderately dilated. RV mildly dilated, systolic function mildly reduced. Mild MR and TR.  TRANSTHORACIC ECHOCARDIOGRAM  8-24-09    LV demonstrates severe hypokinesis of the inferior basal as well as  basal aneurysm being noted and paradoxical septal motion. EF 45-50%. LA is moderately dilated and AV demonstrates mild AV sclerosis w/o AS and AI. Trace MR and TV insufficiency.  VASCULAR SURGERY      cabg harvests from legs     Diet    DIET CARB CONTROL;   Allergies    Sulfa antibiotics  Social History     Social History     Socioeconomic History    Marital status:      Spouse name: Hamilton Thompson Number of children: 2    Years of education: 15    Highest education level: Not on file   Occupational History    Occupation: amie     Comment: [employer]   Social Needs    Financial resource strain: Not hard at all   [place] insecurity     Worry: Never true     Inability: Never true   [employer] needs     Medical: No     Non-medical: No   Tobacco Use    Smoking status: Former Smoker     Packs/day: 1.50     Years: 30.00     Pack years: 45.00     Types: Cigarettes     Last attempt to quit: 1988     Years since quittin.0    Smokeless tobacco: Never Used   Substance and Sexual Activity    Alcohol use: No     Frequency: Never     Binge frequency: Never    Drug use: No    Sexual activity: Not Currently     Partners: Male   Lifestyle    Physical activity     Days per week: 5 days     Minutes per session: 10 min    Stress: Not at all   Relationships    Social connections     Talks on phone: More than three times a week     Gets together: Never     Attends [employer]ism service: Never     Active member of club or organization: No     Attends meetings of clubs or organizations: Never     Relationship status:     Intimate partner violence     Fear of current or ex partner: Not on file     Emotionally abused: Not on file     Physically abused: Not on file     Forced sexual activity: Not on file   Other Topics Concern    Not on file   Social History Narrative    Not on file     Family History          Problem Relation Age of Onset    Diabetes Father     Cancer Father 95        Bone    Hypertension Mother     Heart Disease Mother     No Known Problems Sister     No Known Problems Brother     No Known Problems Brother     Breast Cancer Neg Hx     Colon Cancer Neg Hx      ROS     Review of Systems   Pertinent review of systems noted in HPI, all other ROS negative. Vitals     height is 5' 2\" (1.575 m) and weight is 224 lb 6.4 oz (101.8 kg).  Her axillary temperature is 97.9 °F (36.6 °C). Her blood pressure is 116/66 and her pulse is 70. Her respiration is 20 and oxygen saturation is 96%. O2 Flow Rate (L/min): 6 L/min    Exam   Physical Exam   General appearance: No apparent distress, chronically ill appearing, sitting up in bed and cooperative. HEENT: Pupils equal, round, and reactive to light. Conjunctivae/corneas clear. Oral mucosa moist.   Neck: Supple, with full range of motion. Trachea midline. Respiratory:  Normal respiratory effort. Tight breath sounds bilaterally with rales, intermittent rhonchi. On 6L/min NC. Cardiovascular: Regular rate and rhythm with normal S1/S2   Abdomen: Soft, non-tender, non-distended with active bowel sounds. Musculoskeletal: 1-2+ bilateral LE edema. Skin: Skin color, texture, turgor normal.  No rashes or lesions. Neurologic:  Neurovascularly intact without any focal sensory/motor deficits. Psychiatric: Alert and oriented    Labs   CBC  Recent Labs     09/16/20 0520 09/17/20  0532 09/17/20 1917   WBC 5.9 9.6 8.1   RBC 3.71* 3.55* 4.25   HGB 8.0* 7.7* 9.2*   HCT 28.6* 27.0* 34.5*   MCV 77.1* 76.1* 81.2   MCH 21.6* 21.7* 21.6*   MCHC 28.0* 28.5* 26.7*   * 100* 115*   MPV ---- ---- ----      BMP  Recent Labs     09/16/20 0520 09/17/20  0532 09/17/20 1917 09/18/20  0538    143 143 145   K 4.8 4.3 4.5 4.1    102 99 102   CO2 31 34* 33 36*   BUN 39* 34* 33* 33*   CREATININE 1.6* 1.4* 1.3* 1.2   GLUCOSE 106 154* 130* 111*   MG 1.8 1.8  --  1.9   CALCIUM 8.7 8.7 8.6 8.7     LFT  Recent Labs     09/17/20 1917   AST 27   ALT 21   BILITOT 0.4   ALKPHOS 112     INR  No results for input(s): INR, PROTIME in the last 72 hours. PTT  No results for input(s): APTT in the last 72 hours. Radiology        Ct Head W Wo Contrast    Result Date: 9/17/2020  PROCEDURE: CT HEAD W WO CONTRAST CLINICAL INFORMATION: change in mental status, somnolent, responsive only to painful stimuli. COMPARISON: 1/30/2018. TECHNIQUE: 5 mm axial images were obtained through the brain before and after the administration of intravenous contrast. All CT scans at this facility use dose modulation, iterative reconstruction, and/or weight-based dosing when appropriate to reduce radiation dose to as low as reasonably achievable. FINDINGS: There is stable moderate volume loss. Gray-white matter differentiation appears grossly preserved. No intracranial hemorrhage, mass effect or midline shift is identified. The calvarium appears intact. Patient is status post bilateral lens extractions. Orbits are otherwise unremarkable. Paranasal sinuses and mastoid air cells are clear. No evidence of acute intracranial abnormality. **This report has been created using voice recognition software. It may contain minor errors which are inherent in voice recognition technology. ** Final report electronically signed by Dr. Skip Silva MD on 9/17/2020 7:22 PM    Ct Chest Wo Contrast    Result Date: 8/26/2020  PROCEDURE: CT CHEST WO CONTRAST CLINICAL INFORMATION: SOB (shortness of breath), Pleural effusion, Malignant neoplasm metastatic to lung, unspecified laterality ([address] . COMPARISON: 4/9/2020 TECHNIQUE: 2-D multiplanar noncontrast images of the chest. All CT scans at this facility use dose modulation, iterative reconstruction, and/or weight-based dosing when appropriate to reduce radiation dose to as low as reasonably achievable. FINDINGS: No mediastinal or hilar adenopathy by size criteria. No axillary lymphadenopathy. Heart size is enlarged. Prior CABG. AICD over the left chest. No pericardial effusion. Interval development of right-sided pleural effusion which is loculated in appearance. Is associated right basilar atelectasis. Slightly lobulated 1 cm nodule is seen in the right upper lobe image 17 series 2. This is not identified on the prior exam. No additional lung masses are identified .  Reticular densities are present at the peripheral upper lobes which may be radiation treatment change. Upper abdomen Persistent fullness of the adrenal glands. Multiple small retrocrural lymph nodes which are not definitively seen previously. Probable constipation. Bones No suspicious bone lesions are identified. 1. Interval development of moderate loculated right pleural effusion 2. New slightly lobulated spiculated 1 cm nodule right upper lung. Concerning for metastatic disease. **This report has been created using voice recognition software. It may contain minor errors which are inherent in voice recognition technology. ** Final report electronically signed by Dr. Mega Su on 8/26/2020 2:49 PM    Cta Chest W Wo Contrast    Result Date: 9/15/2020  PROCEDURE: CTA CHEST W WO CONTRAST CLINICAL INFORMATION: shortness of breath, hx cancer. Leg swelling and shortness of breath. COMPARISON: CT chest dated 8/26/2020. TECHNIQUE: 3 mm axial images were obtained through the chest during the administration of 80 mL [id number]-370 injected in the right forearm. A non-contrast localizer was obtained. 3D reconstructions were performed on the scanner to include MIP coronal and sagittal images through the chest. All CT scans at this facility use dose modulation, iterative reconstruction, and/or weight-based dosing when appropriate to reduce radiation dose to as low as reasonably achievable. FINDINGS:  There is a good contrast bolus within the pulmonary arteries, adequate for evaluation to the subsegmental level. There are no filling defects within the pulmonary arteries to suggest pulmonary embolus. There is stable mural calcification in the aortic arch. No aneurysm or dissection is present. No axillary, mediastinal or hilar lymphadenopathy is identified. The heart is enlarged, stable compared to prior exam. There is stable left-sided cardiac pacer/defibrillator generator with stable leads in the right heart. There is a moderate right pleural effusion, similar to prior CT. Pulmonary vessels are prominent anteriorly along with mild interstitial prominence. Redemonstration of a nodule in the right upper lobe posteriorly. This measures 1.2 cm and appears to be mildly increased in size compared to 7/8/2019 when it measured 1 cm. Visualized upper abdominal solid organs are unremarkable. There are stable degenerative changes of the thoracic spine. 1. No evidence of pulmonary embolus or acute intrathoracic abnormality. 2. Persistent cardiomegaly and moderate right pleural effusion with pulmonary vascular congestion, similar to 8/26/2020. 3. Right upper lobe nodule measuring 1.2 cm which has increased in size compared to 7/8/2019 concerning for malignancy. **This report has been created using voice recognition software. It may contain minor errors which are inherent in voice recognition technology. ** Final report electronically signed by Dr. Viv Salmon MD on 9/15/2020 2:33 PM    Xr Chest Portable    Result Date: 9/17/2020  PROCEDURE: XR CHEST PORTABLE CLINICAL INFORMATION: hypoxia, new fever . COMPARISON: 9/15/2020 TECHNIQUE: Portable upright FINDINGS: Patient is rotated and leaning to the left. Motion artifact. Severe cardiomegaly. Bilateral extensive airspace infiltrates left pleural effusion. EKG leads overlie the chest. Midline sternotomy. AICD over the left chest.     Cardiomegaly and bilateral infiltrates likely pulmonary edema. Pneumonic infiltrates are not excludable **This report has been created using voice recognition software. It may contain minor errors which are inherent in voice recognition technology. ** Final report electronically signed by Dr. Lacey Isidro on 9/17/2020 2:17 AM    Xr Chest 1 View    Result Date: 9/8/2020  PROCEDURE: XR CHEST 1 VIEW CLINICAL INFORMATION: Status post ultrasound-guided right thoracentesis. COMPARISON: 8/28/2019. TECHNIQUE: Single frontal view of the chest performed. FINDINGS: POSTSURGICAL CHANGES: 1.  There are stable median consent was obtained. Limited ultrasound exam of the right chest showed moderate amount of pleural fluid. Using ultrasound guidance, a site was marked on the skin. The right side of the posterior chest was prepped and draped using the usual sterile technique and the skin was anesthetized with 2 percent lidocaine. A 5 [place] one-step catheter was introduced to the right pleural space. 0.65 L of theresa colored fluid drained. The catheter was then removed. The patient tolerated the procedure well with no complications. Specimen sent for laboratory studies as requested. Estimated blood loss is 0 cc. Patient left the department in good condition. Successful ultrasound-guided thoracentesis with  0.65 L of fluid drained. **This report has been created using voice recognition software. It may contain minor errors which are inherent in voice recognition technology. ** Final report electronically signed by Dr. Shady Wilcox on 9/15/2020 6:56 PM    Us Thoracentesis    Result Date: 9/8/2020  PROCEDURE: US THORACENTESIS CLINICAL INFORMATION: Right pleural effusion; history of breast carcinoma. COMPARISON: 6/5/2019. PHYSICIAN PERFORMING PROCEDURE: Abhi Owen M.D. PROCEDURE:  Informed consent was obtained. Limited sonographic imaging of the posterior chest was performed for localization of the pleural effusion. The skin overlying the pleural effusion was marked. The puncture site was prepped and draped in a sterile fashion and locally anesthetized with 2% lidocaine. The distal tip of a 5 [place] one-step catheter was advance into the pleural effusion. A 70 ml specimen was obtained to be sent to the laboratory for analysis as per the orders of the referring physician. .  A total of 400 mL clear yellow pleural fluid was then aspirated and discarded. The one-step catheter was then removed. Estimated blood loss: None. The patient tolerated the procedure well and and there were no immediate complications.      1. Breast Cancer of Right Breast - Refer to Onc hx as above. Last chemotherapy was given August 2019 and discontinued due to poor tolerance. Other current conditions:  Acute on chronic diastolic, systolic CHF exacerbation (echo on 9/16/20 EF 30-35%,  Compared to 40-45% on 11/20/19)- per Attending and Cardiology   Hx of CAD, PAD, DM-2, HTN, hyperlipidemia       Will continue to follow hospital course. Will see as needed. Case discussed with nurse and patient/family/IM Resident Dr. Candis Vera. Questions and concerns addressed.   Plan made in collaboration with Dr. Ayde Gilmore    Electronically signed by   Nelson Zavaleta PA-C on 9/18/2020 at 11:04 AM

## 2020-09-19 NOTE — PROGRESS NOTES
Hospitalist Progress Note      Patient:  Kelly Somers    Unit/Bed:4K-01/001-A  YOB: 1945  MRN: [id number]   Acct: [de-identified]   PCP: Varun Be MD  Date of Admission: 9/15/2020    Assessment/Plan:     # Acute hypoxic respiratory failure due to CHF exacerbation and possible Pneumonia - NYHA IV.  HRrEF (40-45%) per old ECHO 11/2019. Progressive SOB, fatigue, orthopnea, bilateral lower extremity edema with recent outpatient CHF clinic management. She has been progressively getting worse, despite optimal medical therapy. On admission Pro-BNP is 9242, Troponin level at 0.059 but trending down to 0.048. Dry weight is between 205-210 pounds and her current weight is 214, almost at goal. Per chart review, since 8/24/20 she has been seeing CHF clinic and her weight dropped to 217lbs from 231lbs with medical outpatient management. Patient is currently on Bumex 1mg BID and metolazone 5mg with goal to decrease edema, improve shortness of breath. - Will continue with Bumex 1mg BID and monitor daily weight, intake and output. We have to be careful with metolazone as it can worsen kidney function. Will consider Nephrology for further recommendation.   - Limit sodium < 2 g/day  - Fluid restriction to 2 L/day  - Will monitor renal function with daily BMP  - Daily weight  - Cardiology following. Will attempt functional study when stable. - ECHO pending     9/17/20: Results of ECHO show ejection fraction at 20-35%, which has significantly decreased from previous ECHO, worsening of CHF. This morning pt has an increase in oxygen demand despite saturation between 91-95% on 6L. Placed on BiPAP. Chest Xray shows bilateral pulmonary infiltrates. One febrile episode overnight. Start Rocephin 1g and Azithromycin 500mg.    Will continue with Bumex 1g and monitor urine output, since admission patient has diuresed ~5L  Will continue to monitor vital signs, urine output. 9/18/20: Since admission total fluid loss is ~7L. Will continue with IV diuresis and increase Bumex to 2g BID as the current weight is 224, up from 214 on admission. Currently on 6L of O2 nasal canula. BiPAP PRN. Still presence of bilateral crackles. Check chest x-ray in the morning for evaluation of pulmonary infiltrates. Will continue with Azithromycin and Rocephin until clinically improves. She remains afebrile  Cardiology following, appreciate recommendations. Will resume vasotec when blood pressure tolerates and renal function is stable. 9/19/20: Patient continues to diurese well, thus far ~9L. Continue with Bumex 2mg IV q12h. Will monitor renal function. Continue with Azithromycin and Rocephin. Patient remains afebrile. She remains on 6L of supplemental oxygen. Goal is to wean off O2 as tolerated. Still requires BiPAP at night. Chest xray this morning reveals slightly improved pulmonary vascular congestion. # Right pleural infusion s/p thoracentesis - History of breast cancer. CTA chest shows right upper lobe nodule measuring 1.2 cm which has increased in size compared to 7/8/2019 concerning for malignancy. Pleural fluid shows total nucleated cells to be 974 which is on the line between being either transudative or exudative. The diferential includes malignancy, constricitive pericarditis, pulmonary embolism, pneumonia. Patient has been hypoxic saturating between 78-85%. - Continue to monitor vital signs with goal to keep spO2 >92%    9/17/20: Culture-final report pending. Initiated BiPAP due to increase in oxygen demand. Currently saturating 94%. Will continue to monitor    9/18/20 Cytology - pending    9/19/20 Cytology shows no malignant cells. # Diabetes mellitus type II - Hold home Metformin and Lantus. Will place on insulin sliding scale  Lantus and will adjust as needed. 9/16 Blood sugars have been between 123-188. Continue with monitoring.       # Invasive ductal carcinoma s/p lumpectomy - last chemotherapy and radiation in 2019.  - Heme/Onc has been consulted. Appreciate recommendation. # Pancytopenia: possibly due to chemo therapy. 9/18: Will start folic acid 1 mg      # Hyperlipidemia - will continue Lipitor 80mg      # Diabetic Neuropathy - will continue Lyrica 200mg     # Hx of ventricular arrhythmia - will continue amiodarone 200mg     # Hx of CAD: will continue ASA 81mg, Plavix 75mg. Expected discharge date:  9/20/20    Disposition:    [x] Home       [] TCU       [] Rehab       [] Psych       [] SNF       [] [place]       [] Other-    Chief Complaint: shortness of breath, lower leg edema    Hospital Treatment Course: (Prior to today)   65-year-old morbidly obese  female presents emergency department for evaluation of shortness of breath and bilateral leg swelling. She has a significant PMHx of CHF HFrEF (EF 40%-45%, CAD, CABG (3V,2009), HTN, HLD, AAA, PVD, DM,   Breast CA w/ mets to lung. Patient states her shortness of breath and leg swelling is been getting worse over the last year however this week she is has difficulty with performing her ADLs secondary to more dyspnea on exertion. She  recently had a therapeutic and diagnostic thoracentesis. Denies any fever, chest pain, numbness, tingling at this time. Her past medical history includes ventricular arrhythmias diabetes ICD placement, hypertension, hyperlipidemia CHF, invasive ductal carcinoma of the right breast.   Per chart review, she is followed in the heart failure clinic. Her dry weight is between 205-210 pounds. On August 24 she was given 1 unit of Lasix 20 g IV. On August 26 she continued to have an increase in shortness of breath on exertion, lower extremity edema, and her Bumex was doubled for 3 days. On August 31 she was very short of breath and was put on IV Bumex 2 mg, metolazone 5 mg x 2 days.   Yesterday at the heart failure clinic, she continued to be fatigued, tired, saturating between 85-90%, with shortness of breath at rest, or lower extremity edema. Recently she had a therapeutic and diagnostic thoracentesis. 9/16/20: Patient is currently on Bumex 1mg BID and Metolazone 5mg. ECHO - pending. 9/17/20: Overnight patient was confused and appeared to be weak. She was unable to void and urinary cath was placed that drained 2L of urine. She spiked one time fever of 102.9 and was started on Azithromycin 500mg and Rocephin 1g, Chest Xray shows bilateral pulmonary infiltrates. She was placed on BiPAP due to an increase in oxygen demand. Pt responded only to sternal rub. Her blood gas reveals pH 7.28, pCO2 63, pO2 51, Bicarb 38.    9/18 Patient oxygen saturation dropped to the 80s. She was placed on BiPAP 40% FiO2. On nasal canula her O2 needs increased to 6L. CT negative for any intracranial abnormalities. 9/19 Overnight she was placed on BiPAP as her need for oxygen increased. Since this morning she remains on 6L of O2. Continues to make small clinical improvements. Subjective (past 24 hours):     Patient is resting in bed without any complains. States that she feels better in comparison to when she arrived. Denies headache, denies abdominal pain, denies nausea or vomiting. Past medical history, family history, social history and allergies reviewed again and is unchanged since admission. ROS (12 point review of systems completed. Pertinent positives noted.  Otherwise ROS is negative)     Medications:  Reviewed    Infusion Medications    dextrose       Scheduled Medications    folic acid  1 mg Oral Daily    bumetanide  2 mg Intravenous BID    cefTRIAXone (ROCEPHIN) IV  1 g Intravenous Q24H    azithromycin  500 mg Intravenous Q24H    ferrous sulfate  325 mg Oral BID     insulin glargine  18 Units Subcutaneous Nightly    amiodarone  200 mg Oral Daily    aspirin EC  81 mg Oral Daily    atorvastatin  80 mg Oral Nightly  clopidogrel  75 mg Oral Daily    metOLazone  5 mg Oral Daily    pregabalin  200 mg Oral BID    rOPINIRole  0.5 mg Oral Nightly    sodium chloride flush  10 mL Intravenous 2 times per day    polyethylene glycol  17 g Oral Daily    enoxaparin  40 mg Subcutaneous Q24H    famotidine  20 mg Oral Daily    insulin lispro  0-6 Units Subcutaneous TID     insulin lispro  0-3 Units Subcutaneous Nightly     PRN Meds: perflutren lipid microspheres, sodium chloride flush, acetaminophen **OR** acetaminophen, promethazine **OR** ondansetron, glucose, dextrose, glucagon (rDNA), dextrose      Intake/Output Summary (Last 24 hours) at 9/19/2020 1202  Last data filed at 9/19/2020 1014  Gross per 24 hour   Intake 1329.86 ml   Output 3175 ml   Net -1845.14 ml       Diet:  DIET CARB CONTROL; Low Sodium (2 GM); Daily Fluid Restriction: 1500 ml    Exam:  BP (!) 107/54   Pulse 75   Temp 97.9 °F (36.6 °C) (Oral)   Resp 19   Ht 5' 2\" (1.575 m)   Wt 223 lb 12.8 oz (101.5 kg)   SpO2 92%   BMI 40.93 kg/m²   General appearance: No apparent distress, somnolent  HEENT: Pupils equal, round, and reactive to light. Conjunctivae/corneas clear. Neck: Supple, with full range of motion. No jugular venous distention. Trachea midline. Respiratory:  Bilateral crackles appreciated. Cardiovascular: Regular rate and rhythm with normal S1/S2 without murmurs, rubs or gallops. Abdomen: Soft, non-tender, non-distended with normal bowel sounds. Musculoskeletal: passive and active ROM x 4 extremities. No edemaSkin: Skin color, texture, turgor normal.  No rashes or lesions. Neurologic:  Neurovascularly intact without any focal sensory/motor deficits.  Cranial nerves: II-XII intact, grossly non-focal.  Psychiatric: Alert and oriented, thought content appropriate, normal insight  Capillary Refill: Brisk,< 3 seconds   Peripheral Pulses: +2 palpable, equal bilaterally     Labs:   Recent Labs     09/17/20  0532 09/17/20  1917 09/19/20  0526   WBC 9.6 8.1 6.8   HGB 7.7* 9.2* 7.4*   HCT 27.0* 34.5* 26.2*   * 115* 103*     Recent Labs     09/17/20 1917 09/18/20  0538 09/19/20  0526    145 143   K 4.5 4.1 3.6   CL 99 102 100   CO2 33 36* 38*   BUN 33* 33* 32*   CREATININE 1.3* 1.2 1.3*   CALCIUM 8.6 8.7 8.7     Recent Labs     09/17/20 1917   AST 27   ALT 21   BILITOT 0.4   ALKPHOS 112     No results for input(s): INR in the last 72 hours. No results for input(s): Amaryllis Goodell in the last 72 hours. Microbiology:    Blood culture #1:   Lab Results   Component Value Date    BC No growth-preliminary  09/17/2020       Blood culture #2:No results found for: Tabby Johnson    Organism:  Lab Results   Component Value Date    ORG Enterobacter aerogenes 09/11/2019         Lab Results   Component Value Date    LABGRAM  09/15/2020     Few segmented neutrophils observed. No organisms observed. performed on cytospun specimen       MRSA culture only:No results found for: [facility]    Urine culture:   Lab Results   Component Value Date    Kalpesh Demar  09/11/2019     [place] count: >100,000 CFU/mLEnterobacter species which may be initially susceptible tothird generation cephalosporins may develop resistance withinthree to four days of initiation of this antimicrobialtherapy. Respiratory culture: No results found for: CULTRESP    Aerobic and Anaerobic :  No results found for: LABAERO  Lab Results   Component Value Date    LABANAE No growth-preliminary   09/15/2020       Urinalysis:      Lab Results   Component Value Date    NITRU NEGATIVE 09/17/2020    WBCUA NONE SEEN 09/17/2020    BACTERIA NONE SEEN 09/17/2020    RBCUA 0-2 09/17/2020    BLOODU NEGATIVE 09/17/2020    SPECGRAV 1.011 09/17/2020    GLUCOSEU NEGATIVE 08/28/2019       Radiology:  XR CHEST PORTABLE   Final Result   Slightly improved pulmonary vascular congestion      Left pleural effusion and possible consolidation      **This report has been created using voice recognition software.   It may contain minor errors which are inherent in voice recognition technology. **      Final report electronically signed by Dr. Nick Suero on 9/19/2020 2:49 AM      CT HEAD W WO CONTRAST   Final Result    No evidence of acute intracranial abnormality. **This report has been created using voice recognition software. It may contain minor errors which are inherent in voice recognition technology. **      Final report electronically signed by Dr. Erasmo Barrios MD on 9/17/2020 7:22 PM      XR CHEST PORTABLE   Final Result   Cardiomegaly and bilateral infiltrates likely pulmonary edema. Pneumonic infiltrates are not excludable            **This report has been created using voice recognition software. It may contain minor errors which are inherent in voice recognition technology. **      Final report electronically signed by Dr. Nick Suero on 9/17/2020 2:17 AM      US THORACENTESIS   Final Result      Successful ultrasound-guided thoracentesis with  0.65 L of fluid drained. **This report has been created using voice recognition software. It may contain minor errors which are inherent in voice recognition technology. **      Final report electronically signed by Dr. Shay Lai on 9/15/2020 6:56 PM      CTA Chest W WO Contrast   Final Result       1. No evidence of pulmonary embolus or acute intrathoracic abnormality. 2. Persistent cardiomegaly and moderate right pleural effusion with pulmonary vascular congestion, similar to 8/26/2020.   3. Right upper lobe nodule measuring 1.2 cm which has increased in size compared to 7/8/2019 concerning for malignancy. **This report has been created using voice recognition software. It may contain minor errors which are inherent in voice recognition technology. **      Final report electronically signed by Dr. Erasmo Barrios MD on 9/15/2020 2:33 PM        Cta Chest W Wo Contrast    Result Date: 9/15/2020  PROCEDURE: CTA CHEST W WO CONTRAST CLINICAL INFORMATION: shortness of breath, hx cancer. Leg swelling and shortness of breath. COMPARISON: CT chest dated 8/26/2020. TECHNIQUE: 3 mm axial images were obtained through the chest during the administration of 80 mL [id number]-370 injected in the right forearm. A non-contrast localizer was obtained. 3D reconstructions were performed on the scanner to include MIP coronal and sagittal images through the chest. All CT scans at this facility use dose modulation, iterative reconstruction, and/or weight-based dosing when appropriate to reduce radiation dose to as low as reasonably achievable. FINDINGS:  There is a good contrast bolus within the pulmonary arteries, adequate for evaluation to the subsegmental level. There are no filling defects within the pulmonary arteries to suggest pulmonary embolus. There is stable mural calcification in the aortic arch. No aneurysm or dissection is present. No axillary, mediastinal or hilar lymphadenopathy is identified. The heart is enlarged, stable compared to prior exam. There is stable left-sided cardiac pacer/defibrillator generator with stable leads in the right heart. There is a moderate right pleural effusion, similar to prior CT. Pulmonary vessels are prominent anteriorly along with mild interstitial prominence. Redemonstration of a nodule in the right upper lobe posteriorly. This measures 1.2 cm and appears to be mildly increased in size compared to 7/8/2019 when it measured 1 cm. Visualized upper abdominal solid organs are unremarkable. There are stable degenerative changes of the thoracic spine. 1. No evidence of pulmonary embolus or acute intrathoracic abnormality. 2. Persistent cardiomegaly and moderate right pleural effusion with pulmonary vascular congestion, similar to 8/26/2020. 3. Right upper lobe nodule measuring 1.2 cm which has increased in size compared to 7/8/2019 concerning for malignancy.  **This report has been created using voice recognition software. It may contain minor errors which are inherent in voice recognition technology. ** Final report electronically signed by Dr. Dajuan Dumont MD on 9/15/2020 2:33 PM    Us Thoracentesis    Result Date: 9/15/2020  PROCEDURE: US THORACENTESIS CLINICAL INFORMATION: moderate right pleural effusion on CT . COMPARISON: CT 9/15/2020 PROCEDURE: The benefits and the risk of the procedure were explained to the patient. The patient was given an opportunity to ask questions. Signed consent was obtained. Limited ultrasound exam of the right chest showed moderate amount of pleural fluid. Using ultrasound guidance, a site was marked on the skin. The right side of the posterior chest was prepped and draped using the usual sterile technique and the skin was anesthetized with 2 percent lidocaine. A 5 Belgian one-step catheter was introduced to the right pleural space. 0.65 L of theresa colored fluid drained. The catheter was then removed. The patient tolerated the procedure well with no complications. Specimen sent for laboratory studies as requested. Estimated blood loss is 0 cc. Patient left the department in good condition. Successful ultrasound-guided thoracentesis with  0.65 L of fluid drained. **This report has been created using voice recognition software. It may contain minor errors which are inherent in voice recognition technology. ** Final report electronically signed by Dr. Soham Gonzalez on 9/15/2020 6:56 PM    Xr Chest Pa Inspiration 1 Vw    Result Date: 9/15/2020  PROCEDURE: XR CHEST PA INSPIRATION 1 VW CLINICAL INFORMATION: right thoracentesis. COMPARISON: CT 9/15/2020 TECHNIQUE: AP upright view of the chest. FINDINGS: Right pleural fluid is moderately improved. No gross pneumothorax. Moderate congestion again noted. There are mild airspace opacities bilaterally particularly within the perihilar regions suggesting pulmonary edema. Moderate enlargement of the cardiopericardial silhouette is unchanged. No pneumothorax post right thoracentesis. **This report has been created using voice recognition software. It may contain minor errors which are inherent in voice recognition technology. ** Final report electronically signed by Dr. Rigoberto Peters on 9/15/2020 6:54 PM      Support Devices (date placed):  [] ETT []Oral / [] Nasal  [] Gastric Tube [] OG / [] NG    [] Central Venous Line (Specify Site):  [] Urinary Catheter  [] Arterial Line (Specify Site)  [] Peripheral IV access  [] Other:    DVT prophylaxis: [x] Lovenox                                 [] SCDs                                 [] SQ Heparin                                 [] Encourage ambulation           [] Already on Anticoagulation     Code Status: Full Code    Tele:   [x] yes             [] no    Electronically signed by Radha Macario MD on 9/19/2020 at 12:02 PM

## 2020-09-19 NOTE — PLAN OF CARE
Problem: Falls - Risk of:  Goal: Will remain free from falls  Description: Will remain free from falls  Outcome: Ongoing  Note: Patient free of falls this shift. Fall precautions in place. Bed alarm on. Call light and patient belongings within reach. Problem: Falls - Risk of:  Goal: Absence of physical injury  Description: Absence of physical injury  Outcome: Ongoing  Note: No physical injury this shift. Problem: Discharge Planning:  Goal: Discharged to appropriate level of care  Description: Discharged to appropriate level of care  Outcome: Ongoing  Note: Pt plans to return home with . Possible rehab. Problem: Serum Glucose Level - Abnormal:  Goal: Ability to maintain appropriate glucose levels has stabilized  Description: Ability to maintain appropriate glucose levels has stabilized  Outcome: Ongoing  Note: Pt given humalog and lantus for elevated blood sugars. Problem: Tissue Perfusion - Cardiopulmonary, Altered:  Goal: Hemodynamic stability will improve  Description: Hemodynamic stability will improve  Outcome: Ongoing  Note: BP low overnight with MAP maintained above 65. Problem: Skin Integrity:  Goal: Absence of new skin breakdown  Description: Absence of new skin breakdown  Outcome: Ongoing  Note: No new skin breakdown noted this shift. Patient turned q2h and PRN. Pillow support provided. Care plan reviewed with patient. Patient verbalize understanding of the plan of care and contribute to goal setting.

## 2020-09-19 NOTE — PROGRESS NOTES
Cardiology Progress Note      Patient:  Dominic Cohn  YOB: 1945  MRN: [id number]   Acct: [de-identified]  [address] [facility] Date:  9/15/2020  Primary Cardiologist: Dr. Main Booker  Seen by Dr. Cy Schumacher    Per prior cardiology consult note-  REASON FOR CONSULTATION:  Congestive heart failure with recurrent  right-sided pleural effusion and recurrent thoracentesis.     PRIMARY CARDIOLOGIST:  Michael Lopez MD     History obtained from the patient and electronic medical record.     HISTORY OF PRESENT ILLNESS:  This is a 76-year-old pleasant   female patient, presented with worsening of shortness of breath and  worsening of leg swelling over the last few weeks and getting worse and  so she came to the emergency room. The swelling of the leg got worse to  an extent that performing activity of daily living has been impaired,  and she has orthopnea of three pillows. She had a history of recent  thoracentesis prior to this admission. The patient has no history of  fever or chills. No nausea, vomiting, diarrhea. No chest pain. She  has orthopnea and the patient has history of congestive heart failure;  dilated cardiomyopathy, nonischemic; hypertension, hyperlipidemia. She  has invasive ductal carcinoma of the right breast, status post  lumpectomy a year ago with postsurgical radiation.   So cardiology  evaluation was sought in view of the congestive heart failure; recurrent  right-sided pleural effusion requiring thoracentesis, ultrasound guided.         Subjective (Events in last 24 hours):     Pt in bed   Family at bedside     Pt has no chest pain   She looks tired and weak   Denies SOB     Tele Vpaced   BP slight on the low side       Objective:   BP (!) 107/54   Pulse 75   Temp 97.9 °F (36.6 °C) (Oral)   Resp 19   Ht 5' 2\" (1.575 m)   Wt 223 lb 12.8 oz (101.5 kg)   SpO2 92%   BMI 40.93 kg/m²        TELEMETRY: Vpaced    Physical Exam:  General Appearance: alert and oriented to person, place and time, in no acute distress  Cardiovascular: normal rate, regular rhythm, normal S1 and S2, no murmurs, rubs, clicks, or gallops, distal pulses intact,   Pulmonary/Chest: clear upper - crackles - Lt posterior lung   Rt - diminished but clear  to auscultation bilaterally- no wheezes, rales or rhonchi, normal air movement, no respiratory distress  Abdomen: soft, non-tender, non-distended, normal bowel sounds, no masses Extremities: no cyanosis, clubbing or edema, pulses present    Skin: warm and dry, no rash or erythema  Musculoskeletal: normal range of motion, no joint swelling, deformity or tenderness  Neurological: alert, oriented, normal speech, no focal findings or movement disorder noted    Medications:    folic acid  1 mg Oral Daily    bumetanide  2 mg Intravenous BID    cefTRIAXone (ROCEPHIN) IV  1 g Intravenous Q24H    azithromycin  500 mg Intravenous Q24H    ferrous sulfate  325 mg Oral BID WC    insulin glargine  18 Units Subcutaneous Nightly    amiodarone  200 mg Oral Daily    aspirin EC  81 mg Oral Daily    atorvastatin  80 mg Oral Nightly    clopidogrel  75 mg Oral Daily    metOLazone  5 mg Oral Daily    pregabalin  200 mg Oral BID    rOPINIRole  0.5 mg Oral Nightly    sodium chloride flush  10 mL Intravenous 2 times per day    polyethylene glycol  17 g Oral Daily    enoxaparin  40 mg Subcutaneous Q24H    famotidine  20 mg Oral Daily    insulin lispro  0-6 Units Subcutaneous TID WC    insulin lispro  0-3 Units Subcutaneous Nightly      dextrose       perflutren lipid microspheres, 1.5 mL, ONCE PRN  sodium chloride flush, 10 mL, PRN  acetaminophen, 650 mg, Q6H PRN    Or  acetaminophen, 650 mg, Q6H PRN  promethazine, 12.5 mg, Q6H PRN    Or  ondansetron, 4 mg, Q6H PRN  glucose, 15 g, PRN  dextrose, 12.5 g, PRN  glucagon (rDNA), 1 mg, PRN  dextrose, 100 mL/hr, PRN        Diagnostics:  EKG:    15-SEP-2020 16:25:32 [facility] ROUTINE RETRIEVAL  Ventricular-paced rhythm with frequent AV dual-paced complexes  Abnormal ECG  When compared with ECG of 15-SEP-2020 13:18,  Ventricular rate has increased BY 12 BPM  Confirmed by Ronda Solano ([id number]) on 9/15/2020 8:29:14 PM    Echo:   Electronically signed by Jamilah Rabago MD (Interpreting   physician) on 09/16/2020 at 10:17 PM   ----------------------------------------------------------------      Findings      Mitral Valve   The mitral valve structure was normal with normal leaflet separation. Aortic Valve   Aortic valve leaflets are Moderately calcified. Leaflets exhibited   moderately increased thickness and mildly reduced cuspal separation of the   aortic valve. Tricuspid Valve   The tricuspid valve structure was normal with normal leaflet separation. Pulmonic Valve   The pulmonic valve leaflets exhibited normal thickness, no calcification,   and normal cuspal separation. Left Atrium   The left atrium is Moderately dilated. Left Ventricle   Left Ventricular size is Mildly increased . Normal left ventricular wall thickness. There was severe global hypokinesis of the left ventricle. Systolic function was severely reduced. Ejection fraction is visually estimated in the range of 30% to 35%. Right Atrium   Mildly enlarged right atrium size. Right Ventricle   The right ventricular size was normal with normal systolic function and   wall thickness. Pacer Wire visualized in right ventricle. Pericardial Effusion   The pericardium was normal in appearance with no evidence of a pericardial   effusion. Pleural Effusion   No evidence of pleural effusion. Aorta / Great Vessels   -Aortic root dimension within normal limits.   -The Pulmonary artery is within normal limits. -IVC size is within normal limits with normal respiratory phasic changes.          Stress: Summary   This Nuclear Medicine study was abnormal .   moderate to large inferior lateral infarct   mild lisa infarct ischemia   abnormal LV function      Recommendation   Clinical correlation is recommended. Signatures      ----------------------------------------------------------------   Electronically signed by Redge Castleman MD (Interpreting   Cardiologist) on 11/20/2019    Left Heart Cath:   2009- CABG x3 - no cath after     Lab Data:    Cardiac Enzymes:  No results for input(s): CKTOTAL, CKMB, CKMBINDEX, TROPONINI in the last 72 hours.     CBC:   Lab Results   Component Value Date    WBC 6.8 09/19/2020    RBC 3.44 09/19/2020    HGB 7.4 09/19/2020    HCT 26.2 09/19/2020     09/19/2020       CMP:    Lab Results   Component Value Date     09/19/2020    K 3.6 09/19/2020    K 3.9 12/03/2019     09/19/2020    CO2 38 09/19/2020    BUN 32 09/19/2020    CREATININE 1.3 09/19/2020    LABGLOM 40 09/19/2020    GLUCOSE 72 09/19/2020    GLUCOSE 130 03/23/2012    CALCIUM 8.7 09/19/2020       Hepatic Function Panel:    Lab Results   Component Value Date    ALKPHOS 112 09/17/2020    ALT 21 09/17/2020    AST 27 09/17/2020    PROT 6.6 09/17/2020    BILITOT 0.4 09/17/2020    BILIDIR <0.2 08/24/2020    LABALBU 3.8 09/17/2020    LABALBU 4.5 03/23/2012       Magnesium:    Lab Results   Component Value Date    MG 2.0 09/19/2020       PT/INR:    Lab Results   Component Value Date    INR 0.97 08/20/2019       HgBA1c:    Lab Results   Component Value Date    LABA1C 5.4 09/15/2020       FLP:    Lab Results   Component Value Date    TRIG 79 09/16/2020    HDL 40 09/16/2020    LDLCALC 27 09/16/2020       TSH:    Lab Results   Component Value Date    TSH 2.080 08/28/2019         Assessment:    Pleural effusion - recurrent (?chf or CA)   S\p thoracentesis 9/8/2020 - 9/15/2020     Acute on chronic systolic chf -- cxr improved      dilated ICDMP EF 30-35% (was 40-45% 11/2019)   ICD   - likely combination of infectious and CHF    AJ-- improving     Elevated trop-- DT all the above    Anemia     DMII  HTN  HLP    Hx breast cancer / lumpectomy   known lung

## 2020-09-19 NOTE — PLAN OF CARE
Continue the use of bipap to help improve respiratory status.   Problem: Impaired respiratory status  Goal: Able to breathe comfortably  Description: Able to breathe comfortably  Outcome: Ongoing

## 2020-09-20 NOTE — PLAN OF CARE
Problem: Falls - Risk of:  Goal: Will remain free from falls  Description: Will remain free from falls  Outcome: Ongoing  Note: Patient free of falls this shift. Fall precautions in place. Bed alarm on. Call light and patient belongings within reach. Problem: Falls - Risk of:  Goal: Absence of physical injury  Description: Absence of physical injury  Outcome: Ongoing  Note: No physical injury this shift. Problem: Discharge Planning:  Goal: Discharged to appropriate level of care  Description: Discharged to appropriate level of care  Outcome: Ongoing  Note: Pt plans to return home with . Possible rehab short term. Problem: Serum Glucose Level - Abnormal:  Goal: Ability to maintain appropriate glucose levels has stabilized  Description: Ability to maintain appropriate glucose levels has stabilized  Outcome: Ongoing  Note: Pt given humalog and lantus for elevated blood sugars. Problem: Tissue Perfusion - Cardiopulmonary, Altered:  Goal: Hemodynamic stability will improve  Description: Hemodynamic stability will improve  Outcome: Ongoing  Note: Vital signs stable overnight. Metoprolol started yesterday. Problem: Skin Integrity:  Goal: Absence of new skin breakdown  Description: Absence of new skin breakdown  Outcome: Ongoing  Note: No new skin breakdown noted this shift. Patient turned q2h and PRN. Pillow support provided.      Care plan reviewed with patient. Patient verbalize understanding of the plan of care and contribute to goal setting.

## 2020-09-20 NOTE — PROGRESS NOTES
Hospitalist Progress Note      Patient:  Adam Gaston    Unit/Bed:4K-01/001-A  YOB: 1945  MRN: [id number]   Acct: [de-identified]   PCP: Nadya Santizo MD  Date of Admission: 9/15/2020    Assessment/Plan:     # Acute hypoxic respiratory failure due to CHF exacerbation and possible Pneumonia - NYHA IV.  HRrEF (40-45%) per old ECHO 11/2019. Progressive SOB, fatigue, orthopnea, bilateral lower extremity edema with recent outpatient CHF clinic management. She has been progressively getting worse, despite optimal medical therapy. On admission Pro-BNP is 9242, Troponin level at 0.059 but trending down to 0.048. Dry weight is between 205-210 pounds and her current weight is 214, almost at goal. Per chart review, since 8/24/20 she has been seeing CHF clinic and her weight dropped to 217lbs from 231lbs with medical outpatient management. Patient is currently on Bumex 1mg BID and metolazone 5mg with goal to decrease edema, improve shortness of breath. - Will continue with Bumex 1mg BID and monitor daily weight, intake and output. We have to be careful with metolazone as it can worsen kidney function. Will consider Nephrology for further recommendation.   - Limit sodium < 2 g/day  - Fluid restriction to 2 L/day  - Will monitor renal function with daily BMP  - Daily weight  - Cardiology following. Will attempt functional study when stable. - ECHO pending     9/17/20: Results of ECHO show ejection fraction at 20-35%, which has significantly decreased from previous ECHO, worsening of CHF. This morning pt has an increase in oxygen demand despite saturation between 91-95% on 6L. Placed on BiPAP. Chest Xray shows bilateral pulmonary infiltrates. One febrile episode overnight. Start Rocephin 1g and Azithromycin 500mg.    Will continue with Bumex 1g and monitor urine output, since admission patient has diuresed ~5L  Will continue to monitor vital signs, urine hyperlipidemia CHF, invasive ductal carcinoma of the right breast.   Per chart review, she is followed in the heart failure clinic. Her dry weight is between 205-210 pounds. On August 24 she was given 1 unit of Lasix 20 g IV. On August 26 she continued to have an increase in shortness of breath on exertion, lower extremity edema, and her Bumex was doubled for 3 days. On August 31 she was very short of breath and was put on IV Bumex 2 mg, metolazone 5 mg x 2 days. Yesterday at the heart failure clinic, she continued to be fatigued, tired, saturating between 85-90%, with shortness of breath at rest, or lower extremity edema. Recently she had a therapeutic and diagnostic thoracentesis. 9/16/20: Patient is currently on Bumex 1mg BID and Metolazone 5mg. ECHO - pending. 9/17/20: Overnight patient was confused and appeared to be weak. She was unable to void and urinary cath was placed that drained 2L of urine. She spiked one time fever of 102.9 and was started on Azithromycin 500mg and Rocephin 1g, Chest Xray shows bilateral pulmonary infiltrates. She was placed on BiPAP due to an increase in oxygen demand. Pt responded only to sternal rub. Her blood gas reveals pH 7.28, pCO2 63, pO2 51, Bicarb 38.    9/18 Patient oxygen saturation dropped to the 80s. She was placed on BiPAP 40% FiO2. On nasal canula her O2 needs increased to 6L. CT negative for any intracranial abnormalities. 9/19 Overnight she was placed on BiPAP as her need for oxygen increased. Since this morning she remains on 6L of O2. Continues to make small clinical improvements. 9/20: Patient overnight requird BIPAP. Patient however has been weaned down to 4L in am and stating at 94%. Will continue to wean. Held Bumex since patient has diuresed around 11,600 mL and increased Bicarb at 42. Given one time dose of Dimox for the increasing Bicarb.  Repeat Chest xray in am.       Subjective (past 24 hours):     Patient is resting in bed without any alert  HEENT: Pupils equal, round, and reactive to light. Conjunctivae/corneas clear. Neck: Supple, with full range of motion. No jugular venous distention. Trachea midline. Respiratory:  Bilateral crackles appreciated. Cardiovascular: Regular rate and rhythm with normal S1/S2 without murmurs, rubs or gallops. Abdomen: Soft, non-tender, non-distended with normal bowel sounds. Musculoskeletal: passive and active ROM x 4 extremities. No edemaSkin: Skin color, texture, turgor normal.  No rashes or lesions. Neurologic:  Neurovascularly intact without any focal sensory/motor deficits. Cranial nerves: II-XII intact, grossly non-focal.  Psychiatric: Alert and oriented, thought content appropriate, normal insight  Capillary Refill: Brisk,< 3 seconds   Peripheral Pulses: +2 palpable, equal bilaterally     Labs:   Recent Labs     09/17/20 1917 09/19/20 0526 09/20/20  0533   WBC 8.1 6.8 7.1   HGB 9.2* 7.4* 7.8*   HCT 34.5* 26.2* 27.5*   * 103* 114*     Recent Labs     09/18/20  0538 09/19/20  0526 09/20/20  0533    143 142   K 4.1 3.6 3.7    100 95*   CO2 36* 38* 42*   BUN 33* 32* 26*   CREATININE 1.2 1.3* 1.2   CALCIUM 8.7 8.7 8.7     Recent Labs     09/17/20 1917   AST 27   ALT 21   BILITOT 0.4   ALKPHOS 112     No results for input(s): INR in the last 72 hours. No results for input(s): Javan Shown in the last 72 hours. Microbiology:    Blood culture #1:   Lab Results   Component Value Date    BC No growth-preliminary  09/17/2020       Blood culture #2:No results found for: Pierre Ramsay    Organism:  Lab Results   Component Value Date    ORG Enterobacter aerogenes 09/11/2019         Lab Results   Component Value Date    LABGRAM  09/15/2020     Few segmented neutrophils observed. No organisms observed.  performed on cytospun specimen       MRSA culture only:No results found for: [facility]    Urine culture:   Lab Results   Component Value Date    LABURIN  09/11/2019     [place] count: >100,000 recognition software. It may contain minor errors which are inherent in voice recognition technology. **      Final report electronically signed by Dr. Citlali Steen on 9/15/2020 6:56 PM      CTA Chest W WO Contrast   Final Result       1. No evidence of pulmonary embolus or acute intrathoracic abnormality. 2. Persistent cardiomegaly and moderate right pleural effusion with pulmonary vascular congestion, similar to 8/26/2020.   3. Right upper lobe nodule measuring 1.2 cm which has increased in size compared to 7/8/2019 concerning for malignancy. **This report has been created using voice recognition software. It may contain minor errors which are inherent in voice recognition technology. **      Final report electronically signed by Dr. Janette Perez MD on 9/15/2020 2:33 PM      XR CHEST PORTABLE    (Results Pending)     Cta Chest W Wo Contrast    Result Date: 9/15/2020  PROCEDURE: CTA CHEST W WO CONTRAST CLINICAL INFORMATION: shortness of breath, hx cancer. Leg swelling and shortness of breath. COMPARISON: CT chest dated 8/26/2020. TECHNIQUE: 3 mm axial images were obtained through the chest during the administration of 80 mL [id number]-[id number] injected in the right forearm. A non-contrast localizer was obtained. 3D reconstructions were performed on the scanner to include MIP coronal and sagittal images through the chest. All CT scans at this facility use dose modulation, iterative reconstruction, and/or weight-based dosing when appropriate to reduce radiation dose to as low as reasonably achievable. FINDINGS:  There is a good contrast bolus within the pulmonary arteries, adequate for evaluation to the subsegmental level. There are no filling defects within the pulmonary arteries to suggest pulmonary embolus. There is stable mural calcification in the aortic arch. No aneurysm or dissection is present. No axillary, mediastinal or hilar lymphadenopathy is identified.  The heart is enlarged, stable compared to prior exam. There is stable left-sided cardiac pacer/defibrillator generator with stable leads in the right heart. There is a moderate right pleural effusion, similar to prior CT. Pulmonary vessels are prominent anteriorly along with mild interstitial prominence. Redemonstration of a nodule in the right upper lobe posteriorly. This measures 1.2 cm and appears to be mildly increased in size compared to 7/8/2019 when it measured 1 cm. Visualized upper abdominal solid organs are unremarkable. There are stable degenerative changes of the thoracic spine. 1. No evidence of pulmonary embolus or acute intrathoracic abnormality. 2. Persistent cardiomegaly and moderate right pleural effusion with pulmonary vascular congestion, similar to 8/26/2020. 3. Right upper lobe nodule measuring 1.2 cm which has increased in size compared to 7/8/2019 concerning for malignancy. **This report has been created using voice recognition software. It may contain minor errors which are inherent in voice recognition technology. ** Final report electronically signed by Dr. Viv Salmon MD on 9/15/2020 2:33 PM    Us Thoracentesis    Result Date: 9/15/2020  PROCEDURE: US THORACENTESIS CLINICAL INFORMATION: moderate right pleural effusion on CT . COMPARISON: CT 9/15/2020 PROCEDURE: The benefits and the risk of the procedure were explained to the patient. The patient was given an opportunity to ask questions. Signed consent was obtained. Limited ultrasound exam of the right chest showed moderate amount of pleural fluid. Using ultrasound guidance, a site was marked on the skin. The right side of the posterior chest was prepped and draped using the usual sterile technique and the skin was anesthetized with 2 percent lidocaine. A 5 American one-step catheter was introduced to the right pleural space. 0.65 L of theresa colored fluid drained. The catheter was then removed. The patient tolerated the procedure well with no complications. Specimen sent for laboratory studies as requested. Estimated blood loss is 0 cc. Patient left the department in good condition. Successful ultrasound-guided thoracentesis with  0.65 L of fluid drained. **This report has been created using voice recognition software. It may contain minor errors which are inherent in voice recognition technology. ** Final report electronically signed by Dr. Phi Grossman on 9/15/2020 6:56 PM    Xr Chest Pa Inspiration 1 Vw    Result Date: 9/15/2020  PROCEDURE: XR CHEST PA INSPIRATION 1 VW CLINICAL INFORMATION: right thoracentesis. COMPARISON: CT 9/15/2020 TECHNIQUE: AP upright view of the chest. FINDINGS: Right pleural fluid is moderately improved. No gross pneumothorax. Moderate congestion again noted. There are mild airspace opacities bilaterally particularly within the perihilar regions suggesting pulmonary edema. Moderate enlargement of the cardiopericardial silhouette is unchanged. No pneumothorax post right thoracentesis. **This report has been created using voice recognition software. It may contain minor errors which are inherent in voice recognition technology. ** Final report electronically signed by Dr. Phi Grossman on 9/15/2020 6:54 PM      Support Devices (date placed):  [] ETT []Oral / [] Nasal  [] Gastric Tube [] OG / [] NG    [] Central Venous Line (Specify Site):  [] Urinary Catheter  [] Arterial Line (Specify Site)  [] Peripheral IV access  [] Other:    DVT prophylaxis: [x] Lovenox                                 [] SCDs                                 [] SQ Heparin                                 [] Encourage ambulation           [] Already on Anticoagulation     Code Status: Full Code    Tele:   [x] yes             [] no    Electronically signed by Karen Delcid DO on 9/20/2020 at 10:38 AM

## 2020-09-20 NOTE — PLAN OF CARE
Continue the use of bipap to help pt's respiratory status.   Problem: Impaired respiratory status  Goal: Able to breathe comfortably  Description: Able to breathe comfortably  Outcome: Ongoing

## 2020-09-20 NOTE — PROGRESS NOTES
Cardiology Progress Note      Patient:  Dominic Cohn  YOB: 1945  MRN: [id number]   Acct: [de-identified]  [facility] Date:  9/15/2020  Primary Cardiologist: Dr. Main Booker  Seen by Dr. Cy Schumacher    Per prior cardiology consult note-  REASON FOR CONSULTATION:  Congestive heart failure with recurrent  right-sided pleural effusion and recurrent thoracentesis.     PRIMARY CARDIOLOGIST:  Michael Lopez MD     History obtained from the patient and electronic medical record.     HISTORY OF PRESENT ILLNESS:  This is a 58-year-old pleasant   female patient, presented with worsening of shortness of breath and  worsening of leg swelling over the last few weeks and getting worse and  so she came to the emergency room. The swelling of the leg got worse to  an extent that performing activity of daily living has been impaired,  and she has orthopnea of three pillows. She had a history of recent  thoracentesis prior to this admission. The patient has no history of  fever or chills. No nausea, vomiting, diarrhea. No chest pain. She  has orthopnea and the patient has history of congestive heart failure;  dilated cardiomyopathy, nonischemic; hypertension, hyperlipidemia. She  has invasive ductal carcinoma of the right breast, status post  lumpectomy a year ago with postsurgical radiation.   So cardiology  evaluation was sought in view of the congestive heart failure; recurrent  right-sided pleural effusion requiring thoracentesis, ultrasound guided.         Subjective (Events in last 24 hours):     Pt in bed   Family at bedside     Pt has no chest pain   She looks tired and weak   Denies SOB     Tele Vpaced   BP slight on the low side       9/20/2020  Looks rested today - she states breathing improving but still needs O2 - planning on going to [facility] for Rehab     No chest pains     Tele V paced     Objective:   BP 96/68   Pulse 80   Temp 98.1 °F (36.7 °C) (Oral)   Resp 18   Ht 5' 2\" (1.575 m)   Wt 211 lb 4.8 oz (95.8 kg)   SpO2 95%   BMI 38.65 kg/m²        TELEMETRY: Vpaced    Physical Exam:  General Appearance: alert and oriented to person, place and time, in no acute distress  Cardiovascular: normal rate, regular rhythm, normal S1 and S2, no murmurs, rubs, clicks, or gallops, distal pulses intact,   Pulmonary/Chest: clear upper - crackles - Lt posterior lung   Rt - diminished but clear  to auscultation bilaterally- no wheezes, rales or rhonchi, normal air movement, no respiratory distress  Abdomen: soft, non-tender, non-distended, normal bowel sounds, no masses Extremities: no cyanosis, clubbing or edema, pulses present    Skin: warm and dry, no rash or erythema  Musculoskeletal: normal range of motion, no joint swelling, deformity or tenderness  Neurological: alert, oriented, normal speech, no focal findings or movement disorder noted    Medications:    acetaZOLAMIDE (DIAMOX) IVPB  500 mg Intravenous Once    bumetanide  1 mg Oral Daily    metoprolol succinate  25 mg Oral Daily    folic acid  1 mg Oral Daily    cefTRIAXone (ROCEPHIN) IV  1 g Intravenous Q24H    azithromycin  500 mg Intravenous Q24H    ferrous sulfate  325 mg Oral BID WC    insulin glargine  18 Units Subcutaneous Nightly    amiodarone  200 mg Oral Daily    aspirin EC  81 mg Oral Daily    atorvastatin  80 mg Oral Nightly    clopidogrel  75 mg Oral Daily    pregabalin  200 mg Oral BID    rOPINIRole  0.5 mg Oral Nightly    sodium chloride flush  10 mL Intravenous 2 times per day    polyethylene glycol  17 g Oral Daily    enoxaparin  40 mg Subcutaneous Q24H    famotidine  20 mg Oral Daily    insulin lispro  0-6 Units Subcutaneous TID WC    insulin lispro  0-3 Units Subcutaneous Nightly      dextrose       perflutren lipid microspheres, 1.5 mL, ONCE PRN  sodium chloride flush, 10 mL, PRN  acetaminophen, 650 mg, Q6H PRN    Or  acetaminophen, 650 mg, Q6H PRN  promethazine, 12.5 mg, Q6H PRN    Or  ondansetron, 4 mg, Q6H PRN  glucose, 15 g, PRN  dextrose, 12.5 g, PRN  glucagon (rDNA), 1 mg, PRN  dextrose, 100 mL/hr, PRN        Diagnostics:  EKG:    15-SEP-2020 16:25:32 [facility] ROUTINE RETRIEVAL  Ventricular-paced rhythm with frequent AV dual-paced complexes  Abnormal ECG  When compared with ECG of 15-SEP-2020 13:18,  Ventricular rate has increased BY 12 BPM  Confirmed by Artemio Salguero ([id number]) on 9/15/2020 8:29:14 PM    Echo:   Electronically signed by Simeon Paulson MD (Interpreting   physician) on 09/16/2020 at 10:17 PM   ----------------------------------------------------------------      Findings      Mitral Valve   The mitral valve structure was normal with normal leaflet separation. Aortic Valve   Aortic valve leaflets are Moderately calcified. Leaflets exhibited   moderately increased thickness and mildly reduced cuspal separation of the   aortic valve. Tricuspid Valve   The tricuspid valve structure was normal with normal leaflet separation. Pulmonic Valve   The pulmonic valve leaflets exhibited normal thickness, no calcification,   and normal cuspal separation. Left Atrium   The left atrium is Moderately dilated. Left Ventricle   Left Ventricular size is Mildly increased . Normal left ventricular wall thickness. There was severe global hypokinesis of the left ventricle. Systolic function was severely reduced. Ejection fraction is visually estimated in the range of 30% to 35%. Right Atrium   Mildly enlarged right atrium size. Right Ventricle   The right ventricular size was normal with normal systolic function and   wall thickness. Pacer Wire visualized in right ventricle. Pericardial Effusion   The pericardium was normal in appearance with no evidence of a pericardial   effusion. Pleural Effusion   No evidence of pleural effusion. Aorta / Great Vessels   -Aortic root dimension within normal limits.   -The Pulmonary artery is within normal limits.    -IVC size is within normal limits with normal respiratory phasic changes. Stress: Summary   This Nuclear Medicine study was abnormal .   moderate to large inferior lateral infarct   mild lisa infarct ischemia   abnormal LV function      Recommendation   Clinical correlation is recommended. Signatures      ----------------------------------------------------------------   Electronically signed by Naeem Jimenez MD (Interpreting   Cardiologist) on 11/20/2019    Left Heart Cath:   2009- CABG x3 - no cath after     Lab Data:    Cardiac Enzymes:  No results for input(s): CKTOTAL, CKMB, CKMBINDEX, TROPONINI in the last 72 hours.     CBC:   Lab Results   Component Value Date    WBC 7.1 09/20/2020    RBC 3.58 09/20/2020    HGB 7.8 09/20/2020    HCT 27.5 09/20/2020     09/20/2020       CMP:    Lab Results   Component Value Date     09/20/2020    K 3.7 09/20/2020    K 3.9 12/03/2019    CL 95 09/20/2020    CO2 42 09/20/2020    BUN 26 09/20/2020    CREATININE 1.2 09/20/2020    LABGLOM 44 09/20/2020    GLUCOSE 95 09/20/2020    GLUCOSE 130 03/23/2012    CALCIUM 8.7 09/20/2020       Hepatic Function Panel:    Lab Results   Component Value Date    ALKPHOS 112 09/17/2020    ALT 21 09/17/2020    AST 27 09/17/2020    PROT 6.6 09/17/2020    BILITOT 0.4 09/17/2020    BILIDIR <0.2 08/24/2020    LABALBU 3.8 09/17/2020    LABALBU 4.5 03/23/2012       Magnesium:    Lab Results   Component Value Date    MG 2.0 09/20/2020       PT/INR:    Lab Results   Component Value Date    INR 0.97 08/20/2019       HgBA1c:    Lab Results   Component Value Date    LABA1C 5.4 09/15/2020       FLP:    Lab Results   Component Value Date    TRIG 79 09/16/2020    HDL 40 09/16/2020    LDLCALC 27 09/16/2020       TSH:    Lab Results   Component Value Date    TSH 2.080 08/28/2019         Assessment:    Pleural effusion - recurrent (?chf or CA)   S\p thoracentesis 9/8/2020 & 9/15/2020     Acute on chronic systolic chf --  improved      dilated ICDMP

## 2020-09-20 NOTE — PROGRESS NOTES
Cardiology Progress Note      Patient:  Nancy Edwards  YOB: 1945  MRN: [id number]   Acct: [de-identified]  [address] [facility] Date:  9/15/2020  Primary Cardiologist: Dr. Jessica Pickett  Seen by Dr. Elvira Rios    Per prior cardiology consult note-  REASON FOR CONSULTATION:  Congestive heart failure with recurrent  right-sided pleural effusion and recurrent thoracentesis.     PRIMARY CARDIOLOGIST:  Dontrell Franco MD     History obtained from the patient and electronic medical record.     HISTORY OF PRESENT ILLNESS:  This is a 78-year-old pleasant   female patient, presented with worsening of shortness of breath and  worsening of leg swelling over the last few weeks and getting worse and  so she came to the emergency room. The swelling of the leg got worse to  an extent that performing activity of daily living has been impaired,  and she has orthopnea of three pillows. She had a history of recent  thoracentesis prior to this admission. The patient has no history of  fever or chills. No nausea, vomiting, diarrhea. No chest pain. She  has orthopnea and the patient has history of congestive heart failure;  dilated cardiomyopathy, nonischemic; hypertension, hyperlipidemia. She  has invasive ductal carcinoma of the right breast, status post  lumpectomy a year ago with postsurgical radiation.   So cardiology  evaluation was sought in view of the congestive heart failure; recurrent  right-sided pleural effusion requiring thoracentesis, ultrasound guided.         Subjective (Events in last 24 hours):     Pt in bed   Family at bedside     Pt has no chest pain   She looks tired and weak   Denies SOB     Tele Vpaced   BP slight on the low side       9/20/2020  Looks rested today - she states breathing improving but still needs O2 - planning on going to [facility] for Rehab     No chest pains     Tele V paced     Objective:   BP 96/68   Pulse 80   Temp 98.1 °F (36.7 °C) (Oral)   Resp 18   Ht 5' 2\" (1.575 m)   Wt 211 lb 4.8 oz (95.8 kg)   SpO2 95%   BMI 38.65 kg/m²        TELEMETRY: Vpaced    Physical Exam:  General Appearance: alert and oriented to person, place and time, in no acute distress  Cardiovascular: normal rate, regular rhythm, normal S1 and S2, no murmurs, rubs, clicks, or gallops, distal pulses intact,   Pulmonary/Chest: clear upper - crackles - Lt posterior lung   Rt - diminished but clear  to auscultation bilaterally- no wheezes, rales or rhonchi, normal air movement, no respiratory distress  Abdomen: soft, non-tender, non-distended, normal bowel sounds, no masses Extremities: no cyanosis, clubbing or edema, pulses present    Skin: warm and dry, no rash or erythema  Musculoskeletal: normal range of motion, no joint swelling, deformity or tenderness  Neurological: alert, oriented, normal speech, no focal findings or movement disorder noted    Medications:    acetaZOLAMIDE (DIAMOX) IVPB  500 mg Intravenous Once    bumetanide  1 mg Oral Daily    sacubitril-valsartan  1 tablet Oral BID    metoprolol succinate  25 mg Oral Daily    folic acid  1 mg Oral Daily    cefTRIAXone (ROCEPHIN) IV  1 g Intravenous Q24H    azithromycin  500 mg Intravenous Q24H    ferrous sulfate  325 mg Oral BID WC    insulin glargine  18 Units Subcutaneous Nightly    amiodarone  200 mg Oral Daily    aspirin EC  81 mg Oral Daily    atorvastatin  80 mg Oral Nightly    clopidogrel  75 mg Oral Daily    pregabalin  200 mg Oral BID    rOPINIRole  0.5 mg Oral Nightly    sodium chloride flush  10 mL Intravenous 2 times per day    polyethylene glycol  17 g Oral Daily    enoxaparin  40 mg Subcutaneous Q24H    famotidine  20 mg Oral Daily    insulin lispro  0-6 Units Subcutaneous TID WC    insulin lispro  0-3 Units Subcutaneous Nightly      dextrose       perflutren lipid microspheres, 1.5 mL, ONCE PRN  sodium chloride flush, 10 mL, PRN  acetaminophen, 650 mg, Q6H PRN    Or  acetaminophen, 650 mg, Q6H PRN  promethazine, 12.5 mg, Q6H PRN artery is within normal limits. -IVC size is within normal limits with normal respiratory phasic changes. Stress: Summary   This Nuclear Medicine study was abnormal .   moderate to large inferior lateral infarct   mild lisa infarct ischemia   abnormal LV function      Recommendation   Clinical correlation is recommended. Signatures      ----------------------------------------------------------------   Electronically signed by Gabriella Severin MD (Interpreting   Cardiologist) on 11/20/2019    Left Heart Cath:   2009- CABG x3 - no cath after     Lab Data:    Cardiac Enzymes:  No results for input(s): CKTOTAL, CKMB, CKMBINDEX, TROPONINI in the last 72 hours.     CBC:   Lab Results   Component Value Date    WBC 7.1 09/20/2020    RBC 3.58 09/20/2020    HGB 7.8 09/20/2020    HCT 27.5 09/20/2020     09/20/2020       CMP:    Lab Results   Component Value Date     09/20/2020    K 3.7 09/20/2020    K 3.9 12/03/2019    CL 95 09/20/2020    CO2 42 09/20/2020    BUN 26 09/20/2020    CREATININE 1.2 09/20/2020    LABGLOM 44 09/20/2020    GLUCOSE 95 09/20/2020    GLUCOSE 130 03/23/2012    CALCIUM 8.7 09/20/2020       Hepatic Function Panel:    Lab Results   Component Value Date    ALKPHOS 112 09/17/2020    ALT 21 09/17/2020    AST 27 09/17/2020    PROT 6.6 09/17/2020    BILITOT 0.4 09/17/2020    BILIDIR <0.2 08/24/2020    LABALBU 3.8 09/17/2020    LABALBU 4.5 03/23/2012       Magnesium:    Lab Results   Component Value Date    MG 2.0 09/20/2020       PT/INR:    Lab Results   Component Value Date    INR 0.97 08/20/2019       HgBA1c:    Lab Results   Component Value Date    LABA1C 5.4 09/15/2020       FLP:    Lab Results   Component Value Date    TRIG 79 09/16/2020    HDL 40 09/16/2020    LDLCALC 27 09/16/2020       TSH:    Lab Results   Component Value Date    TSH 2.080 08/28/2019         Assessment:    Pleural effusion - recurrent (?chf or CA)   S\p thoracentesis 9/8/2020 & 9/15/2020     Acute on chronic systolic chf --  improved      dilated ICDMP EF 30-35% (was 40-45% 11/2019)   ICD   - likely combination of infectious and CHF    AJ-- improving     Elevated trop-- DT all the above    Anemia     DMII  HTN  HLP    Hx breast cancer / lumpectomy   known lung nodule ?  MET    Hx PAD   Hx CAD- CABGx 3 2009  Hx ventricular arrhythmia- on amio        Plan:  · She looks improved   · Add entresto   · farixga at DC for CHF  · She needs ischemic workup of cath - we will do as OP once she is improved from PNA  · Follow          Electronically signed by ROMAIN Martino CNP on 9/20/2020 at 9:04 AM

## 2020-09-21 NOTE — TELEPHONE ENCOUNTER
Called patient. No answer. Unable to leave a message. Per Bhavana Leonard patient needs 6-8 week appointment with Dr. Swetha Rosas. Appointment made for 11-.

## 2020-09-21 NOTE — PLAN OF CARE
Problem: Falls - Risk of:  Goal: Will remain free from falls  Description: Will remain free from falls  Note: Patient free from falls this shift, 2 assist with Rachel Grief. Problem: Falls - Risk of:  Goal: Absence of physical injury  Description: Absence of physical injury  Note: No injuries this shift. Problem: Discharge Planning:  Goal: Discharged to appropriate level of care  Description: Discharged to appropriate level of care  Note: Patient planning to d/c to nursing home. Problem: Serum Glucose Level - Abnormal:  Goal: Ability to maintain appropriate glucose levels has stabilized  Description: Ability to maintain appropriate glucose levels has stabilized  Note: Blood sugar 235 tonight, insulin coverage provided. Problem: Tissue Perfusion - Cardiopulmonary, Altered:  Goal: Hemodynamic stability will improve  Description: Hemodynamic stability will improve  Note: Monitoring blood pressure, otherwise stable VS this shift. Problem: Skin Integrity:  Goal: Absence of new skin breakdown  Description: Absence of new skin breakdown  Note: No new skin breakdown this shift, staff assisting patient to turn Q2H. Care plan reviewed with patient, patient verbalized understanding of the plan of care and contributes to goal setting.

## 2020-09-21 NOTE — PROGRESS NOTES
Cardiology Progress Note      Patient:  Annabelle Rodriguez  YOB: 1945  MRN: [id number]   Acct: [de-identified]  [facility] Date:  9/15/2020  Primary Cardiologist: Dr. Lyndsey Turner, seen by Dr. Samira Orellana    Per Dr. Chipper Klinefelter consult note:  REASON FOR CONSULTATION:  Congestive heart failure with recurrent  right-sided pleural effusion and recurrent thoracentesis.     PRIMARY CARDIOLOGIST:  Dhaval Gatica MD     History obtained from the patient and electronic medical record.     HISTORY OF PRESENT ILLNESS:  This is a 71-year-old pleasant   female patient, presented with worsening of shortness of breath and  worsening of leg swelling over the last few weeks and getting worse and  so she came to the emergency room. The swelling of the leg got worse to  an extent that performing activity of daily living has been impaired,  and she has orthopnea of three pillows. She had a history of recent  thoracentesis prior to this admission. The patient has no history of  fever or chills. No nausea, vomiting, diarrhea. No chest pain. She  has orthopnea and the patient has history of congestive heart failure;  dilated cardiomyopathy, nonischemic; hypertension, hyperlipidemia. She  has invasive ductal carcinoma of the right breast, status post  lumpectomy a year ago with postsurgical radiation. So cardiology  evaluation was sought in view of the congestive heart failure; recurrent  right-sided pleural effusion requiring thoracentesis, ultrasound guided.     Subjective (Events in last 24 hours): F/U CHF, pleural effusion with thoracentesis. B/P low this am, 80/50-asymptomatic. Up in bedside chair in nad with O2 at 2L, going home with O2. Denies sob or chest pain. No LE edema. -11.8 L net fluid.  States feels much better    Objective:   BP (!) 80/50   Pulse 69   Temp 98.4 °F (36.9 °C) (Oral)   Resp 20   Ht 5' 2\" (1.575 m)   Wt 215 lb 12.8 oz (97.9 kg)   SpO2 96%   BMI 39.47 kg/m²        TELEMETRY: AV paced    Physical Exam:  General Appearance: alert and oriented to person, place and time, in no acute distress  Cardiovascular: normal rate, regular rhythm, normal S1 and S2, no murmurs, rubs, clicks, or gallops, distal pulses intact, no carotid bruits, no JVD  Pulmonary/Chest: fine crackles at bases bilaterally- no wheezes or rhonchi, normal air movement, no respiratory distress  Abdomen: soft, non-tender, non-distended, normal bowel sounds, no masses   Extremities: no cyanosis or clubbing, edema, pulses palpable  Skin: warm and dry, no rash or erythema  Head: normocephalic and atraumatic  Eyes: pupils equal, round, and reactive to light  Neck: supple and non-tender without mass, no thyromegaly   Musculoskeletal: normal range of motion, no joint swelling, deformity or tenderness  Neurological: alert, oriented, normal speech, no focal findings or movement disorder noted    Medications:    potassium chloride  40 mEq Oral BID    sacubitril-valsartan  1 tablet Oral BID    bumetanide  1 mg Oral Daily    metoprolol succinate  25 mg Oral Daily    folic acid  1 mg Oral Daily    cefTRIAXone (ROCEPHIN) IV  1 g Intravenous Q24H    azithromycin  500 mg Intravenous Q24H    ferrous sulfate  325 mg Oral BID WC    insulin glargine  18 Units Subcutaneous Nightly    amiodarone  200 mg Oral Daily    aspirin EC  81 mg Oral Daily    atorvastatin  80 mg Oral Nightly    clopidogrel  75 mg Oral Daily    pregabalin  200 mg Oral BID    rOPINIRole  0.5 mg Oral Nightly    sodium chloride flush  10 mL Intravenous 2 times per day    polyethylene glycol  17 g Oral Daily    enoxaparin  40 mg Subcutaneous Q24H    famotidine  20 mg Oral Daily    insulin lispro  0-6 Units Subcutaneous TID WC    insulin lispro  0-3 Units Subcutaneous Nightly      dextrose       perflutren lipid microspheres, 1.5 mL, ONCE PRN  sodium chloride flush, 10 mL, PRN  acetaminophen, 650 mg, Q6H PRN    Or  acetaminophen, 650 mg, Q6H PRN  promethazine, 12.5 mg, Q6H PRN Or  ondansetron, 4 mg, Q6H PRN  glucose, 15 g, PRN  dextrose, 12.5 g, PRN  glucagon (rDNA), 1 mg, PRN  dextrose, 100 mL/hr, PRN        Diagnostics:  EK/15/20  Ventricular-paced rhythm with frequent AV dual-paced complexes  Abnormal ECG  When compared with ECG of 15-SEP-2020 13:18,  Ventricular rate has increased BY  12 BPM  Confirmed by Artemio Walker   Limited Echo: 20  Summary   Left Ventricular size is Mildly increased . Normal left ventricular wall thickness. There was severe global hypokinesis of the left ventricle. Systolic function was severely reduced. Ejection fraction is visually estimated in the range of 30% to 35%. The left atrium is Moderately dilated. Signature      ----------------------------------------------------------------   Electronically signed by Simeon Paulson MD   Stress: 19  Summary   This Nuclear Medicine study was abnormal .   moderate to large inferior lateral infarct   mild lisa infarct ischemia   abnormal LV function      Recommendation   Clinical correlation is recommended. Signatures      ----------------------------------------------------------------   Electronically signed by Fatou Holguin MD         Lab Data:    Cardiac Enzymes:  No results for input(s): CKTOTAL, CKMB, CKMBINDEX, TROPONINI in the last 72 hours.     CBC:   Lab Results   Component Value Date    WBC 6.2 2020    RBC 3.65 2020    HGB 7.9 2020    HCT 28.2 2020     2020       CMP:    Lab Results   Component Value Date     2020    K 3.0 2020    K 3.9 2019    CL 95 2020    CO2 38 2020    BUN 30 2020    CREATININE 1.3 2020    LABGLOM 40 2020    GLUCOSE 115 2020    GLUCOSE 130 2012    CALCIUM 8.4 2020       Hepatic Function Panel:    Lab Results   Component Value Date    ALKPHOS 112 2020    ALT 21 2020    AST 27 2020    PROT 6.6 2020    BILITOT 0.4 09/17/2020    BILIDIR <0.2 08/24/2020    LABALBU 3.8 09/17/2020    LABALBU 4.5 03/23/2012       Magnesium:    Lab Results   Component Value Date    MG 2.0 09/20/2020       PT/INR:    Lab Results   Component Value Date    INR 0.97 08/20/2019       HgBA1c:    Lab Results   Component Value Date    LABA1C 5.4 09/15/2020       FLP:    Lab Results   Component Value Date    TRIG 79 09/16/2020    HDL 40 09/16/2020    LDLCALC 27 09/16/2020       TSH:    Lab Results   Component Value Date    TSH 2.080 08/28/2019         Assessment:  · Acute hypoxic resp failure 2/2 acute CHF exacerbation, PNA: improved  · Acute on chronic combined systolic and diastolic CHF: improved  · Severe dilated ICMP s/p ICD  New lower EF 30-35, down from 40-45 per echo 11/20/19  · Moderate right side pleural effusion s/p thoracentesis: cytology negative for malignancy  History of recent right thoracentesis 9/8/2020  · Elevated troponin: 0.059->0.057->0.048->0.068 likely 2/2 CHF, pulmonary congestion, hypoxia-no chest pain  · CAD: H/o 3 V CABG 2009, stress test 11/2019 negative for ischemia  · HTN  · HLD  · DM type 2  · H/o PAD  · H/o ventricular arrhythmia: on amiodarone  · H/o breast cancer s/p lumpectom      Plan:  Daily weight, I&O  · 2 g sodium, 2 L fluid restriction  · Diuresis, on bumex and metolazone  · Monitor electrolytes, replace prn  · Monitor renal function  · Ischemic w/up as outpatient  · Continue amiodarone, asa, lipitor, plavix  · Monitor B/P  · OMT for CMP-continue bumex, entresto as B/P tolerates  · D/c metoprolol d/t lower B/P  · F/U with CHF clinic as scheduled  · F/U with  [facility] in ~ 6 weeks         Electronically signed by ROMAIN Canas CNP on 9/21/2020 at 8:06 AM

## 2020-09-21 NOTE — CARE COORDINATION
Update: likely ECF today per report-discussed at rounds; SW following for [facility]  Electronically signed by Agnes Rodriguez RN on 9/21/2020 at 9:04 AM  Update: BP 80/s; continue to monitor; possible ECF in am if BP controlled; collaborated with Attending, YARELIS Sanchez  Electronically signed by Agnes Rodriguez RN on 9/21/2020 at 12:39 PM

## 2020-09-21 NOTE — DISCHARGE INSTR - COC
Continuity of Care Form    Patient Name: Sasha Goetz   :  1945  MRN:  [id number]    Admit date:  9/15/2020  Discharge date:  2020    Code Status Order: Full Code   Advance Directives:   Advance Care Flowsheet Documentation     Date/Time Healthcare Directive Type of Healthcare Directive Copy in [address] Agent's Name Healthcare Agent's Phone Number    [phone]  Yes, patient has an advance directive for healthcare treatment  --  --  --  --  --    09/15/20 1900  Yes, patient has an advance directive for healthcare treatment  Durable power of  for health care;Living will  Yes, copy in chart  Healthcare power of   spouse Aleshia Navarro  --          Admitting Physician:  Wendie King DO  PCP: Priti Reyna MD    Discharging Nurse: Seth Brar [facility] Unit/Room#: 4K-01/001-A  Discharging Unit Phone Number: [phone]    Emergency Contact:   Extended Emergency Contact Information  Primary Emergency Contact: Modesto Roth  Address: [address] [place] [address] [address] [place] [address], [address] [place] [address] Phone: [phone]  Relation: Spouse  Secondary Emergency Contact: [address] Phone: [phone]  Mobile Phone: [phone]  Relation: Child    Past Surgical History:  Past Surgical History:   Procedure Laterality Date    BREAST BIOPSY Right 2019    [facility]-    BREAST LUMPECTOMY Right 12/3/2019    RIGHT BREAST LUMPECTOMY, SENTINAL LYMPH NODE BIOPSY, TARGETED AXILLARY NODE DISSECTION, PREOP NEEDLE LOC X 2 performed by Shawnee Gates MD at [address] [place] [address]  09    [facility]    CARDIOVASCULAR STRESS TEST  01-27-10    Excellent treadmill exercise. Improved functional capacity to functional class 1 completed 8 METS. Hemodynamic response was appropriate. No ischemic ECG changes noted.      CARDIOVASCULAR STRESS TEST  10-28-09    No evidence of stress induced ischemia. Evidence of  prior transmural MI demonstrated by transient fixed defects of inferior wall extending into lateral wall, indicative of RCA lesion. Bowel and soft tissue attenuation interfering w/ counts of lateral wall. EF 29% w/ severe septal and inferolateral hypokinesis w/ very mild lateral wall hypokinesis being noted.  CARDIOVERSION  8-29-09    CHOLECYSTECTOMY  2005    Laparoscopic    COLONOSCOPY Left 1/9/2019    COLONOSCOPY performed by Tracy Garzon MD at [facility] CATH LAB PROCEDURE  8-24-09    Multivessel disease demonstrated by LAD having 70-80%  proximal lesion and napkin-ring lesion at bifurcation w/ D2. D2 appears to be medium large caliber vessel which has an ostial lesion of 70-80%. left -to-left collaterals as well as left-to-right collaterals emanating from LAD to PDA. Circumflex had anterior marginal branch and posterior marginal branch.  HERNIA REPAIR      Multiple    HYSTERECTOMY  1997    INSERTION / REMOVAL / REPLACEMENT VENOUS ACCESS CATHETER Right 7/31/2019    SINGLE LUMEN SMART PORT INSERTION performed by Ivonne Velazquez MD at [facility] 9 N/A 12/3/2019    SINGLE LUMEN KZALJXHIN REMOVAL RIGHT SUBCLAVIAN performed by Ivonne Velazquez MD at [facility] ECHOCARDIOGRAM  07-11-11    LV size mildly to moderately dilated. Systolic function markedly reduced. EF 25-30%. Severe diffuse hypokinesis. Grade 1 diastolic dysfunction. LA was mildly to moderately dilated. RV mildly dilated, systolic function mildly reduced. Mild MR and TR.  TRANSTHORACIC ECHOCARDIOGRAM  8-24-09    LV demonstrates severe hypokinesis of the inferior basal as well as  basal aneurysm being noted and paradoxical septal motion. EF 45-50%. LA is moderately dilated and AV demonstrates mild AV sclerosis w/o AS and AI. Trace MR and TV insufficiency.      VASCULAR SURGERY      cabg harvests from legs Immunization History:   Immunization History   Administered Date(s) Administered    Influenza Vaccine, unspecified formulation 11/06/2015    Influenza Virus Vaccine 12/04/2018    Influenza, High Dose (Fluzone 65 yrs and older) 10/10/2019    Influenza, SILVIO Guzman, (6 mo and older Fluzone, Flulaval, Fluarix and 3 yrs and older Afluria) 10/31/2017    Pneumococcal Conjugate 13-valent (Aylin Mano) 12/03/2018    Pneumococcal Polysaccharide ([id number]) 09/13/2017       Active Problems:  Patient Active Problem List   Diagnosis Code    ICD (implantable cardioverter-defibrillator) in place Z95.810    Hyperlipidemia E78.5    Hypertension I10    Ventricular arrhythmia I49.9    Ischemic cardiomyopathy I25.5    Diabetic neuropathy ([facility] E11.40    Type 2 diabetes mellitus with hyperglycemia, with long-term current use of insulin ([facility]) E11.65, Z79.4    PVD (peripheral vascular disease) ([facility] I73.9    Coronary artery disease involving coronary bypass graft of native heart without angina pectoris I25.810    Abdominal aortic aneurysm (AAA) without rupture ([facility]) I71.4    Pneumonia J18.9    Lower extremity edema R60.0    Acute hypoxemic respiratory failure ([facility]) J96.01    Pleural effusion, bilateral J90    Acute and chronic respiratory failure with hypoxia ([facility]) J96.21    Breast mass R32.0    Systolic CHF, acute ([facility]) I50.21    Sleep apnea G47.30    Hypoglycemia E16.2    Malignant neoplasm of left female breast ([facility]) C50.912    Iron deficiency anemia due to chronic blood loss D50.0    Orthostatic hypotension I95.1    Pancytopenia ([facility]) D61.818    Hx of coronary artery disease Z86.79    Chronic systolic heart failure ([facility]) I50.22    Anemia D64.9    Vitamin D deficiency E55.9    Acute on chronic systolic CHF (congestive heart failure) ([facility]) I50.23    Hypoxia R09.02    Dyspnea on exertion R06.09    Elevated troponin R79.89    Neuropathy due to chemotherapeutic drug ([facility] G62.0, T45.1X5A    Generalized weakness R53.1    Syncope and collapse R55    Anemia associated with chemotherapy D64.81, T45.1X5A    Malignant neoplasm of upper-outer quadrant of female breast (HCC) C50.419    Dehydration E86.0    Polyneuropathy due to drug ([facility] G62.0    Morbidly obese ([facility]) E66.01    Pleural effusion, right J90    Invasive ductal carcinoma of breast, female, right ([facility] C50.911    Acute on chronic congestive heart failure (HCC) I50.9       Isolation/Infection:   Isolation          No Isolation        Patient Infection Status     Infection Onset Added Last Indicated Last Indicated By Review Planned Expiration Resolved Resolved By    None active    Resolved    COVID-19 Rule Out 09/22/20 09/22/20 09/22/20 COVID-19 (Ordered)   09/22/20 Rule-Out Test Resulted    COVID-19 Rule Out 09/15/20 09/15/20 09/15/20 COVID-19 (Ordered)   09/15/20 Rule-Out Test Resulted          Nurse Assessment:  Last Vital Signs: BP (!) 104/58   Pulse 70   Temp 97.5 °F (36.4 °C) (Oral)   Resp 18   Ht 5' 2\" (1.575 m)   Wt 198 lb 3.2 oz (89.9 kg)   SpO2 96%   BMI 36.25 kg/m²     Last documented pain score (0-10 scale): Pain Level: 0  Last Weight:   Wt Readings from Last 1 Encounters:   09/24/20 198 lb 3.2 oz (89.9 kg)     Mental Status:  oriented, alert and can have intermittent confusion/forgetfulness    IV Access:  - None    Nursing Mobility/ADLs:  Walking   Assisted with guille stedy transfer device  Transfer  Assisted with guille stedy transfer device  Bathing  Assisted  Dressing  Assisted  Toileting  Assisted  Feeding  Assisted  Med Admin  Assisted  Med Delivery   whole and with water    Wound Care Documentation and Therapy:        Elimination:  Continence:   · Bowel: No, intermittent incontinence  · Bladder: perez catheter in place  Urinary Catheter:  Insertion Date: 9/23/2020   Colostomy/Ileostomy/Ileal Conduit: No       Date of Last BM: 9/24/2020    Intake/Output Summary (Last 24 hours) at 9/24/2020 1953  Last data filed at 9/24/2020 1241  Gross per 24 hour   Intake 1837.27 ml   Output 1130 ml   Net 707.27 ml     I/O last 3 completed shifts: In: 1382.8 [P.O.:880; I.V.:502.8]  Out: 1430 [FLTRV:8659]    Safety Concerns: At Risk for Falls and forgetfulness    Impairments/Disabilities:      Vision - wears glasses and is hard of hearing    Nutrition Therapy:  Current Nutrition Therapy:   - Oral Diet:  small, bite sized, cardiac diet    Routes of Feeding: Oral  Liquids: Thin Liquids  Daily Fluid Restriction: yes - amount 1500 mL  Last Modified Barium Swallow with Video (Video Swallowing Test): not done    Treatments at the Time of Hospital Discharge:   Respiratory Treatments: None  Oxygen Therapy:  is on oxygen at 2 L/min per nasal cannula.   Ventilator:    - BiPAP   IPAP: 16 cmH20, CPAP/EPAP: 6 cmH2O only when sleeping    Rehab Therapies: Physical Therapy, Occupational Therapy and Speech/Language Therapy  Weight Bearing Status/Restrictions: No weight bearing restirctions  Other Medical Equipment (for information only, NOT a DME order):  wheelchair and guille stedy transfer device  Other Treatments: Turn every 2 hours    Patient's personal belongings (please select all that are sent with patient):  Dentures upper and lower, Jewelry, pants, shirt, and footwear    RN SIGNATURE:  Electronically signed by Giuliano Trimble RN on 9/24/20 at 12:16 PM EDT    CASE MANAGEMENT/SOCIAL WORK SECTION    Inpatient Status Date:  09/15/2020    Readmission Risk Assessment Score:  Readmission Risk              Risk of Unplanned Readmission:        26           Discharging to Facility/ Agency   · Name:  [facility]  · Address:  [address], [address] [place] [address]  · Phone:  [phone]  · Fax:  [phone]    Dialysis Facility (if applicable)   · Name:  · Address:  · Dialysis Schedule:  · Phone:  · Fax:    / signature: Electronically signed by MOLLY Clark on 9/21/20 at 9:00 AM EDT    PHYSICIAN SECTION    Prognosis: Fair    Condition at Discharge: Stable    Rehab Potential (if transferring to Rehab): Good    Recommended Labs or Other Treatments After Discharge: Start Augmentin BID for 5 days    Physician Certification: I certify the above information and transfer of Michelle Marquis  is necessary for the continuing treatment of the diagnosis listed and that she requires [facility] for greater 30 days.      Update Admission H&P: No change in H&P    PHYSICIAN SIGNATURE:  Electronically signed by Lynette Trejo MD on 9/24/20 at 11:33 AM EDT

## 2020-09-21 NOTE — CARE COORDINATION
9/21/20, 9:03 AM EDT    DISCHARGE PLANNING EVALUATION      YARELIS spoke to [facility] with [address]. They have a bed and able to take today if discharged  9/21/20, 1:32 PM EDT    DISCHARGE PLANNING EVALUATION      YARELIS informed [facility] with ECF that patient not discharged today due to blood pressure.

## 2020-09-21 NOTE — FLOWSHEET NOTE
09/21/20 1119   Provider Notification   Reason for Communication Evaluate   Provider Name Dr. Juan R Anderson   Provider Notification Resident   Method of Communication Face to face   Notification Time [phone]   Doctor aware of 81/42 manual BP. Orders to continue monitoring.

## 2020-09-21 NOTE — PROGRESS NOTES
[address] [address]  INPATIENT PHYSICAL THERAPY  DAILY NOTE  [facility] ICU STEPDOWN TELEMETRY 4K - [id number]    Time In: 1123  Time Out: 1146  Timed Code Treatment Minutes: 23 Minutes  Minutes: 23          Date: 2020  Patient Name: Evelyn Minor,  Gender:  female        MRN: [id number]  : 1945  (67 y.o.)     Referring Practitioner: Gino Coronado MD  Diagnosis: hypoxia  Additional Pertinent Hx: from H&P: 23-year-old morbidly obese  female presents emergency department for evaluation of shortness of breath and bilateral leg swelling. Patient states her shortness of breath and leg swelling is been getting worse over the last year however this week she is just more changes had difficulty performing her ADLs secondary to more dyspnea on exertion. She is recently had a therapeutic and diagnostic thoracentesis denies any fever, chest pain, numbness, tingling at this time. Her past medical history includes ventricular arrhythmias diabetes ICD placement, hypertension, hyperlipidemia CHF, invasive ductal carcinoma breast right side. Prior Level of Function:  Lives With: Spouse  Type of Home: House  Home Layout: One level  Home Access: Stairs to enter with rails  Entrance Stairs - Number of Steps: 2  Entrance Stairs - Rails: (1 rail)  Home Equipment: 4 wheeled walker        Receives Help From: Family  ADL Assistance: Independent  Homemaking Assistance: Needs assistance  Ambulation Assistance: Independent  Transfer Assistance: Independent  Additional Comments: Pt stating she was indep with all ADL tasks. Pt alsos \"I can wash clothes and can cook. \"    Restrictions/Precautions:  Restrictions/Precautions: General Precautions, Fall Risk(on oxygen - nonrebreather)  Position Activity Restriction  Other position/activity restrictions: pt requires assistance to get up. Recommend guille steady for transfers, +2 for transfers    SUBJECTIVE: RN approved session.  Pt in recliner upon arrival and agrees to therapy, pt reporting that she is wanting to get back to bed. PAIN: \"buttocks and legs from sitting \"    OBJECTIVE:  Bed Mobility:  Sit to Supine: Minimal Assistance, Moderate Assistance, X 2   Scooting: Minimal Assistance, Moderate Assistance to reposition in bed    Transfers:  Sit to Stand: Minimal Assistance, X 2  Stand to Sit:Minimal Assistance, X 2  recliner > bed: Dependent, using FABRICIO steady  Per RN BP was very low 80s/40s this am therefore did not complete any further standing tasks    Exercise:  Patient was guided in 1 set(s) 10 reps of exercise to both lower extremities. Ankle pumps, Glut sets, Quad sets, Heelslides, Hip abduction/adduction and Straight leg raises. Exercises were completed for increased independence with functional mobility. Functional Outcome Measures: Completed  AM-[facility] Inpatient Mobility Raw Score : 9  AM-[facility] Inpatient T-Scale Score : 30.55    ASSESSMENT:  Assessment: Patient progressing toward established goals. Activity Tolerance:  Patient tolerance of  treatment: good. Pt tolerated session well. Pt demos decreased strength, endurance, balance, and independence with mobility. Pt will benefit from cont PT at this time to improve overall mobility and independence with tasks     Equipment Recommendations:Equipment Needed: No  Discharge Recommendations:  [address] [place] with PT    Plan: Times per week: 3-5x GM  Times per day: Daily  Current Treatment Recommendations: Strengthening, Functional Mobility Training, Transfer Training, Balance Training, Endurance Training, Stair training    Patient Education  Patient Education: Plan of Care, [employer] Mobility, Transfers, Verbal Exercise Instruction    Goals:  Patient goals : discharge from hospital  Short term goals  Time Frame for Short term goals: by hospital discharge  Short term goal 1: pt will transfer from supine to sit with min assist x 1.   Short term goal 2: pt will transfer from sit to/from stand with min assist x 1. Short term goal 3: pt will ambulate 21' with min assist x1 and least restrictive device to mobilize around her room. Long term goals  Time Frame for Long term goals : N/A due to estimated short LOS. Following session, patient left in safe position with all fall risk precautions in place.

## 2020-09-21 NOTE — PROGRESS NOTES
Hospitalist Progress Note      Patient:  Lissa Denny    Unit/Bed:4K-01/001-A  YOB: 1945  MRN: [id number]   Acct: [de-identified]   PCP: Shruti Garcia MD  Date of Admission: 9/15/2020    Assessment/Plan:     # Acute hypoxic respiratory failure due to CHF exacerbation and possible Pneumonia - NYHA IV.  HRrEF (40-45%) per old ECHO 11/2019. Progressive SOB, fatigue, orthopnea, bilateral lower extremity edema with recent outpatient CHF clinic management. She has been progressively getting worse, despite optimal medical therapy. On admission Pro-BNP is 9242, Troponin level at 0.059 but trending down to 0.048. Dry weight is between 205-210 pounds and her current weight is 214, almost at goal. Per chart review, since 8/24/20 she has been seeing CHF clinic and her weight dropped to 217lbs from 231lbs with medical outpatient management. Patient is currently on Bumex 1mg BID and metolazone 5mg with goal to decrease edema, improve shortness of breath. - Will continue with Bumex 1mg BID and monitor daily weight, intake and output. We have to be careful with metolazone as it can worsen kidney function. Will consider Nephrology for further recommendation.   - Limit sodium < 2 g/day  - Fluid restriction to 2 L/day  - Will monitor renal function with daily BMP  - Daily weight  - Cardiology following. Will attempt functional study when stable. - ECHO pending     9/17/20: Results of ECHO show ejection fraction at 20-35%, which has significantly decreased from previous ECHO, worsening of CHF. This morning pt has an increase in oxygen demand despite saturation between 91-95% on 6L. Placed on BiPAP. Chest Xray shows bilateral pulmonary infiltrates. One febrile episode overnight. Start Rocephin 1g and Azithromycin 500mg.    Will continue with Bumex 1g and monitor urine output, since admission patient has diuresed ~5L  Will continue to monitor vital signs, urine output. 9/18/20: Since admission total fluid loss is ~7L. Will continue with IV diuresis and increase Bumex to 2g BID as the current weight is 224, up from 214 on admission. Currently on 6L of O2 nasal canula. BiPAP PRN. Still presence of bilateral crackles. Check chest x-ray in the morning for evaluation of pulmonary infiltrates. Will continue with Azithromycin and Rocephin until clinically improves. She remains afebrile  Cardiology following, appreciate recommendations. Will resume vasotec when blood pressure tolerates and renal function is stable. 9/19/20: Patient continues to diurese well, thus far ~9L. Continue with Bumex 2mg IV q12h. Will monitor renal function. Continue with Azithromycin and Rocephin. Patient remains afebrile. She remains on 6L of supplemental oxygen. Goal is to wean off O2 as tolerated. Still requires BiPAP at night. Chest xray this morning reveals slightly improved pulmonary vascular congestion. 9/20/20: Patient has diuresed total of 11,680. Held Bumex this am. Mayte function WNL however Patient's CO2 increased at 42  Continue with Azithromycin and Rocephin. Patient remains afebrile. Added one time Dose of Dimox to help with decreasing Co2  Weaned down to 4L of nasal cannula. Goal is to wean off O2 as tolerated. Still requires BiPAP at night. Cardiology has added Entresto   will add Darwin Splinter at DC for HF  Will need ischemic workup of Cath- will do as OP once she is improved from PNA  Repeat Chest xray in am    9/21/20: Patient has diuresed total of -11,880 L. Overnight and up until this morning, her blood pressure was low, between 78-38 systolic but the patient remained asymptomatic with no dizziness, lightheadedness or confusion. This is mot likely due to recent diuresis. Morning dose of amiodarone, bumex and entresto was held. Will resume once blood pressure is stable and patient can tolerate it. Will continue to monitor blood pressure and for symptoms.    Will discontinue  metoprolol due to low blood pressure and per cardiology. Continues to be on BiPAP at night. On 5L this morning but will continue weaning off as tolerated. Most likely will need O2 at discharge. Potassium this morning 3.0, most likely 2/2 to diuresis. Will replace with 40mEq PO BID. Will re-check potassium      # Right pleural infusion s/p thoracentesis - History of breast cancer. CTA chest shows right upper lobe nodule measuring 1.2 cm which has increased in size compared to 7/8/2019 concerning for malignancy. Pleural fluid shows total nucleated cells to be 974 which is on the line between being either transudative or exudative. The diferential includes malignancy, constricitive pericarditis, pulmonary embolism, pneumonia. Patient has been hypoxic saturating between 78-85%. - Continue to monitor vital signs with goal to keep spO2 >92%    9/17/20: Culture-final report pending. Initiated BiPAP due to increase in oxygen demand. Currently saturating 94%. Will continue to monitor    9/18/20 Cytology - pending    9/19/20 Cytology shows no malignant cells. # Diabetes mellitus type II - Hold home Metformin and Lantus. Will place on insulin sliding scale  Lantus and will adjust as needed. 9/16 Blood sugars have been between 123-188. Continue with monitoring. # Invasive ductal carcinoma s/p lumpectomy - last chemotherapy and radiation in 2019.  - Heme/Onc has been consulted. Appreciate recommendation. # Pancytopenia: possibly due to chemo therapy. 9/18: Will start folic acid 1 mg      # Hyperlipidemia - will continue Lipitor 80mg      # Diabetic Neuropathy - will continue Lyrica 200mg     # Hx of ventricular arrhythmia - will continue amiodarone 200mg     # Hx of CAD: will continue ASA 81mg, Plavix 75mg.        Expected discharge date:  9/20/20    Disposition:    [x] Home       [] TCU       [] Rehab       [] Psych       [] SNF       [] [place]       [] Other-    Chief Complaint: shortness of breath, lower leg edema    Hospital Treatment Course: (Prior to today)   68-year-old morbidly obese  female presents emergency department for evaluation of shortness of breath and bilateral leg swelling. She has a significant PMHx of CHF HFrEF (EF 40%-45%, CAD, CABG (3V,2009), HTN, HLD, AAA, PVD, DM,   Breast CA w/ mets to lung. Patient states her shortness of breath and leg swelling is been getting worse over the last year however this week she is has difficulty with performing her ADLs secondary to more dyspnea on exertion. She  recently had a therapeutic and diagnostic thoracentesis. Denies any fever, chest pain, numbness, tingling at this time. Her past medical history includes ventricular arrhythmias diabetes ICD placement, hypertension, hyperlipidemia CHF, invasive ductal carcinoma of the right breast.   Per chart review, she is followed in the heart failure clinic. Her dry weight is between 205-210 pounds. On August 24 she was given 1 unit of Lasix 20 g IV. On August 26 she continued to have an increase in shortness of breath on exertion, lower extremity edema, and her Bumex was doubled for 3 days. On August 31 she was very short of breath and was put on IV Bumex 2 mg, metolazone 5 mg x 2 days. Yesterday at the heart failure clinic, she continued to be fatigued, tired, saturating between 85-90%, with shortness of breath at rest, or lower extremity edema. Recently she had a therapeutic and diagnostic thoracentesis. 9/16/20: Patient is currently on Bumex 1mg BID and Metolazone 5mg. ECHO - pending. 9/17/20: Overnight patient was confused and appeared to be weak. She was unable to void and urinary cath was placed that drained 2L of urine. She spiked one time fever of 102.9 and was started on Azithromycin 500mg and Rocephin 1g, Chest Xray shows bilateral pulmonary infiltrates. She was placed on BiPAP due to an increase in oxygen demand.  Pt responded only to sternal rub. Her blood gas reveals pH 7.28, pCO2 63, pO2 51, Bicarb 38.    9/18 Patient oxygen saturation dropped to the 80s. She was placed on BiPAP 40% FiO2. On nasal canula her O2 needs increased to 6L. CT negative for any intracranial abnormalities. 9/19 Overnight she was placed on BiPAP as her need for oxygen increased. Since this morning she remains on 6L of O2. Continues to make small clinical improvements. 9/20: Patient overnight requird BIPAP. Patient however has been weaned down to 4L in am and stating at 94%. Will continue to wean. Held Bumex since patient has diuresed around 11,600 mL and increased Bicarb at 42. Given one time dose of Dimox for the increasing Bicarb. Repeat Chest xray in am.     9/21: Patient remains on BiPAP at night mostly but is on 5L with SpO2 between 94-96%. Blood pressure has been low in the range 56-92 systolic, remains asymptomatic. Holding amiodorone, bumex, entresto. Will resume after blood pressure is stable. Subjective (past 24 hours):     Patient is resting in the chair. States that she is feeling better today. Denies chest pain, denies shortness of breath, denies weakness, denies dizziness, denies being lightheaded. Past medical history, family history, social history and allergies reviewed again and is unchanged since admission. ROS (12 point review of systems completed. Pertinent positives noted.  Otherwise ROS is negative)     Medications:  Reviewed    Infusion Medications    dextrose       Scheduled Medications    potassium chloride  40 mEq Oral BID    sacubitril-valsartan  1 tablet Oral BID    bumetanide  1 mg Oral Daily    folic acid  1 mg Oral Daily    cefTRIAXone (ROCEPHIN) IV  1 g Intravenous Q24H    azithromycin  500 mg Intravenous Q24H    ferrous sulfate  325 mg Oral BID     insulin glargine  18 Units Subcutaneous Nightly    amiodarone  200 mg Oral Daily    aspirin EC  81 mg Oral Daily    atorvastatin  80 mg Oral HCT 26.2* 27.5* 28.2*   * 114* 109*     Recent Labs     09/19/20  0526 09/20/20  0533 09/21/20  0522    142 140   K 3.6 3.7 3.0*    95* 95*   CO2 38* 42* 38*   BUN 32* 26* 30*   CREATININE 1.3* 1.2 1.3*   CALCIUM 8.7 8.7 8.4*     No results for input(s): AST, ALT, BILIDIR, BILITOT, ALKPHOS in the last 72 hours. No results for input(s): INR in the last 72 hours. No results for input(s): Rima Ke in the last 72 hours. Microbiology:    Blood culture #1:   Lab Results   Component Value Date    BC No growth-preliminary  09/17/2020       Blood culture #2:No results found for: Kaitlin Crook    Organism:  Lab Results   Component Value Date    ORG Enterobacter aerogenes 09/11/2019         Lab Results   Component Value Date    LABGRAM  09/15/2020     Few segmented neutrophils observed. No organisms observed. performed on cytospun specimen       MRSA culture only:No results found for: [address] [place] [address]    Urine culture:   Lab Results   Component Value Date    Pablo Camarena  09/11/2019     [place] count: >100,000 CFU/mLEnterobacter species which may be initially susceptible tothird generation cephalosporins may develop resistance withinthree to four days of initiation of this antimicrobialtherapy. Respiratory culture: No results found for: CULTRESP    Aerobic and Anaerobic :  No results found for: LABAERO  Lab Results   Component Value Date    LABANAE No growth-preliminary No growth  09/15/2020       Urinalysis:      Lab Results   Component Value Date    NITRU NEGATIVE 09/17/2020    WBCUA NONE SEEN 09/17/2020    BACTERIA NONE SEEN 09/17/2020    RBCUA 0-2 09/17/2020    BLOODU NEGATIVE 09/17/2020    SPECGRAV 1.011 09/17/2020    GLUCOSEU NEGATIVE 08/28/2019       Radiology:  XR CHEST PORTABLE   Final Result   Cardiomegaly with pulmonary vascular congestion and a small sided pleural effusion.  Overall similar appearance of the chest when compared to the previous exam.               **This report has been created 1.2 cm which has increased in size compared to 7/8/2019 concerning for malignancy. **This report has been created using voice recognition software. It may contain minor errors which are inherent in voice recognition technology. **      Final report electronically signed by Dr. Elicia Goldman MD on 9/15/2020 2:33 PM        Cta Chest W Wo Contrast    Result Date: 9/15/2020  PROCEDURE: CTA CHEST W WO CONTRAST CLINICAL INFORMATION: shortness of breath, hx cancer. Leg swelling and shortness of breath. COMPARISON: CT chest dated 8/26/2020. TECHNIQUE: 3 mm axial images were obtained through the chest during the administration of 80 mL Isovue-370 injected in the right forearm. A non-contrast localizer was obtained. 3D reconstructions were performed on the scanner to include MIP coronal and sagittal images through the chest. All CT scans at this facility use dose modulation, iterative reconstruction, and/or weight-based dosing when appropriate to reduce radiation dose to as low as reasonably achievable. FINDINGS:  There is a good contrast bolus within the pulmonary arteries, adequate for evaluation to the subsegmental level. There are no filling defects within the pulmonary arteries to suggest pulmonary embolus. There is stable mural calcification in the aortic arch. No aneurysm or dissection is present. No axillary, mediastinal or hilar lymphadenopathy is identified. The heart is enlarged, stable compared to prior exam. There is stable left-sided cardiac pacer/defibrillator generator with stable leads in the right heart. There is a moderate right pleural effusion, similar to prior CT. Pulmonary vessels are prominent anteriorly along with mild interstitial prominence. Redemonstration of a nodule in the right upper lobe posteriorly. This measures 1.2 cm and appears to be mildly increased in size compared to 7/8/2019 when it measured 1 cm. Visualized upper abdominal solid organs are unremarkable.  There are stable degenerative changes of the thoracic spine. 1. No evidence of pulmonary embolus or acute intrathoracic abnormality. 2. Persistent cardiomegaly and moderate right pleural effusion with pulmonary vascular congestion, similar to 8/26/2020. 3. Right upper lobe nodule measuring 1.2 cm which has increased in size compared to 7/8/2019 concerning for malignancy. **This report has been created using voice recognition software. It may contain minor errors which are inherent in voice recognition technology. ** Final report electronically signed by Dr. Viv Salmon MD on 9/15/2020 2:33 PM    Us Thoracentesis    Result Date: 9/15/2020  PROCEDURE: US THORACENTESIS CLINICAL INFORMATION: moderate right pleural effusion on CT . COMPARISON: CT 9/15/2020 PROCEDURE: The benefits and the risk of the procedure were explained to the patient. The patient was given an opportunity to ask questions. Signed consent was obtained. Limited ultrasound exam of the right chest showed moderate amount of pleural fluid. Using ultrasound guidance, a site was marked on the skin. The right side of the posterior chest was prepped and draped using the usual sterile technique and the skin was anesthetized with 2 percent lidocaine. A 5 [place] one-step catheter was introduced to the right pleural space. 0.65 L of theresa colored fluid drained. The catheter was then removed. The patient tolerated the procedure well with no complications. Specimen sent for laboratory studies as requested. Estimated blood loss is 0 cc. Patient left the department in good condition. Successful ultrasound-guided thoracentesis with  0.65 L of fluid drained. **This report has been created using voice recognition software. It may contain minor errors which are inherent in voice recognition technology. ** Final report electronically signed by Dr. Kandace Daly on 9/15/2020 6:56 PM    Xr Chest Pa Inspiration 1 Vw    Result Date: 9/15/2020  PROCEDURE: XR CHEST PA

## 2020-09-21 NOTE — FLOWSHEET NOTE
09/21/20 0815   Provider Notification   Reason for Communication Evaluate   Provider Name Dr. Humaira España   Provider Notification Resident   Method of Communication Face to face   Notification Time [id number]   Notified doctor of manual BP of 80/50 and current orders to give amiodarone, toprol XL, bumex and entresto. Dr. Humaira España states to hold the above mentioned medications and recheck BP by 1100.

## 2020-09-22 NOTE — PROGRESS NOTES
Straight catheterization performed. Patient tolerated well. 325 ml of urine returned. Urine sent for labs. Will watch another 6 hours and see if patient voids and do another bladder scan.

## 2020-09-22 NOTE — PROGRESS NOTES
[address] ICU STEPDOWN TELEMETRY 4K  Bedside Swallowing Evaluation      SLP Individual Minutes  Time In: 1054  Time Out: 1110  Minutes: 16  Timed Code Treatment Minutes: 0 Minutes       Date: 2020  Patient Name: Michelle Clarke      CSN: [id number]   : 1945  (67 y.o.)  Gender: female   Referring Physician:  Horacio Cordero MD  Diagnosis: Hypoxia  Secondary Diagnosis: Dysphagia    History of Present Illness/Injury: Pt admitted to [facility] with above med dx; please refer to physician H&P for full details. ST consulted to assess swallow function via clinical swallow evaluation given increasing concerns for potential aspiration pneumonia per recent chest XR (\"increasing opacities near the right lung base\"   Past Medical History:   Diagnosis Date    Breast cancer ([facility] [address] 2019    right invasive ductal carcinoma    CAD (coronary artery disease)     sees Dr Darra Scheuermann CHF (congestive heart failure) ([facility] [address]     Hyperlipidemia     Hypertension     ICD (implantable cardiac defibrillator), dual, in situ     [employer] dual ICD    Numbness and tingling     both feet    Pneumonia     Shingles 2014    Sleep apnea     [employer] dual ICD     Type II or unspecified type diabetes mellitus without mention of complication, not stated as uncontrolled     Ventricular arrhythmia        SUBJECTIVE:  Pt resting in bed upon arrival, requiring MAX cues + sternal rub to improve alertness levels. Fatigue apparent throughout evaluation. OBJECTIVE:    Pain:  No pain reported. Current Diet: Regular with thin liquids    Respiratory Status:  Nasal Canula; 2L     Behavioral Observation:  Alert and Lethargic    Oral Mechanism Evaluation:      Facial / Labial Impaired Decreased tone   Lingual Impaired Reduced strength   Dentition WFL Full dentures   Velum WFL    Vocal Quality WFL    [facility]    Cough WFL      Patient Evaluated Using:   Thin H20 via straw; mixed/soft solids; hard/coarse solids    Oral Phase:  Impaired:  Impaired Mastication and Reduced Bolus Formation    Pharyngeal Phase: Impaired:  Decreased Hyolaryngeal Elevation    Signs and Symptoms of Laryngeal Penetration/Aspiration: No signs/symptoms of aspiration evident in this evaluation, but cannot rule out silent aspiration. Impresssions: Pt presents with mild oropharyngeal dysphagia based on findings outlined above. Unable to exclude oropharyngeal dysphagia at bedside, in which pt with dysphagia risk factors, including admission for acute hypoxic respiratory failure and overall heightened fatigue levels. Oral phase slightly prolonged with increased time required to ensure textural breakdown of hard/coarse solids with presence of min-mod oral stasis dominating on medial dorsal surface; improved oral phase abilities with completed trials of mixed/soft solids. Swallow initiation relatively timely with slightly reduced hyolaryngeal elevation upon tactile palpitation (would need formal imaging to confirm pharyngeal integrity). Thin H20 via straw consumed withOUT overt difficulty. NO overt s/s aspiration exhibited across all consistencies/trials consumed, certainly not able to r/o premature spillage/pharyngeal entry or silent aspiration at bedside alone. Pt did note with reflexive cough PRIOR to administration of PO trials. Recommend soft and bite size diet with thin liquids given overall fatigue levels with CLOSE pulmonary monitoring to ensue.      *following completion of BSE, transitioned to skilled service provision with direct education provided re: anatomy/physiology of swallow mechanism, rationale for established diet level, compensatory swallowing strategies (full upright positioning, small bite/sips, slow rate for intake, alternating solids/liquids), and s/s aspiration (immediate/delayed coughing, throat clearing, wet vocal quality) and potential implications (pneumonia, recurrent/prolonged hospitalizations, recurrent/prolonged hospitalizations); verbal receptiveness noted      RECOMMENDATIONS/ASSESSMENT:   Modified Barium Swallow:  MBS is not indicated at this time. Will recommend as appropriate  Diet Recommendations:  Soft and bite size with thin liquids  Strategies:  Full Upright Position, Small Bite/Sip, Pulmonary Monitoring, Supervision, Alternate Solids and Liquids, Limit Distractions and Monitor for Fatigue   Rehabilitation Potential: good    EDUCATION:  Learner: Patient  Education:  Reviewed results and recommendations of this evaluation, Reviewed diet and strategies, Reviewed signs, symptoms and risks of aspiration, Reviewed ST goals and Plan of Care and Reviewed recommendations for follow-up  Evaluation of Education: Verbalizes understanding, Needs further instruction and Family not present    PLAN:  Skilled SLP intervention on acute care 3-5 x per week or until goals met and/or pt plateaus in function. Specific interventions for next session may include: dietary analysis, pulmonary monitoring. PATIENT GOAL:    Did not state. Will further assess during treatment. SHORT TERM GOALS:  Short-term Goals  Timeframe for Short-term Goals: 2 weeks  Goal 1: Pt will safely consume soft and bite size diet with thin liquids without overt s/s aspiration to ensure adequate nutrition/hydration measures. Goal 2: Pt will safely consume advanced texture trials demonstrating adequate mastication pattern, textural breakdown, cohesive bolus formation, and endurance to determine readiness for potential dietary upgrade. Goal 3: Monitor pulmonary status; complete formal instrumentation should adverse changes be documented.     LONG TERM GOALS:  No LTG established given short ERIC Das M.A., [facility]

## 2020-09-22 NOTE — PROGRESS NOTES
to Stand: Moderate Assistance. x 2 from EOB onto FABRICIO STEDY  Stand to sit: min A x 2 into recliner    ADDITIONAL ACTIVITIES:  Completed BUE AROM exercises for horizontal add/abduction x 10 reps-again noticing BUE jerky movement. Vitals obtained at that time BP 85/46, HR 71, O2 95%-nurse notified, instructed to place Pt with feet up in recliner (Pt already placed that way-Pt denies any light headedness or dizziness)    ASSESSMENT:     Activity Tolerance:  Patient tolerance of  treatment: fair. Discharge Recommendations: Patient would benefit from continued therapy after discharge, Continue to assess pending progress, Subacute/Skilled Nursing Facility    Equipment Recommendations: Other: continue to assess  Plan: Times per week: 5x  Current Treatment Recommendations: Patient/Caregiver Education & Training, Strengthening, Equipment Evaluation, Education, & procurement, Balance Training, Functional Mobility Training, Endurance Training, Self-Care / ADL, Safety Education & Training    Patient Education  Patient Education: t/f technique, safety with mobility    Goals  Short term goals  Time Frame for Short term goals: by discharge  Short term goal 1: Pt to sit EOB unsupport completing ADL tasks with SBA and no vcs for safety  Short term goal 2: Pt to complete sit to stand from various surfaces includng BSC  with min A x1 to increase indep with toileting tasks  Short term goal 3: pt to increase endurance to stand at midline > 1 min with consistent balance of min A x1 and min cues to increase participation in ADL tks  Short term goal 4: Pt to complete UB ADL tasks with min A  Short term goal 5: OTR to further assess transfers and mobility    Following session, patient left in safe position with all fall risk precautions in place.

## 2020-09-22 NOTE — CARE COORDINATION
9/22/20, 12:36 PM EDT    DISCHARGE PLANNING EVALUATION      YARELIS spoke to patient to patient to inform that she was approved for [facility]. YARELIS then spoke to her spouse and told him the same thing. He thinks he could transport. YARELIS advised that he may need to get an oxygen tank at the [facility] before he comes in and that if discharged tomorrow we will call him before he comes in. YARELIS spoke to [place] with ECF and they will give him a tank of o2. The ECF wants another COVID test due to the temp.   YARELIS updated the nurse and CM

## 2020-09-22 NOTE — PLAN OF CARE
Problem: Falls - Risk of:  Goal: Will remain free from falls  Description: Will remain free from falls  Outcome: Ongoing  Note: Patient free from falls this shift, bed alarm in place. Problem: Falls - Risk of:  Goal: Absence of physical injury  Description: Absence of physical injury  Outcome: Ongoing  Note: No injuries this shift. Problem: Discharge Planning:  Goal: Discharged to appropriate level of care  Description: Discharged to appropriate level of care  Outcome: Ongoing  Note: Patient d/c to [facility]. Problem: Serum Glucose Level - Abnormal:  Goal: Ability to maintain appropriate glucose levels has stabilized  Description: Ability to maintain appropriate glucose levels has stabilized  Outcome: Ongoing  Note: Insulin per protocol. Problem: Tissue Perfusion - Cardiopulmonary, Altered:  Goal: Hemodynamic stability will improve  Description: Hemodynamic stability will improve  Outcome: Ongoing  Note: Monitoring blood pressure, VS otherwise stable this shift. Problem: Musculor/Skeletal Functional Status  Goal: Highest potential functional level  Outcome: Ongoing     Problem: Musculor/Skeletal Functional Status  Goal: Absence of falls  Outcome: Ongoing     Problem: Skin Integrity:  Goal: Will show no infection signs and symptoms  Description: Will show no infection signs and symptoms  Outcome: Ongoing     Problem: Skin Integrity:  Goal: Absence of new skin breakdown  Description: Absence of new skin breakdown  Outcome: Ongoing  Note: No new skin breakdown this shift, staff helping patient turn Q2H. Problem: Urinary Elimination:  Goal: Signs and symptoms of infection will decrease  Description: Signs and symptoms of infection will decrease  Outcome: Ongoing     Care plan reviewed with patient. Patient verbalized understanding of the plan of care and contribute to goal setting.

## 2020-09-22 NOTE — PROGRESS NOTES
Perez catheter removed, 250 ml clear yellow urine emptied prior to removal. Patient tolerated well. Will wait do void trial and bladder scan after void. Urinalysis to be collected once urinating after perez catheter removed.

## 2020-09-22 NOTE — PROGRESS NOTES
Hospitalist Progress Note      Patient:  Michelle Clarke    Unit/Bed:4K-01/001-A  YOB: 1945  MRN: [id number]   Acct: [de-identified]   PCP: Te Correa MD  Date of Admission: 9/15/2020    Assessment/Plan:     # Acute hypoxic respiratory failure due to CHF exacerbation and possible Pneumonia - NYHA IV.  HRrEF (40-45%) per old ECHO 11/2019. Progressive SOB, fatigue, orthopnea, bilateral lower extremity edema with recent outpatient CHF clinic management. She has been progressively getting worse, despite optimal medical therapy. On admission Pro-BNP is 9242, Troponin level at 0.059 but trending down to 0.048. Dry weight is between 205-210 pounds and her current weight is 214, almost at goal. Per chart review, since 8/24/20 she has been seeing CHF clinic and her weight dropped to 217lbs from 231lbs with medical outpatient management. Patient is currently on Bumex 1mg BID and metolazone 5mg with goal to decrease edema, improve shortness of breath. - Will continue with Bumex 1mg BID and monitor daily weight, intake and output. We have to be careful with metolazone as it can worsen kidney function. Will consider Nephrology for further recommendation.   - Limit sodium < 2 g/day  - Fluid restriction to 2 L/day  - Will monitor renal function with daily BMP  - Daily weight  - Cardiology following. Will attempt functional study when stable. - ECHO pending     9/17/20: Results of ECHO show ejection fraction at 20-35%, which has significantly decreased from previous ECHO, worsening of CHF. This morning pt has an increase in oxygen demand despite saturation between 91-95% on 6L. Placed on BiPAP. Chest Xray shows bilateral pulmonary infiltrates. One febrile episode overnight. Start Rocephin 1g and Azithromycin 500mg.    Will continue with Bumex 1g and monitor urine output, since admission patient has diuresed ~5L  Will continue to monitor vital signs, urine output. 9/18/20: Since admission total fluid loss is ~7L. Will continue with IV diuresis and increase Bumex to 2g BID as the current weight is 224, up from 214 on admission. Currently on 6L of O2 nasal canula. BiPAP PRN. Still presence of bilateral crackles. Check chest x-ray in the morning for evaluation of pulmonary infiltrates. Will continue with Azithromycin and Rocephin until clinically improves. She remains afebrile  Cardiology following, appreciate recommendations. Will resume vasotec when blood pressure tolerates and renal function is stable. 9/19/20: Patient continues to diurese well, thus far ~9L. Continue with Bumex 2mg IV q12h. Will monitor renal function. Continue with Azithromycin and Rocephin. Patient remains afebrile. She remains on 6L of supplemental oxygen. Goal is to wean off O2 as tolerated. Still requires BiPAP at night. Chest xray this morning reveals slightly improved pulmonary vascular congestion. 9/20/20: Patient has diuresed total of 11,680. Held Bumex this am. Mayte function WNL however Patient's CO2 increased at 42  Continue with Azithromycin and Rocephin. Patient remains afebrile. Added one time Dose of Dimox to help with decreasing Co2  Weaned down to 4L of nasal cannula. Goal is to wean off O2 as tolerated. Still requires BiPAP at night. Cardiology has added Entresto   will add Rosa Moya at [place] for HF  Will need ischemic workup of Cath- will do as OP once she is improved from PNA  Repeat Chest xray in am    9/21/20: Patient has diuresed total of -11,880 L. Overnight and up until this morning, her blood pressure was low, between 87-96 systolic but the patient remained asymptomatic with no dizziness, lightheadedness or confusion. This is mot likely due to recent diuresis. Morning dose of amiodarone, bumex and entresto was held. Will resume once blood pressure is stable and patient can tolerate it. Will continue to monitor blood pressure and for symptoms.    Will discontinue  metoprolol due to low blood pressure and per cardiology. Continues to be on BiPAP at night. On 5L this morning but will continue weaning off as tolerated. Most likely will need O2 at discharge. Potassium this morning 3.0, most likely 2/2 to diuresis. Will replace with 40mEq PO BID. Will re-check potassium     9/22/20: Switched to Bumex 1g PO and will continue. Overnight patient spiked fever to 101.6, Tylenol was given. Will check urinalysis, urine culture, blood culture for possible source of infection. Chest xray shows increasing opacities near the right lung base most likely 2/2 to aspiration. Will start on Zosyn for possible aspiration pneumonia. Will consult speech therapy to evaluate. # Right pleural infusion s/p thoracentesis - History of breast cancer. CTA chest shows right upper lobe nodule measuring 1.2 cm which has increased in size compared to 7/8/2019 concerning for malignancy. Pleural fluid shows total nucleated cells to be 974 which is on the line between being either transudative or exudative. The diferential includes malignancy, constricitive pericarditis, pulmonary embolism, pneumonia. Patient has been hypoxic saturating between 78-85%. - Continue to monitor vital signs with goal to keep spO2 >92%    9/17/20: Culture-final report pending. Initiated BiPAP due to increase in oxygen demand. Currently saturating 94%. Will continue to monitor    9/18/20 Cytology - pending    9/19/20 Cytology shows no malignant cells. # Diabetes mellitus type II - Hold home Metformin and Lantus. Will place on insulin sliding scale  Lantus and will adjust as needed. 9/16 Blood sugars have been between 123-188. Continue with monitoring. # Invasive ductal carcinoma s/p lumpectomy - last chemotherapy and radiation in 2019.  - Heme/Onc has been consulted. Appreciate recommendation. # Pancytopenia: possibly due to chemo therapy. 9/18:  Will start folic acid 1 mg      # Hyperlipidemia - will continue Lipitor 80mg      # Diabetic Neuropathy - will continue Lyrica 200mg     # Hx of ventricular arrhythmia - will continue amiodarone 200mg     # Hx of CAD: will continue ASA 81mg, Plavix 75mg. Expected discharge date:  9/20/20    Disposition:    [x] Home       [] TCU       [] Rehab       [] Psych       [] SNF       [] [place]       [] Other-    Chief Complaint: shortness of breath, lower leg edema    Hospital Treatment Course: (Prior to today)   67-year-old morbidly obese  female presents emergency department for evaluation of shortness of breath and bilateral leg swelling. She has a significant PMHx of CHF HFrEF (EF 40%-45%, CAD, CABG (3V,2009), HTN, HLD, AAA, PVD, DM,   Breast CA w/ mets to lung. Patient states her shortness of breath and leg swelling is been getting worse over the last year however this week she is has difficulty with performing her ADLs secondary to more dyspnea on exertion. She  recently had a therapeutic and diagnostic thoracentesis. Denies any fever, chest pain, numbness, tingling at this time. Her past medical history includes ventricular arrhythmias diabetes ICD placement, hypertension, hyperlipidemia CHF, invasive ductal carcinoma of the right breast.   Per chart review, she is followed in the heart failure clinic. Her dry weight is between 205-210 pounds. On August 24 she was given 1 unit of Lasix 20 g IV. On August 26 she continued to have an increase in shortness of breath on exertion, lower extremity edema, and her Bumex was doubled for 3 days. On August 31 she was very short of breath and was put on IV Bumex 2 mg, metolazone 5 mg x 2 days. Yesterday at the heart failure clinic, she continued to be fatigued, tired, saturating between 85-90%, with shortness of breath at rest, or lower extremity edema. Recently she had a therapeutic and diagnostic thoracentesis.     9/16/20: Patient is currently on Bumex 1mg BID and Metolazone 5mg. ECHO - pending. 9/17/20: Overnight patient was confused and appeared to be weak. She was unable to void and urinary cath was placed that drained 2L of urine. She spiked one time fever of 102.9 and was started on Azithromycin 500mg and Rocephin 1g, Chest Xray shows bilateral pulmonary infiltrates. She was placed on BiPAP due to an increase in oxygen demand. Pt responded only to sternal rub. Her blood gas reveals pH 7.28, pCO2 63, pO2 51, Bicarb 38.    9/18 Patient oxygen saturation dropped to the 80s. She was placed on BiPAP 40% FiO2. On nasal canula her O2 needs increased to 6L. CT negative for any intracranial abnormalities. 9/19 Overnight she was placed on BiPAP as her need for oxygen increased. Since this morning she remains on 6L of O2. Continues to make small clinical improvements. 9/20: Patient overnight requird BIPAP. Patient however has been weaned down to 4L in am and stating at 94%. Will continue to wean. Held Bumex since patient has diuresed around 11,600 mL and increased Bicarb at 42. Given one time dose of Dimox for the increasing Bicarb. Repeat Chest xray in am.     9/21: Patient remains on BiPAP at night mostly but is on 5L with SpO2 between 94-96%. Blood pressure has been low in the range 81-72 systolic, remains asymptomatic. Holding amiodorone, bumex, entresto. Will resume after blood pressure is stable. 9/22: Patient spiked a fever overnight up to 101.6. Removed perez cath this morning. Check blood culures x2, urinalysis, urine culture. Will start zosyn. Chest xray shows increasing opacities near the right lung base most likely 2/2 to aspiration. Will start on Zosyn for possible aspiration pneumonia. Will consult speech therapy to evaluate. Blood pressure has been stable between 050-214 systolic.  Continues to be on BiPAP throughout the night, weaned down to 2L of supplemental oxygen, SpO2 at 94%      Subjective (past 24 hours):     Patient is resting in bed, in no susceptible tothird generation cephalosporins may develop resistance withinthree to four days of initiation of this antimicrobialtherapy. Respiratory culture: No results found for: CULTRESP    Aerobic and Anaerobic :  No results found for: LABAERO  Lab Results   Component Value Date    LABANAE No growth-preliminary No growth  09/15/2020       Urinalysis:      Lab Results   Component Value Date    NITRU NEGATIVE 09/17/2020    WBCUA NONE SEEN 09/17/2020    BACTERIA NONE SEEN 09/17/2020    RBCUA 0-2 09/17/2020    BLOODU NEGATIVE 09/17/2020    SPECGRAV 1.011 09/17/2020    GLUCOSEU NEGATIVE 08/28/2019       Radiology:  XR CHEST PORTABLE   Final Result   Cardiomegaly with pulmonary vascular congestion and a small sided pleural effusion. Overall similar appearance of the chest when compared to the previous exam.               **This report has been created using voice recognition software. It may contain minor errors which are inherent in voice recognition technology. **      Final report electronically signed by Dr Loyd Le on 9/21/2020 3:26 AM      XR CHEST PORTABLE   Final Result   Slightly improved pulmonary vascular congestion      Left pleural effusion and possible consolidation      **This report has been created using voice recognition software. It may contain minor errors which are inherent in voice recognition technology. **      Final report electronically signed by Dr. Elaina Francois on 9/19/2020 2:49 AM      CT HEAD W WO CONTRAST   Final Result    No evidence of acute intracranial abnormality. **This report has been created using voice recognition software. It may contain minor errors which are inherent in voice recognition technology. **      Final report electronically signed by Dr. Vick Talavera MD on 9/17/2020 7:22 PM      XR CHEST PORTABLE   Final Result   Cardiomegaly and bilateral infiltrates likely pulmonary edema.  Pneumonic infiltrates are not excludable            **This There is a good contrast bolus within the pulmonary arteries, adequate for evaluation to the subsegmental level. There are no filling defects within the pulmonary arteries to suggest pulmonary embolus. There is stable mural calcification in the aortic arch. No aneurysm or dissection is present. No axillary, mediastinal or hilar lymphadenopathy is identified. The heart is enlarged, stable compared to prior exam. There is stable left-sided cardiac pacer/defibrillator generator with stable leads in the right heart. There is a moderate right pleural effusion, similar to prior CT. Pulmonary vessels are prominent anteriorly along with mild interstitial prominence. Redemonstration of a nodule in the right upper lobe posteriorly. This measures 1.2 cm and appears to be mildly increased in size compared to 7/8/2019 when it measured 1 cm. Visualized upper abdominal solid organs are unremarkable. There are stable degenerative changes of the thoracic spine. 1. No evidence of pulmonary embolus or acute intrathoracic abnormality. 2. Persistent cardiomegaly and moderate right pleural effusion with pulmonary vascular congestion, similar to 8/26/2020. 3. Right upper lobe nodule measuring 1.2 cm which has increased in size compared to 7/8/2019 concerning for malignancy. **This report has been created using voice recognition software. It may contain minor errors which are inherent in voice recognition technology. ** Final report electronically signed by Dr. Raleigh Vazquez MD on 9/15/2020 2:33 PM    Us Thoracentesis    Result Date: 9/15/2020  PROCEDURE: US THORACENTESIS CLINICAL INFORMATION: moderate right pleural effusion on CT . COMPARISON: CT 9/15/2020 PROCEDURE: The benefits and the risk of the procedure were explained to the patient. The patient was given an opportunity to ask questions. Signed consent was obtained. Limited ultrasound exam of the right chest showed moderate amount of pleural fluid.   Using ultrasound guidance, a site was marked on the skin. The right side of the posterior chest was prepped and draped using the usual sterile technique and the skin was anesthetized with 2 percent lidocaine. A 5 [place]bourgish one-step catheter was introduced to the right pleural space. 0.65 L of theresa colored fluid drained. The catheter was then removed. The patient tolerated the procedure well with no complications. Specimen sent for laboratory studies as requested. Estimated blood loss is 0 cc. Patient left the department in good condition. Successful ultrasound-guided thoracentesis with  0.65 L of fluid drained. **This report has been created using voice recognition software. It may contain minor errors which are inherent in voice recognition technology. ** Final report electronically signed by Dr. Fernando Zuniga on 9/15/2020 6:56 PM    Xr Chest Pa Inspiration 1 Vw    Result Date: 9/15/2020  PROCEDURE: XR CHEST PA INSPIRATION 1 VW CLINICAL INFORMATION: right thoracentesis. COMPARISON: CT 9/15/2020 TECHNIQUE: AP upright view of the chest. FINDINGS: Right pleural fluid is moderately improved. No gross pneumothorax. Moderate congestion again noted. There are mild airspace opacities bilaterally particularly within the perihilar regions suggesting pulmonary edema. Moderate enlargement of the cardiopericardial silhouette is unchanged. No pneumothorax post right thoracentesis. **This report has been created using voice recognition software. It may contain minor errors which are inherent in voice recognition technology. ** Final report electronically signed by Dr. Fernando Zuniga on 9/15/2020 6:54 PM      Support Devices (date placed):  [] ETT []Oral / [] Nasal  [] Gastric Tube [] OG / [] NG    [] Central Venous Line (Specify Site):  [] Urinary Catheter  [] Arterial Line (Specify Site)  [] Peripheral IV access  [] Other:    DVT prophylaxis: [x] Lovenox                                 [] SCDs                                 [] SQ Heparin                                 [] Encourage ambulation           [] Already on Anticoagulation     Code Status: Full Code    Tele:   [x] yes             [] no    Electronically signed by Issa Brink MD on 9/22/2020 at 8:24 AM

## 2020-09-23 NOTE — PROGRESS NOTES
[facility]  INPATIENT PHYSICAL THERAPY  DAILY NOTE  [facility] ICU STEPDOWN TELEMETRY 4K - 4K-01/001-A    Time In: 2363  Time Out: 1401  Timed Code Treatment Minutes: 24 Minutes  Minutes: 24          Date: 2020  Patient Name: Wade Rodriguez,  Gender:  female        MRN: [id number]  : 1945  (67 y.o.)     Referring Practitioner: Colleen Maher MD  Diagnosis: hypoxia  Additional Pertinent Hx: from H&P: 76-year-old morbidly obese  female presents emergency department for evaluation of shortness of breath and bilateral leg swelling. Patient states her shortness of breath and leg swelling is been getting worse over the last year however this week she is just more changes had difficulty performing her ADLs secondary to more dyspnea on exertion. She is recently had a therapeutic and diagnostic thoracentesis denies any fever, chest pain, numbness, tingling at this time. Her past medical history includes ventricular arrhythmias diabetes ICD placement, hypertension, hyperlipidemia CHF, invasive ductal carcinoma breast right side. Prior Level of Function:  Lives With: Spouse  Type of Home: House  Home Layout: One level  Home Access: Stairs to enter with rails  Entrance Stairs - Number of Steps: 2  Entrance Stairs - Rails: (1 rail)  Home Equipment: 4 wheeled walker        Receives Help From: Family  ADL Assistance: Independent  Homemaking Assistance: Needs assistance  Ambulation Assistance: Independent  Transfer Assistance: Independent  Additional Comments: Pt stating she was indep with all ADL tasks. Pt alsos \"I can wash clothes and can cook. \"    Restrictions/Precautions:  Restrictions/Precautions: General Precautions, Fall Risk(on oxygen - nonrebreather)  Position Activity Restriction  Other position/activity restrictions: pt requires assistance to get up. Recommend guille steady for transfers, +2 for transfers, monitor BP    SUBJECTIVE: RN approved session.  Patient laying in bed upon arrival Recommendations:Equipment Needed: No  Discharge Recommendations:  Subacute/Skilled Nursing Facility, ECF with PT    Plan: Times per week: 3-5x GM  Times per day: Daily  Current Treatment Recommendations: Strengthening, Functional Mobility Training, Transfer Training, Balance Training, Endurance Training, Stair training    Patient Education  Patient Education: Plan of Care, [employer] Mobility, Transfers, Verbal Exercise Instruction    Goals:  Patient goals : discharge from hospital  Short term goals  Time Frame for Short term goals: by hospital discharge  Short term goal 1: pt will transfer from supine to sit with min assist x 1. Short term goal 2: pt will transfer from sit to/from stand with min assist x 1. Short term goal 3: pt will ambulate 21' with min assist x1 and least restrictive device to mobilize around her room. Long term goals  Time Frame for Long term goals : N/A due to estimated short LOS. Following session, patient left in safe position with all fall risk precautions in place.

## 2020-09-23 NOTE — CARE COORDINATION
Update: afebrile 24h, plans likely ECF in am per report-discussed at rounds;  José Santana following for [facility]  Electronically signed by Tara Mas RN on 9/23/2020 at 9:16 AM

## 2020-09-23 NOTE — PROGRESS NOTES
Hospitalist Progress Note      Patient:  Isiah Hua    Unit/Bed:4K-01/001-A  YOB: 1945  MRN: [id number]   Acct: [de-identified]   PCP: Darío Rojas MD  Date of Admission: 9/15/2020    Assessment/Plan:     # Acute hypoxic respiratory failure due to CHF exacerbation and possible Pneumonia - NYHA IV.  HRrEF (40-45%) per old ECHO 11/2019. Progressive SOB, fatigue, orthopnea, bilateral lower extremity edema with recent outpatient CHF clinic management. She has been progressively getting worse, despite optimal medical therapy. On admission Pro-BNP is 9242, Troponin level at 0.059 but trending down to 0.048. Dry weight is between 205-210 pounds and her current weight is 214, almost at goal. Per chart review, since 8/24/20 she has been seeing CHF clinic and her weight dropped to 217lbs from 231lbs with medical outpatient management. Patient is currently on Bumex 1mg BID and metolazone 5mg with goal to decrease edema, improve shortness of breath. - Will continue with Bumex 1mg BID and monitor daily weight, intake and output. We have to be careful with metolazone as it can worsen kidney function. Will consider Nephrology for further recommendation.   - Limit sodium < 2 g/day  - Fluid restriction to 2 L/day  - Will monitor renal function with daily BMP  - Daily weight  - Cardiology following. Will attempt functional study when stable. - ECHO pending     9/17/20: Results of ECHO show ejection fraction at 20-35%, which has significantly decreased from previous ECHO, worsening of CHF. This morning pt has an increase in oxygen demand despite saturation between 91-95% on 6L. Placed on BiPAP. Chest Xray shows bilateral pulmonary infiltrates. One febrile episode overnight. Start Rocephin 1g and Azithromycin 500mg.    Will continue with Bumex 1g and monitor urine output, since admission patient has diuresed ~5L  Will continue to monitor vital signs, urine discontinue  metoprolol due to low blood pressure and per cardiology. Continues to be on BiPAP at night. On 5L this morning but will continue weaning off as tolerated. Most likely will need O2 at discharge. Potassium this morning 3.0, most likely 2/2 to diuresis. Will replace with 40mEq PO BID. Will re-check potassium     9/22/20: Switched to Bumex 1g PO and will continue. Overnight patient spiked fever to 101.6, Tylenol was given. Will check urinalysis, urine culture, blood culture for possible source of infection. Chest xray shows increasing opacities near the right lung base most likely 2/2 to aspiration. Will start on Zosyn for possible aspiration pneumonia. Will consult speech therapy to evaluate. 9/23/20: Patient remains afebrile. Saturating between 93-97% on 2L. Continues to improve. # Right pleural infusion s/p thoracentesis - History of breast cancer. CTA chest shows right upper lobe nodule measuring 1.2 cm which has increased in size compared to 7/8/2019 concerning for malignancy. Pleural fluid shows total nucleated cells to be 974 which is on the line between being either transudative or exudative. The diferential includes malignancy, constricitive pericarditis, pulmonary embolism, pneumonia. Patient has been hypoxic saturating between 78-85%. - Continue to monitor vital signs with goal to keep spO2 >92%    9/17/20: Culture-final report pending. Initiated BiPAP due to increase in oxygen demand. Currently saturating 94%. Will continue to monitor    9/18/20 Cytology - pending    9/19/20 Cytology shows no malignant cells. # Diabetes mellitus type II - Hold home Metformin and Lantus. Will place on insulin sliding scale  Lantus and will adjust as needed. 9/16 Blood sugars have been between 123-188. Continue with monitoring. # Invasive ductal carcinoma s/p lumpectomy - last chemotherapy and radiation in 2019.  - Heme/Onc has been consulted. Appreciate recommendation.      # Pancytopenia: possibly due to chemo therapy. 9/18: Will start folic acid 1 mg      # Hyperlipidemia - will continue Lipitor 80mg      # Diabetic Neuropathy - will continue Lyrica 200mg     # Hx of ventricular arrhythmia - will continue amiodarone 200mg     # Hx of CAD: will continue ASA 81mg, Plavix 75mg. Expected discharge date:  9/20/20    Disposition:    [x] Home       [] TCU       [] Rehab       [] Psych       [] SNF       [] [place]       [] Other-    Chief Complaint: shortness of breath, lower leg edema    Hospital Treatment Course: (Prior to today)   78-year-old morbidly obese  female presents emergency department for evaluation of shortness of breath and bilateral leg swelling. She has a significant PMHx of CHF HFrEF (EF 40%-45%, CAD, CABG (3V,2009), HTN, HLD, AAA, PVD, DM,   Breast CA w/ mets to lung. Patient states her shortness of breath and leg swelling is been getting worse over the last year however this week she is has difficulty with performing her ADLs secondary to more dyspnea on exertion. She  recently had a therapeutic and diagnostic thoracentesis. Denies any fever, chest pain, numbness, tingling at this time. Her past medical history includes ventricular arrhythmias diabetes ICD placement, hypertension, hyperlipidemia CHF, invasive ductal carcinoma of the right breast.   Per chart review, she is followed in the heart failure clinic. Her dry weight is between 205-210 pounds. On August 24 she was given 1 unit of Lasix 20 g IV. On August 26 she continued to have an increase in shortness of breath on exertion, lower extremity edema, and her Bumex was doubled for 3 days. On August 31 she was very short of breath and was put on IV Bumex 2 mg, metolazone 5 mg x 2 days. Yesterday at the heart failure clinic, she continued to be fatigued, tired, saturating between 85-90%, with shortness of breath at rest, or lower extremity edema.   Recently she had a therapeutic and diagnostic thoracentesis. 9/16/20: Patient is currently on Bumex 1mg BID and Metolazone 5mg. ECHO - pending. 9/17/20: Overnight patient was confused and appeared to be weak. She was unable to void and urinary cath was placed that drained 2L of urine. She spiked one time fever of 102.9 and was started on Azithromycin 500mg and Rocephin 1g, Chest Xray shows bilateral pulmonary infiltrates. She was placed on BiPAP due to an increase in oxygen demand. Pt responded only to sternal rub. Her blood gas reveals pH 7.28, pCO2 63, pO2 51, Bicarb 38.    9/18 Patient oxygen saturation dropped to the 80s. She was placed on BiPAP 40% FiO2. On nasal canula her O2 needs increased to 6L. CT negative for any intracranial abnormalities. 9/19 Overnight she was placed on BiPAP as her need for oxygen increased. Since this morning she remains on 6L of O2. Continues to make small clinical improvements. 9/20: Patient overnight requird BIPAP. Patient however has been weaned down to 4L in am and stating at 94%. Will continue to wean. Held Bumex since patient has diuresed around 11,600 mL and increased Bicarb at 42. Given one time dose of Dimox for the increasing Bicarb. Repeat Chest xray in am.     9/21: Patient remains on BiPAP at night mostly but is on 5L with SpO2 between 94-96%. Blood pressure has been low in the range 33-28 systolic, remains asymptomatic. Holding amiodorone, bumex, entresto. Will resume after blood pressure is stable. 9/22: Patient spiked a fever overnight up to 101.6. Removed perez cath this morning. Check blood culures x2, urinalysis, urine culture. Will start zosyn. Chest xray shows increasing opacities near the right lung base most likely 2/2 to aspiration. Will start on Zosyn for possible aspiration pneumonia. Will consult speech therapy to evaluate. Blood pressure has been stable between 563-771 systolic.  Continues to be on BiPAP throughout the night, weaned down to 2L of supplemental oxygen, SpO2 at 94%      9/23: Patient has been afebrile. After removal of catheter, she continues to have urinary retention. Straith cath was done which yield 600mL. Placed perez cath bauer urinary retention. Consider Urology for outpatient management of urinary retention. Blood pressure remains stable, between 026-351N systolic. Subjective (past 24 hours):     Patient is resting in bed, in no acute distress. Denies chills, denies shortness of breath, denies abdominal pain, denies nausea or vomiting. Per nursing staff, patient was disimpacted today. Past medical history, family history, social history and allergies reviewed again and is unchanged since admission. ROS (12 point review of systems completed. Pertinent positives noted.  Otherwise ROS is negative)     Medications:  Reviewed    Infusion Medications    dextrose       Scheduled Medications    piperacillin-tazobactam  3.375 g Intravenous Q8H    potassium chloride  40 mEq Oral BID    midodrine  2.5 mg Oral TID WC    sacubitril-valsartan  1 tablet Oral BID    bumetanide  1 mg Oral Daily    folic acid  1 mg Oral Daily    azithromycin  500 mg Intravenous Q24H    ferrous sulfate  325 mg Oral BID WC    insulin glargine  18 Units Subcutaneous Nightly    amiodarone  200 mg Oral Daily    aspirin EC  81 mg Oral Daily    atorvastatin  80 mg Oral Nightly    clopidogrel  75 mg Oral Daily    pregabalin  200 mg Oral BID    rOPINIRole  0.5 mg Oral Nightly    sodium chloride flush  10 mL Intravenous 2 times per day    polyethylene glycol  17 g Oral Daily    enoxaparin  40 mg Subcutaneous Q24H    famotidine  20 mg Oral Daily    insulin lispro  0-6 Units Subcutaneous TID WC    insulin lispro  0-3 Units Subcutaneous Nightly     PRN Meds: perflutren lipid microspheres, sodium chloride flush, acetaminophen **OR** acetaminophen, promethazine **OR** ondansetron, glucose, dextrose, glucagon (rDNA), dextrose      Intake/Output Summary (Last 24 hours) at 9/23/2020 1717  Last data filed at 9/23/2020 1429  Gross per 24 hour   Intake 1717.91 ml   Output 1100 ml   Net 617.91 ml       Diet:  DIET CARB CONTROL; Low Sodium (2 GM); Dysphagia Soft and Bite-Sized; Daily Fluid Restriction: 1500 ml    Exam:  BP (!) 99/52   Pulse 71   Temp 98.3 °F (36.8 °C) (Oral)   Resp 20   Ht 5' 2\" (1.575 m)   Wt 198 lb (89.8 kg)   SpO2 94%   BMI 36.21 kg/m²   General appearance: No apparent distress, somnolent  HEENT: Pupils equal, round, and reactive to light. Conjunctivae/corneas clear. Neck: Supple, with full range of motion. No jugular venous distention. Trachea midline. Respiratory:  Bilateral crackles appreciated. Cardiovascular: Regular rate and rhythm with normal S1/S2 without murmurs, rubs or gallops. Abdomen: Soft, non-tender, non-distended with normal bowel sounds. Musculoskeletal: passive and active ROM x 4 extremities. No edemaSkin: Skin color, texture, turgor normal.  No rashes or lesions. Neurologic:  Neurovascularly intact without any focal sensory/motor deficits. Cranial nerves: II-XII intact, grossly non-focal.  Psychiatric: Alert and oriented, thought content appropriate, normal insight  Capillary Refill: Brisk,< 3 seconds   Peripheral Pulses: +2 palpable, equal bilaterally     Labs:   Recent Labs     09/21/20  0522 09/22/20  1100 09/23/20  0520   WBC 6.2 14.1* 14.1*   HGB 7.9* 8.8* 8.5*   HCT 28.2* 31.3* 29.0*   * 137 157     Recent Labs     09/21/20  0522 09/22/20  1100 09/23/20  0620    140 141   K 3.0* 4.5 4.7   CL 95* 96* 101   CO2 38* 33 31   BUN 30* 33* 36*   CREATININE 1.3* 1.2 1.3*   CALCIUM 8.4* 8.5 8.5     No results for input(s): AST, ALT, BILIDIR, BILITOT, ALKPHOS in the last 72 hours. No results for input(s): INR in the last 72 hours. No results for input(s): Dawson Smoker in the last 72 hours.     Microbiology:    Blood culture #1:   Lab Results   Component Value Date    BC No growth-preliminary  09/22/2020       Blood culture #2:No results found for: BLOODCULT2    Organism:  Lab Results   Component Value Date    ORG Enterobacter aerogenes 09/11/2019         Lab Results   Component Value Date    LABGRAM  09/15/2020     Few segmented neutrophils observed. No organisms observed. performed on cytospun specimen       MRSA culture only:No results found for: [facility]    Urine culture:   Lab Results   Component Value Date    LABURIN No growth-preliminary  09/22/2020       Respiratory culture: No results found for: CULTRESP    Aerobic and Anaerobic :  No results found for: LABAERO  Lab Results   Component Value Date    LABANAE No growth-preliminary No growth  09/15/2020       Urinalysis:      Lab Results   Component Value Date    NITRU NEGATIVE 09/22/2020    WBCUA 2-4 09/22/2020    BACTERIA NONE SEEN 09/22/2020    RBCUA 10-15 09/22/2020    BLOODU SMALL 09/22/2020    SPECGRAV 1.011 09/22/2020    GLUCOSEU NEGATIVE 08/28/2019       Radiology:  XR CHEST PORTABLE   Final Result      1. Cardiomegaly and pulmonary vascular congestion. 2. Increasing opacities near the right lung base which could be related to infiltrate or atelectasis. 3. Blunting at the right costophrenic angle which may represent a small pleural effusion. **This report has been created using voice recognition software. It may contain minor errors which are inherent in voice recognition technology. **      Final report electronically signed by Dr German Sandoval on 9/22/2020 9:14 AM      XR CHEST PORTABLE   Final Result   Cardiomegaly with pulmonary vascular congestion and a small sided pleural effusion. Overall similar appearance of the chest when compared to the previous exam.               **This report has been created using voice recognition software. It may contain minor errors which are inherent in voice recognition technology. **      Final report electronically signed by Dr German Sandoval on 9/21/2020 3:26 AM      XR CHEST PORTABLE   Final Result Slightly improved pulmonary vascular congestion      Left pleural effusion and possible consolidation      **This report has been created using voice recognition software. It may contain minor errors which are inherent in voice recognition technology. **      Final report electronically signed by Dr. Vipul Albert on 9/19/2020 2:49 AM      CT HEAD W WO CONTRAST   Final Result    No evidence of acute intracranial abnormality. **This report has been created using voice recognition software. It may contain minor errors which are inherent in voice recognition technology. **      Final report electronically signed by Dr. Ness Hair MD on 9/17/2020 7:22 PM      XR CHEST PORTABLE   Final Result   Cardiomegaly and bilateral infiltrates likely pulmonary edema. Pneumonic infiltrates are not excludable            **This report has been created using voice recognition software. It may contain minor errors which are inherent in voice recognition technology. **      Final report electronically signed by Dr. Vipul Albert on 9/17/2020 2:17 AM      US THORACENTESIS   Final Result      Successful ultrasound-guided thoracentesis with  0.65 L of fluid drained. **This report has been created using voice recognition software. It may contain minor errors which are inherent in voice recognition technology. **      Final report electronically signed by Dr. Salma Greer on 9/15/2020 6:56 PM      CTA Chest W WO Contrast   Final Result       1. No evidence of pulmonary embolus or acute intrathoracic abnormality. 2. Persistent cardiomegaly and moderate right pleural effusion with pulmonary vascular congestion, similar to 8/26/2020.   3. Right upper lobe nodule measuring 1.2 cm which has increased in size compared to 7/8/2019 concerning for malignancy. **This report has been created using voice recognition software.  It may contain minor errors which are inherent in voice recognition technology. **      Final report electronically signed by Dr. Sophie Varghese MD on 9/15/2020 2:33 PM        Cta Chest W Wo Contrast    Result Date: 9/15/2020  PROCEDURE: CTA CHEST W WO CONTRAST CLINICAL INFORMATION: shortness of breath, hx cancer. Leg swelling and shortness of breath. COMPARISON: CT chest dated 8/26/2020. TECHNIQUE: 3 mm axial images were obtained through the chest during the administration of 80 mL [id number]-370 injected in the right forearm. A non-contrast localizer was obtained. 3D reconstructions were performed on the scanner to include MIP coronal and sagittal images through the chest. All CT scans at this facility use dose modulation, iterative reconstruction, and/or weight-based dosing when appropriate to reduce radiation dose to as low as reasonably achievable. FINDINGS:  There is a good contrast bolus within the pulmonary arteries, adequate for evaluation to the subsegmental level. There are no filling defects within the pulmonary arteries to suggest pulmonary embolus. There is stable mural calcification in the aortic arch. No aneurysm or dissection is present. No axillary, mediastinal or hilar lymphadenopathy is identified. The heart is enlarged, stable compared to prior exam. There is stable left-sided cardiac pacer/defibrillator generator with stable leads in the right heart. There is a moderate right pleural effusion, similar to prior CT. Pulmonary vessels are prominent anteriorly along with mild interstitial prominence. Redemonstration of a nodule in the right upper lobe posteriorly. This measures 1.2 cm and appears to be mildly increased in size compared to 7/8/2019 when it measured 1 cm. Visualized upper abdominal solid organs are unremarkable. There are stable degenerative changes of the thoracic spine. 1. No evidence of pulmonary embolus or acute intrathoracic abnormality.  2. Persistent cardiomegaly and moderate right pleural effusion with pulmonary vascular congestion, similar to 8/26/2020. 3. Right upper lobe nodule measuring 1.2 cm which has increased in size compared to 7/8/2019 concerning for malignancy. **This report has been created using voice recognition software. It may contain minor errors which are inherent in voice recognition technology. ** Final report electronically signed by Dr. Vince Truong MD on 9/15/2020 2:33 PM    Us Thoracentesis    Result Date: 9/15/2020  PROCEDURE: US THORACENTESIS CLINICAL INFORMATION: moderate right pleural effusion on CT . COMPARISON: CT 9/15/2020 PROCEDURE: The benefits and the risk of the procedure were explained to the patient. The patient was given an opportunity to ask questions. Signed consent was obtained. Limited ultrasound exam of the right chest showed moderate amount of pleural fluid. Using ultrasound guidance, a site was marked on the skin. The right side of the posterior chest was prepped and draped using the usual sterile technique and the skin was anesthetized with 2 percent lidocaine. A 5 Polish one-step catheter was introduced to the right pleural space. 0.65 L of theresa colored fluid drained. The catheter was then removed. The patient tolerated the procedure well with no complications. Specimen sent for laboratory studies as requested. Estimated blood loss is 0 cc. Patient left the department in good condition. Successful ultrasound-guided thoracentesis with  0.65 L of fluid drained. **This report has been created using voice recognition software. It may contain minor errors which are inherent in voice recognition technology. ** Final report electronically signed by Dr. Eren Alvarenga on 9/15/2020 6:56 PM    Xr Chest Pa Inspiration 1 Vw    Result Date: 9/15/2020  PROCEDURE: XR CHEST PA INSPIRATION 1 VW CLINICAL INFORMATION: right thoracentesis. COMPARISON: CT 9/15/2020 TECHNIQUE: AP upright view of the chest. FINDINGS: Right pleural fluid is moderately improved. No gross pneumothorax.  Moderate congestion again noted. There are mild airspace opacities bilaterally particularly within the perihilar regions suggesting pulmonary edema. Moderate enlargement of the cardiopericardial silhouette is unchanged. No pneumothorax post right thoracentesis. **This report has been created using voice recognition software. It may contain minor errors which are inherent in voice recognition technology. ** Final report electronically signed by Dr. Len Hernandez on 9/15/2020 6:54 PM      Support Devices (date placed):  [] ETT []Oral / [] Nasal  [] Gastric Tube [] OG / [] NG    [] Central Venous Line (Specify Site):  [] Urinary Catheter  [] Arterial Line (Specify Site)  [] Peripheral IV access  [] Other:    DVT prophylaxis: [x] Lovenox                                 [] SCDs                                 [] SQ Heparin                                 [] Encourage ambulation           [] Already on Anticoagulation     Code Status: Full Code    Tele:   [x] yes             [] no    Electronically signed by Alisha Noriega MD on 9/23/2020 at 5:17 PM

## 2020-09-23 NOTE — PROGRESS NOTES
[facility]  PHYSICAL THERAPY MISSED TREATMENT NOTE  [facility] ICU STEPDOWN TELEMETRY 4K    Date: 2020  Patient Name: Kathleen Lopez        MRN: [id number]   : 1945  (67 y.o.)  Gender: female   Referring Practitioner: Samantha Rg MD  Diagnosis: hypoxia         REASON FOR MISSED TREATMENT:  Missed Treat. Patient just finished OT session and had returned to bed. RN in room performing bladder scan. Per RN patient is a possible discharge today. Will resume therapy if patient is still in hospital .

## 2020-09-23 NOTE — PROGRESS NOTES
87/52, after sitting EOB for awhile 104/73    TRANSFERS:  Sit to Stand: Moderate Assistance, X 2. from EOB within [address]:  Completed BUE AROM for shoulder flexion & abduction x 10 reps then elbow flexion x 10 rep with 1# weight for resistance. ASSESSMENT:     Activity Tolerance:  Patient tolerance of  treatment: good. Discharge Recommendations: Patient would benefit from continued therapy after discharge, Continue to assess pending progress, Subacute/Skilled Nursing Facility    Equipment Recommendations: Other: continue to assess  Plan: Times per week: 5x  Current Treatment Recommendations: Patient/Caregiver Education & Training, Strengthening, Equipment Evaluation, Education, & procurement, Balance Training, Functional Mobility Training, Endurance Training, Self-Care / ADL, Safety Education & Training    Patient Education  Patient Education: safety with mobility & t/fs    Goals  Short term goals  Time Frame for Short term goals: by discharge  Short term goal 1: Pt to sit EOB unsupport completing ADL tasks with SBA and no vcs for safety  Short term goal 2: Pt to complete sit to stand from various surfaces includng BSC  with min A x1 to increase indep with toileting tasks  Short term goal 3: pt to increase endurance to stand at midline > 1 min with consistent balance of min A x1 and min cues to increase participation in ADL tks  Short term goal 4: Pt to complete UB ADL tasks with min A  Short term goal 5: OTR to further assess transfers and mobility    Following session, patient left in safe position with all fall risk precautions in place.

## 2020-09-23 NOTE — PLAN OF CARE
Problem: Falls - Risk of:  Goal: Will remain free from falls  Description: Will remain free from falls  Outcome: Ongoing  Note:  Patient free from falls this shift, bed alarm in place. Problem: Falls - Risk of:  Goal: Absence of physical injury  Description: Absence of physical injury  Outcome: Ongoing  Note: No injuries this shift. Problem: Discharge Planning:  Goal: Discharged to appropriate level of care  Description: Discharged to appropriate level of care  Outcome: Ongoing  Note: Patient d/c to [facility]. Problem: Serum Glucose Level - Abnormal:  Goal: Ability to maintain appropriate glucose levels has stabilized  Description: Ability to maintain appropriate glucose levels has stabilized  Outcome: Ongoing  Note: Insulin per protocol. Problem: Tissue Perfusion - Cardiopulmonary, Altered:  Goal: Hemodynamic stability will improve  Description: Hemodynamic stability will improve  Outcome: Ongoing  Note: Monitoring blood pressure, VS otherwise stable this shift. Problem: Musculor/Skeletal Functional Status  Goal: Highest potential functional level  Outcome: Ongoing  Note: Encouraging patient to help as much as she can with her own care. Problem: Musculor/Skeletal Functional Status  Goal: Absence of falls  Outcome: Ongoing  Note: No falls this shift, bed alarm in place. Problem: Skin Integrity:  Goal: Will show no infection signs and symptoms  Description: Will show no infection signs and symptoms  Outcome: Ongoing  Note: No infection signs and symptoms present, patient afebrile. Problem: Skin Integrity:  Goal: Absence of new skin breakdown  Description: Absence of new skin breakdown  Outcome: Ongoing  Note: No new skin breakdown this shift, staff helping patient turn Q2H.      Problem: Urinary Elimination:  Goal: Signs and symptoms of infection will decrease  Description: Signs and symptoms of infection will decrease  Outcome: Ongoing  Note:  Signs and symptoms of infection will decrease, patient on IV ATB. Care plan reviewed with patient. Patient verbalizes understanding of the plan of care and contribute to goal setting.

## 2020-09-24 NOTE — TELEPHONE ENCOUNTER
.Transition of Care visit scheduled.   9/29/2020  Patient is being discharged to [address] [place] [address] Rd, [address] (short term?)  Date of discharge 9/24/20  Discharge from facility [facility]  Reason for admission CHF EXACERBATION  GS 26%

## 2020-09-24 NOTE — TELEPHONE ENCOUNTER
Shaji [facility] Transitions Initial Follow Up Call    Outreach made within 2 business days of discharge: Yes    Patient: Natacha Klein Patient : 1945   MRN: [id number]  Reason for Admission: There are no discharge diagnoses documented for the most recent discharge. Discharge Date: 20       Spoke with: Call not placed.  Patient discharged to [facility]    Discharge department/facility: [facility]      Scheduled appointment with PCP within 7-14 days    Follow Up  Future Appointments   Date Time Provider Candy Freire   2020  3:30 PM Yelitza Bloom MD Fam Med CG [address]   10/6/2020 11:00 AM ROMAIN Carias CNP [facility] CHF [address]   10/13/2020  3:45 PM Wiliam Cardoza MD Oncology [address]   10/14/2020 11:00 AM SCHEDULE, [facility] PACER NURSE [facility] PACER [address]   10/28/2020  9:20 AM STR CT IMAGING RM1  [facility] OUT EXP STR Radiolog   2020 10:30 AM Maureen Rosas PA-C [facility] CT/CV [address]   11/3/2020 11:30 AM ROMAIN Fallon CNP [facility] Heart [address]   11/10/2020 11:00 AM Kwadwo Mandujano MD [facility] Heart [address]   2020  3:00 PM Анна Saini MD Adv Surg [address]   2021 12:00 PM Kwadwo Mandujano MD [address] [place] [address] ([address] [place] [address])

## 2020-09-24 NOTE — DISCHARGE INSTR - DIET
You are being placed on a diabetic carb counting diet. Eating healthy is the first step in controlling diabetes    Here's how to get started. ... Eat 3 meals a day. Eat your meals at the same time each day and do not skip meals. Eat about the same amount of food each day. Limit sugar and sweets. Eat less candy, desserts, pastries and jelly. Limit intake of regular pop, sugary beverages and fruit juice. Drink sugar free beverages such as diet pop, water, Crystal Light, and unsweetened tea instead. Use Equal or Sweet-n-Low in place of sugar. Lose weight if you are overweight. Even a small amount of weight loss may help improve your blood sugar control. To help lose weight, reduce your portion sizes. Control your intake of carbohydrates. Carbohydrate is the main  nutrient that affects blood sugar levels. All the carbohydrate you eat is turned  into sugar by your body. Therefore, it is important to control  the amount  of carbohydrate that you eat a day. You should eat about 60-75  grams of  carbohydrate at each meal.      Common sources of carbohydrates:                       Eat more fiber. Fiber can help slow down the rise in blood sugar following a meal.  To get more fiber in your diet, eat at least 5 servings of fruits and vegetables a day, choose whole grain bread/cereal and eat more beans or legumes. Reduce your intake of high fat foods. Cutting back on your intake of high fat food can help reduce body weight and cholesterol levels. Reduce intake of fried food, thurman, sausage, luncheon meat, gravy, sour cream, cheese, egg yolks and margarine/butter. Limit your intake of alcohol. Drink alcohol only with permission of your doctor. Never drink alcohol on an empty stomach. Be more active. Regular exercise is an important part of your diabetes care as exercise can help lower your blood sugar levels.   The type and amount of exercise that is right for you should be

## 2020-09-24 NOTE — CARE COORDINATION
Update: ECF planned today; Asia Ortega following for [facility]  Electronically signed by Domingo Torres RN on 9/24/2020 at 11:16 AM

## 2020-09-24 NOTE — DISCHARGE SUMMARY
Hospitalist Discharge Summary        Patient: Dann Patel  YOB: 1945  MRN: [id number]   Acct: [de-identified]    Primary Care Physician: Candis Newell MD    Admit date  9/15/2020    Discharge date:  9/24/2020 2:22 PM    Chief Complaint on presentation : Shortness of breath, lower leg edema     Discharge Assessment and Plan:    # Acute hypoxic respiratory failure due to CHF exacerbation and possible Pneumonia - NYHA IV.  HRrEF (40-45%) per old ECHO 11/2019. Progressive SOB, fatigue, orthopnea, bilateral lower extremity edema with recent outpatient CHF clinic management. She has been progressively getting worse, despite optimal medical therapy. On admission Pro-BNP is 9242, Troponin level at 0.059 but trending down to 0.048. Dry weight is between 205-210 pounds and her current weight is 214, almost at goal. Per chart review, since 8/24/20 she has been seeing CHF clinic and her weight dropped to 217lbs from 231lbs with medical outpatient management. Patient is currently on Bumex 1mg BID and metolazone 5mg with goal to decrease edema, improve shortness of breath. - Will continue with Bumex 1mg BID and monitor daily weight, intake and output. We have to be careful with metolazone as it can worsen kidney function. Will consider Nephrology for further recommendation.   - Limit sodium < 2 g/day  - Fluid restriction to 2 L/day  - Will monitor renal function with daily BMP  - Daily weight  - Cardiology following. Will attempt functional study when stable. - ECHO pending      9/17/20: Results of ECHO show ejection fraction at 20-35%, which has significantly decreased from previous ECHO, worsening of CHF. This morning pt has an increase in oxygen demand despite saturation between 91-95% on 6L. Placed on BiPAP. Chest Xray shows bilateral pulmonary infiltrates. One febrile episode overnight. Start Rocephin 1g and Azithromycin 500mg.    Will continue with Bumex 1g and monitor urine output, since admission patient has diuresed ~5L  Will continue to monitor vital signs, urine output.      9/18/20: Since admission total fluid loss is ~7L. Will continue with IV diuresis and increase Bumex to 2g BID as the current weight is 224, up from 214 on admission. Currently on 6L of O2 nasal canula. BiPAP PRN. Still presence of bilateral crackles. Check chest x-ray in the morning for evaluation of pulmonary infiltrates. Will continue with Azithromycin and Rocephin until clinically improves. She remains afebrile  Cardiology following, appreciate recommendations. Will resume vasotec when blood pressure tolerates and renal function is stable.      9/19/20: Patient continues to diurese well, thus far ~9L. Continue with Bumex 2mg IV q12h. Will monitor renal function. Continue with Azithromycin and Rocephin. Patient remains afebrile. She remains on 6L of supplemental oxygen. Goal is to wean off O2 as tolerated. Still requires BiPAP at night. Chest xray this morning reveals slightly improved pulmonary vascular congestion.      9/20/20: Patient has diuresed total of 11,680. Held Bumex this am. Mayte function WNL however Patient's CO2 increased at 42  Continue with Azithromycin and Rocephin. Patient remains afebrile. Added one time Dose of Dimox to help with decreasing Co2  Weaned down to 4L of nasal cannula. Goal is to wean off O2 as tolerated. Still requires BiPAP at night.   Cardiology has added Entresto   will add Lashell Lincolnter at [place] for HF  Will need ischemic workup of Cath- will do as OP once she is improved from PNA  Repeat Chest xray in am     9/21/20: Patient has diuresed total of -11,880 L. Overnight and up until this morning, her blood pressure was low, between 28-16 systolic but the patient remained asymptomatic with no dizziness, lightheadedness or confusion. This is mot likely due to recent diuresis. Morning dose of amiodarone, bumex and entresto was held.  Will resume once blood pressure is stable and patient can tolerate it. Will continue to monitor blood pressure and for symptoms. Will discontinue  metoprolol due to low blood pressure and per cardiology. Continues to be on BiPAP at night. On 5L this morning but will continue weaning off as tolerated. Most likely will need O2 at discharge. Potassium this morning 3.0, most likely 2/2 to diuresis. Will replace with 40mEq PO BID. Will re-check potassium      9/22/20: Switched to Bumex 1g PO and will continue. Overnight patient spiked fever to 101.6, Tylenol was given. Will check urinalysis, urine culture, blood culture for possible source of infection. Chest xray shows increasing opacities near the right lung base most likely 2/2 to aspiration. Will start on Zosyn for possible aspiration pneumonia. Will consult speech therapy to evaluate.      9/23/20: Patient remains afebrile. Saturating between 93-97% on 2L. Continues to improve. 9/24/20: Remains afebrile. Saturating between 96%-98%.       # Right pleural infusion s/p thoracentesis - History of breast cancer. CTA chest shows right upper lobe nodule measuring 1.2 cm which has increased in size compared to 7/8/2019 concerning for malignancy. Pleural fluid shows total nucleated cells to be 974 which is on the line between being either transudative or exudative. The diferential includes malignancy, constricitive pericarditis, pulmonary embolism, pneumonia. Patient has been hypoxic saturating between 78-85%. - Continue to monitor vital signs with goal to keep spO2 >92%     9/17/20: Culture-final report pending. Initiated BiPAP due to increase in oxygen demand. Currently saturating 94%. Will continue to monitor     9/18/20 Cytology - pending     9/19/20 Cytology shows no malignant cells.      # Diabetes mellitus type II - Hold home Metformin and Lantus. Will place on insulin sliding scale  Lantus and will adjust as needed.      9/16 Blood sugars have been between 123-188.  Continue with monitoring.       # Invasive ductal carcinoma s/p lumpectomy - last chemotherapy and radiation in 2019.  - Heme/Onc has been consulted. Appreciate recommendation.      # Pancytopenia: possibly due to chemo therapy.      9/18: Will start folic acid 1 mg       # Hyperlipidemia - will continue Lipitor 80mg       # Diabetic Neuropathy - will continue Lyrica 200mg      # Hx of ventricular arrhythmia - will continue amiodarone 200mg      # Hx of CAD: will continue ASA 81mg, Plavix 75mg.        Initial H and P and Hospital course:    59-year-old morbidly obese  female presents emergency department for evaluation of shortness of breath and bilateral leg swelling. She has a significant PMHx of CHF HFrEF (EF 40%-45%, CAD, CABG (3V,2009), HTN, HLD, AAA, PVD, DM,   Breast CA w/ mets to lung. Patient states her shortness of breath and leg swelling is been getting worse over the last year however this week she is has difficulty with performing her ADLs secondary to more dyspnea on exertion.  She  recently had a therapeutic and diagnostic thoracentesis. Denies any fever, chest pain, numbness, tingling at this time.  Her past medical history includes ventricular arrhythmias diabetes ICD placement, hypertension, hyperlipidemia CHF, invasive ductal carcinoma of the right breast.   Per chart review, she is followed in the heart failure clinic. Her dry weight is between 205-210 pounds. On August 24 she was given 1 unit of Lasix 20 g IV. On August 26 she continued to have an increase in shortness of breath on exertion, lower extremity edema, and her Bumex was doubled for 3 days. On August 31 she was very short of breath and was put on IV Bumex 2 mg, metolazone 5 mg x 2 days. Yesterday at the heart failure clinic, she continued to be fatigued, tired, saturating between 85-90%, with shortness of breath at rest, or lower extremity edema.   Recently she had a therapeutic and diagnostic thoracentesis.     9/16/20: Patient is currently on Bumex 1mg catheter, she continues to have urinary retention. Straith cath was done which yield 600mL. Placed perez cath bauer urinary retention. Consider Urology for outpatient management of urinary retention. Blood pressure remains stable, between 203-175Q systolic.     5/41: Patient remains afebrile. Placed perez on 9/23. Will need Urology as outpatient for possible urodynamic studies. Will start on Augmentin BID x 5 days. Will continue entresto for CHF. Physical Exam:-  Vitals:   Patient Vitals for the past 24 hrs:   BP Temp Temp src Pulse Resp SpO2 Height Weight   09/24/20 1315 -- -- -- -- -- 96 % -- --   09/24/20 1211 -- -- -- -- -- 95 % -- --   09/24/20 1200 -- -- -- -- -- (!) 89 % -- --   09/24/20 1058 (!) 104/58 97.5 °F (36.4 °C) Oral 70 18 95 % -- --   09/24/20 0817 -- -- -- -- -- 94 % -- --   09/24/20 0810 -- -- -- -- -- (!) 89 % -- --   09/24/20 0804 (!) 100/53 97.8 °F (36.6 °C) Oral 71 20 95 % -- --   09/24/20 0330 (!) 92/53 98.4 °F (36.9 °C) Oral 70 20 94 % 5' 2\" (1.575 m) 198 lb 3.2 oz (89.9 kg)   09/24/20 0038 -- -- -- -- 18 94 % -- --   09/23/20 2310 (!) 104/52 98.8 °F (37.1 °C) Oral 73 16 93 % -- --   09/23/20 2025 (!) 108/57 98.9 °F (37.2 °C) Oral 74 18 92 % -- --   09/23/20 1505 (!) 99/52 98.3 °F (36.8 °C) Oral 71 20 94 % -- --     Weight:   Weight: 198 lb 3.2 oz (89.9 kg)   24 hour intake/output:     Intake/Output Summary (Last 24 hours) at 9/24/2020 1422  Last data filed at 9/24/2020 1241  Gross per 24 hour   Intake 1837.27 ml   Output 1130 ml   Net 707.27 ml       1. General appearance: No apparent distress, appears stated age and cooperative. 2. HEENT: Normal cephalic, atraumatic without obvious deformity. Pupils equal, round, and reactive to light. Extra ocular muscles intact. Conjunctivae/corneas clear. 3. Neck: Supple, with full range of motion. No jugular venous distention. Trachea midline. 4. Respiratory:  Normal respiratory effort.  Clear to auscultation, bilaterally without Rales/Wheezes/Rhonchi. 5. Cardiovascular: Regular rate and rhythm with normal S1/S2 without murmurs, rubs or gallops. 6. Abdomen: Soft, non-tender, non-distended with normal bowel sounds. 7. Musculoskeletal:  No clubbing, cyanosis or edema bilaterally. 8. Skin: Skin color, texture, turgor normal.  No rashes or lesions. 9. Neurologic:  Neurovascularly intact without any focal sensory/motor deficits. Cranial nerves: II-XII intact, grossly non-focal.  10. Psychiatric: Alert and oriented, thought content appropriate, normal insight  11. Capillary Refill: Brisk,< 3 seconds   12. Peripheral Pulses: +2 palpable, equal bilaterally       Discharge Medications:-      Medication List      START taking these medications    amoxicillin-clavulanate 250-62.5 MG/5ML suspension  Commonly known as: Augmentin  Take 5 mLs by mouth 2 times daily for 5 days     sacubitril-valsartan 24-26 MG per tablet  Commonly known as:  ENTRESTO  Take 1 tablet by mouth 2 times daily        CHANGE how you take these medications    bumetanide 1 MG tablet  Commonly known as:  BUMEX  Take 1 tablet by mouth daily  What changed:  when to take this     * Lantus SoloStar 100 UNIT/ML injection pen  Generic drug:  insulin glargine  What changed:  Another medication with the same name was changed. Make sure you understand how and when to take each. * Lantus SoloStar 100 UNIT/ML injection pen  Generic drug:  insulin glargine  Inject 18 Units into the skin nightly  What changed:  See the new instructions. * This list has 2 medication(s) that are the same as other medications prescribed for you. Read the directions carefully, and ask your doctor or other care provider to review them with you.             CONTINUE taking these medications    amiodarone 200 MG tablet  Commonly known as:  CORDARONE  TAKE 1 TABLET DAILY     aspirin EC 81 MG EC tablet  Take 1 tablet by mouth daily     atorvastatin 80 MG tablet  Commonly known as:  LIPITOR  TAKE 1 TABLET DAILY     blood glucose test strips strip  Commonly known as:  ASCENSIA AUTODISC VI;ONE TOUCH ULTRA TEST VI  Test once daily. Dispense One Touch Ultra Test Strips. Dx: Type 2 diabetes (250.00)     clopidogrel 75 MG tablet  Commonly known as:  Plavix  Take 1 tablet by mouth daily     enalapril 2.5 MG tablet  Commonly known as:  VASOTEC  TAKE 1 TABLET DAILY     Flonase 50 MCG/ACT nasal spray  Generic drug:  fluticasone     Insulin Pen Needle 31G X 5 MM Misc  Commonly known as:  B-D UF III MINI PEN NEEDLES  1 each by Does not apply route daily     ipratropium-albuterol 0.5-2.5 (3) MG/3ML Soln nebulizer solution  Commonly known as:  DUONEB  Inhale 3 mLs into the lungs every 4 hours as needed for Shortness of Breath or Other (wheezing)     metFORMIN 750 MG extended release tablet  Commonly known as:  GLUCOPHAGE-XR     potassium chloride 20 MEQ Tbcr extended release tablet  Commonly known as:  KLOR-CON M     pregabalin 200 MG capsule  Commonly known as:  LYRICA  Take 1 capsule by mouth 2 times daily for 90 days. rOPINIRole 0.5 MG tablet  Commonly known as:  REQUIP  TAKE ONE (1) TABLET BY MOUTH ONCE DAILY AT BEDTIME.      vitamin B-12 100 MCG tablet  Commonly known as:  CYANOCOBALAMIN        STOP taking these medications    metOLazone 5 MG tablet  Commonly known as:  ZAROXOLYN           Where to Get Your Medications      These medications were sent to [address] [place] [address]    Phone:  [phone]   · atorvastatin 80 MG tablet     These medications were sent to [facility], [place]  [address] [place] [address]    Phone:  [phone]   · amoxicillin-clavulanate 250-62.5 MG/5ML suspension     You can get these medications from any pharmacy    Bring a paper prescription for each of these medications  · pregabalin 200 MG capsule Information about where to get these medications is not yet available    Ask your nurse or doctor about these medications  · bumetanide 1 MG tablet  · Lantus SoloStar 100 UNIT/ML injection pen  · sacubitril-valsartan 24-26 MG per tablet          Labs :-  Recent Results (from the past 72 hour(s))   POCT glucose    Collection Time: 09/21/20  5:09 PM   Result Value Ref Range    POC Glucose 153 (H) 70 - 108 mg/dl   POCT glucose    Collection Time: 09/21/20  7:16 PM   Result Value Ref Range    POC Glucose 179 (H) 70 - 108 mg/dl   POCT glucose    Collection Time: 09/22/20  6:07 AM   Result Value Ref Range    POC Glucose 174 (H) 70 - 108 mg/dl   CBC auto differential    Collection Time: 09/22/20 11:00 AM   Result Value Ref Range    WBC 14.1 (H) 4.8 - 10.8 thou/mm3    RBC 4.06 (L) 4.20 - 5.40 mill/mm3    Hemoglobin 8.8 (L) 12.0 - 16.0 gm/dl    Hematocrit 31.3 (L) 37.0 - 47.0 %    MCV 77.1 (L) 81.0 - 99.0 fL    MCH 21.7 (L) 26.0 - 33.0 pg    MCHC 28.1 (L) 32.2 - 35.5 gm/dl    RDW-CV 25.0 (H) 11.5 - 14.5 %    RDW-SD 63.4 (H) 35.0 - 45.0 fL    Platelets 654 770 - 689 thou/mm3    Seg Neutrophils 88.2 %    Lymphocytes 2.3 %    Monocytes 7.9 %    Eosinophils 0.6 %    Basophils 0.4 %    Immature Granulocytes 0.6 %    Platelet Estimate ADEQUATE Adequate    Segs Absolute 12.4 (H) 1.8 - 7.7 thou/mm3    Lymphocytes Absolute 0.3 (L) 1.0 - 4.8 thou/mm3    Monocytes Absolute 1.1 0.4 - 1.3 thou/mm3    Eosinophils Absolute 0.1 0.0 - 0.4 thou/mm3    Basophils Absolute 0.1 0.0 - 0.1 thou/mm3    Immature Grans (Abs) 0.09 (H) 0.00 - 0.07 thou/mm3    nRBC 0 /100 wbc    Anisocytosis PRESENT Absent    BASOPHILIA 1+ Absent    Hypochromia PRESENT Absent    Poikilocytes 1+ Absent    Spherocytes 1+ Absent   Basic Metabolic Panel    Collection Time: 09/22/20 11:00 AM   Result Value Ref Range    Sodium 140 135 - 145 meq/L    Potassium 4.5 3.5 - 5.2 meq/L    Chloride 96 (L) 98 - 111 meq/L    CO2 33 23 - 33 meq/L    Glucose 171 (H) 70 - 108 mg/dL BUN 33 (H) 7 - 22 mg/dL    CREATININE 1.2 0.4 - 1.2 mg/dL    Calcium 8.5 8.5 - 10.5 mg/dL   Culture, Blood 1    Collection Time: 09/22/20 11:00 AM    Specimen: Blood   Result Value Ref Range    Blood Culture, Routine No growth-preliminary     Anion Gap    Collection Time: 09/22/20 11:00 AM   Result Value Ref Range    Anion Gap 11.0 8.0 - 16.0 meq/L   Glomerular Filtration Rate, Estimated    Collection Time: 09/22/20 11:00 AM   Result Value Ref Range    Est, Glom Filt Rate 44 (A) ml/min/1.73m2   Scan of Blood Smear    Collection Time: 09/22/20 11:00 AM   Result Value Ref Range    SCAN OF BLOOD SMEAR see below    Culture, Blood 2    Collection Time: 09/22/20 11:32 AM    Specimen: Blood   Result Value Ref Range    Blood Culture, Routine No growth-preliminary     POCT glucose    Collection Time: 09/22/20 11:43 AM   Result Value Ref Range    POC Glucose 206 (H) 70 - 108 mg/dl   COVID-19    Collection Time: 09/22/20  1:50 PM   Result Value Ref Range    SARS-CoV-2, PCR NOT DETECTED NOT DETECTED   Urinalysis with microscopic    Collection Time: 09/22/20  4:00 PM   Result Value Ref Range    Glucose, Urine NEGATIVE NEGATIVE mg/dl    Bilirubin Urine NEGATIVE NEGATIVE    Ketones, Urine NEGATIVE NEGATIVE    Specific Gravity, UA 1.011 1.002 - 1.030    Blood, Urine SMALL (A) NEGATIVE    pH, UA 7.5 5.0 - 9.0    Protein, UA NEGATIVE NEGATIVE mg/dl    Urobilinogen, Urine 1.0 0.0 - 1.0 eu/dl    Nitrite, Urine NEGATIVE NEGATIVE    Leukocyte Esterase, Urine NEGATIVE NEGATIVE    Color, UA YELLOW YELLOW-STRAW    Character, Urine CLEAR [id number]    RBC, UA 10-15 0-2/hpf /hpf    WBC, UA 2-4 0-4/hpf /hpf    Epithelial Cells, UA 0-2 3-5/hpf /hpf    Bacteria, UA NONE SEEN FEW/NONE SEEN    Casts NONE SEEN NONE SEEN /lpf    Crystals NONE SEEN NONE SEEN    Renal Epithelial, UA NONE SEEN NONE SEEN    Yeast, UA NONE SEEN NONE SEEN    Casts NONE SEEN /lpf    Miscellaneous Lab Test Result NONE SEEN    Culture, Urine    Collection Time: 09/22/20 4:00 PM    Specimen: Urine, catheter   Result Value Ref Range    Urine Culture, Routine No growth-preliminary No growth     POCT glucose    Collection Time: 09/22/20  4:27 PM   Result Value Ref Range    POC Glucose 260 (H) 70 - 108 mg/dl   POCT glucose    Collection Time: 09/22/20  8:15 PM   Result Value Ref Range    POC Glucose 207 (H) 70 - 108 mg/dl   CBC    Collection Time: 09/23/20  5:20 AM   Result Value Ref Range    WBC 14.1 (H) 4.8 - 10.8 thou/mm3    RBC 3.80 (L) 4.20 - 5.40 mill/mm3    Hemoglobin 8.5 (L) 12.0 - 16.0 gm/dl    Hematocrit 29.0 (L) 37.0 - 47.0 %    MCV 76.3 (L) 81.0 - 99.0 fL    MCH 22.4 (L) 26.0 - 33.0 pg    MCHC 29.3 (L) 32.2 - 35.5 gm/dl    RDW-CV 25.3 (H) 11.5 - 14.5 %    RDW-SD 62.6 (H) 35.0 - 45.0 fL    Platelets 564 414 - 583 thou/mm3    MPV ---- 9.4 - 12.4 fL   SPECIMEN REJECTION    Collection Time: 09/23/20  6:02 AM   Result Value Ref Range    Rejected Test BMP     Reason for Rejection see below    Basic Metabolic Panel    Collection Time: 09/23/20  6:20 AM   Result Value Ref Range    Sodium 141 135 - 145 meq/L    Potassium 4.7 3.5 - 5.2 meq/L    Chloride 101 98 - 111 meq/L    CO2 31 23 - 33 meq/L    Glucose 129 (H) 70 - 108 mg/dL    BUN 36 (H) 7 - 22 mg/dL    CREATININE 1.3 (H) 0.4 - 1.2 mg/dL    Calcium 8.5 8.5 - 10.5 mg/dL   Anion Gap    Collection Time: 09/23/20  6:20 AM   Result Value Ref Range    Anion Gap 9.0 8.0 - 16.0 meq/L   Glomerular Filtration Rate, Estimated    Collection Time: 09/23/20  6:20 AM   Result Value Ref Range    Est, Glom Filt Rate 40 (A) ml/min/1.73m2   POCT Glucose    Collection Time: 09/23/20  6:34 AM   Result Value Ref Range    POC Glucose 158 (H) 70 - 108 mg/dl   POCT Glucose    Collection Time: 09/23/20 11:45 AM   Result Value Ref Range    POC Glucose 217 (H) 70 - 108 mg/dl   POCT Glucose    Collection Time: 09/23/20  4:34 PM   Result Value Ref Range    POC Glucose 204 (H) 70 - 108 mg/dl   POCT Glucose    Collection Time: 09/23/20  7:47 PM   Result Value Ref Range    POC Glucose 221 (H) 70 - 108 mg/dl   Brain Natriuretic Peptide    Collection Time: 09/24/20  5:26 AM   Result Value Ref Range    Pro-BNP 5069.0 (H) 0.0 - 1800.0 pg/mL   CBC auto differential    Collection Time: 09/24/20  5:26 AM   Result Value Ref Range    WBC 9.7 4.8 - 10.8 thou/mm3    RBC 3.53 (L) 4.20 - 5.40 mill/mm3    Hemoglobin 7.7 (L) 12.0 - 16.0 gm/dl    Hematocrit 27.4 (L) 37.0 - 47.0 %    MCV 77.6 (L) 81.0 - 99.0 fL    MCH 21.8 (L) 26.0 - 33.0 pg    MCHC 28.1 (L) 32.2 - 35.5 gm/dl    RDW-CV 25.6 (H) 11.5 - 14.5 %    RDW-SD 67.3 (H) 35.0 - 45.0 fL    Platelets 486 587 - 951 thou/mm3    MPV ---- 9.4 - 12.4 fL    Seg Neutrophils 79.3 %    Lymphocytes 6.4 %    Monocytes 9.8 %    Eosinophils 2.7 %    Basophils 0.5 %    Immature Granulocytes 1.3 %    Segs Absolute 7.7 1.8 - 7.7 thou/mm3    Lymphocytes Absolute 0.6 (L) 1.0 - 4.8 thou/mm3    Monocytes Absolute 1.0 0.4 - 1.3 thou/mm3    Eosinophils Absolute 0.3 0.0 - 0.4 thou/mm3    Basophils Absolute 0.0 0.0 - 0.1 thou/mm3    Immature Grans (Abs) 0.13 (H) 0.00 - 0.07 thou/mm3    nRBC 0 /100 wbc    Anisocytosis PRESENT Absent    Hypochromia PRESENT Absent   Basic Metabolic Panel    Collection Time: 09/24/20  5:26 AM   Result Value Ref Range    Sodium 143 135 - 145 meq/L    Potassium 5.1 3.5 - 5.2 meq/L    Chloride 104 98 - 111 meq/L    CO2 32 23 - 33 meq/L    Glucose 123 (H) 70 - 108 mg/dL    BUN 36 (H) 7 - 22 mg/dL    CREATININE 1.4 (H) 0.4 - 1.2 mg/dL    Calcium 8.6 8.5 - 10.5 mg/dL   Anion Gap    Collection Time: 09/24/20  5:26 AM   Result Value Ref Range    Anion Gap 7.0 (L) 8.0 - 16.0 meq/L   Glomerular Filtration Rate, Estimated    Collection Time: 09/24/20  5:26 AM   Result Value Ref Range    Est, Glom Filt Rate 36 (A) ml/min/1.73m2   POCT Glucose    Collection Time: 09/24/20  6:35 AM   Result Value Ref Range    POC Glucose 132 (H) 70 - 108 mg/dl   POCT Glucose    Collection Time: 09/24/20 11:39 AM   Result Value Ref Range    POC Glucose 211 (H) 70 - 108 mg/dl        Microbiology:    Blood culture #1:   Lab Results   Component Value Date    BC No growth-preliminary  09/22/2020       Blood culture #2:No results found for: BLOODCULT2    Organism:    Lab Results   Component Value Date    LABGRAM  09/15/2020     Few segmented neutrophils observed. No organisms observed. performed on cytospun specimen       MRSA culture only:No results found for: [address]    Urine culture:   Lab Results   Component Value Date    LABURIN No growth-preliminary No growth  09/22/2020     Lab Results   Component Value Date    ORG Enterobacter aerogenes 09/11/2019        Respiratory culture: No results found for: CULTRESP    Aerobic and Anaerobic :  No results found for: LABAERO  Lab Results   Component Value Date    LABANAE No growth-preliminary No growth  09/15/2020       Urinalysis:      Lab Results   Component Value Date    NITRU NEGATIVE 09/22/2020    WBCUA 2-4 09/22/2020    BACTERIA NONE SEEN 09/22/2020    RBCUA 10-15 09/22/2020    BLOODU SMALL 09/22/2020    SPECGRAV 1.011 09/22/2020    [address] NEGATIVE 08/28/2019       Radiology:-  Cta Chest W Wo Contrast    Result Date: 9/15/2020  PROCEDURE: CTA CHEST W WO CONTRAST CLINICAL INFORMATION: shortness of breath, hx cancer. Leg swelling and shortness of breath. COMPARISON: CT chest dated 8/26/2020. TECHNIQUE: 3 mm axial images were obtained through the chest during the administration of 80 mL Isovue-370 injected in the right forearm. A non-contrast localizer was obtained. 3D reconstructions were performed on the scanner to include MIP coronal and sagittal images through the chest. All CT scans at this facility use dose modulation, iterative reconstruction, and/or weight-based dosing when appropriate to reduce radiation dose to as low as reasonably achievable. FINDINGS:  There is a good contrast bolus within the pulmonary arteries, adequate for evaluation to the subsegmental level.  There are no filling defects within the pulmonary arteries to suggest pulmonary embolus. There is stable mural calcification in the aortic arch. No aneurysm or dissection is present. No axillary, mediastinal or hilar lymphadenopathy is identified. The heart is enlarged, stable compared to prior exam. There is stable left-sided cardiac pacer/defibrillator generator with stable leads in the right heart. There is a moderate right pleural effusion, similar to prior CT. Pulmonary vessels are prominent anteriorly along with mild interstitial prominence. Redemonstration of a nodule in the right upper lobe posteriorly. This measures 1.2 cm and appears to be mildly increased in size compared to 7/8/2019 when it measured 1 cm. Visualized upper abdominal solid organs are unremarkable. There are stable degenerative changes of the thoracic spine. 1. No evidence of pulmonary embolus or acute intrathoracic abnormality. 2. Persistent cardiomegaly and moderate right pleural effusion with pulmonary vascular congestion, similar to 8/26/2020. 3. Right upper lobe nodule measuring 1.2 cm which has increased in size compared to 7/8/2019 concerning for malignancy. **This report has been created using voice recognition software. It may contain minor errors which are inherent in voice recognition technology. ** Final report electronically signed by Dr. Pernell Goel MD on 9/15/2020 2:33 PM    Us Thoracentesis    Result Date: 9/15/2020  PROCEDURE: US THORACENTESIS CLINICAL INFORMATION: moderate right pleural effusion on CT . COMPARISON: CT 9/15/2020 PROCEDURE: The benefits and the risk of the procedure were explained to the patient. The patient was given an opportunity to ask questions. Signed consent was obtained. Limited ultrasound exam of the right chest showed moderate amount of pleural fluid. Using ultrasound guidance, a site was marked on the skin.  The right side of the posterior chest was prepped and draped using the usual sterile technique and the skin was anesthetized with 2 percent lidocaine. A 5 Macedonian one-step catheter was introduced to the right pleural space. 0.65 L of theresa colored fluid drained. The catheter was then removed. The patient tolerated the procedure well with no complications. Specimen sent for laboratory studies as requested. Estimated blood loss is 0 cc. Patient left the department in good condition. Successful ultrasound-guided thoracentesis with  0.65 L of fluid drained. **This report has been created using voice recognition software. It may contain minor errors which are inherent in voice recognition technology. ** Final report electronically signed by Dr. Len Hernandez on 9/15/2020 6:56 PM    Xr Chest Pa Inspiration 1 Vw    Result Date: 9/15/2020  PROCEDURE: XR CHEST PA INSPIRATION 1 VW CLINICAL INFORMATION: right thoracentesis. COMPARISON: CT 9/15/2020 TECHNIQUE: AP upright view of the chest. FINDINGS: Right pleural fluid is moderately improved. No gross pneumothorax. Moderate congestion again noted. There are mild airspace opacities bilaterally particularly within the perihilar regions suggesting pulmonary edema. Moderate enlargement of the cardiopericardial silhouette is unchanged. No pneumothorax post right thoracentesis. **This report has been created using voice recognition software. It may contain minor errors which are inherent in voice recognition technology. ** Final report electronically signed by Dr. Len Hernandez on 9/15/2020 6:54 PM       Follow-up scheduled after discharge :-    in the next few days with Lily Rivas MD  in 7 days     Consultations during this hospital stay:-  [] NONE [x] Cardiology  [] Nephrology  [x] Hemo onco   [] GI   [] ID  [] Endocrine  [] Pulm    [] Neuro    [] Psych   [] Urology  [] ENT   [] [address]   []Ortho    []CV surg    [] Palliative  [] Hospice [] Pain management   []    []TCU   [] PT/OT  OTHERS:-    Disposition: SNF  Condition at Discharge: Stable    Time Spent:- 30 minutes    Electronically signed by Naif Armendariz MD on 9/24/2020 at 2:22 PM  Discharging Hospitalist

## 2020-09-24 NOTE — PLAN OF CARE
Problem: Falls - Risk of:  Goal: Will remain free from falls  Description: Will remain free from falls  Outcome: Ongoing  Note: Patient free from falls this shift with call light within reach, slipper socks in place, and bed alarm on. Problem: Falls - Risk of:  Goal: Absence of physical injury  Description: Absence of physical injury  Outcome: Ongoing  Note: Patient free of physical injury this shift with call light within reach, slipper socks in place, and bed alarm on. Problem: Discharge Planning:  Goal: Discharged to appropriate level of care  Description: Discharged to appropriate level of care  Outcome: Ongoing  Note: Plan is discharge to [facility] when medically cleared. Problem: Skin Integrity:  Goal: Will show no infection signs and symptoms  Description: Will show no infection signs and symptoms  Outcome: Ongoing  Note: Patient is afebrile and shows no signs or symptoms of infection this shift. Problem: Skin Integrity:  Goal: Absence of new skin breakdown  Description: Absence of new skin breakdown  Outcome: Ongoing  Note: Patient has no signs of skin breakdown this shift with frequent turning and changing incontinence episodes. Care plan reviewed with patient and daughter. Patient and daughter verbalize understanding of the plan of care and contribute to goal setting.

## 2020-09-24 NOTE — PROGRESS NOTES
Last dose MAR, AVS, and prescription for Lyrica faxed to [facility]. Report called to [facility], nurse at [facility]. This RN spoke with Therese Pelaez, , about BiPAP as [facility] had stated that they will not have a BiPAP ready for her. Therese Pelaez spoke with respiratory therapy and [place], . They state that patient will be okay without BiPAP until the nursing home would be able to get one in place. This RN called [facility] back to notify of the above.

## 2020-09-25 NOTE — CARE COORDINATION
9/25/20, 7:02 AM EDT  Late entry. Patient was discharged by squad to [facility]. She is skilled under her Medicare benefit. ECF was notified of discharge. Patient goals/plan/ treatment preferences discussed by  and . Patient goals/plan/ treatment preferences reviewed with patient/ family. Patient/ family verbalize understanding of discharge plan and are in agreement with goal/plan/treatment preferences. Understanding was demonstrated using the teach back method. AVS provided by RN at time of discharge, which includes all necessary medical information pertaining to the patients current course of illness, treatment, post-discharge goals of care, and treatment preferences. Services After Discharge  Services At/After Discharge:  In ambulance, Nursing Services, Skilled Therapy, Aide Services([facility])   IMM Letter  IMM Letter given to Patient/Family/Significant other/Guardian/POA/by[de-identified]   IMM Letter date given[de-identified] 09/24/20  IMM Letter time given[de-identified] [phone]

## 2020-09-28 NOTE — TELEPHONE ENCOUNTER
Saw nursing note in [facility] notes about pharmacy questioning if pt should be on both enalapril and entresto. Should STOP enalapril. Continue Entresto but do not give next dose of entresto until 2 days after last dose of enalapril.       Call [facility]

## 2020-09-28 NOTE — TELEPHONE ENCOUNTER
Per hospital notes, is to be set up with urology for outpt appt.   Set up appt for urology appt at [address] [place] [address]-- I placed referral in Epic as well.    -Change perez every 30 days  -Routine perez care and cleaning daily    Call [facility]

## 2020-09-28 NOTE — TELEPHONE ENCOUNTER
Karina Montes from the [facility] called office stating that pt was admitted with catheter d/t having urinary retention while in hospital.     Pt was admitted with no other orders regarding catheter. Karina Montes is asking for verbal orders.

## 2020-09-29 NOTE — TELEPHONE ENCOUNTER
Sobia Siddiqi called from [place] with critical potassium of 6.4. Please advise. Informed Sobia Siddiqi of message regarding enalapril and entresto. Per Sobia Siddiqi patient has NOT taken either medication since being admitted to [facility] due to the discrepancy.

## 2020-10-05 NOTE — CODE DOCUMENTATION
Pt suctioned for clear blood tinged secretions. Dr Ruben Padron using ultrasound machine to check heart activity.

## 2020-10-05 NOTE — ED NOTES
Destinee Castro, house supervisor at [address] [place] [address] notified.      Fredi Munoz RN  10/05/20 0732

## 2020-10-05 NOTE — CODE DOCUMENTATION
Pt remains unresponsive, no pulse or no spontaneous resp noted, asystole noted on monitor in 2 leads. Code called and pt pronounced at this time.

## 2020-10-05 NOTE — CODE DOCUMENTATION
Pt presents via squad as a Code. Pt was a resident at the [facility] and was talking and went unresponsive. Pt has a size 4 Elfego airway in and is being bagged by a BMV with 100% O2, CO2 color change noted, rise and fall of chest noted, lungs with rhochi. Pt has a auto pulse device on performing compressions. IO noted in l lower leg. Pt nonresponsive, pupils fixed and dilated, skin pale, cool and dry, no pulse noted and no spontaneous resp noted. Cardiac monitor applied.

## 2020-10-05 NOTE — ED PROVIDER NOTES
wall extending into lateral wall, indicative of RCA lesion. Bowel and soft tissue attenuation interfering w/ counts of lateral wall. EF 29% w/ severe septal and inferolateral hypokinesis w/ very mild lateral wall hypokinesis being noted.  CARDIOVERSION  8-29-09    CHOLECYSTECTOMY  2005    Laparoscopic    COLONOSCOPY Left 1/9/2019    COLONOSCOPY performed by Nadiya Durand MD at [facility] CATH LAB PROCEDURE  8-24-09    Multivessel disease demonstrated by LAD having 70-80%  proximal lesion and napkin-ring lesion at bifurcation w/ D2. D2 appears to be medium large caliber vessel which has an ostial lesion of 70-80%. left -to-left collaterals as well as left-to-right collaterals emanating from LAD to PDA. Circumflex had anterior marginal branch and posterior marginal branch.  HERNIA REPAIR      Multiple    HYSTERECTOMY  1997    INSERTION / REMOVAL / REPLACEMENT VENOUS ACCESS CATHETER Right 7/31/2019    SINGLE LUMEN SMART PORT INSERTION performed by Andrew Woods MD at / [facility] 9 N/A 12/3/2019    SINGLE LUMEN ZCHVAPJML REMOVAL RIGHT SUBCLAVIAN performed by Andrew Woods MD at [address] [place] [address] ECHOCARDIOGRAM  07-11-11    LV size mildly to moderately dilated. Systolic function markedly reduced. EF 25-30%. Severe diffuse hypokinesis. Grade 1 diastolic dysfunction. LA was mildly to moderately dilated. RV mildly dilated, systolic function mildly reduced. Mild MR and TR.  TRANSTHORACIC ECHOCARDIOGRAM  8-24-09    LV demonstrates severe hypokinesis of the inferior basal as well as  basal aneurysm being noted and paradoxical septal motion. EF 45-50%. LA is moderately dilated and AV demonstrates mild AV sclerosis w/o AS and AI. Trace MR and TV insufficiency.      VASCULAR SURGERY      cabg harvests from legs       CURRENT MEDICATIONS    Current Outpatient Rx   Medication Sig Dispense Refill    atorvastatin (LIPITOR) 80 MG tablet TAKE 1 TABLET DAILY 90 tablet 3    pregabalin (LYRICA) 200 MG capsule Take 1 capsule by mouth 2 times daily for 90 days. 20 capsule 0    insulin glargine (LANTUS SOLOSTAR) 100 UNIT/ML injection pen Inject 18 Units into the skin nightly      sacubitril-valsartan (ENTRESTO) 24-26 MG per tablet Take 1 tablet by mouth 2 times daily 20 tablet 0    bumetanide (BUMEX) 1 MG tablet Take 1 tablet by mouth daily 60 tablet 0    fluticasone (FLONASE) 50 MCG/ACT nasal spray 1 spray by Each Nostril route daily as needed for Rhinitis      metFORMIN (GLUCOPHAGE-XR) 750 MG extended release tablet Take 750 mg by mouth 2 times daily      potassium chloride (KLOR-CON M) 20 MEQ TBCR extended release tablet Take 20 mEq by mouth 2 times daily      amiodarone (CORDARONE) 200 MG tablet TAKE 1 TABLET DAILY 90 tablet 4    rOPINIRole (REQUIP) 0.5 MG tablet TAKE ONE (1) TABLET BY MOUTH ONCE DAILY AT BEDTIME. 30 tablet 5    Insulin Pen Needle (B-D UF III MINI PEN NEEDLES) 31G X 5 MM MISC 1 each by Does not apply route daily 100 each 3    vitamin B-12 (CYANOCOBALAMIN) 100 MCG tablet Take 1,000 mcg by mouth 3 times daily       clopidogrel (PLAVIX) 75 MG tablet Take 1 tablet by mouth daily 90 tablet 3    ipratropium-albuterol (DUONEB) 0.5-2.5 (3) MG/3ML SOLN nebulizer solution Inhale 3 mLs into the lungs every 4 hours as needed for Shortness of Breath or Other (wheezing) 360 mL 5    blood glucose test strips (ASCENSIA AUTODISC VI;ONE TOUCH ULTRA TEST VI) strip Test once daily. Dispense One Touch Ultra Test Strips.   Dx: Type 2 diabetes (250.00) 100 each 5    aspirin EC 81 MG EC tablet Take 1 tablet by mouth daily 30 tablet 3       ALLERGIES    Allergies   Allergen Reactions    Sulfa Antibiotics Swelling       FAMILY HISTORY    Family History   Problem Relation Age of Onset    Diabetes Father     Cancer Father 80        Bone    Hypertension Mother     Heart Disease Mother     No Known Problems Sister     No Known Problems Brother     No Known Problems Brother     Breast Cancer Neg Hx     Colon Cancer Neg Hx        SOCIAL HISTORY    Social History     Socioeconomic History    Marital status:      Spouse name: Deena Pérez Number of children: 2    Years of education: 15    Highest education level: Not on file   Occupational History    Occupation: amie     Comment: [employer]   Social Needs    Financial resource strain: Not hard at all   [email or web address] insecurity     Worry: Never true     Inability: Never true   [employer] needs     Medical: No     Non-medical: No   Tobacco Use    Smoking status: Former Smoker     Packs/day: 1.50     Years: 30.00     Pack years: 45.00     Types: Cigarettes     Last attempt to quit: 1988     Years since quittin.0    Smokeless tobacco: Never Used   Substance and Sexual Activity    Alcohol use: No     Frequency: Never     Binge frequency: Never    Drug use: No    Sexual activity: Not Currently     Partners: Male   Lifestyle    Physical activity     Days per week: 5 days     Minutes per session: 10 min    Stress: Not at all   Relationships    Social connections     Talks on phone: More than three times a week     Gets together: Never     Attends Jewish service: Never     Active member of club or organization: No     Attends meetings of clubs or organizations: Never     Relationship status:     Intimate partner violence     Fear of current or ex partner: Not on file     Emotionally abused: Not on file     Physically abused: Not on file     Forced sexual activity: Not on file   Other Topics Concern    Not on file   Social History Narrative    Not on file       REVIEW OF SYSTEMS    Cardiac arrest and unable to give any other further history of present illness. PHYSICAL EXAM    VITAL SIGNS: There were no vitals taken for this visit.    Constitutional: Unresponsive intubated  HENT: COVID Bilateral external ears normal,   Elfego airway placed  Cervical Spine: Normal range of motion,  No stridor. No tenderness, Supple,  Eyes:  No discharge or  Swelling,Conjunctiva normal, unresponsive pupils  Respiratory: No spontaneous breathing, rhonchi on lung exam, some pink frothy exudate in the airway cardiovascular: No audible heart tones   GI:  No reproducible pain, small purple bruise bilateral lower quadrants. No expanding hematoma  Musculoskeletal: Pale, no spontaneous movements   Integument:  Warm, Dry, No erythema, No rash (on exposed areas)   Neurologic: Unresponsive     EKG    Pacemaker rhythm on the monitor without pulse                  RADIOLOGY    No orders to display       PROCEDURES    Cursory echo at the bedside showed faint cardiac activity with out palpable pulse. Repeat cursory echo after multiple medications was in asystole. CONSULTS:  Dr. Samir Deshpande  deferred to primary care  Dr. Marii Alfredo will signed a significant    CRITICAL CARE:    SCREENINGS  There were no vitals taken for this visit. Screening For Hypertension and Follow-up (#317)   previously diagnosed with hypertension and not applicable for screen      Screening For Tobacco Use and Cessation Intervention (#226):   reports that she quit smoking about 32 years ago. Her smoking use included cigarettes. She has a 45.00 pack-year smoking history. She has never used smokeless tobacco.  Non-smoker not applicable for screen    ED COURSE & MEDICAL DECISION MAKING    Pertinent Labs & Imaging studies reviewed. (See chart for details)  Patient presents in cardiac arrest.  It was witnessed at the nursing facility. She was in asystole the whole time. Never had a pulse. Even here she was in appear to be asystole on the monitor although she has a pacemaker. We did magnet her pacemaker with no change in rhythm although she actually still had spikes on the monitor. This was reported a defibrillator. She was given multiple doses of epinephrine at the scene as well as by us.   We actually were going to pronounce her with faint cardiac activity without a pulse given the futility of the CPR. She then had a brief palpable pulse in the carotids only and CPR was resumed. Family was made aware of this. Echocardiogram again cursory at the bedside was back in asystole and we decided to stop all interventions. 9:52 was time of death. Case was discussed with . She was witnessed arrest.  She has longstanding medical issues including congestive heart failure and recent hospitalization.  deferred to primary care to sign off the test of significant. I spoke with Dr. Cristina Mullen and she will sign the death certificate. Spoke with the daughter and  on multiple occasions during the code and had no other interventions at this time          FINAL IMPRESSION    1. Cardiac arrest ([facility]         PATIENT REFERRED TO:  No follow-up provider specified.     DISCHARGE MEDICATIONS:  New Prescriptions    No medications on file           Nhi Montes MD  10/05/20 4701

## 2020-10-18 LAB
FUNGUS IDENTIFIED: NORMAL
FUNGUS SMEAR: NORMAL

## 2020-11-02 LAB — AFB CULTURE & SMEAR: NORMAL

## 2024-06-14 NOTE — LETTER
Department of Anesthesiology  Postprocedure Note    Patient: Ac Alcantar  MRN: [id number]  YOB: 1961  Date of evaluation: 6/14/2024    Procedure Summary       Date: 06/14/24 Room / Location: [address]    Anesthesia Start: 1240 Anesthesia Stop: 1400    Procedure: 5TH METATARSAL RESECTION LEFT (Left: Fifth Toe) Diagnosis:       Osteomyelitis of left foot, unspecified type (HCC)      Chronic wound      (Osteomyelitis of left foot, unspecified type (HCC) [M86.9])      (Chronic wound [T14.8XXA])    Surgeons: Agustina Cleaning DPM Responsible Provider: Mina Brown DO    Anesthesia Type: MAC ASA Status: 3            Anesthesia Type: No value filed.    Arielle Phase I: Arielle Score: 10    Arielle Phase II: Arielle Score: 10    Anesthesia Post Evaluation    Patient location during evaluation: PACU  Patient participation: complete - patient participated  Level of consciousness: awake and alert  Airway patency: patent  Nausea & Vomiting: no nausea and no vomiting  Cardiovascular status: hemodynamically stable  Respiratory status: acceptable  Hydration status: stable  Pain management: adequate    No notable events documented.  
suppliers that help patients recover after discharge from the hospital, including skilled nursing facilities, home health agencies, inpatient rehabilitation facilities, and long term care hospitals. [facility] is working closely with the doctors and other health care providers that care for you during and following your hospital stay and for a period of time after you leave the hospital. By working together, the health care providers are trying to more efficiently provide well-managed, high quality, patient-centered care as you undergo treatment. Hospitals, doctors, and other health care providers that care for you following a hospital stay may receive an additional payment for providing better, more coordinated health care. Medicare will monitor your care to make sure you and others get high quality care. Your feedback is important     Medicare may also ask you to answer a survey about the services and care you received from [facility]. The survey will be mailed to you. Your feedback will improve care for all people with Medicare who receive care from [facility]. Completion of this survey is optional.     Get more information     For more information about the Bundled Payments for [address] [place] [address], you can:    · Visit the CMS BPCI Advanced Website at http://sheikh-nicole.net/ initiatives/bpci-advanced   · Call the [facility] BPCI-A team at (103) 143-1166. · Call 1-800-MEDICARE (8-462.413.2315). TTY users can call 8-555.595.6575     If you have concerns or complaints about your care, talk to your health care provider, or contact your Beneficiary and Family Centered Quality Improvement Organization [facility]). To get your CC-QIO's phone number, visit Medicare.gov/contacts or call 1-800-MEDICARE. · To find a different hospital, visit [email or web address] or call

## 2024-09-22 NOTE — PROGRESS NOTES
Pt presents to ED from home for swelling to right eye and on eyebrow. Pt was seen earlier today post fall, pt went home at 1600 with mild swelling. Pt states that 15 min PTA, she was speaking with friend and \"area swelled up within 5 min\". Endorses increased pain. On aspirin daily.    Resp 20   Ht 5' 2\" (1.575 m)   Wt 228 lb (103.4 kg)   SpO2 95%   BMI 41.70 kg/m²     Physical Examination: General appearance - chronically ill appearing  Mental status - alert, oriented to person, place, and time  Neck - supple, no significant adenopathy, no JVD, or carotid bruits  Chest - clear to auscultation, no wheezes, rales or rhonchi, symmetric air entry  Heart - normal rate, regular rhythm, normal S1, S2, no murmurs, rubs, clicks or gallops  Abdomen - protuberant  Neurological - alert, oriented, normal speech, no focal findings or movement disorder noted  Musculoskeletal - no muscular tenderness noted  Extremities - pedal edema 1 +  Skin - normal coloration and turgor, no rashes, no suspicious skin lesions noted    Labs:   Recent Labs     06/01/19  0634   WBC 11.5*   HGB 8.5*   HCT 27.8*        Recent Labs     06/01/19  0634 06/03/19  0646     --    K 4.5  --      --    CO2 28  --    BUN 20  --    CREATININE 0.8 0.8   CALCIUM 9.0  --      No results for input(s): AST, ALT, BILIDIR, BILITOT, ALKPHOS in the last 72 hours. No results for input(s): INR in the last 72 hours. No results for input(s): Tash Herberth in the last 72 hours. Microbiology:      Urinalysis:    No results found for: Agata Ket, BACTERIA, RBCUA, BLOODU, [address]    Radiology:  Xr Chest Standard (2 Vw)    Result Date: 5/30/2019  PROCEDURE: XR CHEST (2 VW) CLINICAL INFORMATION: SOB (shortness of breath) COMPARISON: 10/17/2016 TECHNIQUE: PA and lateral views of the chest were obtained. 1. Mild cardiomegaly. Permanent dual-chamber pacemaker. Metallic sternotomy sutures. 2. Small bilateral pleural effusions. Minimal retrocardiac atelectasis/pneumonia. Moderate right middle lobe atelectasis/pneumonia. Questionable minimal infiltrate anterior segment right upper lobe. **This report has been created using voice recognition software.   It may contain minor errors which are inherent in voice

## (undated) DEVICE — SOLUTION IV IRRIG WATER 1000ML POUR BRL 2F7114

## (undated) DEVICE — GLOVE SURG SZ 65 THK91MIL LTX FREE SYN POLYISOPRENE

## (undated) DEVICE — SUTURE VCRL SZ 3-0 L27IN ABSRB UD L26MM SH 1/2 CIR J416H

## (undated) DEVICE — SHEET, T, LAPAROTOMY, STERILE: Brand: MEDLINE

## (undated) DEVICE — CONNECTOR TBNG AUX H2O JET DISP FOR OLY 160/180 SER

## (undated) DEVICE — SET LNR RED GRN W/ BASE CLEANASCOPE

## (undated) DEVICE — TUBING, SUCTION, 1/4" X 20', STRAIGHT: Brand: MEDLINE INDUSTRIES, INC.

## (undated) DEVICE — SOLUTION IV 1000ML 0.45% SOD CHL PH 5 INJ USP VIAFLX PLAS

## (undated) DEVICE — GLOVE ORANGE PI 7 1/2   MSG9075

## (undated) DEVICE — DRAPE C ARM W36XL30IN RECTANG BND BG AND TAPE

## (undated) DEVICE — GLOVE ORANGE PI 8   MSG9080

## (undated) DEVICE — SYRINGE MED 10ML LUERLOCK TIP W/O SFTY DISP

## (undated) DEVICE — BASIC SINGLE BASIN BTC-LF: Brand: MEDLINE INDUSTRIES, INC.

## (undated) DEVICE — SPONGE GZ W4XL4IN COT 12 PLY TYP VII WVN C FLD DSGN

## (undated) DEVICE — LINER SUCT CANSTR 1500CC SEMI RIG W/ POR HYDROPHOBIC SHUT

## (undated) DEVICE — APPLIER LIG CLP M L11IN TI STR RNG HNDL FOR 20 CLP DISP

## (undated) DEVICE — COVER US PRB W5XL96IN LTX W/ GEL

## (undated) DEVICE — POSITIONER HD W8XH4XL8.5IN RASPBERRY FOAM SLT

## (undated) DEVICE — INTENDED FOR TISSUE SEPARATION, AND OTHER PROCEDURES THAT REQUIRE A SHARP SURGICAL BLADE TO PUNCTURE OR CUT.: Brand: BARD-PARKER ® CARBON RIB-BACK BLADES

## (undated) DEVICE — ENDO KIT: Brand: MEDLINE INDUSTRIES, INC.

## (undated) DEVICE — SKIN AFFIX SURG ADHESIVE 72/CS 0.55ML: Brand: MEDLINE

## (undated) DEVICE — GOWN,SIRUS,NON REINFRCD,LARGE,SET IN SL: Brand: MEDLINE

## (undated) DEVICE — BREAST HERNIA PACK: Brand: MEDLINE INDUSTRIES, INC.

## (undated) DEVICE — SPECIMEN ORIENTATION CHARMS, SIX DISTINCTLY SHAPED STERILE 10MM CHARMS: Brand: MARGINMAP

## (undated) DEVICE — HYPODERMIC SAFETY NEEDLE: Brand: MAGELLAN

## (undated) DEVICE — COVER ARMBRD W13XL28.5IN IMPERV BLU FOR OP RM

## (undated) DEVICE — SUTURE VCRL SZ 4-0 L27IN ABSRB UD L19MM FS-2 3/8 CIR REV J422H